# Patient Record
Sex: FEMALE | Race: WHITE | NOT HISPANIC OR LATINO
[De-identification: names, ages, dates, MRNs, and addresses within clinical notes are randomized per-mention and may not be internally consistent; named-entity substitution may affect disease eponyms.]

---

## 2017-03-08 ENCOUNTER — RX RENEWAL (OUTPATIENT)
Age: 67
End: 2017-03-08

## 2017-05-22 ENCOUNTER — APPOINTMENT (OUTPATIENT)
Dept: INTERNAL MEDICINE | Facility: CLINIC | Age: 67
End: 2017-05-22

## 2017-05-22 RX ORDER — DIPHENOXYLATE HYDROCHLORIDE AND ATROPINE SULFATE 2.5; .025 MG/1; MG/1
2.5-0.025 TABLET ORAL 4 TIMES DAILY
Refills: 0 | Status: ACTIVE | COMMUNITY
Start: 2017-05-22

## 2017-05-24 LAB
ALBUMIN SERPL ELPH-MCNC: 4 G/DL
ALP BLD-CCNC: 95 U/L
ALT SERPL-CCNC: 27 U/L
ANION GAP SERPL CALC-SCNC: 17 MMOL/L
AST SERPL-CCNC: 35 U/L
BASOPHILS # BLD AUTO: 0.02 K/UL
BASOPHILS NFR BLD AUTO: 0.3 %
BILIRUB SERPL-MCNC: 0.5 MG/DL
BUN SERPL-MCNC: 19 MG/DL
CALCIUM SERPL-MCNC: 10.2 MG/DL
CHLORIDE SERPL-SCNC: 98 MMOL/L
CHOLEST SERPL-MCNC: 190 MG/DL
CHOLEST/HDLC SERPL: 1.9 RATIO
CO2 SERPL-SCNC: 23 MMOL/L
CREAT SERPL-MCNC: 0.67 MG/DL
EOSINOPHIL # BLD AUTO: 0.06 K/UL
EOSINOPHIL NFR BLD AUTO: 0.8 %
FERRITIN SERPL-MCNC: 22 NG/ML
FOLATE SERPL-MCNC: >20 NG/ML
GLUCOSE SERPL-MCNC: 72 MG/DL
HCT VFR BLD CALC: 39.5 %
HDLC SERPL-MCNC: 102 MG/DL
HGB BLD-MCNC: 11.9 G/DL
IMM GRANULOCYTES NFR BLD AUTO: 0.1 %
IRON SATN MFR SERPL: 18 %
IRON SERPL-MCNC: 76 UG/DL
LDLC SERPL CALC-MCNC: 62 MG/DL
LYMPHOCYTES # BLD AUTO: 1.42 K/UL
LYMPHOCYTES NFR BLD AUTO: 17.8 %
MAGNESIUM SERPL-MCNC: 1.9 MG/DL
MAN DIFF?: NORMAL
MCHC RBC-ENTMCNC: 26.6 PG
MCHC RBC-ENTMCNC: 30.1 GM/DL
MCV RBC AUTO: 88.4 FL
MONOCYTES # BLD AUTO: 0.53 K/UL
MONOCYTES NFR BLD AUTO: 6.6 %
NEUTROPHILS # BLD AUTO: 5.93 K/UL
NEUTROPHILS NFR BLD AUTO: 74.4 %
PLATELET # BLD AUTO: 341 K/UL
POTASSIUM SERPL-SCNC: 5.3 MMOL/L
PROT SERPL-MCNC: 7.8 G/DL
RBC # BLD: 4.47 M/UL
RBC # FLD: 18 %
SODIUM SERPL-SCNC: 138 MMOL/L
TIBC SERPL-MCNC: 418 UG/DL
TRIGL SERPL-MCNC: 129 MG/DL
UIBC SERPL-MCNC: 342 UG/DL
VIT B12 SERPL-MCNC: 650 PG/ML
WBC # FLD AUTO: 7.97 K/UL

## 2017-05-25 LAB — ZINC SERPL-MCNC: 67 UG/DL

## 2017-06-05 ENCOUNTER — APPOINTMENT (OUTPATIENT)
Dept: INTERNAL MEDICINE | Facility: CLINIC | Age: 67
End: 2017-06-05

## 2017-06-05 VITALS — WEIGHT: 94.5 LBS | BODY MASS INDEX: 19.75 KG/M2

## 2017-06-16 LAB
ANION GAP SERPL CALC-SCNC: 18 MMOL/L
BUN SERPL-MCNC: 15 MG/DL
CALCIUM SERPL-MCNC: 10.2 MG/DL
CHLORIDE SERPL-SCNC: 99 MMOL/L
CO2 SERPL-SCNC: 23 MMOL/L
CREAT SERPL-MCNC: 0.71 MG/DL
GLUCOSE SERPL-MCNC: 89 MG/DL
MAGNESIUM SERPL-MCNC: 2 MG/DL
POTASSIUM SERPL-SCNC: 5.1 MMOL/L
SODIUM SERPL-SCNC: 140 MMOL/L

## 2017-07-05 ENCOUNTER — APPOINTMENT (OUTPATIENT)
Dept: INTERNAL MEDICINE | Facility: CLINIC | Age: 67
End: 2017-07-05

## 2017-07-05 DIAGNOSIS — M85.80 OTHER SPECIFIED DISORDERS OF BONE DENSITY AND STRUCTURE, UNSPECIFIED SITE: ICD-10-CM

## 2017-07-06 VITALS — HEART RATE: 78 BPM | SYSTOLIC BLOOD PRESSURE: 120 MMHG | DIASTOLIC BLOOD PRESSURE: 70 MMHG | RESPIRATION RATE: 14 BRPM

## 2017-08-21 ENCOUNTER — LABORATORY RESULT (OUTPATIENT)
Age: 67
End: 2017-08-21

## 2017-08-25 ENCOUNTER — APPOINTMENT (OUTPATIENT)
Dept: INTERNAL MEDICINE | Facility: CLINIC | Age: 67
End: 2017-08-25
Payer: MEDICARE

## 2017-08-25 PROCEDURE — 99214 OFFICE O/P EST MOD 30 MIN: CPT

## 2017-08-25 RX ORDER — POTASSIUM CHLORIDE 1500 MG/1
20 TABLET, FILM COATED, EXTENDED RELEASE ORAL
Qty: 30 | Refills: 2 | Status: DISCONTINUED | COMMUNITY
Start: 2017-05-22 | End: 2017-08-25

## 2017-09-13 ENCOUNTER — APPOINTMENT (OUTPATIENT)
Dept: SURGERY | Facility: CLINIC | Age: 67
End: 2017-09-13
Payer: MEDICARE

## 2017-09-13 VITALS
HEIGHT: 58 IN | HEART RATE: 86 BPM | WEIGHT: 95 LBS | RESPIRATION RATE: 15 BRPM | BODY MASS INDEX: 19.94 KG/M2 | SYSTOLIC BLOOD PRESSURE: 113 MMHG | DIASTOLIC BLOOD PRESSURE: 73 MMHG | OXYGEN SATURATION: 98 % | TEMPERATURE: 98.3 F

## 2017-09-13 DIAGNOSIS — K62.3 RECTAL PROLAPSE: ICD-10-CM

## 2017-09-13 DIAGNOSIS — Z87.19 PERSONAL HISTORY OF OTHER DISEASES OF THE DIGESTIVE SYSTEM: ICD-10-CM

## 2017-09-13 PROCEDURE — 99215 OFFICE O/P EST HI 40 MIN: CPT

## 2017-09-13 RX ORDER — IRON/IRON ASP GLY/FA/MV-MIN 38 125-25-1MG
TABLET ORAL
Refills: 0 | Status: ACTIVE | COMMUNITY

## 2017-09-25 ENCOUNTER — RX RENEWAL (OUTPATIENT)
Age: 67
End: 2017-09-25

## 2017-10-07 ENCOUNTER — LABORATORY RESULT (OUTPATIENT)
Age: 67
End: 2017-10-07

## 2017-10-12 ENCOUNTER — MEDICATION RENEWAL (OUTPATIENT)
Age: 67
End: 2017-10-12

## 2017-10-12 ENCOUNTER — APPOINTMENT (OUTPATIENT)
Dept: INTERNAL MEDICINE | Facility: CLINIC | Age: 67
End: 2017-10-12
Payer: MEDICARE

## 2017-10-12 VITALS — RESPIRATION RATE: 14 BRPM | HEART RATE: 72 BPM | DIASTOLIC BLOOD PRESSURE: 70 MMHG | SYSTOLIC BLOOD PRESSURE: 110 MMHG

## 2017-10-12 DIAGNOSIS — Z87.39 PERSONAL HISTORY OF OTHER DISEASES OF THE MUSCULOSKELETAL SYSTEM AND CONNECTIVE TISSUE: ICD-10-CM

## 2017-10-12 PROCEDURE — 90662 IIV NO PRSV INCREASED AG IM: CPT

## 2017-10-12 PROCEDURE — G0008: CPT

## 2017-10-12 PROCEDURE — 99214 OFFICE O/P EST MOD 30 MIN: CPT | Mod: 25

## 2017-10-13 LAB
APPEARANCE: CLEAR
BILIRUBIN URINE: NEGATIVE
BLOOD URINE: NEGATIVE
COLOR: YELLOW
GLUCOSE QUALITATIVE U: NEGATIVE MG/DL
KETONES URINE: NEGATIVE
LEUKOCYTE ESTERASE URINE: NEGATIVE
NITRITE URINE: NEGATIVE
PH URINE: 6
PROTEIN URINE: NEGATIVE MG/DL
SPECIFIC GRAVITY URINE: 1.01
UROBILINOGEN URINE: NEGATIVE MG/DL

## 2017-10-17 ENCOUNTER — TRANSCRIPTION ENCOUNTER (OUTPATIENT)
Age: 67
End: 2017-10-17

## 2017-10-28 ENCOUNTER — MEDICATION RENEWAL (OUTPATIENT)
Age: 67
End: 2017-10-28

## 2017-12-01 ENCOUNTER — NON-APPOINTMENT (OUTPATIENT)
Age: 67
End: 2017-12-01

## 2017-12-01 ENCOUNTER — APPOINTMENT (OUTPATIENT)
Dept: INTERNAL MEDICINE | Facility: CLINIC | Age: 67
End: 2017-12-01
Payer: MEDICARE

## 2017-12-01 PROCEDURE — 99497 ADVNCD CARE PLAN 30 MIN: CPT | Mod: 33

## 2017-12-01 PROCEDURE — 93000 ELECTROCARDIOGRAM COMPLETE: CPT

## 2017-12-01 PROCEDURE — 99214 OFFICE O/P EST MOD 30 MIN: CPT | Mod: 25

## 2017-12-01 PROCEDURE — G0439: CPT

## 2017-12-01 RX ORDER — LEVOTHYROXINE SODIUM 0.11 MG/1
112 TABLET ORAL
Qty: 90 | Refills: 2 | Status: DISCONTINUED | COMMUNITY
Start: 2017-05-22 | End: 2017-12-01

## 2017-12-05 ENCOUNTER — TRANSCRIPTION ENCOUNTER (OUTPATIENT)
Age: 67
End: 2017-12-05

## 2018-02-20 ENCOUNTER — RX RENEWAL (OUTPATIENT)
Age: 68
End: 2018-02-20

## 2018-02-23 ENCOUNTER — RX RENEWAL (OUTPATIENT)
Age: 68
End: 2018-02-23

## 2018-02-28 ENCOUNTER — RX RENEWAL (OUTPATIENT)
Age: 68
End: 2018-02-28

## 2018-03-26 ENCOUNTER — RX RENEWAL (OUTPATIENT)
Age: 68
End: 2018-03-26

## 2018-04-16 ENCOUNTER — MEDICATION RENEWAL (OUTPATIENT)
Age: 68
End: 2018-04-16

## 2018-04-18 ENCOUNTER — APPOINTMENT (OUTPATIENT)
Dept: ORTHOPEDIC SURGERY | Facility: CLINIC | Age: 68
End: 2018-04-18
Payer: MEDICARE

## 2018-04-18 VITALS
BODY MASS INDEX: 20.43 KG/M2 | SYSTOLIC BLOOD PRESSURE: 111 MMHG | HEART RATE: 100 BPM | HEIGHT: 58.5 IN | WEIGHT: 100 LBS | DIASTOLIC BLOOD PRESSURE: 65 MMHG

## 2018-04-18 PROCEDURE — 72110 X-RAY EXAM L-2 SPINE 4/>VWS: CPT

## 2018-04-18 PROCEDURE — 99204 OFFICE O/P NEW MOD 45 MIN: CPT

## 2018-04-18 PROCEDURE — 72170 X-RAY EXAM OF PELVIS: CPT | Mod: 59

## 2018-04-18 RX ORDER — NYSTATIN 100000 [USP'U]/G
100000 POWDER TOPICAL
Qty: 60 | Refills: 0 | Status: ACTIVE | COMMUNITY
Start: 2017-06-08

## 2018-05-01 ENCOUNTER — APPOINTMENT (OUTPATIENT)
Dept: INTERNAL MEDICINE | Facility: CLINIC | Age: 68
End: 2018-05-01
Payer: MEDICARE

## 2018-05-01 ENCOUNTER — APPOINTMENT (OUTPATIENT)
Dept: ORTHOPEDIC SURGERY | Facility: CLINIC | Age: 68
End: 2018-05-01
Payer: MEDICARE

## 2018-05-01 VITALS
WEIGHT: 100 LBS | HEART RATE: 92 BPM | BODY MASS INDEX: 20.43 KG/M2 | HEIGHT: 58.5 IN | DIASTOLIC BLOOD PRESSURE: 68 MMHG | SYSTOLIC BLOOD PRESSURE: 105 MMHG

## 2018-05-01 PROCEDURE — 36415 COLL VENOUS BLD VENIPUNCTURE: CPT

## 2018-05-01 PROCEDURE — 73630 X-RAY EXAM OF FOOT: CPT | Mod: 50

## 2018-05-01 PROCEDURE — 99214 OFFICE O/P EST MOD 30 MIN: CPT

## 2018-05-02 LAB
25(OH)D3 SERPL-MCNC: 27.4 NG/ML
ALBUMIN SERPL ELPH-MCNC: 4 G/DL
ALP BLD-CCNC: 131 U/L
ALT SERPL-CCNC: 28 U/L
ANION GAP SERPL CALC-SCNC: 14 MMOL/L
AST SERPL-CCNC: 41 U/L
BASOPHILS # BLD AUTO: 0.03 K/UL
BASOPHILS NFR BLD AUTO: 0.5 %
BILIRUB SERPL-MCNC: 0.6 MG/DL
BUN SERPL-MCNC: 15 MG/DL
CALCIUM SERPL-MCNC: 10 MG/DL
CHLORIDE SERPL-SCNC: 96 MMOL/L
CHOLEST SERPL-MCNC: 203 MG/DL
CHOLEST/HDLC SERPL: 1.8 RATIO
CO2 SERPL-SCNC: 26 MMOL/L
CREAT SERPL-MCNC: 0.86 MG/DL
EOSINOPHIL # BLD AUTO: 0.09 K/UL
EOSINOPHIL NFR BLD AUTO: 1.4 %
FERRITIN SERPL-MCNC: 50 NG/ML
FOLATE SERPL-MCNC: >20 NG/ML
GLUCOSE SERPL-MCNC: 79 MG/DL
HCT VFR BLD CALC: 38.3 %
HDLC SERPL-MCNC: 112 MG/DL
HGB BLD-MCNC: 12.3 G/DL
IMM GRANULOCYTES NFR BLD AUTO: 0.2 %
IRON SATN MFR SERPL: 21 %
IRON SERPL-MCNC: 83 UG/DL
LDLC SERPL CALC-MCNC: 66 MG/DL
LYMPHOCYTES # BLD AUTO: 1.32 K/UL
LYMPHOCYTES NFR BLD AUTO: 20.8 %
MAGNESIUM SERPL-MCNC: 1.7 MG/DL
MAN DIFF?: NORMAL
MCHC RBC-ENTMCNC: 28.7 PG
MCHC RBC-ENTMCNC: 32.1 GM/DL
MCV RBC AUTO: 89.3 FL
MONOCYTES # BLD AUTO: 0.43 K/UL
MONOCYTES NFR BLD AUTO: 6.8 %
NEUTROPHILS # BLD AUTO: 4.46 K/UL
NEUTROPHILS NFR BLD AUTO: 70.3 %
PLATELET # BLD AUTO: 240 K/UL
POTASSIUM SERPL-SCNC: 4.5 MMOL/L
PROT SERPL-MCNC: 7.7 G/DL
RBC # BLD: 4.29 M/UL
RBC # FLD: 14.5 %
SODIUM SERPL-SCNC: 136 MMOL/L
T4 SERPL-MCNC: 9.9 UG/DL
TIBC SERPL-MCNC: 401 UG/DL
TRIGL SERPL-MCNC: 125 MG/DL
TSH SERPL-ACNC: 0.88 UIU/ML
UIBC SERPL-MCNC: 318 UG/DL
VIT B12 SERPL-MCNC: 498 PG/ML
WBC # FLD AUTO: 6.34 K/UL

## 2018-05-04 LAB — ZINC SERPL-MCNC: 130 UG/DL

## 2018-05-07 ENCOUNTER — RX RENEWAL (OUTPATIENT)
Age: 68
End: 2018-05-07

## 2018-05-09 ENCOUNTER — APPOINTMENT (OUTPATIENT)
Dept: ORTHOPEDIC SURGERY | Facility: CLINIC | Age: 68
End: 2018-05-09
Payer: MEDICARE

## 2018-05-09 VITALS
BODY MASS INDEX: 20.43 KG/M2 | WEIGHT: 100 LBS | SYSTOLIC BLOOD PRESSURE: 100 MMHG | HEART RATE: 89 BPM | HEIGHT: 58.5 IN | DIASTOLIC BLOOD PRESSURE: 65 MMHG

## 2018-05-09 DIAGNOSIS — M43.10 SPONDYLOLISTHESIS, SITE UNSPECIFIED: ICD-10-CM

## 2018-05-09 PROCEDURE — 99214 OFFICE O/P EST MOD 30 MIN: CPT

## 2018-05-10 ENCOUNTER — APPOINTMENT (OUTPATIENT)
Dept: INTERNAL MEDICINE | Facility: CLINIC | Age: 68
End: 2018-05-10
Payer: MEDICARE

## 2018-05-10 VITALS — HEIGHT: 58.5 IN | WEIGHT: 100 LBS | BODY MASS INDEX: 20.43 KG/M2

## 2018-05-10 VITALS — HEART RATE: 78 BPM | SYSTOLIC BLOOD PRESSURE: 110 MMHG | DIASTOLIC BLOOD PRESSURE: 70 MMHG | RESPIRATION RATE: 14 BRPM

## 2018-05-10 DIAGNOSIS — M54.5 LOW BACK PAIN: ICD-10-CM

## 2018-05-10 PROCEDURE — 99214 OFFICE O/P EST MOD 30 MIN: CPT

## 2018-05-10 RX ORDER — AMOXICILLIN AND CLAVULANATE POTASSIUM 875; 125 MG/1; MG/1
875-125 TABLET, COATED ORAL
Qty: 20 | Refills: 0 | Status: DISCONTINUED | COMMUNITY
Start: 2017-09-24 | End: 2018-05-10

## 2018-05-10 RX ORDER — METHOCARBAMOL 500 MG/1
500 TABLET, FILM COATED ORAL 3 TIMES DAILY
Qty: 30 | Refills: 0 | Status: DISCONTINUED | COMMUNITY
Start: 2018-04-18 | End: 2018-05-10

## 2018-05-12 ENCOUNTER — FORM ENCOUNTER (OUTPATIENT)
Age: 68
End: 2018-05-12

## 2018-05-13 ENCOUNTER — OUTPATIENT (OUTPATIENT)
Dept: OUTPATIENT SERVICES | Facility: HOSPITAL | Age: 68
LOS: 1 days | End: 2018-05-13
Payer: MEDICARE

## 2018-05-13 ENCOUNTER — APPOINTMENT (OUTPATIENT)
Dept: MRI IMAGING | Facility: CLINIC | Age: 68
End: 2018-05-13
Payer: MEDICARE

## 2018-05-13 DIAGNOSIS — Z00.8 ENCOUNTER FOR OTHER GENERAL EXAMINATION: ICD-10-CM

## 2018-05-13 PROCEDURE — 72148 MRI LUMBAR SPINE W/O DYE: CPT | Mod: 26

## 2018-05-13 PROCEDURE — 72148 MRI LUMBAR SPINE W/O DYE: CPT

## 2018-05-14 ENCOUNTER — TRANSCRIPTION ENCOUNTER (OUTPATIENT)
Age: 68
End: 2018-05-14

## 2018-05-23 ENCOUNTER — APPOINTMENT (OUTPATIENT)
Dept: ORTHOPEDIC SURGERY | Facility: CLINIC | Age: 68
End: 2018-05-23
Payer: MEDICARE

## 2018-05-23 VITALS
WEIGHT: 100 LBS | HEIGHT: 58.5 IN | HEART RATE: 67 BPM | SYSTOLIC BLOOD PRESSURE: 102 MMHG | DIASTOLIC BLOOD PRESSURE: 61 MMHG | BODY MASS INDEX: 20.43 KG/M2

## 2018-05-23 PROCEDURE — 99214 OFFICE O/P EST MOD 30 MIN: CPT

## 2018-05-31 ENCOUNTER — MEDICATION RENEWAL (OUTPATIENT)
Age: 68
End: 2018-05-31

## 2018-05-31 RX ORDER — BUPROPION HYDROCHLORIDE 150 MG/1
150 TABLET, EXTENDED RELEASE ORAL
Qty: 30 | Refills: 1 | Status: DISCONTINUED | COMMUNITY
Start: 2018-05-10 | End: 2018-05-31

## 2018-06-05 ENCOUNTER — APPOINTMENT (OUTPATIENT)
Dept: SURGERY | Facility: CLINIC | Age: 68
End: 2018-06-05
Payer: MEDICARE

## 2018-06-05 PROCEDURE — 99213 OFFICE O/P EST LOW 20 MIN: CPT

## 2018-06-10 ENCOUNTER — FORM ENCOUNTER (OUTPATIENT)
Age: 68
End: 2018-06-10

## 2018-06-11 ENCOUNTER — APPOINTMENT (OUTPATIENT)
Dept: MRI IMAGING | Facility: CLINIC | Age: 68
End: 2018-06-11
Payer: MEDICARE

## 2018-06-11 ENCOUNTER — OUTPATIENT (OUTPATIENT)
Dept: OUTPATIENT SERVICES | Facility: HOSPITAL | Age: 68
LOS: 1 days | End: 2018-06-11
Payer: MEDICARE

## 2018-06-11 DIAGNOSIS — Z00.8 ENCOUNTER FOR OTHER GENERAL EXAMINATION: ICD-10-CM

## 2018-06-11 PROCEDURE — 70540 MRI ORBIT/FACE/NECK W/O DYE: CPT | Mod: 26

## 2018-06-11 PROCEDURE — 70540 MRI ORBIT/FACE/NECK W/O DYE: CPT

## 2018-06-15 ENCOUNTER — OTHER (OUTPATIENT)
Age: 68
End: 2018-06-15

## 2018-06-28 ENCOUNTER — APPOINTMENT (OUTPATIENT)
Dept: INTERNAL MEDICINE | Facility: CLINIC | Age: 68
End: 2018-06-28
Payer: MEDICARE

## 2018-06-28 VITALS
WEIGHT: 98 LBS | HEART RATE: 72 BPM | DIASTOLIC BLOOD PRESSURE: 70 MMHG | SYSTOLIC BLOOD PRESSURE: 110 MMHG | BODY MASS INDEX: 20.13 KG/M2 | RESPIRATION RATE: 14 BRPM

## 2018-06-28 DIAGNOSIS — M70.72 OTHER BURSITIS OF HIP, LEFT HIP: ICD-10-CM

## 2018-06-28 PROCEDURE — 99214 OFFICE O/P EST MOD 30 MIN: CPT

## 2018-06-28 NOTE — ASSESSMENT
[FreeTextEntry1] : Trial of celebrex bid \par Orthopedic evaluation\par \par Continue wellbutrin and other meds\par \par F/U 2 months with BW\par F/U if symptoms do not resolve, or if they should change/worsen.\par

## 2018-06-28 NOTE — HISTORY OF PRESENT ILLNESS
[de-identified] : Pt presents for f/u evaluation of hypothyroidism, low vitamin D, s/p ileostomy, Sjogrens' disease, and depression.\par Feels less emotional on wellbutrin.\par No crying as much.\par Has been seeing orthopedics for her back and foot issues.\par C/O left hip pain radiating down leg. No buttock pain. Better when walking.\par \par

## 2018-06-28 NOTE — PHYSICAL EXAM
[No Acute Distress] : no acute distress [Supple] : supple [No Lymphadenopathy] : no lymphadenopathy [No Respiratory Distress] : no respiratory distress  [Clear to Auscultation] : lungs were clear to auscultation bilaterally [No Accessory Muscle Use] : no accessory muscle use [Normal Rate] : normal rate  [Regular Rhythm] : with a regular rhythm [Normal S1, S2] : normal S1 and S2 [No Edema] : there was no peripheral edema [Soft] : abdomen soft [Non Tender] : non-tender [Non-distended] : non-distended [Normal Bowel Sounds] : normal bowel sounds [de-identified] : illeostomy in place

## 2018-07-02 ENCOUNTER — APPOINTMENT (OUTPATIENT)
Dept: ORTHOPEDIC SURGERY | Facility: CLINIC | Age: 68
End: 2018-07-02
Payer: MEDICARE

## 2018-07-02 VITALS
SYSTOLIC BLOOD PRESSURE: 120 MMHG | WEIGHT: 95 LBS | HEIGHT: 59 IN | HEART RATE: 98 BPM | BODY MASS INDEX: 19.15 KG/M2 | DIASTOLIC BLOOD PRESSURE: 67 MMHG

## 2018-07-02 DIAGNOSIS — M25.551 PAIN IN RIGHT HIP: ICD-10-CM

## 2018-07-02 DIAGNOSIS — M25.552 PAIN IN RIGHT HIP: ICD-10-CM

## 2018-07-02 PROCEDURE — 99215 OFFICE O/P EST HI 40 MIN: CPT

## 2018-07-02 PROCEDURE — 73522 X-RAY EXAM HIPS BI 3-4 VIEWS: CPT

## 2018-07-09 RX ORDER — METRONIDAZOLE 7.5 MG/G
0.75 CREAM TOPICAL TWICE DAILY
Qty: 45 | Refills: 0 | Status: DISCONTINUED | COMMUNITY
Start: 2017-09-27 | End: 2018-07-09

## 2018-07-09 RX ORDER — LEVOTHYROXINE SODIUM 112 UG/1
112 TABLET ORAL
Qty: 90 | Refills: 1 | Status: DISCONTINUED | COMMUNITY
Start: 2017-10-12 | End: 2018-07-09

## 2018-07-09 RX ORDER — MICONAZOLE NITRATE
2 POWDER (GRAM) MISCELLANEOUS
Refills: 0 | Status: DISCONTINUED | COMMUNITY
End: 2018-07-09

## 2018-07-09 RX ORDER — CELECOXIB 100 MG/1
100 CAPSULE ORAL
Qty: 30 | Refills: 0 | Status: DISCONTINUED | COMMUNITY
Start: 2018-06-28 | End: 2018-07-09

## 2018-07-09 RX ORDER — LEVOTHYROXINE SODIUM 112 UG/1
112 TABLET ORAL
Refills: 0 | Status: DISCONTINUED | COMMUNITY
End: 2018-07-09

## 2018-07-13 ENCOUNTER — RX RENEWAL (OUTPATIENT)
Age: 68
End: 2018-07-13

## 2018-07-23 ENCOUNTER — APPOINTMENT (OUTPATIENT)
Dept: ORTHOPEDIC SURGERY | Facility: CLINIC | Age: 68
End: 2018-07-23

## 2018-08-01 ENCOUNTER — APPOINTMENT (OUTPATIENT)
Dept: ORTHOPEDIC SURGERY | Facility: CLINIC | Age: 68
End: 2018-08-01
Payer: MEDICARE

## 2018-08-01 VITALS
BODY MASS INDEX: 19.35 KG/M2 | HEIGHT: 59 IN | HEART RATE: 70 BPM | SYSTOLIC BLOOD PRESSURE: 97 MMHG | WEIGHT: 96 LBS | DIASTOLIC BLOOD PRESSURE: 61 MMHG

## 2018-08-01 DIAGNOSIS — G57.02 LESION OF SCIATIC NERVE, LEFT LOWER LIMB: ICD-10-CM

## 2018-08-01 PROCEDURE — 99214 OFFICE O/P EST MOD 30 MIN: CPT

## 2018-08-02 ENCOUNTER — LABORATORY RESULT (OUTPATIENT)
Age: 68
End: 2018-08-02

## 2018-08-02 ENCOUNTER — INPATIENT (INPATIENT)
Facility: HOSPITAL | Age: 68
LOS: 1 days | Discharge: HOME | DRG: 682 | End: 2018-08-04
Attending: STUDENT IN AN ORGANIZED HEALTH CARE EDUCATION/TRAINING PROGRAM | Admitting: INTERNAL MEDICINE
Payer: MEDICARE

## 2018-08-02 VITALS
SYSTOLIC BLOOD PRESSURE: 147 MMHG | DIASTOLIC BLOOD PRESSURE: 60 MMHG | HEART RATE: 88 BPM | TEMPERATURE: 97 F | RESPIRATION RATE: 16 BRPM | OXYGEN SATURATION: 98 %

## 2018-08-02 DIAGNOSIS — E87.5 HYPERKALEMIA: ICD-10-CM

## 2018-08-02 LAB
ALBUMIN SERPL ELPH-MCNC: 4 G/DL — SIGNIFICANT CHANGE UP (ref 3.3–5)
ALP SERPL-CCNC: 123 U/L — HIGH (ref 40–120)
ALT FLD-CCNC: 35 U/L — SIGNIFICANT CHANGE UP (ref 10–45)
ANION GAP SERPL CALC-SCNC: 10 MMOL/L — SIGNIFICANT CHANGE UP (ref 5–17)
ANION GAP SERPL CALC-SCNC: 13 MMOL/L — SIGNIFICANT CHANGE UP (ref 5–17)
APPEARANCE UR: CLEAR — SIGNIFICANT CHANGE UP
AST SERPL-CCNC: 97 U/L — HIGH (ref 10–40)
BASOPHILS # BLD AUTO: 0.1 K/UL — SIGNIFICANT CHANGE UP (ref 0–0.2)
BASOPHILS NFR BLD AUTO: 0.8 % — SIGNIFICANT CHANGE UP (ref 0–2)
BILIRUB SERPL-MCNC: 0.3 MG/DL — SIGNIFICANT CHANGE UP (ref 0.2–1.2)
BILIRUB UR-MCNC: NEGATIVE — SIGNIFICANT CHANGE UP
BUN SERPL-MCNC: 48 MG/DL — HIGH (ref 7–23)
BUN SERPL-MCNC: 50 MG/DL — HIGH (ref 7–23)
CALCIUM SERPL-MCNC: 9.5 MG/DL — SIGNIFICANT CHANGE UP (ref 8.4–10.5)
CALCIUM SERPL-MCNC: 9.6 MG/DL — SIGNIFICANT CHANGE UP (ref 8.4–10.5)
CHLORIDE SERPL-SCNC: 88 MMOL/L — LOW (ref 96–108)
CHLORIDE SERPL-SCNC: 88 MMOL/L — LOW (ref 96–108)
CO2 SERPL-SCNC: 24 MMOL/L — SIGNIFICANT CHANGE UP (ref 22–31)
CO2 SERPL-SCNC: 24 MMOL/L — SIGNIFICANT CHANGE UP (ref 22–31)
COLOR SPEC: YELLOW — SIGNIFICANT CHANGE UP
CREAT ?TM UR-MCNC: 29 MG/DL — SIGNIFICANT CHANGE UP
CREAT SERPL-MCNC: 1.65 MG/DL — HIGH (ref 0.5–1.3)
CREAT SERPL-MCNC: 1.74 MG/DL — HIGH (ref 0.5–1.3)
DIFF PNL FLD: NEGATIVE — SIGNIFICANT CHANGE UP
EOSINOPHIL # BLD AUTO: 0.1 K/UL — SIGNIFICANT CHANGE UP (ref 0–0.5)
EOSINOPHIL NFR BLD AUTO: 1 % — SIGNIFICANT CHANGE UP (ref 0–6)
GAS PNL BLDV: SIGNIFICANT CHANGE UP
GLUCOSE SERPL-MCNC: 91 MG/DL — SIGNIFICANT CHANGE UP (ref 70–99)
GLUCOSE SERPL-MCNC: 95 MG/DL — SIGNIFICANT CHANGE UP (ref 70–99)
GLUCOSE UR QL: NEGATIVE — SIGNIFICANT CHANGE UP
HCT VFR BLD CALC: 38.4 % — SIGNIFICANT CHANGE UP (ref 34.5–45)
HGB BLD-MCNC: 13.4 G/DL — SIGNIFICANT CHANGE UP (ref 11.5–15.5)
KETONES UR-MCNC: NEGATIVE — SIGNIFICANT CHANGE UP
LEUKOCYTE ESTERASE UR-ACNC: NEGATIVE — SIGNIFICANT CHANGE UP
LYMPHOCYTES # BLD AUTO: 1.4 K/UL — SIGNIFICANT CHANGE UP (ref 1–3.3)
LYMPHOCYTES # BLD AUTO: 17.4 % — SIGNIFICANT CHANGE UP (ref 13–44)
MCHC RBC-ENTMCNC: 29.3 PG — SIGNIFICANT CHANGE UP (ref 27–34)
MCHC RBC-ENTMCNC: 34.8 GM/DL — SIGNIFICANT CHANGE UP (ref 32–36)
MCV RBC AUTO: 84.2 FL — SIGNIFICANT CHANGE UP (ref 80–100)
MONOCYTES # BLD AUTO: 0.6 K/UL — SIGNIFICANT CHANGE UP (ref 0–0.9)
MONOCYTES NFR BLD AUTO: 7.4 % — SIGNIFICANT CHANGE UP (ref 2–14)
NEUTROPHILS # BLD AUTO: 6.1 K/UL — SIGNIFICANT CHANGE UP (ref 1.8–7.4)
NEUTROPHILS NFR BLD AUTO: 73.4 % — SIGNIFICANT CHANGE UP (ref 43–77)
NITRITE UR-MCNC: NEGATIVE — SIGNIFICANT CHANGE UP
OSMOLALITY SERPL: 282 MOS/KG — SIGNIFICANT CHANGE UP (ref 275–300)
OSMOLALITY UR: 262 MOS/KG — LOW (ref 300–900)
PH UR: 7 — SIGNIFICANT CHANGE UP (ref 5–8)
PLATELET # BLD AUTO: 263 K/UL — SIGNIFICANT CHANGE UP (ref 150–400)
POTASSIUM SERPL-MCNC: 6.5 MMOL/L — CRITICAL HIGH (ref 3.5–5.3)
POTASSIUM SERPL-MCNC: 8.6 MMOL/L — CRITICAL HIGH (ref 3.5–5.3)
POTASSIUM SERPL-SCNC: 6.5 MMOL/L — CRITICAL HIGH (ref 3.5–5.3)
POTASSIUM SERPL-SCNC: 8.6 MMOL/L — CRITICAL HIGH (ref 3.5–5.3)
POTASSIUM UR-SCNC: 57 MMOL/L — SIGNIFICANT CHANGE UP
PROT SERPL-MCNC: 8.4 G/DL — HIGH (ref 6–8.3)
PROT UR-MCNC: NEGATIVE — SIGNIFICANT CHANGE UP
RBC # BLD: 4.56 M/UL — SIGNIFICANT CHANGE UP (ref 3.8–5.2)
RBC # FLD: 11.9 % — SIGNIFICANT CHANGE UP (ref 10.3–14.5)
SODIUM SERPL-SCNC: 122 MMOL/L — LOW (ref 135–145)
SODIUM SERPL-SCNC: 125 MMOL/L — LOW (ref 135–145)
SODIUM UR-SCNC: <20 MMOL/L — SIGNIFICANT CHANGE UP
SP GR SPEC: 1.01 — SIGNIFICANT CHANGE UP (ref 1.01–1.02)
UROBILINOGEN FLD QL: NEGATIVE — SIGNIFICANT CHANGE UP
WBC # BLD: 8.3 K/UL — SIGNIFICANT CHANGE UP (ref 3.8–10.5)
WBC # FLD AUTO: 8.3 K/UL — SIGNIFICANT CHANGE UP (ref 3.8–10.5)

## 2018-08-02 PROCEDURE — 93010 ELECTROCARDIOGRAM REPORT: CPT

## 2018-08-02 PROCEDURE — 99223 1ST HOSP IP/OBS HIGH 75: CPT

## 2018-08-02 PROCEDURE — 99285 EMERGENCY DEPT VISIT HI MDM: CPT | Mod: GC,25

## 2018-08-02 RX ORDER — MULTIVIT-MIN/FERROUS GLUCONATE 9 MG/15 ML
1 LIQUID (ML) ORAL
Qty: 0 | Refills: 0 | COMMUNITY

## 2018-08-02 RX ORDER — INSULIN HUMAN 100 [IU]/ML
6 INJECTION, SOLUTION SUBCUTANEOUS ONCE
Qty: 0 | Refills: 0 | Status: COMPLETED | OUTPATIENT
Start: 2018-08-02 | End: 2018-08-02

## 2018-08-02 RX ORDER — DEXTROSE 50 % IN WATER 50 %
100 SYRINGE (ML) INTRAVENOUS ONCE
Qty: 0 | Refills: 0 | Status: DISCONTINUED | OUTPATIENT
Start: 2018-08-02 | End: 2018-08-02

## 2018-08-02 RX ORDER — INSULIN HUMAN 100 [IU]/ML
10 INJECTION, SOLUTION SUBCUTANEOUS ONCE
Qty: 0 | Refills: 0 | Status: DISCONTINUED | OUTPATIENT
Start: 2018-08-02 | End: 2018-08-02

## 2018-08-02 RX ORDER — SODIUM CHLORIDE 9 MG/ML
1000 INJECTION INTRAMUSCULAR; INTRAVENOUS; SUBCUTANEOUS ONCE
Qty: 0 | Refills: 0 | Status: COMPLETED | OUTPATIENT
Start: 2018-08-02 | End: 2018-08-02

## 2018-08-02 RX ORDER — DEXTROSE 50 % IN WATER 50 %
50 SYRINGE (ML) INTRAVENOUS ONCE
Qty: 0 | Refills: 0 | Status: COMPLETED | OUTPATIENT
Start: 2018-08-02 | End: 2018-08-02

## 2018-08-02 RX ADMIN — SODIUM CHLORIDE 2000 MILLILITER(S): 9 INJECTION INTRAMUSCULAR; INTRAVENOUS; SUBCUTANEOUS at 21:01

## 2018-08-02 RX ADMIN — INSULIN HUMAN 6 UNIT(S): 100 INJECTION, SOLUTION SUBCUTANEOUS at 22:09

## 2018-08-02 RX ADMIN — SODIUM CHLORIDE 2000 MILLILITER(S): 9 INJECTION INTRAMUSCULAR; INTRAVENOUS; SUBCUTANEOUS at 22:17

## 2018-08-02 RX ADMIN — Medication 50 MILLILITER(S): at 22:10

## 2018-08-02 NOTE — H&P ADULT - NSHPREVIEWOFSYSTEMS_GEN_ALL_CORE
Review of Systems:   CONSTITUTIONAL: No fever, weight loss, or fatigue  EYES: No eye pain, visual disturbances, or discharge  ENMT:  No difficulty hearing, tinnitus, vertigo; No sinus or throat pain  NECK: No pain or stiffness  BREASTS: No pain, masses, or nipple discharge  RESPIRATORY: No cough, wheezing, chills or hemoptysis; No shortness of breath  CARDIOVASCULAR: No chest pain, palpitations, dizziness, or leg swelling  GASTROINTESTINAL: No abdominal or epigastric pain. No nausea, vomiting, or hematemesis; No diarrhea or constipation. No melena or hematochezia.  GENITOURINARY: No dysuria, frequency, hematuria, or incontinence  NEUROLOGICAL: No headaches, memory loss, loss of strength, numbness, or tremors  SKIN: No itching, burning, rashes, or lesions   LYMPH NODES: No enlarged glands  ENDOCRINE: No heat or cold intolerance; No hair loss  MUSCULOSKELETAL: No joint pain or swelling; No muscle, back, or extremity pain  PSYCHIATRIC: No depression, anxiety, mood swings, or difficulty sleeping  HEME/LYMPH: No easy bruising, or bleeding gums  ALLERY AND IMMUNOLOGIC: No hives or eczema  [ ] all other systems negative or as per HPI Review of Systems:   CONSTITUTIONAL: +WEIGHT LOSS, FATIGUE; No fever  EYES: No eye pain, visual disturbances, or discharge  ENMT:  No difficulty hearing, tinnitus, vertigo; No sinus or throat pain  NECK: No pain or stiffness  BREASTS: No pain, masses, or nipple discharge  RESPIRATORY: No cough, wheezing, chills or hemoptysis; No shortness of breath  CARDIOVASCULAR: No chest pain, palpitations, dizziness, or leg swelling  GASTROINTESTINAL: +GERD, MILD NAUSEA; No abdominal or epigastric pain. No vomiting, or hematemesis; No diarrhea or constipation. No melena or hematochezia.  GENITOURINARY: +URINARY FREQUENCY AND URGENCY; No dysuria, hematuria, or incontinence  NEUROLOGICAL: No headaches, memory loss, loss of strength, numbness, or tremors  SKIN: No itching, burning, rashes, or lesions   LYMPH NODES: No enlarged glands  ENDOCRINE: No heat or cold intolerance; No hair loss  MUSCULOSKELETAL: No joint pain or swelling; No muscle, back, or extremity pain  PSYCHIATRIC: +ANXIETY; No depression, mood swings, or difficulty sleeping  HEME/LYMPH: No easy bruising, or bleeding gums  ALLERY AND IMMUNOLOGIC: No hives or eczema  [X] all other systems negative or as per HPI

## 2018-08-02 NOTE — H&P ADULT - PROBLEM SELECTOR PLAN 5
--will continue home anti-motility agents as she experiences rapid volume loss from ileostomy when she is off them. Will have to balance this output with need to lower hyperkalemia. Can consider holding one to two doses of loperamide and lomotil if K remains >6.0. --will continue home anti-motility agents as she experiences rapid volume loss from ileostomy when she is off them. Will have to balance this output with need to lower hyperkalemia. Can consider holding one to two doses of loperamide and lomotil if K remains >6.0.  --Patient's GI physician Dr. White asked to be notified of admission.

## 2018-08-02 NOTE — H&P ADULT - NSHPPHYSICALEXAM_GEN_ALL_CORE
Vital Signs Last 24 Hrs  T(C): 36.8 (08-02-18 @ 22:29)  T(F): 98.2 (08-02-18 @ 22:29), Max: 98.2 (08-02-18 @ 22:29)  HR: 86 (08-02-18 @ 22:29) (81 - 88)  BP: 125/85 (08-02-18 @ 22:29)  BP(mean): --  RR: 17 (08-02-18 @ 22:29) (16 - 17)  SpO2: 100% (08-02-18 @ 22:29) (98% - 100%)  Wt(kg): --    CAPILLARY BLOOD GLUCOSE  POCT Blood Glucose.: 129 mg/dL (02 Aug 2018 22:54)    PHYSICAL EXAM:  GENERAL: NAD, well-developed  HEAD:  Atraumatic, Normocephalic  EYES: EOMI, PERRLA, conjunctiva and sclera clear  NECK: Supple, No JVD  CHEST/LUNG: Clear to auscultation bilaterally; No wheeze  HEART: Regular rate and rhythm; No murmurs, rubs, or gallops  ABDOMEN: Soft, Nontender, Nondistended; Bowel sounds present  EXTREMITIES:  2+ Peripheral Pulses, No clubbing, cyanosis, or edema  PSYCH: AAOx3  NEUROLOGY: non-focal  SKIN: No rashes or lesions Vital Signs Last 24 Hrs  T(C): 36.8 (08-02-18 @ 22:29)  T(F): 98.2 (08-02-18 @ 22:29), Max: 98.2 (08-02-18 @ 22:29)  HR: 86 (08-02-18 @ 22:29) (81 - 88)  BP: 125/85 (08-02-18 @ 22:29)  BP(mean): --  RR: 17 (08-02-18 @ 22:29) (16 - 17)  SpO2: 100% (08-02-18 @ 22:29) (98% - 100%)  Wt(kg): --    CAPILLARY BLOOD GLUCOSE  POCT Blood Glucose.: 129 mg/dL (02 Aug 2018 22:54)    PHYSICAL EXAM:  GENERAL: NAD, well-developed, very thin  HEAD:  Atraumatic, Normocephalic, dry mucus membranes  EYES: EOMI, PERRLA, conjunctiva and sclera clear  NECK: Supple, No JVD  CHEST/LUNG: Clear to auscultation bilaterally; No wheeze  HEART: Regular rate and rhythm; No murmurs, rubs, or gallops  ABDOMEN: ileostomy in place; Soft, Nontender, Nondistended; Bowel sounds present  EXTREMITIES:  2+ Peripheral Pulses, No clubbing, cyanosis, or edema  PSYCH: AAOx3, visibly anxious and tremulous at times  NEUROLOGY: non-focal  SKIN: No rashes or lesions

## 2018-08-02 NOTE — H&P ADULT - NSHPLABSRESULTS_GEN_ALL_CORE
EKG, labs, and imaging personally reviewed and interpreted.     EKG: NSR, , QTc 399, QRS 70, no ischemic changes, T waves do not appear peaked    LABS:                13.4   8.3   )-----------( 263      ( 02 Aug 2018 20:03 )             38.4       125<L>  |  88<L>  |  48<H>  ----------------------------<  91  6.5<HH>   |  24  |  1.74<H>    Ca    9.6      02 Aug 2018 21:08  Phos  4.0     -  Mg     1.9         TPro  8.4<H>  /  Alb  4.0  /  TBili  0.3  /  DBili  x   /  AST  97<H>  /  ALT  35  /  AlkPhos  123<H>      Urinalysis Basic - ( 02 Aug 2018 20:15 )    Color: Yellow / Appearance: Clear / S.012 / pH: x  Gluc: x / Ketone: Negative  / Bili: Negative / Urobili: Negative   Blood: x / Protein: Negative / Nitrite: Negative   Leuk Esterase: Negative / RBC: x / WBC x   Sq Epi: x / Non Sq Epi: x / Bacteria: x    RADIOLOGY & ADDITIONAL TESTS:  Imaging Personally Reviewed: none available    Consultant(s) Notes Reviewed:  Yes  Care Discussed with Consultants/Other Providers: Yes EKG, labs, and imaging personally reviewed and interpreted.     EKG: NSR, , QTc 399, QRS 70, no ischemic changes, T waves do not appear peaked    LABS:                13.4   8.3   )-----------( 263      ( 02 Aug 2018 20:03 )             38.4     125<L>  |  88<L>  |  48<H>  ----------------------------<  91  6.5<HH>   |  24  |  1.74<H>    Ca    9.6      02 Aug 2018 21:08  Phos  4.0     -  Mg     1.9         TPro  8.4<H>  /  Alb  4.0  /  TBili  0.3  /  DBili  x   /  AST  97<H>  /  ALT  35  /  AlkPhos  123<H>      Urinalysis Basic - ( 02 Aug 2018 20:15 )    Color: Yellow / Appearance: Clear / S.012 / pH: x  Gluc: x / Ketone: Negative  / Bili: Negative / Urobili: Negative   Blood: x / Protein: Negative / Nitrite: Negative   Leuk Esterase: Negative / RBC: x / WBC x   Sq Epi: x / Non Sq Epi: x / Bacteria: x    RADIOLOGY & ADDITIONAL TESTS:  Imaging Personally Reviewed: none available    Consultant(s) Notes Reviewed:  Yes  Care Discussed with Consultants/Other Providers: Yes

## 2018-08-02 NOTE — H&P ADULT - HISTORY OF PRESENT ILLNESS
67F PMH Sjogrens, SBO s/p ileostomy (2017), anxiety, GERD, sciatica/back pain who was sent to ED by PMD due to abnormal labs (hyperkalemia and VALERIE). She endorses feeling fatigued over the past few months but has been particularly worse the past 3 days. Also noticed urinary urgency during this time, but with very limited urine output the past few days. She has had numerous medication changes since June including starting Wellbutrin a few months ago, but developed poor appetite and weight loss (98 to 92 lbs). Wellbutrin was d/c 2 weeks ago, has not regained weight or recovered appetite. Relying mostly on Ensure. Had previously been on 40mEq of KCl supplement daily, but has been taking 20mEq KCl more recently. Finally, had been relying on Advil for sciatica/piriformis pain, started on celebrex in June as well which she has been taking regularly. Her output from the ileostomy remains unchanged from her baseline (about 1200 cc/24 hours). She denies hematuria, fever, chills, SOB, chest pain, LE swelling.     In ED, noted to have VS WNL. Labs significant for Cr 1.7 (from baseline 0.8 from earlier this year), Na 125 (was 128 this morning outpatient), K 6.5. She received approx 2L IVF and insulin/dextrose.

## 2018-08-02 NOTE — ED PROVIDER NOTE - NS ED ROS FT
REVIEW OF SYSTEMS:    CONSTITUTIONAL: Has generalized weakness, NO fevers or chills  EYES/ENT: No visual changes;  No vertigo or throat pain   NECK: No pain or stiffness  RESPIRATORY: No cough, wheezing, hemoptysis; No shortness of breath  CARDIOVASCULAR: No chest pain or palpitations  GASTROINTESTINAL: No abdominal or epigastric pain. No nausea, vomiting, or hematemesis; No diarrhea or constipation. No melena or hematochezia. Low appetite  GENITOURINARY: Complains of dysuria and low o/p  NEUROLOGICAL: No numbness or weakness  SKIN: No itching, rashes  MSK: No trauma, has history benign tremor

## 2018-08-02 NOTE — ED PROVIDER NOTE - NS_ ATTENDINGSCRIBEDETAILS _ED_A_ED_FT
The scribe's documentation has been prepared under my direction and personally reviewed by me in its entirety. I confirm that the note above accurately reflects all work, treatment, procedures, and medical decision making performed by me.  GARLAND Dowd MD

## 2018-08-02 NOTE — H&P ADULT - PMH
Anxiety    Sciatica Anxiety    Colonic dysmotility    GERD (gastroesophageal reflux disease)    Ileostomy in place  2/2 SBO 2017  Sciatica    Sjogren's syndrome

## 2018-08-02 NOTE — ED ADULT NURSE NOTE - NSIMPLEMENTINTERV_GEN_ALL_ED
Implemented All Universal Safety Interventions:  [place] to call system. Call bell, personal items and telephone within reach. Instruct patient to call for assistance. Room bathroom lighting operational. Non-slip footwear when patient is off stretcher. Physically safe environment: no spills, clutter or unnecessary equipment. Stretcher in lowest position, wheels locked, appropriate side rails in place.

## 2018-08-02 NOTE — ED PROVIDER NOTE - MEDICAL DECISION MAKING DETAILS
68 y/o F pt with PMHx of sjogren' s crest syndrome, anxiety, sciatica presents to the ED for high potassium and creatinine levels on outside labs today. Notes decreased urine output, increased urinary urgency, mild bladder tenderness, otherwise PE is normal. Pt is hemodynamically stable. Plan: labs to evaluate for renal injury, hyperkalemia, and reassess 68 y/o F pt with PMHx of sjogren' s crest syndrome, anxiety, sciatica presents to the ED for high potassium and creatinine levels on outside labs today. Notes decreased urine output, increased urinary urgency, mild bladder tenderness, otherwise PE is normal. Pt is hemodynamically stable. Plan: labs to evaluate for renal injury, hyperkalemia, and reassess. 68 y/o F pt with PMHx of sjogren' s crest syndrome, anxiety, sciatica presents to the ED for high potassium and creatinine levels on outside labs today. Notes decreased urine output, increased urinary urgency, mild bladder tenderness, otherwise PE is normal. Pt is hemodynamically stable. UA negative for infection. Plan: labs to evaluate for renal injury, hyperkalemia, and reassess. 68 y/o F pt with PMHx of sjogren' s crest syndrome, anxiety, sciatica presents to the ED for high potassium and creatinine levels on outside labs today. Notes decreased urine output, increased urinary urgency, mild bladder tenderness, otherwise PE is normal. Pt is hemodynamically stable. UA negative for infection. Plan: labs to evaluate for renal injury, hyperkalemia to 6.5 here in ED and normal EKG, pt given insulin and dextrose. Pt otherwise stable and will be admitted to [facility].

## 2018-08-02 NOTE — ED ADULT NURSE REASSESSMENT NOTE - NS ED NURSE REASSESS COMMENT FT1
pharmacy called in regards to pts home medications being brought up to date. pt ambulating to bathroom. no acute distress noted.

## 2018-08-02 NOTE — ED ADULT NURSE NOTE - CHPI ED NUR SYMPTOMS NEG
no decreased eating/drinking/no fever/no dizziness/no weakness/no nausea/no tingling/no pain/no vomiting/no chills

## 2018-08-02 NOTE — H&P ADULT - PROBLEM SELECTOR PLAN 2
--due to KCl supplementation and VALERIE. Stop KCl supplementation.   --K 6.5 on admission, treated with insulin and dextrose. Will check repeat K.   --admitted to telemetry, has shorted QTc but T waves not appearing hyperacute at this time.   --If K remains elevated on repeat blood work, will need further treatment.

## 2018-08-02 NOTE — ED PROVIDER NOTE - ABDOMINAL EXAM
mid abd ostomy with no output in bag, mild suprapubic tenderness, no abd distension, no rebound, no guarding,

## 2018-08-02 NOTE — ED PROVIDER NOTE - PHYSICAL EXAMINATION
PHYSICAL EXAM:  GENERAL: NAD, looks frail  HEAD:  Atraumatic, Normocephalic  EYES: EOMI, PERRLA, conjunctiva and sclera clear  NECK: Supple, No JVD  CHEST/LUNG: Clear to auscultation bilaterally; No wheeze  HEART: Regular rate and rhythm; No murmurs, rubs, or gallops  ABDOMEN: Soft, Nontender, Nondistended; Bowel sounds present, illeostomy bag present and intact. Mild suprapubic tenderness  EXTREMITIES:  2+ Peripheral Pulses, No clubbing, cyanosis, or edema  PSYCH: AAOx3  NEUROLOGY: non-focal  SKIN: No rashes or lesions

## 2018-08-02 NOTE — H&P ADULT - ASSESSMENT
67F PMH FRANCESCO Ferrell s/p ileostomy (2017), anxiety, GERD, sciatica/back pain who was sent to ED for hyperkalemia and VALERIE. Also found to be hyponatremic. Suspect abnormalities related to hypovolemia and NSAID use, admitted for electrolyte management and evaluation of VALERIE.

## 2018-08-02 NOTE — H&P ADULT - PROBLEM SELECTOR PLAN 3
--mixed picture based on lab studies, but suspect predominantly related to hypovolemic hyponatremia. Received IVF in the ED, still being infused slowly. Despite Na values here of 122 to 125, was actually 128 this morning. Check BMP Q6 hours to avoid rapid overcorrection. Goal is no more than ~130 by tomorrow evening ideally.

## 2018-08-02 NOTE — ED PROVIDER NOTE - ATTENDING CONTRIBUTION TO CARE
68 y/o F pt with PMHx of sjogren' s crest syndrome, anxiety, sciatica presents to the ED for high potassium and creatinine levels on outside labs today. Notes decreased urine output, increased urinary urgency.  Of note patient is s/p partial colectomy and ileostomy (per report by patient) and has been on supplementation, including potassium supplementation, due to this.     On exam, patient is well appearing in NAD, afebrile, VSS.  PERRL, anicteric.  Oropharynx clear, MMM. Lungs CTABL.  Cardiac Nrl s1s2 RRR no MGR.  Abdomen is soft nt/nd.  No CVA tenderness.  No peripheral edema.  No rash noted.   Mild bladder tenderness.  Neuro: no facial asymmetry, moving all extremities equally, ambulates normally.    Pt is hemodynamically stable. UA negative for infection. Plan: labs to evaluate for renal injury, hyperkalemia to 6.5 here in ED with normal EKG that is not suggestive of resultant cardiac dysrhythmia, pt given insulin and dextrose. Pt otherwise stable and will be admitted to [facility].  Hyperkalemia may be secondary to potassium supplementation and or decreased renal clearance; decreased renal clearance likely due to dehydration due to ileostomy, however, DDx also includes intrinsic renal dysfunction, will need further workup and evaluation as inpatient.

## 2018-08-02 NOTE — ED ADULT NURSE NOTE - OBJECTIVE STATEMENT
67 yr old female arrive to the ED form home c/o abd normal lab values. per pt lab work this morning showed a potassium of 6.9 as well as a creatinine of 1.66. pt had ileostomy placed in 2017 due to small bowel obstruction. upon assessment pt is a&ox3, neuro grossly intact, lungs clear sulma, abd soft, non tender to palpation. ileostomy located in RLQ draining with no output in bag. pt reports chronic siatic pain but denies pain at this time.  pt also reports an increase urinary frequency and urgency  as well as dribbling but states when she makes it to the bathroom  a small amount of urine comes out. pt ambulatory with a steady gait. saline lock obtained. call bell within reach.  at bedside

## 2018-08-02 NOTE — ED CLERICAL - NS ED CLERK UNITS
5 day old ex 37 week baby girl, mom with Anti -D antibodies,  vinny positive, being admitted for phothotherapy.    [place] discharged home from nursery on  with bili of 10.6.  Mom states that she is looking more yellow.    BIli in ED 15.6 at 135 HOL (threshold 15).  Breastfeeding.    s/p phototherapy during WBN APER 5 day old ex 37 week baby girl, mom with Anti -D antibodies,  vinny positive, being admitted for phothotherapy.    [place] discharged home from nursery on  with bili of 10.6.  Mom states that she is looking more yellow.    BIli in ED 15.6 at 135 HOL (threshold 15).  Breastfeeding + supplementing.  Taking 2 oz - 100 ml q2-3hrs with some spitting up.  2 voids and 2 stools today.  Seen by Dr. Fontana.     s/p phototherapy during WBN  Active and well appearing 5 day old ex 37 week baby girl, mom with Anti -D antibodies,  vinny positive, being admitted for phothotherapy.    [place] discharged home from nursery on  with bili of 10.6.  Mom states that she is looking more yellow.    BIli in ED 15.6 at 135 HOL (threshold 15).  Breastfeeding + supplementing.  Taking 2 oz - 100 ml q2-3hrs with some spitting up.  2 voids and 2 stools today.  Seen by Dr. Fontana.     s/p phototherapy during WBN  Active and well appearing    wt 2400 (-2% from birth wt)

## 2018-08-02 NOTE — ED PROVIDER NOTE - OBJECTIVE STATEMENT
68 y/o F pt with PMHx of anxiety, sciatica, PSHx of ileostomy (1 year ago s/p obstruction) c/o high potassium at 6.9 on lab. Took potassium supplements this morning. States that pt has been taking different medications after her ileostomy. Notes that pt's been not been feeling well since taking Wellbutrin such as decreased eating/drinking. Noted weight loss, so pt stopped taking it 2 weeks ago. Pt hasn't gained any weight since. Notes that at night, pt will feel the need to urinate and then states that there is very little urinary output. Denies dysuria, pain, palpitations, or any other complaints. NKDA. Current medications: potassium supplements  Internal Medicine: Lee Coley 68 y/o F pt with PMHx of anxiety, sciatica, PSHx of ileostomy (1 year ago s/p obstruction) c/o high potassium at 6.9 on lab. Took potassium supplements this morning. States that pt has been taking different medications after her ileostomy. Notes that pt's been not been feeling well since taking Wellbutrin such as decreased eating/drinking. Noted weight loss, so pt stopped taking it 2 weeks ago. Pt hasn't gained any weight since. Notes that at night, pt will feel the need to urinate and then states that there is very little urinary output. Denies dysuria, pain, palpitations, or any other complaints. NKDA. Current medications: potassium supplements. Also notes increase in Crt from .86 to 1.66 from May lab to this am labs.  Internal Medicine: Lee Coley

## 2018-08-02 NOTE — ED PROVIDER NOTE - ST/T WAVE
No st elevations or depressions.  Grossly normal t wave morphology; t waves do not appear hyperacute.

## 2018-08-02 NOTE — H&P ADULT - NSHPOUTPATIENTPROVIDERS_GEN_ALL_CORE
Dr. Lee Coley (PMD) Dr. Lee Coley (PMD)  Dr. Kirill Monroe (GI) Dr. Lee Coley (PMD)  Dr. Kirill White (GI)

## 2018-08-02 NOTE — H&P ADULT - PROBLEM SELECTOR PLAN 6
--has had very poor PO intake and lost weight over the past few months.  --will c/w protein supplementation and obtain nutrition consult.

## 2018-08-02 NOTE — H&P ADULT - PROBLEM SELECTOR PLAN 1
--FENA <1%, appears related to pre-renal etiology. Patient appears hypovolemic as well and endorses poor PO intake. Differential also includes VALERIE related to NSAID use. Inflammatory causes given her history of autoimmune illnesses (Sjogrens, hypothyroidism) also a possibility.   --Hold NSAIDs. Avoid other nephrotoxins. Renally dose meds (holding pepcid at this time).   --continues to receive 2nd IVF bolus but infusing slowly.   --check urine protein/cr and check urine microscopy.   --will check bladder scan but unlikely to be urinary retention. Will get renal US in the AM.  --will consider nephrology consult after initial work up if not improving.

## 2018-08-03 ENCOUNTER — TRANSCRIPTION ENCOUNTER (OUTPATIENT)
Age: 68
End: 2018-08-03

## 2018-08-03 DIAGNOSIS — M35.00 SJOGREN SYNDROME, UNSPECIFIED: ICD-10-CM

## 2018-08-03 DIAGNOSIS — Z90.49 ACQUIRED ABSENCE OF OTHER SPECIFIED PARTS OF DIGESTIVE TRACT: Chronic | ICD-10-CM

## 2018-08-03 DIAGNOSIS — E43 UNSPECIFIED SEVERE PROTEIN-CALORIE MALNUTRITION: ICD-10-CM

## 2018-08-03 DIAGNOSIS — E87.5 HYPERKALEMIA: ICD-10-CM

## 2018-08-03 DIAGNOSIS — Z29.9 ENCOUNTER FOR PROPHYLACTIC MEASURES, UNSPECIFIED: ICD-10-CM

## 2018-08-03 DIAGNOSIS — M54.32 SCIATICA, LEFT SIDE: ICD-10-CM

## 2018-08-03 DIAGNOSIS — Z93.2 ILEOSTOMY STATUS: ICD-10-CM

## 2018-08-03 DIAGNOSIS — Z90.710 ACQUIRED ABSENCE OF BOTH CERVIX AND UTERUS: Chronic | ICD-10-CM

## 2018-08-03 DIAGNOSIS — N17.9 ACUTE KIDNEY FAILURE, UNSPECIFIED: ICD-10-CM

## 2018-08-03 DIAGNOSIS — E87.1 HYPO-OSMOLALITY AND HYPONATREMIA: ICD-10-CM

## 2018-08-03 LAB
ALBUMIN SERPL ELPH-MCNC: 3 G/DL — LOW (ref 3.3–5)
ALP SERPL-CCNC: 80 U/L — SIGNIFICANT CHANGE UP (ref 40–120)
ALT FLD-CCNC: 15 U/L — SIGNIFICANT CHANGE UP (ref 10–45)
ANION GAP SERPL CALC-SCNC: 10 MMOL/L — SIGNIFICANT CHANGE UP (ref 5–17)
ANION GAP SERPL CALC-SCNC: 10 MMOL/L — SIGNIFICANT CHANGE UP (ref 5–17)
ANION GAP SERPL CALC-SCNC: 5 MMOL/L — SIGNIFICANT CHANGE UP (ref 5–17)
APPEARANCE UR: CLEAR — SIGNIFICANT CHANGE UP
AST SERPL-CCNC: 50 U/L — HIGH (ref 10–40)
BACTERIA # UR AUTO: NEGATIVE — SIGNIFICANT CHANGE UP
BILIRUB SERPL-MCNC: 0.3 MG/DL — SIGNIFICANT CHANGE UP (ref 0.2–1.2)
BILIRUB UR-MCNC: NEGATIVE — SIGNIFICANT CHANGE UP
BUN SERPL-MCNC: 30 MG/DL — HIGH (ref 7–23)
BUN SERPL-MCNC: 34 MG/DL — HIGH (ref 7–23)
BUN SERPL-MCNC: 36 MG/DL — HIGH (ref 7–23)
CALCIUM SERPL-MCNC: 6.7 MG/DL — LOW (ref 8.4–10.5)
CALCIUM SERPL-MCNC: 8.3 MG/DL — LOW (ref 8.4–10.5)
CALCIUM SERPL-MCNC: 8.5 MG/DL — SIGNIFICANT CHANGE UP (ref 8.4–10.5)
CHLORIDE SERPL-SCNC: 101 MMOL/L — SIGNIFICANT CHANGE UP (ref 96–108)
CHLORIDE SERPL-SCNC: 98 MMOL/L — SIGNIFICANT CHANGE UP (ref 96–108)
CHLORIDE SERPL-SCNC: 99 MMOL/L — SIGNIFICANT CHANGE UP (ref 96–108)
CO2 SERPL-SCNC: 17 MMOL/L — LOW (ref 22–31)
CO2 SERPL-SCNC: 21 MMOL/L — LOW (ref 22–31)
CO2 SERPL-SCNC: 23 MMOL/L — SIGNIFICANT CHANGE UP (ref 22–31)
COLOR SPEC: YELLOW — SIGNIFICANT CHANGE UP
CREAT ?TM UR-MCNC: 16 MG/DL — SIGNIFICANT CHANGE UP
CREAT SERPL-MCNC: 1.06 MG/DL — SIGNIFICANT CHANGE UP (ref 0.5–1.3)
CREAT SERPL-MCNC: 1.08 MG/DL — SIGNIFICANT CHANGE UP (ref 0.5–1.3)
CREAT SERPL-MCNC: 1.33 MG/DL — HIGH (ref 0.5–1.3)
CULTURE RESULTS: SIGNIFICANT CHANGE UP
DIFF PNL FLD: NEGATIVE — SIGNIFICANT CHANGE UP
EPI CELLS # UR: 0 /HPF — SIGNIFICANT CHANGE UP (ref 0–5)
GLUCOSE SERPL-MCNC: 100 MG/DL — HIGH (ref 70–99)
GLUCOSE SERPL-MCNC: 89 MG/DL — SIGNIFICANT CHANGE UP (ref 70–99)
GLUCOSE SERPL-MCNC: 91 MG/DL — SIGNIFICANT CHANGE UP (ref 70–99)
GLUCOSE UR QL: NEGATIVE MG/DL — SIGNIFICANT CHANGE UP
HCT VFR BLD CALC: 33 % — LOW (ref 34.5–45)
HCT VFR BLD CALC: 34.9 % — SIGNIFICANT CHANGE UP (ref 34.5–45)
HGB BLD-MCNC: 11.2 G/DL — LOW (ref 11.5–15.5)
HGB BLD-MCNC: 11.5 G/DL — SIGNIFICANT CHANGE UP (ref 11.5–15.5)
KETONES UR-MCNC: NEGATIVE — SIGNIFICANT CHANGE UP
LEUKOCYTE ESTERASE UR-ACNC: NEGATIVE — SIGNIFICANT CHANGE UP
MAGNESIUM SERPL-MCNC: 1.5 MG/DL — LOW (ref 1.6–2.6)
MAGNESIUM SERPL-MCNC: 1.6 MG/DL — SIGNIFICANT CHANGE UP (ref 1.6–2.6)
MAGNESIUM SERPL-MCNC: 1.6 MG/DL — SIGNIFICANT CHANGE UP (ref 1.6–2.6)
MCHC RBC-ENTMCNC: 28.5 PG — SIGNIFICANT CHANGE UP (ref 27–34)
MCHC RBC-ENTMCNC: 28.9 PG — SIGNIFICANT CHANGE UP (ref 27–34)
MCHC RBC-ENTMCNC: 33 GM/DL — SIGNIFICANT CHANGE UP (ref 32–36)
MCHC RBC-ENTMCNC: 33.9 GM/DL — SIGNIFICANT CHANGE UP (ref 32–36)
MCV RBC AUTO: 85.3 FL — SIGNIFICANT CHANGE UP (ref 80–100)
MCV RBC AUTO: 86.5 FL — SIGNIFICANT CHANGE UP (ref 80–100)
NITRITE UR-MCNC: NEGATIVE — SIGNIFICANT CHANGE UP
PH UR: 6.5 — SIGNIFICANT CHANGE UP (ref 5–8)
PHOSPHATE SERPL-MCNC: 2.8 MG/DL — SIGNIFICANT CHANGE UP (ref 2.5–4.5)
PHOSPHATE SERPL-MCNC: 3.3 MG/DL — SIGNIFICANT CHANGE UP (ref 2.5–4.5)
PLATELET # BLD AUTO: 200 K/UL — SIGNIFICANT CHANGE UP (ref 150–400)
PLATELET # BLD AUTO: 259 K/UL — SIGNIFICANT CHANGE UP (ref 150–400)
POTASSIUM SERPL-MCNC: 4 MMOL/L — SIGNIFICANT CHANGE UP (ref 3.5–5.3)
POTASSIUM SERPL-MCNC: 5 MMOL/L — SIGNIFICANT CHANGE UP (ref 3.5–5.3)
POTASSIUM SERPL-MCNC: 7.7 MMOL/L — CRITICAL HIGH (ref 3.5–5.3)
POTASSIUM SERPL-SCNC: 4 MMOL/L — SIGNIFICANT CHANGE UP (ref 3.5–5.3)
POTASSIUM SERPL-SCNC: 5 MMOL/L — SIGNIFICANT CHANGE UP (ref 3.5–5.3)
POTASSIUM SERPL-SCNC: 7.7 MMOL/L — CRITICAL HIGH (ref 3.5–5.3)
PROT ?TM UR-MCNC: <4 MG/DL — SIGNIFICANT CHANGE UP (ref 0–12)
PROT SERPL-MCNC: 5.8 G/DL — LOW (ref 6–8.3)
PROT UR-MCNC: NEGATIVE MG/DL — SIGNIFICANT CHANGE UP
PROT/CREAT UR-RTO: SIGNIFICANT CHANGE UP (ref 0–0.2)
RBC # BLD: 3.87 M/UL — SIGNIFICANT CHANGE UP (ref 3.8–5.2)
RBC # BLD: 4.04 M/UL — SIGNIFICANT CHANGE UP (ref 3.8–5.2)
RBC # FLD: 11.6 % — SIGNIFICANT CHANGE UP (ref 10.3–14.5)
RBC # FLD: 13.6 % — SIGNIFICANT CHANGE UP (ref 10.3–14.5)
RBC CASTS # UR COMP ASSIST: 1 /HPF — SIGNIFICANT CHANGE UP (ref 0–4)
SODIUM SERPL-SCNC: 121 MMOL/L — LOW (ref 135–145)
SODIUM SERPL-SCNC: 131 MMOL/L — LOW (ref 135–145)
SODIUM SERPL-SCNC: 132 MMOL/L — LOW (ref 135–145)
SP GR SPEC: 1.01 — LOW (ref 1.01–1.02)
SPECIMEN SOURCE: SIGNIFICANT CHANGE UP
TSH SERPL-MCNC: 0.78 UIU/ML — SIGNIFICANT CHANGE UP (ref 0.27–4.2)
UROBILINOGEN FLD QL: NEGATIVE MG/DL — SIGNIFICANT CHANGE UP
WBC # BLD: 6.8 K/UL — SIGNIFICANT CHANGE UP (ref 3.8–10.5)
WBC # BLD: 7.63 K/UL — SIGNIFICANT CHANGE UP (ref 3.8–10.5)
WBC # FLD AUTO: 6.8 K/UL — SIGNIFICANT CHANGE UP (ref 3.8–10.5)
WBC # FLD AUTO: 7.63 K/UL — SIGNIFICANT CHANGE UP (ref 3.8–10.5)
WBC UR QL: 0 /HPF — SIGNIFICANT CHANGE UP (ref 0–5)

## 2018-08-03 PROCEDURE — 76770 US EXAM ABDO BACK WALL COMP: CPT | Mod: 26

## 2018-08-03 PROCEDURE — 99233 SBSQ HOSP IP/OBS HIGH 50: CPT

## 2018-08-03 RX ORDER — HEPARIN SODIUM 5000 [USP'U]/ML
5000 INJECTION INTRAVENOUS; SUBCUTANEOUS EVERY 12 HOURS
Qty: 0 | Refills: 0 | Status: DISCONTINUED | OUTPATIENT
Start: 2018-08-03 | End: 2018-08-04

## 2018-08-03 RX ORDER — MAGNESIUM OXIDE 400 MG ORAL TABLET 241.3 MG
400 TABLET ORAL
Qty: 0 | Refills: 0 | Status: DISCONTINUED | OUTPATIENT
Start: 2018-08-03 | End: 2018-08-04

## 2018-08-03 RX ORDER — LANOLIN ALCOHOL/MO/W.PET/CERES
5 CREAM (GRAM) TOPICAL AT BEDTIME
Qty: 0 | Refills: 0 | Status: DISCONTINUED | OUTPATIENT
Start: 2018-08-03 | End: 2018-08-04

## 2018-08-03 RX ORDER — PANTOPRAZOLE SODIUM 20 MG/1
40 TABLET, DELAYED RELEASE ORAL
Qty: 0 | Refills: 0 | Status: DISCONTINUED | OUTPATIENT
Start: 2018-08-03 | End: 2018-08-04

## 2018-08-03 RX ORDER — LOPERAMIDE HCL 2 MG
2 TABLET ORAL
Qty: 0 | Refills: 0 | Status: DISCONTINUED | OUTPATIENT
Start: 2018-08-03 | End: 2018-08-04

## 2018-08-03 RX ORDER — ACETAMINOPHEN 500 MG
750 TABLET ORAL ONCE
Qty: 0 | Refills: 0 | Status: COMPLETED | OUTPATIENT
Start: 2018-08-03 | End: 2018-08-03

## 2018-08-03 RX ORDER — DIPHENOXYLATE HCL/ATROPINE 2.5-.025MG
1 TABLET ORAL
Qty: 0 | Refills: 0 | Status: DISCONTINUED | OUTPATIENT
Start: 2018-08-03 | End: 2018-08-04

## 2018-08-03 RX ORDER — HYDROXYCHLOROQUINE SULFATE 200 MG
200 TABLET ORAL DAILY
Qty: 0 | Refills: 0 | Status: DISCONTINUED | OUTPATIENT
Start: 2018-08-03 | End: 2018-08-04

## 2018-08-03 RX ORDER — LIDOCAINE 4 G/100G
1 CREAM TOPICAL ONCE
Qty: 0 | Refills: 0 | Status: COMPLETED | OUTPATIENT
Start: 2018-08-03 | End: 2018-08-03

## 2018-08-03 RX ORDER — ACETAMINOPHEN 500 MG
1000 TABLET ORAL ONCE
Qty: 0 | Refills: 0 | Status: DISCONTINUED | OUTPATIENT
Start: 2018-08-03 | End: 2018-08-03

## 2018-08-03 RX ORDER — SODIUM CHLORIDE 9 MG/ML
1000 INJECTION, SOLUTION INTRAVENOUS
Qty: 0 | Refills: 0 | Status: DISCONTINUED | OUTPATIENT
Start: 2018-08-03 | End: 2018-08-03

## 2018-08-03 RX ORDER — CHOLECALCIFEROL (VITAMIN D3) 125 MCG
3000 CAPSULE ORAL DAILY
Qty: 0 | Refills: 0 | Status: DISCONTINUED | OUTPATIENT
Start: 2018-08-03 | End: 2018-08-04

## 2018-08-03 RX ADMIN — Medication 2 MILLIGRAM(S): at 19:04

## 2018-08-03 RX ADMIN — Medication 2 MILLIGRAM(S): at 05:08

## 2018-08-03 RX ADMIN — Medication 2 MILLIGRAM(S): at 23:05

## 2018-08-03 RX ADMIN — Medication 750 MILLIGRAM(S): at 15:18

## 2018-08-03 RX ADMIN — Medication 1 TABLET(S): at 05:08

## 2018-08-03 RX ADMIN — MAGNESIUM OXIDE 400 MG ORAL TABLET 400 MILLIGRAM(S): 241.3 TABLET ORAL at 14:48

## 2018-08-03 RX ADMIN — Medication 1 TABLET(S): at 19:04

## 2018-08-03 RX ADMIN — MAGNESIUM OXIDE 400 MG ORAL TABLET 400 MILLIGRAM(S): 241.3 TABLET ORAL at 20:39

## 2018-08-03 RX ADMIN — SODIUM CHLORIDE 75 MILLILITER(S): 9 INJECTION, SOLUTION INTRAVENOUS at 06:18

## 2018-08-03 RX ADMIN — Medication 200 MILLIGRAM(S): at 11:50

## 2018-08-03 RX ADMIN — HEPARIN SODIUM 5000 UNIT(S): 5000 INJECTION INTRAVENOUS; SUBCUTANEOUS at 19:04

## 2018-08-03 RX ADMIN — Medication 1 TABLET(S): at 12:29

## 2018-08-03 RX ADMIN — Medication 3000 UNIT(S): at 12:29

## 2018-08-03 RX ADMIN — PANTOPRAZOLE SODIUM 40 MILLIGRAM(S): 20 TABLET, DELAYED RELEASE ORAL at 05:08

## 2018-08-03 RX ADMIN — LIDOCAINE 1 PATCH: 4 CREAM TOPICAL at 23:05

## 2018-08-03 RX ADMIN — LIDOCAINE 1 PATCH: 4 CREAM TOPICAL at 11:50

## 2018-08-03 RX ADMIN — Medication 2 MILLIGRAM(S): at 11:50

## 2018-08-03 RX ADMIN — Medication 1 TABLET(S): at 23:05

## 2018-08-03 RX ADMIN — Medication 300 MILLIGRAM(S): at 14:48

## 2018-08-03 RX ADMIN — Medication 1 DROP(S): at 05:08

## 2018-08-03 RX ADMIN — Medication 5 MILLIGRAM(S): at 23:05

## 2018-08-03 NOTE — DIETITIAN INITIAL EVALUATION ADULT. - OTHER INFO
Patient seen for nutrition consult on 6TOW.  reports pt with weight of 88 pounds x 1.5 years ago, reports pt had gained weight and was up to 98 pounds in May, 2018 however now reports pt with weight loss over the last few months due to decreased PO intake. Current dosing weight noted as 92.4 pounds. In-house pt reports appetite is improving, reports feeling hungry which is something she hasn't felt in a while. Pt consuming lunch during visit. Pt ordered for Ensure Enlive, amendable to also trying health shake 1x per day.

## 2018-08-03 NOTE — DISCHARGE NOTE ADULT - CARE PLAN
Principal Discharge DX:	VALERIE (acute kidney injury)  Goal:	Resolved  Assessment and plan of treatment:	Avoid taking (NSAIDs) - (ex: Ibuprofen, Advil, Celebrex, Naprosyn)  Avoid taking any nephrotoxic agents (can harm kidneys) - Intravenous contrast for diagnostic testing, combination cold medications.  Have all medications adjusted for your renal function by your Health Care Provider.  Blood pressure control is important.  Take all medication as prescribed.  Secondary Diagnosis:	Hyperkalemia  Assessment and plan of treatment:	Likely in setting of KCl supplementation and VALERIE.   - no peaked T waves on EKG  - no events on tele monitor  Secondary Diagnosis:	Hyponatremia  Assessment and plan of treatment:	Likely hypovolemic hyponatremia in setting of poor po intake and VALERIE.   Continue hydration and meals  Secondary Diagnosis:	Severe protein-calorie malnutrition  Assessment and plan of treatment:	Regular diet-no concentrated potassium, Ensure Enlive 3x/day and health shakes 1x per day to supplement meals. C/w protein intake.  Secondary Diagnosis:	Ileostomy in place  Assessment and plan of treatment:	Continue home anti-motility agents  F/u with Dr. Kirill marcus Thursday 8/9 at 11am  Secondary Diagnosis:	Sjogren's syndrome  Assessment and plan of treatment:	c/w home Plaquenil and artificial tears.

## 2018-08-03 NOTE — PROGRESS NOTE ADULT - PROBLEM SELECTOR PLAN 5
Continue home anti-motility agents as she experiences rapid volume loss from ileostomy when she is off them. Will have to balance this output with need to lower hyperkalemia.   - Patient's GI physician Dr. White asked to be notified of admission. - c/w home Plaquenil and artificial tears.

## 2018-08-03 NOTE — DIETITIAN INITIAL EVALUATION ADULT. - ENERGY NEEDS
Ht: 58 inches (per pt report), Wt: 92.4lbs, BMI: 19.4kg/m2, IBW: 90lbs +/- 10%, %IBW: 103%  No Edema, Skin: free of pressure injuries   Pertinent Information: This is 67 year old female with PMH Sjogrens, SBO s/p ileostomy (2017), anxiety, GERD, sciatica/back pain who presents with hyperkalemia and VALERIE, hyponatremia likely 2/2 hypovolemia and NSAID use, admitted for electrolyte management and evaluation of VALERIE.

## 2018-08-03 NOTE — DIETITIAN INITIAL EVALUATION ADULT. - NS AS NUTRI INTERV MEALS SNACK
Continue current diet as tolerated. encourage PO intake of protein rich, nutrient dense foods./General/healthful diet

## 2018-08-03 NOTE — DISCHARGE NOTE ADULT - CARE PROVIDERS DIRECT ADDRESSES
,DirectAddress_Unknown,[email or web address]

## 2018-08-03 NOTE — DISCHARGE NOTE ADULT - CARE PROVIDER_API CALL
Kirill Esparza), Gastroenterology  [address]  [address]  [place] [address]  Phone: [phone]  Fax: [phone]    Lee Coley), Internal Medicine  [address]  [address]  [place] [address]  Phone: [phone]  Fax: [phone]

## 2018-08-03 NOTE — PROVIDER CONTACT NOTE (CRITICAL VALUE NOTIFICATION) - ASSESSMENT
Pt A+Ox4, VSS, denies CP; no c/o pain, discomfort, or distress. IVL in place & WDL.  Will continue to monitor and maintain patient safety.

## 2018-08-03 NOTE — CONSULT NOTE ADULT - SUBJECTIVE AND OBJECTIVE BOX
Made aware of patient's admission.  No acute GI issues at present time, appetite markedly better since her electrolytes were corrected.  She will follow up with me in the office Thursday at 11AM.  To stay on home regimen of anti-motility agents.

## 2018-08-03 NOTE — PROGRESS NOTE ADULT - PROBLEM SELECTOR PLAN 4
- c/w home Plaquenil and artificial tears. Pt with recent dx of sciatica, started on advil/celebrex for anti-inflammatory effect. Likely the culprit of the VALERIE/hyperkalemia  - hold all NSAIDs  - tylenol and lidoderm patch for pain control  - pt/ will discuss options of possible local injection as outpatient

## 2018-08-03 NOTE — PROGRESS NOTE ADULT - PROBLEM SELECTOR PLAN 6
Pt with very poor PO intake and lost weight over the past few months.  - will c/w protein supplementation and obtain nutrition consult. Continue home anti-motility agents as she experiences rapid volume loss from ileostomy when she is off them. Will have to balance this output with need to lower hyperkalemia.   - Patient's GI physician Dr. White asked to be notified of admission.

## 2018-08-03 NOTE — DIETITIAN INITIAL EVALUATION ADULT. - ORAL INTAKE PTA
Pt reports decreased PO intake over the last 2-3 months. Pt reports feeling very full after a few bites. Believes decreased in appetite is a side affect of medication, now off medication x 2 weeks. Patient consumes 3 meals per day + Ensure Plus 3x per day (provides 350 Kcals and 13gm protein per serving). Typical diet recall: breakfast: 1 slice Citizen of [place] toast, Lunch; 2/3 of sandwich (white bread with turkey), Dinner: chicken. Pt reports she has been avoiding fiber in the diet due to fear of a blockage (pt noted with ileostomy x 1 year ago). pt also notes more loose stools with milk or yogurt, denies lactose intolerance. Confirms NKFA. Takes multivitamin, vitamin D3, zinc, iron, calcium citrate. Pt denies chewing/swallowing difficulty, nausea, vomiting. Per chart review, usual ileostomy output x 24 hrs=1200ml./fair

## 2018-08-03 NOTE — DIETITIAN INITIAL EVALUATION ADULT. - NS AS NUTRI INTERV COLLABORAT
Collaboration with other providers/Malnutrition sticker placed in EMR, discussed with team Malnutrition sticker placed in EMR, discussed with NP/Collaboration with other providers

## 2018-08-03 NOTE — DIETITIAN INITIAL EVALUATION ADULT. - PROBLEM SELECTOR PLAN 5
--will continue home anti-motility agents as she experiences rapid volume loss from ileostomy when she is off them. Will have to balance this output with need to lower hyperkalemia. Can consider holding one to two doses of loperamide and lomotil if K remains >6.0.  --Patient's GI physician Dr. White asked to be notified of admission.

## 2018-08-03 NOTE — DIETITIAN INITIAL EVALUATION ADULT. - NS AS NUTRI INTERV ED CONTENT
Purpose of the nutrition education/Provide diet education regarding management of ileostomy. also discussed high protein, high calorie snack options to help promote weight gain. Provided list of potassium content in food per pt request. Pt made aware RD remains available as needed.

## 2018-08-03 NOTE — DISCHARGE NOTE ADULT - PLAN OF CARE
Avoid taking (NSAIDs) - (ex: Ibuprofen, Advil, Celebrex, Naprosyn)  Avoid taking any nephrotoxic agents (can harm kidneys) - Intravenous contrast for diagnostic testing, combination cold medications.  Have all medications adjusted for your renal function by your Health Care Provider.  Blood pressure control is important.  Take all medication as prescribed. Likely in setting of KCl supplementation and VALERIE.   - no peaked T waves on EKG  - no events on tele monitor Likely hypovolemic hyponatremia in setting of poor po intake and VALERIE.   Continue hydration and meals Regular diet-no concentrated potassium, Ensure Enlive 3x/day and health shakes 1x per day to supplement meals. C/w protein intake. Continue home anti-motility agents  F/u with Dr. Kirill Esparza GI this Thursday 8/9 at 11am c/w home Plaquenil and artificial tears. Resolved

## 2018-08-03 NOTE — PROGRESS NOTE ADULT - PROBLEM SELECTOR PLAN 7
DVT ppx: HSQ  Dispo: home, likely over the weekend Pt with very poor PO intake and lost weight over the past few months.  - will c/w protein supplementation and obtain nutrition consult.

## 2018-08-03 NOTE — DISCHARGE NOTE ADULT - HOSPITAL COURSE
67F PMH SjJAIRO hornerO s/p ileostomy (2017), anxiety, GERD, sciatica/back pain who was sent to ED by PMD due to abnormal labs (hyperkalemia and VALERIE)... 67F [facility] Sjogrens, SBO s/p ileostomy (2017), anxiety, GERD, sciatica/back pain who was sent to ED for hyperkalemia and VALERIE, hyponatremia likely 2/2 hypovolemia and NSAID use, admitted for electrolyte management and evaluation of VALERIE.      Problem/Plan - 1:  ·  Problem: VALERIE (acute kidney injury).  Plan: - FENA <1%, appears related to pre-renal etiology. Patient appears hypovolemic as   well and endorses poor PO intake. Likely secondary to decreased PO and NSAIDS.  - Resolved.   - Renal US pending.      Problem/Plan - 2:  ·  Problem: Hyperkalemia.  Plan: Likely in setting of KCl supplementation and VALERIE.   - no peaked T waves on EKG  - no events on tele monitor  - K+ now at 4.8 this morning.   - Stop KCl supplementation.      Problem/Plan - 3:  ·  Problem: Hyponatremia.  Plan: Likely hypovolemic hyponatremia in setting of poor po intake and VALERIE.   - Na 122 on admission, 133 this morning  - stop IVF   - continue to monitor mental status  - encourage po intake.      Problem/Plan - 4:  ·  Problem: Sciatic nerve pain, left.  Plan: Pt with recent dx of sciatica, started on advil/celebrex for anti-inflammatory effect. Likely the culprit of the VALERIE/hyperkalemia  - hold all NSAIDs  - tylenol and lidoderm patch for pain control  - pt/ will discuss options of possible local injection as outpatient.      Problem/Plan - 5:  ·  Problem: Sjogren's syndrome.  Plan: - c/w home Plaquenil and artificial tears.      Problem/Plan - 6:  Problem: Ileostomy in place. Plan: Continue home anti-motility agents as she experiences rapid volume loss from ileostomy when she is off them. Will have to balance this output with need to lower hyperkalemia.   - Patient to follow up with GI as an outpatient this Thursday.     Problem/Plan - 7:  ·  Problem: Severe protein-calorie malnutrition.  Plan: Pt with very poor PO intake and lost weight over the past few months.  - will c/w protein supplementation and obtain nutrition consult.      Problem/Plan - 8:  ·  Problem: Need for prophylactic measure.  Plan: DVT ppx: HSQ  Patient to be discharged home today pending results of renal US.   Patient will follow up with Dr. White this Thursday.

## 2018-08-03 NOTE — DISCHARGE NOTE ADULT - MEDICATION SUMMARY - MEDICATIONS TO TAKE
I will START or STAY ON the medications listed below when I get home from the hospital:    loperamide 2 mg oral capsule  -- 2 cap(s) by mouth 4 times a day(before meals and at bedtime)    *Please see internal linda*  -- Indication: For diarrhea    Lomotil 2.5 mg-0.025 mg oral tablet  -- 1 tab(s) by mouth 4 times a day before meals and at bedtime    *Please see internal linda*  -- Indication: For diarrhea    Plaquenil 200 mg oral tablet  -- 1 tab(s) by mouth once a day with breakfast  -- Indication: For Sjogren's syndrome    Mag-Ox 400 oral tablet  -- 1 tab(s) by mouth 4 times a day with meals & bed  -- Indication: For Need for prophylactic measure    melatonin 5 mg oral tablet  -- 1 tab(s) by mouth once a day ( before bedtime)  -- Indication: For Insomnia    Systane ophthalmic solution  -- 1 drop(s) to each affected eye 2 times a day  -- Indication: For Need for prophylactic measure    NexIUM 40 mg oral delayed release capsule  -- 1 cap(s) by mouth once a day (before breakfast)   -- Indication: For GERD (gastroesophageal reflux disease)    Synthroid 100 mcg (0.1 mg) oral tablet  -- 1 tab(s) by mouth once a day  -- Indication: For Hypothyroid    Vitamin D3 1000 intl units oral tablet  -- 1 tab(s) by mouth once a day  (takes along with 2000iu=total dose 3000iu)  -- Indication: For Need for prophylactic measure    Vitamin D3 2000 intl units oral tablet  -- 1 tab(s) by mouth once a day  (takes along with 1000iu=total dose 3000iu)  -- Indication: For Need for prophylactic measure

## 2018-08-03 NOTE — DISCHARGE NOTE ADULT - PATIENT PORTAL LINK FT
You can access the [facility] Portal, offered by [facility], by registering with the following website: [email or web address]

## 2018-08-03 NOTE — DISCHARGE NOTE ADULT - MEDICATION SUMMARY - MEDICATIONS TO STOP TAKING
I will STOP taking the medications listed below when I get home from the hospital:    miconazole 2% topical cream  -- Apply on skin to affected area every 3 days    potassium chloride 10 mEq oral capsule, extended release  -- 1 cap(s) by mouth 2 times a day    Pepcid AC Maximum Strength 20 mg oral tablet  -- 1 tab(s) by mouth once a day(before dinner)    Tums 500 mg oral tablet, chewable  -- 1 or 2  tab(s) by mouth once a day at bedtime    celecoxib 100 mg oral capsule  -- 1 cap(s) by mouth 2 times a day    Advil 200 mg oral tablet  -- 1 tab(s) by mouth every 6 hours, As Needed

## 2018-08-03 NOTE — CHART NOTE - NSCHARTNOTEFT_GEN_A_CORE
Upon Nutritional Assessment by the Registered Dietitian your patient was determined to meet criteria / has evidence of the following diagnosis/diagnoses:          [ ]  Mild Protein Calorie Malnutrition        [x ]  Moderate Protein Calorie Malnutrition        [ ] Severe Protein Calorie Malnutrition        [ ] Unspecified Protein Calorie Malnutrition        [ ] Underweight / BMI <19        [ ] Morbid Obesity / BMI > 40      Findings as based on:  [ ] Comprehensive nutrition assessment   [ ] Nutrition Focused Physical Exam  [x ] Other: ·meeting <75% of estimated needs >1 month, 6% weight loss x 2-3 months    RD to remain available and follow-up as medically appropriate.     Nutrition Plan/Recommendations:          PROVIDER Section:     By signing this assessment you are acknowledging and agree with the diagnosis/diagnoses assigned by the Registered Dietitian    Comments:

## 2018-08-04 VITALS — SYSTOLIC BLOOD PRESSURE: 98 MMHG | DIASTOLIC BLOOD PRESSURE: 60 MMHG

## 2018-08-04 LAB
ANION GAP SERPL CALC-SCNC: 9 MMOL/L — SIGNIFICANT CHANGE UP (ref 5–17)
BUN SERPL-MCNC: 22 MG/DL — SIGNIFICANT CHANGE UP (ref 7–23)
CALCIUM SERPL-MCNC: 8.9 MG/DL — SIGNIFICANT CHANGE UP (ref 8.4–10.5)
CHLORIDE SERPL-SCNC: 100 MMOL/L — SIGNIFICANT CHANGE UP (ref 96–108)
CO2 SERPL-SCNC: 24 MMOL/L — SIGNIFICANT CHANGE UP (ref 22–31)
CREAT SERPL-MCNC: 1.02 MG/DL — SIGNIFICANT CHANGE UP (ref 0.5–1.3)
GLUCOSE SERPL-MCNC: 79 MG/DL — SIGNIFICANT CHANGE UP (ref 70–99)
HCT VFR BLD CALC: 32.1 % — LOW (ref 34.5–45)
HGB BLD-MCNC: 10.4 G/DL — LOW (ref 11.5–15.5)
MAGNESIUM SERPL-MCNC: 1.6 MG/DL — SIGNIFICANT CHANGE UP (ref 1.6–2.6)
MCHC RBC-ENTMCNC: 28.1 PG — SIGNIFICANT CHANGE UP (ref 27–34)
MCHC RBC-ENTMCNC: 32.4 GM/DL — SIGNIFICANT CHANGE UP (ref 32–36)
MCV RBC AUTO: 86.8 FL — SIGNIFICANT CHANGE UP (ref 80–100)
PHOSPHATE SERPL-MCNC: 2.4 MG/DL — LOW (ref 2.5–4.5)
PLATELET # BLD AUTO: 241 K/UL — SIGNIFICANT CHANGE UP (ref 150–400)
POTASSIUM SERPL-MCNC: 4.8 MMOL/L — SIGNIFICANT CHANGE UP (ref 3.5–5.3)
POTASSIUM SERPL-SCNC: 4.8 MMOL/L — SIGNIFICANT CHANGE UP (ref 3.5–5.3)
RBC # BLD: 3.7 M/UL — LOW (ref 3.8–5.2)
RBC # FLD: 13.8 % — SIGNIFICANT CHANGE UP (ref 10.3–14.5)
SODIUM SERPL-SCNC: 133 MMOL/L — LOW (ref 135–145)
WBC # BLD: 6.67 K/UL — SIGNIFICANT CHANGE UP (ref 3.8–10.5)
WBC # FLD AUTO: 6.67 K/UL — SIGNIFICANT CHANGE UP (ref 3.8–10.5)

## 2018-08-04 PROCEDURE — 83605 ASSAY OF LACTIC ACID: CPT

## 2018-08-04 PROCEDURE — 76770 US EXAM ABDO BACK WALL COMP: CPT

## 2018-08-04 PROCEDURE — 80048 BASIC METABOLIC PNL TOTAL CA: CPT

## 2018-08-04 PROCEDURE — 81001 URINALYSIS AUTO W/SCOPE: CPT

## 2018-08-04 PROCEDURE — 85027 COMPLETE CBC AUTOMATED: CPT

## 2018-08-04 PROCEDURE — 84132 ASSAY OF SERUM POTASSIUM: CPT

## 2018-08-04 PROCEDURE — 96375 TX/PRO/DX INJ NEW DRUG ADDON: CPT

## 2018-08-04 PROCEDURE — 84300 ASSAY OF URINE SODIUM: CPT

## 2018-08-04 PROCEDURE — 83935 ASSAY OF URINE OSMOLALITY: CPT

## 2018-08-04 PROCEDURE — 82803 BLOOD GASES ANY COMBINATION: CPT

## 2018-08-04 PROCEDURE — 84443 ASSAY THYROID STIM HORMONE: CPT

## 2018-08-04 PROCEDURE — 85014 HEMATOCRIT: CPT

## 2018-08-04 PROCEDURE — 82570 ASSAY OF URINE CREATININE: CPT

## 2018-08-04 PROCEDURE — 99285 EMERGENCY DEPT VISIT HI MDM: CPT | Mod: 25

## 2018-08-04 PROCEDURE — 86036 ANCA SCREEN EACH ANTIBODY: CPT

## 2018-08-04 PROCEDURE — 83735 ASSAY OF MAGNESIUM: CPT

## 2018-08-04 PROCEDURE — 83930 ASSAY OF BLOOD OSMOLALITY: CPT

## 2018-08-04 PROCEDURE — 80053 COMPREHEN METABOLIC PANEL: CPT

## 2018-08-04 PROCEDURE — 82947 ASSAY GLUCOSE BLOOD QUANT: CPT

## 2018-08-04 PROCEDURE — 99233 SBSQ HOSP IP/OBS HIGH 50: CPT

## 2018-08-04 PROCEDURE — 84295 ASSAY OF SERUM SODIUM: CPT

## 2018-08-04 PROCEDURE — 82435 ASSAY OF BLOOD CHLORIDE: CPT

## 2018-08-04 PROCEDURE — 84133 ASSAY OF URINE POTASSIUM: CPT

## 2018-08-04 PROCEDURE — 81003 URINALYSIS AUTO W/O SCOPE: CPT

## 2018-08-04 PROCEDURE — 93005 ELECTROCARDIOGRAM TRACING: CPT

## 2018-08-04 PROCEDURE — 82330 ASSAY OF CALCIUM: CPT

## 2018-08-04 PROCEDURE — 84156 ASSAY OF PROTEIN URINE: CPT

## 2018-08-04 PROCEDURE — 96374 THER/PROPH/DIAG INJ IV PUSH: CPT

## 2018-08-04 PROCEDURE — 82962 GLUCOSE BLOOD TEST: CPT

## 2018-08-04 PROCEDURE — 84100 ASSAY OF PHOSPHORUS: CPT

## 2018-08-04 PROCEDURE — 87086 URINE CULTURE/COLONY COUNT: CPT

## 2018-08-04 RX ORDER — CALCIUM CARBONATE 500(1250)
1 TABLET ORAL
Qty: 0 | Refills: 0 | COMMUNITY

## 2018-08-04 RX ORDER — POTASSIUM CHLORIDE 20 MEQ
1 PACKET (EA) ORAL
Qty: 0 | Refills: 0 | COMMUNITY

## 2018-08-04 RX ORDER — CELECOXIB 200 MG/1
1 CAPSULE ORAL
Qty: 0 | Refills: 0 | COMMUNITY

## 2018-08-04 RX ORDER — FAMOTIDINE 10 MG/ML
1 INJECTION INTRAVENOUS
Qty: 0 | Refills: 0 | COMMUNITY

## 2018-08-04 RX ORDER — MICONAZOLE NITRATE 2 %
1 CREAM (GRAM) TOPICAL
Qty: 0 | Refills: 0 | COMMUNITY

## 2018-08-04 RX ORDER — IBUPROFEN 200 MG
1 TABLET ORAL
Qty: 0 | Refills: 0 | COMMUNITY

## 2018-08-04 RX ORDER — LEVOTHYROXINE SODIUM 125 MCG
100 TABLET ORAL DAILY
Qty: 0 | Refills: 0 | Status: DISCONTINUED | OUTPATIENT
Start: 2018-08-04 | End: 2018-08-04

## 2018-08-04 RX ADMIN — Medication 1 TABLET(S): at 11:42

## 2018-08-04 RX ADMIN — Medication 1 TABLET(S): at 05:24

## 2018-08-04 RX ADMIN — Medication 2 MILLIGRAM(S): at 11:41

## 2018-08-04 RX ADMIN — Medication 2 MILLIGRAM(S): at 05:24

## 2018-08-04 RX ADMIN — MAGNESIUM OXIDE 400 MG ORAL TABLET 400 MILLIGRAM(S): 241.3 TABLET ORAL at 08:49

## 2018-08-04 RX ADMIN — Medication 3000 UNIT(S): at 11:42

## 2018-08-04 RX ADMIN — Medication 200 MILLIGRAM(S): at 11:41

## 2018-08-04 RX ADMIN — HEPARIN SODIUM 5000 UNIT(S): 5000 INJECTION INTRAVENOUS; SUBCUTANEOUS at 05:24

## 2018-08-04 RX ADMIN — PANTOPRAZOLE SODIUM 40 MILLIGRAM(S): 20 TABLET, DELAYED RELEASE ORAL at 05:24

## 2018-08-04 NOTE — PROGRESS NOTE ADULT - PROBLEM SELECTOR PLAN 8
DVT ppx: HSQ  Patient to be discharged home today pending results of renal US.   Patient will follow up with Dr. White this Thursday
DVT ppx: HSQ  Dispo: home, likely over the weekend

## 2018-08-04 NOTE — PROGRESS NOTE ADULT - PROBLEM SELECTOR PLAN 2
Likely in setting of KCl supplementation and VALERIE.   - no peaked T waves on EKG  - no events on tele monitor  - K+ now at 4.8 this morning.   - Stop KCl supplementation.
Likely in setting of KCl supplementation and VALERIE.   - no peaked T waves on EKG  - no events on tele monitor  - K+ now at 4 this morning. Continue to monitor BMP q12hr  - Stop KCl supplementation.

## 2018-08-04 NOTE — PROGRESS NOTE ADULT - PROBLEM SELECTOR PLAN 4
Pt with recent dx of sciatica, started on advil/celebrex for anti-inflammatory effect. Likely the culprit of the VALERIE/hyperkalemia  - hold all NSAIDs  - tylenol and lidoderm patch for pain control  - pt/ will discuss options of possible local injection as outpatient

## 2018-08-04 NOTE — PROGRESS NOTE ADULT - PROBLEM SELECTOR PLAN 3
Likely hypovolemic hyponatremia in setting of poor po intake and VALERIE.   - Na 122 on admission, 133 this morning  - stop IVF   - continue to monitor mental status  - encourage po intake
Likely hypovolemic hyponatremia in setting of poor po intake and VALERIE.   - Na 122 on admission, 133 this morning  - stop IVF   - continue to monitor mental status  - encourage po intake

## 2018-08-04 NOTE — PROGRESS NOTE ADULT - ASSESSMENT
67F PMH Sjogr, JAIROO s/p ileostomy (2017), anxiety, GERD, sciatica/back pain who was sent to ED for hyperkalemia and VALERIE, hyponatremia likely 2/2 hypovolemia and NSAID use, admitted for electrolyte management and evaluation of VALERIE.
67F PMH Sjogr, JAIROO s/p ileostomy (2017), anxiety, GERD, sciatica/back pain who was sent to ED for hyperkalemia and VALERIE, hyponatremia likely 2/2 hypovolemia and NSAID use, admitted for electrolyte management and evaluation of VALERIE.

## 2018-08-04 NOTE — PROGRESS NOTE ADULT - PROBLEM SELECTOR PLAN 6
Continue home anti-motility agents as she experiences rapid volume loss from ileostomy when she is off them. Will have to balance this output with need to lower hyperkalemia.   - Patient to follow up with GI as an outpatient this Thursday.

## 2018-08-04 NOTE — PROGRESS NOTE ADULT - SUBJECTIVE AND OBJECTIVE BOX
Patient is a 67y old  Female who presents with a chief complaint of abnormal labs (hyperkalemia) (03 Aug 2018 17:12)      SUBJECTIVE / OVERNIGHT EVENTS:  Clinically improved.  No acute events overnight.  Feels well.   ROS are negative.    MEDICATIONS  (STANDING):  cholecalciferol 3000 Unit(s) Oral daily  diphenoxylate/atropine 1 Tablet(s) Oral four times a day  heparin  Injectable 5000 Unit(s) SubCutaneous every 12 hours  hydroxychloroquine 200 milliGRAM(s) Oral daily  levothyroxine 100 MICROGram(s) Oral daily  loperamide 2 milliGRAM(s) Oral four times a day  magnesium oxide 400 milliGRAM(s) Oral two times a day with meals  melatonin 5 milliGRAM(s) Oral at bedtime  pantoprazole    Tablet 40 milliGRAM(s) Oral before breakfast    MEDICATIONS  (PRN):  artificial  tears Solution 1 Drop(s) Both EYES four times a day PRN Dry Eyes      CAPILLARY BLOOD GLUCOSE        I&O's Summary    03 Aug 2018 07:  -  04 Aug 2018 07:00  --------------------------------------------------------  IN: 915 mL / OUT: 550 mL / NET: 365 mL    04 Aug 2018 07:01  -  04 Aug 2018 12:35  --------------------------------------------------------  IN: 480 mL / OUT: 0 mL / NET: 480 mL        PHYSICAL EXAM:  Vital Signs Last 24 Hrs  T(C): 36.4 (04 Aug 2018 11:57), Max: 36.5 (03 Aug 2018 20:15)  T(F): 97.6 (04 Aug 2018 11:57), Max: 97.7 (03 Aug 2018 20:15)  HR: 78 (04 Aug 2018 11:57) (74 - 99)  BP: 98/60 (04 Aug 2018 12:04) (95/59 - 101/61)  BP(mean): --  RR: 18 (04 Aug 2018 11:57) (18 - 18)  SpO2: 96% (04 Aug 2018 11:57) (96% - 97%)  GENERAL: NAD, well-developed  HEAD:  Atraumatic, Normocephalic  EYES: EOMI, PERRLA, conjunctiva and sclera clear  NECK: Supple, No JVD  CHEST/LUNG: Clear to auscultation bilaterally; No wheeze  HEART: Regular rate and rhythm; No murmurs, rubs, or gallops  ABDOMEN: Soft, Nontender, Nondistended; Bowel sounds present, ostomy  EXTREMITIES:  2+ Peripheral Pulses, No clubbing, cyanosis, or edema  PSYCH: AAOx3  NEUROLOGY: non-focal  SKIN: No rashes or lesions    LABS:                        10.4   6.67  )-----------( 241      ( 04 Aug 2018 08:25 )             32.1     08-04    133<L>  |  100  |  22  ----------------------------<  79  4.8   |  24  |  1.02    Ca    8.9      04 Aug 2018 06:27  Phos  2.4     08-04  Mg     1.6     08-04    TPro  5.8<L>  /  Alb  3.0<L>  /  TBili  0.3  /  DBili  x   /  AST  50<H>  /  ALT  15  /  AlkPhos  80  08-03          Urinalysis Basic - ( 03 Aug 2018 04:41 )    Color: Yellow / Appearance: Clear / S.006 / pH: x  Gluc: x / Ketone: Negative  / Bili: Negative / Urobili: Negative mg/dL   Blood: x / Protein: Negative mg/dL / Nitrite: Negative   Leuk Esterase: Negative / RBC: 1 /HPF / WBC 0 /HPF   Sq Epi: x / Non Sq Epi: 0 /HPF / Bacteria: Negative        RADIOLOGY & ADDITIONAL TESTS:    Imaging Personally Reviewed:    Consultant(s) Notes Reviewed:      Care Discussed with Consultants/Other Providers:
Patient is a 67y old  Female who presents with a chief complaint of abnormal labs (hyperkalemia) (02 Aug 2018 23:34)      SUBJECTIVE / OVERNIGHT EVENTS:  no acute events overnight, vss, afebrile  pt still with left-sided hip/sciatica pain but has been ambulating the hallway  currently denies chest pain, palpitation, dyspnea, abdominal pain    ROS:  14 point ROS negative in detail except stated as above    MEDICATIONS  (STANDING):  acetaminophen  IVPB. 750 milliGRAM(s) IV Intermittent once  diphenoxylate/atropine 1 Tablet(s) Oral four times a day  heparin  Injectable 5000 Unit(s) SubCutaneous every 12 hours  hydroxychloroquine 200 milliGRAM(s) Oral daily  loperamide 2 milliGRAM(s) Oral four times a day  melatonin 5 milliGRAM(s) Oral at bedtime  pantoprazole    Tablet 40 milliGRAM(s) Oral before breakfast    MEDICATIONS  (PRN):  artificial  tears Solution 1 Drop(s) Both EYES four times a day PRN Dry Eyes      CAPILLARY BLOOD GLUCOSE      POCT Blood Glucose.: 129 mg/dL (02 Aug 2018 22:54)    I&O's Summary    02 Aug 2018 07:01  -  03 Aug 2018 07:00  --------------------------------------------------------  IN: 0 mL / OUT: 300 mL / NET: -300 mL        PHYSICAL EXAM:  Vital Signs Last 24 Hrs  T(C): 36.4 (03 Aug 2018 04:22), Max: 37 (03 Aug 2018 00:40)  T(F): 97.5 (03 Aug 2018 04:22), Max: 98.6 (03 Aug 2018 00:40)  HR: 86 (03 Aug 2018 04:22) (58 - 88)  BP: 96/59 (03 Aug 2018 04:22) (96/59 - 147/60)  BP(mean): --  RR: 18 (03 Aug 2018 04:22) (16 - 18)  SpO2: 98% (03 Aug 2018 04:22) (98% - 100%)    GENERAL: NAD, well-developed  HEAD:  Atraumatic, Normocephalic  EYES: EOMI, PERRLA, conjunctiva and sclera clear  NECK: Supple, No JVD  CHEST/LUNG: Clear to auscultation bilaterally; No wheeze  HEART: Regular rate and rhythm; No murmurs, rubs, or gallops  ABDOMEN: Soft, Nontender, Nondistended; Bowel sounds present  EXTREMITIES:  2+ Peripheral Pulses, No clubbing, cyanosis, or edema  NEUROLOGY: AAOx3; non-focal  SKIN: No rashes or lesions    LABS:  personally reviewed                        11.5   6.8   )-----------( 200      ( 03 Aug 2018 02:56 )             34.9     08-03    131<L>  |  98  |  30<H>  ----------------------------<  100<H>  4.0   |  23  |  1.06    Ca    8.5      03 Aug 2018 10:52  Phos  2.8     08-03  Mg     1.6     08-03    TPro  5.8<L>  /  Alb  3.0<L>  /  TBili  0.3  /  DBili  x   /  AST  50<H>  /  ALT  15  /  AlkPhos  80  08-03          Urinalysis Basic - ( 03 Aug 2018 04:41 )    Color: Yellow / Appearance: Clear / S.006 / pH: x  Gluc: x / Ketone: Negative  / Bili: Negative / Urobili: Negative mg/dL   Blood: x / Protein: Negative mg/dL / Nitrite: Negative   Leuk Esterase: Negative / RBC: 1 /HPF / WBC 0 /HPF   Sq Epi: x / Non Sq Epi: 0 /HPF / Bacteria: Negative        RADIOLOGY & ADDITIONAL TESTS:    Imaging Personally Reviewed:  - tele: NSR 80-90s    Consultant(s) Notes Reviewed:      Care Discussed with Consultants/Other Providers:

## 2018-08-04 NOTE — PROGRESS NOTE ADULT - PROBLEM SELECTOR PLAN 1
- FENA <1%, appears related to pre-renal etiology. Patient appears hypovolemic as   well and endorses poor PO intake. Likely secondary to decreased PO and NSAIDS.  - Resolved.   - Renal US pending.
FENA <1%, appears related to pre-renal etiology. Patient appears hypovolemic as well and endorses poor PO intake. Differential also includes VALERIE related to NSAID use. Inflammatory causes given her history of autoimmune illnesses (Sjogrens, hypothyroidism) also a possibility.   - Cr baseline 0.8 as per . now trending down since admission s/p IVF  - continue to hold NSAIDs. Avoid other nephrotoxins. Renally dose meds (holding pepcid at this time).   - fu renal US  - ru urine lyte studies

## 2018-08-04 NOTE — CHART NOTE - NSCHARTNOTEFT_GEN_A_CORE
Medicine [phone]; S/W Radiology Resident([id number]) re. renal U/S results. Renal U/s performed 8/3/18 @ 1615  -U/S results unable to be uploaded in [facility] until Mon. 8/6 due to IT issue  -Renal U/S read by Dr. Garcia(Rad.); Normal renal ultrasound  -d/w Dr. ADAM Elias; OK to d/c home now

## 2018-08-04 NOTE — PROGRESS NOTE ADULT - PROBLEM SELECTOR PLAN 7
Pt with very poor PO intake and lost weight over the past few months.  - will c/w protein supplementation and obtain nutrition consult.

## 2018-08-07 ENCOUNTER — INBOUND DOCUMENT (OUTPATIENT)
Age: 68
End: 2018-08-07

## 2018-08-07 DIAGNOSIS — Z98.890 OTHER SPECIFIED POSTPROCEDURAL STATES: Chronic | ICD-10-CM

## 2018-08-08 ENCOUNTER — APPOINTMENT (OUTPATIENT)
Dept: INTERNAL MEDICINE | Facility: CLINIC | Age: 68
End: 2018-08-08
Payer: MEDICARE

## 2018-08-08 VITALS — RESPIRATION RATE: 14 BRPM | HEART RATE: 72 BPM | SYSTOLIC BLOOD PRESSURE: 106 MMHG | DIASTOLIC BLOOD PRESSURE: 60 MMHG

## 2018-08-08 VITALS — HEIGHT: 59 IN | BODY MASS INDEX: 19.96 KG/M2 | WEIGHT: 99 LBS

## 2018-08-08 DIAGNOSIS — E87.5 HYPERKALEMIA: ICD-10-CM

## 2018-08-08 PROCEDURE — 99496 TRANSJ CARE MGMT HIGH F2F 7D: CPT | Mod: 25

## 2018-08-08 PROCEDURE — 36415 COLL VENOUS BLD VENIPUNCTURE: CPT

## 2018-08-08 RX ORDER — CELECOXIB 100 MG/1
100 CAPSULE ORAL TWICE DAILY
Qty: 90 | Refills: 2 | Status: DISCONTINUED | COMMUNITY
Start: 2018-07-02 | End: 2018-08-08

## 2018-08-08 RX ORDER — BUPROPION HYDROCHLORIDE 100 MG/1
100 TABLET, FILM COATED ORAL
Qty: 180 | Refills: 0 | Status: DISCONTINUED | COMMUNITY
Start: 2018-05-31 | End: 2018-08-08

## 2018-08-08 NOTE — HISTORY OF PRESENT ILLNESS
[de-identified] : Pt presents for evaluation s/p hospitalization for VALERIE / hyperkalemia.\par Pt was hydrated / potassium supplementation held. \par Feels much better - more energy. Better appetite,\par has gained some weight.

## 2018-08-08 NOTE — ASSESSMENT
[FreeTextEntry1] : Blood work was drawn and sent to the lab today. The patient has been instructed to call the office next week to discuss today's lab work.\par \par To see GI tomorrow\par \par Monitor electrolytes carefully.\par Will recheck pending above, but no more than two weeks.\par \par

## 2018-08-08 NOTE — PHYSICAL EXAM
[No Acute Distress] : no acute distress [Normal Sclera/Conjunctiva] : normal sclera/conjunctiva [PERRL] : pupils equal round and reactive to light [Normal Oropharynx] : the oropharynx was normal [No JVD] : no jugular venous distention [Supple] : supple [No Lymphadenopathy] : no lymphadenopathy [No Respiratory Distress] : no respiratory distress  [Clear to Auscultation] : lungs were clear to auscultation bilaterally [No Accessory Muscle Use] : no accessory muscle use [Normal Rate] : normal rate  [Regular Rhythm] : with a regular rhythm [Normal S1, S2] : normal S1 and S2 [No Edema] : there was no peripheral edema [Soft] : abdomen soft [Non Tender] : non-tender [Non-distended] : non-distended [Normal Bowel Sounds] : normal bowel sounds [de-identified] : ileostomy in place

## 2018-08-09 PROBLEM — M35.00 SJOGREN SYNDROME, UNSPECIFIED: Chronic | Status: ACTIVE | Noted: 2018-08-03

## 2018-08-09 PROBLEM — F41.9 ANXIETY DISORDER, UNSPECIFIED: Chronic | Status: ACTIVE | Noted: 2018-08-07

## 2018-08-09 PROBLEM — K21.9 GASTRO-ESOPHAGEAL REFLUX DISEASE WITHOUT ESOPHAGITIS: Chronic | Status: ACTIVE | Noted: 2018-08-03

## 2018-08-09 PROBLEM — K59.9 FUNCTIONAL INTESTINAL DISORDER, UNSPECIFIED: Chronic | Status: ACTIVE | Noted: 2018-08-03

## 2018-08-09 LAB
ALBUMIN SERPL ELPH-MCNC: 3.7 G/DL
ALP BLD-CCNC: 92 U/L
ALT SERPL-CCNC: 30 U/L
ANION GAP SERPL CALC-SCNC: 16 MMOL/L
AST SERPL-CCNC: 56 U/L
BILIRUB SERPL-MCNC: 0.2 MG/DL
BUN SERPL-MCNC: 18 MG/DL
CALCIUM SERPL-MCNC: 9.2 MG/DL
CHLORIDE SERPL-SCNC: 97 MMOL/L
CO2 SERPL-SCNC: 19 MMOL/L
CREAT SERPL-MCNC: 1.09 MG/DL
GLUCOSE SERPL-MCNC: 69 MG/DL
MAGNESIUM SERPL-MCNC: 1.5 MG/DL
POTASSIUM SERPL-SCNC: 5.8 MMOL/L
PROT SERPL-MCNC: 7.1 G/DL
SODIUM SERPL-SCNC: 132 MMOL/L

## 2018-08-11 LAB
ANION GAP SERPL CALC-SCNC: 14 MMOL/L
BUN SERPL-MCNC: 15 MG/DL
CALCIUM SERPL-MCNC: 9.5 MG/DL
CHLORIDE SERPL-SCNC: 92 MMOL/L
CO2 SERPL-SCNC: 24 MMOL/L
CREAT SERPL-MCNC: 0.9 MG/DL
GLUCOSE SERPL-MCNC: 79 MG/DL
MAGNESIUM SERPL-MCNC: 1.5 MG/DL
POTASSIUM SERPL-SCNC: 4.5 MMOL/L
SODIUM SERPL-SCNC: 130 MMOL/L

## 2018-08-14 ENCOUNTER — OUTPATIENT (OUTPATIENT)
Dept: OUTPATIENT SERVICES | Facility: HOSPITAL | Age: 68
LOS: 1 days | End: 2018-08-14
Payer: MEDICARE

## 2018-08-14 DIAGNOSIS — Z90.710 ACQUIRED ABSENCE OF BOTH CERVIX AND UTERUS: Chronic | ICD-10-CM

## 2018-08-14 DIAGNOSIS — Z90.49 ACQUIRED ABSENCE OF OTHER SPECIFIED PARTS OF DIGESTIVE TRACT: Chronic | ICD-10-CM

## 2018-08-14 DIAGNOSIS — M54.16 RADICULOPATHY, LUMBAR REGION: ICD-10-CM

## 2018-08-14 DIAGNOSIS — Z98.890 OTHER SPECIFIED POSTPROCEDURAL STATES: Chronic | ICD-10-CM

## 2018-08-14 PROCEDURE — 77003 FLUOROGUIDE FOR SPINE INJECT: CPT

## 2018-08-14 PROCEDURE — 62323 NJX INTERLAMINAR LMBR/SAC: CPT

## 2018-08-28 ENCOUNTER — OUTPATIENT (OUTPATIENT)
Dept: OUTPATIENT SERVICES | Facility: HOSPITAL | Age: 68
LOS: 1 days | End: 2018-08-28
Payer: MEDICARE

## 2018-08-28 DIAGNOSIS — M54.16 RADICULOPATHY, LUMBAR REGION: ICD-10-CM

## 2018-08-28 DIAGNOSIS — Z90.49 ACQUIRED ABSENCE OF OTHER SPECIFIED PARTS OF DIGESTIVE TRACT: Chronic | ICD-10-CM

## 2018-08-28 DIAGNOSIS — Z90.710 ACQUIRED ABSENCE OF BOTH CERVIX AND UTERUS: Chronic | ICD-10-CM

## 2018-08-28 DIAGNOSIS — Z98.890 OTHER SPECIFIED POSTPROCEDURAL STATES: Chronic | ICD-10-CM

## 2018-08-28 PROCEDURE — 62323 NJX INTERLAMINAR LMBR/SAC: CPT

## 2018-08-28 PROCEDURE — 77003 FLUOROGUIDE FOR SPINE INJECT: CPT

## 2018-09-17 ENCOUNTER — APPOINTMENT (OUTPATIENT)
Dept: ORTHOPEDIC SURGERY | Facility: CLINIC | Age: 68
End: 2018-09-17
Payer: MEDICARE

## 2018-09-17 VITALS
HEART RATE: 85 BPM | HEIGHT: 59 IN | BODY MASS INDEX: 19.15 KG/M2 | WEIGHT: 95 LBS | SYSTOLIC BLOOD PRESSURE: 110 MMHG | DIASTOLIC BLOOD PRESSURE: 67 MMHG

## 2018-09-17 DIAGNOSIS — M41.50 OTHER SECONDARY SCOLIOSIS, SITE UNSPECIFIED: ICD-10-CM

## 2018-09-17 PROCEDURE — 99214 OFFICE O/P EST MOD 30 MIN: CPT

## 2018-09-18 PROBLEM — M41.50 DEGENERATIVE SCOLIOSIS IN ADULT PATIENT: Status: ACTIVE | Noted: 2018-04-18

## 2018-09-19 ENCOUNTER — APPOINTMENT (OUTPATIENT)
Dept: ORTHOPEDIC SURGERY | Facility: CLINIC | Age: 68
End: 2018-09-19

## 2018-09-24 ENCOUNTER — RX RENEWAL (OUTPATIENT)
Age: 68
End: 2018-09-24

## 2018-09-25 ENCOUNTER — APPOINTMENT (OUTPATIENT)
Dept: INTERNAL MEDICINE | Facility: CLINIC | Age: 68
End: 2018-09-25
Payer: MEDICARE

## 2018-09-25 PROCEDURE — 36415 COLL VENOUS BLD VENIPUNCTURE: CPT

## 2018-09-26 LAB
ANION GAP SERPL CALC-SCNC: 18 MMOL/L
BUN SERPL-MCNC: 18 MG/DL
CALCIUM SERPL-MCNC: 9.8 MG/DL
CHLORIDE SERPL-SCNC: 96 MMOL/L
CO2 SERPL-SCNC: 24 MMOL/L
CREAT SERPL-MCNC: 0.77 MG/DL
FOLATE SERPL-MCNC: >20 NG/ML
GLUCOSE SERPL-MCNC: 77 MG/DL
MAGNESIUM SERPL-MCNC: 1.8 MG/DL
POTASSIUM SERPL-SCNC: 5.5 MMOL/L
SODIUM SERPL-SCNC: 138 MMOL/L
VIT B12 SERPL-MCNC: 438 PG/ML

## 2018-09-28 ENCOUNTER — RX RENEWAL (OUTPATIENT)
Age: 68
End: 2018-09-28

## 2018-10-05 ENCOUNTER — APPOINTMENT (OUTPATIENT)
Dept: INTERNAL MEDICINE | Facility: CLINIC | Age: 68
End: 2018-10-05
Payer: MEDICARE

## 2018-10-05 PROCEDURE — 36415 COLL VENOUS BLD VENIPUNCTURE: CPT

## 2018-10-10 LAB
ANION GAP SERPL CALC-SCNC: 15 MMOL/L
BUN SERPL-MCNC: 19 MG/DL
CALCIUM SERPL-MCNC: 9.8 MG/DL
CHLORIDE SERPL-SCNC: 94 MMOL/L
CO2 SERPL-SCNC: 23 MMOL/L
CREAT SERPL-MCNC: 0.9 MG/DL
GLUCOSE SERPL-MCNC: 71 MG/DL
MAGNESIUM SERPL-MCNC: 1.8 MG/DL
POTASSIUM SERPL-SCNC: 5.3 MMOL/L
SODIUM SERPL-SCNC: 132 MMOL/L

## 2018-10-11 ENCOUNTER — RX RENEWAL (OUTPATIENT)
Age: 68
End: 2018-10-11

## 2018-10-17 ENCOUNTER — MEDICATION RENEWAL (OUTPATIENT)
Age: 68
End: 2018-10-17

## 2018-10-24 ENCOUNTER — APPOINTMENT (OUTPATIENT)
Dept: INTERNAL MEDICINE | Facility: CLINIC | Age: 68
End: 2018-10-24
Payer: MEDICARE

## 2018-10-24 PROCEDURE — 36415 COLL VENOUS BLD VENIPUNCTURE: CPT

## 2018-10-24 PROCEDURE — G0008: CPT

## 2018-10-24 PROCEDURE — 90662 IIV NO PRSV INCREASED AG IM: CPT

## 2018-10-25 LAB
ANION GAP SERPL CALC-SCNC: 10 MMOL/L
BASOPHILS # BLD AUTO: 0.05 K/UL
BASOPHILS NFR BLD AUTO: 0.6 %
BUN SERPL-MCNC: 23 MG/DL
CALCIUM SERPL-MCNC: 9.8 MG/DL
CHLORIDE SERPL-SCNC: 98 MMOL/L
CO2 SERPL-SCNC: 28 MMOL/L
CREAT SERPL-MCNC: 1 MG/DL
EOSINOPHIL # BLD AUTO: 0.11 K/UL
EOSINOPHIL NFR BLD AUTO: 1.3 %
GLUCOSE SERPL-MCNC: 62 MG/DL
HCT VFR BLD CALC: 35.5 %
HGB BLD-MCNC: 11.6 G/DL
IMM GRANULOCYTES NFR BLD AUTO: 0.3 %
LYMPHOCYTES # BLD AUTO: 1.21 K/UL
LYMPHOCYTES NFR BLD AUTO: 13.8 %
MAGNESIUM SERPL-MCNC: 1.5 MG/DL
MAN DIFF?: NORMAL
MCHC RBC-ENTMCNC: 28.1 PG
MCHC RBC-ENTMCNC: 32.7 GM/DL
MCV RBC AUTO: 86 FL
MONOCYTES # BLD AUTO: 0.77 K/UL
MONOCYTES NFR BLD AUTO: 8.8 %
NEUTROPHILS # BLD AUTO: 6.62 K/UL
NEUTROPHILS NFR BLD AUTO: 75.2 %
PLATELET # BLD AUTO: 322 K/UL
POTASSIUM SERPL-SCNC: 4.7 MMOL/L
RBC # BLD: 4.13 M/UL
RBC # FLD: 14.1 %
SODIUM SERPL-SCNC: 136 MMOL/L
WBC # FLD AUTO: 8.79 K/UL

## 2018-10-27 ENCOUNTER — RX RENEWAL (OUTPATIENT)
Age: 68
End: 2018-10-27

## 2018-11-02 ENCOUNTER — MEDICATION RENEWAL (OUTPATIENT)
Age: 68
End: 2018-11-02

## 2018-11-19 ENCOUNTER — APPOINTMENT (OUTPATIENT)
Dept: INTERNAL MEDICINE | Facility: CLINIC | Age: 68
End: 2018-11-19
Payer: MEDICARE

## 2018-11-19 PROCEDURE — 36415 COLL VENOUS BLD VENIPUNCTURE: CPT

## 2018-11-28 LAB
25(OH)D3 SERPL-MCNC: 25.7 NG/ML
ALBUMIN SERPL ELPH-MCNC: 4 G/DL
ALP BLD-CCNC: 136 U/L
ALT SERPL-CCNC: 22 U/L
ANION GAP SERPL CALC-SCNC: 14 MMOL/L
APPEARANCE: CLEAR
AST SERPL-CCNC: 37 U/L
BASOPHILS # BLD AUTO: 0.04 K/UL
BASOPHILS NFR BLD AUTO: 0.6 %
BILIRUB SERPL-MCNC: 0.5 MG/DL
BILIRUBIN URINE: NEGATIVE
BLOOD URINE: NEGATIVE
BUN SERPL-MCNC: 20 MG/DL
CALCIUM SERPL-MCNC: 9.8 MG/DL
CHLORIDE SERPL-SCNC: 95 MMOL/L
CHOLEST SERPL-MCNC: 198 MG/DL
CHOLEST/HDLC SERPL: 1.8 RATIO
CO2 SERPL-SCNC: 23 MMOL/L
COLOR: YELLOW
CREAT SERPL-MCNC: 1.02 MG/DL
EOSINOPHIL # BLD AUTO: 0.21 K/UL
EOSINOPHIL NFR BLD AUTO: 3 %
FERRITIN SERPL-MCNC: 70 NG/ML
FOLATE SERPL-MCNC: >20 NG/ML
GLUCOSE QUALITATIVE U: NEGATIVE MG/DL
GLUCOSE SERPL-MCNC: 52 MG/DL
HBA1C MFR BLD HPLC: 5.5 %
HCT VFR BLD CALC: 34.9 %
HDLC SERPL-MCNC: 112 MG/DL
HGB BLD-MCNC: 10.9 G/DL
IMM GRANULOCYTES NFR BLD AUTO: 0.3 %
IRON SATN MFR SERPL: 27 %
IRON SERPL-MCNC: 120 UG/DL
KETONES URINE: NEGATIVE
LDLC SERPL CALC-MCNC: 63 MG/DL
LEUKOCYTE ESTERASE URINE: NEGATIVE
LYMPHOCYTES # BLD AUTO: 1.24 K/UL
LYMPHOCYTES NFR BLD AUTO: 17.6 %
MAGNESIUM SERPL-MCNC: 1.5 MG/DL
MAN DIFF?: NORMAL
MCHC RBC-ENTMCNC: 27.6 PG
MCHC RBC-ENTMCNC: 31.2 GM/DL
MCV RBC AUTO: 88.4 FL
MONOCYTES # BLD AUTO: 0.51 K/UL
MONOCYTES NFR BLD AUTO: 7.2 %
NEUTROPHILS # BLD AUTO: 5.04 K/UL
NEUTROPHILS NFR BLD AUTO: 71.3 %
NITRITE URINE: NEGATIVE
PH URINE: 6
PLATELET # BLD AUTO: 229 K/UL
POTASSIUM SERPL-SCNC: 4.5 MMOL/L
PROT SERPL-MCNC: 7.4 G/DL
PROTEIN URINE: NEGATIVE MG/DL
RBC # BLD: 3.95 M/UL
RBC # FLD: 14.6 %
SODIUM SERPL-SCNC: 132 MMOL/L
SPECIFIC GRAVITY URINE: 1.01
T4 FREE SERPL-MCNC: 2 NG/DL
T4 SERPL-MCNC: 9.7 UG/DL
TIBC SERPL-MCNC: 452 UG/DL
TRIGL SERPL-MCNC: 116 MG/DL
TSH SERPL-ACNC: 0.28 UIU/ML
UIBC SERPL-MCNC: 332 UG/DL
URATE SERPL-MCNC: 5.7 MG/DL
UROBILINOGEN URINE: NEGATIVE MG/DL
VIT B12 SERPL-MCNC: 481 PG/ML
WBC # FLD AUTO: 7.06 K/UL
ZINC SERPL-MCNC: 69 UG/DL

## 2018-12-03 ENCOUNTER — APPOINTMENT (OUTPATIENT)
Dept: INTERNAL MEDICINE | Facility: CLINIC | Age: 68
End: 2018-12-03
Payer: MEDICARE

## 2018-12-03 ENCOUNTER — NON-APPOINTMENT (OUTPATIENT)
Age: 68
End: 2018-12-03

## 2018-12-03 VITALS — SYSTOLIC BLOOD PRESSURE: 110 MMHG | HEART RATE: 78 BPM | DIASTOLIC BLOOD PRESSURE: 64 MMHG | RESPIRATION RATE: 14 BRPM

## 2018-12-03 VITALS — HEIGHT: 59 IN | BODY MASS INDEX: 19.56 KG/M2 | WEIGHT: 97 LBS

## 2018-12-03 PROCEDURE — 99214 OFFICE O/P EST MOD 30 MIN: CPT | Mod: 25

## 2018-12-03 PROCEDURE — 93000 ELECTROCARDIOGRAM COMPLETE: CPT

## 2018-12-03 PROCEDURE — 99497 ADVNCD CARE PLAN 30 MIN: CPT | Mod: 33

## 2018-12-03 PROCEDURE — G0439: CPT

## 2018-12-03 PROCEDURE — 36415 COLL VENOUS BLD VENIPUNCTURE: CPT

## 2018-12-03 RX ORDER — DULOXETINE HYDROCHLORIDE 20 MG/1
20 CAPSULE, DELAYED RELEASE PELLETS ORAL
Qty: 30 | Refills: 1 | Status: DISCONTINUED | COMMUNITY
Start: 2018-11-02 | End: 2018-12-03

## 2018-12-03 NOTE — HISTORY OF PRESENT ILLNESS
[PMH Reviewed and Updated] : past medical history reviewed and updated [PSH Reviewed and Updated] : past surgical history reviewed and updated [Family History Reviewed and Updated] : family history reviewed and updated [Medication and Allergies Reconciled] : medication and allergies reconciled [0] : 0 [Over the Past 2 Weeks, Have You Felt Down, Depressed, or Hopeless?] : 1.) Over the past 2 weeks, have you felt down, depressed, or hopeless? No [Over the Past 2 Weeks, Have You Felt Little Interest or Pleasure Doing Things?] : 2.) Over the past 2 weeks, have you felt little interest or pleasure doing things? No [FreeTextEntry1] : Pt presents for f/u evaluation of hypothyroidism, s/p osteomy, CREST syndrome, hyperlipidemia.\par She is doing well  - output of about 1 liter daily\par Also here for her annual physical.\par \par She is without acute complaints today.\par \par Saw GI Dr Hill - tai.\par \par Saw endocrinology - repeat bone density in August\par \par Will be going to [place] in the next few days for the winter.

## 2018-12-05 LAB
ANION GAP SERPL CALC-SCNC: 12 MMOL/L
BASOPHILS # BLD AUTO: 0.04 K/UL
BASOPHILS NFR BLD AUTO: 0.5 %
BUN SERPL-MCNC: 20 MG/DL
CALCIUM SERPL-MCNC: 10 MG/DL
CHLORIDE SERPL-SCNC: 99 MMOL/L
CO2 SERPL-SCNC: 23 MMOL/L
CREAT SERPL-MCNC: 1.07 MG/DL
EOSINOPHIL # BLD AUTO: 0.13 K/UL
EOSINOPHIL NFR BLD AUTO: 1.5 %
GLUCOSE SERPL-MCNC: 86 MG/DL
HCT VFR BLD CALC: 34 %
HGB BLD-MCNC: 10.8 G/DL
IMM GRANULOCYTES NFR BLD AUTO: 0.2 %
LYMPHOCYTES # BLD AUTO: 1.71 K/UL
LYMPHOCYTES NFR BLD AUTO: 19.8 %
MAGNESIUM SERPL-MCNC: 1.6 MG/DL
MAN DIFF?: NORMAL
MCHC RBC-ENTMCNC: 27.4 PG
MCHC RBC-ENTMCNC: 31.8 GM/DL
MCV RBC AUTO: 86.3 FL
MONOCYTES # BLD AUTO: 0.69 K/UL
MONOCYTES NFR BLD AUTO: 8 %
NEUTROPHILS # BLD AUTO: 6.04 K/UL
NEUTROPHILS NFR BLD AUTO: 70 %
PLATELET # BLD AUTO: 259 K/UL
POTASSIUM SERPL-SCNC: 5.1 MMOL/L
RBC # BLD: 3.94 M/UL
RBC # FLD: 14.3 %
SODIUM SERPL-SCNC: 134 MMOL/L
WBC # FLD AUTO: 8.63 K/UL

## 2019-03-10 ENCOUNTER — TRANSCRIPTION ENCOUNTER (OUTPATIENT)
Age: 69
End: 2019-03-10

## 2019-03-22 ENCOUNTER — TRANSCRIPTION ENCOUNTER (OUTPATIENT)
Age: 69
End: 2019-03-22

## 2019-03-26 ENCOUNTER — RX RENEWAL (OUTPATIENT)
Age: 69
End: 2019-03-26

## 2019-04-02 ENCOUNTER — MEDICATION RENEWAL (OUTPATIENT)
Age: 69
End: 2019-04-02

## 2019-04-15 ENCOUNTER — RX RENEWAL (OUTPATIENT)
Age: 69
End: 2019-04-15

## 2019-05-02 ENCOUNTER — RESULT REVIEW (OUTPATIENT)
Age: 69
End: 2019-05-02

## 2019-05-02 ENCOUNTER — APPOINTMENT (OUTPATIENT)
Dept: INTERNAL MEDICINE | Facility: CLINIC | Age: 69
End: 2019-05-02
Payer: MEDICARE

## 2019-05-02 VITALS — SYSTOLIC BLOOD PRESSURE: 112 MMHG | HEART RATE: 72 BPM | RESPIRATION RATE: 14 BRPM | DIASTOLIC BLOOD PRESSURE: 66 MMHG

## 2019-05-02 VITALS — HEIGHT: 58 IN | WEIGHT: 96 LBS | BODY MASS INDEX: 20.15 KG/M2

## 2019-05-02 PROCEDURE — 90471 IMMUNIZATION ADMIN: CPT | Mod: GY

## 2019-05-02 PROCEDURE — G0444 DEPRESSION SCREEN ANNUAL: CPT | Mod: 59

## 2019-05-02 PROCEDURE — 99214 OFFICE O/P EST MOD 30 MIN: CPT | Mod: 25

## 2019-05-02 PROCEDURE — 90750 HZV VACC RECOMBINANT IM: CPT | Mod: GY

## 2019-05-02 NOTE — HISTORY OF PRESENT ILLNESS
[de-identified] : Pt presents for evaluation of hyperlipidemia, OA, Sjogrens, hypothyroidism, s/p ileostomy.\par Pt had uneventful winter in [place], except for worsening of sciatica.\par No relief with Cymbalta.\par

## 2019-05-02 NOTE — ASSESSMENT
[FreeTextEntry1] : Labs reviewed\par \par DC Cymbalta\par Begin Sertraline 25 mg daily\par \par Continue all other meds.\par Neuro f/u\par \par Shingrix #1 given\par \par F/U 2-3 months

## 2019-05-02 NOTE — PHYSICAL EXAM
[No Acute Distress] : no acute distress [Normal Sclera/Conjunctiva] : normal sclera/conjunctiva [PERRL] : pupils equal round and reactive to light [No JVD] : no jugular venous distention [Normal Oropharynx] : the oropharynx was normal [Supple] : supple [No Lymphadenopathy] : no lymphadenopathy [No Respiratory Distress] : no respiratory distress  [Clear to Auscultation] : lungs were clear to auscultation bilaterally [No Accessory Muscle Use] : no accessory muscle use [Normal Rate] : normal rate  [Regular Rhythm] : with a regular rhythm [Soft] : abdomen soft [No Edema] : there was no peripheral edema [Normal S1, S2] : normal S1 and S2 [Normal Bowel Sounds] : normal bowel sounds [Non-distended] : non-distended [Non Tender] : non-tender [de-identified] : ileostomy in place

## 2019-06-20 ENCOUNTER — APPOINTMENT (OUTPATIENT)
Dept: SURGERY | Facility: CLINIC | Age: 69
End: 2019-06-20
Payer: MEDICARE

## 2019-06-20 VITALS
HEIGHT: 58.5 IN | OXYGEN SATURATION: 99 % | RESPIRATION RATE: 73 BRPM | HEART RATE: 15 BPM | WEIGHT: 93 LBS | SYSTOLIC BLOOD PRESSURE: 99 MMHG | BODY MASS INDEX: 19 KG/M2 | TEMPERATURE: 98.1 F | DIASTOLIC BLOOD PRESSURE: 66 MMHG

## 2019-06-20 DIAGNOSIS — R59.9 ENLARGED LYMPH NODES, UNSPECIFIED: ICD-10-CM

## 2019-06-20 DIAGNOSIS — R15.9 FULL INCONTINENCE OF FECES: ICD-10-CM

## 2019-06-20 DIAGNOSIS — Z87.19 PERSONAL HISTORY OF OTHER DISEASES OF THE DIGESTIVE SYSTEM: ICD-10-CM

## 2019-06-20 PROCEDURE — 99214 OFFICE O/P EST MOD 30 MIN: CPT

## 2019-06-20 RX ORDER — ELECTROLYTES/DEXTROSE
SOLUTION, ORAL ORAL
Refills: 0 | Status: DISCONTINUED | COMMUNITY
End: 2019-06-20

## 2019-06-20 RX ORDER — POTASSIUM CHLORIDE 750 MG/1
10 CAPSULE, EXTENDED RELEASE ORAL TWICE DAILY
Qty: 180 | Refills: 1 | Status: DISCONTINUED | COMMUNITY
Start: 2017-08-25 | End: 2019-06-20

## 2019-06-20 RX ORDER — SERTRALINE 25 MG/1
25 TABLET, FILM COATED ORAL
Qty: 30 | Refills: 2 | Status: DISCONTINUED | COMMUNITY
Start: 2019-05-02 | End: 2019-06-20

## 2019-06-20 RX ORDER — MAGNESIUM OXIDE 400 MG
400 (241.3 MG) TABLET ORAL
Qty: 360 | Refills: 1 | Status: DISCONTINUED | COMMUNITY
Start: 2017-08-25 | End: 2019-06-20

## 2019-06-20 RX ORDER — METHYLPREDNISOLONE 4 MG/1
4 TABLET ORAL
Qty: 1 | Refills: 0 | Status: DISCONTINUED | COMMUNITY
Start: 2018-08-01 | End: 2019-06-20

## 2019-06-20 NOTE — PHYSICAL EXAM
[Normal Heart Sounds] : normal heart sounds [Normal Breath Sounds] : Normal breath sounds [Normal Rate and Rhythm] : normal rate and rhythm [No Rash or Lesion] : No rash or lesion [Alert] : alert [Oriented to Time] : oriented to time [Oriented to Place] : oriented to place [Oriented to Person] : oriented to person [Calm] : calm [Abdomen Masses] : No abdominal masses [Abdomen Tenderness] : ~T No ~M abdominal tenderness [JVD] : no jugular venous distention  [de-identified] : No palpable hernias, ostomy appliance in place [de-identified] : Well nourished female, in no apparent distress [de-identified] : WNL [de-identified] : Full ROM

## 2019-06-20 NOTE — ASSESSMENT
[FreeTextEntry1] : Chart and previous notes reviewed. No change in my assessment or recommendation. I would not reverse the patient's loop ileostomy because she would continue to have the problems that she had previously and there is a significant risk of losing more small bowel.

## 2019-06-20 NOTE — HISTORY OF PRESENT ILLNESS
[FreeTextEntry1] : Siena is a 67 y/o female here for follow up visit. Patient with a PMH of CREST syndrome, colonic inertia, recurrent rectal prolapse and anal incontinence. Patient is s/p low anterior resection, rigid proctosigmoidoscopy, JUANPABLO and reduction of internal hernia on 1/30/11 at [facility] indicated for rectal prolapse. In 2016 patient underwent ventral mesh rectopexy with re-suspension of the bladder on 9/13/16 at [facility]. In 2017 patient was diagnosed with a small bowel obstruction, s/p laparotomy, extensive JUANPABLO, ileocolic resection with primary anastomosis and diverting loop ileostomy at [facility]. Last seen on 9/13/17, patient was inquiring about closure of her loop ileostomy but recommended against the procedure. \par \par MRI from 5/13/19 shows colectomy with a right-sided ileostomy. Moderate distended loop of small bowel within the right side of the lower abdomen. Flexible sigmoidoscopy from 2/15/17 demonstrated the entire examined colon is normal. \par \par Today, patient is here to discuss possible ileostomy closure again. Has no real issues with ostomy. Has some issues with ostomy appliance but she sees a stomal therapist.

## 2019-06-25 ENCOUNTER — RX RENEWAL (OUTPATIENT)
Age: 69
End: 2019-06-25

## 2019-07-05 ENCOUNTER — APPOINTMENT (OUTPATIENT)
Dept: INTERNAL MEDICINE | Facility: CLINIC | Age: 69
End: 2019-07-05
Payer: MEDICARE

## 2019-07-05 VITALS
WEIGHT: 93 LBS | HEART RATE: 72 BPM | DIASTOLIC BLOOD PRESSURE: 70 MMHG | RESPIRATION RATE: 14 BRPM | SYSTOLIC BLOOD PRESSURE: 106 MMHG | BODY MASS INDEX: 19.11 KG/M2

## 2019-07-05 PROCEDURE — 99214 OFFICE O/P EST MOD 30 MIN: CPT

## 2019-07-05 NOTE — HISTORY OF PRESENT ILLNESS
[de-identified] : developed cellulitis 6/26 - put on doxycline for seven days.\par Put on prednisone for URI and proAir.\par Otherwise feeling ok.\par Still with chest congestion.\par \par Did not try sertraline.\par Still obviously depressed\par \par Did blood work last week at lab.\par Still on meds for hypothyroidism, GERD, CREST

## 2019-07-05 NOTE — ASSESSMENT
[FreeTextEntry1] : Labs reviewed\par \par Advair 250/50 bid for one week\par F/U 10 days\par \par Begin Sertraline 25 mg daily\par \par Continue all other meds.\par \par F/U 2-3 months

## 2019-07-05 NOTE — PHYSICAL EXAM
[Normal] : no carotid or abdominal bruits heard, no varicosities, pedal pulses are present, no peripheral edema, no extremity clubbing or cyanosis and no palpable aorta

## 2019-07-11 ENCOUNTER — RX RENEWAL (OUTPATIENT)
Age: 69
End: 2019-07-11

## 2019-07-12 ENCOUNTER — EMERGENCY (EMERGENCY)
Facility: HOSPITAL | Age: 69
LOS: 1 days | Discharge: HOME | End: 2019-07-12
Attending: EMERGENCY MEDICINE
Payer: MEDICARE

## 2019-07-12 VITALS
SYSTOLIC BLOOD PRESSURE: 101 MMHG | OXYGEN SATURATION: 99 % | DIASTOLIC BLOOD PRESSURE: 59 MMHG | HEART RATE: 78 BPM | RESPIRATION RATE: 17 BRPM | TEMPERATURE: 98 F

## 2019-07-12 VITALS
TEMPERATURE: 98 F | HEART RATE: 60 BPM | WEIGHT: 93.04 LBS | HEIGHT: 58 IN | SYSTOLIC BLOOD PRESSURE: 113 MMHG | DIASTOLIC BLOOD PRESSURE: 61 MMHG | OXYGEN SATURATION: 98 % | RESPIRATION RATE: 17 BRPM

## 2019-07-12 DIAGNOSIS — Z98.890 OTHER SPECIFIED POSTPROCEDURAL STATES: Chronic | ICD-10-CM

## 2019-07-12 DIAGNOSIS — Z90.49 ACQUIRED ABSENCE OF OTHER SPECIFIED PARTS OF DIGESTIVE TRACT: Chronic | ICD-10-CM

## 2019-07-12 DIAGNOSIS — Z90.710 ACQUIRED ABSENCE OF BOTH CERVIX AND UTERUS: Chronic | ICD-10-CM

## 2019-07-12 LAB
ALBUMIN SERPL ELPH-MCNC: 4 G/DL — SIGNIFICANT CHANGE UP (ref 3.3–5)
ALP SERPL-CCNC: 90 U/L — SIGNIFICANT CHANGE UP (ref 40–120)
ALT FLD-CCNC: 20 U/L — SIGNIFICANT CHANGE UP (ref 10–45)
ANION GAP SERPL CALC-SCNC: 10 MMOL/L — SIGNIFICANT CHANGE UP (ref 5–17)
ANION GAP SERPL CALC-SCNC: 11 MMOL/L — SIGNIFICANT CHANGE UP (ref 5–17)
AST SERPL-CCNC: 33 U/L — SIGNIFICANT CHANGE UP (ref 10–40)
BASE EXCESS BLDV CALC-SCNC: 3.5 MMOL/L — HIGH (ref -2–2)
BASOPHILS # BLD AUTO: 0 K/UL — SIGNIFICANT CHANGE UP (ref 0–0.2)
BASOPHILS NFR BLD AUTO: 0.3 % — SIGNIFICANT CHANGE UP (ref 0–2)
BILIRUB SERPL-MCNC: 0.6 MG/DL — SIGNIFICANT CHANGE UP (ref 0.2–1.2)
BUN SERPL-MCNC: 14 MG/DL — SIGNIFICANT CHANGE UP (ref 7–23)
BUN SERPL-MCNC: 14 MG/DL — SIGNIFICANT CHANGE UP (ref 7–23)
CA-I SERPL-SCNC: 1.21 MMOL/L — SIGNIFICANT CHANGE UP (ref 1.12–1.3)
CALCIUM SERPL-MCNC: 8 MG/DL — LOW (ref 8.4–10.5)
CALCIUM SERPL-MCNC: 9.4 MG/DL — SIGNIFICANT CHANGE UP (ref 8.4–10.5)
CHLORIDE BLDV-SCNC: 92 MMOL/L — LOW (ref 96–108)
CHLORIDE SERPL-SCNC: 89 MMOL/L — LOW (ref 96–108)
CHLORIDE SERPL-SCNC: 94 MMOL/L — LOW (ref 96–108)
CO2 BLDV-SCNC: 30 MMOL/L — SIGNIFICANT CHANGE UP (ref 22–30)
CO2 SERPL-SCNC: 26 MMOL/L — SIGNIFICANT CHANGE UP (ref 22–31)
CO2 SERPL-SCNC: 27 MMOL/L — SIGNIFICANT CHANGE UP (ref 22–31)
CREAT SERPL-MCNC: 0.95 MG/DL — SIGNIFICANT CHANGE UP (ref 0.5–1.3)
CREAT SERPL-MCNC: 1.03 MG/DL — SIGNIFICANT CHANGE UP (ref 0.5–1.3)
EOSINOPHIL # BLD AUTO: 0 K/UL — SIGNIFICANT CHANGE UP (ref 0–0.5)
EOSINOPHIL NFR BLD AUTO: 0.3 % — SIGNIFICANT CHANGE UP (ref 0–6)
GAS PNL BLDV: 126 MMOL/L — LOW (ref 135–145)
GAS PNL BLDV: SIGNIFICANT CHANGE UP
GLUCOSE BLDV-MCNC: 90 MG/DL — SIGNIFICANT CHANGE UP (ref 70–99)
GLUCOSE SERPL-MCNC: 92 MG/DL — SIGNIFICANT CHANGE UP (ref 70–99)
GLUCOSE SERPL-MCNC: 92 MG/DL — SIGNIFICANT CHANGE UP (ref 70–99)
HCO3 BLDV-SCNC: 29 MMOL/L — SIGNIFICANT CHANGE UP (ref 21–29)
HCT VFR BLD CALC: 35.8 % — SIGNIFICANT CHANGE UP (ref 34.5–45)
HCT VFR BLDA CALC: 39 % — SIGNIFICANT CHANGE UP (ref 39–50)
HGB BLD CALC-MCNC: 12.6 G/DL — SIGNIFICANT CHANGE UP (ref 11.5–15.5)
HGB BLD-MCNC: 11.8 G/DL — SIGNIFICANT CHANGE UP (ref 11.5–15.5)
LACTATE BLDV-MCNC: 0.8 MMOL/L — SIGNIFICANT CHANGE UP (ref 0.7–2)
LYMPHOCYTES # BLD AUTO: 0.8 K/UL — LOW (ref 1–3.3)
LYMPHOCYTES # BLD AUTO: 6.9 % — LOW (ref 13–44)
MAGNESIUM SERPL-MCNC: 1.3 MG/DL — LOW (ref 1.6–2.6)
MCHC RBC-ENTMCNC: 28.8 PG — SIGNIFICANT CHANGE UP (ref 27–34)
MCHC RBC-ENTMCNC: 32.9 GM/DL — SIGNIFICANT CHANGE UP (ref 32–36)
MCV RBC AUTO: 87.7 FL — SIGNIFICANT CHANGE UP (ref 80–100)
MONOCYTES # BLD AUTO: 0.5 K/UL — SIGNIFICANT CHANGE UP (ref 0–0.9)
MONOCYTES NFR BLD AUTO: 3.7 % — SIGNIFICANT CHANGE UP (ref 2–14)
NEUTROPHILS # BLD AUTO: 10.9 K/UL — HIGH (ref 1.8–7.4)
NEUTROPHILS NFR BLD AUTO: 88.7 % — HIGH (ref 43–77)
OTHER CELLS CSF MANUAL: 7 ML/DL — LOW (ref 18–22)
PCO2 BLDV: 50 MMHG — SIGNIFICANT CHANGE UP (ref 35–50)
PH BLDV: 7.38 — SIGNIFICANT CHANGE UP (ref 7.35–7.45)
PHOSPHATE SERPL-MCNC: 3.2 MG/DL — SIGNIFICANT CHANGE UP (ref 2.5–4.5)
PLATELET # BLD AUTO: 208 K/UL — SIGNIFICANT CHANGE UP (ref 150–400)
PO2 BLDV: 25 MMHG — SIGNIFICANT CHANGE UP (ref 25–45)
POTASSIUM BLDV-SCNC: 4 MMOL/L — SIGNIFICANT CHANGE UP (ref 3.5–5.3)
POTASSIUM SERPL-MCNC: 4 MMOL/L — SIGNIFICANT CHANGE UP (ref 3.5–5.3)
POTASSIUM SERPL-MCNC: 4 MMOL/L — SIGNIFICANT CHANGE UP (ref 3.5–5.3)
POTASSIUM SERPL-SCNC: 4 MMOL/L — SIGNIFICANT CHANGE UP (ref 3.5–5.3)
POTASSIUM SERPL-SCNC: 4 MMOL/L — SIGNIFICANT CHANGE UP (ref 3.5–5.3)
PROT SERPL-MCNC: 6.8 G/DL — SIGNIFICANT CHANGE UP (ref 6–8.3)
RBC # BLD: 4.08 M/UL — SIGNIFICANT CHANGE UP (ref 3.8–5.2)
RBC # FLD: 12.1 % — SIGNIFICANT CHANGE UP (ref 10.3–14.5)
SAO2 % BLDV: 38 % — LOW (ref 67–88)
SODIUM SERPL-SCNC: 127 MMOL/L — LOW (ref 135–145)
SODIUM SERPL-SCNC: 130 MMOL/L — LOW (ref 135–145)
WBC # BLD: 12.3 K/UL — HIGH (ref 3.8–10.5)
WBC # FLD AUTO: 12.3 K/UL — HIGH (ref 3.8–10.5)

## 2019-07-12 PROCEDURE — 99284 EMERGENCY DEPT VISIT MOD MDM: CPT | Mod: GC

## 2019-07-12 RX ORDER — MAGNESIUM SULFATE 500 MG/ML
2 VIAL (ML) INJECTION ONCE
Refills: 0 | Status: COMPLETED | OUTPATIENT
Start: 2019-07-12 | End: 2019-07-12

## 2019-07-12 RX ORDER — SODIUM CHLORIDE 9 MG/ML
1000 INJECTION INTRAMUSCULAR; INTRAVENOUS; SUBCUTANEOUS ONCE
Refills: 0 | Status: COMPLETED | OUTPATIENT
Start: 2019-07-12 | End: 2019-07-12

## 2019-07-12 RX ORDER — ACETAMINOPHEN 500 MG
650 TABLET ORAL ONCE
Refills: 0 | Status: COMPLETED | OUTPATIENT
Start: 2019-07-12 | End: 2019-07-12

## 2019-07-12 RX ORDER — METOCLOPRAMIDE HCL 10 MG
10 TABLET ORAL ONCE
Refills: 0 | Status: COMPLETED | OUTPATIENT
Start: 2019-07-12 | End: 2019-07-12

## 2019-07-12 RX ADMIN — Medication 50 GRAM(S): at 13:21

## 2019-07-12 RX ADMIN — Medication 10 MILLIGRAM(S): at 13:21

## 2019-07-12 RX ADMIN — Medication 650 MILLIGRAM(S): at 11:15

## 2019-07-12 RX ADMIN — SODIUM CHLORIDE 1000 MILLILITER(S): 9 INJECTION INTRAMUSCULAR; INTRAVENOUS; SUBCUTANEOUS at 11:16

## 2019-07-12 NOTE — ED CLERICAL - NS ED CLERK NOTE PRE-ARRIVAL INFORMATION; ADDITIONAL PRE-ARRIVAL INFORMATION
CC/Reason For referral: dehydration, headache  Preferred Consultant(if applicable):  Who admits for you (if needed):  Do you have documents you would like to fax over?  Would you still like to speak to an ED attending? please call after patient is seen

## 2019-07-12 NOTE — ED PROVIDER NOTE - ATTENDING CONTRIBUTION TO CARE
Patient is a 67 yo F with history of Sjogren's syndrome, CREST, GERD, hx of anxiety depression. hypothyroidism, ostomy secondary to SBO with resection/ ileostomy in 2017 sent in by Dr. Esparza for evaluation of decreased ostomy input. Patient reports decreased PO intake. Denies abdominal pain, nausea, vomiting, fevers, or chills. She states she has a headache. No chest pain or shortness of breath. Headache is pressure like. No focal weakness.     VS noted  Gen. no acute distress, Non toxic   HEENT: EOMI, mmm, pharynx w/o erythema or exudate  Lungs: CTAB/L no C/ W /R   CVS: RRR   Abd; Soft non tender, non distended, no CVA tenderness b/l  Ext: no edema  Skin: no rash  Neuro AAOx3 non focal clear speech  a/p: decreased ostomy output - will check labs, hydrate and reassess. Will reach out to Dr. Esparza. No indication for CT at this time. Will treat headache symptomatically  - no suspicion for SAH, meningitis.   - Charo VEGA

## 2019-07-12 NOTE — ED PROVIDER NOTE - OBJECTIVE STATEMENT
68F, hx sjogrens, CREST, GERD, anxiety/depression, hypothyroid, SBO s/p resection & ileostomy 2017, presents after being instructed to come to ED by her GI doctor due to abnormal labs. GI Dr Esparza [phone]. Patient states that she has had slightly decreased ostomy output since yesterday, as well as a bad headache. NO associated fevers or chills, nausea or vomiting, chest pain, shortness of breath, cough, abdominal pain, urinary sx.    Went to GI doctor for eval last night and had bloodwork performed. Faxed results include CBC, CMP, Mag/Phos - CBC with WBC 14.9, Mg 1.4, H/H 11.3/34.2, Phos 3.3BUN/Cr 18/0.91, Na 129, Cl 87. Patient states she was called this AM by Dr Esparza and told to come to ED for eval and further care.     Of note, patient states she had left arm cellulitis ~3 weeks ago and had 1 week course doxy. She then developed bronchitis and had a course of steroids. 68F, hx sjogrens, CREST, GERD, anxiety/depression, hypothyroid, SBO s/p resection & ileostomy 2017, presents after being instructed to come to ED by her GI doctor due to abnormal labs. GI Dr Esparza [phone]. Patient states that she has had slightly decreased ostomy output since yesterday, as well as a bad headache. NO associated fevers or chills, nausea or vomiting, chest pain, shortness of breath, cough, abdominal pain, urinary sx.    Went to GI doctor for eval last night and had bloodwork performed:  Faxed results include CBC, CMP, Mag/Phos: CBC with WBC 14.9, H/H 11.3/34.2, Phos 3.3, Mg 1.4, BUN/Cr 18/0.91, Na 129, K 4.4, Cl 87. Patient states she was called this AM by Dr Esparza and told to come to ED for eval and further care.     Of note, patient states she had left arm cellulitis ~3 weeks ago and had 1 week course doxy. She then developed bronchitis and had a course of steroids. 68F, hx sjogrens, CREST, GERD, anxiety/depression, hypothyroid, SBO s/p resection & ileostomy 2017, presents after being instructed to come to ED by her GI doctor due to abnormal labs. GI Dr Esparza [phone]. Patient states that she has had slightly decreased ostomy output since yesterday, as well as a bad headache. NO associated fevers or chills, nausea or vomiting, chest pain, shortness of breath, cough, abdominal pain, urinary sx. Patient reports decreased appetite chronic with poor PO intake.     Went to GI doctor for eval last night and had bloodwork performed:  Faxed results include CBC, CMP, Mag/Phos: CBC with WBC 14.9, H/H 11.3/34.2, Phos 3.3, Mg 1.4, BUN/Cr 18/0.91, Na 129, K 4.4, Cl 87. Patient states she was called this AM by Dr Esparza and told to come to ED for eval and further care.     Of note, patient states she had left arm cellulitis ~3 weeks ago and had 1 week course doxy. She then developed bronchitis and had a course of steroids.

## 2019-07-12 NOTE — ED PROVIDER NOTE - PROGRESS NOTE DETAILS
27-Jun-2019 Spoke with GI Dr Esparza regarding patient clinical ED course. If patient discharged home, with be followed up with Dr White.  Patient still receiving IV fluids, will recheck BMP after. Will administer reglan due to ongoing headache, as well as Mag due to low mag on labs  Resting in bed, no acute distress   Moises Turner MD, PGY3 Emergency Medicine Na improved to 130 from 127. Headache improved. Will d/c home with return precautions and instructed to f/u with primary medical doctor and Dr Esparza.   Moises Turner MD, PGY3 Emergency Medicine

## 2019-07-12 NOTE — ED PROVIDER NOTE - CLINICAL SUMMARY MEDICAL DECISION MAKING FREE TEXT BOX
68F, hx Sjogren's, CREST, GERD, anxiety/depression, hypothyroid, SBO s/p resection & ileostomy 2017, presents for evaluation due to reported abnormal labs and slight decreased ostomy output. Exam as above. NO nausea, vomiting, abdominal pain. Will check labs, administer fluids and pain control (for headache), and re-assess. Currently stable, no acute distress. Will continue to follow up and re-assess. Case discussed with Attending.   Moises Turner MD, PGY3 Emergency Medicine

## 2019-07-12 NOTE — ED PROVIDER NOTE - PHYSICAL EXAMINATION
General: Well appearing, alert, oriented, no acute distress.   HEENT: PERRLA EOMI. No trauma/bruising noted to head or face.   CV: Regular rate and rhythm, S1/S2, no murmurs/rubs/gallops noted on exam.   Lungs: Clear to ascultation bilaterally, no wheezes/crackles/rales noted on exam.   Abdomen: Soft, non tender, non distended, no guarding or rebound. Right lower abdomen with ostomy in place.   MSK: Full ROM of upper and lower extremities bilaterally. Full ROM of neck.   Neuro: Awake, A+O x4, moving all extremities spontaneously. CN 2-12 grossly intact.  Strength and sensation grossly intact to all extremities. Ambulatory w/o assist, normal gait.   Extremities: No swelling or edema noted to extremities.   Skin: No rash noted on exam.

## 2019-07-12 NOTE — ED ADULT NURSE NOTE - OBJECTIVE STATEMENT
Pt 69 y/o female presents to ED from home after receiving abnormal blood test results from gastro yesterday. Pt went to gastro complaining of decreased ileostomy output. Ileostomy from 2017 SBO. Pt also complaining of severe HA. Pt took Tylenol extra strength 1000mg at home with relief last night for HA. Pt reports around normal ileostomy output from 10pm-10am. PMH includes hypothyroidism and sjogrens. Has upper extremity tremors at baseline. Pt denies N/V, appetite, pain, SOB, CP. Pt does not seem to be in any apparent respiratory distress.  at bedside.

## 2019-07-12 NOTE — ED PROVIDER NOTE - NSFOLLOWUPINSTRUCTIONS_ED_ALL_ED_FT
Return to hospital for any new or concerning symptoms, including but not limited to: fevers, chills, nausea, vomiting, headache, dizziness, lightheadedness, chest pain, shortness of breath, difficulty breathing, abdominal pain, weakness, or any other new or concerning symptoms.    Please follow up with your primary medical doctor and Dr Esparza next week for further care    Please continue all home medications    Please ensure adequate hydration. You can drink pedialyte or other oral hydration solutions as per dosing on bottles.

## 2019-07-12 NOTE — ED PROVIDER NOTE - PMH
Anxiety    Colonic dysmotility    GERD (gastroesophageal reflux disease)    Ileostomy in place  2/2 SBO 2017  Sciatica    Sjogren's syndrome

## 2019-07-14 ENCOUNTER — INPATIENT (INPATIENT)
Facility: HOSPITAL | Age: 69
LOS: 3 days | Discharge: HOME | DRG: 247 | End: 2019-07-18
Attending: HOSPITALIST | Admitting: HOSPITALIST
Payer: MEDICARE

## 2019-07-14 VITALS
WEIGHT: 93.04 LBS | HEIGHT: 58.5 IN | OXYGEN SATURATION: 99 % | RESPIRATION RATE: 18 BRPM | HEART RATE: 83 BPM | DIASTOLIC BLOOD PRESSURE: 61 MMHG | TEMPERATURE: 98 F | SYSTOLIC BLOOD PRESSURE: 96 MMHG

## 2019-07-14 DIAGNOSIS — Z90.710 ACQUIRED ABSENCE OF BOTH CERVIX AND UTERUS: Chronic | ICD-10-CM

## 2019-07-14 DIAGNOSIS — M54.2 CERVICALGIA: ICD-10-CM

## 2019-07-14 DIAGNOSIS — M35.00 SJOGREN SYNDROME, UNSPECIFIED: ICD-10-CM

## 2019-07-14 DIAGNOSIS — K21.9 GASTRO-ESOPHAGEAL REFLUX DISEASE WITHOUT ESOPHAGITIS: ICD-10-CM

## 2019-07-14 DIAGNOSIS — Z90.49 ACQUIRED ABSENCE OF OTHER SPECIFIED PARTS OF DIGESTIVE TRACT: Chronic | ICD-10-CM

## 2019-07-14 DIAGNOSIS — Z93.2 ILEOSTOMY STATUS: ICD-10-CM

## 2019-07-14 DIAGNOSIS — E03.9 HYPOTHYROIDISM, UNSPECIFIED: ICD-10-CM

## 2019-07-14 DIAGNOSIS — Z98.890 OTHER SPECIFIED POSTPROCEDURAL STATES: Chronic | ICD-10-CM

## 2019-07-14 DIAGNOSIS — M54.30 SCIATICA, UNSPECIFIED SIDE: ICD-10-CM

## 2019-07-14 DIAGNOSIS — Z29.9 ENCOUNTER FOR PROPHYLACTIC MEASURES, UNSPECIFIED: ICD-10-CM

## 2019-07-14 LAB
ALBUMIN SERPL ELPH-MCNC: 3.8 G/DL — SIGNIFICANT CHANGE UP (ref 3.3–5)
ALP SERPL-CCNC: 81 U/L — SIGNIFICANT CHANGE UP (ref 40–120)
ALT FLD-CCNC: 17 U/L — SIGNIFICANT CHANGE UP (ref 10–45)
ANION GAP SERPL CALC-SCNC: 10 MMOL/L — SIGNIFICANT CHANGE UP (ref 5–17)
APTT BLD: 33.5 SEC — SIGNIFICANT CHANGE UP (ref 27.5–36.3)
AST SERPL-CCNC: 28 U/L — SIGNIFICANT CHANGE UP (ref 10–40)
BASOPHILS # BLD AUTO: 0 K/UL — SIGNIFICANT CHANGE UP (ref 0–0.2)
BASOPHILS NFR BLD AUTO: 0.2 % — SIGNIFICANT CHANGE UP (ref 0–2)
BILIRUB SERPL-MCNC: 0.4 MG/DL — SIGNIFICANT CHANGE UP (ref 0.2–1.2)
BUN SERPL-MCNC: 18 MG/DL — SIGNIFICANT CHANGE UP (ref 7–23)
CALCIUM SERPL-MCNC: 8.8 MG/DL — SIGNIFICANT CHANGE UP (ref 8.4–10.5)
CHLORIDE SERPL-SCNC: 95 MMOL/L — LOW (ref 96–108)
CO2 SERPL-SCNC: 29 MMOL/L — SIGNIFICANT CHANGE UP (ref 22–31)
CREAT SERPL-MCNC: 1.09 MG/DL — SIGNIFICANT CHANGE UP (ref 0.5–1.3)
D DIMER BLD IA.RAPID-MCNC: 181 NG/ML DDU — SIGNIFICANT CHANGE UP
EOSINOPHIL # BLD AUTO: 0.1 K/UL — SIGNIFICANT CHANGE UP (ref 0–0.5)
EOSINOPHIL NFR BLD AUTO: 1 % — SIGNIFICANT CHANGE UP (ref 0–6)
GAS PNL BLDV: SIGNIFICANT CHANGE UP
GLUCOSE SERPL-MCNC: 102 MG/DL — HIGH (ref 70–99)
HCT VFR BLD CALC: 31.8 % — LOW (ref 34.5–45)
HGB BLD-MCNC: 11.1 G/DL — LOW (ref 11.5–15.5)
INR BLD: 0.97 RATIO — SIGNIFICANT CHANGE UP (ref 0.88–1.16)
LYMPHOCYTES # BLD AUTO: 1.8 K/UL — SIGNIFICANT CHANGE UP (ref 1–3.3)
LYMPHOCYTES # BLD AUTO: 14.6 % — SIGNIFICANT CHANGE UP (ref 13–44)
MAGNESIUM SERPL-MCNC: 1.8 MG/DL — SIGNIFICANT CHANGE UP (ref 1.6–2.6)
MCHC RBC-ENTMCNC: 31.3 PG — SIGNIFICANT CHANGE UP (ref 27–34)
MCHC RBC-ENTMCNC: 34.9 GM/DL — SIGNIFICANT CHANGE UP (ref 32–36)
MCV RBC AUTO: 89.6 FL — SIGNIFICANT CHANGE UP (ref 80–100)
MONOCYTES # BLD AUTO: 0.7 K/UL — SIGNIFICANT CHANGE UP (ref 0–0.9)
MONOCYTES NFR BLD AUTO: 5.6 % — SIGNIFICANT CHANGE UP (ref 2–14)
NEUTROPHILS # BLD AUTO: 9.4 K/UL — HIGH (ref 1.8–7.4)
NEUTROPHILS NFR BLD AUTO: 78.7 % — HIGH (ref 43–77)
PLATELET # BLD AUTO: 184 K/UL — SIGNIFICANT CHANGE UP (ref 150–400)
POTASSIUM SERPL-MCNC: 4.3 MMOL/L — SIGNIFICANT CHANGE UP (ref 3.5–5.3)
POTASSIUM SERPL-SCNC: 4.3 MMOL/L — SIGNIFICANT CHANGE UP (ref 3.5–5.3)
PROT SERPL-MCNC: 6.1 G/DL — SIGNIFICANT CHANGE UP (ref 6–8.3)
PROTHROM AB SERPL-ACNC: 11.1 SEC — SIGNIFICANT CHANGE UP (ref 10–12.9)
RBC # BLD: 3.55 M/UL — LOW (ref 3.8–5.2)
RBC # FLD: 12.4 % — SIGNIFICANT CHANGE UP (ref 10.3–14.5)
SODIUM SERPL-SCNC: 134 MMOL/L — LOW (ref 135–145)
TROPONIN T, HIGH SENSITIVITY RESULT: 23 NG/L — SIGNIFICANT CHANGE UP (ref 0–51)
TROPONIN T, HIGH SENSITIVITY RESULT: 26 NG/L — SIGNIFICANT CHANGE UP (ref 0–51)
WBC # BLD: 12 K/UL — HIGH (ref 3.8–10.5)
WBC # FLD AUTO: 12 K/UL — HIGH (ref 3.8–10.5)

## 2019-07-14 PROCEDURE — 99285 EMERGENCY DEPT VISIT HI MDM: CPT | Mod: GC

## 2019-07-14 PROCEDURE — 93010 ELECTROCARDIOGRAM REPORT: CPT

## 2019-07-14 PROCEDURE — 71045 X-RAY EXAM CHEST 1 VIEW: CPT | Mod: 26

## 2019-07-14 PROCEDURE — 99223 1ST HOSP IP/OBS HIGH 75: CPT | Mod: GC

## 2019-07-14 RX ORDER — NYSTATIN CREAM 100000 [USP'U]/G
1 CREAM TOPICAL
Refills: 0 | Status: DISCONTINUED | OUTPATIENT
Start: 2019-07-14 | End: 2019-07-18

## 2019-07-14 RX ORDER — CHOLECALCIFEROL (VITAMIN D3) 125 MCG
1 CAPSULE ORAL
Qty: 0 | Refills: 0 | DISCHARGE

## 2019-07-14 RX ORDER — PANTOPRAZOLE SODIUM 20 MG/1
40 TABLET, DELAYED RELEASE ORAL
Refills: 0 | Status: DISCONTINUED | OUTPATIENT
Start: 2019-07-14 | End: 2019-07-18

## 2019-07-14 RX ORDER — SODIUM CHLORIDE 9 MG/ML
1000 INJECTION INTRAMUSCULAR; INTRAVENOUS; SUBCUTANEOUS ONCE
Refills: 0 | Status: COMPLETED | OUTPATIENT
Start: 2019-07-14 | End: 2019-07-14

## 2019-07-14 RX ORDER — CHOLECALCIFEROL (VITAMIN D3) 125 MCG
1000 CAPSULE ORAL DAILY
Refills: 0 | Status: DISCONTINUED | OUTPATIENT
Start: 2019-07-14 | End: 2019-07-18

## 2019-07-14 RX ORDER — ZINC SULFATE TAB 220 MG (50 MG ZINC EQUIVALENT) 220 (50 ZN) MG
220 TAB ORAL DAILY
Refills: 0 | Status: DISCONTINUED | OUTPATIENT
Start: 2019-07-14 | End: 2019-07-18

## 2019-07-14 RX ORDER — LOPERAMIDE HCL 2 MG
4 TABLET ORAL
Refills: 0 | Status: DISCONTINUED | OUTPATIENT
Start: 2019-07-14 | End: 2019-07-18

## 2019-07-14 RX ORDER — TRAMADOL HYDROCHLORIDE 50 MG/1
1 TABLET ORAL
Qty: 0 | Refills: 0 | DISCHARGE

## 2019-07-14 RX ORDER — ASPIRIN/CALCIUM CARB/MAGNESIUM 324 MG
81 TABLET ORAL DAILY
Refills: 0 | Status: DISCONTINUED | OUTPATIENT
Start: 2019-07-14 | End: 2019-07-18

## 2019-07-14 RX ORDER — ACETAMINOPHEN 500 MG
975 TABLET ORAL ONCE
Refills: 0 | Status: COMPLETED | OUTPATIENT
Start: 2019-07-14 | End: 2019-07-14

## 2019-07-14 RX ORDER — FERROUS SULFATE 325(65) MG
325 TABLET ORAL DAILY
Refills: 0 | Status: DISCONTINUED | OUTPATIENT
Start: 2019-07-14 | End: 2019-07-18

## 2019-07-14 RX ORDER — LOPERAMIDE HCL 2 MG
2 TABLET ORAL
Refills: 0 | Status: DISCONTINUED | OUTPATIENT
Start: 2019-07-14 | End: 2019-07-14

## 2019-07-14 RX ORDER — LEVOTHYROXINE SODIUM 125 MCG
100 TABLET ORAL DAILY
Refills: 0 | Status: DISCONTINUED | OUTPATIENT
Start: 2019-07-14 | End: 2019-07-18

## 2019-07-14 RX ORDER — MAGNESIUM OXIDE 400 MG ORAL TABLET 241.3 MG
400 TABLET ORAL
Refills: 0 | Status: DISCONTINUED | OUTPATIENT
Start: 2019-07-14 | End: 2019-07-18

## 2019-07-14 RX ORDER — TRAMADOL HYDROCHLORIDE 50 MG/1
50 TABLET ORAL THREE TIMES A DAY
Refills: 0 | Status: DISCONTINUED | OUTPATIENT
Start: 2019-07-14 | End: 2019-07-18

## 2019-07-14 RX ORDER — LANOLIN ALCOHOL/MO/W.PET/CERES
1 CREAM (GRAM) TOPICAL
Qty: 0 | Refills: 0 | DISCHARGE

## 2019-07-14 RX ORDER — HYDROXYCHLOROQUINE SULFATE 200 MG
200 TABLET ORAL DAILY
Refills: 0 | Status: DISCONTINUED | OUTPATIENT
Start: 2019-07-14 | End: 2019-07-18

## 2019-07-14 RX ORDER — ENOXAPARIN SODIUM 100 MG/ML
40 INJECTION SUBCUTANEOUS DAILY
Refills: 0 | Status: DISCONTINUED | OUTPATIENT
Start: 2019-07-14 | End: 2019-07-18

## 2019-07-14 RX ORDER — DIPHENOXYLATE HCL/ATROPINE 2.5-.025MG
1 TABLET ORAL
Refills: 0 | Status: DISCONTINUED | OUTPATIENT
Start: 2019-07-14 | End: 2019-07-16

## 2019-07-14 RX ADMIN — Medication 2 MILLIGRAM(S): at 21:19

## 2019-07-14 RX ADMIN — MAGNESIUM OXIDE 400 MG ORAL TABLET 400 MILLIGRAM(S): 241.3 TABLET ORAL at 21:19

## 2019-07-14 RX ADMIN — Medication 81 MILLIGRAM(S): at 21:47

## 2019-07-14 RX ADMIN — SODIUM CHLORIDE 1000 MILLILITER(S): 9 INJECTION INTRAMUSCULAR; INTRAVENOUS; SUBCUTANEOUS at 16:20

## 2019-07-14 RX ADMIN — Medication 1 TABLET(S): at 21:19

## 2019-07-14 NOTE — H&P ADULT - NSHPLABSRESULTS_GEN_ALL_CORE
11.1   12.0  )-----------( 184      ( 14 Jul 2019 16:22 )             31.8       07-14    134<L>  |  95<L>  |  18  ----------------------------<  102<H>  4.3   |  29  |  1.09    Ca    8.8      14 Jul 2019 16:22  Mg     1.8     07-14    TPro  6.1  /  Alb  3.8  /  TBili  0.4  /  DBili  x   /  AST  28  /  ALT  17  /  AlkPhos  81  07-14          PT/INR - ( 14 Jul 2019 16:22 )   PT: 11.1 sec;   INR: 0.97 ratio         PTT - ( 14 Jul 2019 16:22 )  PTT:33.5 sec    Lactate Trend    CAPILLARY BLOOD GLUCOSE 11.1   12.0  )-----------( 184      ( 14 Jul 2019 16:22 )             31.8       07-14    134<L>  |  95<L>  |  18  ----------------------------<  102<H>  4.3   |  29  |  1.09    Ca    8.8      14 Jul 2019 16:22  Mg     1.8     07-14    TPro  6.1  /  Alb  3.8  /  TBili  0.4  /  DBili  x   /  AST  28  /  ALT  17  /  AlkPhos  81  07-14          PT/INR - ( 14 Jul 2019 16:22 )   PT: 11.1 sec;   INR: 0.97 ratio         PTT - ( 14 Jul 2019 16:22 )  PTT:33.5 sec    Lactate Trend    CAPILLARY BLOOD GLUCOSE    < from: Xray Chest 1 View- PORTABLE-Urgent (07.14.19 @ 16:43) >    IMPRESSION:   No acute pulmonary disease.    < end of copied text >    EKG: reviewed, with new T wave inversion in III, aVF, V3 and V4. 11.1   12.0  )-----------( 184      ( 14 Jul 2019 16:22 )             31.8       07-14    134<L>  |  95<L>  |  18  ----------------------------<  102<H>  4.3   |  29  |  1.09    Ca    8.8      14 Jul 2019 16:22  Mg     1.8     07-14    TPro  6.1  /  Alb  3.8  /  TBili  0.4  /  DBili  x   /  AST  28  /  ALT  17  /  AlkPhos  81  07-14          PT/INR - ( 14 Jul 2019 16:22 )   PT: 11.1 sec;   INR: 0.97 ratio         PTT - ( 14 Jul 2019 16:22 )  PTT:33.5 sec    Lactate Trend    CAPILLARY BLOOD GLUCOSE    < from: Xray Chest 1 View- PORTABLE-Urgent (07.14.19 @ 16:43) >    IMPRESSION:   No acute pulmonary disease.    < end of copied text >    EKG: reviewed, with new T wave inversion in III, aVF, V3 and V4. No other ST changes Personally reviewed labs and noted in detail below              11.1   12.0  )-----------( 184      ( 14 Jul 2019 16:22 )             31.8     07-14    134<L>  |  95<L>  |  18  ----------------------------<  102<H>  4.3   |  29  |  1.09    Ca    8.8      14 Jul 2019 16:22  Mg     1.8     07-14    TPro  6.1  /  Alb  3.8  /  TBili  0.4  /  DBili  x   /  AST  28  /  ALT  17  /  AlkPhos  81  07-14          PT/INR - ( 14 Jul 2019 16:22 )   PT: 11.1 sec;   INR: 0.97 ratio         PTT - ( 14 Jul 2019 16:22 )  PTT:33.5 sec    Lactate Trend    CAPILLARY BLOOD GLUCOSE    < from: Xray Chest 1 View- PORTABLE-Urgent (07.14.19 @ 16:43) >    IMPRESSION:   No acute pulmonary disease.    < end of copied text >    EKG: personally  reviewed, with new T wave inversion in III, aVF, V3 -V5. No other ST changes

## 2019-07-14 NOTE — PATIENT PROFILE ADULT - NSTRANSFERBELONGINGSDISPO_GEN_A_NUR
Was the patient seen in the last year in this department? Yes     Does patient have an active prescription for medications requested? No     Received Request Via: Patient        I requested 90 day ( 3 month) pt would like to have this meds renewed for 3 months at a time please.   with patient

## 2019-07-14 NOTE — H&P ADULT - PROBLEM SELECTOR PLAN 3
- on home PRN tramadol  - Istop ID: [id number] - last picked tramadol 4/2019, dispensed 90 pills, 30 day supply  - c/w tramadol PRN - c/w home Plaquenil 200mg QD

## 2019-07-14 NOTE — H&P ADULT - NSICDXFAMILYHX_GEN_ALL_CORE_FT
FAMILY HISTORY:  Mother  Still living? Unknown  Family history of uterine cancer, Age at diagnosis: Age Unknown FAMILY HISTORY:  Family history of uterine cancer  FH: CHF (congestive heart failure), Mother, passed age 82  FH: myocardial infarction, Father, age 70

## 2019-07-14 NOTE — H&P ADULT - NSICDXPASTMEDICALHX_GEN_ALL_CORE_FT
PAST MEDICAL HISTORY:  Anxiety     Colonic dysmotility     GERD (gastroesophageal reflux disease)     Ileostomy in place 2/2 SBO 2017    Sciatica     Sjogren's syndrome

## 2019-07-14 NOTE — H&P ADULT - PROBLEM SELECTOR PLAN 1
Patient with atypical exertional neck pain, as well as new EKG changes with T wave inversion, need to r/o ACS  - Patient with atypical exertional neck pain, as well as new EKG changes with T wave inversion, concerning for atypical unstable angina in women, need to r/o ACS  - Trop negative  - obtain TTE and possible stress test while inpatient  - cards consult in AM - per patient, is scheduled to see Dr. Womack  for new care establishment  - start daily ASA Patient with atypical exertional neck pain, as well as new EKG changes with T wave inversion, concerning for atypical unstable angina in women, need to r/o ACS  - admit to tele  - Trop negative  - obtain TTE and possible stress test while inpatient  - cards consult in AM - per patient, is scheduled to see Dr. Womack  for [facility] care establishment  - start daily ASA  - check AM lipid and A1C

## 2019-07-14 NOTE — ED ADULT NURSE NOTE - NSIMPLEMENTINTERV_GEN_ALL_ED
Implemented All Universal Safety Interventions:  [place] to call system. Call bell, personal items and telephone within reach. Instruct patient to call for assistance. Room bathroom lighting operational. Non-slip footwear when patient is off stretcher. Physically safe environment: no spills, clutter or unnecessary equipment. Stretcher in lowest position, wheels locked, appropriate side rails in place.

## 2019-07-14 NOTE — H&P ADULT - PROBLEM SELECTOR PLAN 2
- c/w home Plaquenil 200mg QD differentials broad include ACS (r/o) vs. neck abscess vs. pulled muscle vs. lymphadenopathy   - management as above

## 2019-07-14 NOTE — H&P ADULT - PROBLEM SELECTOR PLAN 6
- DVT: Lovenox QD  - diet: DASH/TLC - c/w daily change  - c/w home loperamide 2mg 2 capsules q6hr  - c/w lomotil 1 tabs q6hr  - ileostomy with good output - unlikely to cause her neck pain with associated exertion  - c/w home PPI

## 2019-07-14 NOTE — ED ADULT NURSE NOTE - OBJECTIVE STATEMENT
69 y/o F, reported to ED from home. A&ox2 (person & place), c/o left neck pain. Pt reports that she had 2 episodes of sudden onset left sided neck pain. Pt reports that her first episode was yesterday and then it happened again today. Pt reports that the episode only lasts a few minutes and then it resolves. Pt reports that the pain in her neck feels like a "pressure." Pt denies any pain or discomfort in her neck at this time. Pt denies feeling SOB or difficulty swallowing when she feels this pressure. Pt denies LOC, H/A, SOB, C/P, N/V/D, abd pain. Pt denies palpitations, numbness or tingling. Pt denies any lightheadedness or dizziness. Pt reports that she was in the ED on Friday 7/12/19 because of dehydration and low Mg levels. Pt was treated in the ED and sent home feeling better.  at pt's bedside, will continue to monitor pt.

## 2019-07-14 NOTE — H&P ADULT - NSHPSOCIALHISTORY_GEN_ALL_CORE
Lives at home with , ambulatory without assist. Used to work as . Denies smoking, alcohol use or recreational drug use.

## 2019-07-14 NOTE — H&P ADULT - ASSESSMENT
68F with PMHx of Sjogrens, SBO s/p ileostomy (2017), GERD, sciatica/68F with PMHx of Sjogrens, SBO s/p ileostomy (2017), anxiety, GERD, sciatica/back presents with 2 day h/o anterior L neck pain, back presents with 2 day h/o anterior L neck pain, admitted for ACS workup. 68F with PMHx of Sjogrens, SBO s/p ileostomy (2017), GERD, sciatica/68F with PMHx of Sjogrens, hypothyroidism, SBO s/p ileostomy (2017), GERD, sciatica/back presents with 2 day h/o anterior L neck pain, back presents with 2 day h/o anterior L neck pain, admitted for ACS workup.

## 2019-07-14 NOTE — ED ADULT NURSE REASSESSMENT NOTE - NS ED NURSE REASSESS COMMENT FT1
Pt forgot that she had gotten an EKG out in triage, minutes before she came back.  said that she is normally not that forgetful.

## 2019-07-14 NOTE — H&P ADULT - ATTENDING COMMENTS
67 yo woman with PMHx of Sjogrens, SBO s/p ileostomy (2017), hypothyroidism, GERD, sciatica/back pain presents with anterior leck neck pain x 2 days. Confirmed HPI and ROS with patient.  Vitals reviewed and physically examined patient at bedside. Agree with resident's findings.  Labs, imaging and EKG personally reviewed  Neck pain: elicited on exertion per HPI. Possible cardiac equivalent in setting of T-wave changes on EKG. R/O ACS Monitor on tele. Cardiology consult. with regards to cardiac CT vs stress test. Family was planning to set up appointment with Dr. Womack.   Remainder of plan as detailed above.  Patient previously unknown to me and I was assigned to precept this case with housestaff resident, Dr. Rowan. My involvement in this case consisted only of the initial history, physical and management plan. Primary day team to assume care in AM.

## 2019-07-14 NOTE — ED PROVIDER NOTE - CLINICAL SUMMARY MEDICAL DECISION MAKING FREE TEXT BOX
68F with hx of appendectomy, ileostomy (for SBO) presenting with exertional anterior neck pain x 2 days associated with dyspnea. Plan - basics, trop, ekg, cxr, tba.

## 2019-07-14 NOTE — ED PROVIDER NOTE - ATTENDING CONTRIBUTION TO CARE
Private Physician Johan Coley, PCP, [facility]  68y female PMH ileostomy after small bowel obstruction two y ago. SP APPY, hyestrectomy. PT was seen two days ago for ha/dehydration/hyponatremia. Given Ivf improved and DC. Pt now comes to ed complains of chest pain pressure while walking while walking  yesterday with shortness of breath, Symptoms returns when takes short walk. Improved with resting. No cough,hemoptysis, travel. Cancer. Had uri last week tx with doxycycline. PE WDWN NCAT neck supple chest clear anterior & posterior abd soft +bs no mass guarding, neruo no focal  defects cv no rgm  Fuad Joel MD, Facep

## 2019-07-14 NOTE — H&P ADULT - HISTORY OF PRESENT ILLNESS
68F with hx of Sjogren's, CREST, GERD, appendectomy, ileostomy (for SBO 2017) presenting with exertional anterior neck pain x 2 days associated with dyspnea. Intermittent. No radiation of pain. No associated nausea. No fever, chills, cough. No abdominal pain, diarrhea. No dysuria, frequency. No recent travel, calf pain/swelling/redness. 68F with PMHx of Sjogrens, SBO s/p ileostomy (2017), GERD, sciatica/back pain presents with anterior leck neck pain x 2 days. Per patient, she first noticed the pain Saturday while talking a stroll with her , rated the pain 8/10, not sharp and non-radiating. Had to stop stroll for the pain to go away. Again, had the same symptoms tonight, promoted the  to bring her to the ED for evaluations.   She denies fever, chill, CP, SOB, n/v/c/d, abd or urinary symptoms. No sick contact, recent travel, or leg pain/swelling.   Reports h/o acid reflux where she feels the discomfort in her superior anterior chest, but the neck pain was different. At baseline able to walk multiple blocks with CP, however, this time around, she felt the neck pain only after walking for about 100 feet.    ED VSS. Lab benign. EKG with new T wave inversions in leads III, aVF, and V3, V4. Admitted to medicine for further workup. 68F with PMHx of Sjogrens, SBO s/p ileostomy (2017), hypothyroidism, GERD, sciatica/back pain presents with anterior leck neck pain x 2 days. Per patient, she first noticed the pain Saturday while talking a stroll with her , rated the pain 8/10, not sharp and non-radiating. Had to stop stroll for the pain to go away. Again, had the same symptoms tonight, promoted the  to bring her to the ED for evaluations.   Was recently treated for bronchitis and completed a course of doxy and prednisone 2 weeks ago.  She denies fever, chill, diaphoresis, CP, SOB, n/v/c/d, abd or urinary symptoms. No sick contact, recent travel, or leg pain/swelling.   Reports h/o acid reflux where she feels the discomfort in her superior anterior chest, but the neck pain was different. At baseline able to walk multiple blocks with CP, however, this time around, she felt the neck pain only after walking for about 100 feet.    ED VSS. Lab benign. EKG with new T wave inversions in leads III, aVF, and V3, V4. Admitted to medicine for further workup. 68F with PMHx of Sjogrens, SBO s/p ileostomy (2017), hypothyroidism, GERD, sciatica/back pain presents with anterior leck neck pain x 2 days. Per patient, she first noticed the pain Saturday while talking a stroll with her , rated the pain 8/10, not sharp and non-radiating. Had to stop strolling for the pain to go away. Again, had the same symptoms tonight, promoted the  to bring her to the ED for evaluations.   Was recently treated for bronchitis and completed a course of doxy and prednisone 2 weeks ago.  She denies fever, chill, diaphoresis, CP, SOB, n/v/c/d, abd or urinary symptoms. No sick contact, recent travel, or leg pain/swelling.   Reports h/o acid reflux where she feels the discomfort in her superior anterior chest, but the neck pain was different. At baseline able to walk multiple blocks without CP, however, this time around, she felt the neck pain only after walking for about 100 feet.    ED VSS. Lab benign. EKG with new T wave inversions in leads III, aVF, V3, V4. Admitted to medicine for further workup. 68F with PMHx of Sjogrens, SBO s/p ileostomy (2017), hypothyroidism, GERD, sciatica/back pain presents with anterior leck neck pain x 2 days. Per patient, she first noticed the pain Saturday while talking a stroll with her , rated the pain 8/10, not sharp and non-radiating. Had to stop strolling for the pain to go away. Again, had the same symptoms tonight, promoted the  to bring her to the ED for evaluations.   Was recently treated for bronchitis and LUE cellulitis and completed a course of doxy and prednisone 2 weeks ago.  She denies fever, chill, diaphoresis, CP, SOB, n/v/c/d, abd or urinary symptoms. No sick contact, recent travel, or leg pain/swelling.   Reports h/o acid reflux where she feels the discomfort in her superior anterior chest, but the neck pain was different. At baseline able to walk multiple blocks without CP, however, this time around, she felt the neck pain only after walking for about 100 feet.    ED VSS. Lab benign. EKG with new T wave inversions in leads III, aVF, V3, V4. Admitted to medicine for further workup.

## 2019-07-14 NOTE — ED ADULT NURSE REASSESSMENT NOTE - NS ED NURSE REASSESS COMMENT FT1
Pt AAOx4, NAD, resp nonlabored, resting comfortably in bed with family at bedside. Safety maintained.

## 2019-07-14 NOTE — PATIENT PROFILE ADULT - VISION (WITH CORRECTIVE LENSES IF THE PATIENT USUALLY WEARS THEM):
Normal vision: sees adequately in most situations; can see medication labels, newsprint/With glasses

## 2019-07-14 NOTE — H&P ADULT - PROBLEM SELECTOR PLAN 7
- DVT: Lovenox QD  - diet: DASH/TLC - c/w daily change  - c/w home loperamide 2mg 2 capsules q6hr  - c/w lomotil 1 tabs q6hr  - ileostomy with good output

## 2019-07-14 NOTE — ED ADULT NURSE REASSESSMENT NOTE - NS ED NURSE REASSESS COMMENT FT1
Pt is awaiting results from her repeat troponin.  at bedside, VSS. Pt is receiving IV fluids. Will continue to monitor pt.

## 2019-07-14 NOTE — H&P ADULT - NSHPREVIEWOFSYSTEMS_GEN_ALL_CORE
REVIEW OF SYSTEMS:    CONSTITUTIONAL: No weakness, fevers or chills  EYES: No vertigo or throat pain  ENT: No visual changes, eye pain  MOUTH: moist kevin mucosal, no mouth ulcers  NECK: No pain or stiffness  RESPIRATORY: No cough, wheezing, hemoptysis; No shortness of breath  CARDIOVASCULAR: No chest pain or palpitations  GASTROINTESTINAL: No abdominal or epigastric pain. No nausea, vomiting, or hematemesis; No diarrhea or constipation. No melena or hematochezia.  GENITOURINARY: No dysuria, frequency or hematuria  NEUROLOGICAL: No numbness or weakness  SKIN: No itching, rashes REVIEW OF SYSTEMS:    CONSTITUTIONAL: No weakness, fevers or chills  EYES: No vertigo or throat pain  ENT: No visual changes, eye pain  MOUTH: moist kevin mucosal, no mouth ulcers  NECK: + anterior neck pain. No stiffness  RESPIRATORY: No cough, wheezing, hemoptysis; No shortness of breath  CARDIOVASCULAR: No chest pain or palpitations  GASTROINTESTINAL: No abdominal or epigastric pain. No nausea, vomiting, or hematemesis; No diarrhea or constipation. No melena or hematochezia.  GENITOURINARY: No dysuria, frequency or hematuria  NEUROLOGICAL: No numbness or weakness  SKIN: No itching, rashes

## 2019-07-14 NOTE — H&P ADULT - PROBLEM SELECTOR PLAN 4
- unlikely to cause her neck pain with associated exertion  - c/w home PPI - AM TSH  - c/w home synthroid - on home PRN tramadol  - Istop ID: [id number] - last picked tramadol 4/2019, dispensed 90 pills, 30 day supply  - c/w tramadol PRN

## 2019-07-14 NOTE — ED PROVIDER NOTE - NS ED ROS FT
GENERAL: no fever, chills  HEENT: no cough, congestion, odynophagia, dysphagia  CARDIAC: + anterior neck pain  PULM: + dyspnea  GI: no abdominal pain, nausea, vomiting, diarrhea, constipation, melena, hematochezia  : no urinary dysuria, frequency, incontinence, hematuria  NEURO: no headache, motor weakness, sensory changes  MSK: no joint or muscle pain  SKIN: no rashes, hives  HEME: no active bleeding, bruising

## 2019-07-14 NOTE — H&P ADULT - NSHPPHYSICALEXAM_GEN_ALL_CORE
PHYSICAL EXAM:  GENERAL: NAD, well-developed  HEAD:  Atraumatic, Normocephalic  EYES: EOMI, PERRLA, conjunctiva and sclera clear  NECK: Supple, No JVD  CHEST/LUNG: Clear to auscultation bilaterally; No wheeze  HEART: Regular rate and rhythm; No murmurs, rubs, or gallops  ABDOMEN: Soft, Nontender, Nondistended; Bowel sounds present  EXTREMITIES:  2+ Peripheral Pulses, No clubbing, cyanosis, or edema  PSYCH: AAOx3  NEUROLOGY: non-focal  SKIN: No rashes or lesions PHYSICAL EXAM:  GENERAL: NAD, well-developed  HEAD:  Atraumatic, Normocephalic  EYES: EOMI, PERRLA, conjunctiva and sclera clear  NECK: Supple, No JVD, no mass felt, no lymphadenopathy   CHEST/LUNG: Clear to auscultation bilaterally; No wheeze. No tenderness to palpation of chest wall  HEART: Regular rate and rhythm; No murmurs, rubs, or gallops  ABDOMEN: Soft, Nontender, Nondistended; Bowel sounds present. Ileostomy bag on left abd, recently changed  EXTREMITIES:  2+ Peripheral Pulses, No clubbing, cyanosis, or edema  PSYCH: AAOx4  NEUROLOGY: non-focal  SKIN: No rashes or lesions    Vital Signs Last 24 Hrs  T(C): 36.9 (14 Jul 2019 19:30), Max: 36.9 (14 Jul 2019 15:15)  T(F): 98.5 (14 Jul 2019 19:30), Max: 98.5 (14 Jul 2019 15:15)  HR: 85 (14 Jul 2019 19:30) (83 - 86)  BP: 101/60 (14 Jul 2019 19:30) (96/61 - 112/68)  BP(mean): --  RR: 16 (14 Jul 2019 19:30) (16 - 18)  SpO2: 98% (14 Jul 2019 19:30) (98% - 99%) PHYSICAL EXAM:  GENERAL: NAD, well-developed  HEAD:  Atraumatic, Normocephalic  EYES: EOMI, PERRLA, conjunctiva and sclera clear  NECK: Supple, No JVD, no mass felt, no lymphadenopathy, full ROM  CHEST/LUNG: Clear to auscultation bilaterally; No wheeze. No tenderness to palpation of chest wall  HEART: Regular rate and rhythm; No murmurs, rubs, or gallops  ABDOMEN: Soft, Nontender, Nondistended; Bowel sounds present. Ileostomy bag on left abd, recently changed  EXTREMITIES:  2+ Peripheral Pulses, No clubbing, cyanosis, or edema  PSYCH: AAOx4  NEUROLOGY: non-focal  SKIN: No rashes or lesions    Vital Signs Last 24 Hrs  T(C): 36.9 (14 Jul 2019 19:30), Max: 36.9 (14 Jul 2019 15:15)  T(F): 98.5 (14 Jul 2019 19:30), Max: 98.5 (14 Jul 2019 15:15)  HR: 85 (14 Jul 2019 19:30) (83 - 86)  BP: 101/60 (14 Jul 2019 19:30) (96/61 - 112/68)  BP(mean): --  RR: 16 (14 Jul 2019 19:30) (16 - 18)  SpO2: 98% (14 Jul 2019 19:30) (98% - 99%) Vital Signs Last 24 Hrs  T(C): 36.9 (14 Jul 2019 19:30), Max: 36.9 (14 Jul 2019 15:15)  T(F): 98.5 (14 Jul 2019 19:30), Max: 98.5 (14 Jul 2019 15:15)  HR: 85 (14 Jul 2019 19:30) (83 - 86)  BP: 101/60 (14 Jul 2019 19:30) (96/61 - 112/68)  BP(mean): --  RR: 16 (14 Jul 2019 19:30) (16 - 18)  SpO2: 98% (14 Jul 2019 19:30) (98% - 99%)    PHYSICAL EXAM:  GENERAL: NAD, well-developed  HEAD:  Atraumatic, Normocephalic  EYES: EOMI, PERRLA, conjunctiva and sclera clear  NECK: Supple, No JVD, no mass felt, no lymphadenopathy, full ROM without reproduction of pain.  CHEST/LUNG: Clear to auscultation bilaterally; No wheeze. No tenderness to palpation of chest wall  HEART: Regular rate and rhythm +S1 S2; No murmurs, rubs, or gallops. No appreciable carotid bruits  ABDOMEN: Soft, Nontender, Nondistended; Bowel sounds present. Ileostomy bag on right abd, recently changed  EXTREMITIES:  2+ Peripheral Pulses, No clubbing, cyanosis, or edema  PSYCH: AAOx4  NEUROLOGY: non-focal  SKIN: No rashes or lesions

## 2019-07-14 NOTE — ED PROVIDER NOTE - OBJECTIVE STATEMENT
68F with hx of 68F with hx of appendectomy, ileostomy (for SBO) presenting with exertional anterior neck pain x 2 days associated with dyspnea. Intermittent. No radiation of pain. No associated nausea. No fever, chills, cough. No abdominal pain, diarrhea. No dysuria, frequency. No recent travel, calf pain/swelling/redness. 68F with hx of Sjogren's, CREST, GERD, appendectomy, ileostomy (for SBO) presenting with exertional anterior neck pain x 2 days associated with dyspnea. Intermittent. No radiation of pain. No associated nausea. No fever, chills, cough. No abdominal pain, diarrhea. No dysuria, frequency. No recent travel, calf pain/swelling/redness.

## 2019-07-15 LAB
ALBUMIN SERPL ELPH-MCNC: 3.3 G/DL — SIGNIFICANT CHANGE UP (ref 3.3–5)
ALP SERPL-CCNC: 73 U/L — SIGNIFICANT CHANGE UP (ref 40–120)
ALT FLD-CCNC: 15 U/L — SIGNIFICANT CHANGE UP (ref 10–45)
ANION GAP SERPL CALC-SCNC: 11 MMOL/L — SIGNIFICANT CHANGE UP (ref 5–17)
AST SERPL-CCNC: 25 U/L — SIGNIFICANT CHANGE UP (ref 10–40)
BASOPHILS # BLD AUTO: 0.1 K/UL — SIGNIFICANT CHANGE UP (ref 0–0.2)
BASOPHILS NFR BLD AUTO: 0.8 % — SIGNIFICANT CHANGE UP (ref 0–2)
BILIRUB SERPL-MCNC: 0.4 MG/DL — SIGNIFICANT CHANGE UP (ref 0.2–1.2)
BUN SERPL-MCNC: 12 MG/DL — SIGNIFICANT CHANGE UP (ref 7–23)
CALCIUM SERPL-MCNC: 9.1 MG/DL — SIGNIFICANT CHANGE UP (ref 8.4–10.5)
CHLORIDE SERPL-SCNC: 104 MMOL/L — SIGNIFICANT CHANGE UP (ref 96–108)
CHOLEST SERPL-MCNC: 164 MG/DL — SIGNIFICANT CHANGE UP (ref 10–199)
CO2 SERPL-SCNC: 24 MMOL/L — SIGNIFICANT CHANGE UP (ref 22–31)
CREAT SERPL-MCNC: 0.88 MG/DL — SIGNIFICANT CHANGE UP (ref 0.5–1.3)
EOSINOPHIL # BLD AUTO: 0.2 K/UL — SIGNIFICANT CHANGE UP (ref 0–0.5)
EOSINOPHIL NFR BLD AUTO: 2.3 % — SIGNIFICANT CHANGE UP (ref 0–6)
GLUCOSE SERPL-MCNC: 77 MG/DL — SIGNIFICANT CHANGE UP (ref 70–99)
HBA1C BLD-MCNC: 5.5 % — SIGNIFICANT CHANGE UP (ref 4–5.6)
HCT VFR BLD CALC: 31.2 % — LOW (ref 34.5–45)
HCV AB S/CO SERPL IA: 0.12 S/CO — SIGNIFICANT CHANGE UP (ref 0–0.99)
HCV AB SERPL-IMP: SIGNIFICANT CHANGE UP
HDLC SERPL-MCNC: 90 MG/DL — SIGNIFICANT CHANGE UP
HGB BLD-MCNC: 10.4 G/DL — LOW (ref 11.5–15.5)
LIPID PNL WITH DIRECT LDL SERPL: 53 MG/DL — SIGNIFICANT CHANGE UP
LYMPHOCYTES # BLD AUTO: 1.6 K/UL — SIGNIFICANT CHANGE UP (ref 1–3.3)
LYMPHOCYTES # BLD AUTO: 21.6 % — SIGNIFICANT CHANGE UP (ref 13–44)
MAGNESIUM SERPL-MCNC: 2 MG/DL — SIGNIFICANT CHANGE UP (ref 1.6–2.6)
MCHC RBC-ENTMCNC: 30 PG — SIGNIFICANT CHANGE UP (ref 27–34)
MCHC RBC-ENTMCNC: 33.4 GM/DL — SIGNIFICANT CHANGE UP (ref 32–36)
MCV RBC AUTO: 90 FL — SIGNIFICANT CHANGE UP (ref 80–100)
MONOCYTES # BLD AUTO: 0.4 K/UL — SIGNIFICANT CHANGE UP (ref 0–0.9)
MONOCYTES NFR BLD AUTO: 5 % — SIGNIFICANT CHANGE UP (ref 2–14)
NEUTROPHILS # BLD AUTO: 5.1 K/UL — SIGNIFICANT CHANGE UP (ref 1.8–7.4)
NEUTROPHILS NFR BLD AUTO: 70.3 % — SIGNIFICANT CHANGE UP (ref 43–77)
PHOSPHATE SERPL-MCNC: 3 MG/DL — SIGNIFICANT CHANGE UP (ref 2.5–4.5)
PLATELET # BLD AUTO: 171 K/UL — SIGNIFICANT CHANGE UP (ref 150–400)
POTASSIUM SERPL-MCNC: 4.4 MMOL/L — SIGNIFICANT CHANGE UP (ref 3.5–5.3)
POTASSIUM SERPL-SCNC: 4.4 MMOL/L — SIGNIFICANT CHANGE UP (ref 3.5–5.3)
PROT SERPL-MCNC: 5.8 G/DL — LOW (ref 6–8.3)
RBC # BLD: 3.47 M/UL — LOW (ref 3.8–5.2)
RBC # FLD: 12.4 % — SIGNIFICANT CHANGE UP (ref 10.3–14.5)
SODIUM SERPL-SCNC: 139 MMOL/L — SIGNIFICANT CHANGE UP (ref 135–145)
TOTAL CHOLESTEROL/HDL RATIO MEASUREMENT: 1.8 RATIO — LOW (ref 3.3–7.1)
TRIGL SERPL-MCNC: 106 MG/DL — SIGNIFICANT CHANGE UP (ref 10–149)
TSH SERPL-MCNC: 0.59 UIU/ML — SIGNIFICANT CHANGE UP (ref 0.27–4.2)
WBC # BLD: 7.2 K/UL — SIGNIFICANT CHANGE UP (ref 3.8–10.5)
WBC # FLD AUTO: 7.2 K/UL — SIGNIFICANT CHANGE UP (ref 3.8–10.5)

## 2019-07-15 PROCEDURE — 99232 SBSQ HOSP IP/OBS MODERATE 35: CPT

## 2019-07-15 PROCEDURE — 99223 1ST HOSP IP/OBS HIGH 75: CPT

## 2019-07-15 RX ADMIN — ENOXAPARIN SODIUM 40 MILLIGRAM(S): 100 INJECTION SUBCUTANEOUS at 12:56

## 2019-07-15 RX ADMIN — Medication 4 MILLIGRAM(S): at 21:56

## 2019-07-15 RX ADMIN — Medication 1 TABLET(S): at 17:14

## 2019-07-15 RX ADMIN — MAGNESIUM OXIDE 400 MG ORAL TABLET 400 MILLIGRAM(S): 241.3 TABLET ORAL at 17:10

## 2019-07-15 RX ADMIN — MAGNESIUM OXIDE 400 MG ORAL TABLET 400 MILLIGRAM(S): 241.3 TABLET ORAL at 12:51

## 2019-07-15 RX ADMIN — Medication 100 MICROGRAM(S): at 05:00

## 2019-07-15 RX ADMIN — Medication 4 MILLIGRAM(S): at 12:55

## 2019-07-15 RX ADMIN — Medication 1 TABLET(S): at 21:59

## 2019-07-15 RX ADMIN — Medication 4 MILLIGRAM(S): at 17:11

## 2019-07-15 RX ADMIN — Medication 325 MILLIGRAM(S): at 12:56

## 2019-07-15 RX ADMIN — Medication 4 MILLIGRAM(S): at 05:33

## 2019-07-15 RX ADMIN — Medication 1000 UNIT(S): at 12:51

## 2019-07-15 RX ADMIN — NYSTATIN CREAM 1 APPLICATION(S): 100000 CREAM TOPICAL at 17:11

## 2019-07-15 RX ADMIN — Medication 1 TABLET(S): at 13:02

## 2019-07-15 RX ADMIN — Medication 81 MILLIGRAM(S): at 12:50

## 2019-07-15 RX ADMIN — ZINC SULFATE TAB 220 MG (50 MG ZINC EQUIVALENT) 220 MILLIGRAM(S): 220 (50 ZN) TAB at 12:57

## 2019-07-15 RX ADMIN — Medication 1 TABLET(S): at 05:32

## 2019-07-15 RX ADMIN — Medication 200 MILLIGRAM(S): at 12:57

## 2019-07-15 RX ADMIN — PANTOPRAZOLE SODIUM 40 MILLIGRAM(S): 20 TABLET, DELAYED RELEASE ORAL at 05:33

## 2019-07-15 NOTE — PROGRESS NOTE ADULT - PROBLEM SELECTOR PLAN 1
Patient with atypical exertional neck pain, as well as new EKG changes with T wave inversion, concerning for atypical unstable angina in women, need to r/o ACS  - admit to tele  - Trop negative  - obtain TTE and possible stress test while inpatient  - cards consult in AM - per patient, is scheduled to see Dr. Womack  for [facility] care establishment  - start daily ASA  - check AM lipid and A1C Patient with atypical exertional neck pain, as well as new EKG changes with T wave inversion, concerning for atypical unstable angina in women, need to r/o ACS  - admit to tele  - Trop negative  - obtain TTE and possible stress test while inpatient  - cards consult   -daily ASA  -A1c 5.5  ldl 63

## 2019-07-15 NOTE — PROGRESS NOTE ADULT - PROBLEM SELECTOR PLAN 2
differentials broad include ACS (r/o) vs. neck abscess vs. pulled muscle vs. lymphadenopathy   - management as above symptoms concerning for ischemic disease  - management as above

## 2019-07-15 NOTE — PROGRESS NOTE ADULT - PROBLEM SELECTOR PLAN 4
- on home PRN tramadol  - Istop ID: [id number] - last picked tramadol 4/2019, dispensed 90 pills, 30 day supply  - c/w tramadol PRN

## 2019-07-15 NOTE — PROGRESS NOTE ADULT - ASSESSMENT
68F with PMHx of Sjogrens, SBO s/p ileostomy (2017), GERD, sciatica/68F with PMHx of Sjogrens, hypothyroidism, SBO s/p ileostomy (2017), GERD, sciatica/back presents with 2 day h/o anterior L neck pain, back presents with 2 day h/o anterior L neck pain, admitted for ACS workup.

## 2019-07-15 NOTE — PROGRESS NOTE ADULT - PROBLEM SELECTOR PLAN 7
- c/w daily change  - c/w home loperamide 2mg 2 capsules q6hr  - c/w lomotil 1 tabs q6hr  - ileostomy with good output

## 2019-07-15 NOTE — PROGRESS NOTE ADULT - SUBJECTIVE AND OBJECTIVE BOX
Patient is a 68y old  Female who presents with a chief complaint of anterior neck pain (15 Jul 2019 14:42)        SUBJECTIVE / OVERNIGHT EVENTS: no acute complaints. neck pain resolved      MEDICATIONS  (STANDING):  aspirin enteric coated 81 milliGRAM(s) Oral daily  cholecalciferol 1000 Unit(s) Oral daily  diphenoxylate/atropine 1 Tablet(s) Oral four times a day  enoxaparin Injectable 40 milliGRAM(s) SubCutaneous daily  ferrous    sulfate 325 milliGRAM(s) Oral daily  hydroxychloroquine 200 milliGRAM(s) Oral daily  levothyroxine 100 MICROGram(s) Oral daily  loperamide 4 milliGRAM(s) Oral four times a day  magnesium oxide 400 milliGRAM(s) Oral three times a day with meals  nystatin Cream 1 Application(s) Topical two times a day  pantoprazole    Tablet 40 milliGRAM(s) Oral before breakfast  zinc sulfate 220 milliGRAM(s) Oral daily    MEDICATIONS  (PRN):  traMADol 50 milliGRAM(s) Oral three times a day PRN Moderate Pain (4 - 6)      Vital Signs Last 24 Hrs  T(C): 36.4 (15 Jul 2019 12:20), Max: 36.9 (14 Jul 2019 20:58)  T(F): 97.5 (15 Jul 2019 12:20), Max: 98.4 (14 Jul 2019 20:58)  HR: 80 (15 Jul 2019 12:20) (72 - 84)  BP: 93/53 (15 Jul 2019 12:20) (89/52 - 104/62)  BP(mean): --  RR: 18 (15 Jul 2019 12:20) (18 - 18)  SpO2: 99% (15 Jul 2019 12:20) (97% - 99%)  CAPILLARY BLOOD GLUCOSE        I&O's Summary    14 Jul 2019 07:01  -  15 Jul 2019 07:00  --------------------------------------------------------  IN: 0 mL / OUT: 450 mL / NET: -450 mL    15 Jul 2019 07:01  -  15 Jul 2019 20:41  --------------------------------------------------------  IN: 480 mL / OUT: 400 mL / NET: 80 mL        PHYSICAL EXAM:  GENERAL: NAD  HEAD:  Atraumatic, Normocephalic  EYES: conjunctiva and sclera clear  NECK: No JVD  CHEST/LUNG: CTA b/l  HEART: S1 S2 RRR  ABDOMEN: +BS Soft, NT/ND  EXTREMITIES:  2+ DP Pulses, No c/c/e  NEUROLOGY: AAOx3, no focal deficits   SKIN: No rashes or lesions    LABS:                        10.4   7.2   )-----------( 171      ( 15 Jul 2019 06:45 )             31.2     07-15    139  |  104  |  12  ----------------------------<  77  4.4   |  24  |  0.88    Ca    9.1      15 Jul 2019 06:46  Phos  3.0     07-15  Mg     2.0     07-15    TPro  5.8<L>  /  Alb  3.3  /  TBili  0.4  /  DBili  x   /  AST  25  /  ALT  15  /  AlkPhos  73  07-15    PT/INR - ( 14 Jul 2019 16:22 )   PT: 11.1 sec;   INR: 0.97 ratio         PTT - ( 14 Jul 2019 16:22 )  PTT:33.5 sec          RADIOLOGY & ADDITIONAL TESTS:    Imaging Personally Reviewed:   Consultant(s) Notes Reviewed:    Care Discussed with Consultants/Other Providers:

## 2019-07-15 NOTE — CHART NOTE - NSCHARTNOTEFT_GEN_A_CORE
as per nurse, pt requesting ensure plus instead of health shake. saw pt and explained that we have ensure enlive. pt requesting 3 x ensure enlive daily. she agrees to discontinue health shake that she was receiving at breakfast. as per nurse, pt requesting ensure plus instead of health shake. saw pt and explained that we have ensure enlive. pt requesting 3 x ensure enlive daily. she agrees to discontinue health shake that she was receiving at breakfast. NP made aware.

## 2019-07-15 NOTE — CONSULT NOTE ADULT - SUBJECTIVE AND OBJECTIVE BOX
Full Consult To Follow  - Will see in PM **********              CARDIOLOGY CONSULT NOTE              ************  ============================================================  CHIEF COMPLAINT/REASON FOR CONSULT:  Patient is a 68y old  Female who presents with a chief complaint of anterior neck pain (15 Jul 2019 14:42)      HISTORY OF PRESENT ILLNESS:  68yFemale with a history of GERD, Sciatica,Ileostomy, Sjogren's Syndrome Treated With Steroids - presenting with Chest pressure.  Briefly, limited in mobility primarily due to sciatica.  Recently discharged.  Now developed exertional chest heaviness 4/10 radiating to L neck, improved with rest. +Fatigue.  No Assoc SOB/Palps/Diaphoresis.   EKG with anterior TWIs new from prior.           aspirin enteric coated 81 milliGRAM(s) Oral daily  cholecalciferol 1000 Unit(s) Oral daily  diphenoxylate/atropine 1 Tablet(s) Oral four times a day  enoxaparin Injectable 40 milliGRAM(s) SubCutaneous daily  ferrous    sulfate 325 milliGRAM(s) Oral daily  hydroxychloroquine 200 milliGRAM(s) Oral daily  levothyroxine 100 MICROGram(s) Oral daily  loperamide 4 milliGRAM(s) Oral four times a day  magnesium oxide 400 milliGRAM(s) Oral three times a day with meals  nystatin Cream 1 Application(s) Topical two times a day  pantoprazole    Tablet 40 milliGRAM(s) Oral before breakfast  traMADol 50 milliGRAM(s) Oral three times a day PRN  zinc sulfate 220 milliGRAM(s) Oral daily    ============================================================  Interval Events   -   -     ============================================================      Allergies    latex (Rash)  No Known Drug Allergies    Intolerances    	    MEDICATIONS:  aspirin enteric coated 81 milliGRAM(s) Oral daily  enoxaparin Injectable 40 milliGRAM(s) SubCutaneous daily    hydroxychloroquine 200 milliGRAM(s) Oral daily      traMADol 50 milliGRAM(s) Oral three times a day PRN    diphenoxylate/atropine 1 Tablet(s) Oral four times a day  loperamide 4 milliGRAM(s) Oral four times a day  pantoprazole    Tablet 40 milliGRAM(s) Oral before breakfast    levothyroxine 100 MICROGram(s) Oral daily    cholecalciferol 1000 Unit(s) Oral daily  ferrous    sulfate 325 milliGRAM(s) Oral daily  magnesium oxide 400 milliGRAM(s) Oral three times a day with meals  nystatin Cream 1 Application(s) Topical two times a day  zinc sulfate 220 milliGRAM(s) Oral daily      PAST MEDICAL & SURGICAL HISTORY:  Sciatica  Anxiety  GERD (gastroesophageal reflux disease)  Colonic dysmotility  Ileostomy in place: 2/2 SBO 2017  Sjogren's syndrome  H/O ileostomy  History of appendectomy  History of hysterectomy      FAMILY HISTORY:  FH: CHF (congestive heart failure): Mother, passed age 82  FH: myocardial infarction: Father, age 70  Family history of uterine cancer    Mother - HTN      SOCIAL HISTORY:    [ x] Non-smoker  [x] No Illicit Drug Use  [ x] No Excess EtOH Use      REVIEW OF SYSTEMS: (Unless + Before Symptom, it is negative)  Constitutional: [] Fever, [x]Fatigue, []Weight Changes  Eyes:  []Recent Vision Changes, []Eye Pain  ENT: []Congestion, []Sore Throat  Endocrine: []Excess Sweating, []Temperature Intolerance  Cardiovascular:  [x]Chest Pain, []Palpitations, []Shortness of Breath, []Pre-syncope, []Syncope,[] LE Swelling  Respiratory:[] Cough, []Congestion,[] Wheezing  Gastrointestinal: [] Abdominal Pain,[] Nausea, []Vomiting  Genitourinary: []Dysuria,[] hematuria  Musculoskeletal: []Joint Pain, []Hip/Knee Injury  Neurologic: []Headaches,[] Imbalance, []Weakness  Skin: []Rashes, []hematoma, []purprura    ================================    PHYSICAL EXAM:  T(C): 36.4 (07-15-19 @ 12:20), Max: 36.9 (07-14-19 @ 19:30)  HR: 80 (07-15-19 @ 12:20) (72 - 85)  BP: 93/53 (07-15-19 @ 12:20) (89/52 - 104/62)  RR: 18 (07-15-19 @ 12:20) (16 - 18)  SpO2: 99% (07-15-19 @ 12:20) (97% - 99%)  Wt(kg): --  I&O's Summary    14 Jul 2019 07:01  -  15 Jul 2019 07:00  --------------------------------------------------------  IN: 0 mL / OUT: 450 mL / NET: -450 mL    15 Jul 2019 07:01  -  15 Jul 2019 19:03  --------------------------------------------------------  IN: 480 mL / OUT: 400 mL / NET: 80 mL      Appearance: Normal; NAD	  Psychiatry: AOx3; Normal Mood/Affect  HEENT:   Normal oral mucosa, EOMI	  Lymphatic: No lymphadenopathy  Cardiovascular: Normal S1 S2, No JVD, No murmurs, No edema  Respiratory: Lungs clear to auscultation, no use of accessory muscles	  Gastrointestinal:  Soft, Non-tender	  Skin: No rashes, No ecchymoses, No cyanosis	  Neurologic: Non-focal, No Focal Deficits  Extremities: Normal range of motion, No clubbing, cyanosis  Vascular: Peripheral pulses palpable 2+ bilaterally, no prominent varicosities    ============================    LABS:	   Labs Reviewed07-15-19 @ 19:03	    CBC Full  -  ( 15 Jul 2019 06:45 )  WBC Count : 7.2 K/uL  Hemoglobin : 10.4 g/dL  Hematocrit : 31.2 %  Platelet Count - Automated : 171 K/uL  Mean Cell Volume : 90.0 fl  Mean Cell Hemoglobin : 30.0 pg  Mean Cell Hemoglobin Concentration : 33.4 gm/dL  Auto Neutrophil # : 5.1 K/uL  Auto Lymphocyte # : 1.6 K/uL  Auto Monocyte # : 0.4 K/uL  Auto Eosinophil # : 0.2 K/uL  Auto Basophil # : 0.1 K/uL  Auto Neutrophil % : 70.3 %  Auto Lymphocyte % : 21.6 %  Auto Monocyte % : 5.0 %  Auto Eosinophil % : 2.3 %  Auto Basophil % : 0.8 %    07-15    139  |  104  |  12  ----------------------------<  77  4.4   |  24  |  0.88  07-14    134<L>  |  95<L>  |  18  ----------------------------<  102<H>  4.3   |  29  |  1.09    Ca    9.1      15 Jul 2019 06:46  Ca    8.8      14 Jul 2019 16:22  Phos  3.0     07-15  Mg     2.0     07-15  Mg     1.8     07-14    TPro  5.8<L>  /  Alb  3.3  /  TBili  0.4  /  DBili  x   /  AST  25  /  ALT  15  /  AlkPhos  73  07-15  TPro  6.1  /  Alb  3.8  /  TBili  0.4  /  DBili  x   /  AST  28  /  ALT  17  /  AlkPhos  81  07-14    EKG   NSR +Anterior TWIs    =========================================================================  ASSESSMENT:  68yFemale with a history of GERD, Sciatica,Ileostomy, Sjogren's Syndrome Treated With Steroids - presenting with Chest pressure.  aspirin enteric coated 81 milliGRAM(s) Oral daily      Recommendations  - TTE  - MPI  - Continue ASA  - Discussed at length with patient/ at bedside  - Will follow        Please call with questions.     Silvestre Rosen MD, [facility]  [phone]                                                                                                        Total time spent in face-to-face encounter was 80 minutes. > 50 minutes spent in counseling and coordination of care addressing all medical issues listed above. All labs, imaging, consultant reports, and any relevant outside medical records personally reviewed in order to evaluate and manage the patient medically as well as provide coordination of care amongst providers.

## 2019-07-16 DIAGNOSIS — H10.32 UNSPECIFIED ACUTE CONJUNCTIVITIS, LEFT EYE: ICD-10-CM

## 2019-07-16 LAB
ANION GAP SERPL CALC-SCNC: 10 MMOL/L — SIGNIFICANT CHANGE UP (ref 5–17)
BUN SERPL-MCNC: 14 MG/DL — SIGNIFICANT CHANGE UP (ref 7–23)
CALCIUM SERPL-MCNC: 8.8 MG/DL — SIGNIFICANT CHANGE UP (ref 8.4–10.5)
CHLORIDE SERPL-SCNC: 100 MMOL/L — SIGNIFICANT CHANGE UP (ref 96–108)
CO2 SERPL-SCNC: 26 MMOL/L — SIGNIFICANT CHANGE UP (ref 22–31)
CREAT SERPL-MCNC: 0.91 MG/DL — SIGNIFICANT CHANGE UP (ref 0.5–1.3)
GLUCOSE SERPL-MCNC: 85 MG/DL — SIGNIFICANT CHANGE UP (ref 70–99)
HCT VFR BLD CALC: 33.6 % — LOW (ref 34.5–45)
HGB BLD-MCNC: 11.2 G/DL — LOW (ref 11.5–15.5)
MAGNESIUM SERPL-MCNC: 1.7 MG/DL — SIGNIFICANT CHANGE UP (ref 1.6–2.6)
MCHC RBC-ENTMCNC: 29.9 PG — SIGNIFICANT CHANGE UP (ref 27–34)
MCHC RBC-ENTMCNC: 33.3 GM/DL — SIGNIFICANT CHANGE UP (ref 32–36)
MCV RBC AUTO: 89.9 FL — SIGNIFICANT CHANGE UP (ref 80–100)
PLATELET # BLD AUTO: 180 K/UL — SIGNIFICANT CHANGE UP (ref 150–400)
POTASSIUM SERPL-MCNC: 4.1 MMOL/L — SIGNIFICANT CHANGE UP (ref 3.5–5.3)
POTASSIUM SERPL-SCNC: 4.1 MMOL/L — SIGNIFICANT CHANGE UP (ref 3.5–5.3)
RBC # BLD: 3.74 M/UL — LOW (ref 3.8–5.2)
RBC # FLD: 12.4 % — SIGNIFICANT CHANGE UP (ref 10.3–14.5)
SODIUM SERPL-SCNC: 136 MMOL/L — SIGNIFICANT CHANGE UP (ref 135–145)
WBC # BLD: 6.6 K/UL — SIGNIFICANT CHANGE UP (ref 3.8–10.5)
WBC # FLD AUTO: 6.6 K/UL — SIGNIFICANT CHANGE UP (ref 3.8–10.5)

## 2019-07-16 PROCEDURE — 93018 CV STRESS TEST I&R ONLY: CPT

## 2019-07-16 PROCEDURE — 99233 SBSQ HOSP IP/OBS HIGH 50: CPT

## 2019-07-16 PROCEDURE — 93016 CV STRESS TEST SUPVJ ONLY: CPT

## 2019-07-16 PROCEDURE — 78452 HT MUSCLE IMAGE SPECT MULT: CPT | Mod: 26

## 2019-07-16 PROCEDURE — 93306 TTE W/DOPPLER COMPLETE: CPT | Mod: 26

## 2019-07-16 RX ORDER — POLYMYXIN B SULF/TRIMETHOPRIM 10000-1/ML
2 DROPS OPHTHALMIC (EYE) EVERY 4 HOURS
Refills: 0 | Status: DISCONTINUED | OUTPATIENT
Start: 2019-07-16 | End: 2019-07-17

## 2019-07-16 RX ORDER — DIPHENOXYLATE HCL/ATROPINE 2.5-.025MG
2 TABLET ORAL
Refills: 0 | Status: DISCONTINUED | OUTPATIENT
Start: 2019-07-16 | End: 2019-07-18

## 2019-07-16 RX ORDER — DIPHENOXYLATE HCL/ATROPINE 2.5-.025MG
1 TABLET ORAL
Refills: 0 | Status: DISCONTINUED | OUTPATIENT
Start: 2019-07-16 | End: 2019-07-18

## 2019-07-16 RX ADMIN — MAGNESIUM OXIDE 400 MG ORAL TABLET 400 MILLIGRAM(S): 241.3 TABLET ORAL at 13:40

## 2019-07-16 RX ADMIN — Medication 100 MICROGRAM(S): at 05:47

## 2019-07-16 RX ADMIN — MAGNESIUM OXIDE 400 MG ORAL TABLET 400 MILLIGRAM(S): 241.3 TABLET ORAL at 09:03

## 2019-07-16 RX ADMIN — Medication 2 DROP(S): at 22:39

## 2019-07-16 RX ADMIN — Medication 4 MILLIGRAM(S): at 05:47

## 2019-07-16 RX ADMIN — Medication 2 TABLET(S): at 17:36

## 2019-07-16 RX ADMIN — ZINC SULFATE TAB 220 MG (50 MG ZINC EQUIVALENT) 220 MILLIGRAM(S): 220 (50 ZN) TAB at 09:03

## 2019-07-16 RX ADMIN — Medication 4 MILLIGRAM(S): at 22:39

## 2019-07-16 RX ADMIN — MAGNESIUM OXIDE 400 MG ORAL TABLET 400 MILLIGRAM(S): 241.3 TABLET ORAL at 17:36

## 2019-07-16 RX ADMIN — Medication 325 MILLIGRAM(S): at 09:03

## 2019-07-16 RX ADMIN — Medication 2 DROP(S): at 17:37

## 2019-07-16 RX ADMIN — Medication 1000 UNIT(S): at 09:03

## 2019-07-16 RX ADMIN — ENOXAPARIN SODIUM 40 MILLIGRAM(S): 100 INJECTION SUBCUTANEOUS at 13:41

## 2019-07-16 RX ADMIN — Medication 2 TABLET(S): at 22:39

## 2019-07-16 RX ADMIN — Medication 1 TABLET(S): at 06:34

## 2019-07-16 RX ADMIN — Medication 200 MILLIGRAM(S): at 13:41

## 2019-07-16 RX ADMIN — Medication 4 MILLIGRAM(S): at 17:36

## 2019-07-16 RX ADMIN — Medication 81 MILLIGRAM(S): at 09:03

## 2019-07-16 RX ADMIN — Medication 4 MILLIGRAM(S): at 13:41

## 2019-07-16 RX ADMIN — Medication 1 TABLET(S): at 13:44

## 2019-07-16 RX ADMIN — PANTOPRAZOLE SODIUM 40 MILLIGRAM(S): 20 TABLET, DELAYED RELEASE ORAL at 06:34

## 2019-07-16 NOTE — CONSULT NOTE ADULT - SUBJECTIVE AND OBJECTIVE BOX
SUBJECTIVE:  68yFemale admitted with substernal exertional chest pain, planned for stress test and possible angiogram  she has a permanent ileostomy and is dependent on imodium/lomotil to keep the output to goal 6702-9427 cc/24 hrs  she usually takes Imodium 2 capsules with meals and bedtime and Lomotil 1-2 capsules with meals/bedtime depending on output that day  she denies nausea, vomiting or abdominal pain  no blood or mucus in the ostomy  ______________________________________________________________________  PMH/PSH:  PAST MEDICAL & SURGICAL HISTORY:  Sciatica  Anxiety  GERD (gastroesophageal reflux disease)  Colonic dysmotility  Ileostomy in place: 2/2 SBO 2017  Sjogren's syndrome  H/O ileostomy  History of appendectomy  History of hysterectomy    ______________________________________________________________________  MEDS:  MEDICATIONS  (STANDING):  aspirin enteric coated 81 milliGRAM(s) Oral daily  cholecalciferol 1000 Unit(s) Oral daily  diphenoxylate/atropine 1 Tablet(s) Oral four times a day  enoxaparin Injectable 40 milliGRAM(s) SubCutaneous daily  ferrous    sulfate 325 milliGRAM(s) Oral daily  hydroxychloroquine 200 milliGRAM(s) Oral daily  levothyroxine 100 MICROGram(s) Oral daily  loperamide 4 milliGRAM(s) Oral four times a day  magnesium oxide 400 milliGRAM(s) Oral three times a day with meals  nystatin Cream 1 Application(s) Topical two times a day  pantoprazole    Tablet 40 milliGRAM(s) Oral before breakfast  zinc sulfate 220 milliGRAM(s) Oral daily    MEDICATIONS  (PRN):  traMADol 50 milliGRAM(s) Oral three times a day PRN Moderate Pain (4 - 6)    ______________________________________________________________________  ALL:   Allergies    latex (Rash)  No Known Drug Allergies    Intolerances      ______________________________________________________________________  SH:  ______________________________________________________________________  FH:  FAMILY HISTORY:  FH: CHF (congestive heart failure): Mother, passed age 82  FH: myocardial infarction: Father, age 70  Family history of uterine cancer    ______________________________________________________________________  ROS:    CONSTITUTIONAL:  No weight loss, fever, chills, weakness or fatigue.    HEENT:  Eyes:  No visual loss, blurred vision, double vision or yellow sclerae. Ears, Nose, Throat:  No hearing loss, sneezing, congestion, runny nose or sore throat.    SKIN:  No rash or itching.    CARDIOVASCULAR:  No chest pain, chest pressure or chest discomfort. No palpitations or edema.    RESPIRATORY:  No shortness of breath, cough or sputum.    GASTROINTESTINAL:  SEE HPI    GENITOURINARY:  No dysuria, hematuria, urinary frequency    NEUROLOGICAL:  No headache, dizziness, syncope, paralysis, ataxia, numbness or tingling in the extremities. No change in bowel or bladder control.    MUSCULOSKELETAL:  No muscle, back pain, joint pain or stiffness.    HEMATOLOGIC:  No anemia, bleeding or bruising.    LYMPHATICS:  No enlarged nodes. No history of splenectomy.    PSYCHIATRIC:  No history of depression or anxiety.    ENDOCRINOLOGIC:  No reports of sweating, cold or heat intolerance. No polyuria or polydipsia.    ALLERGIES:  No history of asthma, hives, eczema or rhinitis.  ______________________________________________________________________  PHYSICAL EXAM:  T(C): 36.6 (07-15-19 @ 21:01), Max: 36.6 (07-15-19 @ 21:01)  HR: 82 (07-15-19 @ 21:01)  BP: 102/62 (07-15-19 @ 21:01)  RR: 18 (07-15-19 @ 21:01)  SpO2: 96% (07-15-19 @ 21:01)  Wt(kg): --    07-15  -  07-16  --------------------------------------------------------  IN:    Oral Fluid: 720 mL  Total IN: 720 mL    OUT:    Voided: 400 mL  Total OUT: 400 mL    Total NET: 320 mL        PHYSICAL EXAM:      Constitutional: nad, thin    Eyes: perrla    ENMT: mmm    Neck: no jvd/lad    Respiratory: cta    Cardiovascular: rr s1/s2 nl    Gastrointestinal: soft ntnd +bs rlq ileostomy with liquid green/yellow stool    Extremities: no c/c    Psychiatric: a&o x 3, anxious      ______________________________________________________________________  LABS:                        11.2   6.6   )-----------( 180      ( 16 Jul 2019 07:09 )             33.6     07-16    136  |  100  |  14  ----------------------------<  85  4.1   |  26  |  0.91    Ca    8.8      16 Jul 2019 07:09  Phos  3.0     07-15  Mg     1.7     07-16    TPro  5.8<L>  /  Alb  3.3  /  TBili  0.4  /  DBili  x   /  AST  25  /  ALT  15  /  AlkPhos  73  07-15    LIVER FUNCTIONS - ( 15 Jul 2019 06:46 )  Alb: 3.3 g/dL / Pro: 5.8 g/dL / ALK PHOS: 73 U/L / ALT: 15 U/L / AST: 25 U/L / GGT: x           ______________________________________________________________________  IMAGING:  n/a  ______________________________________________________________________  ASSESSMENT:  68y Female a/w possible ACS, asked to see for high ostomy output    PLAN:  1.  patient should resume Imodium 4 mg with meals and at bedtime  2.  she should be on lomotil - start with 2 pills with breakfast, 1 with lunch and dinner and 2 at bedtime  3.  if her ostomy output is < 300 cc/12 hrs would reduce/hold next lomotil dose  4.  if she is fasting for various tests would start iv fluids  5.  monitor lytes as she is prone to low K+/low Mg++ in the past  6.  should be allowed to take her own ensure plus 3 cans/day  7.  d/w , patient and NATALYA Esparza M.D.  [facility]  (O) [phone]

## 2019-07-16 NOTE — PROGRESS NOTE ADULT - PROBLEM SELECTOR PLAN 4
- on home PRN tramadol  - Istop ID: [id number] - last picked tramadol 4/2019, dispensed 90 pills, 30 day supply  - c/w tramadol PRN - c/w home Plaquenil 200mg QD

## 2019-07-16 NOTE — PROGRESS NOTE ADULT - PROBLEM SELECTOR PLAN 2
symptoms concerning for ischemic disease  - management as above ?unstable angina  - management as above Size Of Lesion In Cm (Optional): 0 Detail Level: Zone

## 2019-07-16 NOTE — PROVIDER CONTACT NOTE (OTHER) - ACTION/TREATMENT ORDERED:
seen and evaluated by Torres Chapman Ho,NP @ bedside no new orders at this time will be reevaluated by MD in am

## 2019-07-16 NOTE — PROGRESS NOTE ADULT - PROBLEM SELECTOR PLAN 6
- unlikely to cause her neck pain with associated exertion  - c/w home PPI - TSH wnl  - c/w home synthroid

## 2019-07-16 NOTE — PROGRESS NOTE ADULT - SUBJECTIVE AND OBJECTIVE BOX
· Requested by Name:	Chiara Solis	  · Date/Time:	16-Jul-2019 	  · Reason for Referral/Consultation:	Chest Pain	  · Reason for Admission	anterior neck pain	      · Subjective and Objective: 	  **********              CARDIOLOGY CONSULT NOTE              ************  ============================================================  CHIEF COMPLAINT/REASON FOR CONSULT:  Patient is a 68y old  Female who presents with a chief complaint of anterior neck pain (15 Jul 2019 14:42)      HISTORY OF PRESENT ILLNESS:  68yFemale with a history of GERD, Sciatica,Ileostomy, Sjogren's Syndrome Treated With Steroids - presenting with Chest pressure.  Briefly, limited in mobility primarily due to sciatica.  Recently discharged.  Now developed exertional chest heaviness 4/10 radiating to L neck, improved with rest. +Fatigue.  No Assoc SOB/Palps/Diaphoresis.   EKG with anterior TWIs new from prior.     ==================  Interval Events  - MPI with moderate-severe reversible defects in inferoseptal wall  - TTE with mod MR; otherwise normal  - No reported worsening CP/Palps/SOB  ===================          aspirin enteric coated 81 milliGRAM(s) Oral daily  cholecalciferol 1000 Unit(s) Oral daily  diphenoxylate/atropine 1 Tablet(s) Oral four times a day  enoxaparin Injectable 40 milliGRAM(s) SubCutaneous daily  ferrous    sulfate 325 milliGRAM(s) Oral daily  hydroxychloroquine 200 milliGRAM(s) Oral daily  levothyroxine 100 MICROGram(s) Oral daily  loperamide 4 milliGRAM(s) Oral four times a day  magnesium oxide 400 milliGRAM(s) Oral three times a day with meals  nystatin Cream 1 Application(s) Topical two times a day  pantoprazole    Tablet 40 milliGRAM(s) Oral before breakfast  traMADol 50 milliGRAM(s) Oral three times a day PRN  zinc sulfate 220 milliGRAM(s) Oral daily        Allergies    latex (Rash)  No Known Drug Allergies    Intolerances    	    MEDICATIONS:  aspirin enteric coated 81 milliGRAM(s) Oral daily  enoxaparin Injectable 40 milliGRAM(s) SubCutaneous daily    hydroxychloroquine 200 milliGRAM(s) Oral daily      traMADol 50 milliGRAM(s) Oral three times a day PRN    diphenoxylate/atropine 1 Tablet(s) Oral four times a day  loperamide 4 milliGRAM(s) Oral four times a day  pantoprazole    Tablet 40 milliGRAM(s) Oral before breakfast    levothyroxine 100 MICROGram(s) Oral daily    cholecalciferol 1000 Unit(s) Oral daily  ferrous    sulfate 325 milliGRAM(s) Oral daily  magnesium oxide 400 milliGRAM(s) Oral three times a day with meals  nystatin Cream 1 Application(s) Topical two times a day  zinc sulfate 220 milliGRAM(s) Oral daily      PAST MEDICAL & SURGICAL HISTORY:  Sciatica  Anxiety  GERD (gastroesophageal reflux disease)  Colonic dysmotility  Ileostomy in place: 2/2 SBO 2017  Sjogren's syndrome  H/O ileostomy  History of appendectomy  History of hysterectomy      FAMILY HISTORY:  FH: CHF (congestive heart failure): Mother, passed age 82  FH: myocardial infarction: Father, age 70  Family history of uterine cancer    Mother - HTN      SOCIAL HISTORY:    [ x] Non-smoker  [x] No Illicit Drug Use  [ x] No Excess EtOH Use      REVIEW OF SYSTEMS: (Unless + Before Symptom, it is negative)  Constitutional: [] Fever, [x]Fatigue, []Weight Changes  Eyes:  []Recent Vision Changes, []Eye Pain  ENT: []Congestion, []Sore Throat  Endocrine: []Excess Sweating, []Temperature Intolerance  Cardiovascular:  [x]Chest Pain, []Palpitations, []Shortness of Breath, []Pre-syncope, []Syncope,[] LE Swelling  Respiratory:[] Cough, []Congestion,[] Wheezing  Gastrointestinal: [] Abdominal Pain,[] Nausea, []Vomiting  Genitourinary: []Dysuria,[] hematuria  Musculoskeletal: []Joint Pain, []Hip/Knee Injury  Neurologic: []Headaches,[] Imbalance, []Weakness  Skin: []Rashes, []hematoma, []purprura    ================================    PHYSICAL EXAM:  T(C): 36.4 (07-15-19 @ 12:20), Max: 36.9 (07-14-19 @ 19:30)  HR: 80 (07-15-19 @ 12:20) (72 - 85)  BP: 93/53 (07-15-19 @ 12:20) (89/52 - 104/62)  RR: 18 (07-15-19 @ 12:20) (16 - 18)  SpO2: 99% (07-15-19 @ 12:20) (97% - 99%)  Wt(kg): --  I&O's Summary    14 Jul 2019 07:01  -  15 Jul 2019 07:00  --------------------------------------------------------  IN: 0 mL / OUT: 450 mL / NET: -450 mL    15 Jul 2019 07:01  -  15 Jul 2019 19:03  --------------------------------------------------------  IN: 480 mL / OUT: 400 mL / NET: 80 mL      Appearance: Normal; NAD	  Psychiatry: AOx3; Normal Mood/Affect  HEENT:   Normal oral mucosa, EOMI	  Lymphatic: No lymphadenopathy  Cardiovascular: Normal S1 S2, No JVD, No murmurs, No edema  Respiratory: Lungs clear to auscultation, no use of accessory muscles	  Gastrointestinal:  Soft, Non-tender	  Skin: No rashes, No ecchymoses, No cyanosis	  Neurologic: Non-focal, No Focal Deficits  Extremities: Normal range of motion, No clubbing, cyanosis  Vascular: Peripheral pulses palpable 2+ bilaterally, no prominent varicosities    ============================    LABS:	   Labs [id number]    CBC Full  -  ( 15 Jul 2019 06:45 )  WBC Count : 7.2 K/uL  Hemoglobin : 10.4 g/dL  Hematocrit : 31.2 %  Platelet Count - Automated : 171 K/uL  Mean Cell Volume : 90.0 fl  Mean Cell Hemoglobin : 30.0 pg  Mean Cell Hemoglobin Concentration : 33.4 gm/dL  Auto Neutrophil # : 5.1 K/uL  Auto Lymphocyte # : 1.6 K/uL  Auto Monocyte # : 0.4 K/uL  Auto Eosinophil # : 0.2 K/uL  Auto Basophil # : 0.1 K/uL  Auto Neutrophil % : 70.3 %  Auto Lymphocyte % : 21.6 %  Auto Monocyte % : 5.0 %  Auto Eosinophil % : 2.3 %  Auto Basophil % : 0.8 %    07-15    139  |  104  |  12  ----------------------------<  77  4.4   |  24  |  0.88  07-14    134<L>  |  95<L>  |  18  ----------------------------<  102<H>  4.3   |  29  |  1.09    Ca    9.1      15 Jul 2019 06:46  Ca    8.8      14 Jul 2019 16:22  Phos  3.0     07-15  Mg     2.0     07-15  Mg     1.8     07-14    TPro  5.8<L>  /  Alb  3.3  /  TBili  0.4  /  DBili  x   /  AST  25  /  ALT  15  /  AlkPhos  73  07-15  TPro  6.1  /  Alb  3.8  /  TBili  0.4  /  DBili  x   /  AST  28  /  ALT  17  /  AlkPhos  81  07-14    EKG   NSR +Anterior TWIs    =================  < from: Nuclear Stress Test-Pharmacologic (07.16.19 @ 15:41) >  ECG ABNORMALITIES DURING/AFTER STRESS:   ST Changes:ST Depression: 0.5 mm horizontal in leads II,  III, aVF, and V2-V6 started at 2:00 min following  regadenoson infusion at 120 bpm and persisted at least  6:00 min in recovery.  ------------------------------------------------------------------------  NEW ARRHYTHMIAS DEVELOPED DURING/AFTER STRESS:  None  ------------------------------------------------------------------------  STRESS TEST IMPRESSIONS:  Chest Pain: No chest pain with administration of  Regadenoson.  Symptom: Fatigue.  HR Response: Appropriate.  BP Response: Appropriate.  Heart Rhythm: Sinus Rhythm - 74 BPM.  Baseline ECG: T wave abnormality in leads II, III, aVF,  and V3-V6, inverted at baseline.  ECG Changes: ST Depression: 0.5 mm horizontal in leads II,  III, aVF, and V2-V6 started at 2:00 min following  regadenoson infusion at 120 bpm and persisted at least  6:00 min in recovery.  Arrhythmia: None.  ------------------------------------------------------------------------  PROCEDURE:  Approximately 20 minutes later, tomographic images were  obtained in a 180 degree arc from right anterior oblique  to left anterior oblique with 64 stops. The tomographic  slices were reconstructed in 3 orthogonal planes (short  axis, horizontal long axis and vertical long axis).  Interpretation was performed both by visual and  quantitative analysis.  Rest and stress images were acquired using CZT-based  system with pinhole collimation (ERLink 530 c, Cellwitch), and reconstructed using MLEM algorithm.  Images were re-acquired with the patient in a prone  position.  ------------------------------------------------------------------------  NUCLEAR FINDINGS:  The left ventricle was small in size. There are large,  moderate defects in the inferior, basal to mid  inferolateral, and basal to mid inferoseptal walls that  are mostly reversible suggestive of ischemia with partial  scarring.  There are mild defects in the distal anterior wall and  apex that are partially reversible suggestive of mild  ischemia with partial scarring.  ------------------------------------------------------------------------  GATED ANALYSIS:  Post-stress gated wall motion analysis was performed (LVEF  > 70%;LVEDV = 37 ml.) revealing reduced systolic  thickening of the inferior, basal to mid inferoseptal,  basal to mid inferolateral, and apical walls.  Overall  left ventricular ejection fraction is normal.  ------------------------------------------------------------------------  IMPRESSIONS:Abnormal Study  * Chest Pain: No chest pain with administration of  Regadenoson.  * Symptom: Fatigue.  * HR Response: Appropriate.  * BP Response: Appropriate.  * Heart Rhythm: Sinus Rhythm - 74 BPM.  * Baseline ECG: T wave abnormality in leads II, III, aVF,  and V3-V6, inverted at baseline.  * ECG Changes: ST Depression: 0.5 mm horizontal in leads  II, III, aVF, and V2-V6 started at 2:00 min following  regadenoson infusion at 120 bpm and persisted at least  6:00 min in recovery.  * Arrhythmia: None.  * The left ventricle was small in size. There are large,  moderate defects in the inferior, basal to mid  inferolateral, and basal to mid inferoseptal walls that  are mostly reversible suggestive of ischemia with partial  scarring.  * There are mild defects in the distal anterior wall and  apex that are partially reversible suggestive of mild  ischemia with partial scarring.  * Post-stress gated wall motion analysis was performed  (LVEF > 70%;LVEDV = 37 ml.) revealing reduced systolic  thickening of the inferior, basal to mid inferoseptal,  basal to mid inferolateral, and apical walls.  Overall  left ventricular ejection fraction is normal.  *** No previous Nuclear/Stress exam.  ------------------------------------------------------------------------  Confirmed on  7/16/2019 - 17:02:12 by Matteo Melendez M.D.  ------------------------------------------------------------------------    < end of copied text >      =========================================================================  ASSESSMENT:  68yFemale with a history of GERD, Sciatica,Ileostomy, Sjogren's Syndrome Treated With Steroids - presenting with Chest pressure      Recommendations  - Plan for LHC with Dr. Lau in Early AM  - Continue ASA  - Discussed at length with patient/ in nuclear lab  - Will follow        Please call with questions.     Silvestre Rosen MD, [facility]  [phone]

## 2019-07-16 NOTE — PROGRESS NOTE ADULT - PROBLEM SELECTOR PLAN 1
Patient with atypical exertional neck pain, as well as new EKG changes with T wave inversion, concerning for atypical unstable angina in women, need to r/o ACS  - admit to tele  - Trop negative  - obtain TTE and possible stress test while inpatient  - cards consult   -daily ASA  -A1c 5.5  ldl 63 neck pain concerning for unstable angina- EKG with TWI anteroseptally and inferiorly. negative troponin.   monitor on telemetry  -TTE reviewed   f/u stress   cards f/u  -daily ASA  -A1c 5.5  ldl 53

## 2019-07-16 NOTE — PROGRESS NOTE ADULT - SUBJECTIVE AND OBJECTIVE BOX
Patient is a 68y old  Female who presents with a chief complaint of anterior neck pain (16 Jul 2019 15:52)        SUBJECTIVE / OVERNIGHT EVENTS:      MEDICATIONS  (STANDING):  aspirin enteric coated 81 milliGRAM(s) Oral daily  cholecalciferol 1000 Unit(s) Oral daily  diphenoxylate/atropine 2 Tablet(s) Oral two times a day  diphenoxylate/atropine 1 Tablet(s) Oral two times a day  enoxaparin Injectable 40 milliGRAM(s) SubCutaneous daily  ferrous    sulfate 325 milliGRAM(s) Oral daily  hydroxychloroquine 200 milliGRAM(s) Oral daily  levothyroxine 100 MICROGram(s) Oral daily  loperamide 4 milliGRAM(s) Oral four times a day  magnesium oxide 400 milliGRAM(s) Oral three times a day with meals  nystatin Cream 1 Application(s) Topical two times a day  pantoprazole    Tablet 40 milliGRAM(s) Oral before breakfast  trimethoprim/polymyxin Solution 2 Drop(s) Both EYES every 4 hours  zinc sulfate 220 milliGRAM(s) Oral daily    MEDICATIONS  (PRN):  traMADol 50 milliGRAM(s) Oral three times a day PRN Moderate Pain (4 - 6)      Vital Signs Last 24 Hrs  T(C): 36.8 (16 Jul 2019 11:53), Max: 36.8 (16 Jul 2019 11:53)  T(F): 98.2 (16 Jul 2019 11:53), Max: 98.2 (16 Jul 2019 11:53)  HR: 100 (16 Jul 2019 11:53) (82 - 100)  BP: 114/72 (16 Jul 2019 11:53) (102/62 - 114/72)  BP(mean): --  RR: 18 (16 Jul 2019 11:53) (18 - 18)  SpO2: 96% (16 Jul 2019 11:53) (96% - 96%)  CAPILLARY BLOOD GLUCOSE        I&O's Summary    15 Jul 2019 07:01  -  16 Jul 2019 07:00  --------------------------------------------------------  IN: 720 mL / OUT: 400 mL / NET: 320 mL    16 Jul 2019 07:01  -  16 Jul 2019 19:05  --------------------------------------------------------  IN: 220 mL / OUT: 175 mL / NET: 45 mL        PHYSICAL EXAM:  GENERAL: NAD  HEAD:  Atraumatic, Normocephalic  EYES: conjunctiva and sclera clear  NECK: No JVD  CHEST/LUNG: CTA b/l  HEART: S1 S2 RRR  ABDOMEN: +BS Soft, NT/ND, +ostomy  EXTREMITIES:  2+ DP Pulses, No c/c/e  NEUROLOGY: AAOx3, no focal deficits   SKIN: No rashes or lesions    LABS:                        11.2   6.6   )-----------( 180      ( 16 Jul 2019 07:09 )             33.6     07-16    136  |  100  |  14  ----------------------------<  85  4.1   |  26  |  0.91    Ca    8.8      16 Jul 2019 07:09  Phos  3.0     07-15  Mg     1.7     07-16    TPro  5.8<L>  /  Alb  3.3  /  TBili  0.4  /  DBili  x   /  AST  25  /  ALT  15  /  AlkPhos  73  07-15              RADIOLOGY & ADDITIONAL TESTS:    Imaging Personally Reviewed: TTE reviewed- no segmental wall motion abn  Consultant(s) Notes Reviewed:    Care Discussed with Consultants/Other Providers:

## 2019-07-16 NOTE — PROGRESS NOTE ADULT - PROBLEM SELECTOR PLAN 7
- c/w daily change  - c/w home loperamide 2mg 2 capsules q6hr  - c/w lomotil 1 tabs q6hr  - ileostomy with good output - unlikely to cause her neck pain with associated exertion  - c/w home PPI

## 2019-07-16 NOTE — PROGRESS NOTE ADULT - PROBLEM SELECTOR PLAN 5
- TSH wnl  - c/w home synthroid - on home PRN tramadol  - Istop ID: [id number] - last picked tramadol 4/2019, dispensed 90 pills, 30 day supply  - c/w tramadol PRN

## 2019-07-17 ENCOUNTER — TRANSCRIPTION ENCOUNTER (OUTPATIENT)
Age: 69
End: 2019-07-17

## 2019-07-17 LAB
ANION GAP SERPL CALC-SCNC: 13 MMOL/L — SIGNIFICANT CHANGE UP (ref 5–17)
BUN SERPL-MCNC: 13 MG/DL — SIGNIFICANT CHANGE UP (ref 7–23)
CALCIUM SERPL-MCNC: 9.1 MG/DL — SIGNIFICANT CHANGE UP (ref 8.4–10.5)
CHLORIDE SERPL-SCNC: 101 MMOL/L — SIGNIFICANT CHANGE UP (ref 96–108)
CO2 SERPL-SCNC: 27 MMOL/L — SIGNIFICANT CHANGE UP (ref 22–31)
CREAT SERPL-MCNC: 0.92 MG/DL — SIGNIFICANT CHANGE UP (ref 0.5–1.3)
GLUCOSE BLDC GLUCOMTR-MCNC: 126 MG/DL — HIGH (ref 70–99)
GLUCOSE SERPL-MCNC: 68 MG/DL — LOW (ref 70–99)
MAGNESIUM SERPL-MCNC: 1.7 MG/DL — SIGNIFICANT CHANGE UP (ref 1.6–2.6)
POTASSIUM SERPL-MCNC: 4.4 MMOL/L — SIGNIFICANT CHANGE UP (ref 3.5–5.3)
POTASSIUM SERPL-SCNC: 4.4 MMOL/L — SIGNIFICANT CHANGE UP (ref 3.5–5.3)
SODIUM SERPL-SCNC: 141 MMOL/L — SIGNIFICANT CHANGE UP (ref 135–145)

## 2019-07-17 PROCEDURE — 93010 ELECTROCARDIOGRAM REPORT: CPT | Mod: 76

## 2019-07-17 PROCEDURE — 99152 MOD SED SAME PHYS/QHP 5/>YRS: CPT | Mod: GC

## 2019-07-17 PROCEDURE — 99233 SBSQ HOSP IP/OBS HIGH 50: CPT

## 2019-07-17 PROCEDURE — 93458 L HRT ARTERY/VENTRICLE ANGIO: CPT | Mod: 26,59,GC

## 2019-07-17 PROCEDURE — 92928 PRQ TCAT PLMT NTRAC ST 1 LES: CPT | Mod: RC,GC

## 2019-07-17 PROCEDURE — 99232 SBSQ HOSP IP/OBS MODERATE 35: CPT

## 2019-07-17 RX ORDER — HYDROXYCHLOROQUINE SULFATE 200 MG
1 TABLET ORAL
Qty: 0 | Refills: 0 | DISCHARGE
Start: 2019-07-17

## 2019-07-17 RX ORDER — OFLOXACIN 0.3 %
1 DROPS OPHTHALMIC (EYE)
Refills: 0 | Status: DISCONTINUED | OUTPATIENT
Start: 2019-07-17 | End: 2019-07-18

## 2019-07-17 RX ORDER — CLOPIDOGREL BISULFATE 75 MG/1
75 TABLET, FILM COATED ORAL DAILY
Refills: 0 | Status: DISCONTINUED | OUTPATIENT
Start: 2019-07-18 | End: 2019-07-18

## 2019-07-17 RX ORDER — ASPIRIN/CALCIUM CARB/MAGNESIUM 324 MG
1 TABLET ORAL
Qty: 30 | Refills: 0
Start: 2019-07-17 | End: 2019-08-15

## 2019-07-17 RX ORDER — CHOLECALCIFEROL (VITAMIN D3) 125 MCG
1 CAPSULE ORAL
Qty: 0 | Refills: 0 | DISCHARGE

## 2019-07-17 RX ORDER — CLOPIDOGREL BISULFATE 75 MG/1
1 TABLET, FILM COATED ORAL
Qty: 30 | Refills: 0
Start: 2019-07-17 | End: 2019-08-15

## 2019-07-17 RX ORDER — LEVOTHYROXINE SODIUM 125 MCG
1 TABLET ORAL
Qty: 0 | Refills: 0 | DISCHARGE

## 2019-07-17 RX ORDER — DIPHENOXYLATE HCL/ATROPINE 2.5-.025MG
1 TABLET ORAL
Qty: 0 | Refills: 0 | DISCHARGE

## 2019-07-17 RX ORDER — NYSTATIN CREAM 100000 [USP'U]/G
1 CREAM TOPICAL
Qty: 0 | Refills: 0 | DISCHARGE

## 2019-07-17 RX ORDER — ESOMEPRAZOLE MAGNESIUM 40 MG/1
1 CAPSULE, DELAYED RELEASE ORAL
Qty: 0 | Refills: 0 | DISCHARGE

## 2019-07-17 RX ORDER — ATORVASTATIN CALCIUM 80 MG/1
20 TABLET, FILM COATED ORAL AT BEDTIME
Refills: 0 | Status: DISCONTINUED | OUTPATIENT
Start: 2019-07-17 | End: 2019-07-18

## 2019-07-17 RX ORDER — ZINC SULFATE TAB 220 MG (50 MG ZINC EQUIVALENT) 220 (50 ZN) MG
0 TAB ORAL
Qty: 0 | Refills: 0 | DISCHARGE

## 2019-07-17 RX ORDER — SODIUM CHLORIDE 9 MG/ML
1000 INJECTION INTRAMUSCULAR; INTRAVENOUS; SUBCUTANEOUS
Refills: 0 | Status: DISCONTINUED | OUTPATIENT
Start: 2019-07-17 | End: 2019-07-18

## 2019-07-17 RX ORDER — LOPERAMIDE HCL 2 MG
2 TABLET ORAL
Qty: 0 | Refills: 0 | DISCHARGE

## 2019-07-17 RX ORDER — MAGNESIUM OXIDE 400 MG ORAL TABLET 241.3 MG
1 TABLET ORAL
Qty: 0 | Refills: 0 | DISCHARGE

## 2019-07-17 RX ORDER — OFLOXACIN 0.3 %
1 DROPS OPHTHALMIC (EYE)
Qty: 1 | Refills: 0
Start: 2019-07-17 | End: 2019-07-23

## 2019-07-17 RX ORDER — BENZOYL PEROXIDE MICRONIZED 5.8 %
1 TOWELETTE (EA) TOPICAL
Qty: 0 | Refills: 0 | DISCHARGE

## 2019-07-17 RX ORDER — ROSUVASTATIN CALCIUM 5 MG/1
1 TABLET ORAL
Qty: 30 | Refills: 0
Start: 2019-07-17 | End: 2019-08-15

## 2019-07-17 RX ORDER — HYDROXYCHLOROQUINE SULFATE 200 MG
1 TABLET ORAL
Qty: 0 | Refills: 0 | DISCHARGE

## 2019-07-17 RX ORDER — TRAMADOL HYDROCHLORIDE 50 MG/1
1 TABLET ORAL
Qty: 0 | Refills: 0 | DISCHARGE

## 2019-07-17 RX ADMIN — Medication 4 MILLIGRAM(S): at 16:11

## 2019-07-17 RX ADMIN — Medication 4 MILLIGRAM(S): at 22:18

## 2019-07-17 RX ADMIN — Medication 1 DROP(S): at 22:17

## 2019-07-17 RX ADMIN — Medication 4 MILLIGRAM(S): at 05:42

## 2019-07-17 RX ADMIN — Medication 200 MILLIGRAM(S): at 17:32

## 2019-07-17 RX ADMIN — ATORVASTATIN CALCIUM 20 MILLIGRAM(S): 80 TABLET, FILM COATED ORAL at 22:17

## 2019-07-17 RX ADMIN — SODIUM CHLORIDE 50 MILLILITER(S): 9 INJECTION INTRAMUSCULAR; INTRAVENOUS; SUBCUTANEOUS at 17:32

## 2019-07-17 RX ADMIN — ZINC SULFATE TAB 220 MG (50 MG ZINC EQUIVALENT) 220 MILLIGRAM(S): 220 (50 ZN) TAB at 16:12

## 2019-07-17 RX ADMIN — MAGNESIUM OXIDE 400 MG ORAL TABLET 400 MILLIGRAM(S): 241.3 TABLET ORAL at 17:33

## 2019-07-17 RX ADMIN — Medication 2 DROP(S): at 16:11

## 2019-07-17 RX ADMIN — Medication 325 MILLIGRAM(S): at 16:12

## 2019-07-17 RX ADMIN — Medication 2 TABLET(S): at 22:35

## 2019-07-17 RX ADMIN — Medication 1 TABLET(S): at 16:18

## 2019-07-17 RX ADMIN — Medication 2 DROP(S): at 05:42

## 2019-07-17 RX ADMIN — Medication 81 MILLIGRAM(S): at 07:57

## 2019-07-17 RX ADMIN — Medication 100 MICROGRAM(S): at 05:42

## 2019-07-17 RX ADMIN — Medication 1000 UNIT(S): at 16:10

## 2019-07-17 RX ADMIN — PANTOPRAZOLE SODIUM 40 MILLIGRAM(S): 20 TABLET, DELAYED RELEASE ORAL at 05:42

## 2019-07-17 NOTE — DISCHARGE NOTE PROVIDER - CARE PROVIDER_API CALL
Lee Coley)  Internal Medicine  [address] [facility] [address], [address]  [place] [address]  Phone: [phone]  Fax: [phone]  Follow Up Time: 1 week    Kirill Esparza)  Gastroenterology  [address] [facility], [address]  [place] [address]  Phone: [phone]  Fax: [phone]  Follow Up Time: 1 week Lee Coley)  Internal Medicine  [address] [facility] [facility], [address]  [place] [address]  Phone: [phone]  Fax: [phone]  Follow Up Time: 1 week    Kirill Esparza)  Gastroenterology  [address] [facility], [address]  [place] [address]  Phone: [phone]  Fax: [phone]  Follow Up Time: 1 week    Silvestre Rosen)  Cardiovascular Disease; Internal Medicine  [address] [facility], [address] [place] [address]  Phone: [phone]  Fax: [phone]  Follow Up Time:

## 2019-07-17 NOTE — PROGRESS NOTE ADULT - PROBLEM SELECTOR PLAN 5
- on home PRN tramadol  - Istop ID: [id number] - last picked tramadol 4/2019, dispensed 90 pills, 30 day supply  - c/w tramadol PRN

## 2019-07-17 NOTE — DISCHARGE NOTE PROVIDER - CARE PROVIDERS DIRECT ADDRESSES
,[email or web address],DirectAddress_Unknown ,[email or web address],DirectAddress_Unknown,[email or web address]

## 2019-07-17 NOTE — PROGRESS NOTE ADULT - PROBLEM SELECTOR PROBLEM 1
R/O ACS (acute coronary syndrome)

## 2019-07-17 NOTE — PROGRESS NOTE ADULT - SUBJECTIVE AND OBJECTIVE BOX
SUBJECTIVE:  no acute events  output is watery but volume is acceptable  _____________________________________________________  OBJECTIVE:    T(C): 36.3 (07-17-19 @ 05:05), Max: 36.8 (07-16-19 @ 11:53)  HR: 73 (07-17-19 @ 05:05)  BP: 106/50 (07-17-19 @ 05:05)  RR: 18 (07-17-19 @ 05:05)  SpO2: 100% (07-17-19 @ 05:05)  Wt(kg): --    07-16 @ 07:01  -  07-17 @ 07:00  --------------------------------------------------------  IN:    Oral Fluid: 460 mL  Total IN: 460 mL    OUT:    Ileostomy: 525 mL  Total OUT: 525 mL    Total NET: -65 mL        PHYSICAL EXAM:      Constitutional: nad    Gastrointestinal: soft ntnd +bs      _____________________________________________________  LABS:                        11.2   6.6   )-----------( 180      ( 16 Jul 2019 07:09 )             33.6     07-16    136  |  100  |  14  ----------------------------<  85  4.1   |  26  |  0.91    Ca    8.8      16 Jul 2019 07:09  Mg     1.7     07-16        _____________________________________________________  ACTIVE MEDS:  MEDICATIONS  (STANDING):  aspirin enteric coated 81 milliGRAM(s) Oral daily  cholecalciferol 1000 Unit(s) Oral daily  diphenoxylate/atropine 2 Tablet(s) Oral two times a day  diphenoxylate/atropine 1 Tablet(s) Oral two times a day  enoxaparin Injectable 40 milliGRAM(s) SubCutaneous daily  ferrous    sulfate 325 milliGRAM(s) Oral daily  hydroxychloroquine 200 milliGRAM(s) Oral daily  levothyroxine 100 MICROGram(s) Oral daily  loperamide 4 milliGRAM(s) Oral four times a day  magnesium oxide 400 milliGRAM(s) Oral three times a day with meals  nystatin Cream 1 Application(s) Topical two times a day  pantoprazole    Tablet 40 milliGRAM(s) Oral before breakfast  trimethoprim/polymyxin Solution 2 Drop(s) Both EYES every 4 hours  zinc sulfate 220 milliGRAM(s) Oral daily    MEDICATIONS  (PRN):  traMADol 50 milliGRAM(s) Oral three times a day PRN Moderate Pain (4 - 6)    _____________________________________________________  ASSESSMENT:  68yFemale with ileostomy dependent on lomotil/imodium for output control    PLAN:  1.  resume her usual imodium (4 mg four times a day - AC and HS) and lomotil (2 tabs with breakfast, 1 tab with lunch/dinner and 2 tabs at bedtime) after her angiogram  2.  monitor ostomy output and electrolytes    Kirill Esparza M.D.  [facility]  (O) [phone]

## 2019-07-17 NOTE — PROGRESS NOTE ADULT - PROBLEM SELECTOR PLAN 1
neck pain concerning for unstable angina- EKG with TWI anteroseptally and inferiorly. negative troponin.   monitor on telemetry  -TTE reviewed   positive stress s/p cath today with GAUDENCIO placed to RCA  cards f/u  -c/w asa, plavix, statin  -A1c 5.5  ldl 53

## 2019-07-17 NOTE — PROGRESS NOTE ADULT - SUBJECTIVE AND OBJECTIVE BOX
Patient is a 68y old  Female who presents with a chief complaint of anterior neck pain (17 Jul 2019 18:10)        SUBJECTIVE / OVERNIGHT EVENTS: no acute complaints, still having watery output from ostomy      MEDICATIONS  (STANDING):  aluminum hydroxide/magnesium hydroxide/simethicone Suspension 30 milliLiter(s) Oral once  aspirin enteric coated 81 milliGRAM(s) Oral daily  atorvastatin 20 milliGRAM(s) Oral at bedtime  cholecalciferol 1000 Unit(s) Oral daily  diphenoxylate/atropine 2 Tablet(s) Oral two times a day  diphenoxylate/atropine 1 Tablet(s) Oral two times a day  enoxaparin Injectable 40 milliGRAM(s) SubCutaneous daily  ferrous    sulfate 325 milliGRAM(s) Oral daily  hydroxychloroquine 200 milliGRAM(s) Oral daily  levothyroxine 100 MICROGram(s) Oral daily  loperamide 4 milliGRAM(s) Oral four times a day  magnesium oxide 400 milliGRAM(s) Oral three times a day with meals  nystatin Cream 1 Application(s) Topical two times a day  ofloxacin 0.3% Solution 1 Drop(s) Both EYES four times a day  pantoprazole    Tablet 40 milliGRAM(s) Oral before breakfast  sodium chloride 0.9%. 1000 milliLiter(s) (50 mL/Hr) IV Continuous <Continuous>  zinc sulfate 220 milliGRAM(s) Oral daily    MEDICATIONS  (PRN):  traMADol 50 milliGRAM(s) Oral three times a day PRN Moderate Pain (4 - 6)      Vital Signs Last 24 Hrs  T(C): 36.7 (17 Jul 2019 15:10), Max: 36.8 (17 Jul 2019 14:53)  T(F): 98.1 (17 Jul 2019 15:10), Max: 98.2 (17 Jul 2019 14:53)  HR: 98 (17 Jul 2019 17:09) (73 - 98)  BP: 91/55 (17 Jul 2019 17:09) (91/55 - 106/50)  BP(mean): --  RR: 16 (17 Jul 2019 17:09) (16 - 18)  SpO2: 98% (17 Jul 2019 17:09) (95% - 100%)  CAPILLARY BLOOD GLUCOSE      POCT Blood Glucose.: 126 mg/dL (17 Jul 2019 18:26)    I&O's Summary    16 Jul 2019 07:01 - 17 Jul 2019 07:00  --------------------------------------------------------  IN: 460 mL / OUT: 525 mL / NET: -65 mL    17 Jul 2019 07:01  -  17 Jul 2019 19:32  --------------------------------------------------------  IN: 400 mL / OUT: 450 mL / NET: -50 mL        PHYSICAL EXAM:  GENERAL: NAD  HEAD:  Atraumatic, Normocephalic  EYES: conjunctiva and sclera clear  NECK: No JVD  CHEST/LUNG: CTA b/l  HEART: S1 S2 RRR  ABDOMEN: +BS Soft, NT/ND +ostomy  EXTREMITIES:  2+ DP Pulses, No c/c/e  NEUROLOGY: AAOx3, no focal deficits   SKIN: right groin with small amount of blood on gauze - no hematoma    LABS:                        11.2   6.6   )-----------( 180      ( 16 Jul 2019 07:09 )             33.6     07-17    141  |  101  |  13  ----------------------------<  68<L>  4.4   |  27  |  0.92    Ca    9.1      17 Jul 2019 15:53  Mg     1.7     07-17                RADIOLOGY & ADDITIONAL TESTS:    Imaging Personally Reviewed:  Consultant(s) Notes Reviewed:    Care Discussed with Consultants/Other Providers:

## 2019-07-17 NOTE — DISCHARGE NOTE PROVIDER - NSDCCPCAREPLAN_GEN_ALL_CORE_FT
PRINCIPAL DISCHARGE DIAGNOSIS  Diagnosis: Stented coronary artery  Assessment and Plan of Treatment: s/p cardiac cath--oRCA 95% GAUDENCIO x 1  Continue ASA & Plavix, started on statin medication as per Cardiology  Follow-up with Cardiology in 1-2 weeks.      SECONDARY DISCHARGE DIAGNOSES  Diagnosis: Ileostomy in place  Assessment and Plan of Treatment: Permanent ileostomy and is dependent on imodium/lomotil to keep the output to goal 2773-0008 cc/24 hrs  Continue with Imodium 2 capsules with meals and bedtime and Lomotil 1-2 capsules with meals/bedtime depending on output that day    Diagnosis: Sjogren's syndrome  Assessment and Plan of Treatment: Continue with your current regimen and follow-up with your PCP in 1 week    Diagnosis: Acute conjunctivitis of left eye, unspecified acute conjunctivitis type  Assessment and Plan of Treatment: Complete course of eye drops x 7 days. If no improvement, follow-up with Ophthalmogy outpatient. PRINCIPAL DISCHARGE DIAGNOSIS  Diagnosis: Stented coronary artery  Assessment and Plan of Treatment: s/p cardiac cath--oRCA 95% GAUDENCIO x 1  Continue ASA & Plavix, started on statin medication as per Cardiology  Follow-up with Cardiology in 1-2 weeks.      SECONDARY DISCHARGE DIAGNOSES  Diagnosis: Ileostomy in place  Assessment and Plan of Treatment: Permanent ileostomy and is dependent on imodium/lomotil to keep the output to goal 7849-6944 cc/24 hrs  Continue with Imodium 2 capsules with meals and bedtime and Lomotil 1-2 capsules with meals/bedtime depending on output that day    Diagnosis: Sjogren's syndrome  Assessment and Plan of Treatment: Continue with your current regimen and follow-up with your PCP in 1 week    Diagnosis: Acute conjunctivitis of left eye, unspecified acute conjunctivitis type  Assessment and Plan of Treatment: Complete course of eye drops x 7 days. If no improvement, follow-up with Ophthalmogy outpatient. PRINCIPAL DISCHARGE DIAGNOSIS  Diagnosis: Stented coronary artery  Assessment and Plan of Treatment: s/p cardiac cath--oRCA 95% GAUDENCIO x 1  Continue ASA & Plavix, started on statin medication (Crestor 10mg at bedtime) as per Cardiology  Follow-up with Cardiology in 1-2 weeks.      SECONDARY DISCHARGE DIAGNOSES  Diagnosis: Ileostomy in place  Assessment and Plan of Treatment: Permanent ileostomy and is dependent on imodium/lomotil to keep the output to goal 9440-3955 cc/24 hrs  Continue with Imodium 2 capsules with meals and bedtime and Lomotil 1-2 capsules with meals/bedtime depending on output that day    Diagnosis: Sjogren's syndrome  Assessment and Plan of Treatment: Continue with your current regimen and follow-up with your PCP in 1 week    Diagnosis: Acute conjunctivitis of left eye, unspecified acute conjunctivitis type  Assessment and Plan of Treatment: Complete course of eye drops x 7 days. If no improvement, follow-up with Ophthalmogy outpatient.

## 2019-07-17 NOTE — DISCHARGE NOTE PROVIDER - PROVIDER TOKENS
PROVIDER:[TOKEN:[[id number]],FOLLOWUP:[1 week]],PROVIDER:[TOKEN:[[id number]],FOLLOWUP:[1 week]] PROVIDER:[TOKEN:[[id number]],FOLLOWUP:[1 week]],PROVIDER:[TOKEN:[[id number]],FOLLOWUP:[1 week]],PROVIDER:[TOKEN:[[id number]]]

## 2019-07-17 NOTE — DISCHARGE NOTE PROVIDER - HOSPITAL COURSE
68F with PMHx of Sjogrens, hypothyroidism, SBO s/p ileostomy (2017), GERD, sciatica/back presents with 2 day h/o anterior L neck pain, back presents with 2 day h/o anterior L neck pain, admitted for ACS workup. EKG on arrival with TWI anteroseptally and inferiorly, negative troponins, TTE performed, and underwent a C 7/17 oRCA 95% GAUDENCIO x 1, on ASA & Plavix, started on Crestor 5mg as per Cardiology. Pt also with EDP 9, provided with IV hydration post-cath, now deemed clinically stable for discharge home with outpatient follow-up with Cards & PCP in 1-2 weeks. 68F with PMHx of Sjogrens, hypothyroidism, SBO s/p ileostomy (2017), GERD, sciatica/back presents with 2 day h/o anterior L neck pain, back presents with 2 day h/o anterior L neck pain, admitted for ACS workup. EKG on arrival with TWI anteroseptally and inferiorly, negative troponins, TTE performed, and underwent a C 7/17 oRCA 95% GAUDENCIO x 1, on ASA & Plavix, started on Crestor 5mg as per Cardiology. Now recommended by cardiology to increase crestor to 10mg qhs on discharge. Pt also with EDP 9, provided with IV hydration post-cath, now deemed clinically stable for discharge home with outpatient follow-up with PCP in 1-2 weeks. follow up with Cardiology - Dr. Rosen.

## 2019-07-17 NOTE — PROGRESS NOTE ADULT - SUBJECTIVE AND OBJECTIVE BOX
· Requested by Name:	Chiara Solis	  · Date/Time:	17-Jul-2019 	  · Reason for Referral/Consultation:	Chest Pain	  · Reason for Admission	anterior neck pain	      · Subjective and Objective: 	  **********              CARDIOLOGY CONSULT FOLLOW UP NOTE              ************  ============================================================  CHIEF COMPLAINT/REASON FOR CONSULT:  Patient is a 68y old  Female who presents with a chief complaint of anterior neck pain (15 Jul 2019 14:42)      HISTORY OF PRESENT ILLNESS:  68yFemale with a history of GERD, Sciatica,Ileostomy, Sjogren's Syndrome Treated With Steroids - presenting with Chest pressure.  Briefly, limited in mobility primarily due to sciatica.  Recently discharged.  Now developed exertional chest heaviness 4/10 radiating to L neck, improved with rest. +Fatigue.  No Assoc SOB/Palps/Diaphoresis.   EKG with anterior TWIs new from prior.     ==================  Interval Events  - s/p LHC with {CI to 95% pRCA  - TTE with mod MR; otherwise normal  - No reported worsening CP/Palps/SOB  ===================          aspirin enteric coated 81 milliGRAM(s) Oral daily  cholecalciferol 1000 Unit(s) Oral daily  diphenoxylate/atropine 1 Tablet(s) Oral four times a day  enoxaparin Injectable 40 milliGRAM(s) SubCutaneous daily  ferrous    sulfate 325 milliGRAM(s) Oral daily  hydroxychloroquine 200 milliGRAM(s) Oral daily  levothyroxine 100 MICROGram(s) Oral daily  loperamide 4 milliGRAM(s) Oral four times a day  magnesium oxide 400 milliGRAM(s) Oral three times a day with meals  nystatin Cream 1 Application(s) Topical two times a day  pantoprazole    Tablet 40 milliGRAM(s) Oral before breakfast  traMADol 50 milliGRAM(s) Oral three times a day PRN  zinc sulfate 220 milliGRAM(s) Oral daily        Allergies    latex (Rash)  No Known Drug Allergies    Intolerances    	    MEDICATIONS:  aspirin enteric coated 81 milliGRAM(s) Oral daily  enoxaparin Injectable 40 milliGRAM(s) SubCutaneous daily    hydroxychloroquine 200 milliGRAM(s) Oral daily      traMADol 50 milliGRAM(s) Oral three times a day PRN    diphenoxylate/atropine 1 Tablet(s) Oral four times a day  loperamide 4 milliGRAM(s) Oral four times a day  pantoprazole    Tablet 40 milliGRAM(s) Oral before breakfast    levothyroxine 100 MICROGram(s) Oral daily    cholecalciferol 1000 Unit(s) Oral daily  ferrous    sulfate 325 milliGRAM(s) Oral daily  magnesium oxide 400 milliGRAM(s) Oral three times a day with meals  nystatin Cream 1 Application(s) Topical two times a day  zinc sulfate 220 milliGRAM(s) Oral daily      PAST MEDICAL & SURGICAL HISTORY:  Sciatica  Anxiety  GERD (gastroesophageal reflux disease)  Colonic dysmotility  Ileostomy in place: 2/2 SBO 2017  Sjogren's syndrome  H/O ileostomy  History of appendectomy  History of hysterectomy      FAMILY HISTORY:  FH: CHF (congestive heart failure): Mother, passed age 82  FH: myocardial infarction: Father, age 70  Family history of uterine cancer    Mother - HTN      SOCIAL HISTORY:    [ x] Non-smoker  [x] No Illicit Drug Use  [ x] No Excess EtOH Use      REVIEW OF SYSTEMS: (Unless + Before Symptom, it is negative)  Constitutional: [] Fever, [x]Fatigue, []Weight Changes  Eyes:  []Recent Vision Changes, []Eye Pain  ENT: []Congestion, []Sore Throat  Endocrine: []Excess Sweating, []Temperature Intolerance  Cardiovascular:  []Chest Pain, []Palpitations, []Shortness of Breath, []Pre-syncope, []Syncope,[] LE Swelling  Respiratory:[] Cough, []Congestion,[] Wheezing  Gastrointestinal: [] Abdominal Pain,[] Nausea, []Vomiting  Genitourinary: []Dysuria,[] hematuria  Musculoskeletal: []Joint Pain, []Hip/Knee Injury  Neurologic: []Headaches,[] Imbalance, []Weakness  Skin: []Rashes, []hematoma, []purprura    ================================    PHYSICAL EXAM:  T(C): 36.4 (07-15-19 @ 12:20), Max: 36.9 (07-14-19 @ 19:30)  HR: 80 (07-15-19 @ 12:20) (72 - 85)  BP: 93/53 (07-15-19 @ 12:20) (89/52 - 104/62)  RR: 18 (07-15-19 @ 12:20) (16 - 18)  SpO2: 99% (07-15-19 @ 12:20) (97% - 99%)  Wt(kg): --  I&O's Summary    14 Jul 2019 07:01  -  15 Jul 2019 07:00  --------------------------------------------------------  IN: 0 mL / OUT: 450 mL / NET: -450 mL    15 Jul 2019 07:01  -  15 Jul 2019 19:03  --------------------------------------------------------  IN: 480 mL / OUT: 400 mL / NET: 80 mL      Appearance: Normal; NAD	  Psychiatry: AOx3; Normal Mood/Affect  HEENT:   Normal oral mucosa, EOMI	  Lymphatic: No lymphadenopathy  Cardiovascular: Normal S1 S2, No JVD, No murmurs, No edema  Respiratory: Lungs clear to auscultation, no use of accessory muscles	  Gastrointestinal:  Soft, Non-tender	  Skin: No rashes, No ecchymoses, No cyanosis	  Neurologic: Non-focal, No Focal Deficits  Extremities: Normal range of motion, No clubbing, cyanosis  Vascular: Peripheral pulses palpable 2+ bilaterally, no prominent varicosities    ============================    LABS:	   Labs [id number]    CBC Full  -  ( 15 Jul 2019 06:45 )  WBC Count : 7.2 K/uL  Hemoglobin : 10.4 g/dL  Hematocrit : 31.2 %  Platelet Count - Automated : 171 K/uL  Mean Cell Volume : 90.0 fl  Mean Cell Hemoglobin : 30.0 pg  Mean Cell Hemoglobin Concentration : 33.4 gm/dL  Auto Neutrophil # : 5.1 K/uL  Auto Lymphocyte # : 1.6 K/uL  Auto Monocyte # : 0.4 K/uL  Auto Eosinophil # : 0.2 K/uL  Auto Basophil # : 0.1 K/uL  Auto Neutrophil % : 70.3 %  Auto Lymphocyte % : 21.6 %  Auto Monocyte % : 5.0 %  Auto Eosinophil % : 2.3 %  Auto Basophil % : 0.8 %    07-15    139  |  104  |  12  ----------------------------<  77  4.4   |  24  |  0.88  07-14    134<L>  |  95<L>  |  18  ----------------------------<  102<H>  4.3   |  29  |  1.09    Ca    9.1      15 Jul 2019 06:46  Ca    8.8      14 Jul 2019 16:22  Phos  3.0     07-15  Mg     2.0     07-15  Mg     1.8     07-14    TPro  5.8<L>  /  Alb  3.3  /  TBili  0.4  /  DBili  x   /  AST  25  /  ALT  15  /  AlkPhos  73  07-15  TPro  6.1  /  Alb  3.8  /  TBili  0.4  /  DBili  x   /  AST  28  /  ALT  17  /  AlkPhos  81  07-14    EKG   NSR +Anterior TWIs    =================  < from: Nuclear Stress Test-Pharmacologic (07.16.19 @ 15:41) >  ECG ABNORMALITIES DURING/AFTER STRESS:   ST Changes:ST Depression: 0.5 mm horizontal in leads II,  III, aVF, and V2-V6 started at 2:00 min following  regadenoson infusion at 120 bpm and persisted at least  6:00 min in recovery.  ------------------------------------------------------------------------  NEW ARRHYTHMIAS DEVELOPED DURING/AFTER STRESS:  None  ------------------------------------------------------------------------  STRESS TEST IMPRESSIONS:  Chest Pain: No chest pain with administration of  Regadenoson.  Symptom: Fatigue.  HR Response: Appropriate.  BP Response: Appropriate.  Heart Rhythm: Sinus Rhythm - 74 BPM.  Baseline ECG: T wave abnormality in leads II, III, aVF,  and V3-V6, inverted at baseline.  ECG Changes: ST Depression: 0.5 mm horizontal in leads II,  III, aVF, and V2-V6 started at 2:00 min following  regadenoson infusion at 120 bpm and persisted at least  6:00 min in recovery.  Arrhythmia: None.  ------------------------------------------------------------------------  PROCEDURE:  Approximately 20 minutes later, tomographic images were  obtained in a 180 degree arc from right anterior oblique  to left anterior oblique with 64 stops. The tomographic  slices were reconstructed in 3 orthogonal planes (short  axis, horizontal long axis and vertical long axis).  Interpretation was performed both by visual and  quantitative analysis.  Rest and stress images were acquired using CZT-based  system with pinhole collimation (Stratoscale 530 c, [employer]), and reconstructed using MLEM algorithm.  Images were re-acquired with the patient in a prone  position.  ------------------------------------------------------------------------  NUCLEAR FINDINGS:  The left ventricle was small in size. There are large,  moderate defects in the inferior, basal to mid  inferolateral, and basal to mid inferoseptal walls that  are mostly reversible suggestive of ischemia with partial  scarring.  There are mild defects in the distal anterior wall and  apex that are partially reversible suggestive of mild  ischemia with partial scarring.  ------------------------------------------------------------------------  GATED ANALYSIS:  Post-stress gated wall motion analysis was performed (LVEF  > 70%;LVEDV = 37 ml.) revealing reduced systolic  thickening of the inferior, basal to mid inferoseptal,  basal to mid inferolateral, and apical walls.  Overall  left ventricular ejection fraction is normal.  ------------------------------------------------------------------------  IMPRESSIONS:Abnormal Study  * Chest Pain: No chest pain with administration of  Regadenoson.  * Symptom: Fatigue.  * HR Response: Appropriate.  * BP Response: Appropriate.  * Heart Rhythm: Sinus Rhythm - 74 BPM.  * Baseline ECG: T wave abnormality in leads II, III, aVF,  and V3-V6, inverted at baseline.  * ECG Changes: ST Depression: 0.5 mm horizontal in leads  II, III, aVF, and V2-V6 started at 2:00 min following  regadenoson infusion at 120 bpm and persisted at least  6:00 min in recovery.  * Arrhythmia: None.  * The left ventricle was small in size. There are large,  moderate defects in the inferior, basal to mid  inferolateral, and basal to mid inferoseptal walls that  are mostly reversible suggestive of ischemia with partial  scarring.  * There are mild defects in the distal anterior wall and  apex that are partially reversible suggestive of mild  ischemia with partial scarring.  * Post-stress gated wall motion analysis was performed  (LVEF > 70%;LVEDV = 37 ml.) revealing reduced systolic  thickening of the inferior, basal to mid inferoseptal,  basal to mid inferolateral, and apical walls.  Overall  left ventricular ejection fraction is normal.  *** No previous Nuclear/Stress exam.  ------------------------------------------------------------------------  Confirmed on  7/16/2019 - 17:02:12 by Matteo Melendez M.D.  ------------------------------------------------------------------------    < end of copied text >      =========================================================================  ASSESSMENT:  68yFemale with a history of GERD, Sciatica,Ileostomy, Sjogren's Syndrome Treated With Steroids - presenting with Chest pressure      Recommendations  - Continue ASA/Plavix  - Start Crestor 5mg  - Discussed at length with patient and   - Will follow        Please call with questions.     Silvestre Rosen MD, [facility]  [phone]

## 2019-07-18 ENCOUNTER — TRANSCRIPTION ENCOUNTER (OUTPATIENT)
Age: 69
End: 2019-07-18

## 2019-07-18 ENCOUNTER — INBOUND DOCUMENT (OUTPATIENT)
Age: 69
End: 2019-07-18

## 2019-07-18 ENCOUNTER — MEDICATION RENEWAL (OUTPATIENT)
Age: 69
End: 2019-07-18

## 2019-07-18 VITALS
SYSTOLIC BLOOD PRESSURE: 108 MMHG | OXYGEN SATURATION: 100 % | TEMPERATURE: 98 F | DIASTOLIC BLOOD PRESSURE: 69 MMHG | RESPIRATION RATE: 16 BRPM | HEART RATE: 86 BPM

## 2019-07-18 LAB
ANION GAP SERPL CALC-SCNC: 11 MMOL/L — SIGNIFICANT CHANGE UP (ref 5–17)
BUN SERPL-MCNC: 17 MG/DL — SIGNIFICANT CHANGE UP (ref 7–23)
CALCIUM SERPL-MCNC: 8.8 MG/DL — SIGNIFICANT CHANGE UP (ref 8.4–10.5)
CHLORIDE SERPL-SCNC: 104 MMOL/L — SIGNIFICANT CHANGE UP (ref 96–108)
CO2 SERPL-SCNC: 24 MMOL/L — SIGNIFICANT CHANGE UP (ref 22–31)
CREAT SERPL-MCNC: 1.04 MG/DL — SIGNIFICANT CHANGE UP (ref 0.5–1.3)
GLUCOSE SERPL-MCNC: 82 MG/DL — SIGNIFICANT CHANGE UP (ref 70–99)
MAGNESIUM SERPL-MCNC: 1.6 MG/DL — SIGNIFICANT CHANGE UP (ref 1.6–2.6)
POTASSIUM SERPL-MCNC: 4.2 MMOL/L — SIGNIFICANT CHANGE UP (ref 3.5–5.3)
POTASSIUM SERPL-SCNC: 4.2 MMOL/L — SIGNIFICANT CHANGE UP (ref 3.5–5.3)
SODIUM SERPL-SCNC: 139 MMOL/L — SIGNIFICANT CHANGE UP (ref 135–145)

## 2019-07-18 PROCEDURE — 93458 L HRT ARTERY/VENTRICLE ANGIO: CPT | Mod: 59

## 2019-07-18 PROCEDURE — 85730 THROMBOPLASTIN TIME PARTIAL: CPT

## 2019-07-18 PROCEDURE — 99153 MOD SED SAME PHYS/QHP EA: CPT

## 2019-07-18 PROCEDURE — 80053 COMPREHEN METABOLIC PANEL: CPT

## 2019-07-18 PROCEDURE — 86803 HEPATITIS C AB TEST: CPT

## 2019-07-18 PROCEDURE — 93005 ELECTROCARDIOGRAM TRACING: CPT

## 2019-07-18 PROCEDURE — 93306 TTE W/DOPPLER COMPLETE: CPT

## 2019-07-18 PROCEDURE — C1887: CPT

## 2019-07-18 PROCEDURE — 84443 ASSAY THYROID STIM HORMONE: CPT

## 2019-07-18 PROCEDURE — 93017 CV STRESS TEST TRACING ONLY: CPT

## 2019-07-18 PROCEDURE — C1874: CPT

## 2019-07-18 PROCEDURE — 83735 ASSAY OF MAGNESIUM: CPT

## 2019-07-18 PROCEDURE — C9600: CPT | Mod: RC

## 2019-07-18 PROCEDURE — 83605 ASSAY OF LACTIC ACID: CPT

## 2019-07-18 PROCEDURE — C1769: CPT

## 2019-07-18 PROCEDURE — C1725: CPT

## 2019-07-18 PROCEDURE — 71045 X-RAY EXAM CHEST 1 VIEW: CPT

## 2019-07-18 PROCEDURE — 84100 ASSAY OF PHOSPHORUS: CPT

## 2019-07-18 PROCEDURE — 96375 TX/PRO/DX INJ NEW DRUG ADDON: CPT

## 2019-07-18 PROCEDURE — 82962 GLUCOSE BLOOD TEST: CPT

## 2019-07-18 PROCEDURE — 99239 HOSP IP/OBS DSCHRG MGMT >30: CPT

## 2019-07-18 PROCEDURE — 80061 LIPID PANEL: CPT

## 2019-07-18 PROCEDURE — 85379 FIBRIN DEGRADATION QUANT: CPT

## 2019-07-18 PROCEDURE — 80048 BASIC METABOLIC PNL TOTAL CA: CPT

## 2019-07-18 PROCEDURE — 85014 HEMATOCRIT: CPT

## 2019-07-18 PROCEDURE — 85027 COMPLETE CBC AUTOMATED: CPT

## 2019-07-18 PROCEDURE — 82435 ASSAY OF BLOOD CHLORIDE: CPT

## 2019-07-18 PROCEDURE — 99232 SBSQ HOSP IP/OBS MODERATE 35: CPT

## 2019-07-18 PROCEDURE — 83036 HEMOGLOBIN GLYCOSYLATED A1C: CPT

## 2019-07-18 PROCEDURE — 84295 ASSAY OF SERUM SODIUM: CPT

## 2019-07-18 PROCEDURE — 96374 THER/PROPH/DIAG INJ IV PUSH: CPT

## 2019-07-18 PROCEDURE — 99238 HOSP IP/OBS DSCHRG MGMT 30/<: CPT

## 2019-07-18 PROCEDURE — 78452 HT MUSCLE IMAGE SPECT MULT: CPT

## 2019-07-18 PROCEDURE — 84484 ASSAY OF TROPONIN QUANT: CPT

## 2019-07-18 PROCEDURE — 82330 ASSAY OF CALCIUM: CPT

## 2019-07-18 PROCEDURE — 85610 PROTHROMBIN TIME: CPT

## 2019-07-18 PROCEDURE — C1894: CPT

## 2019-07-18 PROCEDURE — 82803 BLOOD GASES ANY COMBINATION: CPT

## 2019-07-18 PROCEDURE — 99285 EMERGENCY DEPT VISIT HI MDM: CPT

## 2019-07-18 PROCEDURE — 84132 ASSAY OF SERUM POTASSIUM: CPT

## 2019-07-18 PROCEDURE — 99152 MOD SED SAME PHYS/QHP 5/>YRS: CPT

## 2019-07-18 PROCEDURE — 82947 ASSAY GLUCOSE BLOOD QUANT: CPT

## 2019-07-18 RX ORDER — ROSUVASTATIN CALCIUM 5 MG/1
1 TABLET ORAL
Qty: 30 | Refills: 0
Start: 2019-07-18 | End: 2019-08-16

## 2019-07-18 RX ADMIN — Medication 81 MILLIGRAM(S): at 09:26

## 2019-07-18 RX ADMIN — ZINC SULFATE TAB 220 MG (50 MG ZINC EQUIVALENT) 220 MILLIGRAM(S): 220 (50 ZN) TAB at 09:25

## 2019-07-18 RX ADMIN — PANTOPRAZOLE SODIUM 40 MILLIGRAM(S): 20 TABLET, DELAYED RELEASE ORAL at 06:51

## 2019-07-18 RX ADMIN — Medication 325 MILLIGRAM(S): at 09:26

## 2019-07-18 RX ADMIN — Medication 1 TABLET(S): at 12:35

## 2019-07-18 RX ADMIN — Medication 1000 UNIT(S): at 09:26

## 2019-07-18 RX ADMIN — Medication 4 MILLIGRAM(S): at 06:51

## 2019-07-18 RX ADMIN — Medication 1 DROP(S): at 13:29

## 2019-07-18 RX ADMIN — Medication 2 TABLET(S): at 07:41

## 2019-07-18 RX ADMIN — Medication 1 DROP(S): at 06:52

## 2019-07-18 RX ADMIN — Medication 200 MILLIGRAM(S): at 09:25

## 2019-07-18 RX ADMIN — Medication 100 MICROGRAM(S): at 06:52

## 2019-07-18 RX ADMIN — Medication 4 MILLIGRAM(S): at 12:35

## 2019-07-18 RX ADMIN — MAGNESIUM OXIDE 400 MG ORAL TABLET 400 MILLIGRAM(S): 241.3 TABLET ORAL at 12:35

## 2019-07-18 RX ADMIN — MAGNESIUM OXIDE 400 MG ORAL TABLET 400 MILLIGRAM(S): 241.3 TABLET ORAL at 09:25

## 2019-07-18 RX ADMIN — CLOPIDOGREL BISULFATE 75 MILLIGRAM(S): 75 TABLET, FILM COATED ORAL at 09:26

## 2019-07-18 NOTE — PROGRESS NOTE ADULT - PROVIDER SPECIALTY LIST ADULT
Cardiology
Gastroenterology
Gastroenterology
Internal Medicine
Internal Medicine
Intervent Cardiology
Internal Medicine

## 2019-07-18 NOTE — CHART NOTE - NSCHARTNOTEFT_GEN_A_CORE
patient seen and examined. still having watery output from ostomy - may be slightly better.     PHYSICAL EXAM:   HEART: s1, s2 RRR  LUNGS: CTA b/l   Abd: NT/ND      68F h/o Sjogrens, hypothyroidism, SBO s/p ileostomy (2017), GERD, sciatica/back presents with 2 day h/o anterior L neck pain, back presents with 2 day h/o anterior L neck pain, admitted for ACS workup. EKG on arrival with TWI anteroseptally and inferiorly, negative troponins, TTE performed, and underwent a [facility] 7/17 oRCA 95% GAUDENCIO x 1, on ASA & Plavix, started on Crestor 5mg as per Cardiology. Now recommended by cardiology to increase crestor to 10mg qhs on discharge. Pt also found with EDP 9, provided with IV hydration post-cath, now deemed clinically stable for discharge home with outpatient follow-up with PCP in 1-2 weeks. follow up with Cardiology - Dr. Rosen - appointment made in 1 month.     discharge time - 38 minutes  Dr. M. Luke  Medicine Hospitalist  [phone]

## 2019-07-18 NOTE — DISCHARGE NOTE NURSING/CASE MANAGEMENT/SOCIAL WORK - NSDCDPATPORTLINK_GEN_ALL_CORE
You can access the [facility] Patient Portal, offered by [facility], by registering with the following website: [email or web address]

## 2019-07-18 NOTE — PROGRESS NOTE ADULT - REASON FOR ADMISSION
anterior neck pain

## 2019-07-18 NOTE — PROGRESS NOTE ADULT - SUBJECTIVE AND OBJECTIVE BOX
· Requested by Name:	Chiara Solis	  · Date/Time:	18-Jul-2019 	  · Reason for Referral/Consultation:	Chest Pain	  · Reason for Admission	anterior neck pain	      · Subjective and Objective: 	  **********              CARDIOLOGY CONSULT FOLLOW UP NOTE              ************  ============================================================  CHIEF COMPLAINT/REASON FOR CONSULT:  Patient is a 68y old  Female who presents with a chief complaint of anterior neck pain (15 Jul 2019 14:42)      HISTORY OF PRESENT ILLNESS:  68yFemale with a history of GERD, Sciatica,Ileostomy, Sjogren's Syndrome Treated With Steroids - presenting with Chest pressure.  Briefly, limited in mobility primarily due to sciatica.  Recently discharged.  Now developed exertional chest heaviness 4/10 radiating to L neck, improved with rest. +Fatigue.  No Assoc SOB/Palps/Diaphoresis.   EKG with anterior TWIs new from prior.     ==================  Interval Events  - s/p LHC with {CI to 95% pRCA  - TTE with mod MR; otherwise normal  - No reported worsening CP/Palps/SOB  ===================          aspirin enteric coated 81 milliGRAM(s) Oral daily  cholecalciferol 1000 Unit(s) Oral daily  diphenoxylate/atropine 1 Tablet(s) Oral four times a day  enoxaparin Injectable 40 milliGRAM(s) SubCutaneous daily  ferrous    sulfate 325 milliGRAM(s) Oral daily  hydroxychloroquine 200 milliGRAM(s) Oral daily  levothyroxine 100 MICROGram(s) Oral daily  loperamide 4 milliGRAM(s) Oral four times a day  magnesium oxide 400 milliGRAM(s) Oral three times a day with meals  nystatin Cream 1 Application(s) Topical two times a day  pantoprazole    Tablet 40 milliGRAM(s) Oral before breakfast  traMADol 50 milliGRAM(s) Oral three times a day PRN  zinc sulfate 220 milliGRAM(s) Oral daily        Allergies    latex (Rash)  No Known Drug Allergies    Intolerances    	    MEDICATIONS:  aspirin enteric coated 81 milliGRAM(s) Oral daily  enoxaparin Injectable 40 milliGRAM(s) SubCutaneous daily    hydroxychloroquine 200 milliGRAM(s) Oral daily      traMADol 50 milliGRAM(s) Oral three times a day PRN    diphenoxylate/atropine 1 Tablet(s) Oral four times a day  loperamide 4 milliGRAM(s) Oral four times a day  pantoprazole    Tablet 40 milliGRAM(s) Oral before breakfast    levothyroxine 100 MICROGram(s) Oral daily    cholecalciferol 1000 Unit(s) Oral daily  ferrous    sulfate 325 milliGRAM(s) Oral daily  magnesium oxide 400 milliGRAM(s) Oral three times a day with meals  nystatin Cream 1 Application(s) Topical two times a day  zinc sulfate 220 milliGRAM(s) Oral daily      PAST MEDICAL & SURGICAL HISTORY:  Sciatica  Anxiety  GERD (gastroesophageal reflux disease)  Colonic dysmotility  Ileostomy in place: 2/2 SBO 2017  Sjogren's syndrome  H/O ileostomy  History of appendectomy  History of hysterectomy      FAMILY HISTORY:  FH: CHF (congestive heart failure): Mother, passed age 82  FH: myocardial infarction: Father, age 70  Family history of uterine cancer    Mother - HTN      SOCIAL HISTORY:    [ x] Non-smoker  [x] No Illicit Drug Use  [ x] No Excess EtOH Use      REVIEW OF SYSTEMS: (Unless + Before Symptom, it is negative)  Constitutional: [] Fever, [x]Fatigue, []Weight Changes  Eyes:  []Recent Vision Changes, []Eye Pain  ENT: []Congestion, []Sore Throat  Endocrine: []Excess Sweating, []Temperature Intolerance  Cardiovascular:  []Chest Pain, []Palpitations, []Shortness of Breath, []Pre-syncope, []Syncope,[] LE Swelling  Respiratory:[] Cough, []Congestion,[] Wheezing  Gastrointestinal: [] Abdominal Pain,[] Nausea, []Vomiting  Genitourinary: []Dysuria,[] hematuria  Musculoskeletal: []Joint Pain, []Hip/Knee Injury  Neurologic: []Headaches,[] Imbalance, []Weakness  Skin: []Rashes, []hematoma, []purprura    ================================    PHYSICAL EXAM:  T(C): 36.4 (07-15-19 @ 12:20), Max: 36.9 (07-14-19 @ 19:30)  HR: 80 (07-15-19 @ 12:20) (72 - 85)  BP: 93/53 (07-15-19 @ 12:20) (89/52 - 104/62)  RR: 18 (07-15-19 @ 12:20) (16 - 18)  SpO2: 99% (07-15-19 @ 12:20) (97% - 99%)  Wt(kg): --  I&O's Summary    14 Jul 2019 07:01  -  15 Jul 2019 07:00  --------------------------------------------------------  IN: 0 mL / OUT: 450 mL / NET: -450 mL    15 Jul 2019 07:01  -  15 Jul 2019 19:03  --------------------------------------------------------  IN: 480 mL / OUT: 400 mL / NET: 80 mL      Appearance: Normal; NAD	  Psychiatry: AOx3; Normal Mood/Affect  HEENT:   Normal oral mucosa, EOMI	  Lymphatic: No lymphadenopathy  Cardiovascular: Normal S1 S2, No JVD, No murmurs, No edema  Respiratory: Lungs clear to auscultation, no use of accessory muscles	  Gastrointestinal:  Soft, Non-tender	  Skin: No rashes, No ecchymoses, No cyanosis	  Neurologic: Non-focal, No Focal Deficits  Extremities: Normal range of motion, No clubbing, cyanosis  Vascular: Peripheral pulses palpable 2+ bilaterally, no prominent varicosities    ============================    LABS:	   Labs [id number]    CBC Full  -  ( 15 Jul 2019 06:45 )  WBC Count : 7.2 K/uL  Hemoglobin : 10.4 g/dL  Hematocrit : 31.2 %  Platelet Count - Automated : 171 K/uL  Mean Cell Volume : 90.0 fl  Mean Cell Hemoglobin : 30.0 pg  Mean Cell Hemoglobin Concentration : 33.4 gm/dL  Auto Neutrophil # : 5.1 K/uL  Auto Lymphocyte # : 1.6 K/uL  Auto Monocyte # : 0.4 K/uL  Auto Eosinophil # : 0.2 K/uL  Auto Basophil # : 0.1 K/uL  Auto Neutrophil % : 70.3 %  Auto Lymphocyte % : 21.6 %  Auto Monocyte % : 5.0 %  Auto Eosinophil % : 2.3 %  Auto Basophil % : 0.8 %    07-15    139  |  104  |  12  ----------------------------<  77  4.4   |  24  |  0.88  07-14    134<L>  |  95<L>  |  18  ----------------------------<  102<H>  4.3   |  29  |  1.09    Ca    9.1      15 Jul 2019 06:46  Ca    8.8      14 Jul 2019 16:22  Phos  3.0     07-15  Mg     2.0     07-15  Mg     1.8     07-14    TPro  5.8<L>  /  Alb  3.3  /  TBili  0.4  /  DBili  x   /  AST  25  /  ALT  15  /  AlkPhos  73  07-15  TPro  6.1  /  Alb  3.8  /  TBili  0.4  /  DBili  x   /  AST  28  /  ALT  17  /  AlkPhos  81  07-14    EKG   NSR +Anterior TWIs    =================  < from: Nuclear Stress Test-Pharmacologic (07.16.19 @ 15:41) >  ECG ABNORMALITIES DURING/AFTER STRESS:   ST Changes:ST Depression: 0.5 mm horizontal in leads II,  III, aVF, and V2-V6 started at 2:00 min following  regadenoson infusion at 120 bpm and persisted at least  6:00 min in recovery.  ------------------------------------------------------------------------  NEW ARRHYTHMIAS DEVELOPED DURING/AFTER STRESS:  None  ------------------------------------------------------------------------  STRESS TEST IMPRESSIONS:  Chest Pain: No chest pain with administration of  Regadenoson.  Symptom: Fatigue.  HR Response: Appropriate.  BP Response: Appropriate.  Heart Rhythm: Sinus Rhythm - 74 BPM.  Baseline ECG: T wave abnormality in leads II, III, aVF,  and V3-V6, inverted at baseline.  ECG Changes: ST Depression: 0.5 mm horizontal in leads II,  III, aVF, and V2-V6 started at 2:00 min following  regadenoson infusion at 120 bpm and persisted at least  6:00 min in recovery.  Arrhythmia: None.  ------------------------------------------------------------------------  PROCEDURE:  Approximately 20 minutes later, tomographic images were  obtained in a 180 degree arc from right anterior oblique  to left anterior oblique with 64 stops. The tomographic  slices were reconstructed in 3 orthogonal planes (short  axis, horizontal long axis and vertical long axis).  Interpretation was performed both by visual and  quantitative analysis.  Rest and stress images were acquired using CZT-based  system with pinhole collimation (Liebo 530 c, LiveRSVP), and reconstructed using MLEM algorithm.  Images were re-acquired with the patient in a prone  position.  ------------------------------------------------------------------------  NUCLEAR FINDINGS:  The left ventricle was small in size. There are large,  moderate defects in the inferior, basal to mid  inferolateral, and basal to mid inferoseptal walls that  are mostly reversible suggestive of ischemia with partial  scarring.  There are mild defects in the distal anterior wall and  apex that are partially reversible suggestive of mild  ischemia with partial scarring.  ------------------------------------------------------------------------  GATED ANALYSIS:  Post-stress gated wall motion analysis was performed (LVEF  > 70%;LVEDV = 37 ml.) revealing reduced systolic  thickening of the inferior, basal to mid inferoseptal,  basal to mid inferolateral, and apical walls.  Overall  left ventricular ejection fraction is normal.  ------------------------------------------------------------------------  IMPRESSIONS:Abnormal Study  * Chest Pain: No chest pain with administration of  Regadenoson.  * Symptom: Fatigue.  * HR Response: Appropriate.  * BP Response: Appropriate.  * Heart Rhythm: Sinus Rhythm - 74 BPM.  * Baseline ECG: T wave abnormality in leads II, III, aVF,  and V3-V6, inverted at baseline.  * ECG Changes: ST Depression: 0.5 mm horizontal in leads  II, III, aVF, and V2-V6 started at 2:00 min following  regadenoson infusion at 120 bpm and persisted at least  6:00 min in recovery.  * Arrhythmia: None.  * The left ventricle was small in size. There are large,  moderate defects in the inferior, basal to mid  inferolateral, and basal to mid inferoseptal walls that  are mostly reversible suggestive of ischemia with partial  scarring.  * There are mild defects in the distal anterior wall and  apex that are partially reversible suggestive of mild  ischemia with partial scarring.  * Post-stress gated wall motion analysis was performed  (LVEF > 70%;LVEDV = 37 ml.) revealing reduced systolic  thickening of the inferior, basal to mid inferoseptal,  basal to mid inferolateral, and apical walls.  Overall  left ventricular ejection fraction is normal.  *** No previous Nuclear/Stress exam.  ------------------------------------------------------------------------  Confirmed on  7/16/2019 - 17:02:12 by Matteo Melendez M.D.  ------------------------------------------------------------------------    < end of copied text >      =========================================================================  ASSESSMENT:  68yFemale with a history of GERD, Sciatica,Ileostomy, Sjogren's Syndrome Treated With Steroids - presenting with Chest pressure      Recommendations  - Continue ASA/Plavix  - Start Crestor 5mg  - Discussed at length with patient and   - OK to D/C         Please call with questions.     Silvestre Rosen MD, [facility]  [phone]

## 2019-07-18 NOTE — DISCHARGE NOTE NURSING/CASE MANAGEMENT/SOCIAL WORK - NSDCPNDISPN_GEN_ALL_CORE
Education provided on the pain management plan of care/Safe use, storage and disposal of opioids when prescribed

## 2019-07-18 NOTE — PROGRESS NOTE ADULT - ATTENDING COMMENTS
S/p PCI and stable without complaints.  Right groin stable.  Ready for discharge today
Dr. JUANITA BhaktaUniversity [facility] Centerist  [phone]
[facility]. [facility]  [phone]
Dr. J[facility]taTr[facility] Hospitalist  [phone]

## 2019-07-18 NOTE — PROGRESS NOTE ADULT - SUBJECTIVE AND OBJECTIVE BOX
SUBJECTIVE:  events noted  s/p GAUDENCIO to RCA yesterday  ostomy output liquid but only having first solid meal this AM  _____________________________________________________  OBJECTIVE:    T(C): 36.6 (07-18-19 @ 04:25), Max: 36.9 (07-17-19 @ 21:38)  HR: 81 (07-18-19 @ 04:25)  BP: 105/65 (07-18-19 @ 04:25)  RR: 18 (07-18-19 @ 04:25)  SpO2: 96% (07-18-19 @ 04:25)  Wt(kg): --    07-17 @ 07:01  -  07-18 @ 07:00  --------------------------------------------------------  IN:    Oral Fluid: 420 mL    sodium chloride 0.9%.: 100 mL  Total IN: 520 mL    OUT:    Ileostomy: 1000 mL  Total OUT: 1000 mL    Total NET: -480 mL        PHYSICAL EXAM:      Constitutional: nad    Respiratory: cta    Cardiovascular: rr s1/s2 nl    Gastrointestinal: soft ntnd +bs rlq ileostomy with liquid yellow output      _____________________________________________________  LABS:    07-18    139  |  104  |  17  ----------------------------<  82  4.2   |  24  |  1.04    Ca    8.8      18 Jul 2019 07:01  Mg     1.6     07-18        _____________________________________________________  ACTIVE MEDS:  MEDICATIONS  (STANDING):  aluminum hydroxide/magnesium hydroxide/simethicone Suspension 30 milliLiter(s) Oral once  aspirin enteric coated 81 milliGRAM(s) Oral daily  atorvastatin 20 milliGRAM(s) Oral at bedtime  cholecalciferol 1000 Unit(s) Oral daily  clopidogrel Tablet 75 milliGRAM(s) Oral daily  diphenoxylate/atropine 2 Tablet(s) Oral two times a day  diphenoxylate/atropine 1 Tablet(s) Oral two times a day  enoxaparin Injectable 40 milliGRAM(s) SubCutaneous daily  ferrous    sulfate 325 milliGRAM(s) Oral daily  hydroxychloroquine 200 milliGRAM(s) Oral daily  levothyroxine 100 MICROGram(s) Oral daily  loperamide 4 milliGRAM(s) Oral four times a day  magnesium oxide 400 milliGRAM(s) Oral three times a day with meals  nystatin Cream 1 Application(s) Topical two times a day  ofloxacin 0.3% Solution 1 Drop(s) Both EYES four times a day  pantoprazole    Tablet 40 milliGRAM(s) Oral before breakfast  sodium chloride 0.9%. 1000 milliLiter(s) (50 mL/Hr) IV Continuous <Continuous>  zinc sulfate 220 milliGRAM(s) Oral daily    MEDICATIONS  (PRN):  traMADol 50 milliGRAM(s) Oral three times a day PRN Moderate Pain (4 - 6)    _____________________________________________________  ASSESSMENT:  68yFemale s/p GAUDENCIO to RCA, with ileostomy, at risk for dehydration    PLAN:  1.  Resume home regimen on Imodium 4 mg four times a day and Lomotil 4-6 pills/24 hrs  2.  Diet as tolerated  3.  See me in the office next week to assess for dehydration/repeat labs    Kirill Esparza M.D.  [facility]  (O) [phone]

## 2019-07-18 NOTE — PROGRESS NOTE ADULT - ASSESSMENT
69 yo F with PMH of Sjogren's, SBO s/p ileostomy (2017), GERD, and sciatica who presents for exertional chest pain with positive stress test, s/p GAUDENCIO to pRCA  - Continue aspirin 81mg daily and Plavix 75mg daily  - Increase atorvastatin to 40mg nightly  - Rest of management per general cardiology and primary team    Mallika Howard MD  Cardiology Fellow  [phone]  All Cardiology service information can be found 24/7 on [email or web address], password: [employer] 69 yo F with PMH of Sjogren's, SBO s/p ileostomy (2017), GERD, and sciatica who presents for exertional chest pain with positive stress test, s/p GAUDENCIO to pRCA  - Continue aspirin 81mg daily and Plavix 75mg daily  - Recommend increasing statin to high intensity dose  - No contraindication for discharge from interventional cardiology standpoint  - Rest of management per general cardiology and primary team    Mallika Howard MD  Cardiology Fellow  [phone]  All Cardiology service information can be found 24/7 on [email or web address], password: [email or web address] 67 yo F with PMH of Sjogren's, SBO s/p ileostomy (2017), GERD, and sciatica who presents for exertional chest pain with positive stress test, s/p GAUDENCIO x 1 to pRCA on 7/17/19. Right femoral access site without hematoma, vitals signs and labs stable.  - Continue aspirin 81mg daily and Plavix 75mg daily  - Recommend increasing statin to high intensity dose  - No contraindication for discharge from interventional cardiology standpoint  - Rest of management per general cardiology and primary team    Mallika Howard MD  Cardiology Fellow  [phone]  All Cardiology service information can be found 24/7 on [email or web address], password: [employer]

## 2019-07-18 NOTE — PROGRESS NOTE ADULT - SUBJECTIVE AND OBJECTIVE BOX
Patient seen and examined at bedside. No complaints.    Overnight Events:     Review Of Systems: No chest pain, shortness of breath, or palpitations            Current Meds:  aluminum hydroxide/magnesium hydroxide/simethicone Suspension 30 milliLiter(s) Oral once  aspirin enteric coated 81 milliGRAM(s) Oral daily  atorvastatin 20 milliGRAM(s) Oral at bedtime  cholecalciferol 1000 Unit(s) Oral daily  clopidogrel Tablet 75 milliGRAM(s) Oral daily  diphenoxylate/atropine 2 Tablet(s) Oral two times a day  diphenoxylate/atropine 1 Tablet(s) Oral two times a day  enoxaparin Injectable 40 milliGRAM(s) SubCutaneous daily  ferrous    sulfate 325 milliGRAM(s) Oral daily  hydroxychloroquine 200 milliGRAM(s) Oral daily  levothyroxine 100 MICROGram(s) Oral daily  loperamide 4 milliGRAM(s) Oral four times a day  magnesium oxide 400 milliGRAM(s) Oral three times a day with meals  nystatin Cream 1 Application(s) Topical two times a day  ofloxacin 0.3% Solution 1 Drop(s) Both EYES four times a day  pantoprazole    Tablet 40 milliGRAM(s) Oral before breakfast  sodium chloride 0.9%. 1000 milliLiter(s) IV Continuous <Continuous>  traMADol 50 milliGRAM(s) Oral three times a day PRN  zinc sulfate 220 milliGRAM(s) Oral daily      Vitals:  T(F): 97.8 (07-18), Max: 98.4 (07-17)  HR: 81 (07-18) (68 - 98)  BP: 105/65 (07-18) (91/55 - 105/65)  RR: 18 (07-18)  SpO2: 96% (07-18)  I&O's Summary    17 Jul 2019 07:01  -  18 Jul 2019 07:00  --------------------------------------------------------  IN: 520 mL / OUT: 1000 mL / NET: -480 mL        Physical Exam:  Appearance: No acute distress; well appearing  Eyes: EOMI, pink conjunctiva  HEENT: Normal oral mucosa  Cardiovascular: RRR, S1, S2, no murmurs, rubs, or gallops; no edema; R femoral access site without hematoma, intact distal pulses  Respiratory: Clear to auscultation bilaterally  Gastrointestinal: soft, non-tender  Musculoskeletal: No clubbing; no joint deformity   Neurologic: Non-focal  Psychiatry: AAOx3, mood & affect appropriate  Skin: No rashes      07-18    139  |  104  |  17  ----------------------------<  82  4.2   |  24  |  1.04    Ca    8.8      18 Jul 2019 07:01  Mg     1.6     07-18      CARDIAC MARKERS ( 14 Jul 2019 17:35 )  23 ng/L / x     / x     / x     / x     / x      CARDIAC MARKERS ( 14 Jul 2019 16:22 )  26 ng/L / x     / x     / x     / x     / x        Echo:  < from: Transthoracic Echocardiogram (07.16.19 @ 10:59) >  Conclusions:  1. Moderate mitral regurgitation.  2. Normal left ventricular internal dimensions and wall  thicknesses.  3. Normal left ventricular systolicfunction. No segmental  wall motion abnormalities.  4. Normal diastolic function.  5. Borderline right ventricular enlargement with normal  right ventricular function.    < end of copied text >    Cath:  < from: Cardiac Cath Lab - Adult (07.17.19 @ 09:26) >  CORONARY VESSELS: The coronary circulation is right dominant.  LM:   --  LM: Normal.  LAD:   -- Proximal LAD: Angiography showed minor luminal irregularities  with no flow limiting lesions.  --  Distal LAD: The vessel was very small sized.  CX:   --  OM1: There was a discrete 50 % stenosis at the ostium of the  vessel segment.  RCA:   --  Proximal RCA: There was a tubular 95 % stenosis.    PROCEDURE:  --  Left heart catheterization.  --  Left coronary angiography.  --  Right coronary angiography.  --  Sonosite - Diagnostic.  --  Sheath Exchange for Intervention.  --  Intervention on proximal RCA: balloon angioplasty, stent, balloon  angioplasty. Patient seen and examined at bedside. No complaints, no chest pain.    Overnight Events:     Review Of Systems: No chest pain, shortness of breath, or palpitations            Current Meds:  aluminum hydroxide/magnesium hydroxide/simethicone Suspension 30 milliLiter(s) Oral once  aspirin enteric coated 81 milliGRAM(s) Oral daily  atorvastatin 20 milliGRAM(s) Oral at bedtime  cholecalciferol 1000 Unit(s) Oral daily  clopidogrel Tablet 75 milliGRAM(s) Oral daily  diphenoxylate/atropine 2 Tablet(s) Oral two times a day  diphenoxylate/atropine 1 Tablet(s) Oral two times a day  enoxaparin Injectable 40 milliGRAM(s) SubCutaneous daily  ferrous    sulfate 325 milliGRAM(s) Oral daily  hydroxychloroquine 200 milliGRAM(s) Oral daily  levothyroxine 100 MICROGram(s) Oral daily  loperamide 4 milliGRAM(s) Oral four times a day  magnesium oxide 400 milliGRAM(s) Oral three times a day with meals  nystatin Cream 1 Application(s) Topical two times a day  ofloxacin 0.3% Solution 1 Drop(s) Both EYES four times a day  pantoprazole    Tablet 40 milliGRAM(s) Oral before breakfast  sodium chloride 0.9%. 1000 milliLiter(s) IV Continuous <Continuous>  traMADol 50 milliGRAM(s) Oral three times a day PRN  zinc sulfate 220 milliGRAM(s) Oral daily      Vitals:  T(F): 97.8 (07-18), Max: 98.4 (07-17)  HR: 81 (07-18) (68 - 98)  BP: 105/65 (07-18) (91/55 - 105/65)  RR: 18 (07-18)  SpO2: 96% (07-18)  I&O's Summary    17 Jul 2019 07:01  -  18 Jul 2019 07:00  --------------------------------------------------------  IN: 520 mL / OUT: 1000 mL / NET: -480 mL        Physical Exam:  Appearance: No acute distress; well appearing  Eyes: EOMI, pink conjunctiva  HEENT: Normal oral mucosa  Cardiovascular: RRR, S1, S2, no murmurs, rubs, or gallops; no edema; R femoral access site without hematoma, intact distal pulses  Respiratory: Clear to auscultation bilaterally  Gastrointestinal: soft, non-tender  Musculoskeletal: No clubbing; no joint deformity   Neurologic: Non-focal  Psychiatry: AAOx3, mood & affect appropriate  Skin: No rashes      07-18    139  |  104  |  17  ----------------------------<  82  4.2   |  24  |  1.04    Ca    8.8      18 Jul 2019 07:01  Mg     1.6     07-18      CARDIAC MARKERS ( 14 Jul 2019 17:35 )  23 ng/L / x     / x     / x     / x     / x      CARDIAC MARKERS ( 14 Jul 2019 16:22 )  26 ng/L / x     / x     / x     / x     / x        Echo:  < from: Transthoracic Echocardiogram (07.16.19 @ 10:59) >  Conclusions:  1. Moderate mitral regurgitation.  2. Normal left ventricular internal dimensions and wall  thicknesses.  3. Normal left ventricular systolicfunction. No segmental  wall motion abnormalities.  4. Normal diastolic function.  5. Borderline right ventricular enlargement with normal  right ventricular function.    < end of copied text >    Cath:  < from: Cardiac Cath Lab - Adult (07.17.19 @ 09:26) >  CORONARY VESSELS: The coronary circulation is right dominant.  LM:   --  LM: Normal.  LAD:   -- Proximal LAD: Angiography showed minor luminal irregularities  with no flow limiting lesions.  --  Distal LAD: The vessel was very small sized.  CX:   --  OM1: There was a discrete 50 % stenosis at the ostium of the  vessel segment.  RCA:   --  Proximal RCA: There was a tubular 95 % stenosis.    PROCEDURE:  --  Left heart catheterization.  --  Left coronary angiography.  --  Right coronary angiography.  --  Sonosite - Diagnostic.  --  Sheath Exchange for Intervention.  --  Intervention on proximal RCA: balloon angioplasty, stent, balloon  angioplasty.

## 2019-07-24 ENCOUNTER — APPOINTMENT (OUTPATIENT)
Dept: INTERNAL MEDICINE | Facility: CLINIC | Age: 69
End: 2019-07-24
Payer: MEDICARE

## 2019-07-24 VITALS
HEIGHT: 58 IN | HEART RATE: 72 BPM | DIASTOLIC BLOOD PRESSURE: 70 MMHG | SYSTOLIC BLOOD PRESSURE: 110 MMHG | BODY MASS INDEX: 20.15 KG/M2 | WEIGHT: 96 LBS | RESPIRATION RATE: 14 BRPM

## 2019-07-24 PROCEDURE — 99496 TRANSJ CARE MGMT HIGH F2F 7D: CPT

## 2019-07-25 NOTE — ASSESSMENT
[FreeTextEntry1] : Hospital records reviewed.\par Continue ASA/Plavix/Crestor\par \par Repeat BW 6 weeks\par \par F/U with Cardiology\par Consider Cardiac rehab\par \par May go to PT for chronic sciatica pain\par \par Continue all other meds\par \par

## 2019-07-25 NOTE — HISTORY OF PRESENT ILLNESS
[Post-hospitalization from ___ Hospital] : Post-hospitalization from [unfilled] Hospital [Admitted on: ___] : The patient was admitted on [unfilled] [Discharged on ___] : discharged on [unfilled] [Discharge Summary] : discharge summary [Pertinent Labs] : pertinent labs [Radiology Findings] : radiology findings [Discharge Med List] : discharge medication list [Patient Contacted By: ____] : and contacted by [unfilled] [FreeTextEntry2] : 68F with PMHx of Sjogrens, hypothyroidism, SBO s/p ileostomy (2017), GERD,\par sciatica/back presents with 2 day h/o anterior L neck pain, back presents with\par 2 day h/o anterior L neck pain, admitted for ACS workup. EKG on arrival with\par TWI anteroseptally and inferiorly, negative troponins, TTE performed, and\par underwent a C 7/17 oRCA 95% GAUDENCIO x 1, on ASA & Plavix, started on Crestor 5mg\par as per Cardiology. Now recommended by cardiology to increase crestor to 10mg\par qhs on discharge. Pt also with EDP 9, provided with IV hydration post-cath, now\par deemed clinically stable for discharge home with outpatient follow-up with PCP\par in 1-2 weeks. follow up with Cardiology - Dr. Rosen

## 2019-07-25 NOTE — PHYSICAL EXAM
[Normal] : soft, non-tender, non-distended, no masses palpated, no HSM and normal bowel sounds [de-identified] : ileostomy in place, no hematoma s/p cath [06324 - High Complexity requires an extensive number of possible diagnoses and/or the management options, extensive complexity of the medical data (tests, etc.) to be reviewed, and a high risk of significant complications, morbidity, and/or mortality as w] : High Complexity

## 2019-07-29 ENCOUNTER — NON-APPOINTMENT (OUTPATIENT)
Age: 69
End: 2019-07-29

## 2019-07-29 ENCOUNTER — APPOINTMENT (OUTPATIENT)
Dept: CARDIOLOGY | Facility: CLINIC | Age: 69
End: 2019-07-29
Payer: MEDICARE

## 2019-07-29 ENCOUNTER — MEDICATION RENEWAL (OUTPATIENT)
Age: 69
End: 2019-07-29

## 2019-07-29 VITALS
DIASTOLIC BLOOD PRESSURE: 58 MMHG | OXYGEN SATURATION: 95 % | HEART RATE: 80 BPM | WEIGHT: 96 LBS | SYSTOLIC BLOOD PRESSURE: 101 MMHG | BODY MASS INDEX: 20.06 KG/M2

## 2019-07-29 PROCEDURE — 93000 ELECTROCARDIOGRAM COMPLETE: CPT

## 2019-07-29 PROCEDURE — 99215 OFFICE O/P EST HI 40 MIN: CPT

## 2019-08-04 ENCOUNTER — EMERGENCY (EMERGENCY)
Facility: HOSPITAL | Age: 69
LOS: 1 days | Discharge: HOME | End: 2019-08-04
Attending: EMERGENCY MEDICINE
Payer: MEDICARE

## 2019-08-04 VITALS
WEIGHT: 91.93 LBS | DIASTOLIC BLOOD PRESSURE: 69 MMHG | RESPIRATION RATE: 18 BRPM | HEART RATE: 89 BPM | SYSTOLIC BLOOD PRESSURE: 115 MMHG | OXYGEN SATURATION: 96 % | TEMPERATURE: 98 F | HEIGHT: 58 IN

## 2019-08-04 VITALS
OXYGEN SATURATION: 97 % | SYSTOLIC BLOOD PRESSURE: 124 MMHG | HEART RATE: 94 BPM | RESPIRATION RATE: 18 BRPM | WEIGHT: 92.59 LBS | TEMPERATURE: 98 F | DIASTOLIC BLOOD PRESSURE: 67 MMHG | HEIGHT: 58.5 IN

## 2019-08-04 VITALS
OXYGEN SATURATION: 98 % | DIASTOLIC BLOOD PRESSURE: 68 MMHG | SYSTOLIC BLOOD PRESSURE: 119 MMHG | HEART RATE: 84 BPM | RESPIRATION RATE: 16 BRPM

## 2019-08-04 VITALS
RESPIRATION RATE: 18 BRPM | DIASTOLIC BLOOD PRESSURE: 71 MMHG | OXYGEN SATURATION: 97 % | HEART RATE: 84 BPM | SYSTOLIC BLOOD PRESSURE: 111 MMHG

## 2019-08-04 DIAGNOSIS — Z98.890 OTHER SPECIFIED POSTPROCEDURAL STATES: Chronic | ICD-10-CM

## 2019-08-04 DIAGNOSIS — Z90.710 ACQUIRED ABSENCE OF BOTH CERVIX AND UTERUS: Chronic | ICD-10-CM

## 2019-08-04 DIAGNOSIS — Z90.49 ACQUIRED ABSENCE OF OTHER SPECIFIED PARTS OF DIGESTIVE TRACT: Chronic | ICD-10-CM

## 2019-08-04 LAB
ALBUMIN SERPL ELPH-MCNC: 4 G/DL — SIGNIFICANT CHANGE UP (ref 3.3–5)
ALP SERPL-CCNC: 63 U/L — SIGNIFICANT CHANGE UP (ref 40–120)
ALT FLD-CCNC: 12 U/L — SIGNIFICANT CHANGE UP (ref 10–45)
ANION GAP SERPL CALC-SCNC: 17 MMOL/L — SIGNIFICANT CHANGE UP (ref 5–17)
AST SERPL-CCNC: 39 U/L — SIGNIFICANT CHANGE UP (ref 10–40)
BASOPHILS # BLD AUTO: 0 K/UL — SIGNIFICANT CHANGE UP (ref 0–0.2)
BASOPHILS NFR BLD AUTO: 0.4 % — SIGNIFICANT CHANGE UP (ref 0–2)
BILIRUB SERPL-MCNC: 0.6 MG/DL — SIGNIFICANT CHANGE UP (ref 0.2–1.2)
BUN SERPL-MCNC: 19 MG/DL — SIGNIFICANT CHANGE UP (ref 7–23)
CALCIUM SERPL-MCNC: 9.1 MG/DL — SIGNIFICANT CHANGE UP (ref 8.4–10.5)
CHLORIDE SERPL-SCNC: 89 MMOL/L — LOW (ref 96–108)
CO2 SERPL-SCNC: 28 MMOL/L — SIGNIFICANT CHANGE UP (ref 22–31)
CREAT SERPL-MCNC: 0.95 MG/DL — SIGNIFICANT CHANGE UP (ref 0.5–1.3)
EOSINOPHIL # BLD AUTO: 0.1 K/UL — SIGNIFICANT CHANGE UP (ref 0–0.5)
EOSINOPHIL NFR BLD AUTO: 0.6 % — SIGNIFICANT CHANGE UP (ref 0–6)
GLUCOSE SERPL-MCNC: 103 MG/DL — HIGH (ref 70–99)
HCT VFR BLD CALC: 32.6 % — LOW (ref 34.5–45)
HGB BLD-MCNC: 11.1 G/DL — LOW (ref 11.5–15.5)
LYMPHOCYTES # BLD AUTO: 1.4 K/UL — SIGNIFICANT CHANGE UP (ref 1–3.3)
LYMPHOCYTES # BLD AUTO: 11.6 % — LOW (ref 13–44)
MCHC RBC-ENTMCNC: 29.8 PG — SIGNIFICANT CHANGE UP (ref 27–34)
MCHC RBC-ENTMCNC: 34 GM/DL — SIGNIFICANT CHANGE UP (ref 32–36)
MCV RBC AUTO: 87.6 FL — SIGNIFICANT CHANGE UP (ref 80–100)
MONOCYTES # BLD AUTO: 0.8 K/UL — SIGNIFICANT CHANGE UP (ref 0–0.9)
MONOCYTES NFR BLD AUTO: 6.7 % — SIGNIFICANT CHANGE UP (ref 2–14)
NEUTROPHILS # BLD AUTO: 9.3 K/UL — HIGH (ref 1.8–7.4)
NEUTROPHILS NFR BLD AUTO: 80.7 % — HIGH (ref 43–77)
PLATELET # BLD AUTO: 221 K/UL — SIGNIFICANT CHANGE UP (ref 150–400)
POTASSIUM SERPL-MCNC: 3.8 MMOL/L — SIGNIFICANT CHANGE UP (ref 3.5–5.3)
POTASSIUM SERPL-SCNC: 3.8 MMOL/L — SIGNIFICANT CHANGE UP (ref 3.5–5.3)
PROT SERPL-MCNC: 6.8 G/DL — SIGNIFICANT CHANGE UP (ref 6–8.3)
RBC # BLD: 3.72 M/UL — LOW (ref 3.8–5.2)
RBC # FLD: 11.8 % — SIGNIFICANT CHANGE UP (ref 10.3–14.5)
SODIUM SERPL-SCNC: 134 MMOL/L — LOW (ref 135–145)
WBC # BLD: 11.6 K/UL — HIGH (ref 3.8–10.5)
WBC # FLD AUTO: 11.6 K/UL — HIGH (ref 3.8–10.5)

## 2019-08-04 PROCEDURE — 73562 X-RAY EXAM OF KNEE 3: CPT | Mod: 26,LT

## 2019-08-04 PROCEDURE — 70450 CT HEAD/BRAIN W/O DYE: CPT | Mod: 26

## 2019-08-04 PROCEDURE — 73562 X-RAY EXAM OF KNEE 3: CPT

## 2019-08-04 PROCEDURE — 90715 TDAP VACCINE 7 YRS/> IM: CPT

## 2019-08-04 PROCEDURE — 99284 EMERGENCY DEPT VISIT MOD MDM: CPT | Mod: 25

## 2019-08-04 PROCEDURE — 93005 ELECTROCARDIOGRAM TRACING: CPT | Mod: XU

## 2019-08-04 PROCEDURE — 12001 RPR S/N/AX/GEN/TRNK 2.5CM/<: CPT

## 2019-08-04 PROCEDURE — 93010 ELECTROCARDIOGRAM REPORT: CPT | Mod: 59

## 2019-08-04 PROCEDURE — 99283 EMERGENCY DEPT VISIT LOW MDM: CPT

## 2019-08-04 PROCEDURE — 85027 COMPLETE CBC AUTOMATED: CPT

## 2019-08-04 PROCEDURE — 72125 CT NECK SPINE W/O DYE: CPT

## 2019-08-04 PROCEDURE — 70450 CT HEAD/BRAIN W/O DYE: CPT

## 2019-08-04 PROCEDURE — 80053 COMPREHEN METABOLIC PANEL: CPT

## 2019-08-04 PROCEDURE — 72125 CT NECK SPINE W/O DYE: CPT | Mod: 26

## 2019-08-04 PROCEDURE — 90471 IMMUNIZATION ADMIN: CPT

## 2019-08-04 RX ORDER — TETANUS TOXOID, REDUCED DIPHTHERIA TOXOID AND ACELLULAR PERTUSSIS VACCINE, ADSORBED 5; 2.5; 8; 8; 2.5 [IU]/.5ML; [IU]/.5ML; UG/.5ML; UG/.5ML; UG/.5ML
0.5 SUSPENSION INTRAMUSCULAR ONCE
Refills: 0 | Status: COMPLETED | OUTPATIENT
Start: 2019-08-04 | End: 2019-08-04

## 2019-08-04 RX ADMIN — TETANUS TOXOID, REDUCED DIPHTHERIA TOXOID AND ACELLULAR PERTUSSIS VACCINE, ADSORBED 0.5 MILLILITER(S): 5; 2.5; 8; 8; 2.5 SUSPENSION INTRAMUSCULAR at 14:09

## 2019-08-04 NOTE — ED PROVIDER NOTE - PHYSICAL EXAMINATION
A&Ox3, NAD. NCAT. PERRL, EOMI. Neck supple, no LAD. Lungs CTAB. +S1S2, RRR, No m/r/g. Abd soft, NT/ND, +BS, no rebound or guarding. Extremities: cap refill <2, pulses in distal extremities 4+, no edema. Skin: Left frontal scalp with 1cm laceration closed with staple. left knee with skin tear. all wounds clean and dry, non bloody no discharge or FB, without rash. CN II-XII intact. Strength 5/5 UE/LE. Sensations intact throughout. Gait steady.

## 2019-08-04 NOTE — ED PROVIDER NOTE - CLINICAL SUMMARY MEDICAL DECISION MAKING FREE TEXT BOX
67 y/o female with mechanical trip and fall onto conrete without loc. patient with frontal laceration and left knee pain. neuro intaact. will obtain CTH and neck, xray of knee, tdap and staple wound and reevalute. ZR

## 2019-08-04 NOTE — ED PROVIDER NOTE - SKIN WOUND TYPE
laceration to the left frontal area 1 cm at hair line. laceration to the left frontal area 1 cm just behind hair line. also with skin tear to the left patella

## 2019-08-04 NOTE — ED PROVIDER NOTE - NS ED ROS FT
Constitutional: No fever or chills  Eyes: No visual changes, eye pain or redness  HEENT: see hpi  CV: No chest pain or lower extremity edema  Resp: No SOB no cough  GI: No abd pain. No nausea or vomiting. No diarrhea. No constipation.   : No dysuria, hematuria.   MSK: No musculoskeletal pain  Skin: see hpi  Neuro: No headache. No numbness or tingling. No weakness.

## 2019-08-04 NOTE — ED PROVIDER NOTE - NSFOLLOWUPINSTRUCTIONS_ED_ALL_ED_FT
- stay hydrated. take home medications as prescribed  - take tylenol 975mg every 6 hours as needed for pain.   - follow up with your pcp in 1-2 days.  -Keep knee wrapped with gauze, change daily. TAKE ACE WRAP OFF BEFORE GOING TO SLEEP. use ace wrap during the day while ambulating. Return or follow up with pmd for staple removal in 5-7 days.   - return if symptoms worsen, fever, redness/discharge/drainage from the wound sites, weakness, numbness/tingling, blurred vision, difficulty ambulating and all other concerns.

## 2019-08-04 NOTE — ED PROVIDER NOTE - ATTENDING CONTRIBUTION TO CARE
Attending MD Watson:   I personally have seen and examined this patient.  Physician assistant note reviewed and agree on plan of care and except where noted.  See below for details.     Seen in [facility]20[facility], accompanied by     68F with PMH/PSH including CAD s/p recent stent on Plavix, SBO s/p ileostomy, HLD, hypothyroidism presents to the ED for a recheck of L periorbital ecchymosis.  Patient was discharged from the ED at around the time of this MD's shift start.  Patient had fall earlier which required lac repair to the frontal scalp.  CT head and C spine without acute pathology, nonactionable.  L knee XR nonactionable.  Returns now because of increased L periorbital ecchymosis.  Denies change in vision, double vision, sudden loss of vision. Reports has mild headache, not significantly worsened.  Reports nausea, single emetic episode since fall.  Denies numbness, weakness or tingling in extremities. Reports has been able to ambulate.  Seen ambulating in ED.  Denies loss of urinary or bowel continence. Denies chest pain, shortness of breath, palpitations. Denies urinary complaints. Denies abdominal pain, blood in stools.  On exam, NAD, CN 2-12 grossly intact, head with L frontal scalp laceration repaired with staple, underlying hematoma, +ecchymosis extending in a dependent pattern from aforementioned hematoma, now with ecchymosis under the L eyebrow and involving the L upper lid, PERRL, no hyphema, EOMI, FROM at neck, no tenderness to midline palpation, no stepoffs along length of spine, lungs CTAB with good inspiratory effort, +S1S2, no m/r/g, abdomen soft with +BS, NT, ND, no CVAT, moving all extremities with 5/5 strength bilateral upper and lower extremities, good and equal  strength bilaterally, sensory grossly itnact, ambulating with steady gait, L knee skin tea with mild oozing; A/P: 68F with dependent ecchymosis at area of L eye, reassured, extensive discussion about concussion, pattern of dependent ecchymosis and return to ED precautions.  Patient and  verbalized understanding.  Explained will place ACE wrap on L knee now but MUST remove prior to sleeping.  Again, both patient and  verbalized understanding.  Wounds redressed.  Stable for discharge. Follow up instructions given, importance of follow up emphasized, return to ED parameters reviewed and patient verbalized understanding.  All questions answered, all concerns addressed.

## 2019-08-04 NOTE — ED ADULT NURSE NOTE - NSIMPLEMENTINTERV_GEN_ALL_ED
Implemented All Fall Risk Interventions:  [place] to call system. Call bell, personal items and telephone within reach. Instruct patient to call for assistance. Room bathroom lighting operational. Non-slip footwear when patient is off stretcher. Physically safe environment: no spills, clutter or unnecessary equipment. Stretcher in lowest position, wheels locked, appropriate side rails in place. Provide visual cue, wrist band, yellow gown, etc. Monitor gait and stability. Monitor for mental status changes and reorient to person, place, and time. Review medications for side effects contributing to fall risk. Reinforce activity limits and safety measures with patient and family.

## 2019-08-04 NOTE — ED PROVIDER NOTE - NSFOLLOWUPINSTRUCTIONS_ED_ALL_ED_FT
Follow up with your Primary Care Physician within the next 2-3 days  Bring a copy of your test results with you to your appointment  Continue your current medication regimen  Return to the Emergency Room if you experience new or worsening symptoms  keep wound clean and dry for 24 hours, after which you can shower and allow water to run over the scalp but you should not scrub the area  Return here or to your PMD for staple removal in 10 days.

## 2019-08-04 NOTE — ED ADULT NURSE NOTE - OBJECTIVE STATEMENT
67 y/o female came to ER for head injury s/p tripped and fell down 2 concrete stairs, with head laceration. Witnessed by  who is at pt's bedside.  Pt c/o left knee pain.  Pt with history of CAD with recent stent placement now on Plavix,  history of SBO s/p ileostomy.  Extremities mobile.  Left knee tender to touch, (+) ROM.  Left frontal laceration 1 cm at hair line.  No acute respiratory distress noted. 67 y/o female came to ER for head injury s/p tripped and fell down 2 concrete stairs, with head laceration. Witnessed by  who is at pt's bedside.  Pt c/o left knee pain.  Pt with history of CAD with recent stent placement now on Plavix,  history of SBO s/p ileostomy.  Extremities mobile.  Left knee skin tear,  tender to touch, (+) ROM.  Left frontal laceration 1 cm at hair line.  No acute respiratory distress noted. 69 y/o female came to ER for head injury s/p tripped and fell down 2 concrete stairs, with head laceration. Witnessed by  who is at pt's bedside.  Pt c/o left knee pain.  Pt with history of CAD with recent stent placement now on Plavix,  history of SBO s/p ileostomy.  Extremities mobile.  Left knee skin tear,  tender to touch, (+) ROM.  Left frontal area laceration 1 cm behind hair line.  No acute respiratory distress noted.

## 2019-08-04 NOTE — ED PROVIDER NOTE - MUSCULOSKELETAL, MLM
Spine appears normal, range of motion is not limited, left knee with  ttp along joint line but with full ROM and able to lift against resistance in full extension

## 2019-08-04 NOTE — ED ADULT NURSE NOTE - OBJECTIVE STATEMENT
67 y/o female a+ox3, pmhx SUZAN on plavix and ASA, SBO with ileostomy, coming from home for worsening ecchymosis s/p fall this morning. Pt seen at [facility] ED earlier today after mechanical fall this morning; lac repair to left frontal scalp with 1 stample placed, CT of head negative for pathology, left knee abrasion with no fracture or break on xray. Pt returned to ED concerned that the bruising around left eye was worsening; pt was nauseous with 1 episode of vomiting. Pt denies chest pain or discomfort, headache, lightheadedness, dizziness, difficulty breathing, abdominal pain, fever or chills. Pt seen by THUY Rodrigues who re-dressed the lac and wrapped the left knee. Pt left in position of comfort, will reassess

## 2019-08-04 NOTE — ED ADULT TRIAGE NOTE - CHIEF COMPLAINT QUOTE
fall 1pm, recent discharge from ED, worsening swelling and bruising to left eye, vomit x 1 today. +plavix/ASA

## 2019-08-04 NOTE — ED PROVIDER NOTE - OBJECTIVE STATEMENT
69 y/o female hx of CAD with recent stent placement now on plavix, SBO s/p ileostomy, HLD, hypothyroid who presents to the ED for mechanical trip and fall down two concrete stairs with associated headstrike and laceration. do not believe there was loc. complaining of left knee pain. has otherwise been in usual state of health with no recent illness. unsure of last tetanus shot.

## 2019-08-04 NOTE — ED PROVIDER NOTE - OBJECTIVE STATEMENT
67 y/o female hx of CAD with recent stent placement now on plavix, SBO s/p ileostomy, HLD, hypothyroid who presents to the ED for recheck of ecchymosis around left eye s/p fall this morning. Pt evaluated in Ed today s/p fall on concrete step, had lac repair to left frontal scalp with one staple, CT H/c-spine neg. for acute pathology and Left knee abrasion with normal Xray. Pt concerned that she has developed worsening ecchymosis and swelling to Left eye after the event. pt with on episode of vomiting at home, baseline nausea , feeling groggy and tired. Otherwise no changes in vision, no numbness, tingling, parasthesias, headaches, nose bleeds, difficulty ambulating, dizziness or lightheadedness.

## 2019-08-05 ENCOUNTER — APPOINTMENT (OUTPATIENT)
Dept: INTERNAL MEDICINE | Facility: CLINIC | Age: 69
End: 2019-08-05
Payer: MEDICARE

## 2019-08-05 VITALS — DIASTOLIC BLOOD PRESSURE: 60 MMHG | SYSTOLIC BLOOD PRESSURE: 106 MMHG | RESPIRATION RATE: 14 BRPM | HEART RATE: 78 BPM

## 2019-08-05 PROCEDURE — 99214 OFFICE O/P EST MOD 30 MIN: CPT

## 2019-08-05 NOTE — ASSESSMENT
[FreeTextEntry1] : Dressing on knee changed.\par TO see Derm tomorrow - will defer bandage change for one additional day, as pt still oozing at site.\par Will need staple removed in 7-10 days\par \par Likely with minor concussion.\par F/U if symptoms do not resolve, or if they should change/worsen.\par Return if HA/vomiting/lethargy/ etc occurs.

## 2019-08-05 NOTE — HISTORY OF PRESENT ILLNESS
[FreeTextEntry8] : Pt presents for evaluation s/p fall yesterday - fell on cement stairs - hit her head / shoulder/ knee on left.\par Went to [facility] ED -CT showed scalp hematoma.\par Had one episode of vomiting yesterday. None today.\par REceived one staple at ED

## 2019-08-05 NOTE — PHYSICAL EXAM
[Normal] : normal rate, regular rhythm, normal S1 and S2 and no murmur heard [de-identified] : ecchymosis below both eyes with swelling, bandage on scalp hematoma. [de-identified] : abrasion left knee / ecchymosis left shoulder

## 2019-08-09 ENCOUNTER — INPATIENT (INPATIENT)
Facility: HOSPITAL | Age: 69
LOS: 4 days | Discharge: HOME | DRG: 389 | End: 2019-08-14
Admitting: HOSPITALIST
Payer: MEDICARE

## 2019-08-09 VITALS
TEMPERATURE: 100 F | HEIGHT: 58 IN | DIASTOLIC BLOOD PRESSURE: 52 MMHG | OXYGEN SATURATION: 95 % | SYSTOLIC BLOOD PRESSURE: 89 MMHG | HEART RATE: 85 BPM | WEIGHT: 91.93 LBS | RESPIRATION RATE: 20 BRPM

## 2019-08-09 DIAGNOSIS — R10.9 UNSPECIFIED ABDOMINAL PAIN: ICD-10-CM

## 2019-08-09 DIAGNOSIS — E87.6 HYPOKALEMIA: ICD-10-CM

## 2019-08-09 DIAGNOSIS — K52.9 NONINFECTIVE GASTROENTERITIS AND COLITIS, UNSPECIFIED: ICD-10-CM

## 2019-08-09 DIAGNOSIS — M35.00 SJOGREN SYNDROME, UNSPECIFIED: ICD-10-CM

## 2019-08-09 DIAGNOSIS — Z91.81 HISTORY OF FALLING: ICD-10-CM

## 2019-08-09 DIAGNOSIS — K21.9 GASTRO-ESOPHAGEAL REFLUX DISEASE WITHOUT ESOPHAGITIS: ICD-10-CM

## 2019-08-09 DIAGNOSIS — Z90.710 ACQUIRED ABSENCE OF BOTH CERVIX AND UTERUS: Chronic | ICD-10-CM

## 2019-08-09 DIAGNOSIS — Z90.49 ACQUIRED ABSENCE OF OTHER SPECIFIED PARTS OF DIGESTIVE TRACT: Chronic | ICD-10-CM

## 2019-08-09 DIAGNOSIS — E87.1 HYPO-OSMOLALITY AND HYPONATREMIA: ICD-10-CM

## 2019-08-09 DIAGNOSIS — K56.609 UNSPECIFIED INTESTINAL OBSTRUCTION, UNSPECIFIED AS TO PARTIAL VERSUS COMPLETE OBSTRUCTION: ICD-10-CM

## 2019-08-09 DIAGNOSIS — Z29.9 ENCOUNTER FOR PROPHYLACTIC MEASURES, UNSPECIFIED: ICD-10-CM

## 2019-08-09 DIAGNOSIS — Z98.890 OTHER SPECIFIED POSTPROCEDURAL STATES: Chronic | ICD-10-CM

## 2019-08-09 DIAGNOSIS — I25.10 ATHEROSCLEROTIC HEART DISEASE OF NATIVE CORONARY ARTERY WITHOUT ANGINA PECTORIS: ICD-10-CM

## 2019-08-09 DIAGNOSIS — D64.9 ANEMIA, UNSPECIFIED: ICD-10-CM

## 2019-08-09 DIAGNOSIS — E03.9 HYPOTHYROIDISM, UNSPECIFIED: ICD-10-CM

## 2019-08-09 LAB
ALBUMIN SERPL ELPH-MCNC: 4.2 G/DL — SIGNIFICANT CHANGE UP (ref 3.3–5)
ALP SERPL-CCNC: 65 U/L — SIGNIFICANT CHANGE UP (ref 40–120)
ALT FLD-CCNC: 11 U/L — SIGNIFICANT CHANGE UP (ref 10–45)
ANION GAP SERPL CALC-SCNC: 16 MMOL/L — SIGNIFICANT CHANGE UP (ref 5–17)
APTT BLD: 34.6 SEC — SIGNIFICANT CHANGE UP (ref 27.5–36.3)
AST SERPL-CCNC: 32 U/L — SIGNIFICANT CHANGE UP (ref 10–40)
BASE EXCESS BLDV CALC-SCNC: 10.9 MMOL/L — HIGH (ref -2–2)
BASOPHILS # BLD AUTO: 0 K/UL — SIGNIFICANT CHANGE UP (ref 0–0.2)
BASOPHILS NFR BLD AUTO: 0.3 % — SIGNIFICANT CHANGE UP (ref 0–2)
BILIRUB SERPL-MCNC: 1.1 MG/DL — SIGNIFICANT CHANGE UP (ref 0.2–1.2)
BLD GP AB SCN SERPL QL: NEGATIVE — SIGNIFICANT CHANGE UP
BUN SERPL-MCNC: 26 MG/DL — HIGH (ref 7–23)
CA-I SERPL-SCNC: 1.1 MMOL/L — LOW (ref 1.12–1.3)
CALCIUM SERPL-MCNC: 9.7 MG/DL — SIGNIFICANT CHANGE UP (ref 8.4–10.5)
CHLORIDE BLDV-SCNC: 88 MMOL/L — LOW (ref 96–108)
CHLORIDE SERPL-SCNC: 83 MMOL/L — LOW (ref 96–108)
CO2 BLDV-SCNC: 39 MMOL/L — HIGH (ref 22–30)
CO2 SERPL-SCNC: 32 MMOL/L — HIGH (ref 22–31)
CREAT SERPL-MCNC: 1.04 MG/DL — SIGNIFICANT CHANGE UP (ref 0.5–1.3)
EOSINOPHIL # BLD AUTO: 0 K/UL — SIGNIFICANT CHANGE UP (ref 0–0.5)
EOSINOPHIL NFR BLD AUTO: 0.1 % — SIGNIFICANT CHANGE UP (ref 0–6)
GAS PNL BLDV: 127 MMOL/L — LOW (ref 135–145)
GAS PNL BLDV: SIGNIFICANT CHANGE UP
GLUCOSE BLDV-MCNC: 96 MG/DL — SIGNIFICANT CHANGE UP (ref 70–99)
GLUCOSE SERPL-MCNC: 96 MG/DL — SIGNIFICANT CHANGE UP (ref 70–99)
HCO3 BLDV-SCNC: 37 MMOL/L — HIGH (ref 21–29)
HCT VFR BLD CALC: 34.6 % — SIGNIFICANT CHANGE UP (ref 34.5–45)
HCT VFR BLDA CALC: 34 % — LOW (ref 39–50)
HGB BLD CALC-MCNC: 11.1 G/DL — LOW (ref 11.5–15.5)
HGB BLD-MCNC: 11.2 G/DL — LOW (ref 11.5–15.5)
INR BLD: 1.43 RATIO — HIGH (ref 0.88–1.16)
LACTATE BLDV-MCNC: 1.6 MMOL/L — SIGNIFICANT CHANGE UP (ref 0.7–2)
LYMPHOCYTES # BLD AUTO: 0.7 K/UL — LOW (ref 1–3.3)
LYMPHOCYTES # BLD AUTO: 5.1 % — LOW (ref 13–44)
MAGNESIUM SERPL-MCNC: 1.7 MG/DL — SIGNIFICANT CHANGE UP (ref 1.6–2.6)
MCHC RBC-ENTMCNC: 28.5 PG — SIGNIFICANT CHANGE UP (ref 27–34)
MCHC RBC-ENTMCNC: 32.3 GM/DL — SIGNIFICANT CHANGE UP (ref 32–36)
MCV RBC AUTO: 88.3 FL — SIGNIFICANT CHANGE UP (ref 80–100)
MONOCYTES # BLD AUTO: 0.8 K/UL — SIGNIFICANT CHANGE UP (ref 0–0.9)
MONOCYTES NFR BLD AUTO: 5.9 % — SIGNIFICANT CHANGE UP (ref 2–14)
NEUTROPHILS # BLD AUTO: 12.4 K/UL — HIGH (ref 1.8–7.4)
NEUTROPHILS NFR BLD AUTO: 88.6 % — HIGH (ref 43–77)
OTHER CELLS CSF MANUAL: 5 ML/DL — LOW (ref 18–22)
PCO2 BLDV: 62 MMHG — HIGH (ref 35–50)
PH BLDV: 7.4 — SIGNIFICANT CHANGE UP (ref 7.35–7.45)
PHOSPHATE SERPL-MCNC: 3.4 MG/DL — SIGNIFICANT CHANGE UP (ref 2.5–4.5)
PLATELET # BLD AUTO: 213 K/UL — SIGNIFICANT CHANGE UP (ref 150–400)
PO2 BLDV: 24 MMHG — LOW (ref 25–45)
POTASSIUM BLDV-SCNC: 3.2 MMOL/L — LOW (ref 3.5–5.3)
POTASSIUM SERPL-MCNC: 3.2 MMOL/L — LOW (ref 3.5–5.3)
POTASSIUM SERPL-SCNC: 3.2 MMOL/L — LOW (ref 3.5–5.3)
PROT SERPL-MCNC: 7.3 G/DL — SIGNIFICANT CHANGE UP (ref 6–8.3)
PROTHROM AB SERPL-ACNC: 16.6 SEC — HIGH (ref 10–12.9)
RBC # BLD: 3.92 M/UL — SIGNIFICANT CHANGE UP (ref 3.8–5.2)
RBC # FLD: 11.9 % — SIGNIFICANT CHANGE UP (ref 10.3–14.5)
RH IG SCN BLD-IMP: POSITIVE — SIGNIFICANT CHANGE UP
SAO2 % BLDV: 34 % — LOW (ref 67–88)
SODIUM SERPL-SCNC: 131 MMOL/L — LOW (ref 135–145)
WBC # BLD: 14 K/UL — HIGH (ref 3.8–10.5)
WBC # FLD AUTO: 14 K/UL — HIGH (ref 3.8–10.5)

## 2019-08-09 PROCEDURE — 74270 X-RAY XM COLON 1CNTRST STD: CPT | Mod: 26

## 2019-08-09 PROCEDURE — 74177 CT ABD & PELVIS W/CONTRAST: CPT | Mod: 26

## 2019-08-09 PROCEDURE — 99285 EMERGENCY DEPT VISIT HI MDM: CPT | Mod: GC

## 2019-08-09 PROCEDURE — 99233 SBSQ HOSP IP/OBS HIGH 50: CPT | Mod: GC

## 2019-08-09 PROCEDURE — 99223 1ST HOSP IP/OBS HIGH 75: CPT

## 2019-08-09 PROCEDURE — 71045 X-RAY EXAM CHEST 1 VIEW: CPT | Mod: 26

## 2019-08-09 RX ORDER — POLYETHYLENE GLYCOL 3350 17 G/17G
17 POWDER, FOR SOLUTION ORAL ONCE
Refills: 0 | Status: DISCONTINUED | OUTPATIENT
Start: 2019-08-09 | End: 2019-08-10

## 2019-08-09 RX ORDER — SODIUM CHLORIDE 9 MG/ML
2000 INJECTION, SOLUTION INTRAVENOUS ONCE
Refills: 0 | Status: COMPLETED | OUTPATIENT
Start: 2019-08-09 | End: 2019-08-09

## 2019-08-09 RX ORDER — ZINC SULFATE TAB 220 MG (50 MG ZINC EQUIVALENT) 220 (50 ZN) MG
220 TAB ORAL DAILY
Refills: 0 | Status: DISCONTINUED | OUTPATIENT
Start: 2019-08-09 | End: 2019-08-09

## 2019-08-09 RX ORDER — NYSTATIN CREAM 100000 [USP'U]/G
1 CREAM TOPICAL EVERY 12 HOURS
Refills: 0 | Status: DISCONTINUED | OUTPATIENT
Start: 2019-08-09 | End: 2019-08-14

## 2019-08-09 RX ORDER — POTASSIUM CHLORIDE 20 MEQ
40 PACKET (EA) ORAL ONCE
Refills: 0 | Status: COMPLETED | OUTPATIENT
Start: 2019-08-09 | End: 2019-08-09

## 2019-08-09 RX ORDER — PIPERACILLIN AND TAZOBACTAM 4; .5 G/20ML; G/20ML
3.38 INJECTION, POWDER, LYOPHILIZED, FOR SOLUTION INTRAVENOUS ONCE
Refills: 0 | Status: COMPLETED | OUTPATIENT
Start: 2019-08-09 | End: 2019-08-09

## 2019-08-09 RX ORDER — FERROUS SULFATE 325(65) MG
325 TABLET ORAL DAILY
Refills: 0 | Status: DISCONTINUED | OUTPATIENT
Start: 2019-08-09 | End: 2019-08-09

## 2019-08-09 RX ORDER — HYDROXYCHLOROQUINE SULFATE 200 MG
200 TABLET ORAL DAILY
Refills: 0 | Status: DISCONTINUED | OUTPATIENT
Start: 2019-08-09 | End: 2019-08-14

## 2019-08-09 RX ORDER — PIPERACILLIN AND TAZOBACTAM 4; .5 G/20ML; G/20ML
3.38 INJECTION, POWDER, LYOPHILIZED, FOR SOLUTION INTRAVENOUS EVERY 8 HOURS
Refills: 0 | Status: DISCONTINUED | OUTPATIENT
Start: 2019-08-09 | End: 2019-08-14

## 2019-08-09 RX ORDER — LEVOTHYROXINE SODIUM 125 MCG
100 TABLET ORAL DAILY
Refills: 0 | Status: DISCONTINUED | OUTPATIENT
Start: 2019-08-09 | End: 2019-08-09

## 2019-08-09 RX ORDER — SODIUM CHLORIDE 9 MG/ML
1000 INJECTION, SOLUTION INTRAVENOUS
Refills: 0 | Status: DISCONTINUED | OUTPATIENT
Start: 2019-08-09 | End: 2019-08-09

## 2019-08-09 RX ORDER — ASPIRIN/CALCIUM CARB/MAGNESIUM 324 MG
81 TABLET ORAL DAILY
Refills: 0 | Status: DISCONTINUED | OUTPATIENT
Start: 2019-08-09 | End: 2019-08-14

## 2019-08-09 RX ORDER — CHOLECALCIFEROL (VITAMIN D3) 125 MCG
1000 CAPSULE ORAL DAILY
Refills: 0 | Status: DISCONTINUED | OUTPATIENT
Start: 2019-08-09 | End: 2019-08-09

## 2019-08-09 RX ORDER — PANTOPRAZOLE SODIUM 20 MG/1
40 TABLET, DELAYED RELEASE ORAL
Refills: 0 | Status: DISCONTINUED | OUTPATIENT
Start: 2019-08-09 | End: 2019-08-14

## 2019-08-09 RX ORDER — CALCIUM CARBONATE 500(1250)
1 TABLET ORAL DAILY
Refills: 0 | Status: DISCONTINUED | OUTPATIENT
Start: 2019-08-09 | End: 2019-08-09

## 2019-08-09 RX ORDER — MORPHINE SULFATE 50 MG/1
1 CAPSULE, EXTENDED RELEASE ORAL ONCE
Refills: 0 | Status: DISCONTINUED | OUTPATIENT
Start: 2019-08-09 | End: 2019-08-09

## 2019-08-09 RX ORDER — PREGABALIN 225 MG/1
1000 CAPSULE ORAL DAILY
Refills: 0 | Status: DISCONTINUED | OUTPATIENT
Start: 2019-08-09 | End: 2019-08-09

## 2019-08-09 RX ORDER — ATORVASTATIN CALCIUM 80 MG/1
80 TABLET, FILM COATED ORAL AT BEDTIME
Refills: 0 | Status: DISCONTINUED | OUTPATIENT
Start: 2019-08-09 | End: 2019-08-10

## 2019-08-09 RX ORDER — LEVOTHYROXINE SODIUM 125 MCG
50 TABLET ORAL AT BEDTIME
Refills: 0 | Status: DISCONTINUED | OUTPATIENT
Start: 2019-08-09 | End: 2019-08-09

## 2019-08-09 RX ORDER — PIPERACILLIN AND TAZOBACTAM 4; .5 G/20ML; G/20ML
3.38 INJECTION, POWDER, LYOPHILIZED, FOR SOLUTION INTRAVENOUS EVERY 8 HOURS
Refills: 0 | Status: DISCONTINUED | OUTPATIENT
Start: 2019-08-09 | End: 2019-08-09

## 2019-08-09 RX ORDER — LOPERAMIDE HCL 2 MG
4 TABLET ORAL
Refills: 0 | Status: DISCONTINUED | OUTPATIENT
Start: 2019-08-09 | End: 2019-08-09

## 2019-08-09 RX ORDER — CLOPIDOGREL BISULFATE 75 MG/1
75 TABLET, FILM COATED ORAL DAILY
Refills: 0 | Status: DISCONTINUED | OUTPATIENT
Start: 2019-08-09 | End: 2019-08-14

## 2019-08-09 RX ORDER — DIPHENOXYLATE HCL/ATROPINE 2.5-.025MG
1 TABLET ORAL
Refills: 0 | Status: DISCONTINUED | OUTPATIENT
Start: 2019-08-09 | End: 2019-08-09

## 2019-08-09 RX ORDER — LEVOTHYROXINE SODIUM 125 MCG
100 TABLET ORAL DAILY
Refills: 0 | Status: DISCONTINUED | OUTPATIENT
Start: 2019-08-09 | End: 2019-08-14

## 2019-08-09 RX ORDER — PANTOPRAZOLE SODIUM 20 MG/1
40 TABLET, DELAYED RELEASE ORAL DAILY
Refills: 0 | Status: DISCONTINUED | OUTPATIENT
Start: 2019-08-09 | End: 2019-08-09

## 2019-08-09 RX ORDER — MAGNESIUM OXIDE 400 MG ORAL TABLET 241.3 MG
400 TABLET ORAL
Refills: 0 | Status: DISCONTINUED | OUTPATIENT
Start: 2019-08-09 | End: 2019-08-09

## 2019-08-09 RX ORDER — PANTOPRAZOLE SODIUM 20 MG/1
40 TABLET, DELAYED RELEASE ORAL
Refills: 0 | Status: DISCONTINUED | OUTPATIENT
Start: 2019-08-09 | End: 2019-08-09

## 2019-08-09 RX ORDER — FAMOTIDINE 10 MG/ML
20 INJECTION INTRAVENOUS DAILY
Refills: 0 | Status: DISCONTINUED | OUTPATIENT
Start: 2019-08-09 | End: 2019-08-09

## 2019-08-09 RX ORDER — TRAMADOL HYDROCHLORIDE 50 MG/1
1 TABLET ORAL
Qty: 0 | Refills: 0 | DISCHARGE

## 2019-08-09 RX ADMIN — CLOPIDOGREL BISULFATE 75 MILLIGRAM(S): 75 TABLET, FILM COATED ORAL at 18:59

## 2019-08-09 RX ADMIN — ATORVASTATIN CALCIUM 80 MILLIGRAM(S): 80 TABLET, FILM COATED ORAL at 22:15

## 2019-08-09 RX ADMIN — PIPERACILLIN AND TAZOBACTAM 25 GRAM(S): 4; .5 INJECTION, POWDER, LYOPHILIZED, FOR SOLUTION INTRAVENOUS at 19:16

## 2019-08-09 RX ADMIN — Medication 81 MILLIGRAM(S): at 18:59

## 2019-08-09 RX ADMIN — SODIUM CHLORIDE 2000 MILLILITER(S): 9 INJECTION, SOLUTION INTRAVENOUS at 07:04

## 2019-08-09 RX ADMIN — PIPERACILLIN AND TAZOBACTAM 200 GRAM(S): 4; .5 INJECTION, POWDER, LYOPHILIZED, FOR SOLUTION INTRAVENOUS at 08:47

## 2019-08-09 RX ADMIN — Medication 40 MILLIEQUIVALENT(S): at 08:23

## 2019-08-09 NOTE — ED PROVIDER NOTE - CLINICAL SUMMARY MEDICAL DECISION MAKING FREE TEXT BOX
Attending Leanne Almendarez: 69 y/o female presenting with abdominal pain. upon arrival pt ill appearing with diffusely tender abdomen. concerned for acute obstruction vs perforation. less likely traumatic sequelae as fall one week ago. will obtain labs, ct abd/pel, hydrate, culture and likely admit

## 2019-08-09 NOTE — H&P ADULT - NSHPLABSRESULTS_GEN_ALL_CORE
LABS:                        11.2   14.0  )-----------( 213      ( 09 Aug 2019 07:17 )             34.6     09 Aug 2019 07:17    131    |  83     |  26     ----------------------------<  96     3.2     |  32     |  1.04     Ca    9.7        09 Aug 2019 07:17  Mg     1.7       09 Aug 2019 07:17    TPro  7.3    /  Alb  4.2    /  TBili  1.1    /  DBili  x      /  AST  32     /  ALT  11     /  AlkPhos  65     09 Aug 2019 07:17    PT/INR - ( 09 Aug 2019 07:17 )   PT: 16.6 sec;   INR: 1.43 ratio         PTT - ( 09 Aug 2019 07:17 )  PTT:34.6 sec  CAPILLARY BLOOD GLUCOSE        BLOOD CULTURE    RADIOLOGY & ADDITIONAL TESTS:    EXAM:  XR CHEST AP OR PA 1V                            PROCEDURE DATE:  08/09/2019        FINDINGS:  The heart size is normal.    The lungs are clear. No pneumothorax or pleural effusions.    Osteopenia.    IMPRESSION: Clear lungs.      EXAM:  CT ABDOMEN AND PELVIS IC                            PROCEDURE DATE:  08/09/2019      IMPRESSION:     Findings suggestive of large bowel obstruction secondary to large   retained foci within the large bowel. These may represent bezoars.   Superimposed mild colitis.        Imaging Personally Reviewed:  [X] YES

## 2019-08-09 NOTE — H&P ADULT - NSHPOUTPATIENTPROVIDERS_GEN_ALL_CORE
Dr. Lee Coley (PMD)  Dr. Bryan (Colorectal)  Dr. Esparza (GI) GI Dr. Kirill Esparza ([facility])  Cards Dr. Silvestre Rosen  Colorectal sx Dr. Chauncey Bryan  PMD Dr. Lee Coley

## 2019-08-09 NOTE — H&P ADULT - PROBLEM SELECTOR PLAN 8
- P/w Na 131 in ED. Likely due to pain-related etiology.  - Repletion with IV hydration and pain control.  - Will continue to monitor.

## 2019-08-09 NOTE — H&P ADULT - NSHPREVIEWOFSYSTEMS_GEN_ALL_CORE
CONSTITUTIONAL: No fever, weight loss, or fatigue  EYES: No eye pain, visual disturbances, or discharge  ENMT: No difficulty hearing, tinnitus, vertigo; No sinus or throat pain  RESPIRATORY: No cough, wheezing, chills or hemoptysis; No shortness of breath  CARDIOVASCULAR: No chest pain, palpitations, dizziness, or leg swelling  GASTROINTESTINAL: Decreased ileostomy output; +rectal discharge; crampy abdominal pain worsens with movement  GENITOURINARY: No dysuria, frequency, hematuria, or incontinence  NEUROLOGICAL: No headaches, loss of strength, numbness, or tremors  SKIN: Taut finger skins from Raynaud's syndrome  LYMPH NODES: No enlarged glands  ENDOCRINE: No heat or cold intolerance; No polydipsia or polyuria  MUSCULOSKELETAL: No joint pain or swelling  PSYCHIATRIC: Denies depression, anxiety  HEME/LYMPH: Multiple ecchymoses from a recent fall  ALLERGY AND IMMUNOLOGIC: No hives or eczema

## 2019-08-09 NOTE — H&P ADULT - PROBLEM SELECTOR PLAN 3
- Recent history of CAD s/p GAUDENCIO, currently on DAPT (ASA and Plavix)  - Last ASA and Plavix taken yesterday. Will continue with both meds.  - Also takes Crestor 20 mg PO QHS. Will continue with atorvastatin 80 mg PO QHS (therapeutic exchange)  - Currently without chest pain and VS table. Will continue to monitor. - Recent history of CAD s/p GAUDENCIO, currently on DAPT (ASA and Plavix)  - would recommend to c/e DAPT, statin - Recent history of CAD s/p GAUDENCIO, currently on DAPT (ASA and Plavix)  - would recommend to c/w DAPT, statin

## 2019-08-09 NOTE — H&P ADULT - NSICDXPASTMEDICALHX_GEN_ALL_CORE_FT
PAST MEDICAL HISTORY:  Anxiety     CAD (coronary atherosclerotic disease)     Colonic dysmotility     GERD (gastroesophageal reflux disease)     Ileostomy in place 2/2 SBO 2017    Sciatica     Sjogren's syndrome

## 2019-08-09 NOTE — ADVANCED PRACTICE NURSE CONSULT - ASSESSMENT
Patient is awake & alert, in a stretcher bed w/ spouse @ bedside. We discussed present symptoms which brought pt to ED. Stoma is located rlq, pink, 1 1/8" sl oval, budded, peristomal skin & mucocutaneous junction are intact. Stoma is functioning for liquid beige stool + strong odor. Pouching system is intact- usual change is Tuesday-Friday & spouse performs change.

## 2019-08-09 NOTE — CONSULT NOTE ADULT - SUBJECTIVE AND OBJECTIVE BOX
GENERAL SURGERY CONSULT NOTE  --------------------------------------------------------------------------------------------    HPI:   Patient is a 68y old  Female who presents with a chief complaint of abdominal pain. She is a 69 yo female s/p loop ileostomy due to SBO in 2017 in setting of colonic inertia and sjogrens syndrome. She presents with abdominal pain since last night, worsening, constant, cramping sensation, located in the lower quadrants and not associated with nausea or vomiting. She states she last ate dinner and was unable to tolerate PO this AM due to pain, due to concern for something worse she came to the ER. She states she follows with colorectal Dr. Bryan and GI at [facility]. Also admits to normal ileostomy output 1000-1200cc per day, currently noticed decreased output over the last 24 hours. Admits to mucus from rectal vault. Denies fever, chills, CP, SOB, nausea, vomiting, diarrhea, urinary symptoms    ROS: 10-system review is otherwise negative except HPI above.      PAST MEDICAL & SURGICAL HISTORY:  Sciatica  Anxiety  GERD (gastroesophageal reflux disease)  Colonic dysmotility  Ileostomy in place: 2/2 SBO 2017  Sjogren's syndrome  H/O ileostomy  History of appendectomy  History of hysterectomy    FAMILY HISTORY:  FH: CHF (congestive heart failure): Mother, passed age 82  FH: myocardial infarction: Father, age 70  Family history of uterine cancer  [] Family history not pertinent as reviewed with the patient and family    ALLERGIES: latex (Rash)  No Known Drug Allergies    CURRENT MEDICATIONS  MEDICATIONS (STANDING):   MEDICATIONS (PRN):  --------------------------------------------------------------------------------------------    Vitals:   T(C): 37.4 (08-09-19 @ 07:25), Max: 37.8 (08-09-19 @ 06:11)  HR: 90 (08-09-19 @ 07:25) (85 - 91)  BP: 112/70 (08-09-19 @ 07:25) (89/52 - 112/70)  RR: 20 (08-09-19 @ 07:25) (16 - 20)  SpO2: 97% (08-09-19 @ 07:25) (95% - 99%)  CAPILLARY BLOOD GLUCOSE          Height (cm): 147.32 (08-09 @ 06:11)  Weight (kg): 41.7 (08-09 @ 06:11)  BMI (kg/m2): 19.2 (08-09 @ 06:11)  BSA (m2): 1.31 (08-09 @ 06:11)    PHYSICAL EXAM: ***  General: NAD, Lying in bed comfortably  Neuro: A+Ox3  HEENT: NC/AT, EOMI  Neck: Soft, supple  Cardio: RRR, nml S1/S2  Resp: Good effort, CTA b/l  Thorax: No chest wall tenderness  Breast: No lesions/masses, no drainage  GI/Abd: Soft, nondistended, tender lower quadrants and medial to stoma without palpation of parastomal hernia, no rebound/guarding, no masses palpated  Vascular: All 4 extremities warm.  Skin: Intact, no breakdown  Lymphatic/Nodes: No palpable lymphadenopathy  Musculoskeletal: All 4 extremities moving spontaneously, no limitations  --------------------------------------------------------------------------------------------    LABS  CBC (08-09 @ 07:17)                              11.2<L>                         14.0<H>  )----------------(  213        88.6<H>% Neutrophils, 5.1<L>% Lymphocytes, ANC: 12.4<H>                              34.6      BMP (08-09 @ 07:17)             131<L>  |  83<L>   |  26<H> 		Ca++ --      Ca 9.7                ---------------------------------( 96    		Mg 1.7                3.2<L>  |  32<H>   |  1.04  			Ph --        LFTs (08-09 @ 07:17)      TPro 7.3 / Alb 4.2 / TBili 1.1 / DBili -- / AST 32 / ALT 11 / AlkPhos 65    Coags (08-09 @ 07:17)  aPTT 34.6 / INR 1.43<H> / PT 16.6<H>        VBG (08-09 @ 07:04)     7.40 / 62<H> / 24<L> / 37<H> / 10.9<H> / 34<L>%     Lactate: 1.6    --------------------------------------------------------------------------------------------    MICROBIOLOGY      --------------------------------------------------------------------------------------------  --------------------------------------------------------------------------------------------    ASSESSMENT: Patient is a 68y old f with lower abdominal tenderness presenting with leukocytosis to 14 and CT revealing LBO due to possible bezoar.    PLAN:  ***  - Recommend NPO and IVF  - Recommend IVF resuscitation  -   -   - Patient seen/examined with attending.  - Plan to be discussed with Attending,  GENERAL SURGERY CONSULT NOTE  --------------------------------------------------------------------------------------------    HPI:   Patient is a 68y old  Female who presents with a chief complaint of abdominal pain. She is a 67 yo female s/p loop ileostomy due to SBO in 2017 in setting of colonic inertia and sjogrens syndrome. She presents with abdominal pain since last night, worsening, constant, cramping sensation, located in the lower quadrants and not associated with nausea or vomiting. She states she last ate dinner and was unable to tolerate PO this AM due to pain, due to concern for something worse she came to the ER. She states she follows with colorectal Dr. Bryan and GI at [facility]. Also admits to normal ileostomy output 1000-1200cc per day, currently noticed decreased output over the last 24 hours. Admits to mucus from rectal vault. Denies fever, chills, CP, SOB, nausea, vomiting, diarrhea, urinary symptoms    ROS: 10-system review is otherwise negative except HPI above.      PAST MEDICAL & SURGICAL HISTORY:  Sciatica  Anxiety  GERD (gastroesophageal reflux disease)  Colonic dysmotility  Ileostomy in place: 2/2 SBO 2017  Sjogren's syndrome  H/O ileostomy  History of appendectomy  History of hysterectomy    FAMILY HISTORY:  FH: CHF (congestive heart failure): Mother, passed age 82  FH: myocardial infarction: Father, age 70  Family history of uterine cancer  [] Family history not pertinent as reviewed with the patient and family    ALLERGIES: latex (Rash)  No Known Drug Allergies    CURRENT MEDICATIONS  MEDICATIONS (STANDING):   MEDICATIONS (PRN):  --------------------------------------------------------------------------------------------    Vitals:   T(C): 37.4 (08-09-19 @ 07:25), Max: 37.8 (08-09-19 @ 06:11)  HR: 90 (08-09-19 @ 07:25) (85 - 91)  BP: 112/70 (08-09-19 @ 07:25) (89/52 - 112/70)  RR: 20 (08-09-19 @ 07:25) (16 - 20)  SpO2: 97% (08-09-19 @ 07:25) (95% - 99%)  CAPILLARY BLOOD GLUCOSE          Height (cm): 147.32 (08-09 @ 06:11)  Weight (kg): 41.7 (08-09 @ 06:11)  BMI (kg/m2): 19.2 (08-09 @ 06:11)  BSA (m2): 1.31 (08-09 @ 06:11)    PHYSICAL EXAM: ***  General: NAD, Lying in bed comfortably  Neuro: A+Ox3  HEENT: NC/AT, EOMI  Neck: Soft, supple  Cardio: RRR, nml S1/S2  Resp: Good effort, CTA b/l  Thorax: No chest wall tenderness  Breast: No lesions/masses, no drainage  GI/Abd: Soft, nondistended, tender lower quadrants and medial to stoma without palpation of parastomal hernia, no rebound/guarding, no masses palpated  Vascular: All 4 extremities warm.  Skin: Intact, no breakdown  Lymphatic/Nodes: No palpable lymphadenopathy  Musculoskeletal: All 4 extremities moving spontaneously, no limitations  --------------------------------------------------------------------------------------------    LABS  CBC (08-09 @ 07:17)                              11.2<L>                         14.0<H>  )----------------(  213        88.6<H>% Neutrophils, 5.1<L>% Lymphocytes, ANC: 12.4<H>                              34.6      BMP (08-09 @ 07:17)             131<L>  |  83<L>   |  26<H> 		Ca++ --      Ca 9.7                ---------------------------------( 96    		Mg 1.7                3.2<L>  |  32<H>   |  1.04  			Ph --        LFTs (08-09 @ 07:17)      TPro 7.3 / Alb 4.2 / TBili 1.1 / DBili -- / AST 32 / ALT 11 / AlkPhos 65    Coags (08-09 @ 07:17)  aPTT 34.6 / INR 1.43<H> / PT 16.6<H>        VBG (08-09 @ 07:04)     7.40 / 62<H> / 24<L> / 37<H> / 10.9<H> / 34<L>%     Lactate: 1.6    --------------------------------------------------------------------------------------------    MICROBIOLOGY      --------------------------------------------------------------------------------------------  --------------------------------------------------------------------------------------------    ASSESSMENT: Patient is a 68y old f with lower abdominal tenderness presenting with leukocytosis to 14 and CT revealing LBO due to possible bezoar.    PLAN:  ***  Surgery will admit patient due to concern for obstruction  Will administer Miralax into ostomy from above and water soluble enemas from below  NPO for now  Will continue ASA/Plavix for recent stent  Will hold off on colonoscopy if possible  D/W Dr. Randi Gurrola [phone]

## 2019-08-09 NOTE — H&P ADULT - HISTORY OF PRESENT ILLNESS
Jose Alberto Frey MD, PhD | PGY-2, Day Admit  Department of Internal Medicine  Pager [phone] ([facility]) / [phone] ([facility])  Spectra [phone]    68-yo F w/ PMH of SBO (2017) s/p ileostomy, CAD s/p GAUDENCIO x1 (July 2019), Sjogren's syndrome, hypothyroidism, colonic dysmotility, and GERD, presenting with abdominal pain. Onset was last night. Severe lower abdominal crampy pain that worsens with movement. Unsure of prandial association as patient has not been eating. Last meal 5PM yesterday. Patient notices decreased ileostomy output since Wednesday. Also notices rectal discharge, which she gets only once in a while. Endorses gassy sensation in the abdomen and heartburn from GERD. Denies fever, chills, nausea, vomiting chest pain, SOB, or headache. Of note, patient presented last week s/p mechanical fall (8/4) where no fracture or intracranial bleeding were visualized. Another noted for a recent admission for CAD during 7/14 - 7/18, and she received GAUDENCIO to RCA.    In ED, T 100, HR 85, BP 89/52, RR 20, Sat 95% RA. WBC 14 (88% neutrophil), hgb 11.2, Na 131, and K 3.2. Zosyn, 2L LR, and K tab were given. CTAP was performed which revealed LBO 2/2 large retained foci, likely bezoars. Superimposed mild colitis was also visualized. GI and Surgery were consulted.

## 2019-08-09 NOTE — H&P ADULT - PROBLEM SELECTOR PLAN 10
- DVT PPx: SCDs in the setting of hematoma and possible surgical intervention  - Full Code  - Diet: NPO in anticipation of surgical intervention - P/w hgb 11.2  - Per chart review, hgb ~ 11s at baseline. No signs of acute blood loss.  - Will continue to monitor.    #Problem 11: Need for prophylactic measure  - DVT PPx: SCDs in the setting of hematoma and possible surgical intervention  - Full Code  - Diet: NPO in anticipation of surgical intervention

## 2019-08-09 NOTE — ED PROVIDER NOTE - PROGRESS NOTE DETAILS
Attending Leanne Almendarez: pt at CT scan, reportedly has baseline systolic of 90. received ivf Irizarry: surgery evaluated patient and states patient should get colonoscopy for removal of bezoars

## 2019-08-09 NOTE — H&P ADULT - NSICDXFAMILYHX_GEN_ALL_CORE_FT
FAMILY HISTORY:  Family history of uterine cancer  FH: CHF (congestive heart failure), Mother, passed age 82  FH: myocardial infarction, Father, age 70

## 2019-08-09 NOTE — ADVANCED PRACTICE NURSE CONSULT - REASON FOR CONSULT
Requested by Staff NATALYA Ramos & pt spouse  to evaluate Loop Ileostomy stoma & perform routine ostomy care. Pt is well know to [facility].

## 2019-08-09 NOTE — H&P ADULT - ASSESSMENT
68-yo F w/ PMH of SBO (2017) s/p ileostomy, CAD s/p GAUDENCIO x1 (July 2019), Sjogren's syndrome, hypothyroidism, and GERD, presenting with 1-day history of abdominal pain, found to have LBO likely 2/2 bezoars and mild colitis, admitted for surgical vs. GI intervention.

## 2019-08-09 NOTE — ED PROVIDER NOTE - PHYSICAL EXAMINATION
Attending Leanne Almendarez: Gen: ill appearing, heent: diffuse ecchymoses to face eomi, perrla, mmm, op pink, uvula midline, neck; nttp, no nuchal rigidity, chest: nttp, no crepitus, cv: rrr, no murmurs, lungs: ctab, abd: sdiffusely tender to palpation, decreased output in ostomy, no peritoneal signs, +BS, no guarding, ext: wwp, neg homans, skin: no rash, neuro: awake and alert, following commands, speech clear, sensation and strength intact, no focal deficits

## 2019-08-09 NOTE — H&P ADULT - PROBLEM SELECTOR PLAN 1
- Patient presents with 1-day history of large bowel obstruction evidenced by CTAP, 2/2 large retained foci w/in the large bowel, which may represent bezoars.  - GI and Surgery consulted.  - Per surgery, no surgical intervention.  - GI to assess the patient. Tentatively deciding between enema vs. colonoscopy. - Patient presents with 1-day history of large bowel obstruction evidenced by CTAP, 2/2 large retained foci w/in the large bowel, which may represent bezoars.  - GI and Surgery consulted.  - Per surgery, no surgical intervention.  - Spoke with Dr. Esparza. To assess the patient. Will order tap enema at this time per verbal recs. - Patient presents with 1-day history of large bowel obstruction evidenced by CTAP, 2/2 large retained foci w/in the large bowel, which may represent bezoars.  - Spoke to surgery team ([phone]), they will take over the case from this point. Note per surgery team appreciated.

## 2019-08-09 NOTE — H&P ADULT - PROBLEM SELECTOR PLAN 5
- Patient with diffuse hematoma in the face and bilateral knees  - No knee or cranial fractures. No intracranial hemorrhage noted.  - Symptomatic treatment. Will refrain from NSAIDs in the setting of bleeding  - Continue to monitor

## 2019-08-09 NOTE — H&P ADULT - PROBLEM SELECTOR PLAN 2
- Patient with abdominal pain. CTAP visualizing foci in large bowel that may represent bezoars with superimposed mild colitis.  - S/p Zosyn x1 in ED. Will continue with Zosyn at this time. - Patient with abdominal pain. CTAP visualizing foci in large bowel that may represent bezoars with superimposed mild colitis.  - S/p Zosyn x1 in ED. Would not recommend anbx at this point, Spoke to GI- recommend tap water enema

## 2019-08-09 NOTE — H&P ADULT - PROBLEM SELECTOR PLAN 4
- Presents with history of GERD. Complains of heartburn on presentation.  - Takes famotidine and omeprazole at home. Will continue with famotidine 20 mg PO QD and pantoprazole 40 mg PO QD (therapeutic exchange). - Presents with history of GERD. Complains of heartburn on presentation.  - Takes famotidine and omeprazole at home.  - Will continue with pantoprazole 40 mg IV. Holding famotidine at this time.

## 2019-08-09 NOTE — H&P ADULT - PROBLEM SELECTOR PLAN 7
- Presents with history of hypothyroidism, currently taking Synthroid 100 mcg PO QD  - Also with low BM output in ileostomy.  - Low suspicion, however will check TSH to rule out thyroid-related etiology. - Presents with history of hypothyroidism, currently taking Synthroid 100 mcg PO QD. Will c/w Synthroid 50 mcg IV.  - Also with low BM output in ileostomy.  - Low suspicion, however will check TSH to rule out thyroid-related etiology.

## 2019-08-09 NOTE — ED ADULT NURSE NOTE - ED STAT RN HANDOFF DETAILS
Bedside report given to on coming nurse Rachel RN. Understands pmh, medications given and plan of care for patient. Patient in stable condition, vital signs updated, has no complaints at this time and has been updated on care plan. Explained to patient that it is change of shift and new nurse is taking over, pt verbalized understanding.

## 2019-08-09 NOTE — H&P ADULT - NSHPPHYSICALEXAM_GEN_ALL_CORE
Vital Signs Last 24 Hrs  T(C): 37.8 (09 Aug 2019 11:23), Max: 37.8 (09 Aug 2019 06:11)  T(F): 100.1 (09 Aug 2019 11:23), Max: 100.1 (09 Aug 2019 11:23)  HR: 95 (09 Aug 2019 11:23) (85 - 95)  BP: 96/60 (09 Aug 2019 11:23) (89/52 - 112/70)  BP(mean): 83 (09 Aug 2019 06:30) (83 - 83)  RR: 18 (09 Aug 2019 11:23) (16 - 20)  SpO2: 96% (09 Aug 2019 11:23) (95% - 99%)    PHYSICAL EXAM:  GENERAL: NAD, well-groomed, well-developed  HEAD: Diffuse facial ecchymoses  EYES: EOMI, PERRLA, conjunctiva and sclera clear  ENMT: No tonsillar erythema, exudates, or enlargement  NECK: Supple, No JVD  NERVOUS SYSTEM: AOX3, motor and sensation grossly intact in b/l UE and b/l LE  PSYCHIATRIC: Appropriate affect and mood  CHEST/LUNG: Clear to auscultation bilaterally; No rales, rhonchi, wheezing, or rubs  HEART: Regular rate and rhythm; No murmurs, rubs, or gallops. No LE edema  ABDOMEN: Ileostomy pouch collecting fecal material without ecchymoses or tenderness; lower abdomen tenderness under the umbilicus; +BS  EXTREMITIES:  2+ Peripheral Pulses, No clubbing, cyanosis; tender ecchymoses over bilateral knees  SKIN: No rashes or lesions

## 2019-08-09 NOTE — CONSULT NOTE ADULT - ASSESSMENT
**********              CARDIOLOGY CONSULT NOTE              ************  ============================================================  CHIEF COMPLAINT/REASON FOR CONSULT:  Patient is a 68y old  Female who presents with a chief complaint of Abdominal pain (09 Aug 2019 12:27)      HISTORY OF PRESENT ILLNESS:  68yFemale with a history of GERD, Sjogren's Treated With Prednisone, Ileostomy, Normal LVEF, Moderate MR, Borderline RVE, CAD S/p pRCA S/p RCA  (OM1 was 50%), - presenting with  large bowel obstruction secondary to large retained foci within the large bowel.  Briefly, patient had a stent placed a few weeks ago in the proximal RCA.   Was doing well until last week fell - never lost consciousness (mechanical fall) - had extensive ecchymoses on face and trunk and scalp laceration requiring staples at local er  No active CP/Palps/SOB.  Has remained on ASA & Plavix.                MEDS  aspirin enteric coated 81 milliGRAM(s) Oral daily  atorvastatin 80 milliGRAM(s) Oral at bedtime  clopidogrel Tablet 75 milliGRAM(s) Oral daily  hydroxychloroquine 200 milliGRAM(s) Oral daily  levothyroxine 100 MICROGram(s) Oral daily  nystatin Cream 1 Application(s) Topical every 12 hours PRN  pantoprazole    Tablet 40 milliGRAM(s) Oral before breakfast  piperacillin/tazobactam IVPB.. 3.375 Gram(s) IV Intermittent every 8 hours  polyethylene glycol 3350 17 Gram(s) Oral once    ============================================================  Interval Events   -   -     ============================================================      Allergies    latex (Rash)  No Known Drug Allergies    Intolerances    	    MEDICATIONS:  aspirin enteric coated 81 milliGRAM(s) Oral daily  clopidogrel Tablet 75 milliGRAM(s) Oral daily    hydroxychloroquine 200 milliGRAM(s) Oral daily  piperacillin/tazobactam IVPB.. 3.375 Gram(s) IV Intermittent every 8 hours        pantoprazole    Tablet 40 milliGRAM(s) Oral before breakfast  polyethylene glycol 3350 17 Gram(s) Oral once    atorvastatin 80 milliGRAM(s) Oral at bedtime  levothyroxine 100 MICROGram(s) Oral daily    nystatin Cream 1 Application(s) Topical every 12 hours PRN      PAST MEDICAL & SURGICAL HISTORY:  CAD (coronary atherosclerotic disease)  Sciatica  Anxiety  GERD (gastroesophageal reflux disease)  Colonic dysmotility  Ileostomy in place: 2/2 SBO 2017  Sjogren's syndrome  H/O ileostomy  History of appendectomy  History of hysterectomy      FAMILY HISTORY:  FH: CHF (congestive heart failure): Mother, passed age 82  FH: myocardial infarction: Father, age 70  Family history of uterine cancer    Mother - HTN      SOCIAL HISTORY:    [ x] Non-smoker  [x] No Illicit Drug Use  [ x] No Excess EtOH Use      REVIEW OF SYSTEMS: (Unless + Before Symptom, it is negative)  Constitutional: [] Fever, [x]Fatigue, []Weight Changes  Eyes:  []Recent Vision Changes, []Eye Pain  ENT: []Congestion, []Sore Throat  Endocrine: []Excess Sweating, []Temperature Intolerance  Cardiovascular:  []Chest Pain, []Palpitations, []Shortness of Breath, []Pre-syncope, []Syncope,[] LE Swelling  Respiratory:[] Cough, []Congestion,[] Wheezing  Gastrointestinal: [x] Abdominal Pain,[] Nausea, []Vomiting  Genitourinary: []Dysuria,[] hematuria  Musculoskeletal: []Joint Pain, []Hip/Knee Injury  Neurologic: []Headaches,[] Imbalance, []Weakness  Skin: []Rashes, []hematoma, []purprura    ================================    PHYSICAL EXAM:  T(C): 37.2 (08-09-19 @ 21:14), Max: 38 (08-09-19 @ 15:17)  HR: 98 (08-09-19 @ 21:14) (85 - 104)  BP: 96/58 (08-09-19 @ 22:13) (89/52 - 112/70)  RR: 17 (08-09-19 @ 21:14) (16 - 20)  SpO2: 92% (08-09-19 @ 21:14) (92% - 99%)  Wt(kg): --  I&O's Summary    09 Aug 2019 07:01  -  09 Aug 2019 23:02  --------------------------------------------------------  IN: 340 mL / OUT: 550 mL / NET: -210 mL      Appearance: Normal; NAD +Fatigued  Psychiatry: AOx3; Normal Mood/Affect  HEENT:   Normal oral mucosa, EOMI +Scalp hematoma  Lymphatic: No lymphadenopathy  Cardiovascular: Normal S1 S2, No JVD, No murmurs, No edema  Respiratory: Lungs clear to auscultation, no use of accessory muscles	  Gastrointestinal:  Soft, Non-tender	  Skin: No rashes, No ecchymoses, No cyanosis	  Neurologic: Non-focal, No Focal Deficits  Extremities: Normal range of motion, No clubbing, cyanosis  Vascular: Peripheral pulses palpable 2+ bilaterally, no prominent varicosities    ============================    LABS:	   Labs [id number] @ 23:02	    CBC Full  -  ( 09 Aug 2019 07:17 )  WBC Count : 14.0 K/uL  Hemoglobin : 11.2 g/dL  Hematocrit : 34.6 %  Platelet Count - Automated : 213 K/uL  Mean Cell Volume : 88.3 fl  Mean Cell Hemoglobin : 28.5 pg  Mean Cell Hemoglobin Concentration : 32.3 gm/dL  Auto Neutrophil # : 12.4 K/uL  Auto Lymphocyte # : 0.7 K/uL  Auto Monocyte # : 0.8 K/uL  Auto Eosinophil # : 0.0 K/uL  Auto Basophil # : 0.0 K/uL  Auto Neutrophil % : 88.6 %  Auto Lymphocyte % : 5.1 %  Auto Monocyte % : 5.9 %  Auto Eosinophil % : 0.1 %  Auto Basophil % : 0.3 %    08-09    131<L>  |  83<L>  |  26<H>  ----------------------------<  96  3.2<L>   |  32<H>  |  1.04    Ca    9.7      09 Aug 2019 07:17  Phos  3.4     08-09  Mg     1.7     08-09    TPro  7.3  /  Alb  4.2  /  TBili  1.1  /  DBili  x   /  AST  32  /  ALT  11  /  AlkPhos  65  08-09  ==================  TTE  < from: Transthoracic Echocardiogram (07.16.19 @ 10:59) >  EF (Teicholtz): 54 %  ------------------------------------------------------------------------  Observations:  Mitral Valve: Mitral annular calcification, otherwise  normal mitral valve. Moderate mitral regurgitation.  Aortic Valve/Aorta: Calcified trileaflet aortic valve with  normal opening.  Aortic Root: 2.8 cm.  Left Atrium: Moderate left atrial enlargement.  Left Ventricle: Normal left ventricular systolic function.  No segmental wall motion abnormalities. Normal left  ventricular internal dimensions and wall thicknesses.  Normal diastolic function.  Right Heart: Normal right atrium. Borderline right  ventricular enlargement with normal right ventricular  function. Normal tricuspid valve. Minimal tricuspid  regurgitation. Normal pulmonic valve.  Pericardium/Pleura: Normal pericardium with no pericardial  effusion.  Hemodynamic: Estimated right atrial pressure is 8 mm Hg.  ------------------------------------------------------------------------  Conclusions:  1. Moderate mitral regurgitation.  2. Normal left ventricular internal dimensions and wall  thicknesses.    < end of copied text >  ==========================    EKG  NSR, Non-specific TW changes Similar to Prior EKG    =========================================================================  ASSESSMENT:  68yFemale with a history of GERD, Sjogren's Treated With Prednisone, Ileostomy, Normal LVEF, Moderate MR, Borderline RVE, CAD S/p pRCA S/p RCA  (OM1 was 50%), - presenting with  large bowel obstruction secondary to large retained foci within the large bowel.  =========  RECOMMENDATIONS  - No active cardiac symptoms  - *Requires no further cardiac testing prior to any intervention*  - *MUST Remain On ASA/Plavix*  - Continue Rest of cardiac medications  - Please call me at [phone] with any questions.       Please call with questions.     Silvestre Rosen MD, [facility] FNLA  [phone]                                                                                                        Total time spent in face-to-face encounter was 80 minutes. > 50 minutes spent in counseling and coordination of care addressing all medical issues listed above. All labs, imaging, consultant reports, and any relevant outside medical records personally reviewed in order to evaluate and manage the patient medically as well as provide coordination of care amongst providers.

## 2019-08-09 NOTE — ED PROVIDER NOTE - PMH
Anxiety    CAD (coronary atherosclerotic disease)    Colonic dysmotility    GERD (gastroesophageal reflux disease)    Ileostomy in place  2/2 SBO 2017  Sciatica    Sjogren's syndrome

## 2019-08-09 NOTE — ED PROVIDER NOTE - OBJECTIVE STATEMENT
Attending Leanne Almendarez: 69 y/o female h/o CAD  and prior ileostomy presentign with abdominal pain. pt with recent fall last week and is on plavix. over last few days noticed increased weakness and fatigue. now with worsening abdominal pain. pain located in lower abdomen. pt noticed decreased output in her ostomy over last 2 days. per family member noticed increased weakness. h/o similar with sig dehydration. no fevers or chills. did not take anything for pain

## 2019-08-09 NOTE — ED PROVIDER NOTE - CARE PLAN
Principal Discharge DX:	Abdominal pain  Secondary Diagnosis:	Colitis  Secondary Diagnosis:	Large bowel obstruction

## 2019-08-09 NOTE — H&P ADULT - PROBLEM SELECTOR PLAN 6
- History of Sjogren's syndrome.  - Patient takes Plaquenil 200 mg PO QD. Will continue with home dose - History of Sjogren's syndrome.  - Patient takes Plaquenil 200 mg PO QD. Holding at this time as patient cannot tolerate PO - History of Sjogren's syndrome.  - Patient takes Plaquenil 200 mg PO QD. Will continue at home dose.

## 2019-08-09 NOTE — ED ADULT NURSE NOTE - OBJECTIVE STATEMENT
67 YO female with PMH PCI (2 weeks ago) on ASA and Plavix, SBO with ostomy in place, sciatica, anxiety, GERD, Sjogren's syndrome, sp appendectomy, and sp hysterectomy, via walk in presenting with complaints of abdominal pain. As per patient, since last night, pt developed sudden onset pain, described as constant, worsened with movement, and palpation, associated with mucous rectal discharge (no blood visualized), decreased PO intake, and decreased ostomy output.   Pt states she recently was seen and discharged from [facility] ED for mechanical trip and fall, ecchymosis visualized around face.   Pt denies chest pain, shortness of breath, visual disturbances, numbness/tingling, fever, chills, diaphoresis, headache, nausea, vomiting, constipation, diarrhea, or urinary symptoms.   Pt Axox4, gross neuro intact, PERRL 3 mm. Lungs clear throughout bilateral. S1S2 heard. Abdomen soft, tender, non-distended. Skin warm, dry, and intact. Pt placed in position of comfort. Pt educated on call bell system and provided call bell. Bed in lowest position, wheels locked, appropriate side rails raised. Pt denies needs at this time.

## 2019-08-09 NOTE — ED ADULT NURSE NOTE - NSIMPLEMENTINTERV_GEN_ALL_ED
Implemented All Fall with Harm Risk Interventions:  [place] to call system. Call bell, personal items and telephone within reach. Instruct patient to call for assistance. Room bathroom lighting operational. Non-slip footwear when patient is off stretcher. Physically safe environment: no spills, clutter or unnecessary equipment. Stretcher in lowest position, wheels locked, appropriate side rails in place. Provide visual cue, wrist band, yellow gown, etc. Monitor gait and stability. Monitor for mental status changes and reorient to person, place, and time. Review medications for side effects contributing to fall risk. Reinforce activity limits and safety measures with patient and family. Provide visual clues: red socks.

## 2019-08-09 NOTE — CONSULT NOTE ADULT - SUBJECTIVE AND OBJECTIVE BOX
SUBJECTIVE:  68yFemale followed by me for ileostomy management  yesterday ate out, did not feel well  overnight developed abdominal pain and decreased ostomy output  brought to er by  for continued and worsening pain, cramping, mostly in left harvey-abdomen  no nausea or vomiting  admits to diminished appetite, but no fever or chills  passed a fair amount of mucus per rectum, which is unusual for her    last week fell - never lost consciousness (mechanical fall) - had extensive ecchymoses on face and trunk and scalp  had a scalp laceration requiring staples at local er    A few weeks ago admitted with dyspnea, found to have coronary artery disease requiring placement of drug eluting stent  now on aspirin/plavix    long standing colonic inertia (positive Sitz radhames study 2015)  2011 - partial colon resection (?sigmoid) during a hysterectomy for a "floppy colon", mesh placed for "stability"  2016 - rectopexy by Dr. 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 with TPN and drains  Her ostomy output was high but eventually responded to Imodium and Lomotil (4 of each per 24 hrs) with average 1000 - 1100 cc/24 hrs  If ostomy output would increase typically developed renal failure/hyperkalemia  ______________________________________________________________________  PMH/PSH:  PAST MEDICAL & SURGICAL HISTORY:  CAD (coronary atherosclerotic disease)  Sciatica  Anxiety  GERD (gastroesophageal reflux disease)  Colonic dysmotility  Ileostomy in place: 2/2 SBO 2017  Sjogren's syndrome  H/O ileostomy  History of appendectomy  History of hysterectomy    ______________________________________________________________________  MEDS:  MEDICATIONS  (STANDING):  aspirin enteric coated 81 milliGRAM(s) Oral daily  atorvastatin 80 milliGRAM(s) Oral at bedtime  clopidogrel Tablet 75 milliGRAM(s) Oral daily  hydroxychloroquine 200 milliGRAM(s) Oral daily  levothyroxine 100 MICROGram(s) Oral daily  pantoprazole    Tablet 40 milliGRAM(s) Oral before breakfast  piperacillin/tazobactam IVPB.. 3.375 Gram(s) IV Intermittent every 8 hours  polyethylene glycol 3350 17 Gram(s) Oral once    MEDICATIONS  (PRN):  nystatin Cream 1 Application(s) Topical every 12 hours PRN Rash    ______________________________________________________________________  ALL:   Allergies    latex (Rash)  No Known Drug Allergies    Intolerances      ______________________________________________________________________  SH: no active toxic habits, lives with   ______________________________________________________________________  FH:  FAMILY HISTORY:  FH: CHF (congestive heart failure): Mother, passed age 82  FH: myocardial infarction: Father, age 70  Family history of uterine cancer    ______________________________________________________________________  ROS:    CONSTITUTIONAL:  No weight loss, fever, chills, weakness or fatigue.    HEENT:  Eyes:  No visual loss, blurred vision, double vision or yellow sclerae. Ears, Nose, Throat:  No hearing loss, sneezing, congestion, runny nose or sore throat.    SKIN:  No rash or itching.    CARDIOVASCULAR:  No chest pain, chest pressure or chest discomfort. No palpitations or edema.    RESPIRATORY:  No shortness of breath, cough or sputum.    GASTROINTESTINAL:  SEE HPI    GENITOURINARY:  No dysuria, hematuria, urinary frequency    NEUROLOGICAL:  No headache, dizziness, syncope, paralysis, ataxia, numbness or tingling in the extremities. No change in bowel or bladder control.    MUSCULOSKELETAL:  No muscle, back pain, joint pain or stiffness.    HEMATOLOGIC:  No anemia, bleeding or bruising.    LYMPHATICS:  No enlarged nodes. No history of splenectomy.    PSYCHIATRIC:  No history of depression or anxiety.    ENDOCRINOLOGIC:  No reports of sweating, cold or heat intolerance. No polyuria or polydipsia.    ALLERGIES:  No history of asthma, hives, eczema or rhinitis.  ______________________________________________________________________  PHYSICAL EXAM:  T(C): 37.2 (08-09-19 @ 21:14), Max: 38 (08-09-19 @ 15:17)  HR: 98 (08-09-19 @ 21:14)  BP: 96/58 (08-09-19 @ 22:13)  RR: 17 (08-09-19 @ 21:14)  SpO2: 92% (08-09-19 @ 21:14)  Wt(kg): --    08-09 - 08-09  --------------------------------------------------------  IN:    Oral Fluid: 340 mL  Total IN: 340 mL    OUT:    Ileostomy: 550 mL  Total OUT: 550 mL    Total NET: -210 mL        PHYSICAL EXAM:      Constitutional: nad    Eyes: anicteric    ENMT: dry mm    Neck: no jvd    Back: no cvat    Respiratory: cta    Cardiovascular: rrr s1/s2 nl    Gastrointestinal: soft nd +llq ttp +rlq ostomy (healthy mucosa, +output) no hsm/r/g    Extremities: no c/c    Neurological: grossly non focal    Skin: extensive ecchymoses involving > 80% of face and some parts of upper extremeteis    Psychiatric: depressed, a&o x 3    ______________________________________________________________________  LABS:                        11.2   14.0  )-----------( 213      ( 09 Aug 2019 07:17 )             34.6     08-09    131<L>  |  83<L>  |  26<H>  ----------------------------<  96  3.2<L>   |  32<H>  |  1.04    Ca    9.7      09 Aug 2019 07:17  Phos  3.4     08-09  Mg     1.7     08-09    TPro  7.3  /  Alb  4.2  /  TBili  1.1  /  DBili  x   /  AST  32  /  ALT  11  /  AlkPhos  65  08-09    LIVER FUNCTIONS - ( 09 Aug 2019 07:17 )  Alb: 4.2 g/dL / Pro: 7.3 g/dL / ALK PHOS: 65 U/L / ALT: 11 U/L / AST: 32 U/L / GGT: x           ______________________________________________________________________  IMAGING:  CT Abdomen and Pelvis w/ IV Cont:   EXAM:  CT ABDOMEN AND PELVIS IC                        PROCEDURE DATE:  08/09/2019      INTERPRETATION:  CLINICAL INFORMATION: Abdominal pain.    COMPARISON: None.    PROCEDURE:   CT of the Abdomen and Pelvis was performed with intravenous contrast.   Intravenous contrast: 90 ml Omnipaque 350. 10 ml discarded.  Oral contrast: None.  Sagittal and coronal reformats were performed.    FINDINGS:    LOWER CHEST: Within normal limits.    LIVER: Within normal limits.  BILE DUCTS: Normal caliber.  GALLBLADDER: Within normal limits.  SPLEEN: Within normal limits.  PANCREAS: Within normal limits.  ADRENALS: Within normal limits.  KIDNEYS/URETERS: Mild hydronephrosis bilaterally can be secondary to distended urinary bladder.    BLADDER: Markedly distended.  REPRODUCTIVE ORGANS: Hysterectomy.    BOWEL: Postsurgical changes at the rectosigmoid. Postsurgical changes in the right lower quadrant. Right lower quadrant ostomy. The large bowel is diffusely distended and demonstrates mural thickening and adjacent fatty stranding. Large retained foci are identified throughout the large bowel, largest in the right hemiabdomen.  PERITONEUM: Small free fluid in the abdomen and pelvis.  VESSELS: Moderate vascular calcifications.  RETROPERITONEUM/LYMPH NODES: No lymphadenopathy.    ABDOMINAL WALL: Within normal limits.  BONES: Within normal limits.    IMPRESSION:     Findings suggestive of large bowel obstruction secondary to large retained foci within the large bowel. These may represent bezoars. Superimposed mild colitis.    MO IQBAL M.D. ATTENDING RADIOLOGIST  This document has been electronically signed. Aug  9 2019  9:14AM    ______________________________________________________________________  ASSESSMENT:  68y Female with abdominal pain, CT findings initially suggestive of large bowel obstruction due to fecal bezoars.  CT reviewed again with radiology, obstruction appears to be due to a long large piece of ?stool in the right colon.  given presence of stool in the mid and distal colon, as well as inability to clear patient with enemas given amount/length of colon, it is highly unlikely that a colonoscopy is technically feasible to reach the area of interest in the right colon.    PLAN:  1.  Hypaque enema for therapeutic purposes as planned by surgery  2.  If able consider miralax enema via distal limb of loop ileostomy   3.  diet per surgery  4.  hold lomotil/imodium for now but will need to resume eventually given known high output ileostomy    Kirill Esparza M.D.  [facility]  (O) [phone]

## 2019-08-09 NOTE — CONSULT NOTE ADULT - ATTENDING COMMENTS
I have seen and evaluated patient and discussed with team.  I reviewed CT, data and chart.  Patient with old stool in colon after diverting loop ileostomy proximal to ileocolic anastomosis.  Has large stool burden in colon.  Unlikely LBO.  Will get water soluble enema to r/o LBO and to help with evacuation.  Miralax via distal limb ileostomy.  ABX for likely stercoral colitis

## 2019-08-10 LAB
ALBUMIN SERPL ELPH-MCNC: 3 G/DL — LOW (ref 3.3–5)
ALP SERPL-CCNC: 53 U/L — SIGNIFICANT CHANGE UP (ref 40–120)
ALT FLD-CCNC: 9 U/L — LOW (ref 10–45)
ANION GAP SERPL CALC-SCNC: 13 MMOL/L — SIGNIFICANT CHANGE UP (ref 5–17)
AST SERPL-CCNC: 21 U/L — SIGNIFICANT CHANGE UP (ref 10–40)
BASOPHILS # BLD AUTO: 0 K/UL — SIGNIFICANT CHANGE UP (ref 0–0.2)
BASOPHILS NFR BLD AUTO: 0.3 % — SIGNIFICANT CHANGE UP (ref 0–2)
BILIRUB SERPL-MCNC: 1.3 MG/DL — HIGH (ref 0.2–1.2)
BUN SERPL-MCNC: 22 MG/DL — SIGNIFICANT CHANGE UP (ref 7–23)
CALCIUM SERPL-MCNC: 8.6 MG/DL — SIGNIFICANT CHANGE UP (ref 8.4–10.5)
CHLORIDE SERPL-SCNC: 92 MMOL/L — LOW (ref 96–108)
CO2 SERPL-SCNC: 31 MMOL/L — SIGNIFICANT CHANGE UP (ref 22–31)
CREAT SERPL-MCNC: 1.1 MG/DL — SIGNIFICANT CHANGE UP (ref 0.5–1.3)
EOSINOPHIL # BLD AUTO: 0 K/UL — SIGNIFICANT CHANGE UP (ref 0–0.5)
EOSINOPHIL NFR BLD AUTO: 0.5 % — SIGNIFICANT CHANGE UP (ref 0–6)
GLUCOSE SERPL-MCNC: 98 MG/DL — SIGNIFICANT CHANGE UP (ref 70–99)
HCT VFR BLD CALC: 29 % — LOW (ref 34.5–45)
HGB BLD-MCNC: 9.1 G/DL — LOW (ref 11.5–15.5)
LYMPHOCYTES # BLD AUTO: 1 K/UL — SIGNIFICANT CHANGE UP (ref 1–3.3)
LYMPHOCYTES # BLD AUTO: 14.7 % — SIGNIFICANT CHANGE UP (ref 13–44)
MAGNESIUM SERPL-MCNC: 1.7 MG/DL — SIGNIFICANT CHANGE UP (ref 1.6–2.6)
MCHC RBC-ENTMCNC: 27.9 PG — SIGNIFICANT CHANGE UP (ref 27–34)
MCHC RBC-ENTMCNC: 31.5 GM/DL — LOW (ref 32–36)
MCV RBC AUTO: 88.5 FL — SIGNIFICANT CHANGE UP (ref 80–100)
MONOCYTES # BLD AUTO: 0.5 K/UL — SIGNIFICANT CHANGE UP (ref 0–0.9)
MONOCYTES NFR BLD AUTO: 7.4 % — SIGNIFICANT CHANGE UP (ref 2–14)
NEUTROPHILS # BLD AUTO: 5.5 K/UL — SIGNIFICANT CHANGE UP (ref 1.8–7.4)
NEUTROPHILS NFR BLD AUTO: 77.1 % — HIGH (ref 43–77)
PHOSPHATE SERPL-MCNC: 2.3 MG/DL — LOW (ref 2.5–4.5)
PLATELET # BLD AUTO: 188 K/UL — SIGNIFICANT CHANGE UP (ref 150–400)
POTASSIUM SERPL-MCNC: 3 MMOL/L — LOW (ref 3.5–5.3)
POTASSIUM SERPL-SCNC: 3 MMOL/L — LOW (ref 3.5–5.3)
PROT SERPL-MCNC: 5.6 G/DL — LOW (ref 6–8.3)
RBC # BLD: 3.27 M/UL — LOW (ref 3.8–5.2)
RBC # FLD: 11.9 % — SIGNIFICANT CHANGE UP (ref 10.3–14.5)
SODIUM SERPL-SCNC: 136 MMOL/L — SIGNIFICANT CHANGE UP (ref 135–145)
TSH SERPL-MCNC: 0.52 UIU/ML — SIGNIFICANT CHANGE UP (ref 0.27–4.2)
WBC # BLD: 7.2 K/UL — SIGNIFICANT CHANGE UP (ref 3.8–10.5)
WBC # FLD AUTO: 7.2 K/UL — SIGNIFICANT CHANGE UP (ref 3.8–10.5)

## 2019-08-10 PROCEDURE — 74018 RADEX ABDOMEN 1 VIEW: CPT | Mod: 26

## 2019-08-10 RX ORDER — SODIUM CHLORIDE 9 MG/ML
250 INJECTION, SOLUTION INTRAVENOUS ONCE
Refills: 0 | Status: COMPLETED | OUTPATIENT
Start: 2019-08-10 | End: 2019-08-10

## 2019-08-10 RX ORDER — PREGABALIN 225 MG/1
1000 CAPSULE ORAL DAILY
Refills: 0 | Status: DISCONTINUED | OUTPATIENT
Start: 2019-08-10 | End: 2019-08-14

## 2019-08-10 RX ORDER — FAMOTIDINE 10 MG/ML
20 INJECTION INTRAVENOUS DAILY
Refills: 0 | Status: DISCONTINUED | OUTPATIENT
Start: 2019-08-10 | End: 2019-08-14

## 2019-08-10 RX ORDER — SODIUM CHLORIDE 9 MG/ML
1000 INJECTION, SOLUTION INTRAVENOUS
Refills: 0 | Status: DISCONTINUED | OUTPATIENT
Start: 2019-08-10 | End: 2019-08-11

## 2019-08-10 RX ORDER — POTASSIUM PHOSPHATE, MONOBASIC POTASSIUM PHOSPHATE, DIBASIC 236; 224 MG/ML; MG/ML
30 INJECTION, SOLUTION INTRAVENOUS ONCE
Refills: 0 | Status: COMPLETED | OUTPATIENT
Start: 2019-08-10 | End: 2019-08-10

## 2019-08-10 RX ORDER — SODIUM CHLORIDE 9 MG/ML
500 INJECTION, SOLUTION INTRAVENOUS
Refills: 0 | Status: DISCONTINUED | OUTPATIENT
Start: 2019-08-10 | End: 2019-08-10

## 2019-08-10 RX ORDER — CHOLECALCIFEROL (VITAMIN D3) 125 MCG
1000 CAPSULE ORAL DAILY
Refills: 0 | Status: DISCONTINUED | OUTPATIENT
Start: 2019-08-10 | End: 2019-08-14

## 2019-08-10 RX ORDER — ATORVASTATIN CALCIUM 80 MG/1
40 TABLET, FILM COATED ORAL AT BEDTIME
Refills: 0 | Status: DISCONTINUED | OUTPATIENT
Start: 2019-08-10 | End: 2019-08-14

## 2019-08-10 RX ORDER — LANOLIN ALCOHOL/MO/W.PET/CERES
3 CREAM (GRAM) TOPICAL AT BEDTIME
Refills: 0 | Status: DISCONTINUED | OUTPATIENT
Start: 2019-08-10 | End: 2019-08-14

## 2019-08-10 RX ORDER — MAGNESIUM SULFATE 500 MG/ML
2 VIAL (ML) INJECTION ONCE
Refills: 0 | Status: COMPLETED | OUTPATIENT
Start: 2019-08-10 | End: 2019-08-10

## 2019-08-10 RX ORDER — FERROUS SULFATE 325(65) MG
325 TABLET ORAL DAILY
Refills: 0 | Status: DISCONTINUED | OUTPATIENT
Start: 2019-08-10 | End: 2019-08-10

## 2019-08-10 RX ADMIN — PREGABALIN 1000 MICROGRAM(S): 225 CAPSULE ORAL at 16:05

## 2019-08-10 RX ADMIN — Medication 100 MICROGRAM(S): at 06:49

## 2019-08-10 RX ADMIN — FAMOTIDINE 20 MILLIGRAM(S): 10 INJECTION INTRAVENOUS at 17:56

## 2019-08-10 RX ADMIN — Medication 3 MILLIGRAM(S): at 21:13

## 2019-08-10 RX ADMIN — PIPERACILLIN AND TAZOBACTAM 25 GRAM(S): 4; .5 INJECTION, POWDER, LYOPHILIZED, FOR SOLUTION INTRAVENOUS at 11:08

## 2019-08-10 RX ADMIN — CLOPIDOGREL BISULFATE 75 MILLIGRAM(S): 75 TABLET, FILM COATED ORAL at 11:13

## 2019-08-10 RX ADMIN — POTASSIUM PHOSPHATE, MONOBASIC POTASSIUM PHOSPHATE, DIBASIC 83.33 MILLIMOLE(S): 236; 224 INJECTION, SOLUTION INTRAVENOUS at 16:01

## 2019-08-10 RX ADMIN — Medication 81 MILLIGRAM(S): at 11:13

## 2019-08-10 RX ADMIN — SODIUM CHLORIDE 250 MILLILITER(S): 9 INJECTION, SOLUTION INTRAVENOUS at 10:48

## 2019-08-10 RX ADMIN — Medication 200 MILLIGRAM(S): at 11:32

## 2019-08-10 RX ADMIN — Medication 1000 UNIT(S): at 16:05

## 2019-08-10 RX ADMIN — Medication 50 GRAM(S): at 13:21

## 2019-08-10 RX ADMIN — ATORVASTATIN CALCIUM 40 MILLIGRAM(S): 80 TABLET, FILM COATED ORAL at 21:13

## 2019-08-10 RX ADMIN — PIPERACILLIN AND TAZOBACTAM 25 GRAM(S): 4; .5 INJECTION, POWDER, LYOPHILIZED, FOR SOLUTION INTRAVENOUS at 03:17

## 2019-08-10 RX ADMIN — SODIUM CHLORIDE 100 MILLILITER(S): 9 INJECTION, SOLUTION INTRAVENOUS at 10:48

## 2019-08-10 RX ADMIN — PIPERACILLIN AND TAZOBACTAM 25 GRAM(S): 4; .5 INJECTION, POWDER, LYOPHILIZED, FOR SOLUTION INTRAVENOUS at 19:34

## 2019-08-10 RX ADMIN — PANTOPRAZOLE SODIUM 40 MILLIGRAM(S): 20 TABLET, DELAYED RELEASE ORAL at 06:49

## 2019-08-10 RX ADMIN — SODIUM CHLORIDE 100 MILLILITER(S): 9 INJECTION, SOLUTION INTRAVENOUS at 17:57

## 2019-08-10 NOTE — PROGRESS NOTE ADULT - SUBJECTIVE AND OBJECTIVE BOX
Interval Events: Underwent CT scan    S: Patient reports continued abdominal pain that is minimally relieved with IV tylenol; otherwise denies denies fevers, chills, nausea, emesis, chest pain, SOB.    O: Vital Signs  T(C): 36.7 (08-10 @ 05:40), Max: 38 (08-09 @ 15:17)  HR: 91 (08-10 @ 05:40) (87 - 104)  BP: 90/51 (08-10 @ 05:40) (90/50 - 112/70)  RR: 18 (08-10 @ 05:40) (17 - 20)  SpO2: 95% (08-10 @ 05:40) (92% - 97%)  08-09-19 @ 07:01  -  08-10-19 @ 06:53  --------------------------------------------------------  IN: 540 mL / OUT: 775 mL / NET: -235 mL      General: alert and oriented, NAD  Resp: airway patent, respirations unlabored  CVS: regular rate and rhythm  Abdomen: soft, tender in lower abdomen, mildly distended. Ostomy with good output   Extremities: no edema  Skin: warm, dry, appropriate color                          11.2   14.0  )-----------( 213      ( 09 Aug 2019 07:17 )             34.6   08-09    131<L>  |  83<L>  |  26<H>  ----------------------------<  96  3.2<L>   |  32<H>  |  1.04    Ca    9.7      09 Aug 2019 07:17  Phos  3.4     08-09  Mg     1.7     08-09    TPro  7.3  /  Alb  4.2  /  TBili  1.1  /  DBili  x   /  AST  32  /  ALT  11  /  AlkPhos  65  08-09

## 2019-08-10 NOTE — PROGRESS NOTE ADULT - ASSESSMENT
69yo F presenting with abdominal pain and colonic inertia in the setting of a diverting loop ileostomy and no competent ileocecal valve    - Red rubber cath and miralax to relieve obstruction  -Regular diet  -Pain control    Green team  [id number] 67yo F presenting with abdominal pain and colonic inertia in the setting of a diverting loop ileostomy and no competent ileocecal valve    -Regular diet  -Pain control    Green team  [id number]

## 2019-08-10 NOTE — PROGRESS NOTE ADULT - SUBJECTIVE AND OBJECTIVE BOX
SUBJECTIVE:  reports that she is able to tolerate current diet  reports continued abdominal pain, some relief with iv tylenol  _____________________________________________________  OBJECTIVE:    T(C): 37.2 (08-10-19 @ 17:22), Max: 37.4 (08-10-19 @ 10:36)  HR: 94 (08-10-19 @ 17:22)  BP: 90/50 (08-10-19 @ 17:22)  RR: 17 (08-10-19 @ 17:22)  SpO2: 95% (08-10-19 @ 17:22)  Wt(kg): --    08-09 @ 07:01  -  08-10 @ 07:00  --------------------------------------------------------  IN:    IV PiggyBack: 200 mL    Oral Fluid: 340 mL  Total IN: 540 mL    OUT:    Ileostomy: 950 mL  Total OUT: 950 mL    Total NET: -410 mL      08-10 @ 07:01  -  08-10 @ 19:32  --------------------------------------------------------  IN:    IV PiggyBack: 400 mL    lactated ringers.: 800 mL    lactated ringers.: 250 mL    Oral Fluid: 780 mL  Total IN: 2230 mL    OUT:    Ileostomy: 1000 mL    Voided: 200 mL  Total OUT: 1200 mL    Total NET: 1030 mL        PHYSICAL EXAM:      Constitutional: nad    Gastrointestinal: soft mild diffuse ttp +bs +rlq ostomy output    Skin: ecchymoses face    Psychiatric: a&o x 3 ,       _____________________________________________________  LABS:                        9.1    7.2   )-----------( 188      ( 10 Aug 2019 07:15 )             29.0   Hemoglobin: 9.1 (08-10-19 @ 07:15)  Hemoglobin: 11.2 (08-09-19 @ 07:17)    08-10    136  |  92<L>  |  22  ----------------------------<  98  3.0<L>   |  31  |  1.10    Ca    8.6      10 Aug 2019 07:03  Phos  2.3     08-10  Mg     1.7     08-10    TPro  5.6<L>  /  Alb  3.0<L>  /  TBili  1.3<H>  /  DBili  x   /  AST  21  /  ALT  9<L>  /  AlkPhos  53  08-10    LIVER FUNCTIONS - ( 10 Aug 2019 07:03 )  Alb: 3.0 g/dL / Pro: 5.6 g/dL / ALK PHOS: 53 U/L / ALT: 9 U/L / AST: 21 U/L / GGT: x           _____________________________________________________  ACTIVE MEDS:  MEDICATIONS  (STANDING):  aspirin enteric coated 81 milliGRAM(s) Oral daily  atorvastatin 40 milliGRAM(s) Oral at bedtime  cholecalciferol 1000 Unit(s) Oral daily  clopidogrel Tablet 75 milliGRAM(s) Oral daily  cyanocobalamin 1000 MICROGram(s) Oral daily  famotidine    Tablet 20 milliGRAM(s) Oral daily  hydroxychloroquine 200 milliGRAM(s) Oral daily  lactated ringers. 1000 milliLiter(s) (100 mL/Hr) IV Continuous <Continuous>  levothyroxine 100 MICROGram(s) Oral daily  pantoprazole    Tablet 40 milliGRAM(s) Oral before breakfast  piperacillin/tazobactam IVPB.. 3.375 Gram(s) IV Intermittent every 8 hours    MEDICATIONS  (PRN):  nystatin Cream 1 Application(s) Topical every 12 hours PRN Rash    _____________________________________________________  ASSESSMENT:  68yFemale with loop ileostomy, colonic inertia, presumed stercoral colitis    PLAN:  continue diet  as d/w surgery will arrange for colonoscopic evaluation later in the week to see if stool balls seen on water soluble enema can be removed with cobb net  stay on asa/plavix  cards clearance for proposed procedures/MAC    [facility]  (O) [phone]

## 2019-08-11 LAB
ANION GAP SERPL CALC-SCNC: 14 MMOL/L — SIGNIFICANT CHANGE UP (ref 5–17)
BUN SERPL-MCNC: 14 MG/DL — SIGNIFICANT CHANGE UP (ref 7–23)
CALCIUM SERPL-MCNC: 8.8 MG/DL — SIGNIFICANT CHANGE UP (ref 8.4–10.5)
CHLORIDE SERPL-SCNC: 96 MMOL/L — SIGNIFICANT CHANGE UP (ref 96–108)
CO2 SERPL-SCNC: 26 MMOL/L — SIGNIFICANT CHANGE UP (ref 22–31)
CREAT SERPL-MCNC: 1.01 MG/DL — SIGNIFICANT CHANGE UP (ref 0.5–1.3)
GLUCOSE SERPL-MCNC: 103 MG/DL — HIGH (ref 70–99)
HCT VFR BLD CALC: 31.1 % — LOW (ref 34.5–45)
HGB BLD-MCNC: 9.9 G/DL — LOW (ref 11.5–15.5)
MAGNESIUM SERPL-MCNC: 2.4 MG/DL — SIGNIFICANT CHANGE UP (ref 1.6–2.6)
MCHC RBC-ENTMCNC: 28.7 PG — SIGNIFICANT CHANGE UP (ref 27–34)
MCHC RBC-ENTMCNC: 31.9 GM/DL — LOW (ref 32–36)
MCV RBC AUTO: 90 FL — SIGNIFICANT CHANGE UP (ref 80–100)
PHOSPHATE SERPL-MCNC: 2.6 MG/DL — SIGNIFICANT CHANGE UP (ref 2.5–4.5)
PLATELET # BLD AUTO: 220 K/UL — SIGNIFICANT CHANGE UP (ref 150–400)
POTASSIUM SERPL-MCNC: 3.5 MMOL/L — SIGNIFICANT CHANGE UP (ref 3.5–5.3)
POTASSIUM SERPL-SCNC: 3.5 MMOL/L — SIGNIFICANT CHANGE UP (ref 3.5–5.3)
RBC # BLD: 3.46 M/UL — LOW (ref 3.8–5.2)
RBC # FLD: 12.1 % — SIGNIFICANT CHANGE UP (ref 10.3–14.5)
SODIUM SERPL-SCNC: 136 MMOL/L — SIGNIFICANT CHANGE UP (ref 135–145)
WBC # BLD: 7.4 K/UL — SIGNIFICANT CHANGE UP (ref 3.8–10.5)
WBC # FLD AUTO: 7.4 K/UL — SIGNIFICANT CHANGE UP (ref 3.8–10.5)

## 2019-08-11 RX ORDER — DEXTROSE MONOHYDRATE, SODIUM CHLORIDE, AND POTASSIUM CHLORIDE 50; .745; 4.5 G/1000ML; G/1000ML; G/1000ML
1000 INJECTION, SOLUTION INTRAVENOUS
Refills: 0 | Status: DISCONTINUED | OUTPATIENT
Start: 2019-08-11 | End: 2019-08-14

## 2019-08-11 RX ORDER — POTASSIUM PHOSPHATE, MONOBASIC POTASSIUM PHOSPHATE, DIBASIC 236; 224 MG/ML; MG/ML
15 INJECTION, SOLUTION INTRAVENOUS ONCE
Refills: 0 | Status: COMPLETED | OUTPATIENT
Start: 2019-08-11 | End: 2019-08-11

## 2019-08-11 RX ORDER — DEXTROSE MONOHYDRATE, SODIUM CHLORIDE, AND POTASSIUM CHLORIDE 50; .745; 4.5 G/1000ML; G/1000ML; G/1000ML
1000 INJECTION, SOLUTION INTRAVENOUS
Refills: 0 | Status: DISCONTINUED | OUTPATIENT
Start: 2019-08-11 | End: 2019-08-11

## 2019-08-11 RX ORDER — POTASSIUM CHLORIDE 20 MEQ
40 PACKET (EA) ORAL ONCE
Refills: 0 | Status: COMPLETED | OUTPATIENT
Start: 2019-08-11 | End: 2019-08-11

## 2019-08-11 RX ADMIN — PANTOPRAZOLE SODIUM 40 MILLIGRAM(S): 20 TABLET, DELAYED RELEASE ORAL at 05:26

## 2019-08-11 RX ADMIN — Medication 200 MILLIGRAM(S): at 11:11

## 2019-08-11 RX ADMIN — PIPERACILLIN AND TAZOBACTAM 25 GRAM(S): 4; .5 INJECTION, POWDER, LYOPHILIZED, FOR SOLUTION INTRAVENOUS at 11:11

## 2019-08-11 RX ADMIN — FAMOTIDINE 20 MILLIGRAM(S): 10 INJECTION INTRAVENOUS at 17:35

## 2019-08-11 RX ADMIN — CLOPIDOGREL BISULFATE 75 MILLIGRAM(S): 75 TABLET, FILM COATED ORAL at 11:11

## 2019-08-11 RX ADMIN — POTASSIUM PHOSPHATE, MONOBASIC POTASSIUM PHOSPHATE, DIBASIC 62.5 MILLIMOLE(S): 236; 224 INJECTION, SOLUTION INTRAVENOUS at 11:27

## 2019-08-11 RX ADMIN — PIPERACILLIN AND TAZOBACTAM 25 GRAM(S): 4; .5 INJECTION, POWDER, LYOPHILIZED, FOR SOLUTION INTRAVENOUS at 21:52

## 2019-08-11 RX ADMIN — PIPERACILLIN AND TAZOBACTAM 25 GRAM(S): 4; .5 INJECTION, POWDER, LYOPHILIZED, FOR SOLUTION INTRAVENOUS at 02:44

## 2019-08-11 RX ADMIN — Medication 1000 UNIT(S): at 11:11

## 2019-08-11 RX ADMIN — Medication 3 MILLIGRAM(S): at 21:52

## 2019-08-11 RX ADMIN — Medication 81 MILLIGRAM(S): at 11:11

## 2019-08-11 RX ADMIN — Medication 100 MICROGRAM(S): at 05:26

## 2019-08-11 RX ADMIN — Medication 40 MILLIEQUIVALENT(S): at 11:11

## 2019-08-11 RX ADMIN — ATORVASTATIN CALCIUM 40 MILLIGRAM(S): 80 TABLET, FILM COATED ORAL at 21:52

## 2019-08-11 RX ADMIN — PREGABALIN 1000 MICROGRAM(S): 225 CAPSULE ORAL at 11:10

## 2019-08-11 NOTE — PROGRESS NOTE ADULT - SUBJECTIVE AND OBJECTIVE BOX
Interval Events: no acute interval events    S: Patient reports improvement in abdominal pain. Tolerating a diet without any N/V. remains on IV fluids because of a low baseline BP    O: Vital Signs  T(C): 37 (08-11 @ 02:07), Max: 37.4 (08-10 @ 10:36)  HR: 93 (08-11 @ 02:07) (91 - 97)  BP: 88/54 (08-11 @ 02:07) (81/48 - 93/56)  RR: 17 (08-11 @ 02:07) (17 - 18)  SpO2: 93% (08-11 @ 02:07) (92% - 96%)  08-09-19 @ 07:01  -  08-10-19 @ 07:00  --------------------------------------------------------  IN: 540 mL / OUT: 950 mL / NET: -410 mL    08-10-19 @ 07:01  -  08-11-19 @ 04:41  --------------------------------------------------------  IN: 3690 mL / OUT: 1700 mL / NET: 1990 mL      General: alert and oriented, NAD  Resp: airway patent, respirations unlabored  CVS: regular rate and rhythm  Abdomen: soft, tender in lower abdomen, mildly distended. Ostomy with good output  Extremities: no edema  Skin: warm, dry, appropriate color                          9.1    7.2   )-----------( 188      ( 10 Aug 2019 07:15 )             29.0   08-10    136  |  92<L>  |  22  ----------------------------<  98  3.0<L>   |  31  |  1.10    Ca    8.6      10 Aug 2019 07:03  Phos  2.3     08-10  Mg     1.7     08-10    TPro  5.6<L>  /  Alb  3.0<L>  /  TBili  1.3<H>  /  DBili  x   /  AST  21  /  ALT  9<L>  /  AlkPhos  53  08-10 Interval Events: no acute interval events    S: Patient reports improvement in abdominal pain. Tolerating a diet without any N/V. remains on IV fluids because of a low baseline BP.  Had multiple BMs via anus    O: Vital Signs  T(C): 37 (08-11 @ 02:07), Max: 37.4 (08-10 @ 10:36)  HR: 93 (08-11 @ 02:07) (91 - 97)  BP: 88/54 (08-11 @ 02:07) (81/48 - 93/56)  RR: 17 (08-11 @ 02:07) (17 - 18)  SpO2: 93% (08-11 @ 02:07) (92% - 96%)  08-09-19 @ 07:01  -  08-10-19 @ 07:00  --------------------------------------------------------  IN: 540 mL / OUT: 950 mL / NET: -410 mL    08-10-19 @ 07:01  -  08-11-19 @ 04:41  --------------------------------------------------------  IN: 3690 mL / OUT: 1700 mL / NET: 1990 mL      General: alert and oriented, NAD  Resp: airway patent, respirations unlabored  CVS: regular rate and rhythm  Abdomen: soft, tender in lower abdomen, mildly distended. Ostomy with good output  Extremities: no edema  Skin: warm, dry, appropriate color                          9.1    7.2   )-----------( 188      ( 10 Aug 2019 07:15 )             29.0   08-10    136  |  92<L>  |  22  ----------------------------<  98  3.0<L>   |  31  |  1.10    Ca    8.6      10 Aug 2019 07:03  Phos  2.3     08-10  Mg     1.7     08-10    TPro  5.6<L>  /  Alb  3.0<L>  /  TBili  1.3<H>  /  DBili  x   /  AST  21  /  ALT  9<L>  /  AlkPhos  53  08-10

## 2019-08-11 NOTE — PROGRESS NOTE ADULT - SUBJECTIVE AND OBJECTIVE BOX
SUBJECTIVE:  feels better today  spoke to  - pt had a large soft stool per rectum (he shows me a picture)  ostomy output stable but higher then usual  _____________________________________________________  OBJECTIVE:    T(C): 36.6 (08-11-19 @ 13:34), Max: 37 (08-11-19 @ 02:07)  HR: 94 (08-11-19 @ 13:34)  BP: 86/54 (08-11-19 @ 13:34)  RR: 18 (08-11-19 @ 13:34)  SpO2: 93% (08-11-19 @ 13:34)  Wt(kg): --    08-10 @ 07:01  -  08-11 @ 07:00  --------------------------------------------------------  IN:    IV PiggyBack: 975 mL    lactated ringers.: 250 mL    lactated ringers.: 1900 mL    Oral Fluid: 1060 mL  Total IN: 4185 mL    OUT:    Ileostomy: 1500 mL    Voided: 200 mL  Total OUT: 1700 mL    Total NET: 2485 mL      08-11 @ 07:01  -  08-11 @ 21:13  --------------------------------------------------------  IN:    Oral Fluid: 960 mL  Total IN: 960 mL    OUT:    Ileostomy: 800 mL    Voided: 500 mL  Total OUT: 1300 mL    Total NET: -340 mL        PHYSICAL EXAM:  improved abdominal exam with less tenderness (confirmed with surgical team)  _____________________________________________________  LABS:                        9.9    7.4   )-----------( 220      ( 11 Aug 2019 09:13 )             31.1     Hemoglobin: 9.9 (08-11-19 @ 09:13)  Hemoglobin: 9.1 (08-10-19 @ 07:15)  Hemoglobin: 11.2 (08-09-19 @ 07:17)      08-11    136  |  96  |  14  ----------------------------<  103<H>  3.5   |  26  |  1.01    Ca    8.8      11 Aug 2019 09:13  Phos  2.6     08-11  Mg     2.4     08-11    TPro  5.6<L>  /  Alb  3.0<L>  /  TBili  1.3<H>  /  DBili  x   /  AST  21  /  ALT  9<L>  /  AlkPhos  53  08-10    _____________________________________________________  ACTIVE MEDS:  MEDICATIONS  (STANDING):  aspirin enteric coated 81 milliGRAM(s) Oral daily  atorvastatin 40 milliGRAM(s) Oral at bedtime  cholecalciferol 1000 Unit(s) Oral daily  clopidogrel Tablet 75 milliGRAM(s) Oral daily  cyanocobalamin 1000 MICROGram(s) Oral daily  dextrose 5% + sodium chloride 0.45% with potassium chloride 20 mEq/L 1000 milliLiter(s) (100 mL/Hr) IV Continuous <Continuous>  famotidine    Tablet 20 milliGRAM(s) Oral daily  hydroxychloroquine 200 milliGRAM(s) Oral daily  levothyroxine 100 MICROGram(s) Oral daily  melatonin 3 milliGRAM(s) Oral at bedtime  pantoprazole    Tablet 40 milliGRAM(s) Oral before breakfast  piperacillin/tazobactam IVPB.. 3.375 Gram(s) IV Intermittent every 8 hours    MEDICATIONS  (PRN):  nystatin Cream 1 Application(s) Topical every 12 hours PRN Rash    _____________________________________________________  ASSESSMENT:  68yFemale with retained stool in the colon causing stercoral colitis/low grade large bowel obstruction - now both improving/resolved but at risk for recurrence, especially with known colonic inertia, need for anti-diarrheals (Imodium/Lomotil) to control ostomy output    PLAN:  will discuss with cardiology re: safety of performing colonoscopy with monitored anesthesia care (MAC) to see if any of the stool balls can be broken up/dislodged and assess the severity of colitis  in the future will need to discuss with colorectal surgery if patient would benefit from intermittent antegrade miralax instillations via distal limb of the loop ileostomy (perhaps once a month or so) to keep the colon clear  change to clear liquid diet as of morning in anticipation of colonoscopy either monday afternoon or tuesday, based on suite availability and cardiology clearance    Kirill Esparza M.D.  [facility]  (O) [phone]

## 2019-08-11 NOTE — PROGRESS NOTE ADULT - ASSESSMENT
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  Has diverting loop ileostomy proximal to ileocolic resection.  Now with stercoral colitis on ABX.  Large fecal load in diverted colon    -Regular diet  -GI recommendation: colonoscopic evaluation later in the week to see if stool balls seen on water soluble enema can be removed with cobb net  - Remain on asa/plavix    Green team  [id number]

## 2019-08-11 NOTE — PROVIDER CONTACT NOTE (OTHER) - ACTION/TREATMENT ORDERED:
Dick Hutchison MD notified. Provider recommended to continue to monitor. Pt. BP has been hypotensive. Bolus gave earlier in the day. No bolus at this time.
will order Xray.

## 2019-08-12 LAB
ANION GAP SERPL CALC-SCNC: 9 MMOL/L — SIGNIFICANT CHANGE UP (ref 5–17)
APPEARANCE UR: CLEAR — SIGNIFICANT CHANGE UP
APTT BLD: 37.8 SEC — HIGH (ref 27.5–36.3)
BACTERIA # UR AUTO: NEGATIVE — SIGNIFICANT CHANGE UP
BILIRUB UR-MCNC: NEGATIVE — SIGNIFICANT CHANGE UP
BUN SERPL-MCNC: 9 MG/DL — SIGNIFICANT CHANGE UP (ref 7–23)
CALCIUM SERPL-MCNC: 8.5 MG/DL — SIGNIFICANT CHANGE UP (ref 8.4–10.5)
CHLORIDE SERPL-SCNC: 101 MMOL/L — SIGNIFICANT CHANGE UP (ref 96–108)
CO2 SERPL-SCNC: 25 MMOL/L — SIGNIFICANT CHANGE UP (ref 22–31)
COLOR SPEC: SIGNIFICANT CHANGE UP
CREAT SERPL-MCNC: 0.96 MG/DL — SIGNIFICANT CHANGE UP (ref 0.5–1.3)
CULTURE RESULTS: NO GROWTH — SIGNIFICANT CHANGE UP
DIFF PNL FLD: NEGATIVE — SIGNIFICANT CHANGE UP
EPI CELLS # UR: 1 /HPF — SIGNIFICANT CHANGE UP
GLUCOSE SERPL-MCNC: 95 MG/DL — SIGNIFICANT CHANGE UP (ref 70–99)
GLUCOSE UR QL: NEGATIVE — SIGNIFICANT CHANGE UP
HCT VFR BLD CALC: 26.2 % — LOW (ref 34.5–45)
HGB BLD-MCNC: 8.3 G/DL — LOW (ref 11.5–15.5)
HYALINE CASTS # UR AUTO: 0 /LPF — SIGNIFICANT CHANGE UP (ref 0–2)
INR BLD: 2.3 RATIO — HIGH (ref 0.88–1.16)
KETONES UR-MCNC: NEGATIVE — SIGNIFICANT CHANGE UP
LEUKOCYTE ESTERASE UR-ACNC: NEGATIVE — SIGNIFICANT CHANGE UP
MAGNESIUM SERPL-MCNC: 2.1 MG/DL — SIGNIFICANT CHANGE UP (ref 1.6–2.6)
MCHC RBC-ENTMCNC: 28 PG — SIGNIFICANT CHANGE UP (ref 27–34)
MCHC RBC-ENTMCNC: 31.6 GM/DL — LOW (ref 32–36)
MCV RBC AUTO: 88.6 FL — SIGNIFICANT CHANGE UP (ref 80–100)
NITRITE UR-MCNC: NEGATIVE — SIGNIFICANT CHANGE UP
PH UR: 7.5 — SIGNIFICANT CHANGE UP (ref 5–8)
PHOSPHATE SERPL-MCNC: 2 MG/DL — LOW (ref 2.5–4.5)
PLATELET # BLD AUTO: 219 K/UL — SIGNIFICANT CHANGE UP (ref 150–400)
POTASSIUM SERPL-MCNC: 3.5 MMOL/L — SIGNIFICANT CHANGE UP (ref 3.5–5.3)
POTASSIUM SERPL-SCNC: 3.5 MMOL/L — SIGNIFICANT CHANGE UP (ref 3.5–5.3)
PROT UR-MCNC: ABNORMAL
PROTHROM AB SERPL-ACNC: 26.9 SEC — HIGH (ref 10–12.9)
RBC # BLD: 2.96 M/UL — LOW (ref 3.8–5.2)
RBC # FLD: 12.1 % — SIGNIFICANT CHANGE UP (ref 10.3–14.5)
RBC CASTS # UR COMP ASSIST: 2 /HPF — SIGNIFICANT CHANGE UP (ref 0–4)
SODIUM SERPL-SCNC: 135 MMOL/L — SIGNIFICANT CHANGE UP (ref 135–145)
SP GR SPEC: 1.01 — SIGNIFICANT CHANGE UP (ref 1.01–1.02)
SPECIMEN SOURCE: SIGNIFICANT CHANGE UP
UROBILINOGEN FLD QL: NEGATIVE — SIGNIFICANT CHANGE UP
WBC # BLD: 6.9 K/UL — SIGNIFICANT CHANGE UP (ref 3.8–10.5)
WBC # FLD AUTO: 6.9 K/UL — SIGNIFICANT CHANGE UP (ref 3.8–10.5)
WBC UR QL: 1 /HPF — SIGNIFICANT CHANGE UP (ref 0–5)

## 2019-08-12 RX ADMIN — FAMOTIDINE 20 MILLIGRAM(S): 10 INJECTION INTRAVENOUS at 18:48

## 2019-08-12 RX ADMIN — PIPERACILLIN AND TAZOBACTAM 25 GRAM(S): 4; .5 INJECTION, POWDER, LYOPHILIZED, FOR SOLUTION INTRAVENOUS at 05:14

## 2019-08-12 RX ADMIN — PREGABALIN 1000 MICROGRAM(S): 225 CAPSULE ORAL at 18:48

## 2019-08-12 RX ADMIN — Medication 62.5 MILLIMOLE(S): at 10:06

## 2019-08-12 RX ADMIN — ATORVASTATIN CALCIUM 40 MILLIGRAM(S): 80 TABLET, FILM COATED ORAL at 22:16

## 2019-08-12 RX ADMIN — Medication 3 MILLIGRAM(S): at 22:16

## 2019-08-12 RX ADMIN — Medication 62.5 MILLIMOLE(S): at 15:18

## 2019-08-12 RX ADMIN — Medication 81 MILLIGRAM(S): at 11:46

## 2019-08-12 RX ADMIN — Medication 100 MICROGRAM(S): at 05:15

## 2019-08-12 RX ADMIN — PIPERACILLIN AND TAZOBACTAM 25 GRAM(S): 4; .5 INJECTION, POWDER, LYOPHILIZED, FOR SOLUTION INTRAVENOUS at 22:16

## 2019-08-12 RX ADMIN — Medication 1000 UNIT(S): at 18:48

## 2019-08-12 RX ADMIN — CLOPIDOGREL BISULFATE 75 MILLIGRAM(S): 75 TABLET, FILM COATED ORAL at 11:46

## 2019-08-12 RX ADMIN — PIPERACILLIN AND TAZOBACTAM 25 GRAM(S): 4; .5 INJECTION, POWDER, LYOPHILIZED, FOR SOLUTION INTRAVENOUS at 14:17

## 2019-08-12 RX ADMIN — Medication 200 MILLIGRAM(S): at 11:47

## 2019-08-12 RX ADMIN — PANTOPRAZOLE SODIUM 40 MILLIGRAM(S): 20 TABLET, DELAYED RELEASE ORAL at 05:15

## 2019-08-12 NOTE — CHART NOTE - NSCHARTNOTEFT_GEN_A_CORE
GENERAL SURGERY PROGRESS NOTE:    Interval:  No acute events since op.    Subjective:  Patient seen and examined sp colonoscopy. Denies pain. No other complaints. Denies fever. Denies HA/CP/SOB. Denies N/V/D. No appetite. +Voiding. -Passing flatus. -BM. Fluid leaking from rectum. +OOB w assistance.    Vital Signs Last 24 Hrs  T(C): 36.9 (12 Aug 2019 21:35), Max: 37.1 (12 Aug 2019 18:01)  T(F): 98.5 (12 Aug 2019 21:35), Max: 98.8 (12 Aug 2019 18:01)  HR: 90 (12 Aug 2019 21:35) (83 - 94)  BP: 101/60 (12 Aug 2019 21:35) (91/55 - 104/65)  BP(mean): --  RR: 18 (12 Aug 2019 21:35) (18 - 18)  SpO2: 95% (12 Aug 2019 21:35) (93% - 97%)    Exam:  Gen: NAD, resting in bed, sleeping alert and responding appropriately, dressing on forehead bruising under eyes  Resp: Airway patent, non-labored respirations  Abd: Soft, ND, NTTP x 4 quadrants, no rebound or guarding. Ostomy x1 w fecal material  Ext: No edema, WWP, bruise LUE shoulder  Neuro: AAOx3, no focal deficits    I&O's Detail    11 Aug 2019 07:01  -  12 Aug 2019 07:00  --------------------------------------------------------  IN:    dextrose 5% + sodium chloride 0.45% with potassium chloride 20 mEq/L: 600 mL    IV PiggyBack: 200 mL    Oral Fluid: 1680 mL  Total IN: 2480 mL    OUT:    Ileostomy: 1060 mL    Voided: 1050 mL  Total OUT: 2110 mL    Total NET: 370 mL      12 Aug 2019 07:01  -  12 Aug 2019 23:47  --------------------------------------------------------  IN:    dextrose 5% + sodium chloride 0.45% with potassium chloride 20 mEq/L: 225 mL    IV PiggyBack: 600 mL    Oral Fluid: 720 mL  Total IN: 1545 mL    OUT:    Ileostomy: 600 mL    Voided: 1150 mL  Total OUT: 1750 mL    Total NET: -205 mL          Daily     Daily     MEDICATIONS  (STANDING):  aspirin enteric coated 81 milliGRAM(s) Oral daily  atorvastatin 40 milliGRAM(s) Oral at bedtime  cholecalciferol 1000 Unit(s) Oral daily  clopidogrel Tablet 75 milliGRAM(s) Oral daily  cyanocobalamin 1000 MICROGram(s) Oral daily  dextrose 5% + sodium chloride 0.45% with potassium chloride 20 mEq/L 1000 milliLiter(s) (75 mL/Hr) IV Continuous <Continuous>  famotidine    Tablet 20 milliGRAM(s) Oral daily  hydroxychloroquine 200 milliGRAM(s) Oral daily  levothyroxine 100 MICROGram(s) Oral daily  melatonin 3 milliGRAM(s) Oral at bedtime  pantoprazole    Tablet 40 milliGRAM(s) Oral before breakfast  piperacillin/tazobactam IVPB.. 3.375 Gram(s) IV Intermittent every 8 hours    MEDICATIONS  (PRN):  nystatin Cream 1 Application(s) Topical every 12 hours PRN Rash      LABS:                        8.3    6.9   )-----------( 219      ( 12 Aug 2019 07:07 )             26.2     08-12    135  |  101  |  9   ----------------------------<  95  3.5   |  25  |  0.96    Ca    8.5      12 Aug 2019 07:07  Phos  2.0     12  Mg     2.1     08-12      PT/INR - ( 12 Aug 2019 07:07 )   PT: 26.9 sec;   INR: 2.30 ratio         PTT - ( 12 Aug 2019 07:07 )  PTT:37.8 sec  Urinalysis Basic - ( 12 Aug 2019 00:43 )    Color: Light Yellow / Appearance: Clear / S.015 / pH: x  Gluc: x / Ketone: Negative  / Bili: Negative / Urobili: Negative   Blood: x / Protein: 30 mg/dL / Nitrite: Negative   Leuk Esterase: Negative / RBC: 2 /hpf / WBC 1 /HPF   Sq Epi: x / Non Sq Epi: 1 /hpf / Bacteria: Negative        Plan:   ASA plavix cannot come off  Monitor VS  Am labs  Care per primary team    KELLEE Greenberg, PGY-1  Green Team Surgery

## 2019-08-12 NOTE — PROGRESS NOTE ADULT - SUBJECTIVE AND OBJECTIVE BOX
Pre-Endoscopy Evaluation      Referring Physician:  dr. wilfredo dash                                  Procedure: colonoscopy    Indication for Procedure: colitis    Pertinent History:  68y female with retained stool in the colon causing stercoral colitis/low grade large bowel obstruction    Sedation by Anesthesia [X]    PAST MEDICAL & SURGICAL HISTORY:  CAD (coronary atherosclerotic disease)  Sciatica  Anxiety  GERD (gastroesophageal reflux disease)  Colonic dysmotility  Ileostomy in place: 2/2 SBO 2017  Sjogren's syndrome  H/O ileostomy  History of appendectomy  History of hysterectomy      PMH of Gastroparesis [ ]  Gastric Surgery [ ]  Gastric Outlet Obstruction [ ]    Allergies:    latex (Rash)  No Known Drug Allergies    Intolerances:    Latex allergy: [ ] yes [X] no    Medications:MEDICATIONS  (STANDING):  aspirin enteric coated 81 milliGRAM(s) Oral daily  atorvastatin 40 milliGRAM(s) Oral at bedtime  cholecalciferol 1000 Unit(s) Oral daily  clopidogrel Tablet 75 milliGRAM(s) Oral daily  cyanocobalamin 1000 MICROGram(s) Oral daily  dextrose 5% + sodium chloride 0.45% with potassium chloride 20 mEq/L 1000 milliLiter(s) (75 mL/Hr) IV Continuous <Continuous>  famotidine    Tablet 20 milliGRAM(s) Oral daily  hydroxychloroquine 200 milliGRAM(s) Oral daily  levothyroxine 100 MICROGram(s) Oral daily  melatonin 3 milliGRAM(s) Oral at bedtime  pantoprazole    Tablet 40 milliGRAM(s) Oral before breakfast  piperacillin/tazobactam IVPB.. 3.375 Gram(s) IV Intermittent every 8 hours  sodium phosphate IVPB 15 milliMole(s) IV Intermittent every 4 hours    MEDICATIONS  (PRN):  nystatin Cream 1 Application(s) Topical every 12 hours PRN Rash      Smoking: [ ] yes  [X] no    AICD/PPM: [ ] yes   [X] no    Pertinent lab data:                        8.3    6.9   )-----------( 219      ( 12 Aug 2019 07:07 )             26.2     08-12    135  |  101  |  9   ----------------------------<  95  3.5   |  25  |  0.96    Ca    8.5      12 Aug 2019 07:07  Phos  2.0     08-12  Mg     2.1     08-12        PT/INR - ( 12 Aug 2019 07:07 )   PT: 26.9 sec;   INR: 2.30 ratio      PTT - ( 12 Aug 2019 07:07 )  PTT:37.8 sec        Physical Examination:     Daily   Vital Signs Last 24 Hrs  T(C): 36.9 (12 Aug 2019 10:11), Max: 37.4 (11 Aug 2019 21:13)  T(F): 98.5 (12 Aug 2019 10:11), Max: 99.4 (11 Aug 2019 21:13)  HR: 94 (12 Aug 2019 10:11) (86 - 97)  BP: 104/65 (12 Aug 2019 10:11) (86/54 - 104/65)  BP(mean): --  RR: 18 (12 Aug 2019 10:11) (18 - 18)  SpO2: 96% (12 Aug 2019 10:11) (93% - 96%)      < from: Transthoracic Echocardiogram (07.16.19 @ 10:59) >    Fractional short: 27 %  EF (Teicholtz): 54 %  ------------------------------------------------------------------------  Observations:  Mitral Valve: Mitral annular calcification, otherwise  normal mitral valve. Moderate mitral regurgitation.  Aortic Valve/Aorta: Calcified trileaflet aortic valve with  normal opening.  Aortic Root: 2.8 cm.  Left Atrium: Moderate left atrial enlargement.  Left Ventricle: Normal left ventricular systolic function.  No segmental wall motion abnormalities. Normal left  ventricular internal dimensions and wall thicknesses.  Normal diastolic function.  Right Heart: Normal right atrium. Borderline right  ventricular enlargement with normal right ventricular  function. Normal tricuspid valve. Minimal tricuspid  regurgitation. Normal pulmonic valve.  Pericardium/Pleura: Normal pericardium with no pericardial  effusion.  Hemodynamic: Estimated right atrial pressure is 8 mm Hg.  ------------------------------------------------------------------------  Conclusions:  1. Moderate mitral regurgitation.  2. Normal left ventricular internal dimensions and wall  thicknesses.  3. Normal left ventricular systolicfunction. No segmental  wall motion abnormalities.  4. Normal diastolic function.  5. Borderline right ventricular enlargement with normal  right ventricular function.  -----------------------------------      Constitutional: NAD    Neck:  No JVD    Respiratory: CTAB/L    Cardiovascular: S1 and S2    Gastrointestinal: BS+, soft, NT/ND    Extremities: No peripheral edema    Neurological: A/O x 3    : No Nix    Skin: No rashes    Comments:      The patient is a suitable candidate for the planned procedure unless box checked [ ]  No, explain:

## 2019-08-12 NOTE — PROGRESS NOTE ADULT - ASSESSMENT
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  Has diverting loop ileostomy proximal to ileocolic resection.  Now with stercoral colitis on ABX.  Large fecal load in diverted colon    -Clear liquid diet this am  -GI recommendation:  colonoscopy either monday afternoon or tuesday, based on suite availability and cardiology clearance    Green team  [id number]

## 2019-08-12 NOTE — PROGRESS NOTE ADULT - SUBJECTIVE AND OBJECTIVE BOX
Interval Events: No acute interval events    S: Patient voiding appropriately, has continued pain controlled. Patient doing well, denies fevers, chills, nausea, emesis, chest pain, SOB.    O: Vital Signs  T(C): 36.8 (08-12 @ 01:05), Max: 37.4 (08-11 @ 21:13)  HR: 93 (08-12 @ 01:05) (89 - 97)  BP: 94/57 (08-12 @ 01:05) (86/54 - 102/61)  RR: 18 (08-12 @ 01:05) (17 - 18)  SpO2: 94% (08-12 @ 01:05) (92% - 96%)  08-10-19 @ 07:01  -  08-11-19 @ 07:00  --------------------------------------------------------  IN: 4185 mL / OUT: 1700 mL / NET: 2485 mL    08-11-19 @ 07:01  -  08-12-19 @ 05:08  --------------------------------------------------------  IN: 1320 mL / OUT: 1810 mL / NET: -490 mL      General: alert and oriented, NAD  Resp: airway patent, respirations unlabored  CVS: regular rate and rhythm  Abdomen: soft, minimally tender in the LLQ, nondistended. Ostomy with good output  Extremities: no edema  Skin: warm, dry, appropriate color                          9.9    7.4   )-----------( 220      ( 11 Aug 2019 09:13 )             31.1   08-11    136  |  96  |  14  ----------------------------<  103<H>  3.5   |  26  |  1.01    Ca    8.8      11 Aug 2019 09:13  Phos  2.6     08-11  Mg     2.4     08-11    TPro  5.6<L>  /  Alb  3.0<L>  /  TBili  1.3<H>  /  DBili  x   /  AST  21  /  ALT  9<L>  /  AlkPhos  53  08-10

## 2019-08-13 ENCOUNTER — TRANSCRIPTION ENCOUNTER (OUTPATIENT)
Age: 69
End: 2019-08-13

## 2019-08-13 DIAGNOSIS — F41.9 ANXIETY DISORDER, UNSPECIFIED: ICD-10-CM

## 2019-08-13 LAB
ANION GAP SERPL CALC-SCNC: 11 MMOL/L — SIGNIFICANT CHANGE UP (ref 5–17)
BUN SERPL-MCNC: 8 MG/DL — SIGNIFICANT CHANGE UP (ref 7–23)
CALCIUM SERPL-MCNC: 8.7 MG/DL — SIGNIFICANT CHANGE UP (ref 8.4–10.5)
CHLORIDE SERPL-SCNC: 101 MMOL/L — SIGNIFICANT CHANGE UP (ref 96–108)
CO2 SERPL-SCNC: 24 MMOL/L — SIGNIFICANT CHANGE UP (ref 22–31)
CREAT SERPL-MCNC: 1.04 MG/DL — SIGNIFICANT CHANGE UP (ref 0.5–1.3)
GLUCOSE SERPL-MCNC: 91 MG/DL — SIGNIFICANT CHANGE UP (ref 70–99)
HCT VFR BLD CALC: 32.3 % — LOW (ref 34.5–45)
HGB BLD-MCNC: 10.2 G/DL — LOW (ref 11.5–15.5)
MAGNESIUM SERPL-MCNC: 1.9 MG/DL — SIGNIFICANT CHANGE UP (ref 1.6–2.6)
MCHC RBC-ENTMCNC: 28.5 PG — SIGNIFICANT CHANGE UP (ref 27–34)
MCHC RBC-ENTMCNC: 31.7 GM/DL — LOW (ref 32–36)
MCV RBC AUTO: 90 FL — SIGNIFICANT CHANGE UP (ref 80–100)
PHOSPHATE SERPL-MCNC: 2.6 MG/DL — SIGNIFICANT CHANGE UP (ref 2.5–4.5)
PLATELET # BLD AUTO: 265 K/UL — SIGNIFICANT CHANGE UP (ref 150–400)
POTASSIUM SERPL-MCNC: 3.9 MMOL/L — SIGNIFICANT CHANGE UP (ref 3.5–5.3)
POTASSIUM SERPL-SCNC: 3.9 MMOL/L — SIGNIFICANT CHANGE UP (ref 3.5–5.3)
RBC # BLD: 3.59 M/UL — LOW (ref 3.8–5.2)
RBC # FLD: 12.5 % — SIGNIFICANT CHANGE UP (ref 10.3–14.5)
SODIUM SERPL-SCNC: 136 MMOL/L — SIGNIFICANT CHANGE UP (ref 135–145)
WBC # BLD: 6.6 K/UL — SIGNIFICANT CHANGE UP (ref 3.8–10.5)
WBC # FLD AUTO: 6.6 K/UL — SIGNIFICANT CHANGE UP (ref 3.8–10.5)

## 2019-08-13 PROCEDURE — 99233 SBSQ HOSP IP/OBS HIGH 50: CPT

## 2019-08-13 PROCEDURE — 99222 1ST HOSP IP/OBS MODERATE 55: CPT

## 2019-08-13 RX ORDER — POLYETHYLENE GLYCOL 3350 17 G/17G
500 POWDER, FOR SOLUTION ORAL ONCE
Refills: 0 | Status: DISCONTINUED | OUTPATIENT
Start: 2019-08-13 | End: 2019-08-13

## 2019-08-13 RX ORDER — ESCITALOPRAM OXALATE 10 MG/1
5 TABLET, FILM COATED ORAL DAILY
Refills: 0 | Status: DISCONTINUED | OUTPATIENT
Start: 2019-08-13 | End: 2019-08-14

## 2019-08-13 RX ORDER — ALPRAZOLAM 0.25 MG
0.25 TABLET ORAL EVERY 6 HOURS
Refills: 0 | Status: DISCONTINUED | OUTPATIENT
Start: 2019-08-13 | End: 2019-08-14

## 2019-08-13 RX ORDER — DIPHENOXYLATE HCL/ATROPINE 2.5-.025MG
1 TABLET ORAL
Refills: 0 | Status: DISCONTINUED | OUTPATIENT
Start: 2019-08-13 | End: 2019-08-14

## 2019-08-13 RX ORDER — MAGNESIUM SULFATE 500 MG/ML
2 VIAL (ML) INJECTION ONCE
Refills: 0 | Status: COMPLETED | OUTPATIENT
Start: 2019-08-13 | End: 2019-08-13

## 2019-08-13 RX ORDER — ACETAMINOPHEN 500 MG
650 TABLET ORAL ONCE
Refills: 0 | Status: COMPLETED | OUTPATIENT
Start: 2019-08-13 | End: 2019-08-13

## 2019-08-13 RX ORDER — POTASSIUM PHOSPHATE, MONOBASIC POTASSIUM PHOSPHATE, DIBASIC 236; 224 MG/ML; MG/ML
15 INJECTION, SOLUTION INTRAVENOUS ONCE
Refills: 0 | Status: COMPLETED | OUTPATIENT
Start: 2019-08-13 | End: 2019-08-13

## 2019-08-13 RX ORDER — POLYETHYLENE GLYCOL 3350 17 G/17G
17 POWDER, FOR SOLUTION ORAL ONCE
Refills: 0 | Status: COMPLETED | OUTPATIENT
Start: 2019-08-13 | End: 2019-08-13

## 2019-08-13 RX ADMIN — Medication 1000 UNIT(S): at 11:27

## 2019-08-13 RX ADMIN — POTASSIUM PHOSPHATE, MONOBASIC POTASSIUM PHOSPHATE, DIBASIC 62.5 MILLIMOLE(S): 236; 224 INJECTION, SOLUTION INTRAVENOUS at 15:51

## 2019-08-13 RX ADMIN — FAMOTIDINE 20 MILLIGRAM(S): 10 INJECTION INTRAVENOUS at 18:38

## 2019-08-13 RX ADMIN — Medication 50 GRAM(S): at 12:46

## 2019-08-13 RX ADMIN — Medication 1 TABLET(S): at 21:37

## 2019-08-13 RX ADMIN — PIPERACILLIN AND TAZOBACTAM 25 GRAM(S): 4; .5 INJECTION, POWDER, LYOPHILIZED, FOR SOLUTION INTRAVENOUS at 05:48

## 2019-08-13 RX ADMIN — Medication 100 MICROGRAM(S): at 05:48

## 2019-08-13 RX ADMIN — Medication 200 MILLIGRAM(S): at 11:27

## 2019-08-13 RX ADMIN — PIPERACILLIN AND TAZOBACTAM 25 GRAM(S): 4; .5 INJECTION, POWDER, LYOPHILIZED, FOR SOLUTION INTRAVENOUS at 21:37

## 2019-08-13 RX ADMIN — PIPERACILLIN AND TAZOBACTAM 25 GRAM(S): 4; .5 INJECTION, POWDER, LYOPHILIZED, FOR SOLUTION INTRAVENOUS at 12:47

## 2019-08-13 RX ADMIN — ATORVASTATIN CALCIUM 40 MILLIGRAM(S): 80 TABLET, FILM COATED ORAL at 21:37

## 2019-08-13 RX ADMIN — PANTOPRAZOLE SODIUM 40 MILLIGRAM(S): 20 TABLET, DELAYED RELEASE ORAL at 05:48

## 2019-08-13 RX ADMIN — PREGABALIN 1000 MICROGRAM(S): 225 CAPSULE ORAL at 11:27

## 2019-08-13 RX ADMIN — Medication 3 MILLIGRAM(S): at 21:37

## 2019-08-13 RX ADMIN — POLYETHYLENE GLYCOL 3350 17 GRAM(S): 17 POWDER, FOR SOLUTION ORAL at 14:21

## 2019-08-13 RX ADMIN — CLOPIDOGREL BISULFATE 75 MILLIGRAM(S): 75 TABLET, FILM COATED ORAL at 11:27

## 2019-08-13 RX ADMIN — Medication 81 MILLIGRAM(S): at 11:27

## 2019-08-13 NOTE — PROGRESS NOTE ADULT - SUBJECTIVE AND OBJECTIVE BOX
Green Team Surgery Progress Note     SUBJECTIVE / 24H EVENTS  Patient seen and examined on morning rounds. Colonoscopy aborted due to inability to cross the anastomosis. Otherwise no complaints and having minimal pain.      OBJECTIVE:    VITAL SIGNS:  T(C): 37 (19 @ 01:47), Max: 37.1 (19 @ 18:01)  HR: 91 (19 @ 01:47) (83 - 94)  BP: 107/69 (19 @ 01:47) (91/55 - 107/69)  RR: 18 (19 @ 01:47) (18 - 18)  SpO2: 96% (19 @ 01:47) (93% - 97%)      PHYSICAL EXAM:  Resp: airway patent, respirations unlabored  CVS: regular rate and rhythm  Abdomen: soft, tender in lower abdomen, mildly distended. Ostomy with good output  Extremities: no edema  Skin: warm, dry, appropriate color      19 @ 07:01  -  19 @ 07:00  --------------------------------------------------------  IN:    dextrose 5% + sodium chloride 0.45% with potassium chloride 20 mEq/L: 600 mL    IV PiggyBack: 200 mL    Oral Fluid: 1680 mL  Total IN: 2480 mL    OUT:    Ileostomy: 1060 mL    Voided: 1050 mL  Total OUT: 2110 mL    Total NET: 370 mL      19 @ 07:01  -  19 @ 05:08  --------------------------------------------------------  IN:    dextrose 5% + sodium chloride 0.45% with potassium chloride 20 mEq/L: 225 mL    IV PiggyBack: 700 mL    Oral Fluid: 1200 mL  Total IN: 2125 mL    OUT:    Ileostomy: 1050 mL    Voided: 1150 mL  Total OUT: 2200 mL    Total NET: -75 mL          LAB VALUES:      135  |  101  |  9   ----------------------------<  95  3.5   |  25  |  0.96    Ca    8.5      12 Aug 2019 07:07  Phos  2.0     08-12  Mg     2.1     08-12                                 8.3    6.9   )-----------( 219      ( 12 Aug 2019 07:07 )             26.2       PT/INR - ( 12 Aug 2019 07:07 )   PT: 26.9 sec;   INR: 2.30 ratio         PTT - ( 12 Aug 2019 07:07 )  PTT:37.8 sec        Urinalysis Basic - ( 12 Aug 2019 00:43 )    Color: Light Yellow / Appearance: Clear / S.015 / pH: x  Gluc: x / Ketone: Negative  / Bili: Negative / Urobili: Negative   Blood: x / Protein: 30 mg/dL / Nitrite: Negative   Leuk Esterase: Negative / RBC: 2 /hpf / WBC 1 /HPF   Sq Epi: x / Non Sq Epi: 1 /hpf / Bacteria: Negative        MICROBIOLOGY:    No new microbiology data for review.     RADIOLOGY:    No new radiographic images for review.    MEDICATIONS  (STANDING):  aspirin enteric coated 81 milliGRAM(s) Oral daily  atorvastatin 40 milliGRAM(s) Oral at bedtime  cholecalciferol 1000 Unit(s) Oral daily  clopidogrel Tablet 75 milliGRAM(s) Oral daily  cyanocobalamin 1000 MICROGram(s) Oral daily  dextrose 5% + sodium chloride 0.45% with potassium chloride 20 mEq/L 1000 milliLiter(s) (75 mL/Hr) IV Continuous <Continuous>  famotidine    Tablet 20 milliGRAM(s) Oral daily  hydroxychloroquine 200 milliGRAM(s) Oral daily  levothyroxine 100 MICROGram(s) Oral daily  melatonin 3 milliGRAM(s) Oral at bedtime  pantoprazole    Tablet 40 milliGRAM(s) Oral before breakfast  piperacillin/tazobactam IVPB.. 3.375 Gram(s) IV Intermittent every 8 hours    MEDICATIONS  (PRN):  nystatin Cream 1 Application(s) Topical every 12 hours PRN Rash

## 2019-08-13 NOTE — DISCHARGE NOTE PROVIDER - CARE PROVIDER_API CALL
Chauncey Bryan)  ColonRectal Surgery; Surgery  [address], [address]  [place] [address]  Phone: [phone]  Fax: [phone]  Follow Up Time: 1 week    Kirill Esparza)  Gastroenterology  [address], [address]  [place] [address]  Phone: [phone]  Fax: [phone]  Follow Up Time: Chauncey Bryan)  ColonRectal Surgery; Surgery  [address], [address]  [place] [address]  Phone: [phone]  Fax: [phone]  Follow Up Time: 1 week    Kirill Esparza)  Gastroenterology  [address], [address]  [place] [address]  Phone: [phone]  Fax: [phone]  Follow Up Time:     Rosibel Cardoza)  Surgery  [address], [address]  [place] [address]  Phone: [phone]  Fax: [phone]  Follow Up Time: 1-3 days

## 2019-08-13 NOTE — BEHAVIORAL HEALTH ASSESSMENT NOTE - SUMMARY
Patient is a 68 year old woman,  and domiciled with her , retired , with PPH of anxiety, not currently in outpatient treatment (prior management by PCP), no prior psychiatric hospitalizations, no prior SIB or suicide attempts, no history of violence or legal issues, no history of substance abuse, PMH of SBO (2017, 2019), ileostomy (2017), GERD, hypothyroidism, familial tremor, Sjogren syndrome, colonic dysmotility, CAD s/p GAUDENCIO x1 (July 2019), consulted for anxiety, depression. pt reports anxiety symptoms, worrying about her health, mild depressed mood.

## 2019-08-13 NOTE — DISCHARGE NOTE PROVIDER - NSDCFUADDINST_GEN_ALL_CORE_FT
OSTOMY CARE: Start monthly miralax flushes as instructed by ostomy nurses.      FOLLOW-UP:  1. Please call to make a follow-up appointment within one week of discharge.   2. Please follow up with your primary care physician in one week regarding your hospitalization. OSTOMY CARE: Start every 2 weeks Miralax flushes as instructed by ostomy nurses.      FOLLOW-UP:  1. Please call to make a follow-up appointment within one week of discharge.   2. Please follow up with your primary care physician in one week regarding your hospitalization.

## 2019-08-13 NOTE — BEHAVIORAL HEALTH ASSESSMENT NOTE - HPI (INCLUDE ILLNESS QUALITY, SEVERITY, DURATION, TIMING, CONTEXT, MODIFYING FACTORS, ASSOCIATED SIGNS AND SYMPTOMS)
Patient is a 68 year old woman,  and domiciled with her , retired , with PPH of anxiety, not currently in outpatient treatment (prior management by PCP), no prior psychiatric hospitalizations, no prior SIB or suicide attempts, no history of violence or legal issues, no history of substance abuse, PMH of SBO (2017, 2019), ileostomy (2017), GERD, hypothyroidism, familial tremor, Sjogren syndrome, colonic dysmotility, CAD s/p GAUDENCIO x1 (July 2019), consulted for anxiety, depression.   Pt reports ongoing depression, anxiety due to multiple med issues. Pt reports intermittent depressed mood, anehdonia, poor sleep and appetite. She is hopeful about continuing to engage in pleasurable activities including socializing with friends and staying active but is eager to address her daily bouts of tearfulness in which she ruminates on fears of the unknown. She states that when she is not distracted, she spends time at home overthinking and ruminating on anxious thoughts. She also describes vague disappointment in children and grandchildren, as she compares them to accomplishments of friends’ children but still loves children and feels supported by them. She worries that with the several recent medical events (mechanical fall/facial trauma, CAD stenting, SBO), she will experience similar event in the near future that will prevent her from recovering or returning to baseline activity. Pt denies panic attacks symptoms, psychosis, james. (July 2019).  Patient reports that she has been “terrified about the unknown” and worries about “becoming hopeless and dependent on someone else.” Specifically, she describes fears of ileostomy/GI sx complications including rupture or infection. She describes significantly diminished appetite recently, and attributes this to both mood and GI discomfort (with frequent postprandial vomiting). She describes event yesterday after sx (while on propofol), in which she stated repeatedly that she wanted to kill herself because she was dying. When reflecting on this event, she states that she cannot imagine living with any more surgeries and “does not have the guts” to complete suicide. She denies any prior ideation or attempts. She reports that her  has been supportive throughout process but is distressed when seeing him tearful/cry about her condition.   Patient reports that she has been on Lexapro in the past, prescribed by her PCP who saw change in mood; she discontinued several years ago, and expressed interest in potentially restarting medications to manage anxiety/depressed mood.

## 2019-08-13 NOTE — DISCHARGE NOTE PROVIDER - PROVIDER TOKENS
PROVIDER:[TOKEN:[[id number]],FOLLOWUP:[1 week]],PROVIDER:[TOKEN:[[id number]]] PROVIDER:[TOKEN:[[id number]],FOLLOWUP:[1 week]],PROVIDER:[TOKEN:[[id number]]],PROVIDER:[TOKEN:[[id number]],FOLLOWUP:[1-3 days]]

## 2019-08-13 NOTE — BEHAVIORAL HEALTH ASSESSMENT NOTE - NSBHCHARTREVIEWLAB_PSY_A_CORE FT
10.2   6.6   )-----------( 265      ( 13 Aug 2019 07:32 )             32.3     08-13    136  |  101  |  8   ----------------------------<  91  3.9   |  24  |  1.04    Ca    8.7      13 Aug 2019 07:32  Phos  2.6     08-13  Mg     1.9     08-13

## 2019-08-13 NOTE — DISCHARGE NOTE PROVIDER - NSDCFUSCHEDAPPT_GEN_ALL_CORE_FT
ANGIE MCCULLOUGH ; 08/20/2019 ; [facility] [place] [address]  ANGIE MCCULLOUGH ; 08/26/2019 ; [facility] [address] [facility]  ANGIE MCCULLOUGH ; 08/28/2019 ; [facility] [address]  ANGIE MCCULLOUGH ; 09/17/2019 ; [facility] [address] [facility]  ANGIE MCCULLOUGH ; 09/17/2019 ; [facility] [address] [facility] ANGIE MCCULLOUGH ; 08/20/2019 ; [facility] [place] [address] [facility]  ANGIE MCCULLOUGH ; 08/26/2019 ; [facility] [address] [facility]  ANGIE MCCULLOUGH ; 08/28/2019 ; [facility] [address]  ANGIE MCCULLOUGH ; 09/17/2019 ; [facility] [address] [facility]  ANGIE MCCULLOUGH ; 09/17/2019 ; [facility] [address] [facility] ANGIE MCCULLOUGH ; 08/20/2019 ; [facility] [place] [address] [facility]  ANGIE MCCULLOUGH ; 08/26/2019 ; [facility] [address] [facility]  ANGIE MCCULLOUGH ; 08/28/2019 ; [facility] [address]  ANGIE MCCULLOUGH ; 09/17/2019 ; [facility] [address] [facility]  ANGIE MCCULLOUGH ; 09/17/2019 ; [facility] [address] [facility] ANGIE MCCULLOUGH ; 08/20/2019 ; [facility] [place] [address] [facility]  ANGIE MCCULLOUGH ; 08/26/2019 ; [facility] [address] [facility]  ANGIE MCCULLOUGH ; 08/28/2019 ; [facility] [address]  ANGIE MCCULLOUGH ; 09/17/2019 ; [facility] [address] [facility]  ANGIE MCCULLOUGH ; 09/17/2019 ; [facility] [address] [facility]

## 2019-08-13 NOTE — BEHAVIORAL HEALTH ASSESSMENT NOTE - NSBHCHARTREVIEWIMAGING_PSY_A_CORE FT
EXAM:  CT ABDOMEN AND PELVIS IC                            PROCEDURE DATE:  08/09/2019            INTERPRETATION:  CLINICAL INFORMATION: Abdominal pain.    COMPARISON: None.    PROCEDURE:   CT of the Abdomen and Pelvis was performed with intravenous contrast.   Intravenous contrast: 90 ml Omnipaque 350. 10 ml discarded.  Oral contrast: None.  Sagittal and coronal reformats were performed.    FINDINGS:    LOWER CHEST: Within normal limits.    LIVER: Within normal limits.  BILE DUCTS: Normal caliber.  GALLBLADDER: Within normal limits.  SPLEEN: Within normal limits.  PANCREAS: Within normal limits.  ADRENALS: Within normal limits.  KIDNEYS/URETERS: Mild hydronephrosis bilaterally can be secondary to   distended urinary bladder.    BLADDER: Markedly distended.  REPRODUCTIVE ORGANS: Hysterectomy.    BOWEL: Postsurgical changes at the rectosigmoid. Postsurgical changes in   the right lower quadrant. Right lower quadrant ostomy. The large bowel is   diffusely distended and demonstrates mural thickening and adjacent fatty   stranding. Large retained foci are identified throughout the large bowel,   largest in the right hemiabdomen.

## 2019-08-13 NOTE — BEHAVIORAL HEALTH ASSESSMENT NOTE - RISK ASSESSMENT
Risk factors: recent passive SI, past psychiatric dx and treatment for anxiety (with no current treatment), chronic pain and acute medical complications.  Protective factors: stable marriage and home, strong support network (family, friends), no SIIP/HIIP, no prior attempts, no access to firearms, compliance with medical treatment, strong therapeutic alliances, motivation to begin treatment and improve mood  pt overall low risk for suicide attempt

## 2019-08-13 NOTE — DISCHARGE NOTE PROVIDER - HOSPITAL COURSE
68-yo F w/ PMH of SBO (2017) s/p ileostomy, CAD s/p GAUDENCIO x1 (July 2019), Sjogren's syndrome, hypothyroidism, colonic dysmotility, and GERD, presenting with abdominal pain. Onset was last night. Severe lower abdominal crampy pain that worsens with movement. Unsure of prandial association as patient has not been eating. Last meal 5PM yesterday. Patient notices decreased ileostomy output since Wednesday. Also notices rectal discharge, which she gets only once in a while. Endorses gassy sensation in the abdomen and heartburn from GERD. Denies fever, chills, nausea, vomiting chest pain, SOB, or headache. Of note, patient presented last week s/p mechanical fall (8/4) where no fracture or intracranial bleeding were visualized. Another noted for a recent admission for CAD during 7/14 - 7/18, and she received GAUDENCIO to RCA.        In ED, T 100, HR 85, BP 89/52, RR 20, Sat 95% RA. WBC 14 (88% neutrophil), hgb 11.2, Na 131, and K 3.2. Zosyn, 2L LR, and K tab were given. CTAP was performed which revealed LBO 2/2 large retained foci, likely bezoars. Superimposed mild colitis was also visualized. GI and Surgery and Cards were consulted.        Transfer of care to Surgery due to concern for obstruction    Will administer Miralax into ostomy from above and water soluble enemas from below, NPO for now. Will continue ASA/Plavix for recent stent, hold off on colonoscopy if possible        Diet was advanced and tolerated, patient having GI fxn. Planning with-GI recommendation: colonoscopic evaluation later in the week to see if stool balls seen on water soluble enema can be removed with cobb net, pending cardiology clearance- cleared.        On 8/12, pt underwent Colonoscopy, but was aborted due to inability to cross the anastomosis. Otherwise no complaints and having minimal pain. On 8/13, Staple removed from forehead. 68-yo F w/ PMH of SBO (2017) s/p ileostomy, CAD s/p GAUDENCIO x1 (July 2019), Sjogren's syndrome, hypothyroidism, colonic dysmotility, and GERD, presenting with abdominal pain. Onset was last night. Severe lower abdominal crampy pain that worsens with movement. Unsure of prandial association as patient has not been eating. Last meal 5PM yesterday. Patient notices decreased ileostomy output since Wednesday. Also notices rectal discharge, which she gets only once in a while. Endorses gassy sensation in the abdomen and heartburn from GERD. Denies fever, chills, nausea, vomiting chest pain, SOB, or headache. Of note, patient presented last week s/p mechanical fall (8/4) where no fracture or intracranial bleeding were visualized. Another noted for a recent admission for CAD during 7/14 - 7/18, and she received GAUDENCIO to RCA.        In ED, T 100, HR 85, BP 89/52, RR 20, Sat 95% RA. WBC 14 (88% neutrophil), hgb 11.2, Na 131, and K 3.2. Zosyn, 2L LR, and K tab were given. CTAP was performed which revealed LBO 2/2 large retained foci, likely bezoars. Superimposed mild colitis was also visualized. GI and Surgery and Cards were consulted.        Transfer of care to Surgery due to concern for obstruction    Will administer Miralax into ostomy from above and water soluble enemas from below, NPO for now. Will continue ASA/Plavix for recent stent, hold off on colonoscopy if possible        Diet was advanced and tolerated, patient having GI fxn. Planning with-GI recommendation: colonoscopic evaluation later in the week to see if stool balls seen on water soluble enema can be removed with cobb net, pending cardiology clearance- cleared.        On 8/12, pt underwent Colonoscopy, but was aborted due to inability to cross the anastomosis. Otherwise no complaints and having minimal pain. On 8/13, Staple removed from forehead. Plan for monthly miralax flushes.  At the time of discharge, the patient was hemodynamically stable, was tolerating PO diet, was voiding urine and passing stool, was ambulating, and was comfortable with adequate pain control. The patient was instructed to follow up with Dr. Bryan within 1-2 weeks after discharge from the hospital. The patient felt comfortable with discharge. The patient was discharged to home. The patient had no other issues. 68-yo F w/ PMH of SBO (2017) s/p ileostomy, CAD s/p GAUDENCIO x1 (July 2019), Sjogren's syndrome, hypothyroidism, colonic dysmotility, and GERD, presenting with abdominal pain. Onset was last night. Severe lower abdominal crampy pain that worsens with movement. Unsure of prandial association as patient has not been eating. Last meal 5PM yesterday. Patient notices decreased ileostomy output since Wednesday. Also notices rectal discharge, which she gets only once in a while. Endorses gassy sensation in the abdomen and heartburn from GERD. Denies fever, chills, nausea, vomiting chest pain, SOB, or headache. Of note, patient presented last week s/p mechanical fall (8/4) where no fracture or intracranial bleeding were visualized. Another noted for a recent admission for CAD during 7/14 - 7/18, and she received GAUDENCIO to RCA.        In ED, T 100, HR 85, BP 89/52, RR 20, Sat 95% RA. WBC 14 (88% neutrophil), hgb 11.2, Na 131, and K 3.2. Zosyn, 2L LR, and K tab were given. CTAP was performed which revealed LBO 2/2 large retained foci, likely bezoars. Superimposed mild colitis was also visualized. GI and Surgery and Cards were consulted.        Transfer of care to Surgery due to concern for obstruction    Will administer Miralax into ostomy from above and water soluble enemas from below, NPO for now. Will continue ASA/Plavix for recent stent, hold off on colonoscopy if possible        Diet was advanced and tolerated, patient having GI fxn. Planning with-GI recommendation: colonoscopic evaluation later in the week to see if stool balls seen on water soluble enema can be removed with cobb net, pending cardiology clearance- cleared.        On 8/12, pt underwent Colonoscopy, but was aborted due to inability to cross the anastomosis. Otherwise no complaints and having minimal pain. On 8/13, attempt made to remove the staple placed on 8/4 after fall while at home. 8/14- Plastics surgery attending on call consulted Dr. Cardoza and recommended for follow up outpatient. Ostomy nurses re-educated patient and  on ostomy lavage. Plan for every 2 week miralax flushes.      At the time of discharge, the patient was hemodynamically stable, was tolerating PO diet, was voiding urine and passing stool, was ambulating, and was comfortable with adequate pain control. The patient was instructed to follow up with Dr. Bryan within 1-2 weeks after discharge from the hospital. The patient felt comfortable with discharge. The patient was discharged to home. The patient had no other issues.

## 2019-08-13 NOTE — DISCHARGE NOTE PROVIDER - NSDCCPCAREPLAN_GEN_ALL_CORE_FT
PRINCIPAL DISCHARGE DIAGNOSIS  Diagnosis: Large bowel obstruction  Assessment and Plan of Treatment:       SECONDARY DISCHARGE DIAGNOSES  Diagnosis: Colitis  Assessment and Plan of Treatment:     Diagnosis: Large bowel obstruction  Assessment and Plan of Treatment:

## 2019-08-13 NOTE — DISCHARGE NOTE PROVIDER - CARE PROVIDERS DIRECT ADDRESSES
,[email or web address]_[email or web address],DirectAddress_Unknown,DirectAddress_Unknown

## 2019-08-13 NOTE — PROGRESS NOTE ADULT - ASSESSMENT
69yo F presenting with abdominal pain and with history of colonic inertia, rectal prolapse and anal incontinece related to CREST.  Has diverting loop ileostomy proximal to ileocolic resection.  Now with stercoral colitis on ABX.  Large fecal load in diverted colon.    - LRD  - Colonoscopy aborted due to inability to pass anastomosis  - Cont Aspirin Plavix due to recent cardiac stent  - Remove forehead staple today  - Cont Zosyn   - Cont Home meds     Green team  [id number]

## 2019-08-13 NOTE — BEHAVIORAL HEALTH ASSESSMENT NOTE - NSBHCHARTREVIEWINVESTIGATE_PSY_A_CORE FT
Ventricular Rate 85 BPM    Atrial Rate 85 BPM    P-R Interval 152 ms    QRS Duration 74 ms    Q-T Interval 400 ms    QTC Calculation(Bezet) 476 ms    P Axis 76 degrees    R Axis 23 degrees    T Axis 17 degrees    Diagnosis Line NORMAL SINUS RHYTHM  LOW VOLTAGE QRS  NONSPECIFIC T WAVE ABNORMALITY  ABNORMAL ECG    Confirmed by ATTENDING, ED ([id number]),  MIGUEL CORTEZ ([id number]) on 8/9/2019 7:07:29 AM

## 2019-08-13 NOTE — BEHAVIORAL HEALTH ASSESSMENT NOTE - NSBHCHARTREVIEWVS_PSY_A_CORE FT
Vital Signs Last 24 Hrs  T(C): 36.7 (13 Aug 2019 14:21), Max: 37.1 (12 Aug 2019 18:01)  T(F): 98 (13 Aug 2019 14:21), Max: 98.8 (12 Aug 2019 18:01)  HR: 85 (13 Aug 2019 14:21) (82 - 91)  BP: 109/68 (13 Aug 2019 14:21) (99/65 - 109/68)  BP(mean): --  RR: 18 (13 Aug 2019 14:21) (18 - 18)  SpO2: 97% (13 Aug 2019 14:21) (93% - 97%)

## 2019-08-13 NOTE — BEHAVIORAL HEALTH ASSESSMENT NOTE - NSBHSUICPROTECTFACT_PSY_A_CORE
Identifies reasons for living/Future oriented/Supportive social network or family/Fear of death or dying due to pain/suffering

## 2019-08-13 NOTE — PROGRESS NOTE ADULT - SUBJECTIVE AND OBJECTIVE BOX
SUBJECTIVE:  continues to have output per rectum,  learning how to irrigate distal limb of ileostomy  abd pain almost fully resolved  tolerating diet  _____________________________________________________  OBJECTIVE:    T(C): 36.7 (08-13-19 @ 14:21), Max: 37.1 (08-12-19 @ 18:01)  HR: 85 (08-13-19 @ 14:21)  BP: 109/68 (08-13-19 @ 14:21)  RR: 18 (08-13-19 @ 14:21)  SpO2: 97% (08-13-19 @ 14:21)  Wt(kg): --    08-12 @ 07:01  -  08-13 @ 07:00  --------------------------------------------------------  IN:    dextrose 5% + sodium chloride 0.45% with potassium chloride 20 mEq/L: 225 mL    IV PiggyBack: 800 mL    Oral Fluid: 1440 mL  Total IN: 2465 mL    OUT:    Ileostomy: 1400 mL    Voided: 1150 mL  Total OUT: 2550 mL    Total NET: -85 mL      08-13 @ 07:01  -  08-13 @ 16:54  --------------------------------------------------------  IN:    Oral Fluid: 450 mL  Total IN: 450 mL    OUT:    Ileostomy: 300 mL  Total OUT: 300 mL    Total NET: 150 mL        PHYSICAL EXAM:      Constitutional: nad    Gastrointestinal: soft ntnd +bs +rlq ileostomy      _____________________________________________________  LABS:                        10.2   6.6   )-----------( 265      ( 13 Aug 2019 07:32 )             32.3     08-13    136  |  101  |  8   ----------------------------<  91  3.9   |  24  |  1.04    Ca    8.7      13 Aug 2019 07:32  Phos  2.6     08-13  Mg     1.9     08-13        _____________________________________________________  ACTIVE MEDS:  MEDICATIONS  (STANDING):  acetaminophen   Tablet .. 650 milliGRAM(s) Oral once  aspirin enteric coated 81 milliGRAM(s) Oral daily  atorvastatin 40 milliGRAM(s) Oral at bedtime  cholecalciferol 1000 Unit(s) Oral daily  clopidogrel Tablet 75 milliGRAM(s) Oral daily  cyanocobalamin 1000 MICROGram(s) Oral daily  dextrose 5% + sodium chloride 0.45% with potassium chloride 20 mEq/L 1000 milliLiter(s) (75 mL/Hr) IV Continuous <Continuous>  escitalopram 5 milliGRAM(s) Oral daily  famotidine    Tablet 20 milliGRAM(s) Oral daily  hydroxychloroquine 200 milliGRAM(s) Oral daily  levothyroxine 100 MICROGram(s) Oral daily  melatonin 3 milliGRAM(s) Oral at bedtime  pantoprazole    Tablet 40 milliGRAM(s) Oral before breakfast  piperacillin/tazobactam IVPB.. 3.375 Gram(s) IV Intermittent every 8 hours    MEDICATIONS  (PRN):  ALPRAZolam 0.25 milliGRAM(s) Oral every 6 hours PRN anxiety  nystatin Cream 1 Application(s) Topical every 12 hours PRN Rash    _____________________________________________________  ASSESSMENT:  68yFemale with loop ileostomy and large colon stool burden responding to conservative management    PLAN:  care per surgical team  patient has appt with me next week  if able i would resume lomotil 1 tablet four times a day and eventually slowly re-introduce imodium as she has chronic high output ostomy and previously dehydrated to the point of renal failure    Kirill Esparza M.D.  [facility]  O) [phone]

## 2019-08-13 NOTE — PROGRESS NOTE ADULT - SUBJECTIVE AND OBJECTIVE BOX
Consult:  · Requested by Name:	Chauncey Bryan	  · Date/Time:	13-Aug-2019 	  · Reason for Referral/Consultation:	Obstruction/CAD S/p Recent PCI	  · Reason for Admission	Abdominal pain	    Assessment and Recommendation:   · Assessment		  **********              CARDIOLOGY CONSULT NOTE              ************  ============================================================  CHIEF COMPLAINT/REASON FOR CONSULT:  Patient is a 68y old  Female who presents with a chief complaint of Abdominal pain (09 Aug 2019 12:27)      HISTORY OF PRESENT ILLNESS:  68yFemale with a history of GERD, Sjogren's Treated With Prednisone, Ileostomy, Normal LVEF, Moderate MR, Borderline RVE, CAD S/p pRCA S/p RCA  (OM1 was 50%), - presenting with  large bowel obstruction secondary to large retained foci within the large bowel.  Briefly, patient had a stent placed a few weeks ago in the proximal RCA.   Was doing well until last week fell - never lost consciousness (mechanical fall) - had extensive ecchymoses on face and trunk and scalp laceration requiring staples at local er  No active CP/Palps/SOB.  Has remained on ASA & Plavix.                MEDS  aspirin enteric coated 81 milliGRAM(s) Oral daily  atorvastatin 80 milliGRAM(s) Oral at bedtime  clopidogrel Tablet 75 milliGRAM(s) Oral daily  hydroxychloroquine 200 milliGRAM(s) Oral daily  levothyroxine 100 MICROGram(s) Oral daily  nystatin Cream 1 Application(s) Topical every 12 hours PRN  pantoprazole    Tablet 40 milliGRAM(s) Oral before breakfast  piperacillin/tazobactam IVPB.. 3.375 Gram(s) IV Intermittent every 8 hours  polyethylene glycol 3350 17 Gram(s) Oral once    ============================================================  Interval Events   - S/p Colonoscopy  - Conservative miralax  - No reported worsening CP/Palps/SOB      ============================================================      Allergies    latex (Rash)  No Known Drug Allergies    Intolerances    	    MEDICATIONS:  aspirin enteric coated 81 milliGRAM(s) Oral daily  clopidogrel Tablet 75 milliGRAM(s) Oral daily    hydroxychloroquine 200 milliGRAM(s) Oral daily  piperacillin/tazobactam IVPB.. 3.375 Gram(s) IV Intermittent every 8 hours        pantoprazole    Tablet 40 milliGRAM(s) Oral before breakfast  polyethylene glycol 3350 17 Gram(s) Oral once    atorvastatin 80 milliGRAM(s) Oral at bedtime  levothyroxine 100 MICROGram(s) Oral daily    nystatin Cream 1 Application(s) Topical every 12 hours PRN      PAST MEDICAL & SURGICAL HISTORY:  CAD (coronary atherosclerotic disease)  Sciatica  Anxiety  GERD (gastroesophageal reflux disease)  Colonic dysmotility  Ileostomy in place: 2/2 SBO 2017  Sjogren's syndrome  H/O ileostomy  History of appendectomy  History of hysterectomy      FAMILY HISTORY:  FH: CHF (congestive heart failure): Mother, passed age 82  FH: myocardial infarction: Father, age 70  Family history of uterine cancer    Mother - HTN      SOCIAL HISTORY:    [ x] Non-smoker  [x] No Illicit Drug Use  [ x] No Excess EtOH Use      REVIEW OF SYSTEMS: (Unless + Before Symptom, it is negative)  Constitutional: [] Fever, [x]Fatigue, []Weight Changes  Eyes:  []Recent Vision Changes, []Eye Pain  ENT: []Congestion, []Sore Throat  Endocrine: []Excess Sweating, []Temperature Intolerance  Cardiovascular:  []Chest Pain, []Palpitations, []Shortness of Breath, []Pre-syncope, []Syncope,[] LE Swelling  Respiratory:[] Cough, []Congestion,[] Wheezing  Gastrointestinal: [x] Abdominal Pain,[] Nausea, []Vomiting  Genitourinary: []Dysuria,[] hematuria  Musculoskeletal: []Joint Pain, []Hip/Knee Injury  Neurologic: []Headaches,[] Imbalance, []Weakness  Skin: []Rashes, []hematoma, []purprura    ================================    PHYSICAL EXAM:  T(C): 37.2 (08-09-19 @ 21:14), Max: 38 (08-09-19 @ 15:17)  HR: 98 (08-09-19 @ 21:14) (85 - 104)  BP: 96/58 (08-09-19 @ 22:13) (89/52 - 112/70)  RR: 17 (08-09-19 @ 21:14) (16 - 20)  SpO2: 92% (08-09-19 @ 21:14) (92% - 99%)  Wt(kg): --  I&O's Summary    09 Aug 2019 07:01  -  09 Aug 2019 23:02  --------------------------------------------------------  IN: 340 mL / OUT: 550 mL / NET: -210 mL      Appearance: Normal; NAD +Fatigued  Psychiatry: AOx3; Normal Mood/Affect  HEENT:   Normal oral mucosa, EOMI +Scalp hematoma  Lymphatic: No lymphadenopathy  Cardiovascular: Normal S1 S2, No JVD, No murmurs, No edema  Respiratory: Lungs clear to auscultation, no use of accessory muscles	  Gastrointestinal:  Soft, Non-tender	  Skin: No rashes, No ecchymoses, No cyanosis	  Neurologic: Non-focal, No Focal Deficits  Extremities: Normal range of motion, No clubbing, cyanosis  Vascular: Peripheral pulses palpable 2+ bilaterally, no prominent varicosities    ============================    LABS:	   Labs [id number]    CBC Full  -  ( 09 Aug 2019 07:17 )  WBC Count : 14.0 K/uL  Hemoglobin : 11.2 g/dL  Hematocrit : 34.6 %  Platelet Count - Automated : 213 K/uL  Mean Cell Volume : 88.3 fl  Mean Cell Hemoglobin : 28.5 pg  Mean Cell Hemoglobin Concentration : 32.3 gm/dL  Auto Neutrophil # : 12.4 K/uL  Auto Lymphocyte # : 0.7 K/uL  Auto Monocyte # : 0.8 K/uL  Auto Eosinophil # : 0.0 K/uL  Auto Basophil # : 0.0 K/uL  Auto Neutrophil % : 88.6 %  Auto Lymphocyte % : 5.1 %  Auto Monocyte % : 5.9 %  Auto Eosinophil % : 0.1 %  Auto Basophil % : 0.3 %    08-09    131<L>  |  83<L>  |  26<H>  ----------------------------<  96  3.2<L>   |  32<H>  |  1.04    Ca    9.7      09 Aug 2019 07:17  Phos  3.4     08-09  Mg     1.7     08-09    TPro  7.3  /  Alb  4.2  /  TBili  1.1  /  DBili  x   /  AST  32  /  ALT  11  /  AlkPhos  65  08-09  ==================  TTE  < from: Transthoracic Echocardiogram (07.16.19 @ 10:59) >  EF (Teicholtz): 54 %  ------------------------------------------------------------------------  Observations:  Mitral Valve: Mitral annular calcification, otherwise  normal mitral valve. Moderate mitral regurgitation.  Aortic Valve/Aorta: Calcified trileaflet aortic valve with  normal opening.  Aortic Root: 2.8 cm.  Left Atrium: Moderate left atrial enlargement.  Left Ventricle: Normal left ventricular systolic function.  No segmental wall motion abnormalities. Normal left  ventricular internal dimensions and wall thicknesses.  Normal diastolic function.  Right Heart: Normal right atrium. Borderline right  ventricular enlargement with normal right ventricular  function. Normal tricuspid valve. Minimal tricuspid  regurgitation. Normal pulmonic valve.  Pericardium/Pleura: Normal pericardium with no pericardial  effusion.  Hemodynamic: Estimated right atrial pressure is 8 mm Hg.  ------------------------------------------------------------------------  Conclusions:  1. Moderate mitral regurgitation.  2. Normal left ventricular internal dimensions and wall  thicknesses.    < end of copied text >  ==========================    EKG  NSR, Non-specific TW changes Similar to Prior EKG    =========================================================================  ASSESSMENT:  68yFemale with a history of GERD, Sjogren's Treated With Prednisone, Ileostomy, Normal LVEF, Moderate MR, Borderline RVE, CAD S/p pRCA S/p RCA  (OM1 was 50%), - presenting with  large bowel obstruction secondary to large retained foci within the large bowel.  =========  RECOMMENDATIONS  - No active cardiac symptoms  - Remain On ASA/Plavix*  - Continue Rest of cardiac medications  - Please call me at [phone] with any questions.       Please call with questions.     Silvestre Rosen MD, FACC FNLA  [phone]

## 2019-08-14 ENCOUNTER — TRANSCRIPTION ENCOUNTER (OUTPATIENT)
Age: 69
End: 2019-08-14

## 2019-08-14 VITALS
OXYGEN SATURATION: 95 % | TEMPERATURE: 99 F | RESPIRATION RATE: 16 BRPM | DIASTOLIC BLOOD PRESSURE: 67 MMHG | HEART RATE: 77 BPM | SYSTOLIC BLOOD PRESSURE: 106 MMHG

## 2019-08-14 LAB
ANION GAP SERPL CALC-SCNC: 11 MMOL/L — SIGNIFICANT CHANGE UP (ref 5–17)
BUN SERPL-MCNC: 8 MG/DL — SIGNIFICANT CHANGE UP (ref 7–23)
CALCIUM SERPL-MCNC: 8.6 MG/DL — SIGNIFICANT CHANGE UP (ref 8.4–10.5)
CHLORIDE SERPL-SCNC: 103 MMOL/L — SIGNIFICANT CHANGE UP (ref 96–108)
CO2 SERPL-SCNC: 25 MMOL/L — SIGNIFICANT CHANGE UP (ref 22–31)
CREAT SERPL-MCNC: 1.03 MG/DL — SIGNIFICANT CHANGE UP (ref 0.5–1.3)
CULTURE RESULTS: SIGNIFICANT CHANGE UP
CULTURE RESULTS: SIGNIFICANT CHANGE UP
GLUCOSE SERPL-MCNC: 92 MG/DL — SIGNIFICANT CHANGE UP (ref 70–99)
HCT VFR BLD CALC: 30.4 % — LOW (ref 34.5–45)
HGB BLD-MCNC: 9.6 G/DL — LOW (ref 11.5–15.5)
MAGNESIUM SERPL-MCNC: 2.1 MG/DL — SIGNIFICANT CHANGE UP (ref 1.6–2.6)
MCHC RBC-ENTMCNC: 27.8 PG — SIGNIFICANT CHANGE UP (ref 27–34)
MCHC RBC-ENTMCNC: 31.6 GM/DL — LOW (ref 32–36)
MCV RBC AUTO: 87.9 FL — SIGNIFICANT CHANGE UP (ref 80–100)
PHOSPHATE SERPL-MCNC: 2.6 MG/DL — SIGNIFICANT CHANGE UP (ref 2.5–4.5)
PLATELET # BLD AUTO: 297 K/UL — SIGNIFICANT CHANGE UP (ref 150–400)
POTASSIUM SERPL-MCNC: 3.7 MMOL/L — SIGNIFICANT CHANGE UP (ref 3.5–5.3)
POTASSIUM SERPL-SCNC: 3.7 MMOL/L — SIGNIFICANT CHANGE UP (ref 3.5–5.3)
RBC # BLD: 3.45 M/UL — LOW (ref 3.8–5.2)
RBC # FLD: 12.5 % — SIGNIFICANT CHANGE UP (ref 10.3–14.5)
SODIUM SERPL-SCNC: 139 MMOL/L — SIGNIFICANT CHANGE UP (ref 135–145)
SPECIMEN SOURCE: SIGNIFICANT CHANGE UP
SPECIMEN SOURCE: SIGNIFICANT CHANGE UP
WBC # BLD: 7.8 K/UL — SIGNIFICANT CHANGE UP (ref 3.8–10.5)
WBC # FLD AUTO: 7.8 K/UL — SIGNIFICANT CHANGE UP (ref 3.8–10.5)

## 2019-08-14 PROCEDURE — 81001 URINALYSIS AUTO W/SCOPE: CPT

## 2019-08-14 PROCEDURE — 99285 EMERGENCY DEPT VISIT HI MDM: CPT | Mod: 25

## 2019-08-14 PROCEDURE — 83605 ASSAY OF LACTIC ACID: CPT

## 2019-08-14 PROCEDURE — 93005 ELECTROCARDIOGRAM TRACING: CPT

## 2019-08-14 PROCEDURE — 74018 RADEX ABDOMEN 1 VIEW: CPT

## 2019-08-14 PROCEDURE — 74177 CT ABD & PELVIS W/CONTRAST: CPT

## 2019-08-14 PROCEDURE — 85027 COMPLETE CBC AUTOMATED: CPT

## 2019-08-14 PROCEDURE — 84443 ASSAY THYROID STIM HORMONE: CPT

## 2019-08-14 PROCEDURE — 84295 ASSAY OF SERUM SODIUM: CPT

## 2019-08-14 PROCEDURE — 87040 BLOOD CULTURE FOR BACTERIA: CPT

## 2019-08-14 PROCEDURE — 82330 ASSAY OF CALCIUM: CPT

## 2019-08-14 PROCEDURE — 99232 SBSQ HOSP IP/OBS MODERATE 35: CPT

## 2019-08-14 PROCEDURE — 85730 THROMBOPLASTIN TIME PARTIAL: CPT

## 2019-08-14 PROCEDURE — 86901 BLOOD TYPING SEROLOGIC RH(D): CPT

## 2019-08-14 PROCEDURE — 74270 X-RAY XM COLON 1CNTRST STD: CPT

## 2019-08-14 PROCEDURE — 80048 BASIC METABOLIC PNL TOTAL CA: CPT

## 2019-08-14 PROCEDURE — 71045 X-RAY EXAM CHEST 1 VIEW: CPT

## 2019-08-14 PROCEDURE — 82947 ASSAY GLUCOSE BLOOD QUANT: CPT

## 2019-08-14 PROCEDURE — 82803 BLOOD GASES ANY COMBINATION: CPT

## 2019-08-14 PROCEDURE — 82435 ASSAY OF BLOOD CHLORIDE: CPT

## 2019-08-14 PROCEDURE — 86900 BLOOD TYPING SEROLOGIC ABO: CPT

## 2019-08-14 PROCEDURE — 76705 ECHO EXAM OF ABDOMEN: CPT

## 2019-08-14 PROCEDURE — 85014 HEMATOCRIT: CPT

## 2019-08-14 PROCEDURE — 87086 URINE CULTURE/COLONY COUNT: CPT

## 2019-08-14 PROCEDURE — 96374 THER/PROPH/DIAG INJ IV PUSH: CPT | Mod: XU

## 2019-08-14 PROCEDURE — 84132 ASSAY OF SERUM POTASSIUM: CPT

## 2019-08-14 PROCEDURE — 85610 PROTHROMBIN TIME: CPT

## 2019-08-14 PROCEDURE — 80053 COMPREHEN METABOLIC PANEL: CPT

## 2019-08-14 PROCEDURE — 84100 ASSAY OF PHOSPHORUS: CPT

## 2019-08-14 PROCEDURE — 83735 ASSAY OF MAGNESIUM: CPT

## 2019-08-14 PROCEDURE — 86850 RBC ANTIBODY SCREEN: CPT

## 2019-08-14 RX ORDER — LOPERAMIDE HCL 2 MG
2 TABLET ORAL
Qty: 0 | Refills: 0 | DISCHARGE

## 2019-08-14 RX ORDER — POLYETHYLENE GLYCOL 3350 17 G/17G
34 POWDER, FOR SOLUTION ORAL ONCE
Refills: 0 | Status: DISCONTINUED | OUTPATIENT
Start: 2019-08-14 | End: 2019-08-14

## 2019-08-14 RX ADMIN — PIPERACILLIN AND TAZOBACTAM 25 GRAM(S): 4; .5 INJECTION, POWDER, LYOPHILIZED, FOR SOLUTION INTRAVENOUS at 05:55

## 2019-08-14 RX ADMIN — PANTOPRAZOLE SODIUM 40 MILLIGRAM(S): 20 TABLET, DELAYED RELEASE ORAL at 05:55

## 2019-08-14 RX ADMIN — Medication 100 MICROGRAM(S): at 05:55

## 2019-08-14 RX ADMIN — Medication 200 MILLIGRAM(S): at 11:59

## 2019-08-14 RX ADMIN — PREGABALIN 1000 MICROGRAM(S): 225 CAPSULE ORAL at 11:59

## 2019-08-14 RX ADMIN — Medication 81 MILLIGRAM(S): at 11:59

## 2019-08-14 RX ADMIN — Medication 1 TABLET(S): at 05:55

## 2019-08-14 RX ADMIN — Medication 1 TABLET(S): at 18:16

## 2019-08-14 RX ADMIN — CLOPIDOGREL BISULFATE 75 MILLIGRAM(S): 75 TABLET, FILM COATED ORAL at 11:58

## 2019-08-14 RX ADMIN — Medication 1 TABLET(S): at 11:58

## 2019-08-14 RX ADMIN — Medication 1000 UNIT(S): at 11:59

## 2019-08-14 RX ADMIN — FAMOTIDINE 20 MILLIGRAM(S): 10 INJECTION INTRAVENOUS at 11:58

## 2019-08-14 NOTE — PROGRESS NOTE ADULT - ATTENDING COMMENTS
I have seen and evaluated patient and agree with resident assessment and plan.  Stercoral colitis improving clinically.  Has had multiple BMs.  awaiting colonoscopy to further evaluate.  Continue abx
I have seen and evaluated patient and agree with resident assessment and plan. Ostomy team to teach patient to irrigate distal limb of the loop ileostomy. This will be done at home every 2-4 weeks. Once arrangements made, discharge to home to resume antidiarrheals as needed because of high ostomy output. This will be managed by GI. Patient to complete one more week of oral antibiotics for stercoral colitis. Overall symptoms improved. No further abdominal pain or tenderness. Patient has had multiple BMs once antidiarrheals discontinued. Unfortunately she must restart these medications because of the high ostomy output.
I have seen and evaluated patient and discussed with team and GI. Since patient  from stercoral colitis will not irrigate: Through distal limb of loop ileostomy today. Next week patient should have colonoscopy performed with attempt at removal of stool with Kwong net.  If this can be accomplished several procedures may be needed to complete the task. I discussed this with the patient and her . The stool did appear to be mobile on the water-soluble contrast enema
I have seen and evaluated patient and agree with resident assessment and plan. Patient will be discharged home today with bowel regimen as per GI. Distal limb of the ileostomy flushing will be performed as an outpatient by enterostomal therapy team.
I have seen and evaluated patient and agree with resident edited assessment and plan

## 2019-08-14 NOTE — PROGRESS NOTE ADULT - ASSESSMENT
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  Has diverting loop ileostomy proximal to ileocolic resection.  Now with stercoral colitis on ABX.  Large fecal load in diverted colon.    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  Has diverting loop ileostomy proximal to ileocolic resection.  Now with stercoral colitis on ABX.  Large fecal load in diverted colon.    - LRD  - Ostomy flushed with miralax   - Cont Aspirin Plavix due to recent cardiac stent  - Staple couldn't be removed because of surrounding clot, patient wants plastics to evaluate wound  - Cont Zosyn   - Cont Home meds  - Psych recommened xanax 0.25 mg prn for anxiety    Green team  [id number]

## 2019-08-14 NOTE — PROGRESS NOTE ADULT - SUBJECTIVE AND OBJECTIVE BOX
Consult:  · Requested by Name:	Chauncey Bryan	  · Date/Time:	14-Aug-2019 	  · Reason for Referral/Consultation:	Obstruction/CAD S/p Recent PCI	  · Reason for Admission	Abdominal pain	    Assessment and Recommendation:   · Assessment		  **********              CARDIOLOGY CONSULT PROGRESS NOTE              ************  ============================================================  CHIEF COMPLAINT/REASON FOR CONSULT:  Patient is a 68y old  Female who presents with a chief complaint of Abdominal pain (09 Aug 2019 12:27)      HISTORY OF PRESENT ILLNESS:  68yFemale with a history of GERD, Sjogren's Treated With Prednisone, Ileostomy, Normal LVEF, Moderate MR, Borderline RVE, CAD S/p pRCA S/p RCA  (OM1 was 50%), - presenting with  large bowel obstruction secondary to large retained foci within the large bowel.  Briefly, patient had a stent placed a few weeks ago in the proximal RCA.   Was doing well until last week fell - never lost consciousness (mechanical fall) - had extensive ecchymoses on face and trunk and scalp laceration requiring staples at local er  No active CP/Palps/SOB.  Has remained on ASA & Plavix.                MEDS  aspirin enteric coated 81 milliGRAM(s) Oral daily  atorvastatin 80 milliGRAM(s) Oral at bedtime  clopidogrel Tablet 75 milliGRAM(s) Oral daily  hydroxychloroquine 200 milliGRAM(s) Oral daily  levothyroxine 100 MICROGram(s) Oral daily  nystatin Cream 1 Application(s) Topical every 12 hours PRN  pantoprazole    Tablet 40 milliGRAM(s) Oral before breakfast  piperacillin/tazobactam IVPB.. 3.375 Gram(s) IV Intermittent every 8 hours  polyethylene glycol 3350 17 Gram(s) Oral once    ============================================================  Interval Events   - S/p Colonoscopy  - Conservative miralax  - No reported worsening CP/Palps/SOB      ============================================================      Allergies    latex (Rash)  No Known Drug Allergies    Intolerances    	    MEDICATIONS:  aspirin enteric coated 81 milliGRAM(s) Oral daily  clopidogrel Tablet 75 milliGRAM(s) Oral daily    hydroxychloroquine 200 milliGRAM(s) Oral daily  piperacillin/tazobactam IVPB.. 3.375 Gram(s) IV Intermittent every 8 hours        pantoprazole    Tablet 40 milliGRAM(s) Oral before breakfast  polyethylene glycol 3350 17 Gram(s) Oral once    atorvastatin 80 milliGRAM(s) Oral at bedtime  levothyroxine 100 MICROGram(s) Oral daily    nystatin Cream 1 Application(s) Topical every 12 hours PRN      PAST MEDICAL & SURGICAL HISTORY:  CAD (coronary atherosclerotic disease)  Sciatica  Anxiety  GERD (gastroesophageal reflux disease)  Colonic dysmotility  Ileostomy in place: 2/2 SBO 2017  Sjogren's syndrome  H/O ileostomy  History of appendectomy  History of hysterectomy      FAMILY HISTORY:  FH: CHF (congestive heart failure): Mother, passed age 82  FH: myocardial infarction: Father, age 70  Family history of uterine cancer    Mother - HTN      SOCIAL HISTORY:    [ x] Non-smoker  [x] No Illicit Drug Use  [ x] No Excess EtOH Use      REVIEW OF SYSTEMS: (Unless + Before Symptom, it is negative)  Constitutional: [] Fever, [x]Fatigue, []Weight Changes  Eyes:  []Recent Vision Changes, []Eye Pain  ENT: []Congestion, []Sore Throat  Endocrine: []Excess Sweating, []Temperature Intolerance  Cardiovascular:  []Chest Pain, []Palpitations, []Shortness of Breath, []Pre-syncope, []Syncope,[] LE Swelling  Respiratory:[] Cough, []Congestion,[] Wheezing  Gastrointestinal: [x] Abdominal Pain,[] Nausea, []Vomiting  Genitourinary: []Dysuria,[] hematuria  Musculoskeletal: []Joint Pain, []Hip/Knee Injury  Neurologic: []Headaches,[] Imbalance, []Weakness  Skin: []Rashes, []hematoma, []purprura    ================================    PHYSICAL EXAM:  T(C): 37.2 (08-09-19 @ 21:14), Max: 38 (08-09-19 @ 15:17)  HR: 98 (08-09-19 @ 21:14) (85 - 104)  BP: 96/58 (08-09-19 @ 22:13) (89/52 - 112/70)  RR: 17 (08-09-19 @ 21:14) (16 - 20)  SpO2: 92% (08-09-19 @ 21:14) (92% - 99%)  Wt(kg): --  I&O's Summary    09 Aug 2019 07:01  -  09 Aug 2019 23:02  --------------------------------------------------------  IN: 340 mL / OUT: 550 mL / NET: -210 mL      Appearance: Normal; NAD +Fatigued  Psychiatry: AOx3; Normal Mood/Affect  HEENT:   Normal oral mucosa, EOMI +Scalp hematoma  Lymphatic: No lymphadenopathy  Cardiovascular: Normal S1 S2, No JVD, No murmurs, No edema  Respiratory: Lungs clear to auscultation, no use of accessory muscles	  Gastrointestinal:  Soft, Non-tender	  Skin: No rashes, No ecchymoses, No cyanosis	  Neurologic: Non-focal, No Focal Deficits  Extremities: Normal range of motion, No clubbing, cyanosis  Vascular: Peripheral pulses palpable 2+ bilaterally, no prominent varicosities    ============================    LABS:	   Labs [id number]    CBC Full  -  ( 09 Aug 2019 07:17 )  WBC Count : 14.0 K/uL  Hemoglobin : 11.2 g/dL  Hematocrit : 34.6 %  Platelet Count - Automated : 213 K/uL  Mean Cell Volume : 88.3 fl  Mean Cell Hemoglobin : 28.5 pg  Mean Cell Hemoglobin Concentration : 32.3 gm/dL  Auto Neutrophil # : 12.4 K/uL  Auto Lymphocyte # : 0.7 K/uL  Auto Monocyte # : 0.8 K/uL  Auto Eosinophil # : 0.0 K/uL  Auto Basophil # : 0.0 K/uL  Auto Neutrophil % : 88.6 %  Auto Lymphocyte % : 5.1 %  Auto Monocyte % : 5.9 %  Auto Eosinophil % : 0.1 %  Auto Basophil % : 0.3 %    08-09    131<L>  |  83<L>  |  26<H>  ----------------------------<  96  3.2<L>   |  32<H>  |  1.04    Ca    9.7      09 Aug 2019 07:17  Phos  3.4     08-09  Mg     1.7     08-09    TPro  7.3  /  Alb  4.2  /  TBili  1.1  /  DBili  x   /  AST  32  /  ALT  11  /  AlkPhos  65  08-09  ==================  TTE  < from: Transthoracic Echocardiogram (07.16.19 @ 10:59) >  EF (Teicholtz): 54 %  ------------------------------------------------------------------------  Observations:  Mitral Valve: Mitral annular calcification, otherwise  normal mitral valve. Moderate mitral regurgitation.  Aortic Valve/Aorta: Calcified trileaflet aortic valve with  normal opening.  Aortic Root: 2.8 cm.  Left Atrium: Moderate left atrial enlargement.  Left Ventricle: Normal left ventricular systolic function.  No segmental wall motion abnormalities. Normal left  ventricular internal dimensions and wall thicknesses.  Normal diastolic function.  Right Heart: Normal right atrium. Borderline right  ventricular enlargement with normal right ventricular  function. Normal tricuspid valve. Minimal tricuspid  regurgitation. Normal pulmonic valve.  Pericardium/Pleura: Normal pericardium with no pericardial  effusion.  Hemodynamic: Estimated right atrial pressure is 8 mm Hg.  ------------------------------------------------------------------------  Conclusions:  1. Moderate mitral regurgitation.  2. Normal left ventricular internal dimensions and wall  thicknesses.    < end of copied text >  ==========================    EKG  NSR, Non-specific TW changes Similar to Prior EKG    =========================================================================  ASSESSMENT:  68yFemale with a history of GERD, Sjogren's Treated With Prednisone, Ileostomy, Normal LVEF, Moderate MR, Borderline RVE, CAD S/p pRCA S/p RCA  (OM1 was 50%), - presenting with  large bowel obstruction secondary to large retained foci within the large bowel.  =========  RECOMMENDATIONS  - No active cardiac symptoms  - Remain On ASA/Plavix*  - Continue Rest of cardiac medications  - Please call me at [phone] with any questions.       Please call with questions.     Silvestre Rosen MD, FACC FNLA  [phone]

## 2019-08-14 NOTE — PROGRESS NOTE ADULT - PROVIDER SPECIALTY LIST ADULT
Cardiology
Gastroenterology
Surgery
Cardiology
Gastroenterology

## 2019-08-14 NOTE — DISCHARGE NOTE NURSING/CASE MANAGEMENT/SOCIAL WORK - NSDCDPATPORTLINK_GEN_ALL_CORE
You can access the [facility] Patient Portal, offered by [facility], by registering with the following website: [email or web address]

## 2019-08-14 NOTE — PROGRESS NOTE ADULT - SUBJECTIVE AND OBJECTIVE BOX
Green Team Surgery Progress Note     SUBJECTIVE / 24H EVENTS  Patient seen and examined on morning rounds. Ileostomy flushed. Staple attempted to be removed from forehead but was not found. Instead wound with fresh clot.     OBJECTIVE:    VITAL SIGNS:  T(C): 36.4 (08-14-19 @ 01:10), Max: 37.1 (08-13-19 @ 05:52)  HR: 79 (08-14-19 @ 01:10) (79 - 88)  BP: 101/63 (08-14-19 @ 01:10) (100/58 - 118/70)  RR: 18 (08-14-19 @ 01:10) (18 - 18)  SpO2: 96% (08-14-19 @ 01:10) (95% - 97%)      PHYSICAL EXAM:  Resp: airway patent, respirations unlabored  CVS: regular rate and rhythm  Abdomen: soft, tender in lower abdomen, mildly distended. Ostomy with good output  Extremities: WWP  Wound: Forehead lac 2x1 cm defect with fresh clot      08-12-19 @ 07:01  -  08-13-19 @ 07:00  --------------------------------------------------------  IN:    dextrose 5% + sodium chloride 0.45% with potassium chloride 20 mEq/L: 225 mL    IV PiggyBack: 800 mL    Oral Fluid: 1440 mL  Total IN: 2465 mL    OUT:    Ileostomy: 1400 mL    Voided: 1150 mL  Total OUT: 2550 mL    Total NET: -85 mL      08-13-19 @ 07:01  -  08-14-19 @ 04:16  --------------------------------------------------------  IN:    Oral Fluid: 1090 mL  Total IN: 1090 mL    OUT:    Ileostomy: 1050 mL  Total OUT: 1050 mL    Total NET: 40 mL          LAB VALUES:  08-13    136  |  101  |  8   ----------------------------<  91  3.9   |  24  |  1.04    Ca    8.7      13 Aug 2019 07:32  Phos  2.6     08-13  Mg     1.9     08-13                                 10.2   6.6   )-----------( 265      ( 13 Aug 2019 07:32 )             32.3       PT/INR - ( 12 Aug 2019 07:07 )   PT: 26.9 sec;   INR: 2.30 ratio         PTT - ( 12 Aug 2019 07:07 )  PTT:37.8 sec            MICROBIOLOGY:    No new microbiology data for review.     RADIOLOGY:    No new radiographic images for review.    MEDICATIONS  (STANDING):  aspirin enteric coated 81 milliGRAM(s) Oral daily  atorvastatin 40 milliGRAM(s) Oral at bedtime  cholecalciferol 1000 Unit(s) Oral daily  clopidogrel Tablet 75 milliGRAM(s) Oral daily  cyanocobalamin 1000 MICROGram(s) Oral daily  dextrose 5% + sodium chloride 0.45% with potassium chloride 20 mEq/L 1000 milliLiter(s) (75 mL/Hr) IV Continuous <Continuous>  diphenoxylate/atropine 1 Tablet(s) Oral four times a day  escitalopram 5 milliGRAM(s) Oral daily  famotidine    Tablet 20 milliGRAM(s) Oral daily  hydroxychloroquine 200 milliGRAM(s) Oral daily  levothyroxine 100 MICROGram(s) Oral daily  melatonin 3 milliGRAM(s) Oral at bedtime  pantoprazole    Tablet 40 milliGRAM(s) Oral before breakfast  piperacillin/tazobactam IVPB.. 3.375 Gram(s) IV Intermittent every 8 hours    MEDICATIONS  (PRN):  ALPRAZolam 0.25 milliGRAM(s) Oral every 6 hours PRN anxiety  nystatin Cream 1 Application(s) Topical every 12 hours PRN Rash Green Team Surgery Progress Note     SUBJECTIVE / 24H EVENTS  Patient seen and examined on morning rounds. Ileostomy flushed.   OBJECTIVE:    VITAL SIGNS:  T(C): 36.4 (08-14-19 @ 01:10), Max: 37.1 (08-13-19 @ 05:52)  HR: 79 (08-14-19 @ 01:10) (79 - 88)  BP: 101/63 (08-14-19 @ 01:10) (100/58 - 118/70)  RR: 18 (08-14-19 @ 01:10) (18 - 18)  SpO2: 96% (08-14-19 @ 01:10) (95% - 97%)      PHYSICAL EXAM:  Resp: airway patent, respirations unlabored  CVS: regular rate and rhythm  Abdomen: soft, tender in lower abdomen, mildly distended. Ostomy with good output  Extremities: WWP  Wound: Forehead lac 2x1 cm defect with fresh clot      08-12-19 @ 07:01  -  08-13-19 @ 07:00  --------------------------------------------------------  IN:    dextrose 5% + sodium chloride 0.45% with potassium chloride 20 mEq/L: 225 mL    IV PiggyBack: 800 mL    Oral Fluid: 1440 mL  Total IN: 2465 mL    OUT:    Ileostomy: 1400 mL    Voided: 1150 mL  Total OUT: 2550 mL    Total NET: -85 mL      08-13-19 @ 07:01  -  08-14-19 @ 04:16  --------------------------------------------------------  IN:    Oral Fluid: 1090 mL  Total IN: 1090 mL    OUT:    Ileostomy: 1050 mL  Total OUT: 1050 mL    Total NET: 40 mL          LAB VALUES:  08-13    136  |  101  |  8   ----------------------------<  91  3.9   |  24  |  1.04    Ca    8.7      13 Aug 2019 07:32  Phos  2.6     08-13  Mg     1.9     08-13                                 10.2   6.6   )-----------( 265      ( 13 Aug 2019 07:32 )             32.3       PT/INR - ( 12 Aug 2019 07:07 )   PT: 26.9 sec;   INR: 2.30 ratio         PTT - ( 12 Aug 2019 07:07 )  PTT:37.8 sec            MICROBIOLOGY:    No new microbiology data for review.     RADIOLOGY:    No new radiographic images for review.    MEDICATIONS  (STANDING):  aspirin enteric coated 81 milliGRAM(s) Oral daily  atorvastatin 40 milliGRAM(s) Oral at bedtime  cholecalciferol 1000 Unit(s) Oral daily  clopidogrel Tablet 75 milliGRAM(s) Oral daily  cyanocobalamin 1000 MICROGram(s) Oral daily  dextrose 5% + sodium chloride 0.45% with potassium chloride 20 mEq/L 1000 milliLiter(s) (75 mL/Hr) IV Continuous <Continuous>  diphenoxylate/atropine 1 Tablet(s) Oral four times a day  escitalopram 5 milliGRAM(s) Oral daily  famotidine    Tablet 20 milliGRAM(s) Oral daily  hydroxychloroquine 200 milliGRAM(s) Oral daily  levothyroxine 100 MICROGram(s) Oral daily  melatonin 3 milliGRAM(s) Oral at bedtime  pantoprazole    Tablet 40 milliGRAM(s) Oral before breakfast  piperacillin/tazobactam IVPB.. 3.375 Gram(s) IV Intermittent every 8 hours    MEDICATIONS  (PRN):  ALPRAZolam 0.25 milliGRAM(s) Oral every 6 hours PRN anxiety  nystatin Cream 1 Application(s) Topical every 12 hours PRN Rash

## 2019-08-20 ENCOUNTER — APPOINTMENT (OUTPATIENT)
Dept: SURGERY | Facility: CLINIC | Age: 69
End: 2019-08-20
Payer: MEDICARE

## 2019-08-20 PROCEDURE — 99213 OFFICE O/P EST LOW 20 MIN: CPT

## 2019-08-20 NOTE — PHYSICAL EXAM
[de-identified] : no palpable thyroid nodules [Laryngoscopy Performed] : laryngoscopy was performed, see procedure section for findings [Midline] : located in midline position [FreeTextEntry2] : facial swelling and ecchymosis [Normal] : orientation to person, place, and time: normal [de-identified] : dry mucous membranes.  no parotid or submandibular masses

## 2019-08-20 NOTE — HISTORY OF PRESENT ILLNESS
[de-identified] : prior evaluation of parotid swelling. history of sjogren's syndrome. recently hospitalized for intestinal obstruction, facial trauma s/p fall. no further salivary swelling since last visit

## 2019-08-26 ENCOUNTER — NON-APPOINTMENT (OUTPATIENT)
Age: 69
End: 2019-08-26

## 2019-08-26 ENCOUNTER — APPOINTMENT (OUTPATIENT)
Dept: CARDIOLOGY | Facility: CLINIC | Age: 69
End: 2019-08-26
Payer: MEDICARE

## 2019-08-26 VITALS
BODY MASS INDEX: 20.48 KG/M2 | SYSTOLIC BLOOD PRESSURE: 107 MMHG | DIASTOLIC BLOOD PRESSURE: 66 MMHG | HEART RATE: 72 BPM | OXYGEN SATURATION: 97 % | WEIGHT: 98 LBS

## 2019-08-26 PROBLEM — I25.10 ATHEROSCLEROTIC HEART DISEASE OF NATIVE CORONARY ARTERY WITHOUT ANGINA PECTORIS: Chronic | Status: ACTIVE | Noted: 2019-08-09

## 2019-08-26 PROCEDURE — 99215 OFFICE O/P EST HI 40 MIN: CPT

## 2019-08-26 PROCEDURE — 93000 ELECTROCARDIOGRAM COMPLETE: CPT

## 2019-08-26 NOTE — PHYSICAL EXAM
[General Appearance - Well Developed] : well developed [Normal Appearance] : normal appearance [Well Groomed] : well groomed [No Deformities] : no deformities [General Appearance - Well Nourished] : well nourished [General Appearance - In No Acute Distress] : no acute distress [Normal Conjunctiva] : the conjunctiva exhibited no abnormalities [Normal Oral Mucosa] : normal oral mucosa [Eyelids - No Xanthelasma] : the eyelids demonstrated no xanthelasmas [No Oral Pallor] : no oral pallor [No Oral Cyanosis] : no oral cyanosis [Normal Jugular Venous A Waves Present] : normal jugular venous A waves present [Normal Jugular Venous V Waves Present] : normal jugular venous V waves present [No Jugular Venous Tyler A Waves] : no jugular venous tyler A waves [Respiration, Rhythm And Depth] : normal respiratory rhythm and effort [Exaggerated Use Of Accessory Muscles For Inspiration] : no accessory muscle use [Auscultation Breath Sounds / Voice Sounds] : lungs were clear to auscultation bilaterally [Heart Rate And Rhythm] : heart rate and rhythm were normal [Heart Sounds] : normal S1 and S2 [Murmurs] : no murmurs present [Abdomen Soft] : soft [Abdomen Tenderness] : non-tender [Abdomen Mass (___ Cm)] : no abdominal mass palpated [Abnormal Walk] : normal gait [Gait - Sufficient For Exercise Testing] : the gait was sufficient for exercise testing [Nail Clubbing] : no clubbing of the fingernails [Cyanosis, Localized] : no localized cyanosis [Petechial Hemorrhages (___cm)] : no petechial hemorrhages [] : no ischemic changes [FreeTextEntry1] : + Rash LLE

## 2019-08-26 NOTE — DISCUSSION/SUMMARY
[FreeTextEntry1] : 68yFemale with a history of GERD, Sciatica,Ileostomy, Sjogren's Syndrome Treated With Steroids, Ostomy, HL, Initially Met By Me In Hospital After Presenting With Chest Pressure s/p PCI pRCA  07/2019; OM1 50% - presenting for cardiovascular evaluation \par ================\par ================\par TTE -  - Normal LVEF; Moderate MR; Borderline RVE\par LHC -  - S/p PCI pRCA; OM1 50% \par ================\par ================\par HL, Initially Met By Me In Hospital After Presenting With Chest Pressure s/p PCI pRCA  07/2019; OM1 50% \par - ASA\par - Plavix\par - Crestor Increased to 20mg \par - Ensure proper absorption with Gastroenterologist Dr. Kirill White\par \par Venous Insufficiency\par - Stable; Monitor \par - Consider CR\par \par \par \par \par \par \par \par \par \par \par \par \par \par \par \par \par \par \par \par \par \par \par \par \par \par Venous Insufficiency(Stable; Chronic). Hyperlipidemia (Stable; Chronic). Coronary Artery Disease (Stable; Chronic). Medication plan for these conditions noted above. \par Total Time Spent in face-to-face encounter was 45 minutes. >50% time spent in counseling and coordination of care and on addressing above medical conditions in assessment.\par All labs, imaging, consulting reports, and any relevant outside records including laboratory work personally reviewed in order to evaluate, manage, and coordinate care amongst providers.  Patient-Risk (High).\par \par \par \par \par \par \par \par \par \par \par

## 2019-08-26 NOTE — REASON FOR VISIT
[Follow-Up - Clinic] : a clinic follow-up of [FreeTextEntry2] : Hypertension. Hyperlipidemia. Atherosclerotic Cardiovascular Disease.

## 2019-08-26 NOTE — HISTORY OF PRESENT ILLNESS
[FreeTextEntry1] : 68yFemale with a history of GERD, Sciatica,Ileostomy, Sjogren's Syndrome Treated With Steroids, Ostomy, Initially Met By Me In Hospital After Presenting With Chest Pressure s/p PCI pRCA s/p PCI pRCA 07/2019; OM1 50% - presenting for cardiovascular evaluation \par ================\par ================\par 07/2019\par Briefly, limited in mobility primarily due to sciatica.\par TTE -  - Normal LVEF; Moderate MR; Borderline RVE\par LHC -  - S/p PCI pRCA; OM1 50% \par Walking 0.5 miles. \par +Rash LLE - venous insufficency.\par Concern that some pills may not be absorbed and are being found in ostomy bag. We discussed its importance. \par She is walking 1/2 mile and is considering CR. \par [place] will be going to [place] for the winter and were given Dr. Mendoza and Dr. Gagnon's name. \par Hyperlipidemia/Coronary Artery Disease - controlled on current treatment,\par Labs reviewed in EMR.\par -------------------\par 08/2019\par Distal to ileostomy. \par Bezoars coming home on flushes. \par

## 2019-08-28 ENCOUNTER — APPOINTMENT (OUTPATIENT)
Dept: INTERNAL MEDICINE | Facility: CLINIC | Age: 69
End: 2019-08-28
Payer: MEDICARE

## 2019-08-28 VITALS — SYSTOLIC BLOOD PRESSURE: 110 MMHG | RESPIRATION RATE: 14 BRPM | DIASTOLIC BLOOD PRESSURE: 66 MMHG | HEART RATE: 72 BPM

## 2019-08-28 VITALS — BODY MASS INDEX: 19.31 KG/M2 | HEIGHT: 58 IN | WEIGHT: 92 LBS

## 2019-08-28 PROCEDURE — 99214 OFFICE O/P EST MOD 30 MIN: CPT

## 2019-08-28 NOTE — PHYSICAL EXAM
[Normal] : normal rate, regular rhythm, normal S1 and S2 and no murmur heard [de-identified] : ecchymosis below both eyes much improved [de-identified] : abrasion left knee / ecchymosis left shoulder

## 2019-08-28 NOTE — HISTORY OF PRESENT ILLNESS
[de-identified] : Pt presents for f/u evaluation - was hospitalized a few weeks ago with intestinal obstruction - responded to catheter introduced Miralax into the distal limb of loop ileostomy.\par Pt also still recovering from trauma of fall earlier this month - still with bruising on face/ scalp.\par \par Saw Cardiology - lipids have decreased on Crestor.

## 2019-08-28 NOTE — ASSESSMENT
[FreeTextEntry1] : Labs reviewed.\par To repeat in two weeks.\par Continue all meds\par F/U OV 4 weeks.\par \par F/U with GI

## 2019-09-04 ENCOUNTER — MEDICATION RENEWAL (OUTPATIENT)
Age: 69
End: 2019-09-04

## 2019-09-10 ENCOUNTER — MEDICATION RENEWAL (OUTPATIENT)
Age: 69
End: 2019-09-10

## 2019-09-17 ENCOUNTER — APPOINTMENT (OUTPATIENT)
Dept: CARDIOLOGY | Facility: CLINIC | Age: 69
End: 2019-09-17

## 2019-09-29 ENCOUNTER — EMERGENCY (EMERGENCY)
Facility: HOSPITAL | Age: 69
LOS: 1 days | Discharge: HOME | End: 2019-09-29
Attending: EMERGENCY MEDICINE
Payer: MEDICARE

## 2019-09-29 VITALS
TEMPERATURE: 99 F | HEART RATE: 95 BPM | SYSTOLIC BLOOD PRESSURE: 112 MMHG | HEIGHT: 58 IN | DIASTOLIC BLOOD PRESSURE: 70 MMHG | WEIGHT: 91.93 LBS | RESPIRATION RATE: 18 BRPM | OXYGEN SATURATION: 100 %

## 2019-09-29 DIAGNOSIS — Z98.890 OTHER SPECIFIED POSTPROCEDURAL STATES: Chronic | ICD-10-CM

## 2019-09-29 DIAGNOSIS — Z90.710 ACQUIRED ABSENCE OF BOTH CERVIX AND UTERUS: Chronic | ICD-10-CM

## 2019-09-29 DIAGNOSIS — Z90.49 ACQUIRED ABSENCE OF OTHER SPECIFIED PARTS OF DIGESTIVE TRACT: Chronic | ICD-10-CM

## 2019-09-29 PROCEDURE — 99284 EMERGENCY DEPT VISIT MOD MDM: CPT | Mod: GC

## 2019-09-29 NOTE — ED ADULT NURSE NOTE - NSIMPLEMENTINTERV_GEN_ALL_ED
Implemented All Universal Safety Interventions:  [place] to call system. Call bell, personal items and telephone within reach. Instruct patient to call for assistance. Room bathroom lighting operational. Non-slip footwear when patient is off stretcher. Physically safe environment: no spills, clutter or unnecessary equipment. Stretcher in lowest position, wheels locked, appropriate side rails in place.

## 2019-09-29 NOTE — ED PROVIDER NOTE - FAMILY HISTORY
Father  Still living? Unknown  FH: myocardial infarction, Age at diagnosis: Age Unknown     Mother  Still living? Unknown  Family history of uterine cancer, Age at diagnosis: Age Unknown  FH: CHF (congestive heart failure), Age at diagnosis: Age Unknown

## 2019-09-29 NOTE — ED PROVIDER NOTE - PHYSICAL EXAMINATION
Gen: AAOx3, non-toxic  Head: NCAT  HEENT: EOMI, oral mucosa moist, normal conjunctiva  Lung: CTAB, no respiratory distress, no wheezes/rhonchi/rales B/L, speaking in full sentences  CV: RRR, no murmurs, rubs or gallops  Abd: diffuse abd tenderness, ileostomy in place - no output in bag  MSK: no visible deformities  Neuro: No focal sensory or motor deficits  Skin: Warm, well perfused, no rash  Psych: normal affect.   ~Duke Flood M.D. Resident

## 2019-09-29 NOTE — ED PROVIDER NOTE - ATTENDING CONTRIBUTION TO CARE
68yr F pmh GERD, Sjogren's Treated With Prednisone, Ileostomy, Normal LVEF, Moderate MR, Borderline RVE, CAD S/p pRCA S/p RCA  p/w decreased ileostomy output. 68yr F pmh GERD, Sjogren's Treated With Prednisone, Ileostomy, Normal LVEF, Moderate MR, Borderline RVE, CAD S/p pRCA S/p RCA  p/w decreased ileostomy output and abd pain. since arrival, she reports return of normal output but continues to have abd pain. pt reports abrupt onset of sx, no fever chills, no vomiting, felt nauesous, no cp, sob, no weakness no swelling of low ext.  exam notable for low abd ttp, clear ostomy output, mild prolapse of ileostomy proximal loop.  concern for proximal obstruction, infection, or colonic involvement.   will do labs, CT and consult colorectal surgery.

## 2019-09-29 NOTE — ED PROVIDER NOTE - CLINICAL SUMMARY MEDICAL DECISION MAKING FREE TEXT BOX
67 yo F PMHx Sjogren's, Raynaud's, SBO with resultant ileostomy, large bowel obstructions, p/w abd pain and dec ileostomy output, diffuse abd tenderness on exam, will CTAP to evaluate for bowel obstruction, other acute abd pathology, basic labs, pain control, reevaluate

## 2019-09-29 NOTE — ED PROVIDER NOTE - PATIENT PORTAL LINK FT
You can access the FollowMyHealth Patient Portal offered by [facility] by registering at the following website: [email or web address]. By joining [employer]’s FollowMyHealth portal, you will also be able to view your health information using other applications (apps) compatible with our system.

## 2019-09-29 NOTE — ED ADULT TRIAGE NOTE - NS ED TRIAGE AVPU SCALE
Alert-The patient is alert, awake and responds to voice. The patient is oriented to time, place, and person. The triage nurse is able to obtain subjective information. Unknown

## 2019-09-29 NOTE — ED PROVIDER NOTE - PROGRESS NOTE DETAILS
Duke Flood M.D. Resident: Surgery paged ATTG: : patient endorsed to me by Dr. Damico awaiting Ostomy Nurse to eval patient. I reviewed the results of the imaging and discussed with patient and her . pain improved but not completely resolved. Paged GI Dr. Floyd. awaiting response. ATTG: : I later spoke with Dr Hutchison covering for Dr. Floyd who defers to Surg and ED for management. no rec for abx / inpt care. no interventions. Currently patient is feeling better. tolerated PO trial. has appt tomorrow with Dr. Bryan. has appt on wed with GI Dr. Floyd and Dr. Coley. patient wants to go home. dc to follow with her specialists.  with her at the bedside.

## 2019-09-29 NOTE — ED ADULT NURSE NOTE - OBJECTIVE STATEMENT
67 y/o Female presenting to the ED ambulatory, A&Ox3, complaining of decreased ileostomy output and constant LLQ abdominal pain that she describes as feeling like a gas pain. Abdomen soft, nondistended, tender in the right lower quadrant. Pt hx of bowel obstruction with the ileostomy and gets her ileostomy irrigated every four weeks, pt reports it was irrigated yesterday around 11am and the pain began around 4pm. From 5pm-11pm tonight pt had no output in the ileostomy, pt reports drainage in the ileostomy now, drainage noted. Pt denies chest pain, shortness of breath, fever, chills, dizziness, blood in the stool, vomiting, hematemesis. Ileostomy stoma color WNL, no signs of infection. Safety and comfort measures provided and maintained, bed remains locked and in lowest position. Side rails up for safety.

## 2019-09-29 NOTE — ED PROVIDER NOTE - CARE PLAN
Principal Discharge DX:	Colostomy care  Secondary Diagnosis:	Abdominal pain  Secondary Diagnosis:	Colitis

## 2019-09-29 NOTE — ED PROVIDER NOTE - OBJECTIVE STATEMENT
69 yo F PMHx Sjogren's, Raynaud's, SBO with resultant ileostomy, large bowel obstructions, p/w abd pain and dec ileostomy output. Pt states abd pain began yesterday and today she only put out about 500 cc from her ileostomy bag. She has had dec PO today 2/2 pain. No fevers/chills, N/V. She has a home ostomy nurse that comes to flush her ostomy and encourage colon emptying.

## 2019-09-29 NOTE — ED PROVIDER NOTE - NS ED ROS FT
GENERAL: No fever or chills, EYES: no change in vision, HEENT: no trouble swallowing or speaking, CARDIAC: no chest pain, PULMONARY: no cough or SOB, GI: +abdominal pain, no nausea, no vomiting, no diarrhea or constipation, : No changes in urination, SKIN: no rashes, NEURO: no headache,  MSK: No joint pain ~Duke Flood M.D. Resident

## 2019-09-29 NOTE — ED PROVIDER NOTE - CHILD ABUSE FACILITY
[facility] Medical Necessity Statement: Based on my medical judgement, Mohs surgery is the most appropriate treatment for this cancer compared to other treatments.

## 2019-09-29 NOTE — ED PROVIDER NOTE - NSFOLLOWUPINSTRUCTIONS_ED_ALL_ED_FT
Abdominal Pain    Many things can cause abdominal pain. Many times, abdominal pain is not caused by a disease and will improve without treatment. Your health care provider will do a physical exam to determine if there is a dangerous cause of your pain; blood tests and imaging may help determine the cause of your pain. However, in many cases, no cause may be found and you may need further testing as an outpatient. Monitor your abdominal pain for any changes.     SEEK IMMEDIATE MEDICAL CARE IF YOU HAVE ANY OF THE FOLLOWING SYMPTOMS: worsening abdominal pain, uncontrollable vomiting, profuse diarrhea, inability to have bowel movements or pass gas, black or bloody stools, fever accompanying chest pain or back pain, or fainting. These symptoms may represent a serious problem that is an emergency. Do not wait to see if the symptoms will go away. Get medical help right away. Call 911 and do not drive yourself to the hospital.   Colostomy Irrigation      Colitis    WHAT YOU NEED TO KNOW:    Colitis is swelling and irritation of your colon. Colitis may be caused by ulcers or a problem with your immune system. Bacteria, a virus, or a parasite may also cause colitis. The cause may not be known. You may have diarrhea, abdominal pain, fever, or blood or mucus in your bowel movement.    DISCHARGE INSTRUCTIONS:    Return to the emergency department if:     You have sudden trouble breathing.       Your bowel movements are black or have blood in them.      You have blood in your vomit.      You have severe abdominal pain or your abdomen is swollen and feels hard.      You have any of the following signs of dehydration:   Dizziness or weakness      Dry mouth, cracked lips, or severe thirst      Fast heartbeat or breathing      Urinating very little or not at all    Contact your healthcare provider if:     Your symptoms get worse or do not go away.      You have a fever, chills, cough, or feel weak and achy.      You suddenly lose weight without trying.       You have questions or concerns about your condition or care.     Medicines:     Medicines may be given to decrease inflammation in your colon and treat diarrhea.      Take your medicine as directed. Contact your healthcare provider if you think your medicine is not helping or if you have side effects. Tell him of her if you are allergic to any medicine. Keep a list of the medicines, vitamins, and herbs you take. Include the amounts, and when and why you take them. Bring the list or the pill bottles to follow-up visits. Carry your medicine list with you in case of an emergency.    Manage your symptoms:     Drink liquids as directed to help prevent dehydration. Good liquids to drink include water, juice, and broth. Ask how much liquid to drink each day. You may need to drink an oral rehydration solution (ORS). An ORS contains a balance of water, salt, and sugar to replace body fluids lost during diarrhea.       Eat a variety of healthy foods. Healthy foods include fruits, vegetables, whole-grain breads, beans, low-fat dairy products, lean meats, and fish. You may need to eat several small meals throughout the day instead of large meals. Avoid spicy foods, caffeine, chocolate, and foods high in fat.      Talk to your healthcare provider before you take NSAIDs. NSAIDs can cause worsen your symptoms if ulcers are causing your colitis.       Start to exercise when you feel better. Regular exercise helps your bowels work normally. Ask about the best exercise plan for you.    Follow up with your healthcare provider as directed: You may need to return for a colonoscopy or other tests. Write down how often you have a bowel movements and what they look like. Bring this to your follow-up visits. Write down your questions so you remember to ask them during your visits.   Colostomy care:    Colostomy irrigation is a procedure you use to empty your bowel by putting liquid into your stoma. Your healthcare provider will tell you if you can irrigate your colostomy. Irrigation allows you to time your bowel movements. Your bowel movements need to be regular and free of problems before you can use a stoma cap and irrigation. Schedule regular times to irrigate your colostomy.    Irrigate your colostomy: Ask an ostomy nurse or someone specially trained in ostomy care how to irrigate your colostomy. Below are some general steps for irrigation:    Gather your supplies. You will need a plastic irrigating container with a long tube and a cone to put water into your colostomy. You will also need an irrigation sleeve that will direct the output into the toilet. You will need an adjustable belt to attach the irrigation sleeve and a tail closure for the end of the sleeve.      Choose the same time every day to irrigate. This will help decrease problems with your colostomy.      Know how much liquid to use. Fill the irrigating container with about 16 to 50 ounces (500 to 1500 mL) of lukewarm water. The water should not be cold or hot. Ask how much water you will need to irrigate. Hang the irrigation container so that it is level with your shoulder. Sit up straight on the toilet or on a chair next to the toilet.       Attach the irrigation sleeve to your stoma. Take the adjustable belt and attach it to the irrigation sleeve. Place the belt around your waist and place the sleeve over your stoma. Place the end of the irrigation sleeve into the toilet bowl.       Release air bubbles from the tubing. Release the clamp and allow a small amount of water to flow into the sleeve. Clamp the tubing again.      Moisten the end of the cone. Use water or a water-soluble lubricant.       Place the tip of the cone 3 inches into your stoma. Make sure the fit is snug, and do not force the cone. Release the clamp on the tubing again and slowly allow the water to flow into the stoma. This should take about 5 to 10 minutes. Keep the cone in place for another 10 seconds.       Remove the cone. Allow the output to drain into the irrigation sleeve for about 10 to 15 minutes. Dry the end of the irrigation sleeve. Clip the bottom of the sleeve to the top with a clasp or close the end of the sleeve with the tail closure. It may take 30 to 45 minutes to drain. You may move around during this time. Empty the output from the sleeve into the toilet. Clean the area around the stoma with mild soap and water and pat dry.    Contact your healthcare provider if:    You have a foul odor coming from your colostomy bag or stoma that lasts longer than a week.      Your skin around the stoma becomes red and irritated.      You have nausea, vomiting, pain, cramping, or bloating.      Your bowel movements change.      The size of your stoma changes.      You have questions or concerns about your condition or care.    Follow up with your healthcare provider as directed: Write down your questions so you remember to ask them during your visits.      Please follow with Dr. Bryan as scheduled for tomorrow at 10:30 am  Please follow with Dr. Floyd Wednesday at 11:30 and Dr. Coley at 9 am.

## 2019-09-30 ENCOUNTER — APPOINTMENT (OUTPATIENT)
Dept: INTERNAL MEDICINE | Facility: CLINIC | Age: 69
End: 2019-09-30

## 2019-09-30 VITALS
HEART RATE: 87 BPM | DIASTOLIC BLOOD PRESSURE: 59 MMHG | TEMPERATURE: 98 F | RESPIRATION RATE: 18 BRPM | SYSTOLIC BLOOD PRESSURE: 95 MMHG | OXYGEN SATURATION: 100 %

## 2019-09-30 PROCEDURE — 74177 CT ABD & PELVIS W/CONTRAST: CPT | Mod: 26

## 2019-09-30 PROCEDURE — 96361 HYDRATE IV INFUSION ADD-ON: CPT

## 2019-09-30 PROCEDURE — 74177 CT ABD & PELVIS W/CONTRAST: CPT

## 2019-09-30 PROCEDURE — 96360 HYDRATION IV INFUSION INIT: CPT | Mod: XU

## 2019-09-30 PROCEDURE — 99284 EMERGENCY DEPT VISIT MOD MDM: CPT | Mod: 25

## 2019-09-30 PROCEDURE — 80053 COMPREHEN METABOLIC PANEL: CPT

## 2019-09-30 PROCEDURE — 85027 COMPLETE CBC AUTOMATED: CPT

## 2019-09-30 RX ORDER — SODIUM CHLORIDE 9 MG/ML
1000 INJECTION INTRAMUSCULAR; INTRAVENOUS; SUBCUTANEOUS ONCE
Refills: 0 | Status: COMPLETED | OUTPATIENT
Start: 2019-09-30 | End: 2019-09-30

## 2019-09-30 RX ADMIN — SODIUM CHLORIDE 1000 MILLILITER(S): 9 INJECTION INTRAMUSCULAR; INTRAVENOUS; SUBCUTANEOUS at 00:56

## 2019-09-30 RX ADMIN — SODIUM CHLORIDE 1000 MILLILITER(S): 9 INJECTION INTRAMUSCULAR; INTRAVENOUS; SUBCUTANEOUS at 03:14

## 2019-09-30 NOTE — ADVANCED PRACTICE NURSE CONSULT - ASSESSMENT
Notified by pt's spouse of pt's readmittance to hospital. Chart reviewed & events noted to date. Ostomy RNs in to see upon family's request. Pt w/complaints of  abd pain (around belly button), felt like a "gas pain". Also noted stomal output decreased. Currently ileostomy functioning +pasty morgan yellow stool & pt endorses +stool via rectum. Pt also reports "no pain" at this time. Spoke at length w/pt & spouse as well as ER attending. Spoke w/surgical team earlier this am as stoma lavage was not necessary as stoma functioning, nor irrigation of distal lumen. Also spoke w/team re: follow up to discussed CT scan findings.

## 2019-09-30 NOTE — CONSULT NOTE ADULT - ASSESSMENT
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  She has diverting loop ileostomy proximal to ileocolic resection, CT scan showed persistent distention of the large bowel by heterogeneous, hyperdense materials in the transverse colon (possibly bezoars), and associated worsening colitis.    - Flush Ostomy with miralax, ostomy team to teach patient to irrigate distal limb of the loop ileostomy.

## 2019-09-30 NOTE — ADVANCED PRACTICE NURSE CONSULT - RECOMMEDATIONS
Pouching system intact; pt's LI pink & viable. Pt wears [place] 2 1/4"convex skin barrier, adapt ring & drainable pouch. Currently high output in place due to increase output. Support &encouragement provided. CWOCNs remain available for reconsultation.

## 2019-09-30 NOTE — CONSULT NOTE ADULT - SUBJECTIVE AND OBJECTIVE BOX
[facility] Colorectal Surgery Consultation       HPI:  67yo F with a medical history significant for SBO (2017) s/p ileostomy, CAD s/p GAUDENCIO x1 (July 2019), Sjogren's syndrome, hypothyroidism, colonic dysmotility, and GERD with abdominal pain and with history of colonic inertia, rectal prolapse and anal incontinece related to CREST.  She has diverting loop ileostomy proximal to ileocolic resection.  She was recently admitted in August 2019 with stercoral colitis,  large fecal load in diverted colon. improved on ABX. At that time, GI attempted colonoscopy but could not pass the anastamosis. She was given enema through the ileostomy and she improved. She presents with abdominal pain and decreased ileostomy output. She states abd pain began yesterday and today she only put out about 500 cc from her ileostomy bag. She has had dec PO today 2/2 pain. No fevers/chills, N/V. She has a home ostomy nurse that comes to flush her ostomy and encourage colon emptying.       PAST MEDICAL & SURGICAL HISTORY:  CAD (coronary atherosclerotic disease)  Sciatica  Anxiety  GERD (gastroesophageal reflux disease)  Colonic dysmotility  Ileostomy in place: 2/2 SBO 2017  Sjogren's syndrome  H/O ileostomy  History of appendectomy  History of hysterectomy      FAMILY HISTORY:  FH: CHF (congestive heart failure) (Mother): Mother, passed age 82  FH: myocardial infarction (Father): Father, age 70  Family history of uterine cancer (Mother)      SOCIAL HISTORY:  No tobacco, EtOH, or illicit drug uses. Lives with     MEDICATIONS:  Citracal Petites 200 mg-250 intl units oral tablet: 1 tab(s) orally 2 times a day (09 Aug 2019 12:20)  famotidine 20 mg oral tablet: 1 tab(s) orally once a day (09 Aug 2019 12:14)  hydroxychloroquine 200 mg oral tablet: 1 tab(s) orally once a day (17 Jul 2019 18:30)  Iron 100 Plus: 1 tab(s) orally once a day (17 Jul 2019 18:30)  Lomotil 2.5 mg-0.025 mg oral tablet: 1 tab(s) orally 4 times a day before meals and at bedtime    *Please see internal linda* (17 Jul 2019 18:30)  Mag-Ox 400 oral tablet: 1 tab(s) orally 4 times a day with meals &amp; bed (17 Jul 2019 18:30)  NexIUM 40 mg oral delayed release capsule: 1 cap(s) orally once a day (before breakfast)  (17 Jul 2019 18:30)  nystatin 100,000 units/g topical cream: Apply topically to affected area 3 times a day (17 Jul 2019 18:30)  Synthroid 100 mcg (0.1 mg) oral tablet: 1 tab(s) orally once a day (17 Jul 2019 18:30)  Vitamin B12 1000 mcg oral tablet: 1 tab(s) orally once a day (09 Aug 2019 12:20)  Vitamin D3 5000 intl units oral tablet: 1 tab(s) orally once a day (17 Jul 2019 18:30)  Zinc:  (17 Jul 2019 18:30)    Allergies    latex (Rash)  No Known Drug Allergies    Intolerances        Vital Signs Last 24 Hrs  T(C): 37.4 (30 Sep 2019 01:07), Max: 37.4 (30 Sep 2019 01:07)  T(F): 99.4 (30 Sep 2019 01:07), Max: 99.4 (30 Sep 2019 01:07)  HR: 90 (30 Sep 2019 01:07) (90 - 95)  BP: 104/70 (30 Sep 2019 01:07) (104/70 - 112/70)  BP(mean): --  RR: 17 (30 Sep 2019 01:07) (17 - 18)  SpO2: 100% (30 Sep 2019 01:07) (100% - 100%)  Daily Height in cm: 147.32 (29 Sep 2019 22:30)    Daily     PHYSICAL EXAM:   General: NAD, Lying in bed comfortably  Neuro: A+Ox3  Cardio: RRR  Resp: Good effort  GI/Abd: Soft, nondistended, no tenderness, stoma without palpation of parastomal hernia, no rebound/guarding, no masses palpated  Vascular: All 4 extremities warm.  Skin: Intact, no breakdown                       11.4   12.2  )-----------( 187      ( 30 Sep 2019 01:09 )             34.2     09-30    130<L>  |  88<L>  |  20  ----------------------------<  70  4.4   |  26  |  1.00    Ca    9.5      30 Sep 2019 01:09    TPro  7.1  /  Alb  3.7  /  TBili  1.2  /  DBili  x   /  AST  45<H>  /  ALT  15  /  AlkPhos  69  09-30          Radiographic Findings:   CT abd/pelvis:  LOWER CHEST: Subsegmental atelectasis. Coronary artery calcification.    LIVER: Within normal limits.  BILE DUCTS: Normal caliber.  GALLBLADDER: No significant gallbladder wall thickening.  SPLEEN: Within normal limits.  PANCREAS: Within normal limits.    ADRENALS: Within normal limits.  KIDNEYS/URETERS: Again noted, mild bilateral hydroureteronephrosis to the   level of the urinary bladder. Subcentimeter hypodense focus in the right   kidney, too small to characterize.  BLADDER: Distended.  REPRODUCTIVE ORGANS: Hysterectomy. No obvious adnexal mass.    BOWEL: Postsurgical changes. Ileocolic anastomosis and right lower   quadrant ileostomy. Again noted, distended large bowel by heterogeneous,   mottled hyperdense materials in the transverse colon (possibly bezoars).   Increased extent of colon wall thickening (notably transverse colon) with   surrounding stranding and free fluid since prior study. Fluid-filled   descending/sigmoid colon.  PERITONEUM: No free air. Small free fluid. No drainable fluid collection.  VESSELS: Atherosclerosis.  RETROPERITONEUM/LYMPH NODES: No lymphadenopathy.    ABDOMINAL WALL: Right lower quadrant ileostomy.  BONES: Degenerative changes of the spine. Stable grade 1 anterolisthesis   of L5 on S1.    IMPRESSION:    Persistent distention of the large bowel by heterogeneous, mottled   hyperdense materials in the transverse colon (possibly bezoars).   Increased extent of transverse colon wall thickening with surrounding   stranding and free fluid since 8/9/2019, suggesting worsening colitis.      Persistent bilateral hydroureteronephrosis, which may be due to distended   bladder.    Additional findings as described.

## 2019-09-30 NOTE — ED ADULT NURSE REASSESSMENT NOTE - NS ED NURSE REASSESS COMMENT FT1
Bladder scan done per MD Navarrete order. Post void scan showing 20cc, MD aware.
Pt asleep on stretcher awaiting CT results at this time. Safety and comfort measures maintained, bed remains locked and lowest position. Side rails up for safety.
Pt resting comfortably, in no acute distress. Awaiting ileostomy to be flushed.
Pt steadily walked to the bathroom steady gait noted.
Surgery at bedside.
yes

## 2019-10-01 ENCOUNTER — APPOINTMENT (OUTPATIENT)
Dept: SURGERY | Facility: CLINIC | Age: 69
End: 2019-10-01
Payer: MEDICARE

## 2019-10-01 VITALS
HEART RATE: 73 BPM | DIASTOLIC BLOOD PRESSURE: 53 MMHG | TEMPERATURE: 97.9 F | OXYGEN SATURATION: 99 % | RESPIRATION RATE: 15 BRPM | SYSTOLIC BLOOD PRESSURE: 113 MMHG

## 2019-10-01 DIAGNOSIS — Z93.2 ILEOSTOMY STATUS: ICD-10-CM

## 2019-10-01 PROCEDURE — 99214 OFFICE O/P EST MOD 30 MIN: CPT

## 2019-10-01 RX ORDER — ASPIRIN 81 MG/1
81 TABLET ORAL
Qty: 90 | Refills: 1 | Status: DISCONTINUED | COMMUNITY
Start: 2019-08-05 | End: 2019-10-01

## 2019-10-02 ENCOUNTER — OTHER (OUTPATIENT)
Age: 69
End: 2019-10-02

## 2019-10-02 ENCOUNTER — APPOINTMENT (OUTPATIENT)
Dept: INTERNAL MEDICINE | Facility: CLINIC | Age: 69
End: 2019-10-02
Payer: MEDICARE

## 2019-10-02 VITALS — SYSTOLIC BLOOD PRESSURE: 118 MMHG | DIASTOLIC BLOOD PRESSURE: 60 MMHG | RESPIRATION RATE: 14 BRPM | HEART RATE: 72 BPM

## 2019-10-02 DIAGNOSIS — Z23 ENCOUNTER FOR IMMUNIZATION: ICD-10-CM

## 2019-10-02 PROCEDURE — 90471 IMMUNIZATION ADMIN: CPT | Mod: GY

## 2019-10-02 PROCEDURE — 90750 HZV VACC RECOMBINANT IM: CPT | Mod: GY

## 2019-10-02 PROCEDURE — 99214 OFFICE O/P EST MOD 30 MIN: CPT | Mod: 25

## 2019-10-05 ENCOUNTER — APPOINTMENT (OUTPATIENT)
Dept: INTERNAL MEDICINE | Facility: CLINIC | Age: 69
End: 2019-10-05
Payer: MEDICARE

## 2019-10-05 DIAGNOSIS — L03.119 CELLULITIS OF UNSPECIFIED PART OF LIMB: ICD-10-CM

## 2019-10-05 PROCEDURE — 99213 OFFICE O/P EST LOW 20 MIN: CPT

## 2019-10-05 RX ORDER — CEFADROXIL 500 MG/1
500 CAPSULE ORAL TWICE DAILY
Qty: 10 | Refills: 0 | Status: COMPLETED | COMMUNITY
Start: 2019-10-05 | End: 2019-10-10

## 2019-10-05 NOTE — ASSESSMENT
[FreeTextEntry1] : hospital bloods reviewed\par For blood work next week.\par \par Consider increasing frequency of ileostomy irrigation with miralax.\par \par GI f/u later today.\par \par

## 2019-10-05 NOTE — PHYSICAL EXAM
[Normal] : normal rate, regular rhythm, normal S1 and S2 and no murmur heard [de-identified] : erythema top of left foot, no warmth, no discharge

## 2019-10-05 NOTE — ASSESSMENT
[FreeTextEntry1] : insect bite vs cellulitis.\par \par Observe for 24 hours -if redness expands or still present tomorrow to begin duricef bid for 5 days.\par F/U if symptoms do not resolve, or if they should change/worsen.\par

## 2019-10-05 NOTE — HISTORY OF PRESENT ILLNESS
[FreeTextEntry8] : Pt presents for evaluation of left foot pain for one day.\par Top of foot is red.\par \par No fever/chills.

## 2019-10-05 NOTE — HISTORY OF PRESENT ILLNESS
[de-identified] : Pt presents for evaluation s/p ED visit on 9/30 with left sided abdominal pain. CT showed persistant distention of the large bowel by heterogenous, mottled hyperdense materials in the transverse colon (possible bezoars) and transverse colon wall thickening suggesting worsening colitis. She received a Miralax irrigation of distal limb of ileostomy.\par She is feeling improved.\par to see GI later today.\par Also with b/l hydroureteronephrosis, likely due to distended bladder.\par

## 2019-10-05 NOTE — PHYSICAL EXAM
[Normal] : no carotid or abdominal bruits heard, no varicosities, pedal pulses are present, no peripheral edema, no extremity clubbing or cyanosis and no palpable aorta [de-identified] : erythema top of left foot, no warmth, no discharge

## 2019-10-14 ENCOUNTER — RX RENEWAL (OUTPATIENT)
Age: 69
End: 2019-10-14

## 2019-10-18 ENCOUNTER — APPOINTMENT (OUTPATIENT)
Dept: UROLOGY | Facility: CLINIC | Age: 69
End: 2019-10-18
Payer: MEDICARE

## 2019-10-18 ENCOUNTER — FORM ENCOUNTER (OUTPATIENT)
Age: 69
End: 2019-10-18

## 2019-10-18 VITALS
DIASTOLIC BLOOD PRESSURE: 68 MMHG | WEIGHT: 90 LBS | RESPIRATION RATE: 16 BRPM | HEART RATE: 89 BPM | BODY MASS INDEX: 19.41 KG/M2 | SYSTOLIC BLOOD PRESSURE: 109 MMHG | HEIGHT: 57 IN

## 2019-10-18 DIAGNOSIS — Z80.42 FAMILY HISTORY OF MALIGNANT NEOPLASM OF PROSTATE: ICD-10-CM

## 2019-10-18 DIAGNOSIS — Z95.5 PRESENCE OF CORONARY ANGIOPLASTY IMPLANT AND GRAFT: ICD-10-CM

## 2019-10-18 DIAGNOSIS — M35.00 SICCA SYNDROME, UNSPECIFIED: ICD-10-CM

## 2019-10-18 DIAGNOSIS — Z87.19 PERSONAL HISTORY OF OTHER DISEASES OF THE DIGESTIVE SYSTEM: ICD-10-CM

## 2019-10-18 PROCEDURE — 99204 OFFICE O/P NEW MOD 45 MIN: CPT

## 2019-10-18 NOTE — REVIEW OF SYSTEMS
[Feeling Tired] : feeling tired [Recent Weight Loss (___ Lbs)] : recent [unfilled] ~Ulb weight loss [Dry Eyes] : dryness of the eyes [Diarrhea] : diarrhea [Heartburn] : heartburn [Wake up at night to urinate  How many times?  ___] : wakes up to urinate [unfilled] times during the night [Joint Pain] : joint pain [Itching] : itching [Anxiety] : anxiety [Negative] : Heme/Lymph

## 2019-10-19 ENCOUNTER — APPOINTMENT (OUTPATIENT)
Dept: ULTRASOUND IMAGING | Facility: CLINIC | Age: 69
End: 2019-10-19
Payer: MEDICARE

## 2019-10-19 ENCOUNTER — OUTPATIENT (OUTPATIENT)
Dept: OUTPATIENT SERVICES | Facility: HOSPITAL | Age: 69
LOS: 1 days | End: 2019-10-19
Payer: MEDICARE

## 2019-10-19 DIAGNOSIS — Z98.890 OTHER SPECIFIED POSTPROCEDURAL STATES: Chronic | ICD-10-CM

## 2019-10-19 DIAGNOSIS — Z90.710 ACQUIRED ABSENCE OF BOTH CERVIX AND UTERUS: Chronic | ICD-10-CM

## 2019-10-19 DIAGNOSIS — N13.30 UNSPECIFIED HYDRONEPHROSIS: ICD-10-CM

## 2019-10-19 DIAGNOSIS — Z90.49 ACQUIRED ABSENCE OF OTHER SPECIFIED PARTS OF DIGESTIVE TRACT: Chronic | ICD-10-CM

## 2019-10-19 PROCEDURE — 76770 US EXAM ABDO BACK WALL COMP: CPT | Mod: 26

## 2019-10-19 PROCEDURE — 76770 US EXAM ABDO BACK WALL COMP: CPT

## 2019-10-21 LAB
APPEARANCE: CLEAR
BACTERIA UR CULT: ABNORMAL
BACTERIA: NEGATIVE
BILIRUBIN URINE: NEGATIVE
BLOOD URINE: NEGATIVE
COLOR: YELLOW
GLUCOSE QUALITATIVE U: NEGATIVE
HYALINE CASTS: 0 /LPF
KETONES URINE: NEGATIVE
LEUKOCYTE ESTERASE URINE: NEGATIVE
MICROSCOPIC-UA: NORMAL
NITRITE URINE: NEGATIVE
PH URINE: 6.5
PROTEIN URINE: NEGATIVE
RED BLOOD CELLS URINE: 1 /HPF
SPECIFIC GRAVITY URINE: 1.01
SQUAMOUS EPITHELIAL CELLS: 2 /HPF
UROBILINOGEN URINE: NORMAL
WHITE BLOOD CELLS URINE: 1 /HPF

## 2019-10-21 NOTE — ASSESSMENT
[FreeTextEntry1] : Unclear if truly with incomplete emptying and hydro that is constant vs at the time from bowel issues. With hx of prolapse, may have obstructive component vs dysfunctional voiding from the past. \par --Renal/bladder US to assess hydro and emptying. if hydro persists, will need CIC teaching

## 2019-10-21 NOTE — HISTORY OF PRESENT ILLNESS
[FreeTextEntry1] : 67yo female with cc of mild B hydro and distended bladder. SHe has hx of pelvic organ prolapse with repair including sacral colpopexy, supracervical hysterectomy, BSO, Traore urethropexy with a LAR (for ? redundant colon) 8y ago. Lasted for 5y and then had open repair with mesh. After this, developed SBO and in 2017 has ex-lap, JUANPABLO with ileocolic resection with primary anastamosis and diverting loop ileostomy. Did well for 2y until Aug when she developed decreased ilieostomy output. Had CT scan that showed ? bezoars and colitis. Incidental note of B mild hydro and distended bladder. Had distal stomal flush. Improved and was discharged. Still is having issues with pain. Went back to ER. Had CT scan on 9/30 that showed again B mild hydro with distended bladder. Again also with bezoars and mild colitis. When she was admitted, was told she needed a catheter. She had bladder scan and was told it was only 20cc and so no bridges was placed. \par \par At baseline, goes to the bathroom every 2h. No straining. Gets up every 2h at night but not clearly because of urine output. No issues with UTIs.

## 2019-10-21 NOTE — PHYSICAL EXAM
[Abdomen Soft] : soft [Abdomen Tenderness] : non-tender [Costovertebral Angle Tenderness] : no ~M costovertebral angle tenderness [General Appearance - Well Developed] : well developed [General Appearance - In No Acute Distress] : no acute distress [Edema] : no peripheral edema [Exaggerated Use Of Accessory Muscles For Inspiration] : no accessory muscle use [Skin Color & Pigmentation] : normal skin color and pigmentation [Normal Station and Gait] : the gait and station were normal for the patient's age [Oriented To Time, Place, And Person] : oriented to person, place, and time [No Focal Deficits] : no focal deficits [Supraclavicular Lymph Nodes Enlarged Bilaterally] : supraclavicular [Cervical Lymph Nodes Enlarged Anterior Bilaterally] : anterior cervical [FreeTextEntry1] : ileostomy in place, thin

## 2019-10-23 DIAGNOSIS — F41.9 ANXIETY DISORDER, UNSPECIFIED: ICD-10-CM

## 2019-10-30 ENCOUNTER — APPOINTMENT (OUTPATIENT)
Dept: COLORECTAL SURGERY | Facility: CLINIC | Age: 69
End: 2019-10-30
Payer: MEDICARE

## 2019-10-30 VITALS
HEIGHT: 58 IN | BODY MASS INDEX: 18.89 KG/M2 | DIASTOLIC BLOOD PRESSURE: 71 MMHG | WEIGHT: 90 LBS | HEART RATE: 89 BPM | SYSTOLIC BLOOD PRESSURE: 108 MMHG | OXYGEN SATURATION: 98 % | RESPIRATION RATE: 16 BRPM

## 2019-10-30 PROCEDURE — 99204 OFFICE O/P NEW MOD 45 MIN: CPT

## 2019-10-30 NOTE — ASSESSMENT
[FreeTextEntry1] : Patient now back to baseline. She will use antidiarrheals as needed for high ostomy output and continue distal loop ileostomy irrigations. Colectomy high risk for this patient who has had difficult laparotomies in the past based on previous operative notes. Followup with GI. Followup with me pashwin.

## 2019-10-30 NOTE — PROCEDURE
[FreeTextEntry1] : Unfortunate 68-year-old white female with a long-standing history of colonic inertia and crest syndrome.\par \par Many years ago she underwent surgery for rectal prolapse. She developed a recurrence and was reoperated on with mesh suspension and a bladder suspension being performed. She's had continued issues with bowel obstructions and eventually was in [place] and  required an emergency operation for recurrent small bowel obstruction. An ileocolic resection with diverting ileostomy was performed at the [facility] by Dr. Ady Kothari. She was advised to keep the ileostomy in place due to her colonic inertia. Of late she has been experiencing large bowel obstructions that are thought to be related to inspissated mucus. Various methods have been used to help with this problem including Hypaque enema, antegrade irrigations via her recessive  limb of the ileostomy and recently some warm water enemas from below. Lastly, she recently had a cardiac stent placed and is now on antiplatelet medications.\par \par I certainly believe that the patient's ileostomy should stay in place. Hopefully the situation with her large bowel obstructions can be avoided by ongoing intermittent antegrade flushes via the distal limb of her ileostomy.If she required surgery this cannot be done safely for at least 6-12 months  when her antiplatelet meds can be held related to her cardiac stent placement. If she has recurrent bouts of obstruction requiring emergency room visits or hospitalizations and if all other conservative measures have failed, the last resort would be a proctocolectomy. Since this would be done for colonic inertia her anus would not need to be removed and simply dissection could be done from above stapling just at the level of the sphincter.\par \par Regardless, I hope we can avoid  a proctocolectomy and I advised the patient and her  to continue attempts at her antegrade irrigations

## 2019-10-30 NOTE — PHYSICAL EXAM
[Abdomen Masses] : No abdominal masses [Abdomen Tenderness] : ~T No ~M abdominal tenderness [de-identified] : Non distended

## 2019-10-30 NOTE — HISTORY OF PRESENT ILLNESS
[FreeTextEntry1] : Siena is a 67 y/o female here for follow up visit. Patient with a PMH of CREST syndrome, colonic inertia, recurrent rectal prolapse and anal incontinence. Patient is s/p low anterior resection, rigid proctosigmoidoscopy, JUANPABLO and reduction of internal hernia on 1/30/11 at [facility] [place] [facility] indicated for rectal prolapse. In 2016 patient underwent ventral mesh rectopexy with re-suspension of the bladder on 9/13/16 at [facility] [place] [facility]. In 2017 patient was diagnosed with a small bowel obstruction, s/p laparotomy, extensive JUANPABLO, ileocolic resection with primary anastomosis and diverting loop ileostomy at [facility]. Last seen on 6/20/19, it was not recommended to reverse the patient's loop ileostomy. \par \par Patient presented to [facility] ED on 9/29/19 with left sided abdominal pain. CT from 9/30/19 demonstrated persistent distention of the large bowel by heterogenous, mottled hyperdense materials in the transverse colon (possible bezoars). Increased extent of transverse colon wall thickening with surrounding stranding and free fluid since 8/9/2019, suggesting worsening colitis. Persistent bilateral hydroureteronephrosis, which may be due to distended bladder. It was recommended patient continue intermittent MiraLAX irrigation of distal limb of ileostomy

## 2019-10-31 NOTE — PHYSICAL EXAM
[Normal Breath Sounds] : Normal breath sounds [Normal Heart Sounds] : normal heart sounds [Normal Rate and Rhythm] : normal rate and rhythm [No Rash or Lesion] : No rash or lesion [Alert] : alert [Oriented to Person] : oriented to person [Oriented to Place] : oriented to place [Oriented to Time] : oriented to time [Calm] : calm [de-identified] : round, NT/ND, Ostomy in place [de-identified] : normal female [de-identified] : NC/AT [de-identified] : ARY/+ROM [de-identified] : Intact

## 2019-10-31 NOTE — HISTORY OF PRESENT ILLNESS
[FreeTextEntry1] : Patient is a 69 yo WF here to discuss "next steps" regarding her ileostomy.  She was in [place] and required an emergent surgery for an SBO including creation of an ileostomy.  Most recently she required a cardiac stent and is taking Plavix.  Her ostomy output stopped and she was found to have a LBO.  She was given a Hypaque enema that did resolve it.  Presently she is doing a distal stoma flush every 2-4 weeks.  She developed abdominal pain again but did not have a blockage that time so they cut back on the amount of flush but using it every 2 weeks.  \par \par She does have an extensive history of abdominal surgery in the past.

## 2019-10-31 NOTE — REASON FOR VISIT
[Consultation] : a consultation visit [Spouse] : spouse [FreeTextEntry1] : Patient intake forms reviewed

## 2019-10-31 NOTE — REVIEW OF SYSTEMS
[As Noted in HPI] : as noted in HPI [Heartburn] : heartburn [Arthralgias] : arthralgias [Negative] : Heme/Lymph [FreeTextEntry3] : wears glasses [FreeTextEntry5] : Cath Stent [FreeTextEntry9] : Back pain

## 2019-10-31 NOTE — ASSESSMENT
[FreeTextEntry1] : 68-year-old white female with a very long-standing history of colonic inertia. The patient eventually developed rectal prolapse and underwent a surgical procedure to repair. She developed a recurrence and bladder prolapse and had mesh suspensory procedures for these problems. Patient had ongoing issues with her GI tract including multiple small bowel obstructions. She eventually required a laparotomy and a bowel resection at the [facility] in [place] and a diverting ileostomy was constructed. She was advised to keep the ileostomy in place due to her known colonic inertia. Patient also had a cardiac stent placed recently and is on antiplatelet meds.\par \par Regretfully, she has developed a few large bowel obstructions believed to be related to inspissated mucus. These issues have resolved with Hypaque enema or antegrade irrigations via the recessive limb of her loop ileostomy.\par \par The last thing that we would like to do is a proctocolectomy on this patient if it can be avoided. I would strongly advocate ongoing antegrade irrigations via her ileostomy. She may also attempt some small warm water enemas from below. If she is unfortunate enough to develop recurrent significant large bowel obstructions requiring emergency room evaluation and hospitalizations, the only alternative would be a pan proctocolectomy. Since this would be done for colonic inertia her anus could be left in place with the rectum being stapled at the top of the anus. This would at least avoid the morbidity of the anal incision. Hopefully this will not come to pass.\par \par For now, I would advocate ongoing antegrade irrigations via the recessive limb of her ileostomy and occasional small warm water enemas from below.

## 2019-11-11 ENCOUNTER — APPOINTMENT (OUTPATIENT)
Dept: CARDIOLOGY | Facility: CLINIC | Age: 69
End: 2019-11-11
Payer: MEDICARE

## 2019-11-11 ENCOUNTER — APPOINTMENT (OUTPATIENT)
Dept: INTERNAL MEDICINE | Facility: CLINIC | Age: 69
End: 2019-11-11
Payer: MEDICARE

## 2019-11-11 ENCOUNTER — NON-APPOINTMENT (OUTPATIENT)
Age: 69
End: 2019-11-11

## 2019-11-11 VITALS — HEART RATE: 84 BPM | SYSTOLIC BLOOD PRESSURE: 114 MMHG | RESPIRATION RATE: 14 BRPM | DIASTOLIC BLOOD PRESSURE: 70 MMHG

## 2019-11-11 VITALS
SYSTOLIC BLOOD PRESSURE: 117 MMHG | HEIGHT: 58 IN | WEIGHT: 92 LBS | HEART RATE: 95 BPM | BODY MASS INDEX: 19.31 KG/M2 | DIASTOLIC BLOOD PRESSURE: 70 MMHG

## 2019-11-11 DIAGNOSIS — R09.1 PLEURISY: ICD-10-CM

## 2019-11-11 PROCEDURE — 93000 ELECTROCARDIOGRAM COMPLETE: CPT

## 2019-11-11 PROCEDURE — 99215 OFFICE O/P EST HI 40 MIN: CPT

## 2019-11-11 PROCEDURE — 99213 OFFICE O/P EST LOW 20 MIN: CPT

## 2019-11-11 NOTE — REVIEW OF SYSTEMS
[Chest Pain] : chest pain [Leg Claudication] : no leg claudication [Palpitations] : no palpitations [Orthopnea] : no orthopnea [Lower Ext Edema] : no lower extremity edema [Paroxysmal Nocturnal Dyspnea] : no paroxysmal nocturnal dyspnea [Negative] : Gastrointestinal

## 2019-11-11 NOTE — HISTORY OF PRESENT ILLNESS
[FreeTextEntry8] : Pt presents for evaluation - has been having chest discomfort when taking deep breaths for the past 4-5 days.\par no fever/chills.\par No cough.\par No SOB/MCCORMICK/palpitations. NO wheeze. Does not feel sick.\par No known trauma\par Pain not exacerbated with activity or position.\par No N/V\par Saw cardiology this morning - EKG without change.

## 2019-11-11 NOTE — ASSESSMENT
[FreeTextEntry1] : Chest pain - musculoskeletal vs pulmonary vs GI (?secondary to scleroderma)\par Consider CXR if no improvement in next 48 hours.\par GI evaluation\par \par F/U if symptoms do not resolve, or if they should change/worsen.\par \par

## 2019-11-12 LAB — NT-PROBNP SERPL-MCNC: 719 PG/ML

## 2019-11-27 ENCOUNTER — APPOINTMENT (OUTPATIENT)
Dept: INTERNAL MEDICINE | Facility: CLINIC | Age: 69
End: 2019-11-27
Payer: MEDICARE

## 2019-11-27 VITALS — HEIGHT: 58 IN | TEMPERATURE: 98.7 F | BODY MASS INDEX: 19.31 KG/M2 | WEIGHT: 92 LBS

## 2019-11-27 VITALS — SYSTOLIC BLOOD PRESSURE: 110 MMHG | RESPIRATION RATE: 14 BRPM | HEART RATE: 72 BPM | DIASTOLIC BLOOD PRESSURE: 70 MMHG

## 2019-11-27 DIAGNOSIS — R05 COUGH: ICD-10-CM

## 2019-11-27 PROCEDURE — 99213 OFFICE O/P EST LOW 20 MIN: CPT

## 2019-11-27 NOTE — REVIEW OF SYSTEMS
[Chest Pain] : chest pain [Palpitations] : no palpitations [Leg Claudication] : no leg claudication [Lower Ext Edema] : no lower extremity edema [Orthopnea] : no orthopnea [Paroxysmal Nocturnal Dyspnea] : no paroxysmal nocturnal dyspnea [Negative] : Gastrointestinal

## 2019-11-27 NOTE — ASSESSMENT
[FreeTextEntry1] : Add ventolin PRN\par Tessalon pearls\par \par F/u for CC on Monday\par \par TO see Dr Pierre today for parotid swelling.

## 2019-11-27 NOTE — PHYSICAL EXAM
[Normal] : affect was normal and insight and judgment were intact [de-identified] : swollen right parotid gland

## 2019-11-27 NOTE — HISTORY OF PRESENT ILLNESS
[FreeTextEntry8] : Pt presents for evaluation - has been coughing. Saw pulmonary two days ago - CXR/PFTS were performed. \par Place on astelin and delsym\par No SOB/MCCORMICK/palpitations. NO wheeze. Does not feel sick.\par \par

## 2019-12-02 ENCOUNTER — APPOINTMENT (OUTPATIENT)
Dept: INTERNAL MEDICINE | Facility: CLINIC | Age: 69
End: 2019-12-02
Payer: MEDICARE

## 2019-12-02 VITALS — BODY MASS INDEX: 19.73 KG/M2 | WEIGHT: 94 LBS | HEIGHT: 58 IN

## 2019-12-02 VITALS — SYSTOLIC BLOOD PRESSURE: 110 MMHG | RESPIRATION RATE: 14 BRPM | HEART RATE: 72 BPM | DIASTOLIC BLOOD PRESSURE: 68 MMHG

## 2019-12-02 PROCEDURE — 99214 OFFICE O/P EST MOD 30 MIN: CPT | Mod: 25

## 2019-12-02 PROCEDURE — G0439: CPT

## 2019-12-02 PROCEDURE — G0444 DEPRESSION SCREEN ANNUAL: CPT | Mod: 59

## 2019-12-02 PROCEDURE — 99497 ADVNCD CARE PLAN 30 MIN: CPT | Mod: 33

## 2019-12-02 RX ORDER — BENZONATATE 100 MG/1
100 CAPSULE ORAL 3 TIMES DAILY
Qty: 30 | Refills: 0 | Status: DISCONTINUED | COMMUNITY
Start: 2019-11-27 | End: 2019-12-02

## 2019-12-02 RX ORDER — PEN NEEDLE, DIABETIC 32GX 5/32"
NEEDLE, DISPOSABLE MISCELLANEOUS
Qty: 1 | Refills: 0 | Status: ACTIVE | COMMUNITY
Start: 2019-12-02 | End: 1900-01-01

## 2019-12-02 RX ORDER — TRAMADOL HYDROCHLORIDE 50 MG/1
50 TABLET, COATED ORAL
Qty: 15 | Refills: 0 | Status: DISCONTINUED | COMMUNITY
Start: 2018-09-01 | End: 2019-12-02

## 2019-12-02 RX ORDER — LEVOTHYROXINE SODIUM 100 UG/1
100 TABLET ORAL
Qty: 90 | Refills: 0 | Status: DISCONTINUED | COMMUNITY
Start: 2017-12-01 | End: 2019-12-02

## 2019-12-02 NOTE — HISTORY OF PRESENT ILLNESS
[PMH Reviewed and Updated] : past medical history reviewed and updated [PSH Reviewed and Updated] : past surgical history reviewed and updated [Family History Reviewed and Updated] : family history reviewed and updated [Medication and Allergies Reconciled] : medication and allergies reconciled [0] : 0 [Over the Past 2 Weeks, Have You Felt Down, Depressed, or Hopeless?] : 1.) Over the past 2 weeks, have you felt down, depressed, or hopeless? No [Over the Past 2 Weeks, Have You Felt Little Interest or Pleasure Doing Things?] : 2.) Over the past 2 weeks, have you felt little interest or pleasure doing things? No [FreeTextEntry1] : Pt presents for f/u evaluation of hypothyroidism, s/p osteomy, CREST syndrome, hyperlipidemia.\par She is doing well  - output of about 1 liter daily\par Also here for her annual physical.\par \par She is without acute complaints today.\par \par Saw GI Dr Hill - tai.\par \par Saw endocrinology - repeat bone density in August\par On prolia for osteoporosis.  Endocrinologist - Dr Shabazz\par \par Will be going to [place] in the next few days for the winter.

## 2019-12-02 NOTE — ASSESSMENT
[FreeTextEntry1] : Labs done at GI\par Will review when available.\par \par 16 minutes spent with patient discussing ACP/HCP. She has designated a proxy, and the proxy is aware of the patients wishes and will comply.\par \par Pt had mammogram/breast sono today.\par \par Continue all meds\par \par F/U upon return from [place].\par \par \par

## 2019-12-02 NOTE — HEALTH RISK ASSESSMENT
[Good] : ~his/her~  mood as  good [] : No [No] : In the past 12 months have you used drugs other than those required for medical reasons? No [One fall no injury in past year] : Patient reported one fall in the past year without injury [0] : 2) Feeling down, depressed, or hopeless: Not at all (0) [[id number]] : 0 [HIV test declined] : HIV test declined [Hepatitis C test declined] : Hepatitis C test declined

## 2019-12-29 ENCOUNTER — RX RENEWAL (OUTPATIENT)
Age: 69
End: 2019-12-29

## 2020-01-13 ENCOUNTER — APPOINTMENT (OUTPATIENT)
Dept: PULMONOLOGY | Facility: CLINIC | Age: 70
End: 2020-01-13

## 2020-01-28 NOTE — H&P ADULT - NSCORESITESY/N_GEN_A_CORE_RD
No SSKI Counseling:  I discussed with the patient the risks of SSKI including but not limited to thyroid abnormalities, metallic taste, GI upset, fever, headache, acne, arthralgias, paraesthesias, lymphadenopathy, easy bleeding, arrhythmias, and allergic reaction.

## 2020-02-04 ENCOUNTER — NON-APPOINTMENT (OUTPATIENT)
Age: 70
End: 2020-02-04

## 2020-03-25 ENCOUNTER — RX RENEWAL (OUTPATIENT)
Age: 70
End: 2020-03-25

## 2020-04-30 ENCOUNTER — APPOINTMENT (OUTPATIENT)
Dept: ORTHOPEDIC SURGERY | Facility: CLINIC | Age: 70
End: 2020-04-30
Payer: MEDICARE

## 2020-04-30 VITALS — HEIGHT: 58 IN | BODY MASS INDEX: 19.73 KG/M2 | WEIGHT: 94 LBS

## 2020-04-30 DIAGNOSIS — M20.40 OTHER HAMMER TOE(S) (ACQUIRED), UNSPECIFIED FOOT: ICD-10-CM

## 2020-04-30 DIAGNOSIS — M50.30 OTHER CERVICAL DISC DEGENERATION, UNSPECIFIED CERVICAL REGION: ICD-10-CM

## 2020-04-30 PROCEDURE — 99212 OFFICE O/P EST SF 10 MIN: CPT | Mod: 95

## 2020-05-04 ENCOUNTER — APPOINTMENT (OUTPATIENT)
Dept: ORTHOPEDIC SURGERY | Facility: CLINIC | Age: 70
End: 2020-05-04

## 2020-05-05 ENCOUNTER — LABORATORY RESULT (OUTPATIENT)
Age: 70
End: 2020-05-05

## 2020-05-07 ENCOUNTER — APPOINTMENT (OUTPATIENT)
Dept: ORTHOPEDIC SURGERY | Facility: CLINIC | Age: 70
End: 2020-05-07

## 2020-05-11 ENCOUNTER — APPOINTMENT (OUTPATIENT)
Dept: INTERNAL MEDICINE | Facility: CLINIC | Age: 70
End: 2020-05-11
Payer: MEDICARE

## 2020-05-11 VITALS
OXYGEN SATURATION: 98 % | BODY MASS INDEX: 19.86 KG/M2 | RESPIRATION RATE: 14 BRPM | DIASTOLIC BLOOD PRESSURE: 60 MMHG | WEIGHT: 95 LBS | HEART RATE: 72 BPM | SYSTOLIC BLOOD PRESSURE: 100 MMHG

## 2020-05-11 PROCEDURE — 99214 OFFICE O/P EST MOD 30 MIN: CPT | Mod: 95

## 2020-05-11 NOTE — ASSESSMENT
[FreeTextEntry1] : Labs reviewed\par Increase iron to bid dosing.\par Continue all current meds\par F/U with Neurology / Cardiology/ Orthopedics\par \par Consider tapering off neurontin, as pt states it may be making her a bit sleepy.\par \par f/u OV 3 months with BW

## 2020-05-11 NOTE — HISTORY OF PRESENT ILLNESS
[Home] : at home, [unfilled] , at the time of the visit. [Medical Office: ([facility])___] : at the medical office located in  [Self] : self [Patient] : the patient [de-identified] : Pt presents via televideo for f/u of CAD, hypothyroidism, sciatica, CREST syndrome, hyperlipidemia.\par She returned from [place] a few weeks ago - uneventful winter, except for a fall in her driveway which apparently triggered her sciatica.\par She saw pain management who put her on gabapentin tid with good results. No further sciatic pain.\par She saw Neurology Dr Bundy for w/u of tremor - she is scheduled for MRI Brain and EEG.\par Still with pain in toe - will be seeing podiatry and will consult with Dr Canchola regarding potential surgery.\par

## 2020-05-11 NOTE — PHYSICAL EXAM
[No Acute Distress] : no acute distress [Well-Appearing] : well-appearing [Normal Sclera/Conjunctiva] : normal sclera/conjunctiva [No Respiratory Distress] : no respiratory distress  [No Accessory Muscle Use] : no accessory muscle use [Normal] : affect was normal and insight and judgment were intact

## 2020-05-18 ENCOUNTER — APPOINTMENT (OUTPATIENT)
Dept: CARDIOLOGY | Facility: CLINIC | Age: 70
End: 2020-05-18
Payer: MEDICARE

## 2020-05-18 PROCEDURE — 99215 OFFICE O/P EST HI 40 MIN: CPT | Mod: 95

## 2020-05-31 ENCOUNTER — RX RENEWAL (OUTPATIENT)
Age: 70
End: 2020-05-31

## 2020-06-02 DIAGNOSIS — R92.2 INCONCLUSIVE MAMMOGRAM: ICD-10-CM

## 2020-06-08 ENCOUNTER — APPOINTMENT (OUTPATIENT)
Dept: CARDIOLOGY | Facility: CLINIC | Age: 70
End: 2020-06-08

## 2020-06-12 ENCOUNTER — APPOINTMENT (OUTPATIENT)
Dept: UROLOGY | Facility: CLINIC | Age: 70
End: 2020-06-12

## 2020-06-22 NOTE — ED PROVIDER NOTE - DISCHARGE DATE
Drug Administration Record    Prior to injection, verified patient identity using patient's name and date of birth.  Due to injection administration, patient instructed to remain in clinic for 15 minutes  afterwards, and to report any adverse reaction to me immediately.    Drug Name: triamcinolone acetonide(kenalog)  Dose: 2mL of triamcinolone 10mg/mL, 20mg dose  Route administered: ID  NDC #: Kenalog-10 ([id number])  Amount of waste(mL):3mL  Reason for waste: Multi dose vial    LOT #: [id number]  SITE: scalp  : Aura XM  EXPIRATION DATE: 8/2021      PIERCE Dacosta   12-Jul-2019

## 2020-07-06 ENCOUNTER — APPOINTMENT (OUTPATIENT)
Dept: CARDIOLOGY | Facility: CLINIC | Age: 70
End: 2020-07-06

## 2020-08-01 ENCOUNTER — RX RENEWAL (OUTPATIENT)
Age: 70
End: 2020-08-01

## 2020-08-01 ENCOUNTER — EMERGENCY (EMERGENCY)
Facility: HOSPITAL | Age: 70
LOS: 1 days | Discharge: HOME | End: 2020-08-01
Attending: EMERGENCY MEDICINE
Payer: MEDICARE

## 2020-08-01 VITALS
OXYGEN SATURATION: 99 % | DIASTOLIC BLOOD PRESSURE: 56 MMHG | SYSTOLIC BLOOD PRESSURE: 115 MMHG | RESPIRATION RATE: 20 BRPM | HEART RATE: 78 BPM | TEMPERATURE: 98 F | WEIGHT: 95.02 LBS | HEIGHT: 57 IN

## 2020-08-01 VITALS
RESPIRATION RATE: 16 BRPM | SYSTOLIC BLOOD PRESSURE: 119 MMHG | DIASTOLIC BLOOD PRESSURE: 47 MMHG | OXYGEN SATURATION: 99 % | HEART RATE: 72 BPM

## 2020-08-01 DIAGNOSIS — Z90.710 ACQUIRED ABSENCE OF BOTH CERVIX AND UTERUS: Chronic | ICD-10-CM

## 2020-08-01 DIAGNOSIS — Z98.890 OTHER SPECIFIED POSTPROCEDURAL STATES: Chronic | ICD-10-CM

## 2020-08-01 DIAGNOSIS — Z90.49 ACQUIRED ABSENCE OF OTHER SPECIFIED PARTS OF DIGESTIVE TRACT: Chronic | ICD-10-CM

## 2020-08-01 LAB
ALBUMIN SERPL ELPH-MCNC: 4 G/DL — SIGNIFICANT CHANGE UP (ref 3.3–5)
ALP SERPL-CCNC: 80 U/L — SIGNIFICANT CHANGE UP (ref 40–120)
ALT FLD-CCNC: 21 U/L — SIGNIFICANT CHANGE UP (ref 10–45)
ANION GAP SERPL CALC-SCNC: 14 MMOL/L — SIGNIFICANT CHANGE UP (ref 5–17)
APPEARANCE UR: CLEAR — SIGNIFICANT CHANGE UP
AST SERPL-CCNC: 56 U/L — HIGH (ref 10–40)
BILIRUB SERPL-MCNC: 0.8 MG/DL — SIGNIFICANT CHANGE UP (ref 0.2–1.2)
BILIRUB UR-MCNC: NEGATIVE — SIGNIFICANT CHANGE UP
BUN SERPL-MCNC: 25 MG/DL — HIGH (ref 7–23)
CALCIUM SERPL-MCNC: 9.7 MG/DL — SIGNIFICANT CHANGE UP (ref 8.4–10.5)
CHLORIDE SERPL-SCNC: 90 MMOL/L — LOW (ref 96–108)
CO2 SERPL-SCNC: 31 MMOL/L — SIGNIFICANT CHANGE UP (ref 22–31)
COLOR SPEC: COLORLESS — SIGNIFICANT CHANGE UP
CREAT SERPL-MCNC: 1.02 MG/DL — SIGNIFICANT CHANGE UP (ref 0.5–1.3)
DIFF PNL FLD: NEGATIVE — SIGNIFICANT CHANGE UP
GLUCOSE SERPL-MCNC: 86 MG/DL — SIGNIFICANT CHANGE UP (ref 70–99)
GLUCOSE UR QL: NEGATIVE — SIGNIFICANT CHANGE UP
HCT VFR BLD CALC: 37.3 % — SIGNIFICANT CHANGE UP (ref 34.5–45)
HGB BLD-MCNC: 11.9 G/DL — SIGNIFICANT CHANGE UP (ref 11.5–15.5)
KETONES UR-MCNC: NEGATIVE — SIGNIFICANT CHANGE UP
LEUKOCYTE ESTERASE UR-ACNC: NEGATIVE — SIGNIFICANT CHANGE UP
MCHC RBC-ENTMCNC: 28.4 PG — SIGNIFICANT CHANGE UP (ref 27–34)
MCHC RBC-ENTMCNC: 31.9 GM/DL — LOW (ref 32–36)
MCV RBC AUTO: 89 FL — SIGNIFICANT CHANGE UP (ref 80–100)
NITRITE UR-MCNC: NEGATIVE — SIGNIFICANT CHANGE UP
NRBC # BLD: 0 /100 WBCS — SIGNIFICANT CHANGE UP (ref 0–0)
NT-PROBNP SERPL-SCNC: 230 PG/ML — SIGNIFICANT CHANGE UP (ref 0–300)
PH UR: 7.5 — SIGNIFICANT CHANGE UP (ref 5–8)
PLATELET # BLD AUTO: 112 K/UL — LOW (ref 150–400)
POTASSIUM SERPL-MCNC: 4.3 MMOL/L — SIGNIFICANT CHANGE UP (ref 3.5–5.3)
POTASSIUM SERPL-SCNC: 4.3 MMOL/L — SIGNIFICANT CHANGE UP (ref 3.5–5.3)
PROT SERPL-MCNC: 6.9 G/DL — SIGNIFICANT CHANGE UP (ref 6–8.3)
PROT UR-MCNC: NEGATIVE — SIGNIFICANT CHANGE UP
RBC # BLD: 4.19 M/UL — SIGNIFICANT CHANGE UP (ref 3.8–5.2)
RBC # FLD: 14.5 % — SIGNIFICANT CHANGE UP (ref 10.3–14.5)
SODIUM SERPL-SCNC: 135 MMOL/L — SIGNIFICANT CHANGE UP (ref 135–145)
SP GR SPEC: 1.01 — LOW (ref 1.01–1.02)
TROPONIN T, HIGH SENSITIVITY RESULT: 10 NG/L — SIGNIFICANT CHANGE UP (ref 0–51)
TROPONIN T, HIGH SENSITIVITY RESULT: 12 NG/L — SIGNIFICANT CHANGE UP (ref 0–51)
UROBILINOGEN FLD QL: NEGATIVE — SIGNIFICANT CHANGE UP
WBC # BLD: 8.6 K/UL — SIGNIFICANT CHANGE UP (ref 3.8–10.5)
WBC # FLD AUTO: 8.6 K/UL — SIGNIFICANT CHANGE UP (ref 3.8–10.5)

## 2020-08-01 PROCEDURE — 84484 ASSAY OF TROPONIN QUANT: CPT

## 2020-08-01 PROCEDURE — 71045 X-RAY EXAM CHEST 1 VIEW: CPT | Mod: 26

## 2020-08-01 PROCEDURE — 85027 COMPLETE CBC AUTOMATED: CPT

## 2020-08-01 PROCEDURE — 96360 HYDRATION IV INFUSION INIT: CPT

## 2020-08-01 PROCEDURE — 93010 ELECTROCARDIOGRAM REPORT: CPT

## 2020-08-01 PROCEDURE — 93005 ELECTROCARDIOGRAM TRACING: CPT

## 2020-08-01 PROCEDURE — 99285 EMERGENCY DEPT VISIT HI MDM: CPT | Mod: GC

## 2020-08-01 PROCEDURE — 81003 URINALYSIS AUTO W/O SCOPE: CPT

## 2020-08-01 PROCEDURE — 80053 COMPREHEN METABOLIC PANEL: CPT

## 2020-08-01 PROCEDURE — 99284 EMERGENCY DEPT VISIT MOD MDM: CPT | Mod: 25

## 2020-08-01 PROCEDURE — 83880 ASSAY OF NATRIURETIC PEPTIDE: CPT

## 2020-08-01 PROCEDURE — 71045 X-RAY EXAM CHEST 1 VIEW: CPT

## 2020-08-01 RX ORDER — SODIUM CHLORIDE 9 MG/ML
1000 INJECTION INTRAMUSCULAR; INTRAVENOUS; SUBCUTANEOUS ONCE
Refills: 0 | Status: COMPLETED | OUTPATIENT
Start: 2020-08-01 | End: 2020-08-01

## 2020-08-01 RX ADMIN — SODIUM CHLORIDE 1000 MILLILITER(S): 9 INJECTION INTRAMUSCULAR; INTRAVENOUS; SUBCUTANEOUS at 09:25

## 2020-08-01 RX ADMIN — SODIUM CHLORIDE 1000 MILLILITER(S): 9 INJECTION INTRAMUSCULAR; INTRAVENOUS; SUBCUTANEOUS at 10:40

## 2020-08-01 NOTE — ED PROVIDER NOTE - PROGRESS NOTE DETAILS
Labs within normal limits, troponin negative x2, CXR clear, UA negative for UTI. Voicemail left for Cardiology Dr Womack ([phone]) for collateral, with no response. Will try again. Labs within normal limits, troponin negative x2, CXR clear, UA negative for UTI. Voicemail left for Cardiology Dr Rosen ([phone]) for collateral, with no response. Will try again. Another voicemail left for Cardiology Dr Rosen ([phone]) for collateral, with no response Another voicemail left for Cardiology Dr Rosen ([phone]) for collateral, with no response. Patient stable for discharge with close cardiology follow up.

## 2020-08-01 NOTE — ED PROVIDER NOTE - NSFOLLOWUPINSTRUCTIONS_ED_ALL_ED_FT
Follow up with Cardiology Dr Rosen within 1 week.    Return to the ED for worsening weakness, chest pain, shortness of breath, or recurrent syncope.

## 2020-08-01 NOTE — ED PROVIDER NOTE - PHYSICAL EXAMINATION
Gen: No acute distress.   HEENT: Atraumatic, normocephalic, pupils equally round and reactive to light, extraocular muscles intact, no conjunctival injection  CV: Regular rate and rhythym, normal S1/S2, no murmurs, rubs,or gallops  Resp: Lungs clear to ausculatation bilaterally, no rales, rhonchi, or wheezes  GI: Ileostomy site C/D/I, Soft, nonender, nondistended, BSx4  MSK: Extremities atraumatic, no cyanosis or clubbing  Skin: Warm, dry, no rashes or lesions  Neuro: Nno focal deficits, sensation grossly intact  Psych: Alert and oriented x3, appropriate mood and affect

## 2020-08-01 NOTE — ED ADULT TRIAGE NOTE - CHIEF COMPLAINT QUOTE
unwitnessed syncopal episode at home. Pt reports feeling "very weak" prior to syncopal episode. Denies chest pain/sob.

## 2020-08-01 NOTE — ED ADULT NURSE REASSESSMENT NOTE - NS ED NURSE REASSESS COMMENT FT1
1300 Voided in bathroom. Denies dizziness. A&Ox4. Awaiting call back from cardiologist per Dr/resident. No c/o at present. Pt states her BP usually is in the 90's. Dr riojas.

## 2020-08-01 NOTE — ED PROVIDER NOTE - ATTENDING CONTRIBUTION TO CARE
Patient is a 68 yo F with history of Sjogrens, Raynauds, SBO s/p ileostomy (2017), CAD s/p GAUDENCIO (2019), hypothyroid here for evaluation after syncopal episode this morning. Patient states she was in the elevator with her  when she passed out for 15 seconds. She describes a general weakness and heaviness and feeling of dehydration. She does not think she hit her head but is not sure. She reports passing out for 15 seconds. She reports this has happened in the past and she was diagnosed with vasovagal syncope. Denies headache, vision changes, chest pain, shortness of breath, dizziness. No fevers, chills, abdominal pain. No dysuria.     VS noted  Gen. no acute distress, Non toxic   HEENT: EOMI, mmm, atraumatic  Lungs: CTAB/L no C/ W /R   CVS: RRR   Abd; Soft non tender, non distended   Ext: no edema  Skin: no rash  Neuro AAOx3 non focal clear speech  a/p: syncope - plan for labs including troponin, IVF, u/a. CXR, EKG w/o arrythmia or st elevation/ depression. Patient sees Dr. Womack. Will discuss with him after work up.   - Charo VEGA

## 2020-08-01 NOTE — ED PROVIDER NOTE - OBJECTIVE STATEMENT
Patient is a 69F with PMh of Sjogrens, Raynauds, SBO s/p ileostomy (2017), CAD s/p GAUDENCIO (2019), hypothyroid, presenting to the ED with syncope. Patient is a 69F with PMh of Sjogrens, Raynauds, SBO s/p ileostomy (2017), CAD s/p GAUDENCIO (2019), hypothyroid, presenting to the ED with syncope. Patient woke up early this morning and went to take the elevator in her house. She developed a feeling of weakness and heaviness, and the next thing she knew she was on the floor and her  was helping her to get up. She does not know if she lost consciousness, does not know if she hit her head. She states that the feeling of heaviness was similar to when she had her stent placed in 2019. She denies any CP, SOB, nausea, vomiting, palpitations, dizziness, or vision changes. Patient is a 69F with PMh of Sjogrens, Raynauds, SBO s/p ileostomy (2017), CAD s/p GAUDENCIO (2019), hypothyroid, presenting to the ED with syncope. Patient woke up early this morning and went to take the elevator in her house. She developed a feeling of weakness and heaviness, and the next thing she knew she was on the floor and her  was helping her to get up. She does not know if she lost consciousness, does not know if she hit her head. She denies any CP, SOB, nausea, vomiting, palpitations, dizziness, or vision changes.

## 2020-08-01 NOTE — ED PROVIDER NOTE - CLINICAL SUMMARY MEDICAL DECISION MAKING FREE TEXT BOX
Patient is a 69F with PMh of Sjogrens, Raynauds, SBO s/p ileostomy (2017), CAD s/p GAUDENCIO (2019), hypothyroid, presenting to the ED with syncope. Patient is a 69F with PMh of Sjogrens, Raynauds, SBO s/p ileostomy (2017), CAD s/p GAUDENCIO (2019), hypothyroid, presenting to the ED with syncope. Differential diagnosis includes vasovagal event, cardiac, orthostatic hypotension.    -EKG in NSR, nonischemic  -Left message for callback at Cardiologist Dr Womack's office  -Orthostatic VS  -CBC/CMP/BNP/trops x2  -UA  -CXR  -Reassess after labs and imaging

## 2020-08-01 NOTE — ED ADULT NURSE NOTE - OBJECTIVE STATEMENT
0725 69 yr old WF c/o syncopal episode at home at 410 AM. "[place] very weak, then remembers her  helping her up off floor. " States it only lasted about 3 seconds. Denies HA, chest pain, palp, SOB, cough, fever, chills or exposure to COVID19. Brought in from triage in wheelchair. A&Ox4. color pink. skin W&D. lungs clear. abd soft.  has ileostomy. c/o feeling dehydrated. EKG was done in triage. Cardiac monitor intact. Dr gill pt. Fall risk precautions maintained. hx of coronary stents last yr. Plavix was discontinued yesterday. Takes baby aspirin daily.

## 2020-08-03 ENCOUNTER — APPOINTMENT (OUTPATIENT)
Dept: CARDIOLOGY | Facility: CLINIC | Age: 70
End: 2020-08-03
Payer: MEDICARE

## 2020-08-03 DIAGNOSIS — I87.2 VENOUS INSUFFICIENCY (CHRONIC) (PERIPHERAL): ICD-10-CM

## 2020-08-03 PROBLEM — Z95.5 PRESENCE OF CORONARY ANGIOPLASTY IMPLANT AND GRAFT: Chronic | Status: ACTIVE | Noted: 2020-08-01

## 2020-08-03 PROCEDURE — 99215 OFFICE O/P EST HI 40 MIN: CPT | Mod: 95

## 2020-08-04 ENCOUNTER — APPOINTMENT (OUTPATIENT)
Dept: CARDIOLOGY | Facility: CLINIC | Age: 70
End: 2020-08-04

## 2020-08-05 ENCOUNTER — LABORATORY RESULT (OUTPATIENT)
Age: 70
End: 2020-08-05

## 2020-08-07 LAB — CORTIS SERPL-MCNC: 12.9 UG/DL

## 2020-08-10 ENCOUNTER — APPOINTMENT (OUTPATIENT)
Dept: INTERNAL MEDICINE | Facility: CLINIC | Age: 70
End: 2020-08-10
Payer: MEDICARE

## 2020-08-10 VITALS — HEIGHT: 58 IN | WEIGHT: 96 LBS | TEMPERATURE: 96.6 F | BODY MASS INDEX: 20.15 KG/M2

## 2020-08-10 VITALS — HEART RATE: 72 BPM | SYSTOLIC BLOOD PRESSURE: 118 MMHG | DIASTOLIC BLOOD PRESSURE: 60 MMHG | RESPIRATION RATE: 14 BRPM

## 2020-08-10 PROCEDURE — 99214 OFFICE O/P EST MOD 30 MIN: CPT

## 2020-08-10 RX ORDER — CLOPIDOGREL BISULFATE 75 MG/1
75 TABLET, FILM COATED ORAL
Qty: 90 | Refills: 3 | Status: DISCONTINUED | COMMUNITY
Start: 2019-07-24 | End: 2020-08-10

## 2020-08-10 NOTE — PHYSICAL EXAM
[No Acute Distress] : no acute distress [Normal Sclera/Conjunctiva] : normal sclera/conjunctiva [Well-Appearing] : well-appearing [No Accessory Muscle Use] : no accessory muscle use [No Respiratory Distress] : no respiratory distress  [Normal] : affect was normal and insight and judgment were intact

## 2020-08-10 NOTE — ASSESSMENT
[FreeTextEntry1] : Labs reviewed\par \par Continue all current meds\par F/U with Neurology / Cardiology/ Orthopedics\par \par f/u OV 3 months with BW

## 2020-08-10 NOTE — HISTORY OF PRESENT ILLNESS
[de-identified] : Pt presents for f/u evaluation of CAD, hypothyroidism, sciatica, CREST syndrome, hyperlipidemia.\par She had an episode of vasovagal syncope late July - cardiac testing in ED was ok.\par No episodes since.\par Feels "great"\par No CP/SOB/palpitations/MCCORMICK\par \par Now off Neurontin.

## 2020-08-17 ENCOUNTER — APPOINTMENT (OUTPATIENT)
Dept: CARDIOLOGY | Facility: CLINIC | Age: 70
End: 2020-08-17
Payer: MEDICARE

## 2020-08-17 ENCOUNTER — NON-APPOINTMENT (OUTPATIENT)
Age: 70
End: 2020-08-17

## 2020-08-17 VITALS
SYSTOLIC BLOOD PRESSURE: 98 MMHG | BODY MASS INDEX: 20.36 KG/M2 | HEIGHT: 58 IN | DIASTOLIC BLOOD PRESSURE: 62 MMHG | WEIGHT: 97 LBS | HEART RATE: 76 BPM

## 2020-08-17 PROCEDURE — 99215 OFFICE O/P EST HI 40 MIN: CPT

## 2020-08-17 PROCEDURE — 93000 ELECTROCARDIOGRAM COMPLETE: CPT

## 2020-08-28 ENCOUNTER — APPOINTMENT (OUTPATIENT)
Dept: CARDIOLOGY | Facility: CLINIC | Age: 70
End: 2020-08-28
Payer: MEDICARE

## 2020-08-28 PROCEDURE — 99215 OFFICE O/P EST HI 40 MIN: CPT | Mod: 95

## 2020-08-28 NOTE — REVIEW OF SYSTEMS
[Dyspnea on exertion] : dyspnea during exertion [Feeling Fatigued] : feeling fatigued [Negative] : Heme/Lymph [Chest  Pressure] : no chest pressure [Palpitations] : no palpitations

## 2020-08-28 NOTE — DISCUSSION/SUMMARY
[FreeTextEntry1] : 69yFemale with a history of GERD, Sciatica,Ileostomy, Sjogren's Syndrome Treated With Steroids, Ostomy, HL, Initially Met By Me In Hospital After Presenting With Chest Pressure s/p PCI pRCA  07/2019; OM1 50% - presenting for cardiovascular evaluation \par ================\par ================\par TTE -  - Normal LVEF; Moderate MR; Borderline RVE\par LHC -  - S/p PCI pRCA; OM1 50% \par ================\par ================\par \par Spoke re: if CRP up, will increase statin; otherwise cholesterol excellent\par Vasovagal Recurrent\par - Hydration\par - Compression Stockings - Prescribed \par              - If still not working, will escalate \par - Liberalizing diet. \par \par HL, Initially Met By Me In Hospital After Presenting With Chest Pressure s/p PCI pRCA  07/2019; OM1 50% \par - ASA\par - Plavix\par - Crestor  20mg \par - Ensure proper absorption with Gastroenterologist Dr. Kirill White\par \par Venous Insufficiency\par - Stable; Monitor \par - Consider CR\par \par \par Chest pressure\par - Monitor; seems lung related\par - Monitor; may need lasix \par - BNP\par \par \par \par \par \par \par \par \par \par \par \par \par \par \par \par \par \par \par \par \par \par \par \par \par \par \par \par \par \par Venous Insufficiency(Stable; Chronic). Hyperlipidemia (Stable; Chronic). Coronary Artery Disease (Stable; Chronic). Medication plan for these conditions noted above. \par Total Time Spent in face-to-face encounter was 45 minutes. >50% time spent in counseling and coordination of care and on addressing above medical conditions in assessment.\par All labs, imaging, consulting reports, and any relevant outside records including laboratory work personally reviewed in order to evaluate, manage, and coordinate care amongst providers.  Patient-Risk (High).\par \par \par \par \par \par \par \par \par \par \par

## 2020-08-28 NOTE — HISTORY OF PRESENT ILLNESS
[FreeTextEntry1] : 69yFemale with a history of GERD, Sciatica,Ileostomy, Sjogren's Syndrome Treated With Steroids, Ostomy, Initially Met By Me In Hospital After Presenting With Chest Pressure s/p PCI pRCA s/p PCI pRCA 07/2019; OM1 50% - presenting for cardiovascular evaluation \par ================\par ================\par 07/2019\par Briefly, limited in mobility primarily due to sciatica.\par TTE -  - Normal LVEF; Moderate MR; Borderline RVE\par LHC -  - S/p PCI pRCA; OM1 50% \par Walking 0.5 miles. \par +Rash LLE - venous insufficency.\par Concern that some pills may not be absorbed and are being found in ostomy bag. We discussed its importance. \par She is walking 1/2 mile and is considering CR. \par They will be going to [place] for the winter and were given Dr. Mendoza and Dr. Gagnon's name. \par Hyperlipidemia/Coronary Artery Disease - controlled on current treatment,\par Labs reviewed in EMR.\par -------------------\par 08/2019\par Distal to ileostomy. \par Bezoars coming home on flushes. \par -------------------\par \par When breathes in, it hurts; Lasts through the breath and resolves.\par Otherwise no other symptoms. \par -------------------\par \par Saw Paulino Seckler in [place]\par Did Carotid US = negative\par Walking 3 blocks then SOB; stable MR\par Discussed normal cholesterol ut inflammation cause prior PCI and now on Crestor\par Dsicussed Q2 yr nuclear \par -----------------------\par \par In Emergency room and fainted likely vasovagal. \par In ER, all well. \par Enzymes negative. \par BP went to 80/35 mm Hg. \par ---------------------------\par \par Gaining weight. \par Compression stockings not working., \par Still running low. \par Spoke re: if CRP up, will increase statin; otherwise cholesterol excellent\par \par ---------------------------

## 2020-08-28 NOTE — REASON FOR VISIT
[Follow-Up - Clinic] : a clinic follow-up of [Medical Office: ([facility])___] : at the medical office located in  [Home] : at home, [unfilled] , at the time of the visit. [Verbal consent obtained from patient] : the patient, [unfilled] [FreeTextEntry2] : Hypertension. Hyperlipidemia. Atherosclerotic Cardiovascular Disease.

## 2020-08-31 ENCOUNTER — LABORATORY RESULT (OUTPATIENT)
Age: 70
End: 2020-08-31

## 2020-09-01 LAB — CRP SERPL HS-MCNC: 1.88 MG/L

## 2020-09-02 ENCOUNTER — LABORATORY RESULT (OUTPATIENT)
Age: 70
End: 2020-09-02

## 2020-09-04 LAB — ZINC SERPL-MCNC: 66 UG/DL

## 2020-09-28 ENCOUNTER — APPOINTMENT (OUTPATIENT)
Dept: INTERNAL MEDICINE | Facility: CLINIC | Age: 70
End: 2020-09-28
Payer: MEDICARE

## 2020-09-28 VITALS
WEIGHT: 94 LBS | TEMPERATURE: 98.2 F | BODY MASS INDEX: 19.73 KG/M2 | OXYGEN SATURATION: 95 % | HEART RATE: 86 BPM | HEIGHT: 58 IN

## 2020-09-28 VITALS — RESPIRATION RATE: 14 BRPM | DIASTOLIC BLOOD PRESSURE: 60 MMHG | HEART RATE: 78 BPM | SYSTOLIC BLOOD PRESSURE: 102 MMHG

## 2020-09-28 DIAGNOSIS — Z23 ENCOUNTER FOR IMMUNIZATION: ICD-10-CM

## 2020-09-28 PROCEDURE — G0008: CPT

## 2020-09-28 PROCEDURE — 90662 IIV NO PRSV INCREASED AG IM: CPT

## 2020-09-28 PROCEDURE — 99213 OFFICE O/P EST LOW 20 MIN: CPT | Mod: 25

## 2020-09-28 NOTE — HISTORY OF PRESENT ILLNESS
[FreeTextEntry8] : pt presents for evaluation of abnormal platelets and PT/INR\par pt is off plavix.\par No bleeding\par In USOH

## 2020-09-28 NOTE — ASSESSMENT
[FreeTextEntry1] : Blood work was drawn and sent to the lab today. The patient has been instructed to call the office next week to discuss today's lab work.\par \par Flu vaccine given\par \par Consider hematology evaluation pending today's blood work

## 2020-09-29 ENCOUNTER — LABORATORY RESULT (OUTPATIENT)
Age: 70
End: 2020-09-29

## 2020-09-29 ENCOUNTER — TRANSCRIPTION ENCOUNTER (OUTPATIENT)
Age: 70
End: 2020-09-29

## 2020-10-02 LAB
APTT BLD: 44.1 SEC
BASOPHILS # BLD AUTO: 0.05 K/UL
BASOPHILS NFR BLD AUTO: 0.8 %
EOSINOPHIL # BLD AUTO: 0.18 K/UL
EOSINOPHIL NFR BLD AUTO: 3 %
HCT VFR BLD CALC: 41.3 %
HGB BLD-MCNC: 12.8 G/DL
IMM GRANULOCYTES NFR BLD AUTO: 0.2 %
INR PPP: 1.68 RATIO
LUPUS ANTICOAGULANT CASCADE REFLEX: NORMAL
LYMPHOCYTES # BLD AUTO: 1.14 K/UL
LYMPHOCYTES NFR BLD AUTO: 18.8 %
MAN DIFF?: NORMAL
MCHC RBC-ENTMCNC: 28.1 PG
MCHC RBC-ENTMCNC: 31 GM/DL
MCV RBC AUTO: 90.8 FL
MONOCYTES # BLD AUTO: 0.39 K/UL
MONOCYTES NFR BLD AUTO: 6.4 %
NEUTROPHILS # BLD AUTO: 4.3 K/UL
NEUTROPHILS NFR BLD AUTO: 70.8 %
PLATELET # BLD AUTO: NORMAL K/UL
PLATELET # PLAS AUTO: 149 K/UL
PT BLD: 19.5 SEC
RBC # BLD: 4.55 M/UL
RBC # FLD: 14.6 %
WBC # FLD AUTO: 6.07 K/UL

## 2020-10-07 ENCOUNTER — OUTPATIENT (OUTPATIENT)
Dept: OUTPATIENT SERVICES | Facility: HOSPITAL | Age: 70
LOS: 1 days | Discharge: HOME | End: 2020-10-07

## 2020-10-07 DIAGNOSIS — Z90.710 ACQUIRED ABSENCE OF BOTH CERVIX AND UTERUS: Chronic | ICD-10-CM

## 2020-10-07 DIAGNOSIS — Z90.49 ACQUIRED ABSENCE OF OTHER SPECIFIED PARTS OF DIGESTIVE TRACT: Chronic | ICD-10-CM

## 2020-10-07 DIAGNOSIS — D68.9 COAGULATION DEFECT, UNSPECIFIED: ICD-10-CM

## 2020-10-07 DIAGNOSIS — Z98.890 OTHER SPECIFIED POSTPROCEDURAL STATES: Chronic | ICD-10-CM

## 2020-10-09 ENCOUNTER — RESULT REVIEW (OUTPATIENT)
Age: 70
End: 2020-10-09

## 2020-10-09 ENCOUNTER — LABORATORY RESULT (OUTPATIENT)
Age: 70
End: 2020-10-09

## 2020-10-09 ENCOUNTER — APPOINTMENT (OUTPATIENT)
Dept: HEMATOLOGY ONCOLOGY | Facility: CLINIC | Age: 70
End: 2020-10-09
Payer: MEDICARE

## 2020-10-09 VITALS
DIASTOLIC BLOOD PRESSURE: 55 MMHG | SYSTOLIC BLOOD PRESSURE: 112 MMHG | OXYGEN SATURATION: 97 % | HEART RATE: 74 BPM | RESPIRATION RATE: 14 BRPM | WEIGHT: 94.8 LBS | HEIGHT: 58.66 IN | TEMPERATURE: 98.5 F | BODY MASS INDEX: 19.37 KG/M2

## 2020-10-09 DIAGNOSIS — Z80.3 FAMILY HISTORY OF MALIGNANT NEOPLASM OF BREAST: ICD-10-CM

## 2020-10-09 DIAGNOSIS — D68.9 COAGULATION DEFECT, UNSPECIFIED: ICD-10-CM

## 2020-10-09 LAB
BASOPHILS # BLD AUTO: 0.07 K/UL — SIGNIFICANT CHANGE UP (ref 0–0.2)
BASOPHILS NFR BLD AUTO: 0.9 % — SIGNIFICANT CHANGE UP (ref 0–2)
EOSINOPHIL # BLD AUTO: 0.12 K/UL — SIGNIFICANT CHANGE UP (ref 0–0.5)
EOSINOPHIL NFR BLD AUTO: 1.5 % — SIGNIFICANT CHANGE UP (ref 0–6)
HCT VFR BLD CALC: 37.5 % — SIGNIFICANT CHANGE UP (ref 34.5–45)
HGB BLD-MCNC: 12.4 G/DL — SIGNIFICANT CHANGE UP (ref 11.5–15.5)
IMM GRANULOCYTES NFR BLD AUTO: 0.5 % — SIGNIFICANT CHANGE UP (ref 0–1.5)
LYMPHOCYTES # BLD AUTO: 1.5 K/UL — SIGNIFICANT CHANGE UP (ref 1–3.3)
LYMPHOCYTES # BLD AUTO: 18.7 % — SIGNIFICANT CHANGE UP (ref 13–44)
MCHC RBC-ENTMCNC: 28.2 PG — SIGNIFICANT CHANGE UP (ref 27–34)
MCHC RBC-ENTMCNC: 33.1 G/DL — SIGNIFICANT CHANGE UP (ref 32–36)
MCV RBC AUTO: 85.2 FL — SIGNIFICANT CHANGE UP (ref 80–100)
MONOCYTES # BLD AUTO: 0.44 K/UL — SIGNIFICANT CHANGE UP (ref 0–0.9)
MONOCYTES NFR BLD AUTO: 5.5 % — SIGNIFICANT CHANGE UP (ref 2–14)
NEUTROPHILS # BLD AUTO: 5.87 K/UL — SIGNIFICANT CHANGE UP (ref 1.8–7.4)
NEUTROPHILS NFR BLD AUTO: 72.9 % — SIGNIFICANT CHANGE UP (ref 43–77)
NRBC # BLD: 0 /100 WBCS — SIGNIFICANT CHANGE UP (ref 0–0)
PLATELET # BLD AUTO: 175 K/UL — SIGNIFICANT CHANGE UP (ref 150–400)
PLATELET # BLD MANUAL: 194 K/UL — SIGNIFICANT CHANGE UP (ref 150–400)
RBC # BLD: 4.4 M/UL — SIGNIFICANT CHANGE UP (ref 3.8–5.2)
RBC # FLD: 14 % — SIGNIFICANT CHANGE UP (ref 10.3–14.5)
WBC # BLD: 8.04 K/UL — SIGNIFICANT CHANGE UP (ref 3.8–10.5)
WBC # FLD AUTO: 8.04 K/UL — SIGNIFICANT CHANGE UP (ref 3.8–10.5)

## 2020-10-09 PROCEDURE — 99204 OFFICE O/P NEW MOD 45 MIN: CPT

## 2020-10-12 PROBLEM — Z80.3 FAMILY HISTORY OF MALIGNANT NEOPLASM OF FEMALE BREAST: Status: ACTIVE | Noted: 2020-10-12

## 2020-10-12 RX ORDER — CALCIUM CARBONATE/VITAMIN D3 500-10/5ML
30 LIQUID (ML) ORAL
Refills: 0 | Status: ACTIVE | COMMUNITY
Start: 2020-10-12

## 2020-10-12 RX ORDER — CHLORHEXIDINE GLUCONATE 4 %
1000 LIQUID (ML) TOPICAL DAILY
Refills: 0 | Status: ACTIVE | COMMUNITY

## 2020-10-12 RX ORDER — FAMOTIDINE 20 MG/1
20 TABLET, FILM COATED ORAL DAILY
Refills: 0 | Status: ACTIVE | COMMUNITY

## 2020-10-12 RX ORDER — AZELASTINE HYDROCHLORIDE 137 UG/1
0.1 SPRAY, METERED NASAL TWICE DAILY
Qty: 1 | Refills: 2 | Status: DISCONTINUED | COMMUNITY
Start: 2019-11-27 | End: 2020-10-12

## 2020-10-12 RX ORDER — MULTIVIT WITH CALCIUM,IRON,MIN 27MG-0.4MG
TABLET ORAL
Refills: 0 | Status: DISCONTINUED | COMMUNITY
End: 2020-10-12

## 2020-10-12 RX ORDER — ALBUTEROL SULFATE 90 UG/1
108 (90 BASE) AEROSOL, METERED RESPIRATORY (INHALATION)
Qty: 1 | Refills: 0 | Status: DISCONTINUED | COMMUNITY
Start: 2019-11-27 | End: 2020-10-12

## 2020-10-12 RX ORDER — LOPERAMIDE HCL 2 MG
2 CAPSULE ORAL
Refills: 0 | Status: DISCONTINUED | COMMUNITY
End: 2020-10-12

## 2020-10-12 RX ORDER — DENOSUMAB 60 MG/ML
60 INJECTION SUBCUTANEOUS
Refills: 0 | Status: ACTIVE | COMMUNITY
Start: 2020-10-12

## 2020-10-12 NOTE — REVIEW OF SYSTEMS
[Patient Intake Form Reviewed] : Patient intake form was reviewed [Dry Eyes] : dryness of the eyes [Negative] : Heme/Lymph [de-identified] : Sciatica, numb feet [FreeTextEntry1] : Raynaud's

## 2020-10-12 NOTE — HISTORY OF PRESENT ILLNESS
[de-identified] : EDTA-associated platelet clumping\par Prolonged APTT [de-identified] : 69-year-old female with scleroderma and Sjogren's syndrome referred for thrombocytopenia and coagulopathy.  In December 2018 her platelet count had been 259,000.  All subsequent platelet counts since then have been clumped.  In September 2020, platelets were clumped when drawn into a lavender top, but utilizing a blue top tube the platelet count was 149,000.  In August 2020 prothrombin time was 18.1 seconds.  In September 2020 APTT was 44.1 seconds.  After 50: 50 mixing , APTT was 43.1 seconds and after 2-hour incubation 43.4 seconds / 47.2 seconds.  Dilute Elia viper venom time, silica clotting time, anticardiolipin antibodies, and beta-2 glycoprotein 1 antibodies were all  normal.  A cardiac stent had been placed 1 year ago.  She was anticoagulated with aspirin and Plavix.  Plavix was discontinued in August 2020.\par \par She feels well.  She has left sciatica.  She notes no bleeding, bruising since off the Plavix, limb pain, limb swelling, chest pain, shortness of breath, rash, twila arthritis.  She does not eat salad.

## 2020-10-12 NOTE — REASON FOR VISIT
[Initial Consultation] : an initial consultation for [Coagulopathy] : coagulopathy [Spouse] : spouse

## 2020-10-12 NOTE — ASSESSMENT
[FreeTextEntry1] : 69-year-old female with 2 unrelated hematologic laboratory abnormalities.  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  EDTA, the anticoagulant in lavender top tubes, induces clumping of platelets in some individuals.  It is of no clinical significance.  Should it recur, platelet count should be repeated in a blue top tube to obtain an accurate result.  In addition, she has unexplained prolongation of her PT and APTT.  Her current prothrombin time prolongation is not clinically significant.  One is allowed 1.5 seconds above the upper limits of normal and she falls within this.  Her APTT has been prolonged without correction on 50: 50 mixing.  Evaluation reveals she does not have a lupus type anticoagulant, contact phase inhibitor, nor disseminated intravascular coagulation.  It remains possible she has an inhibitor directed against a specific clotting factor.  More likely, since she has no abnormal bleeding nor bruising, is that this is also an in vitro phenomenon.  [facility] utilizes a thromboplastin to induce clotting in the test tube that is very sensitive and results in false prolongation of the APTT in some individuals.  I will request her to repeat the APTT at an outside lab that utilizes a different type of thromboplastin.  If that result is normal, the APTT prolongation would also be solely an in vitro phenomenon of no significance.\par \par Plan:\par At an outside commercial lab:\par APTT, factors 8, 9, 11\par Discontinue zinc supplement - this can rarely cause anemia [Palliative Care Plan] : not applicable at this time

## 2020-10-12 NOTE — CONSULT LETTER
[Dear  ___] : Dear ~FILOMENA, [Consult Letter:] : I had the pleasure of evaluating your patient, [unfilled]. [Please see my note below.] : Please see my note below. [Consult Closing:] : Thank you very much for allowing me to participate in the care of this patient.  If you have any questions, please do not hesitate to contact me. [Sincerely,] : Sincerely, [FreeTextEntry2] : Lee Coley MD [FreeTextEntry3] : Paresh\par Milton Peng M.D., FACP\par Professor of Medicine\par [employer]\par Associate Chief, Division of Hematology\par [facility]\par [facility]\par [address]\[place] [address]\par [phone]\par \par \par \par   [DrJoshua  ___] : Dr. SHELBY [DrJoshua ___] : Dr. SHELBY

## 2020-10-12 NOTE — RESULTS/DATA
[FreeTextEntry1] : WBC 8000 Hgb 12.4 Hct 37.5 Platelets 175,000 Diff normal\par Blue top platelet count 194,000\par \par PT 14.8, 50:50 12.5\par APTT 38.1, 50:50 39, 2 hr incubation 41.1/40.1\par DTT 21.4\par Fibrinogen 452\par D-dimer <150\par Factor %\par HMWK 35.2\par Prekallikrein 34.8\par

## 2020-10-15 LAB
APTT 2H P 1:4 NP PPP: 40.1 SEC
APTT 2H P INC PPP: 41.1 SEC
APTT 50/50 MIX COMMENT: NORMAL
APTT BLD: 38.1 SEC
APTT IMM NP/PRE NP PPP: 39 SEC
APTT INV RATIO PPP: 38.1 SEC
DEPRECATED D DIMER PPP IA-ACNC: <150 NG/ML DDU
FIBRINOGEN PPP COAG.DERIVED-MCNC: 452 MG/DL
INR PPP: 1.27 RATIO
NPP NORMAL POOLED PLASMA: 32.4 SECS
NPP-PT: 11.8 SEC
PROTHROMBIN TIME/NOMAL: 12.5 SEC
PT BLD: 14.8 SEC
PT BLD: 14.8 SEC
TT CONT PPP: 21.4 SEC

## 2020-11-01 ENCOUNTER — RESULT REVIEW (OUTPATIENT)
Age: 70
End: 2020-11-01

## 2020-11-01 ENCOUNTER — OUTPATIENT (OUTPATIENT)
Dept: OUTPATIENT SERVICES | Facility: HOSPITAL | Age: 70
LOS: 1 days | End: 2020-11-01
Payer: MEDICARE

## 2020-11-01 ENCOUNTER — APPOINTMENT (OUTPATIENT)
Dept: MRI IMAGING | Facility: CLINIC | Age: 70
End: 2020-11-01
Payer: MEDICARE

## 2020-11-01 DIAGNOSIS — Z90.710 ACQUIRED ABSENCE OF BOTH CERVIX AND UTERUS: Chronic | ICD-10-CM

## 2020-11-01 DIAGNOSIS — Z90.49 ACQUIRED ABSENCE OF OTHER SPECIFIED PARTS OF DIGESTIVE TRACT: Chronic | ICD-10-CM

## 2020-11-01 DIAGNOSIS — Z98.890 OTHER SPECIFIED POSTPROCEDURAL STATES: Chronic | ICD-10-CM

## 2020-11-01 DIAGNOSIS — Z00.8 ENCOUNTER FOR OTHER GENERAL EXAMINATION: ICD-10-CM

## 2020-11-01 PROCEDURE — 72148 MRI LUMBAR SPINE W/O DYE: CPT

## 2020-11-01 PROCEDURE — 72148 MRI LUMBAR SPINE W/O DYE: CPT | Mod: 26

## 2020-11-04 ENCOUNTER — APPOINTMENT (OUTPATIENT)
Dept: ORTHOPEDIC SURGERY | Facility: CLINIC | Age: 70
End: 2020-11-04
Payer: MEDICARE

## 2020-11-04 VITALS — BODY MASS INDEX: 19.94 KG/M2 | TEMPERATURE: 97.3 F | WEIGHT: 95 LBS | HEIGHT: 58 IN

## 2020-11-04 DIAGNOSIS — M51.37 OTHER INTERVERTEBRAL DISC DEGENERATION, LUMBOSACRAL REGION: ICD-10-CM

## 2020-11-04 PROCEDURE — 99214 OFFICE O/P EST MOD 30 MIN: CPT

## 2020-11-12 ENCOUNTER — NON-APPOINTMENT (OUTPATIENT)
Age: 70
End: 2020-11-12

## 2020-11-13 ENCOUNTER — LABORATORY RESULT (OUTPATIENT)
Age: 70
End: 2020-11-13

## 2020-11-16 DIAGNOSIS — D69.6 THROMBOCYTOPENIA, UNSPECIFIED: ICD-10-CM

## 2020-11-17 ENCOUNTER — OUTPATIENT (OUTPATIENT)
Dept: OUTPATIENT SERVICES | Facility: HOSPITAL | Age: 70
LOS: 1 days | End: 2020-11-17
Payer: MEDICARE

## 2020-11-17 DIAGNOSIS — Z98.890 OTHER SPECIFIED POSTPROCEDURAL STATES: Chronic | ICD-10-CM

## 2020-11-17 DIAGNOSIS — Z90.49 ACQUIRED ABSENCE OF OTHER SPECIFIED PARTS OF DIGESTIVE TRACT: Chronic | ICD-10-CM

## 2020-11-17 DIAGNOSIS — Z90.710 ACQUIRED ABSENCE OF BOTH CERVIX AND UTERUS: Chronic | ICD-10-CM

## 2020-11-17 DIAGNOSIS — Z11.59 ENCOUNTER FOR SCREENING FOR OTHER VIRAL DISEASES: ICD-10-CM

## 2020-11-17 LAB — SARS-COV-2 RNA SPEC QL NAA+PROBE: SIGNIFICANT CHANGE UP

## 2020-11-17 PROCEDURE — U0003: CPT

## 2020-11-19 ENCOUNTER — OUTPATIENT (OUTPATIENT)
Dept: OUTPATIENT SERVICES | Facility: HOSPITAL | Age: 70
LOS: 1 days | End: 2020-11-19
Payer: MEDICARE

## 2020-11-19 ENCOUNTER — NON-APPOINTMENT (OUTPATIENT)
Age: 70
End: 2020-11-19

## 2020-11-19 DIAGNOSIS — M54.16 RADICULOPATHY, LUMBAR REGION: ICD-10-CM

## 2020-11-19 DIAGNOSIS — Z90.49 ACQUIRED ABSENCE OF OTHER SPECIFIED PARTS OF DIGESTIVE TRACT: Chronic | ICD-10-CM

## 2020-11-19 DIAGNOSIS — Z98.890 OTHER SPECIFIED POSTPROCEDURAL STATES: Chronic | ICD-10-CM

## 2020-11-19 DIAGNOSIS — Z90.710 ACQUIRED ABSENCE OF BOTH CERVIX AND UTERUS: Chronic | ICD-10-CM

## 2020-11-19 LAB
APPEARANCE: CLEAR
BILIRUBIN URINE: NEGATIVE
BLOOD URINE: NEGATIVE
COLOR: NORMAL
GLUCOSE QUALITATIVE U: NEGATIVE
KETONES URINE: NEGATIVE
LEUKOCYTE ESTERASE URINE: NEGATIVE
NITRITE URINE: NEGATIVE
PH URINE: 6.5
PLATELET # PLAS AUTO: 32 K/UL
PROTEIN URINE: NEGATIVE
SPECIFIC GRAVITY URINE: 1.01
UROBILINOGEN URINE: NORMAL

## 2020-11-19 PROCEDURE — 62323 NJX INTERLAMINAR LMBR/SAC: CPT

## 2020-11-20 ENCOUNTER — RESULT REVIEW (OUTPATIENT)
Age: 70
End: 2020-11-20

## 2020-11-20 ENCOUNTER — APPOINTMENT (OUTPATIENT)
Dept: HEMATOLOGY ONCOLOGY | Facility: CLINIC | Age: 70
End: 2020-11-20

## 2020-11-20 ENCOUNTER — APPOINTMENT (OUTPATIENT)
Dept: INTERNAL MEDICINE | Facility: CLINIC | Age: 70
End: 2020-11-20

## 2020-11-20 ENCOUNTER — OUTPATIENT (OUTPATIENT)
Dept: OUTPATIENT SERVICES | Facility: HOSPITAL | Age: 70
LOS: 1 days | Discharge: HOME | End: 2020-11-20

## 2020-11-20 DIAGNOSIS — Z98.890 OTHER SPECIFIED POSTPROCEDURAL STATES: Chronic | ICD-10-CM

## 2020-11-20 DIAGNOSIS — Z90.49 ACQUIRED ABSENCE OF OTHER SPECIFIED PARTS OF DIGESTIVE TRACT: Chronic | ICD-10-CM

## 2020-11-20 DIAGNOSIS — D68.9 COAGULATION DEFECT, UNSPECIFIED: ICD-10-CM

## 2020-11-20 DIAGNOSIS — Z90.710 ACQUIRED ABSENCE OF BOTH CERVIX AND UTERUS: Chronic | ICD-10-CM

## 2020-11-30 ENCOUNTER — APPOINTMENT (OUTPATIENT)
Dept: CARDIOLOGY | Facility: CLINIC | Age: 70
End: 2020-11-30
Payer: MEDICARE

## 2020-11-30 ENCOUNTER — NON-APPOINTMENT (OUTPATIENT)
Age: 70
End: 2020-11-30

## 2020-11-30 VITALS
RESPIRATION RATE: 14 BRPM | OXYGEN SATURATION: 99 % | HEIGHT: 58 IN | BODY MASS INDEX: 19.52 KG/M2 | DIASTOLIC BLOOD PRESSURE: 50 MMHG | WEIGHT: 93 LBS | SYSTOLIC BLOOD PRESSURE: 100 MMHG | HEART RATE: 70 BPM

## 2020-11-30 PROCEDURE — 93000 ELECTROCARDIOGRAM COMPLETE: CPT

## 2020-11-30 PROCEDURE — 99214 OFFICE O/P EST MOD 30 MIN: CPT

## 2020-12-03 ENCOUNTER — APPOINTMENT (OUTPATIENT)
Dept: INTERNAL MEDICINE | Facility: CLINIC | Age: 70
End: 2020-12-03
Payer: MEDICARE

## 2020-12-03 VITALS — BODY MASS INDEX: 19.52 KG/M2 | HEIGHT: 58 IN | WEIGHT: 93 LBS | TEMPERATURE: 97.8 F

## 2020-12-03 VITALS — HEART RATE: 72 BPM | DIASTOLIC BLOOD PRESSURE: 48 MMHG | RESPIRATION RATE: 14 BRPM | SYSTOLIC BLOOD PRESSURE: 102 MMHG

## 2020-12-03 PROCEDURE — 99214 OFFICE O/P EST MOD 30 MIN: CPT | Mod: 25

## 2020-12-03 PROCEDURE — 99497 ADVNCD CARE PLAN 30 MIN: CPT | Mod: 33

## 2020-12-03 PROCEDURE — G0439: CPT

## 2020-12-04 NOTE — HEALTH RISK ASSESSMENT
[Good] : ~his/her~  mood as  good [] : No [No] : In the past 12 months have you used drugs other than those required for medical reasons? No [One fall no injury in past year] : Patient reported one fall in the past year without injury [0] : 2) Feeling down, depressed, or hopeless: Not at all (0) [[id number]] : 0 [HIV test declined] : HIV test declined [Hepatitis C test declined] : Hepatitis C test declined

## 2020-12-04 NOTE — ASSESSMENT
[FreeTextEntry1] : Labs reviewed\par Repeat TFTs in 6 weeks\par \par 16 minutes spent with patient discussing ACP/HCP. She has designated a proxy, and the proxy is aware of the patients wishes and will comply.\par \par Pt had mammogram/breast sono yesterday\par \par Continue all meds\par PT for lower back pain.\par \par F/U upon return from [place] or three months\par \par \par

## 2020-12-04 NOTE — HISTORY OF PRESENT ILLNESS
[PMH Reviewed and Updated] : past medical history reviewed and updated [PSH Reviewed and Updated] : past surgical history reviewed and updated [Family History Reviewed and Updated] : family history reviewed and updated [Medication and Allergies Reconciled] : medication and allergies reconciled [0] : 0 [Over the Past 2 Weeks, Have You Felt Down, Depressed, or Hopeless?] : 1.) Over the past 2 weeks, have you felt down, depressed, or hopeless? No [Over the Past 2 Weeks, Have You Felt Little Interest or Pleasure Doing Things?] : 2.) Over the past 2 weeks, have you felt little interest or pleasure doing things? No [FreeTextEntry1] : Pt presents for f/u evaluation of hypothyroidism, s/p osteomy, CREST syndrome, hyperlipidemia, CAD s/p PTCA\par She is stable on meds\par Also here for her annual physical.\par \par She is without acute complaints today.\par \par Sees GI Dr Hill - stable.\par \par Saw endocrinology - repeat bone density in September reviewed\par On prolia for osteoporosis.  Endocrinologist - Dr Shabazz\par TSH has been borderline high on last BW\par \par Has been seeing Dr Womack for cardiology / Dr Peng for abnormal PT/INR and clumped platelets.\par \par Contemplating winter in [place].\par

## 2020-12-16 ENCOUNTER — TRANSCRIPTION ENCOUNTER (OUTPATIENT)
Age: 70
End: 2020-12-16

## 2021-01-12 NOTE — BEHAVIORAL HEALTH ASSESSMENT NOTE - NSBHCONSULTFOLLOW_PSY_A_CORE
Jessica Mack is a 50 y.o. female who presents today for  Chief Complaint   Patient presents with    Follow-up       HPI:  She saw pain management today for evaluation of chronic mid and lower back pain, thoracic and lumbar DDD, chronic cervical DDD. She was prescribed hydrocodone. Advised to continue other medications. Discussion of injections in future. She is taking diclofenac as needed, typically once or twice a day. Takes Flexeril 1-2 times a day as well, tolerates well. She was seen by Dr. Lillian Avila for exacerbation of her chronic back pain. He has ordered MRIs which she will have seen. Her back pain is fairly stable but still intense at times. Back pain is localized across lower back and radiates to both legs, L>R. She has chronic eczema versus psoriasis. She states she was diagnosed with psoriasis years ago. I gave her steroid cream recently. This has been mildly effective but still has several lesions scattered over her legs and body. She has not seen dermatology locally. Review of Systems   Constitutional: Negative for chills and fever. HENT: Negative for congestion, ear pain and sore throat. Respiratory: Negative for cough, chest tightness, shortness of breath and wheezing. Cardiovascular: Negative for chest pain. Gastrointestinal: Negative for abdominal pain, diarrhea and nausea. Musculoskeletal: Positive for back pain. Negative for arthralgias and myalgias. Skin: Negative for rash.         psoriasis       Past Medical History:   Diagnosis Date    Abnormal Pap smear of cervix     GERD (gastroesophageal reflux disease)     Hip pain     Hyperlipidemia     Nephrolithiasis     Primary osteoarthritis of right hip 12/3/2019    Psoriasis        Current Outpatient Medications   Medication Sig Dispense Refill  HYDROcodone-acetaminophen (NORCO) 5-325 MG per tablet Take 1 tablet by mouth every 8 hours as needed for Pain for up to 30 days. Take lowest dose possible to manage pain 90 tablet 0    diclofenac (VOLTAREN) 50 MG EC tablet Take 1 tablet by mouth 3 times daily as needed for Pain 90 tablet 5    cyclobenzaprine (FLEXERIL) 5 MG tablet Take 1 tablet by mouth 2 times daily as needed for Muscle spasms 60 tablet 3    betamethasone dipropionate (DIPROLENE) 0.05 % cream Apply topically 2 times daily as needed for eczema. Avoid use on face, 45 g 1    pantoprazole (PROTONIX) 40 MG tablet Take 1 tablet by mouth every morning (before breakfast) 30 tablet 5     No current facility-administered medications for this visit. No Known Allergies    Past Surgical History:   Procedure Laterality Date    CHOLECYSTECTOMY      HYSTERECTOMY      HYSTERECTOMY, VAGINAL      No BSO    LITHOTRIPSY      TOTAL HIP ARTHROPLASTY Right 2020    HIP TOTAL ARTHROPLASTY ANTERIOR APPROACH performed by Stan Domingo MD at [facility] OR       Social History     Tobacco Use    Smoking status: Former Smoker     Packs/day: 1.00     Years: 30.00     Pack years: 30.00     Types: Cigarettes     Quit date: 2019     Years since quittin.1    Smokeless tobacco: Never Used   Substance Use Topics    Alcohol use: Not Currently    Drug use: Never       Family History   Problem Relation Age of Onset    Breast Cancer Paternal Grandmother     Hypertension Mother     High Cholesterol Mother     Hypertension Father     Other Father         GERD, nephrolithiasis    High Cholesterol Father     Diabetes type 2  Father     Coronary Art Dis Father     High Blood Pressure Sister     Other Sister         GERD       /86   Pulse 80   Temp 97.6 °F (36.4 °C) (Temporal)   Resp 18   Wt 206 lb (93.4 kg)   SpO2 98%   BMI 34.28 kg/m²     Physical Exam  Vitals signs reviewed.    Constitutional: General: She is not in acute distress. Appearance: Normal appearance. She is well-developed. HENT:      Head: Normocephalic. Eyes:      Conjunctiva/sclera: Conjunctivae normal.      Pupils: Pupils are equal, round, and reactive to light. Neck:      Musculoskeletal: Normal range of motion and neck supple. Thyroid: No thyromegaly. Vascular: No carotid bruit or JVD. Trachea: No tracheal deviation. Cardiovascular:      Rate and Rhythm: Normal rate and regular rhythm. Heart sounds: Normal heart sounds. No murmur. Pulmonary:      Effort: Pulmonary effort is normal. No respiratory distress. Breath sounds: Normal breath sounds. No wheezing or rhonchi. Musculoskeletal: Normal range of motion. Lymphadenopathy:      Cervical: No cervical adenopathy. Skin:     General: Skin is warm and dry. Findings: No rash. Comments: Scaly dry mildly erythematous lesions noted to legs   Neurological:      Mental Status: She is alert. Psychiatric:         Mood and Affect: Mood normal.         Behavior: Behavior normal.         Thought Content: Thought content normal.         ASSESSMENT/PLAN:  1. Chronic midline thoracic back pain  Stable, now followed by pain management. Advised to continue current medication. 2. Degenerative disc disease, lumbar  Continue routine follow-up with pain management  Has upcoming MRI set up by neurosurgery  Continue current medication    3. Degenerative disc disease, cervical  Plan as above    4. Psoriasis  Refer to dermatology for evaluation and recommendations. Order given to patient, she will call and schedule. - External Referral To Dermatology         Return in about 3 months (around 4/12/2021) for follow up, 30 minute visit. Avelina was seen today for follow-up.     Diagnoses and all orders for this visit:    Psoriasis  -     External Referral To Dermatology    Chronic midline thoracic back pain    Degenerative disc disease, lumbar yes

## 2021-01-14 ENCOUNTER — TRANSCRIPTION ENCOUNTER (OUTPATIENT)
Age: 71
End: 2021-01-14

## 2021-01-27 ENCOUNTER — TRANSCRIPTION ENCOUNTER (OUTPATIENT)
Age: 71
End: 2021-01-27

## 2021-01-27 ENCOUNTER — LABORATORY RESULT (OUTPATIENT)
Age: 71
End: 2021-01-27

## 2021-01-28 ENCOUNTER — RX CHANGE (OUTPATIENT)
Age: 71
End: 2021-01-28

## 2021-02-02 ENCOUNTER — EMERGENCY (EMERGENCY)
Facility: HOSPITAL | Age: 71
LOS: 1 days | Discharge: HOME | End: 2021-02-02
Attending: EMERGENCY MEDICINE
Payer: MEDICARE

## 2021-02-02 VITALS
TEMPERATURE: 98 F | DIASTOLIC BLOOD PRESSURE: 76 MMHG | SYSTOLIC BLOOD PRESSURE: 138 MMHG | WEIGHT: 100.09 LBS | RESPIRATION RATE: 18 BRPM | OXYGEN SATURATION: 98 % | HEART RATE: 82 BPM | HEIGHT: 57 IN

## 2021-02-02 DIAGNOSIS — M34.1 CR(E)ST SYNDROME: ICD-10-CM

## 2021-02-02 DIAGNOSIS — Z90.49 ACQUIRED ABSENCE OF OTHER SPECIFIED PARTS OF DIGESTIVE TRACT: Chronic | ICD-10-CM

## 2021-02-02 DIAGNOSIS — Z98.890 OTHER SPECIFIED POSTPROCEDURAL STATES: Chronic | ICD-10-CM

## 2021-02-02 DIAGNOSIS — K56.609 UNSPECIFIED INTESTINAL OBSTRUCTION, UNSPECIFIED AS TO PARTIAL VERSUS COMPLETE OBSTRUCTION: ICD-10-CM

## 2021-02-02 DIAGNOSIS — Z90.710 ACQUIRED ABSENCE OF BOTH CERVIX AND UTERUS: Chronic | ICD-10-CM

## 2021-02-02 LAB
ALBUMIN SERPL ELPH-MCNC: 3.8 G/DL — SIGNIFICANT CHANGE UP (ref 3.3–5)
ALP SERPL-CCNC: 100 U/L — SIGNIFICANT CHANGE UP (ref 40–120)
ALT FLD-CCNC: 21 U/L — SIGNIFICANT CHANGE UP (ref 10–45)
ANION GAP SERPL CALC-SCNC: 12 MMOL/L — SIGNIFICANT CHANGE UP (ref 5–17)
APPEARANCE UR: CLEAR — SIGNIFICANT CHANGE UP
APTT BLD: 30.7 SEC — SIGNIFICANT CHANGE UP (ref 27.5–35.5)
AST SERPL-CCNC: 45 U/L — HIGH (ref 10–40)
BASOPHILS # BLD AUTO: 0.04 K/UL — SIGNIFICANT CHANGE UP (ref 0–0.2)
BASOPHILS NFR BLD AUTO: 0.4 % — SIGNIFICANT CHANGE UP (ref 0–2)
BILIRUB SERPL-MCNC: 0.8 MG/DL — SIGNIFICANT CHANGE UP (ref 0.2–1.2)
BILIRUB UR-MCNC: NEGATIVE — SIGNIFICANT CHANGE UP
BLD GP AB SCN SERPL QL: NEGATIVE — SIGNIFICANT CHANGE UP
BUN SERPL-MCNC: 17 MG/DL — SIGNIFICANT CHANGE UP (ref 7–23)
CALCIUM SERPL-MCNC: 9.5 MG/DL — SIGNIFICANT CHANGE UP (ref 8.4–10.5)
CHLORIDE SERPL-SCNC: 100 MMOL/L — SIGNIFICANT CHANGE UP (ref 96–108)
CO2 SERPL-SCNC: 25 MMOL/L — SIGNIFICANT CHANGE UP (ref 22–31)
COLOR SPEC: COLORLESS — SIGNIFICANT CHANGE UP
CREAT SERPL-MCNC: 0.89 MG/DL — SIGNIFICANT CHANGE UP (ref 0.5–1.3)
DIFF PNL FLD: NEGATIVE — SIGNIFICANT CHANGE UP
EOSINOPHIL # BLD AUTO: 0.11 K/UL — SIGNIFICANT CHANGE UP (ref 0–0.5)
EOSINOPHIL NFR BLD AUTO: 1 % — SIGNIFICANT CHANGE UP (ref 0–6)
GAS PNL BLDV: SIGNIFICANT CHANGE UP
GLUCOSE SERPL-MCNC: 87 MG/DL — SIGNIFICANT CHANGE UP (ref 70–99)
GLUCOSE UR QL: NEGATIVE — SIGNIFICANT CHANGE UP
HCT VFR BLD CALC: 38 % — SIGNIFICANT CHANGE UP (ref 34.5–45)
HGB BLD-MCNC: 12.3 G/DL — SIGNIFICANT CHANGE UP (ref 11.5–15.5)
IMM GRANULOCYTES NFR BLD AUTO: 0.2 % — SIGNIFICANT CHANGE UP (ref 0–1.5)
INR BLD: 1.72 RATIO — HIGH (ref 0.88–1.16)
KETONES UR-MCNC: NEGATIVE — SIGNIFICANT CHANGE UP
LEUKOCYTE ESTERASE UR-ACNC: NEGATIVE — SIGNIFICANT CHANGE UP
LIDOCAIN IGE QN: 54 U/L — SIGNIFICANT CHANGE UP (ref 7–60)
LYMPHOCYTES # BLD AUTO: 1.23 K/UL — SIGNIFICANT CHANGE UP (ref 1–3.3)
LYMPHOCYTES # BLD AUTO: 11.7 % — LOW (ref 13–44)
MCHC RBC-ENTMCNC: 28.7 PG — SIGNIFICANT CHANGE UP (ref 27–34)
MCHC RBC-ENTMCNC: 32.4 GM/DL — SIGNIFICANT CHANGE UP (ref 32–36)
MCV RBC AUTO: 88.8 FL — SIGNIFICANT CHANGE UP (ref 80–100)
MONOCYTES # BLD AUTO: 0.57 K/UL — SIGNIFICANT CHANGE UP (ref 0–0.9)
MONOCYTES NFR BLD AUTO: 5.4 % — SIGNIFICANT CHANGE UP (ref 2–14)
NEUTROPHILS # BLD AUTO: 8.51 K/UL — HIGH (ref 1.8–7.4)
NEUTROPHILS NFR BLD AUTO: 81.3 % — HIGH (ref 43–77)
NITRITE UR-MCNC: NEGATIVE — SIGNIFICANT CHANGE UP
NRBC # BLD: 0 /100 WBCS — SIGNIFICANT CHANGE UP (ref 0–0)
PH UR: 7 — SIGNIFICANT CHANGE UP (ref 5–8)
PLATELET # BLD AUTO: 123 K/UL — LOW (ref 150–400)
POTASSIUM SERPL-MCNC: 4.7 MMOL/L — SIGNIFICANT CHANGE UP (ref 3.5–5.3)
POTASSIUM SERPL-SCNC: 4.7 MMOL/L — SIGNIFICANT CHANGE UP (ref 3.5–5.3)
PROT SERPL-MCNC: 6.7 G/DL — SIGNIFICANT CHANGE UP (ref 6–8.3)
PROT UR-MCNC: NEGATIVE — SIGNIFICANT CHANGE UP
PROTHROM AB SERPL-ACNC: 20.1 SEC — HIGH (ref 10.6–13.6)
RBC # BLD: 4.28 M/UL — SIGNIFICANT CHANGE UP (ref 3.8–5.2)
RBC # FLD: 15.4 % — HIGH (ref 10.3–14.5)
RH IG SCN BLD-IMP: POSITIVE — SIGNIFICANT CHANGE UP
SARS-COV-2 RNA SPEC QL NAA+PROBE: SIGNIFICANT CHANGE UP
SODIUM SERPL-SCNC: 137 MMOL/L — SIGNIFICANT CHANGE UP (ref 135–145)
SP GR SPEC: 1.03 — HIGH (ref 1.01–1.02)
UROBILINOGEN FLD QL: NEGATIVE — SIGNIFICANT CHANGE UP
WBC # BLD: 10.48 K/UL — SIGNIFICANT CHANGE UP (ref 3.8–10.5)
WBC # FLD AUTO: 10.48 K/UL — SIGNIFICANT CHANGE UP (ref 3.8–10.5)

## 2021-02-02 PROCEDURE — 74019 RADEX ABDOMEN 2 VIEWS: CPT | Mod: 26

## 2021-02-02 PROCEDURE — 71045 X-RAY EXAM CHEST 1 VIEW: CPT | Mod: 26

## 2021-02-02 PROCEDURE — 74177 CT ABD & PELVIS W/CONTRAST: CPT | Mod: 26,MG

## 2021-02-02 PROCEDURE — G1004: CPT

## 2021-02-02 PROCEDURE — 99218: CPT | Mod: CS,GC

## 2021-02-02 RX ORDER — SODIUM CHLORIDE 9 MG/ML
1000 INJECTION INTRAMUSCULAR; INTRAVENOUS; SUBCUTANEOUS
Refills: 0 | Status: DISCONTINUED | OUTPATIENT
Start: 2021-02-02 | End: 2021-02-05

## 2021-02-02 RX ORDER — SODIUM CHLORIDE 9 MG/ML
1000 INJECTION, SOLUTION INTRAVENOUS ONCE
Refills: 0 | Status: COMPLETED | OUTPATIENT
Start: 2021-02-02 | End: 2021-02-02

## 2021-02-02 RX ORDER — ONDANSETRON 8 MG/1
4 TABLET, FILM COATED ORAL ONCE
Refills: 0 | Status: COMPLETED | OUTPATIENT
Start: 2021-02-02 | End: 2021-02-02

## 2021-02-02 RX ORDER — FAMOTIDINE 10 MG/ML
20 INJECTION INTRAVENOUS ONCE
Refills: 0 | Status: COMPLETED | OUTPATIENT
Start: 2021-02-02 | End: 2021-02-02

## 2021-02-02 RX ADMIN — SODIUM CHLORIDE 1000 MILLILITER(S): 9 INJECTION, SOLUTION INTRAVENOUS at 05:08

## 2021-02-02 RX ADMIN — SODIUM CHLORIDE 125 MILLILITER(S): 9 INJECTION INTRAMUSCULAR; INTRAVENOUS; SUBCUTANEOUS at 20:38

## 2021-02-02 RX ADMIN — SODIUM CHLORIDE 125 MILLILITER(S): 9 INJECTION INTRAMUSCULAR; INTRAVENOUS; SUBCUTANEOUS at 12:16

## 2021-02-02 RX ADMIN — ONDANSETRON 4 MILLIGRAM(S): 8 TABLET, FILM COATED ORAL at 16:21

## 2021-02-02 RX ADMIN — FAMOTIDINE 20 MILLIGRAM(S): 10 INJECTION INTRAVENOUS at 08:35

## 2021-02-02 NOTE — ED CDU PROVIDER INITIAL DAY NOTE - DETAILS
Abdominal pain/ Vomiting  - IV hydration  - pain and nausea control  - advance diet  - freq re-eval  - seen by colorectal surgery  Case d/w Ruslan Urbina

## 2021-02-02 NOTE — CONSULT NOTE ADULT - PROBLEM SELECTOR RECOMMENDATION 9
Clinically improving, now having ostomy output, tolerating clear liquid diet. 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

## 2021-02-02 NOTE — ED PROVIDER NOTE - PHYSICAL EXAMINATION
General: Anxious   HEENT: pupils equal and reactive, normal external ears bilaterally   Cardiac: RRR, no MRG appreciated  Resp: lungs clear to auscultation bilaterally, symmetric chest wall rise  Abd: soft, mild tenderness diffusely, Ileostomy bag in position with no contents   : no CVA tenderness  Neuro: Moving all extremities  Skin:  normal color for race

## 2021-02-02 NOTE — ED ADULT NURSE NOTE - NSIMPLEMENTINTERV_GEN_ALL_ED
Implemented All Fall Risk Interventions:  [place] to call system. Call bell, personal items and telephone within reach. Instruct patient to call for assistance. Room bathroom lighting operational. Non-slip footwear when patient is off stretcher. Physically safe environment: no spills, clutter or unnecessary equipment. Stretcher in lowest position, wheels locked, appropriate side rails in place. Provide visual cue, wrist band, yellow gown, etc. Monitor gait and stability. Monitor for mental status changes and reorient to person, place, and time. Review medications for side effects contributing to fall risk. Reinforce activity limits and safety measures with patient and family.

## 2021-02-02 NOTE — CONSULT NOTE ADULT - ATTENDING COMMENTS
Differential diagnosis and plan of care discussed with patient after the evaluation  75 Minutes spent on total encounter of which more than fifty percent of the encounter was spent counseling and/or coordinating care by the attending physician.  Advanced care planning was discussed with the patient and/or surrogate decision makers. Advanced care planning forms were discussed. The risks benefits and alternatives to pertinent gastrointestinal procedures and interventions were discussed in detail and all questions were answered. Duration: 30 Minutes.      Keegan Brennan M.D.   Gastroenterology and Hepatology  Cell: [phone]

## 2021-02-02 NOTE — ADVANCED PRACTICE NURSE CONSULT - REASON FOR CONSULT
Requested by spouse to see pt to assess stoma & pouching system .Per Spouse, pt did not bring any ostomy supplies with her. Pt is well known to ostomy team from previous hospitalizations.

## 2021-02-02 NOTE — CONSULT NOTE ADULT - SUBJECTIVE AND OBJECTIVE BOX
Chief Complaint:  Patient is a 70y old  Female who presents with a chief complaint of vomiting, decreased ostomy output    HPI:    The patient is a 68F PMH CAD s/p stents on Plavix, colonic inertia, rectal prolapse, bladder prolapse s/p ?suspensory surgical procedure, SBO s/p ex-lap, SBR, diverting loop ileostomy , with subsequent multiple SBO and one LBO, CREST syndrome Patient currently manages colonic inertia with weekly warm water enemas. She now presents to ED with 2-3days decreased ileostomy output and intermittent crampy abdominal pain. She had an episode of vomiting at home.     The patient denies dysphagia, , diarrhea, unintentional weight loss, change in bowel habits or NSAID use.    She follows with Dr. Kirill Esparza of [facility].    Allergies:  latex (Rash)  No Known Drug Allergies      Home Medications:    Hospital Medications:  sodium chloride 0.9%. 1000 milliLiter(s) IV Continuous <Continuous>      PMHX/PSHX:  Stented coronary artery    CAD (coronary atherosclerotic disease)    Sciatica    Anxiety    GERD (gastroesophageal reflux disease)    Colonic dysmotility    Ileostomy in place    Sjogren&#x27;s syndrome    H/O ileostomy    History of appendectomy    History of hysterectomy        Family history:  FH: CHF (congestive heart failure) (Mother)    FH: myocardial infarction (Father)    Family history of uterine cancer (Mother)        Social History:   Denies ethanol use.  Denies illicit drug use.    ROS:     General:  No wt loss, fevers, chills, night sweats, fatigue,   Eyes:  Good vision, no reported pain  ENT:  No sore throat, pain, runny nose, dysphagia  CV:  No pain, palpitations, hypo/hypertension  Resp:  No dyspnea, cough, tachypnea, wheezing  GI:  See HPI  :  No pain, bleeding, incontinence, nocturia  Muscle:  No pain, weakness  Neuro:  No weakness, tingling, memory problems  Psych:  No fatigue, insomnia, mood problems, depression  Endocrine:  No polyuria, polydipsia, cold/heat intolerance  Heme:  No petechiae, ecchymosis, easy bruisability  Integumentary:  No rash, edema      PHYSICAL EXAM:     GENERAL:  Appears stated age, well-groomed, well-nourished, no distress  HEENT:  NC/AT,  conjunctivae anicteric, clear and pink,   NECK: supple, trachea midline  CHEST:  Full & symmetric excursion, no increased effort, breath sounds clear  HEART:  Regular rhythm, no JVD  ABDOMEN:  Soft, non-tender, non-distended, normoactive bowel sounds,  no masses , no hepatosplenomegaly, ostomy w brown stool  EXTREMITIES:  no cyanosis,clubbing or edema  SKIN:  No rash, erythema, or, ecchymoses, no jaundice  NEURO:  Alert, non-focal, no asterixis  PSYCH: Appropriate affect, oriented to place and time  RECTAL: Deferred      Vital Signs:  Vital Signs Last 24 Hrs  T(C): 37.6 (2021 20:07), Max: 37.6 (2021 20:07)  T(F): 99.6 (2021 20:07), Max: 99.6 (2021 20:07)  HR: 91 (2021 20:07) (78 - 98)  BP: 116/71 (2021 20:07) (116/71 - 140/71)  BP(mean): --  RR: 17 (2021 20:07) (17 - 18)  SpO2: 95% (2021 20:07) (95% - 98%)  Daily Height in cm: 144.78 (2021 04:20)    Daily     LABS:                        12.3   10.48 )-----------( 123      ( 2021 05:10 )             38.0     02-    137  |  100  |  17  ----------------------------<  87  4.7   |  25  |  0.89    Ca    9.5      2021 05:10    TPro  6.7  /  Alb  3.8  /  TBili  0.8  /  DBili  x   /  AST  45<H>  /  ALT  21  /  AlkPhos  100  02-02    LIVER FUNCTIONS - ( 2021 05:10 )  Alb: 3.8 g/dL / Pro: 6.7 g/dL / ALK PHOS: 100 U/L / ALT: 21 U/L / AST: 45 U/L / GGT: x           PT/INR - ( 2021 07:31 )   PT: 20.1 sec;   INR: 1.72 ratio         PTT - ( 2021 07:31 )  PTT:30.7 sec  Urinalysis Basic - ( 2021 06:58 )    Color: Colorless / Appearance: Clear / S.028 / pH: x  Gluc: x / Ketone: Negative  / Bili: Negative / Urobili: Negative   Blood: x / Protein: Negative / Nitrite: Negative   Leuk Esterase: Negative / RBC: x / WBC x   Sq Epi: x / Non Sq Epi: x / Bacteria: x      Amylase Serum--      Lipase serum54       Ammonia--                
COLORECTAL SURGERY CONSULT NOTE    Consulting surgical team: Red Team Surgery [id number]  Consulting attending: Dr. Matteo Hernandez    HPI: 68F PMH CAD s/p stents on Plavix, colonic inertia, rectal prolapse, bladder prolapse s/p ?suspensory surgical procedure, SBO s/p ex-lap, SBR, diverting loop ileostomy at [facility] in [place] 2019, with subsequent multiple SBO and one LBO which resolved after administration of hypaque enema. Patient currently manages colonic inertia with weekly warm water enemas. She now presents to ED with 2-3days decreased ileostomy output and intermittent crampy abdominal pain. Tolerating regular diet with no nausea, vomiting. Denies fevers, chills, sick contacts.      PAST MEDICAL HISTORY:  Stented coronary artery    CAD (coronary atherosclerotic disease)    Sciatica    Anxiety    GERD (gastroesophageal reflux disease)    Colonic dysmotility    Ileostomy in place    Sjogren&#x27;s syndrome        PAST SURGICAL HISTORY:  H/O ileostomy    History of appendectomy    History of hysterectomy        MEDICATIONS:  Home Medications:  Citracal Petites 200 mg-250 intl units oral tablet: 1 tab(s) orally 2 times a day (09 Aug 2019 12:20)  famotidine 20 mg oral tablet: 1 tab(s) orally once a day (09 Aug 2019 12:14)  hydroxychloroquine 200 mg oral tablet: 1 tab(s) orally once a day (17 Jul 2019 18:30)  Iron 100 Plus: 1 tab(s) orally once a day (17 Jul 2019 18:30)  Lomotil 2.5 mg-0.025 mg oral tablet: 1 tab(s) orally 4 times a day before meals and at bedtime    *Please see internal linda* (17 Jul 2019 18:30)  Mag-Ox 400 oral tablet: 1 tab(s) orally 4 times a day with meals &amp; bed (17 Jul 2019 18:30)  NexIUM 40 mg oral delayed release capsule: 1 cap(s) orally once a day (before breakfast)  (17 Jul 2019 18:30)  nystatin 100,000 units/g topical cream: Apply topically to affected area 3 times a day (17 Jul 2019 18:30)  Synthroid 100 mcg (0.1 mg) oral tablet: 1 tab(s) orally once a day (17 Jul 2019 18:30)  Vitamin B12 1000 mcg oral tablet: 1 tab(s) orally once a day (09 Aug 2019 12:20)  Vitamin D3 5000 intl units oral tablet: 1 tab(s) orally once a day (17 Jul 2019 18:30)  Zinc:  (17 Jul 2019 18:30)      ALLERGIES:  latex (Rash)  No Known Drug Allergies      VITALS & I/Os:  Vital Signs Last 24 Hrs  T(C): 37.1 (02 Feb 2021 04:37), Max: 37.1 (02 Feb 2021 04:37)  T(F): 98.8 (02 Feb 2021 04:37), Max: 98.8 (02 Feb 2021 04:37)  HR: 86 (02 Feb 2021 07:12) (82 - 98)  BP: 125/72 (02 Feb 2021 07:12) (125/72 - 140/71)  BP(mean): --  RR: 18 (02 Feb 2021 07:12) (18 - 18)  SpO2: 98% (02 Feb 2021 07:12) (97% - 98%)    I&O's Summary      PHYSICAL EXAM:  General: well developed, well nourished, NAD  Neuro: alert and oriented, no focal deficits, moves all extremities spontaneously  HEENT: NCAT, EOMI, anicteric, mucosa moist  Respiratory: airway patent, respirations unlabored  CVS: regular rate and rhythm  Abdomen: soft, nontender, nondistended, stoma pink and viable with thick green effluent in bag  Extremities: no edema, sensation and movement grossly intact  Skin: warm, dry, appropriate color      LABS:                        12.3   10.48 )-----------( 123      ( 02 Feb 2021 05:10 )             38.0     02-02    137  |  100  |  17  ----------------------------<  87  4.7   |  25  |  0.89    Ca    9.5      02 Feb 2021 05:10    TPro  6.7  /  Alb  3.8  /  TBili  0.8  /  DBili  x   /  AST  45<H>  /  ALT  21  /  AlkPhos  100  02-02    Lactate:  02-02 @ 05:10  1.4          IMAGING:    < from: CT Abdomen and Pelvis w/ IV Cont (02.02.21 @ 06:07) >  FINDINGS:  LOWER CHEST: Coronary artery calcifications. Dilated fluid-filled distal esophagus.    LIVER: Within normal limits.  BILE DUCTS: Normal caliber.  GALLBLADDER: Within normal limits.  SPLEEN: Within normal limits.  PANCREAS: Within normal limits.  ADRENALS: Within normal limits.  KIDNEYS/URETERS: Unchanged mild bilateral hydronephrosis. Symmetric enhancement of the bilateral kidneys.    BLADDER: Within normal limits.  REPRODUCTIVE ORGANS: Hysterectomy.    BOWEL: Postsurgical changes with ileocolic anastomosis in the right lower quadrant ileostomy. Distended bowel loops containing air and fluid with fecalization of small bowel loops proximal to the gradual transition point at the right lower quadrant (2:71).  PERITONEUM: Trace mesenteric edema surrounding the distended bowel loops in the pelvis. Trace ascites. No pneumoperitoneum.  VESSELS: Atherosclerotic changes.  RETROPERITONEUM/LYMPH NODES: No lymphadenopathy.  ABDOMINAL WALL: Right lower quadrant ileostomy.  BONES: Degenerative changes. Unchanged grade 1 anterolisthesis of L5 on S1.    IMPRESSION:    Distended bowel loops containing air and fluid with fecalization of small bowel loops proximal to the gradual transition point at the right lower quadrant (2:71), may reflect partial small bowel obstruction. Trace surrounding mesenteric edema in the pelvic small bowel loops.    Persistent mild bilateral hydronephrosis.    < end of copied text >

## 2021-02-02 NOTE — ED CDU PROVIDER DISPOSITION NOTE - CLINICAL COURSE
69yo F pmhx of Sjogrens, Raynauds, SBO s/p ileostomy (2017), CAD s/p GAUDENCIO (2019), hypothyroid here w/ cc of vomiting. States since 8pm ileostomy output has stopped and has had multiple episodes of vomiting and "stomach cramps", has had multiple SBO's in past and these sx feel like previous SBO's. Had ileostomy due to multiple SBO's. no f/c, no cough, felt ok before this.  In ED, VSS. Labs nonactionable. CT a/p + partial SBO. Pt seen by colorectal surgery team, recommending PO chal with CLD and no surgical intervention at this time. Pt attempted fluids and half of a cracker followed by abdominal discomfort and mild nausea. Pt sent to CDU for serial abdominal exams, hydration, and advance diet as tolerated.   In CDU, patient w/ episode of vomiting relieved by zofran. Abd x ray showed nonspecific bowel gas pattern without significantly gasseous dilated loops of bowel. Pt re-evaluated by Surgery........ 71yo F pmhx of Sjogrens, Raynauds, SBO s/p ileostomy (2017), CAD s/p GAUDENCIO (2019), hypothyroid here w/ cc of vomiting. States since 8pm ileostomy output has stopped and has had multiple episodes of vomiting and "stomach cramps", has had multiple SBO's in past and these sx feel like previous SBO's. Had ileostomy due to multiple SBO's. no f/c, no cough, felt ok before this.  In ED, VSS. Labs nonactionable. CT a/p + partial SBO. Pt seen by colorectal surgery team, recommending PO chal with CLD and no surgical intervention at this time. Pt attempted fluids and half of a cracker followed by abdominal discomfort and mild nausea. Pt sent to CDU for serial abdominal exams, hydration, and advance diet as tolerated.   In CDU, patient w/ episode of vomiting relieved by zofran. Abd x ray showed nonspecific bowel gas pattern without significantly gasseous dilated loops of bowel. Pt re-evaluated by Surgery; pain improved. Pt tolerating PO, stable vitals. seen by Surgery, all questions by patient and spouse answered. Pt understands how to take care of the ostomy and the output. Case discussed with Dr. Kim. Stable for discharge.

## 2021-02-02 NOTE — ED CDU PROVIDER INITIAL DAY NOTE - ATTENDING CONTRIBUTION TO CARE
Patient seen and examined. Case discussed with Surgery. patient feels better with output in ostomy. Admit to CDU for monitoring of symptoms and pain.

## 2021-02-02 NOTE — ED PROVIDER NOTE - NS ED ROS FT
CONSTITUTIONAL: No fevers, no chills  Eyes: No vision changes  Cardiovascular: No Chest pain  Respiratory: No SOB  Gastrointestinal: refer to HPI  Genitourinary: no dysuria, no hematuria  SKIN: no rashes.  NEURO: no headache, no weakness or numbness  PSYCHIATRIC: no known mental health issues.  Endocrine: No unexplained weight gain

## 2021-02-02 NOTE — ED ADULT NURSE NOTE - OBJECTIVE STATEMENT
70 year old Female, PMH: SBO- 2017, Sjogren's syndrome, GERD, CAD. PSH: cardiac stent, hysterectomy, appendectomy. Patient coming in today reporting "I have a blockage, I think", vomiting this morning- no blood present. Has an ileostomy with no drainage since 2000 last night. Reports not drinking a lot of water and thinking she is dehydrated. A&Ox4, breathing spontaneous and unlabored, palpable pulses. Denies fevers, chills, chest pain, shortness of breath, recent sick contacts. Ambulates independently unless sciatica present. Reports frequent falls with syncope, "from low blood pressure and weakness". Bed locked and in lowest position for safety, side rails up, call bell at bedside, educated on use.

## 2021-02-02 NOTE — ED PROVIDER NOTE - PROGRESS NOTE DETAILS
Surgery consult appreciated, recommend PO challenge, states patient with some ostomy outpt. Spoke to patient and , states still has mild LUQ pain. Pt does feel hungry and would like to try po challenge. Will re eval. RGUJRAL Nguyen-PGY2: pt received at sign-out, seen and evaluated at bedside.  Pt sitting comfortably in NAD. Pt states she still has no appetite, states she was able to eat 1 cracker, reports positive ostomy output. Discussed R/B/A of CDU observation for serial abdominal exams, pt agreeable to plan. Discussed with CDU THUY Steele, states pt will be evaluated shortly.

## 2021-02-02 NOTE — ADVANCED PRACTICE NURSE CONSULT - ASSESSMENT
On assessment, pt awake, A & O x4 denies any discomfort,( denies nausea, no abd cramping noted) pt was drinking fluids. Pouching system is intact. Pt is wearing a filtered beige pouch. Abd soft , non distended. Pt unaware if passing gas via ileostomy, as no gas noted at this time. Pouch removed & switched to transparent non-filtered pouch. Stoma pink& viable, small amts of pasty stool w/ undigested food noted .

## 2021-02-02 NOTE — ED CDU PROVIDER INITIAL DAY NOTE - OBJECTIVE STATEMENT
69yo F pmhx of Sjogrens, Raynauds, SBO s/p ileostomy (2017), CAD s/p GAUDENCIO (2019), hypothyroid here w/ cc of vomiting. States since 8pm ileostomy output has stopped and has had multiple episodes of vomiting and "stomach cramps", has had multiple SBO's in past and these sx feel like previous SBO's. Had ileostomy due to multiple SBO's. no f/c, no cough, felt ok before this.  In ED, VSS. Labs nonactionable. CT a/p + partial SBO. Pt seen by colorectal surgery team, recommending PO chal with CLD and no surgical intervention at this time. Pt attempted fluids and half of a cracker followed by abdominal discomfort and mild nausea. Pt sent to CDU for serial abdominal exams, hydration, and advance diet as tolerated. Pt with small amount of formed ostomy output at time of CDU eval, and soft/nontender abdomen.

## 2021-02-02 NOTE — ED CDU PROVIDER INITIAL DAY NOTE - PROGRESS NOTE DETAILS
Pt reports mild abdominal cramping described as gas pains. Tolerating small sips of water. No vomiting since early this morning. VSS. Abdomen remains soft and nontender. +small amount of soft, brown stool in ostomy bag. Called and spoke with surgery team, aware pt staying in CDU, will come re-eval. Pt with episode of vomiting, relieved with zofran. No worsening abdominal pain. Surgery resident at bedside, recommending Abd Xray and will d/w attending Dr. Ramirez who is currently in OR. Xray unremarkable. GI Dr. Brennan at pt's bedside, consulted by surgery team. CDU PROGRESS NOTE THUY MATHEW: Received pt at 1900 sign-out. Pt resting in stretcher in NAD. Case/plan reviewed. VSS. Pt tolerating clears, no vomiting episodes or reports of nausea. +ostomy in place with small amount of formed stool. abdomen soft, nontender, non distended. Pt without complaints. Will continue to monitor overnight. CDU PROGRESS NOTE PA PRIYANKA: Pt resting comfortably, NAD, VSS. Clinically improving, with increasing ostomy output, tolerating clear liquid diet. Will continue to monitor overnight.

## 2021-02-02 NOTE — ED PROVIDER NOTE - OBJECTIVE STATEMENT
69yo F pmhx of Sjogrens, Raynauds, SBO s/p ileostomy (2017), CAD s/p GAUDENCIO (2019), hypothyroid here w/ cc of vomiting    States since 8pm ileostomy output has stopped and has had multiple episodes of vomiting and "stomach cramps", has had multiple SBO's in past and these sx feel like previous SBO's. Had ileostomy due to multiple SBO's. no f/c, no cough, felt ok before this.

## 2021-02-02 NOTE — ED PROVIDER NOTE - ATTENDING CONTRIBUTION TO CARE
Pt w ostomy here w abd pain, no ostomy output and now vomiting for several hrs. on exam, pt is well appearing, stable vitals, benign abd exam, but no output on ostomy. w/u significant for SBO on CT. surgery consulted. awaiting surg recs, expect likely admission

## 2021-02-02 NOTE — ED CDU PROVIDER DISPOSITION NOTE - ATTENDING CONTRIBUTION TO CARE
CDU Attending Note -- Pt seen and examined at bedside.  Case discussed c CDU PA.  Pt comfortable, asymptomatic, and has good follow up.  Surgery recommendations appreciated.  Labs/imaging reviewed.  Pt without n/v, +output in ostomy, tolerating clears.  No abd pain or tenderness to palpation.  Nontoxic appearing.  Awaiting final surgery recs, likely will be able to d/c with close surgery/GI f/u.  --BMM

## 2021-02-02 NOTE — ED CDU PROVIDER DISPOSITION NOTE - CARE PROVIDER_API CALL
Matteo Hernandez)  ColonRectal Surgery; Surgery  [address], [address]  [place] [address]  Phone: [phone]  Fax: [phone]  Follow Up Time:

## 2021-02-02 NOTE — ED CDU PROVIDER DISPOSITION NOTE - NSFOLLOWUPINSTRUCTIONS_ED_ALL_ED_FT
As per Surgery........... Follow up with your PMD within 48-72 hrs. Show copies of your reports given to you. Take all of your medications as previously prescribed.   Rest. Keep self hydrated, start with small amount of water every 20-30 minutes then advance to clears (ginger ale, Broth), then bland diet of BRAT diet (bananas, rice, apple, tea). Avoid diary, avoid spicy/fatty foods.  3. Return to the ER for fever, chills, vomiting, unable to eat or drink or increase or decrease in the output from the ostomy or any other concerns.

## 2021-02-02 NOTE — ADVANCED PRACTICE NURSE CONSULT - RECOMMEDATIONS
Encourage po fluids as tolerated. When allowed to eat encouraged to chew foods very well, eat slowly, maintain hydration. Supplies provided.

## 2021-02-02 NOTE — ED CDU PROVIDER DISPOSITION NOTE - PATIENT PORTAL LINK FT
You can access the FollowMyHealth Patient Portal offered by [facility] by registering at the following website: [email or web address]. By joining [employer]’s FollowMyHealth portal, you will also be able to view your health information using other applications (apps) compatible with our system.

## 2021-02-02 NOTE — ED ADULT NURSE NOTE - PMH
Anxiety    CAD (coronary atherosclerotic disease)    Colonic dysmotility    GERD (gastroesophageal reflux disease)    Ileostomy in place  2/2 SBO 2017  Sciatica    Sjogren's syndrome    Stented coronary artery

## 2021-02-02 NOTE — ED PROVIDER NOTE - CLINICAL SUMMARY MEDICAL DECISION MAKING FREE TEXT BOX
aGry PGY-3:  69yo F w/ pmhx multiple SBO's here with vomiting, no ileostomy output likely representing repeat SBO, will obtain CT A/P to assess if this is the case. Will also obtain bloodwork and give IVF, pt states does not need meds for nausea or pain at this time.

## 2021-02-02 NOTE — CONSULT NOTE ADULT - ASSESSMENT
68F PMH CAD s/p stents on Plavix, colonic inertia, rectal prolapse, bladder prolapse s/p ?suspensory surgical procedure, SBO s/p ex-lap, SBR, diverting loop ileostomy at [facility] in [place] 2019, with subsequent multiple SBO and one LBO which resolved after administration of hypaque enema. Now with possible SBO on CT, however nondistended and now with returned stoma output.    - Recommend PO challenge with CLD, if tolerates, no objection to discharge from ED  - No acute surgical intervention recommended at this time    Discussed with Dr. Hrenandez.    Please page [phone] with any further questions or concerns.    Jessica Ríos, PGY2    
68F PMH of diverting loop ostomy p/w vomiting and decreased ostomy output

## 2021-02-03 VITALS
HEART RATE: 78 BPM | DIASTOLIC BLOOD PRESSURE: 56 MMHG | SYSTOLIC BLOOD PRESSURE: 89 MMHG | OXYGEN SATURATION: 97 % | RESPIRATION RATE: 18 BRPM | TEMPERATURE: 98 F

## 2021-02-03 LAB
CULTURE RESULTS: SIGNIFICANT CHANGE UP
SPECIMEN SOURCE: SIGNIFICANT CHANGE UP

## 2021-02-03 PROCEDURE — 71045 X-RAY EXAM CHEST 1 VIEW: CPT

## 2021-02-03 PROCEDURE — 83690 ASSAY OF LIPASE: CPT

## 2021-02-03 PROCEDURE — 82803 BLOOD GASES ANY COMBINATION: CPT

## 2021-02-03 PROCEDURE — 85610 PROTHROMBIN TIME: CPT

## 2021-02-03 PROCEDURE — 84295 ASSAY OF SERUM SODIUM: CPT

## 2021-02-03 PROCEDURE — G0378: CPT

## 2021-02-03 PROCEDURE — 82947 ASSAY GLUCOSE BLOOD QUANT: CPT

## 2021-02-03 PROCEDURE — 86901 BLOOD TYPING SEROLOGIC RH(D): CPT

## 2021-02-03 PROCEDURE — 85018 HEMOGLOBIN: CPT

## 2021-02-03 PROCEDURE — 74019 RADEX ABDOMEN 2 VIEWS: CPT

## 2021-02-03 PROCEDURE — 83605 ASSAY OF LACTIC ACID: CPT

## 2021-02-03 PROCEDURE — 80053 COMPREHEN METABOLIC PANEL: CPT

## 2021-02-03 PROCEDURE — U0005: CPT

## 2021-02-03 PROCEDURE — U0003: CPT

## 2021-02-03 PROCEDURE — 81003 URINALYSIS AUTO W/O SCOPE: CPT

## 2021-02-03 PROCEDURE — 99284 EMERGENCY DEPT VISIT MOD MDM: CPT | Mod: 25

## 2021-02-03 PROCEDURE — 96375 TX/PRO/DX INJ NEW DRUG ADDON: CPT

## 2021-02-03 PROCEDURE — 93005 ELECTROCARDIOGRAM TRACING: CPT

## 2021-02-03 PROCEDURE — 86900 BLOOD TYPING SEROLOGIC ABO: CPT

## 2021-02-03 PROCEDURE — 74177 CT ABD & PELVIS W/CONTRAST: CPT

## 2021-02-03 PROCEDURE — 86850 RBC ANTIBODY SCREEN: CPT

## 2021-02-03 PROCEDURE — 87086 URINE CULTURE/COLONY COUNT: CPT

## 2021-02-03 PROCEDURE — 82435 ASSAY OF BLOOD CHLORIDE: CPT

## 2021-02-03 PROCEDURE — 96374 THER/PROPH/DIAG INJ IV PUSH: CPT | Mod: XU

## 2021-02-03 PROCEDURE — 85025 COMPLETE CBC W/AUTO DIFF WBC: CPT

## 2021-02-03 PROCEDURE — 82330 ASSAY OF CALCIUM: CPT

## 2021-02-03 PROCEDURE — 85730 THROMBOPLASTIN TIME PARTIAL: CPT

## 2021-02-03 PROCEDURE — 85014 HEMATOCRIT: CPT

## 2021-02-03 PROCEDURE — 84132 ASSAY OF SERUM POTASSIUM: CPT

## 2021-02-03 PROCEDURE — 99217: CPT | Mod: CS

## 2021-02-03 RX ORDER — ACETAMINOPHEN 500 MG
650 TABLET ORAL ONCE
Refills: 0 | Status: COMPLETED | OUTPATIENT
Start: 2021-02-03 | End: 2021-02-03

## 2021-02-03 RX ADMIN — SODIUM CHLORIDE 125 MILLILITER(S): 9 INJECTION INTRAMUSCULAR; INTRAVENOUS; SUBCUTANEOUS at 05:57

## 2021-02-03 RX ADMIN — Medication 650 MILLIGRAM(S): at 05:57

## 2021-02-03 NOTE — ED ADULT NURSE REASSESSMENT NOTE - NS ED NURSE REASSESS COMMENT FT1
12.30 Pt is evaluated by CDU MD Lee Frank . pt is feeling better.  Pt was able to eat solid food & tolerated well  Pt is discharged . Tiffanie Villegas  explained the follow up care & gave the discharge summary  . Pt has stable vitals steady gait A&OX 4 at the time of Discharge
MD Messer at bedside, pt to be admitted to hospital as pt has not had any ostomy output and is unable to drink fluids.
Ostomy RN Rickey at bedside to evaluate pt and change ostomy bag. Pt encouraged to eat and drink.
Patient returns from CT reporting "tightness in throat" that has since subsided. Has previously happened with contrast. No longer has tightness sensation. MD Vega aware and at bedside assessing patient. Patient breathing spontaneous and unlabored, speaking in full sentences, skin color normal for ethnicity. Denies difficulty breathing, shortness of breath. Requesting Vaseline for dry lips. Bed locked and in lowest position for safety with call bell at bedside
Pt accepted to CDU, awaiting transport to CDU.
Pt report received from Susanne HOANG. Pt transferred to cdu Bed 6 for observation due to no output from ileostomy and to advance diet.   Pt a/ox3 denies respiratory distress, sob, dyspnea, C/P, palpitations, n/v/d at this time. Pt states symptoms have improved, currently awaiting at this time. VSS, afebrile, IV clean/dry/intact. Pt aware of plan of care, call bell within reach ,safety maintained. Will monitor and assess.
Pt reports pain 6/10, MD Sethi aware. Per provider, additional pain medication to be ordered at this time.
Report received from Shu HOANG. AOx3, speaking in complete sentences. Unlabored, spontaneous respirations, NAD, O2 sat 98% RA. Pt denies pain, SOB, n/v/d, fever/chills at this time. Seen and evaluated by surgery team. Awaiting final recommendations at this time.
Seen and evaluated by surgery team. Medicated per MD order and EMAR. Pt provided water, juice and crackers per MD request for PO challenge.
Pt received from NATALYA Pham. Pt oriented to CDU & plan of care was discussed. Pt denies any abdominal pain, N/V. Pt states she was able to tolerate a clear dinner without any issues. Pt states she is having more output from ileostomy in comparison to earlier today. Safety & comfort measures maintained. Call bell in reach. Will continue to monitor.
07.00 Am Received the Pt from  NATALYA Ramirez . Pt is Observed for SBO Pt has ileostomy stoma looks healthy  Pt states today ileostomy  is draining  better comparing to yesterday Pt has emptied the pouch. Pt denies abdominal pain Pt A&OX 4 obeys commands Anahi N/V/D fever chills cp SOB   Comfort care & safety measures continued  IV site looks clean & dry no signs of infiltration noted pt denies  pain IV site .  Pt is advised to call for help  call bell with in the reach pt verbalized the understanding . pending CDU  MD & surgical eval . GCS 15/15 A&OX 4 PERRLA  size 3 Strong upper & lower extremities steady gait   No facial droop  No Hand Leg drop denies numbness tingling . Pt is started  with clear liquids  pt tolerated jello apple juice water & Italian Ice Continue to monitor

## 2021-02-03 NOTE — ED CDU PROVIDER SUBSEQUENT DAY NOTE - HISTORY
CDU PROGRESS NOTE PA PRIYANKA: No interval change from prior exam. Pt resting comfortably, NAD, VSS. Abdomen soft, non tender, Pt tolerating clear liquid diet. Will continue to monitor overnight.

## 2021-02-03 NOTE — ED CDU PROVIDER SUBSEQUENT DAY NOTE - PROGRESS NOTE DETAILS
CDU PROGRESS NOTE PA PRIYANKA: Pt resting comfortably, NAD, VSS. Abdomen: soft, nontender, nondistended, stoma pink and viable with thick green effluent in bag. No nausea/vomiting. Pt tolerating clears, will continue to monitor. CDU PROGRESS NOTE PA PRIYANKA: Pt resting comfortably, NAD, VSS. Abdomen: soft, nontender, nondistended, stoma pink and viable with stool in bag. No nausea/vomiting. Pt tolerating clears, will continue to monitor. Pt resting comfortably. NAD. No complaints. VSS. tolerating a clear diet. pending surgery re eval.

## 2021-02-03 NOTE — ED ADULT NURSE REASSESSMENT NOTE - CONDITION
"Subjective   Pt reports L buttocks pain radiating down left leg started 2 weeks ago and started to worsen last night. Pt reports the pain has previously been relieved with ibuprofen but has not relieved the pain since last night. Pt describes the pain as \"painful and burning.\" Denies any injury to the area, urinary or bowel incontinence or weakness.        History provided by:  Patient   used: No    Leg Pain   Location:  Buttock  Time since incident:  2 weeks  Injury: no    Buttock location:  L buttock  Pain details:     Quality:  Burning    Radiates to:  Groin and L leg    Severity:  Moderate    Onset quality:  Gradual    Duration:  2 weeks    Timing:  Constant    Progression:  Worsening  Chronicity:  New  Relieved by:  Movement  Exacerbated by: Supine position.  Ineffective treatments:  NSAIDs  Associated symptoms: back pain    Associated symptoms: no fatigue and no numbness        Review of Systems   Constitutional: Negative for fatigue.   HENT: Negative for congestion.    Respiratory: Negative for cough and shortness of breath.    Gastrointestinal: Negative for vomiting.   Endocrine: Negative for polyuria.   Genitourinary: Positive for pelvic pain. Negative for difficulty urinating, dysuria, hematuria and urgency.   Musculoskeletal: Positive for back pain.   Skin: Negative for color change.   Neurological: Negative for syncope.   Psychiatric/Behavioral: Negative for agitation.       History reviewed. No pertinent past medical history.    No Known Allergies    History reviewed. No pertinent surgical history.    No family history on file.    Social History     Socioeconomic History   • Marital status:      Spouse name: Not on file   • Number of children: Not on file   • Years of education: Not on file   • Highest education level: Not on file           Objective   Physical Exam   Constitutional: She is oriented to person, place, and time. She appears well-developed and well-nourished. "
  HENT:   Head: Normocephalic.   Right Ear: Hearing normal.   Left Ear: Hearing normal.   Nose: Nose normal.   Eyes: Conjunctivae, EOM and lids are normal.   Neck: Trachea normal and full passive range of motion without pain.   Cardiovascular: Regular rhythm, S1 normal, S2 normal, normal heart sounds and normal pulses.   Pulmonary/Chest: Effort normal and breath sounds normal.   Abdominal: Normal appearance and bowel sounds are normal.   Musculoskeletal:        Lumbar back: She exhibits tenderness.   Neurological: She is alert and oriented to person, place, and time. She is not disoriented.   Skin: Skin is warm and dry. She is not diaphoretic.   Psychiatric: She has a normal mood and affect. Her speech is normal and behavior is normal. Thought content normal.   Nursing note and vitals reviewed.      Procedures         Labs Reviewed   URINALYSIS W/ CULTURE IF INDICATED - Abnormal; Notable for the following components:       Result Value    Blood, UA Trace (*)     All other components within normal limits   URINALYSIS, MICROSCOPIC ONLY - Abnormal; Notable for the following components:    RBC, UA 0-2 (*)     All other components within normal limits   PREGNANCY, URINE - Normal       XR Sacrum & Coccyx   Final Result   CONCLUSION:   No radiographic evidence of acute fracture.      Electronically signed by:  Martin Parrish MD  1/2/2019 12:44 PM CST   Workstation: [id number]      CT Lumbar Spine Without Contrast   Final Result   CONCLUSION: Small anterior osteophytes. Facet arthrosis at L4-L5   and L5-S1. Subtle concentric disc bulging at multiple levels most   pronounced at L5-S1.      CT lumbar spine without contrast is otherwise unremarkable. No   focal disc protrusion. No central canal or neuroforaminal   stenosis.      Electronically signed by:  Justyn Valenzuela MD  1/2/2019 11:16 AM CST   Workstation: [id number]              ED Course      12:57P  Rechecked pt and pt reports feeling better after Toradol. Informed x-ray, ct, and 
lab results. Advised to f/u with orthopedics for arthritis and mild herniated disc, and will discharge pt on steroids. Pt agrees with plan of care and all questions addressed at this time.             MDM      Final diagnoses:   Lumbar herniated disc            Mine Ramos PA-C  01/02/19 2015    
unchanged
improved

## 2021-02-03 NOTE — ED ADULT NURSE REASSESSMENT NOTE - NSIMPLEMENTINTERV_GEN_ALL_ED
Implemented All Universal Safety Interventions:  [place] to call system. Call bell, personal items and telephone within reach. Instruct patient to call for assistance. Room bathroom lighting operational. Non-slip footwear when patient is off stretcher. Physically safe environment: no spills, clutter or unnecessary equipment. Stretcher in lowest position, wheels locked, appropriate side rails in place.

## 2021-02-03 NOTE — PROGRESS NOTE ADULT - ASSESSMENT
68F PMH CAD s/p stents on Plavix, colonic inertia, rectal prolapse, bladder prolapse s/p ?suspensory surgical procedure, SBO s/p ex-lap, SBR, diverting loop ileostomy at [facility] in [place] 2019, with subsequent multiple SBO and one LBO which resolved after administration of hypaque enema. Now with possible SBO on CT, however nondistended and now with returned stoma output.    - Advance diet as tolerated   - No acute surgical intervention recommended at this time  - no objection to discharge from the ED       [id number]  Red Surgery

## 2021-02-03 NOTE — ED ADULT NURSE REASSESSMENT NOTE - COMFORT CARE
plan of care explained/po fluids offered
ambulated to bathroom/plan of care explained/po fluids offered

## 2021-02-03 NOTE — PROGRESS NOTE ADULT - SUBJECTIVE AND OBJECTIVE BOX
SURGERY DAILY PROGRESS NOTE:       SUBJECTIVE/ROS: Patient seen and evaluated on AM rounds. Patient feels well. Patient states that her ostomy is functioning as usual. Denies nausea, vomiting, chest pain, shortness of breath.       OBJECTIVE:    Vital Signs Last 24 Hrs  T(C): 36.9 (2021 08:23), Max: 37.6 (2021 20:07)  T(F): 98.5 (2021 08:23), Max: 99.6 (2021 20:07)  HR: 76 (2021 08:23) (76 - 91)  BP: 90/57 (2021 08:23) (90/57 - 136/77)  BP(mean): --  RR: 18 (2021 08:23) (17 - 18)  SpO2: 96% (2021 08:23) (95% - 97%)  I&O's Detail    Daily     Daily   MEDICATIONS  (STANDING):  sodium chloride 0.9%. 1000 milliLiter(s) (125 mL/Hr) IV Continuous <Continuous>    MEDICATIONS  (PRN):      LABS:                        12.3   10.48 )-----------( 123      ( 2021 05:10 )             38.0     -    137  |  100  |  17  ----------------------------<  87  4.7   |  25  |  0.89    Ca    9.5      2021 05:10    TPro  6.7  /  Alb  3.8  /  TBili  0.8  /  DBili  x   /  AST  45<H>  /  ALT  21  /  AlkPhos  100  02-02    PT/INR - ( 2021 07:31 )   PT: 20.1 sec;   INR: 1.72 ratio         PTT - ( 2021 07:31 )  PTT:30.7 sec  Urinalysis Basic - ( 2021 06:58 )    Color: Colorless / Appearance: Clear / S.028 / pH: x  Gluc: x / Ketone: Negative  / Bili: Negative / Urobili: Negative   Blood: x / Protein: Negative / Nitrite: Negative   Leuk Esterase: Negative / RBC: x / WBC x   Sq Epi: x / Non Sq Epi: x / Bacteria: x        PHYSICAL EXAM:  General: well developed, well nourished, NAD  Respiratory: airway patent, respirations unlabored  CVS: regular rate and rhythm  Abdomen: soft, nontender, nondistended, stoma pink and viable with stool and gas in ostomy bag   Extremities: no edema, sensation and movement grossly intact  Skin: warm, dry, appropriate color

## 2021-02-05 ENCOUNTER — APPOINTMENT (OUTPATIENT)
Dept: UROLOGY | Facility: CLINIC | Age: 71
End: 2021-02-05
Payer: MEDICARE

## 2021-02-05 VITALS — SYSTOLIC BLOOD PRESSURE: 100 MMHG | DIASTOLIC BLOOD PRESSURE: 55 MMHG | HEART RATE: 69 BPM

## 2021-02-05 DIAGNOSIS — N13.30 UNSPECIFIED HYDRONEPHROSIS: ICD-10-CM

## 2021-02-05 LAB
25(OH)D3 SERPL-MCNC: 27.9 NG/ML
ALBUMIN SERPL ELPH-MCNC: 3.5 G/DL
ALP BLD-CCNC: 106 U/L
ALT SERPL-CCNC: 16 U/L
ANION GAP SERPL CALC-SCNC: 12 MMOL/L
AST SERPL-CCNC: 29 U/L
BILIRUB SERPL-MCNC: 0.6 MG/DL
BUN SERPL-MCNC: 10 MG/DL
CALCIUM SERPL-MCNC: 8.5 MG/DL
CHLORIDE SERPL-SCNC: 105 MMOL/L
CHOLEST SERPL-MCNC: 133 MG/DL
CO2 SERPL-SCNC: 23 MMOL/L
CREAT SERPL-MCNC: 1.09 MG/DL
FOLATE SERPL-MCNC: 19.8 NG/ML
GLUCOSE SERPL-MCNC: 69 MG/DL
HDLC SERPL-MCNC: 78 MG/DL
LDLC SERPL CALC-MCNC: 35 MG/DL
MAGNESIUM SERPL-MCNC: 1.9 MG/DL
NONHDLC SERPL-MCNC: 55 MG/DL
POTASSIUM SERPL-SCNC: 4.7 MMOL/L
PROT SERPL-MCNC: 5.8 G/DL
SODIUM SERPL-SCNC: 140 MMOL/L
T4 FREE SERPL-MCNC: 1.2 NG/DL
T4 SERPL-MCNC: 6.8 UG/DL
TRIGL SERPL-MCNC: 100 MG/DL
TSH SERPL-ACNC: 1.03 UIU/ML
VIT B12 SERPL-MCNC: 1433 PG/ML

## 2021-02-05 PROCEDURE — 99213 OFFICE O/P EST LOW 20 MIN: CPT

## 2021-02-08 PROBLEM — N13.30 BILATERAL HYDRONEPHROSIS: Status: ACTIVE | Noted: 2019-10-18

## 2021-02-08 LAB
APPEARANCE: CLEAR
BACTERIA UR CULT: NORMAL
BACTERIA: NEGATIVE
BILIRUBIN URINE: NEGATIVE
BLOOD URINE: NEGATIVE
COLOR: COLORLESS
GLUCOSE QUALITATIVE U: NEGATIVE
HYALINE CASTS: 0 /LPF
KETONES URINE: NEGATIVE
LEUKOCYTE ESTERASE URINE: NEGATIVE
MICROSCOPIC-UA: NORMAL
NITRITE URINE: NEGATIVE
PH URINE: 7.5
PROTEIN URINE: NEGATIVE
RED BLOOD CELLS URINE: 0 /HPF
SPECIFIC GRAVITY URINE: 1
SQUAMOUS EPITHELIAL CELLS: 0 /HPF
UROBILINOGEN URINE: NORMAL
WHITE BLOOD CELLS URINE: 0 /HPF

## 2021-02-08 NOTE — PHYSICAL EXAM

## 2021-02-08 NOTE — HISTORY OF PRESENT ILLNESS
[FreeTextEntry1] : 70 year old female with cc of hydro. Pt previously seen by me in 10/2019 for mild B hydro and distended bladder. She has complicated  hx including sacral colpopexy, supracervical hysterectomy, BSO and carrington urethroplasty. This failed and she had open repair with mesh. Had SBO in Jan. 2017 had to have emergent surgery and creation of ileostomy. Second SBO in Aug 2019 medically managed. During this second episode she was noted to have hydro. Given findings, plan made for renal US eval. This showed pt emptying well and hydro was resolved. Now comes in after third SBO this past week medically managed. CT from recent hospital admission reads bilateral mild hydronephrosis. Reviewed images myself and pt with transition point low in pelvis. Bladder is noted to be very distended on this CT again. \par \par No baseline urinary complaints. Denies gross hematuria. She has been voiding well on her own. Denies complaints. No straining or feelings of incomplete emptying.

## 2021-02-08 NOTE — ASSESSMENT
[FreeTextEntry1] : Suspect again that pt with mild B hydro 2/2 overdistension of bladder in the setting of SBO. Now all resolved. Emptying well on bladder scan today. \par --Renal US eval. Will call pt with results

## 2021-02-10 ENCOUNTER — APPOINTMENT (OUTPATIENT)
Dept: COLORECTAL SURGERY | Facility: CLINIC | Age: 71
End: 2021-02-10
Payer: MEDICARE

## 2021-02-10 LAB
BASOPHILS # BLD AUTO: 0.02 K/UL
BASOPHILS NFR BLD AUTO: 0.3 %
EOSINOPHIL # BLD AUTO: 0.23 K/UL
EOSINOPHIL NFR BLD AUTO: 3.1 %
HCT VFR BLD CALC: 32.5 %
HGB BLD-MCNC: 10.4 G/DL
IMM GRANULOCYTES NFR BLD AUTO: 0.4 %
LYMPHOCYTES # BLD AUTO: 1.25 K/UL
LYMPHOCYTES NFR BLD AUTO: 16.8 %
MAN DIFF?: NORMAL
MCHC RBC-ENTMCNC: 29 PG
MCHC RBC-ENTMCNC: 32 GM/DL
MCV RBC AUTO: 90.5 FL
MONOCYTES # BLD AUTO: 0.64 K/UL
MONOCYTES NFR BLD AUTO: 8.6 %
NEUTROPHILS # BLD AUTO: 5.28 K/UL
NEUTROPHILS NFR BLD AUTO: 70.8 %
PLATELET # BLD AUTO: NORMAL K/UL
PLATELET # PLAS AUTO: NORMAL
RBC # BLD: 3.59 M/UL
RBC # FLD: 15.9 %
WBC # FLD AUTO: 7.45 K/UL
ZINC SERPL-MCNC: 52 UG/DL

## 2021-02-10 PROCEDURE — 99212 OFFICE O/P EST SF 10 MIN: CPT

## 2021-02-11 ENCOUNTER — APPOINTMENT (OUTPATIENT)
Dept: INTERNAL MEDICINE | Facility: CLINIC | Age: 71
End: 2021-02-11
Payer: MEDICARE

## 2021-02-11 VITALS
DIASTOLIC BLOOD PRESSURE: 60 MMHG | HEIGHT: 58 IN | BODY MASS INDEX: 20.15 KG/M2 | SYSTOLIC BLOOD PRESSURE: 106 MMHG | TEMPERATURE: 97.8 F | HEART RATE: 72 BPM | RESPIRATION RATE: 14 BRPM | WEIGHT: 96 LBS

## 2021-02-11 DIAGNOSIS — K56.609 UNSPECIFIED INTESTINAL OBSTRUCTION, UNSPECIFIED AS TO PARTIAL VERSUS COMPLETE OBSTRUCTION: ICD-10-CM

## 2021-02-11 PROCEDURE — 99214 OFFICE O/P EST MOD 30 MIN: CPT

## 2021-02-11 NOTE — HISTORY OF PRESENT ILLNESS
[de-identified] : Pt presents for f/u evaluation of CAD, hypothyroidism, sciatica, CREST syndrome, hyperlipidemia.\par She had an episode of SBO, hospitalized at [facility]. \par Noted to have hydronephrosis - Urology w/u unrevealing.\par \par Feeling better\par Getting second COVID vaccine in coming days, and then planning to go to [place] for a month.\par May get epidural injection prior to trip to [place]

## 2021-02-11 NOTE — ASSESSMENT
[FreeTextEntry1] : Labs reviewed\par \par F/U with Dr Wall for epidural\par \par Continue current medications\par \par F/U upon return from [place] with repeat BW

## 2021-02-16 ENCOUNTER — OUTPATIENT (OUTPATIENT)
Dept: OUTPATIENT SERVICES | Facility: HOSPITAL | Age: 71
LOS: 1 days | End: 2021-02-16
Payer: MEDICARE

## 2021-02-16 ENCOUNTER — RESULT REVIEW (OUTPATIENT)
Age: 71
End: 2021-02-16

## 2021-02-16 ENCOUNTER — APPOINTMENT (OUTPATIENT)
Dept: ULTRASOUND IMAGING | Facility: CLINIC | Age: 71
End: 2021-02-16

## 2021-02-16 DIAGNOSIS — Z90.49 ACQUIRED ABSENCE OF OTHER SPECIFIED PARTS OF DIGESTIVE TRACT: Chronic | ICD-10-CM

## 2021-02-16 DIAGNOSIS — Z98.890 OTHER SPECIFIED POSTPROCEDURAL STATES: Chronic | ICD-10-CM

## 2021-02-16 DIAGNOSIS — N13.30 UNSPECIFIED HYDRONEPHROSIS: ICD-10-CM

## 2021-02-16 DIAGNOSIS — Z90.710 ACQUIRED ABSENCE OF BOTH CERVIX AND UTERUS: Chronic | ICD-10-CM

## 2021-02-16 PROCEDURE — 76770 US EXAM ABDO BACK WALL COMP: CPT | Mod: 26

## 2021-02-16 PROCEDURE — 76770 US EXAM ABDO BACK WALL COMP: CPT

## 2021-02-19 NOTE — HISTORY OF PRESENT ILLNESS
[FreeTextEntry1] : 70-year-old female who presents for follow-up after a recent bout of a partial small bowel obstruction.\par \par Patient has an extremely long and complicated past surgical history with an underlying problem of chronic colonic inertia. She developed rectal prolapse in the past and has had multiple operations for her colonic prolapse, GYN prolapse and colonic inertia. She was operated on emergently in [place] for a bowel obstruction where multiple adhesions were lysed and a diverting ileostomy was constructed. Due to her ongoing colonic inertia she was advised to leave the ileostomy in place. Generally she has been doing well but in the recent past developed abdominal pain and decreased ostomy output.\par \par Patient was evaluated in the emergency room. The CAT scan showed a partial small bowel obstruction with a gentle transition point proximal to the ileostomy. The small bowel obstruction resolved with conservative therapy.\par \par Physical examination reveals a thin white female. Her abdomen is totally soft and nontender. She has a well adherent ostomy appliance in place. She has had no problems with abdominal pain or bloating since discharge from the emergency room.\par \par I once again advised against any thoughts of surgery. I advised her to be sure to drink a significant amount of fluid on a daily basis and avoid high fiber products. I will see her as needed

## 2021-02-20 ENCOUNTER — OUTPATIENT (OUTPATIENT)
Dept: OUTPATIENT SERVICES | Facility: HOSPITAL | Age: 71
LOS: 1 days | End: 2021-02-20
Payer: MEDICARE

## 2021-02-20 DIAGNOSIS — Z98.890 OTHER SPECIFIED POSTPROCEDURAL STATES: Chronic | ICD-10-CM

## 2021-02-20 DIAGNOSIS — Z90.710 ACQUIRED ABSENCE OF BOTH CERVIX AND UTERUS: Chronic | ICD-10-CM

## 2021-02-20 DIAGNOSIS — Z20.828 CONTACT WITH AND (SUSPECTED) EXPOSURE TO OTHER VIRAL COMMUNICABLE DISEASES: ICD-10-CM

## 2021-02-20 DIAGNOSIS — Z90.49 ACQUIRED ABSENCE OF OTHER SPECIFIED PARTS OF DIGESTIVE TRACT: Chronic | ICD-10-CM

## 2021-02-20 LAB — SARS-COV-2 RNA SPEC QL NAA+PROBE: SIGNIFICANT CHANGE UP

## 2021-02-20 PROCEDURE — U0003: CPT

## 2021-02-20 PROCEDURE — U0005: CPT

## 2021-02-22 ENCOUNTER — OUTPATIENT (OUTPATIENT)
Dept: OUTPATIENT SERVICES | Facility: HOSPITAL | Age: 71
LOS: 1 days | End: 2021-02-22
Payer: MEDICARE

## 2021-02-22 DIAGNOSIS — Z98.890 OTHER SPECIFIED POSTPROCEDURAL STATES: Chronic | ICD-10-CM

## 2021-02-22 DIAGNOSIS — Z90.49 ACQUIRED ABSENCE OF OTHER SPECIFIED PARTS OF DIGESTIVE TRACT: Chronic | ICD-10-CM

## 2021-02-22 DIAGNOSIS — M54.16 RADICULOPATHY, LUMBAR REGION: ICD-10-CM

## 2021-02-22 DIAGNOSIS — Z90.710 ACQUIRED ABSENCE OF BOTH CERVIX AND UTERUS: Chronic | ICD-10-CM

## 2021-02-22 PROCEDURE — 62323 NJX INTERLAMINAR LMBR/SAC: CPT

## 2021-04-28 ENCOUNTER — LABORATORY RESULT (OUTPATIENT)
Age: 71
End: 2021-04-28

## 2021-05-05 ENCOUNTER — APPOINTMENT (OUTPATIENT)
Dept: INTERNAL MEDICINE | Facility: CLINIC | Age: 71
End: 2021-05-05
Payer: MEDICARE

## 2021-05-05 VITALS — RESPIRATION RATE: 14 BRPM | HEART RATE: 72 BPM | DIASTOLIC BLOOD PRESSURE: 60 MMHG | SYSTOLIC BLOOD PRESSURE: 110 MMHG

## 2021-05-05 VITALS — TEMPERATURE: 98.2 F | WEIGHT: 95 LBS | HEIGHT: 58 IN | BODY MASS INDEX: 19.94 KG/M2

## 2021-05-05 PROCEDURE — 99214 OFFICE O/P EST MOD 30 MIN: CPT

## 2021-05-07 NOTE — HISTORY OF PRESENT ILLNESS
[de-identified] : Pt presents for f/u evaluation of CAD, hypothyroidism, sciatica, CREST syndrome, hyperlipidemia.\par No issues while in [place]\par Having continued back pain - saw physiatry this week - got trigger point injections for presumed bursitis and sciatica with minimal help.\par \par has been off Plaquenil for several months due to eye issues

## 2021-06-02 ENCOUNTER — APPOINTMENT (OUTPATIENT)
Dept: ORTHOPEDIC SURGERY | Facility: CLINIC | Age: 71
End: 2021-06-02
Payer: MEDICARE

## 2021-06-02 VITALS
HEIGHT: 58 IN | WEIGHT: 95 LBS | HEART RATE: 85 BPM | TEMPERATURE: 97.7 F | DIASTOLIC BLOOD PRESSURE: 66 MMHG | SYSTOLIC BLOOD PRESSURE: 111 MMHG | BODY MASS INDEX: 19.94 KG/M2

## 2021-06-02 DIAGNOSIS — M48.061 SPINAL STENOSIS, LUMBAR REGION WITHOUT NEUROGENIC CLAUDICATION: ICD-10-CM

## 2021-06-02 DIAGNOSIS — M54.16 RADICULOPATHY, LUMBAR REGION: ICD-10-CM

## 2021-06-02 PROCEDURE — 99214 OFFICE O/P EST MOD 30 MIN: CPT

## 2021-06-07 ENCOUNTER — APPOINTMENT (OUTPATIENT)
Dept: CARDIOLOGY | Facility: CLINIC | Age: 71
End: 2021-06-07
Payer: MEDICARE

## 2021-06-07 ENCOUNTER — NON-APPOINTMENT (OUTPATIENT)
Age: 71
End: 2021-06-07

## 2021-06-07 VITALS
WEIGHT: 95 LBS | SYSTOLIC BLOOD PRESSURE: 110 MMHG | BODY MASS INDEX: 20.49 KG/M2 | RESPIRATION RATE: 14 BRPM | HEIGHT: 57 IN | DIASTOLIC BLOOD PRESSURE: 60 MMHG | OXYGEN SATURATION: 98 % | HEART RATE: 66 BPM

## 2021-06-07 PROCEDURE — 99215 OFFICE O/P EST HI 40 MIN: CPT

## 2021-06-15 ENCOUNTER — OUTPATIENT (OUTPATIENT)
Dept: OUTPATIENT SERVICES | Facility: HOSPITAL | Age: 71
LOS: 1 days | End: 2021-06-15
Payer: MEDICARE

## 2021-06-15 ENCOUNTER — APPOINTMENT (OUTPATIENT)
Dept: CT IMAGING | Facility: CLINIC | Age: 71
End: 2021-06-15
Payer: MEDICARE

## 2021-06-15 DIAGNOSIS — M54.5 LOW BACK PAIN: ICD-10-CM

## 2021-06-15 DIAGNOSIS — Z98.890 OTHER SPECIFIED POSTPROCEDURAL STATES: Chronic | ICD-10-CM

## 2021-06-15 DIAGNOSIS — Z90.49 ACQUIRED ABSENCE OF OTHER SPECIFIED PARTS OF DIGESTIVE TRACT: Chronic | ICD-10-CM

## 2021-06-15 DIAGNOSIS — Z90.710 ACQUIRED ABSENCE OF BOTH CERVIX AND UTERUS: Chronic | ICD-10-CM

## 2021-06-15 PROCEDURE — 76376 3D RENDER W/INTRP POSTPROCES: CPT | Mod: 26

## 2021-06-15 PROCEDURE — 72131 CT LUMBAR SPINE W/O DYE: CPT

## 2021-06-15 PROCEDURE — 76376 3D RENDER W/INTRP POSTPROCES: CPT

## 2021-06-15 PROCEDURE — 72131 CT LUMBAR SPINE W/O DYE: CPT | Mod: 26,MH

## 2021-07-19 ENCOUNTER — RX RENEWAL (OUTPATIENT)
Age: 71
End: 2021-07-19

## 2021-07-28 ENCOUNTER — LABORATORY RESULT (OUTPATIENT)
Age: 71
End: 2021-07-28

## 2021-08-09 ENCOUNTER — APPOINTMENT (OUTPATIENT)
Dept: INTERNAL MEDICINE | Facility: CLINIC | Age: 71
End: 2021-08-09
Payer: MEDICARE

## 2021-08-09 VITALS — RESPIRATION RATE: 14 BRPM | SYSTOLIC BLOOD PRESSURE: 122 MMHG | HEART RATE: 72 BPM | DIASTOLIC BLOOD PRESSURE: 80 MMHG

## 2021-08-09 VITALS — HEIGHT: 57 IN | WEIGHT: 99 LBS | BODY MASS INDEX: 21.36 KG/M2

## 2021-08-09 DIAGNOSIS — M54.30 SCIATICA, UNSPECIFIED SIDE: ICD-10-CM

## 2021-08-09 PROCEDURE — 99214 OFFICE O/P EST MOD 30 MIN: CPT

## 2021-08-09 RX ORDER — HYDROCORTISONE 25 MG/G
2.5 CREAM TOPICAL
Qty: 28 | Refills: 0 | Status: COMPLETED | COMMUNITY
Start: 2021-02-09

## 2021-08-09 RX ORDER — LIDOCAINE 36 MG/1
1.8 PATCH TOPICAL
Qty: 30 | Refills: 0 | Status: COMPLETED | COMMUNITY
Start: 2021-03-15

## 2021-08-09 RX ORDER — KETOCONAZOLE 20.5 MG/ML
2 SHAMPOO, SUSPENSION TOPICAL
Qty: 120 | Refills: 0 | Status: COMPLETED | COMMUNITY
Start: 2021-02-09

## 2021-08-11 NOTE — HISTORY OF PRESENT ILLNESS
[de-identified] : Pt presents for f/u evaluation of CAD, hypothyroidism, sciatica, CREST syndrome, hyperlipidemia.\par \par Having continued back pain - saw physiatry and surgery in the city   - got trigger point injections for presumed bursitis and sciatica with minimal help.\par Thought to be L5/S1 calcified cyst / disk\par May need L5/S1 fusion\par Hip bursitis unrelated -  saw orthopedics at [facility].\par Advised to continue PT and trigger injections. Last injections 6/30 - lasted a few weeks\par \par has been off Plaquenil for several months due to eye issues

## 2021-08-11 NOTE — ASSESSMENT
[FreeTextEntry1] : Labs reviewed\par \par Trial of celebrex for continued bursitis pain\par Rheum f/u suggested\par PT Rx given at pt request\par \par augmentin for parotid swelling\par All questions answered\par \par F/U 3 months

## 2021-08-13 ENCOUNTER — OUTPATIENT (OUTPATIENT)
Dept: OUTPATIENT SERVICES | Facility: HOSPITAL | Age: 71
LOS: 1 days | End: 2021-08-13
Payer: MEDICARE

## 2021-08-13 DIAGNOSIS — Z98.890 OTHER SPECIFIED POSTPROCEDURAL STATES: Chronic | ICD-10-CM

## 2021-08-13 DIAGNOSIS — Z90.710 ACQUIRED ABSENCE OF BOTH CERVIX AND UTERUS: Chronic | ICD-10-CM

## 2021-08-13 DIAGNOSIS — Z20.828 CONTACT WITH AND (SUSPECTED) EXPOSURE TO OTHER VIRAL COMMUNICABLE DISEASES: ICD-10-CM

## 2021-08-13 DIAGNOSIS — Z90.49 ACQUIRED ABSENCE OF OTHER SPECIFIED PARTS OF DIGESTIVE TRACT: Chronic | ICD-10-CM

## 2021-08-13 LAB — SARS-COV-2 RNA SPEC QL NAA+PROBE: SIGNIFICANT CHANGE UP

## 2021-08-13 PROCEDURE — U0003: CPT

## 2021-08-13 PROCEDURE — U0005: CPT

## 2021-08-16 ENCOUNTER — OUTPATIENT (OUTPATIENT)
Dept: OUTPATIENT SERVICES | Facility: HOSPITAL | Age: 71
LOS: 1 days | End: 2021-08-16
Payer: MEDICARE

## 2021-08-16 DIAGNOSIS — Z98.890 OTHER SPECIFIED POSTPROCEDURAL STATES: Chronic | ICD-10-CM

## 2021-08-16 DIAGNOSIS — Z90.49 ACQUIRED ABSENCE OF OTHER SPECIFIED PARTS OF DIGESTIVE TRACT: Chronic | ICD-10-CM

## 2021-08-16 DIAGNOSIS — Z90.710 ACQUIRED ABSENCE OF BOTH CERVIX AND UTERUS: Chronic | ICD-10-CM

## 2021-08-16 DIAGNOSIS — M54.16 RADICULOPATHY, LUMBAR REGION: ICD-10-CM

## 2021-08-16 PROCEDURE — 64484 NJX AA&/STRD TFRM EPI L/S EA: CPT | Mod: LT

## 2021-08-16 PROCEDURE — 64483 NJX AA&/STRD TFRM EPI L/S 1: CPT | Mod: LT

## 2021-09-22 ENCOUNTER — APPOINTMENT (OUTPATIENT)
Dept: RADIOLOGY | Facility: CLINIC | Age: 71
End: 2021-09-22
Payer: MEDICARE

## 2021-09-22 ENCOUNTER — OUTPATIENT (OUTPATIENT)
Dept: OUTPATIENT SERVICES | Facility: HOSPITAL | Age: 71
LOS: 1 days | End: 2021-09-22
Payer: MEDICARE

## 2021-09-22 DIAGNOSIS — Z90.710 ACQUIRED ABSENCE OF BOTH CERVIX AND UTERUS: Chronic | ICD-10-CM

## 2021-09-22 DIAGNOSIS — Z00.8 ENCOUNTER FOR OTHER GENERAL EXAMINATION: ICD-10-CM

## 2021-09-22 DIAGNOSIS — Z98.890 OTHER SPECIFIED POSTPROCEDURAL STATES: Chronic | ICD-10-CM

## 2021-09-22 DIAGNOSIS — Z90.49 ACQUIRED ABSENCE OF OTHER SPECIFIED PARTS OF DIGESTIVE TRACT: Chronic | ICD-10-CM

## 2021-09-22 PROCEDURE — 72110 X-RAY EXAM L-2 SPINE 4/>VWS: CPT | Mod: 26

## 2021-09-22 PROCEDURE — 72110 X-RAY EXAM L-2 SPINE 4/>VWS: CPT

## 2021-09-26 ENCOUNTER — APPOINTMENT (OUTPATIENT)
Dept: MRI IMAGING | Facility: CLINIC | Age: 71
End: 2021-09-26
Payer: MEDICARE

## 2021-09-26 ENCOUNTER — OUTPATIENT (OUTPATIENT)
Dept: OUTPATIENT SERVICES | Facility: HOSPITAL | Age: 71
LOS: 1 days | End: 2021-09-26
Payer: MEDICARE

## 2021-09-26 DIAGNOSIS — Z98.890 OTHER SPECIFIED POSTPROCEDURAL STATES: Chronic | ICD-10-CM

## 2021-09-26 DIAGNOSIS — Z90.710 ACQUIRED ABSENCE OF BOTH CERVIX AND UTERUS: Chronic | ICD-10-CM

## 2021-09-26 DIAGNOSIS — Z00.8 ENCOUNTER FOR OTHER GENERAL EXAMINATION: ICD-10-CM

## 2021-09-26 DIAGNOSIS — Z90.49 ACQUIRED ABSENCE OF OTHER SPECIFIED PARTS OF DIGESTIVE TRACT: Chronic | ICD-10-CM

## 2021-09-26 PROCEDURE — 72148 MRI LUMBAR SPINE W/O DYE: CPT | Mod: 26,MH

## 2021-09-26 PROCEDURE — 72148 MRI LUMBAR SPINE W/O DYE: CPT | Mod: MH

## 2021-10-01 ENCOUNTER — NON-APPOINTMENT (OUTPATIENT)
Age: 71
End: 2021-10-01

## 2021-10-01 ENCOUNTER — APPOINTMENT (OUTPATIENT)
Dept: INTERNAL MEDICINE | Facility: CLINIC | Age: 71
End: 2021-10-01
Payer: MEDICARE

## 2021-10-01 VITALS — WEIGHT: 102 LBS | BODY MASS INDEX: 22.01 KG/M2 | HEIGHT: 57 IN

## 2021-10-01 VITALS — SYSTOLIC BLOOD PRESSURE: 120 MMHG | DIASTOLIC BLOOD PRESSURE: 60 MMHG | HEART RATE: 78 BPM | RESPIRATION RATE: 16 BRPM

## 2021-10-01 DIAGNOSIS — Z01.818 ENCOUNTER FOR OTHER PREPROCEDURAL EXAMINATION: ICD-10-CM

## 2021-10-01 PROCEDURE — 90662 IIV NO PRSV INCREASED AG IM: CPT

## 2021-10-01 PROCEDURE — G0008: CPT

## 2021-10-01 PROCEDURE — 93000 ELECTROCARDIOGRAM COMPLETE: CPT

## 2021-10-01 PROCEDURE — 99214 OFFICE O/P EST MOD 30 MIN: CPT | Mod: 25

## 2021-10-01 RX ORDER — CELECOXIB 100 MG/1
100 CAPSULE ORAL
Qty: 30 | Refills: 0 | Status: DISCONTINUED | COMMUNITY
Start: 2021-08-09 | End: 2021-10-01

## 2021-10-01 RX ORDER — MELOXICAM 7.5 MG/1
7.5 TABLET ORAL
Qty: 20 | Refills: 0 | Status: DISCONTINUED | COMMUNITY
Start: 2021-05-05 | End: 2021-10-01

## 2021-10-01 RX ORDER — AMOXICILLIN AND CLAVULANATE POTASSIUM 875; 125 MG/1; MG/1
875-125 TABLET, COATED ORAL
Qty: 14 | Refills: 0 | Status: DISCONTINUED | COMMUNITY
Start: 2021-08-09 | End: 2021-10-01

## 2021-10-01 NOTE — ASSESSMENT
[Patient Optimized for Surgery] : Patient optimized for surgery [No Further Testing Recommended] : no further testing recommended [Modify anti-platelet treatment prior to procedure] : Modify anti-platelet treatment prior to procedure [Continue medications as is] : Continue current medications [FreeTextEntry4] : Pt is an acceptable candidate for planned surgery.\par Pt to do labs at the lab.\par  [FreeTextEntry6] : Hold ASA for 1 week before surgery

## 2021-10-01 NOTE — CONSULT LETTER
[Dear  ___] : Dear  [unfilled], [Consult Letter:] : I had the pleasure of evaluating your patient, [unfilled]. [Please see my note below.] : Please see my note below. [Consult Closing:] : Thank you very much for allowing me to participate in the care of this patient.  If you have any questions, please do not hesitate to contact me. [Sincerely,] : Sincerely, [FreeTextEntry1] : Pre-Op evaluation [FreeTextEntry3] : Santy Moreno MD

## 2021-10-01 NOTE — PHYSICAL EXAM
[Normal Sclera/Conjunctiva] : normal sclera/conjunctiva [PERRL] : pupils equal round and reactive to light [Normal Oropharynx] : the oropharynx was normal [No Edema] : there was no peripheral edema [Normal] : soft, non-tender, non-distended, no masses palpated, no HSM and normal bowel sounds [Normal Posterior Cervical Nodes] : no posterior cervical lymphadenopathy [Normal Anterior Cervical Nodes] : no anterior cervical lymphadenopathy [No CVA Tenderness] : no CVA  tenderness [No Spinal Tenderness] : no spinal tenderness [Alert and Oriented x3] : oriented to person, place, and time

## 2021-10-04 ENCOUNTER — TRANSCRIPTION ENCOUNTER (OUTPATIENT)
Age: 71
End: 2021-10-04

## 2021-10-08 ENCOUNTER — NON-APPOINTMENT (OUTPATIENT)
Age: 71
End: 2021-10-08

## 2021-10-28 ENCOUNTER — RX RENEWAL (OUTPATIENT)
Age: 71
End: 2021-10-28

## 2021-11-04 ENCOUNTER — EMERGENCY (EMERGENCY)
Facility: HOSPITAL | Age: 71
LOS: 1 days | Discharge: HOME | End: 2021-11-04
Attending: CLINIC/CENTER
Payer: MEDICARE

## 2021-11-04 VITALS
SYSTOLIC BLOOD PRESSURE: 131 MMHG | HEIGHT: 57 IN | HEART RATE: 75 BPM | OXYGEN SATURATION: 97 % | TEMPERATURE: 98 F | RESPIRATION RATE: 18 BRPM | WEIGHT: 100.09 LBS | DIASTOLIC BLOOD PRESSURE: 79 MMHG

## 2021-11-04 VITALS
HEART RATE: 72 BPM | TEMPERATURE: 98 F | DIASTOLIC BLOOD PRESSURE: 72 MMHG | RESPIRATION RATE: 16 BRPM | OXYGEN SATURATION: 96 % | SYSTOLIC BLOOD PRESSURE: 126 MMHG

## 2021-11-04 DIAGNOSIS — Z90.49 ACQUIRED ABSENCE OF OTHER SPECIFIED PARTS OF DIGESTIVE TRACT: Chronic | ICD-10-CM

## 2021-11-04 DIAGNOSIS — Z98.890 OTHER SPECIFIED POSTPROCEDURAL STATES: Chronic | ICD-10-CM

## 2021-11-04 DIAGNOSIS — Z90.710 ACQUIRED ABSENCE OF BOTH CERVIX AND UTERUS: Chronic | ICD-10-CM

## 2021-11-04 PROCEDURE — 73130 X-RAY EXAM OF HAND: CPT

## 2021-11-04 PROCEDURE — 72125 CT NECK SPINE W/O DYE: CPT | Mod: MA

## 2021-11-04 PROCEDURE — 73130 X-RAY EXAM OF HAND: CPT | Mod: 26,LT

## 2021-11-04 PROCEDURE — 70450 CT HEAD/BRAIN W/O DYE: CPT | Mod: MA

## 2021-11-04 PROCEDURE — 99284 EMERGENCY DEPT VISIT MOD MDM: CPT | Mod: 25

## 2021-11-04 PROCEDURE — 72125 CT NECK SPINE W/O DYE: CPT | Mod: 26,MA

## 2021-11-04 PROCEDURE — 90471 IMMUNIZATION ADMIN: CPT

## 2021-11-04 PROCEDURE — 90715 TDAP VACCINE 7 YRS/> IM: CPT

## 2021-11-04 PROCEDURE — 73562 X-RAY EXAM OF KNEE 3: CPT | Mod: 26,RT

## 2021-11-04 PROCEDURE — 70450 CT HEAD/BRAIN W/O DYE: CPT | Mod: 26,MA

## 2021-11-04 PROCEDURE — 73562 X-RAY EXAM OF KNEE 3: CPT

## 2021-11-04 PROCEDURE — 99284 EMERGENCY DEPT VISIT MOD MDM: CPT | Mod: GC

## 2021-11-04 RX ORDER — ACETAMINOPHEN 500 MG
650 TABLET ORAL ONCE
Refills: 0 | Status: COMPLETED | OUTPATIENT
Start: 2021-11-04 | End: 2021-11-04

## 2021-11-04 RX ORDER — TETANUS TOXOID, REDUCED DIPHTHERIA TOXOID AND ACELLULAR PERTUSSIS VACCINE, ADSORBED 5; 2.5; 8; 8; 2.5 [IU]/.5ML; [IU]/.5ML; UG/.5ML; UG/.5ML; UG/.5ML
0.5 SUSPENSION INTRAMUSCULAR ONCE
Refills: 0 | Status: COMPLETED | OUTPATIENT
Start: 2021-11-04 | End: 2021-11-04

## 2021-11-04 RX ADMIN — Medication 650 MILLIGRAM(S): at 22:57

## 2021-11-04 RX ADMIN — TETANUS TOXOID, REDUCED DIPHTHERIA TOXOID AND ACELLULAR PERTUSSIS VACCINE, ADSORBED 0.5 MILLILITER(S): 5; 2.5; 8; 8; 2.5 SUSPENSION INTRAMUSCULAR at 21:39

## 2021-11-04 NOTE — ED PROVIDER NOTE - ATTENDING CONTRIBUTION TO CARE
Agree with above except noted:     mechanical fall on asa w. mild headache and right side head hematoma. will ct head for bleed and extremity xray.

## 2021-11-04 NOTE — ED ADULT NURSE NOTE - NSICDXPASTMEDICALHX_GEN_ALL_CORE_FT
PAST MEDICAL HISTORY:  Anxiety     CAD (coronary atherosclerotic disease)     Colonic dysmotility     GERD (gastroesophageal reflux disease)     Ileostomy in place 2/2 SBO 2017    Sciatica     Sjogren's syndrome     Stented coronary artery

## 2021-11-04 NOTE — ED ADULT TRIAGE NOTE - PAIN RATING/NUMBER SCALE (0-10): REST
Report given to [facility] for continued care. Packet given to guards. Pt states pain unchanged at 9/10. Released to guard custody for transport.    3

## 2021-11-04 NOTE — ED PROVIDER NOTE - PHYSICAL EXAMINATION
GENERAL: Sitting comfortably in bed in no acute distress  NEURO: Alert and Oriented to person, place, date and situation. Pupils symmetric, No ptosis. No facial asymmetry or dysarthria, no tremor noted.  HEENT: No conjunctival injection or scleral icterus. Large hematoma to R forehead with bruising, no laceration.  CARD: Regular rate and rhythm, no murmurs or gallops appreciated.  RESP: Clear to auscultation bilaterally, No wheezes, rales or rhonchi. Good respiratory effort.  ABD: Bowel sounds active. Nondistended, Soft and nontender to palpation in all quadrants, no guarding, no rigidity. No masses appreciated.  EXT: No pedal edema. 2+DP pulses bilaterally. 4th and 5th digits of L hand pale without capillary refill, all other digits pink with 2 sec cap refill. Tenderness to L 4th digit proximal phalange near MCP joint, and 4th metacarpal distal bony tenderness. R knee with 2cm superficial skin tear., tenderness to medial palpation of the knee. GENERAL: Sitting comfortably in bed in no acute distress  NEURO: Alert and Oriented to person, place, date and situation. Pupils symmetric, No ptosis. No facial asymmetry or dysarthria, no tremor noted.  HEENT: No conjunctival injection or scleral icterus. Large hematoma to R forehead with bruising, no laceration.  no cervical midline tenderness.   CARD: Regular rate and rhythm, no murmurs or gallops appreciated.  RESP: Clear to auscultation bilaterally, No wheezes, rales or rhonchi. Good respiratory effort.  ABD: Bowel sounds active. Nondistended, Soft and nontender to palpation in all quadrants, no guarding, no rigidity. No masses appreciated.  EXT: No pedal edema. 2+DP pulses bilaterally. 4th and 5th digits of L hand pale without capillary refill, all other digits pink with 2 sec cap refill. Tenderness to L 4th digit proximal phalange near MCP joint, and 4th metacarpal distal bony tenderness. R knee with 2cm superficial skin tear., tenderness to medial palpation of the knee.

## 2021-11-04 NOTE — ED PROVIDER NOTE - NSFOLLOWUPINSTRUCTIONS_ED_ALL_ED_FT
Your CT scans and X-rays were negative for any fractures or bleed in the brain. Your forehead bruise will likely continue to get more purple and your R eye may have some blood pooling if it heals. Despite having no symptoms now, you likely had a concussion. If it is uncomfortable to read or look at screens, you begin having headaches or double vision, have difficulty sleeping, or trouble focusing go to your primary care doctor so they can monitor your concussion resolution. Limit any activities that cause headaches or are uncomfortable and re-introduce activities slowly. It is okay to give yourself physical and cognitive rest to recover. If you develop any new or concerning symptoms, including weakness, numbness or tingling in any extremity, significant fatigue or sleepiness, or any other new or concerning symptoms please come back to the ED.

## 2021-11-04 NOTE — ED PROVIDER NOTE - NS ED ROS FT
CONSTITUTIONAL - Denies fever, lightheadedness  SKIN - No rash  HEMATOLOGIC - No abnormal bleeding or bruising  EYES - No eye pain, No blurred vision  ENT - No change in hearing, No sore throat, No neck pain, No rhinorrhea, No ear pain  RESPIRATORY - No shortness of breath, No cough  CARDIAC -No chest pain, No palpitations  GI - No abdominal pain, No nausea, No vomiting, No diarrhea, No constipation  - No dysuria, no frequency, no hematuria.   MUSCULOSKELETAL - (+) joint pain in R knee, No swelling, No back pain, (+) neck pain, (+) L hand pain  NEUROLOGIC - No numbness, No focal weakness, (+) headache, No dizziness

## 2021-11-04 NOTE — ED ADULT NURSE NOTE - CHIEF COMPLAINT QUOTE
Tripped over cane in driveway, landed on head, no LOC, resumed aspirin 81mg today. 1 week ago laminectomy at [facility].

## 2021-11-04 NOTE — ED ADULT TRIAGE NOTE - CHIEF COMPLAINT QUOTE
Tripped over cane in driveway, landed on head, no LOC, resumed aspirin 81mg today. 1 week ago laminectomy at [place].

## 2021-11-04 NOTE — ED PROVIDER NOTE - OBJECTIVE STATEMENT
70 year old female with PMHx Limited Connective tissue disorder (Sjogren's, Raynaud's), SBO s/p ileostomy, CAD with 1 stent (2019) on 81mg aspirin, Lumbar stenosis with sciatica s/p laminectomy (10/28 7 days ago), hypothyroidism, familial hand tremors presents with fall at 6pm. Patient was walking quickly with her cane in the parking lot of a restaurant and tripped over the came and fell to the ground, hit her R forehead, R knee, L hand. Has some neck pain with movement, slightly worse than her chronic arthritic pain. Denies lightheadedness, vertigo, nausea, palpitations prior to fall, denies LOC with fall, it was witnessed by . Denies confusion, nausea, vision changes or difficulties, sensitivity to light, sleepiness.

## 2021-11-04 NOTE — ED PROVIDER NOTE - PROGRESS NOTE DETAILS
Is having some head pain at the bruising site, will give Tylenol and ibuprofen. Ct head, cervical spine all negative for intracranial bleed or fracture. No fracture or dislocation in the R knee or L hand.

## 2021-11-04 NOTE — ED ADULT NURSE NOTE - NSICDXFAMILYHX_GEN_ALL_CORE_FT
FAMILY HISTORY:  Father  Still living? Unknown  FH: myocardial infarction, Age at diagnosis: Age Unknown    Mother  Still living? Unknown  Family history of uterine cancer, Age at diagnosis: Age Unknown  FH: CHF (congestive heart failure), Age at diagnosis: Age Unknown

## 2021-11-04 NOTE — ED PROVIDER NOTE - CLINICAL SUMMARY MEDICAL DECISION MAKING FREE TEXT BOX
70 year old female with PMHx Limited Connective tissue disorder (Sjogren's, Raynaud's), SBO s/p ileostomy, CAD with 1 stent (2019) on 81mg aspirin, Lumbar stenosis with sciatica s/p laminectomy (10/28 7 days ago), hypothyroidism, familial hand tremors presents with fall at 6pm, and large hematoma to R forehead, L knee skin tear and tenderness to L hand. Vitals normal. CT head and cervical spine, L hand and R knee radiographs. 70 year old female with PMHx Limited Connective tissue disorder (Sjogren's, Raynaud's), SBO s/p ileostomy, CAD with 1 stent (2019) on 81mg aspirin, Lumbar stenosis with sciatica s/p laminectomy (10/28 7 days ago), hypothyroidism, familial hand tremors presents with fall at 6pm, and large hematoma to R forehead, L knee skin tear and tenderness to L hand. Vitals normal. CT head and cervical spine, L hand and R knee radiographs. will give tetanus unknown last dose.

## 2021-11-04 NOTE — ED ADULT NURSE NOTE - OBJECTIVE STATEMENT
69 y/o female PMHx Anxiety, CAD, Colonic dysmotility, GERD, Ileostomy in place, Raynaud's, Sciatica s/p laminectomy 10/28/2021, Sjogren's syndrome, Stented coronary artery on 81mg ASA arrives to [facility] ED by car from home with  with c/o fall. Pt states tripped over cane in parking lot at 1830, landed on right side, abrasion to right knee, right hand, ecchymosis and swelling to right forehead, and ecchymosis and swelling to left fourth finger. Patient's  walking in from of patient, turned around after fall, patient +hit head, denies LOC. Patient is A&Ox4, neuro assessment intact. Respirations spontaneous and unlabored. Denies SOB, dyspnea, cough, chest pain, palpitations. No abdominal pain, soft NT/ND. Ileostomy in place right side, stoma bright red and beefy. No n/v/d. Denies urinary symptoms. Denies fever/chills. No sick contacts. Ambulates with cane at baseline.

## 2021-11-06 ENCOUNTER — APPOINTMENT (OUTPATIENT)
Dept: INTERNAL MEDICINE | Facility: CLINIC | Age: 71
End: 2021-11-06
Payer: MEDICARE
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
## 2021-11-06 DIAGNOSIS — T14.8XXA OTHER INJURY OF UNSPECIFIED BODY REGION, INITIAL ENCOUNTER: ICD-10-CM

## 2021-11-06 PROCEDURE — 99213 OFFICE O/P EST LOW 20 MIN: CPT

## 2021-11-06 NOTE — HISTORY OF PRESENT ILLNESS
[FreeTextEntry8] : ANGIE MCCULLOUGH is a 70 year old female who presents for acute med evaluation, s/p recent trip/fall, c/o bruising on face.\par 2 days ago tripped over her cane in parking lot, fell onto flat pavement, right side of head/bilateral hands and right knee hit the ground. She was also just 1 week-post op from lumbar microlaminectomy surgery at the time of the fall.  brought her to [facility], underwent CT of head/cervical spine, bilateral hand rays; findings only significant for subcutaneous hematoma, no subdural/intracranial bleed, was told no acute fractures, She saw hand surgeon Dr. Kirkland yesterday due to persistent pain in left hand 4th digit, she was told on his xray that there is a "fracture at base of middle phalanx left ring finger" and was splinted. \par She also spoke with her spine surgeon, no additional recommendations made as she denies any significant back pain.\par Bruising has now moved into right side of face.\par  [Spouse] : spouse

## 2021-11-06 NOTE — PHYSICAL EXAM
[Normal Sclera/Conjunctiva] : normal sclera/conjunctiva [PERRL] : pupils equal round and reactive to light [EOMI] : extraocular movements intact [Normal Outer Ear/Nose] : the outer ears and nose were normal in appearance [Normal Oropharynx] : the oropharynx was normal [Coordination Grossly Intact] : coordination grossly intact [No Focal Deficits] : no focal deficits [Normal Gait] : normal gait [Normal] : affect was normal and insight and judgment were intact [de-identified] : ++periorbital subcutaneous hematoma--nontender to touch [de-identified] : normal TM/no blood [de-identified] : right knee abrasion--dressed in gauze, no surrounding swelling or discharge. [de-identified] : extensive subcutaneous hematoma extending from right frontal scalp to right cheek [de-identified] : a

## 2021-11-06 NOTE — PLAN
[FreeTextEntry1] : #Left ring finger phalanx fracture: Assisted patient/ to resplint fractured finger without issue, has f/u scheduled with hand surgeon\par #Subcutaneous hematoma: explained to patient that natural progression of scalp hematoma is that gravity will pull hematoma down into face, will eventually reabsorb and heal. Discussed Aspirin 81 mg--was off med for 7 days for surgery and had just restarted per her cardiologist, he had told her he would have preferred that she did not stop Aspirin for surgery as is 2 yrs out of cardiac stent. Discussed risks/benefits of Aspirin, though may somewhat prolong time for bruising to heal, will not stop Aspirin due to greater cardiac risk. Advised to apply ice to affected area. \par #Right knee abrasion: continue Mupirocin and daily dressing changes.\par #post op spine surgery: dressing intact, no significant pain; has f/u scheduled with spine surgeon\par f/u Dr. Coley CPE 12/3, sooner if any further concerns.\par

## 2021-11-06 NOTE — DATA REVIEWED
[FreeTextEntry1] : [facility] ER records 11/4/2021 reviewed: CT head, C-spine, bilateral hand xrays, ER physician note.

## 2021-11-06 NOTE — REVIEW OF SYSTEMS
[Headache] : no headache [Dizziness] : no dizziness [Negative] : Psychiatric [FreeTextEntry9] : as per hPI [de-identified] : as per HPI

## 2021-11-10 ENCOUNTER — LABORATORY RESULT (OUTPATIENT)
Age: 71
End: 2021-11-10

## 2021-11-10 ENCOUNTER — APPOINTMENT (OUTPATIENT)
Dept: INTERNAL MEDICINE | Facility: CLINIC | Age: 71
End: 2021-11-10
Payer: MEDICARE

## 2021-11-10 DIAGNOSIS — T14.8XXA OTHER INJURY OF UNSPECIFIED BODY REGION, INITIAL ENCOUNTER: ICD-10-CM

## 2021-11-10 DIAGNOSIS — S62.625A DISPLACED FRACTURE OF MIDDLE PHALANX OF LEFT RING FINGER, INITIAL ENCOUNTER FOR CLOSED FRACTURE: ICD-10-CM

## 2021-11-10 PROCEDURE — 99213 OFFICE O/P EST LOW 20 MIN: CPT

## 2021-11-11 ENCOUNTER — APPOINTMENT (OUTPATIENT)
Dept: ORTHOPEDIC SURGERY | Facility: CLINIC | Age: 71
End: 2021-11-11

## 2021-11-12 PROBLEM — T14.8XXA HEMATOMA: Status: ACTIVE | Noted: 2019-08-05

## 2021-11-12 PROBLEM — S62.625A FRACTURE OF MIDDLE PHALANX OF LEFT RING FINGER: Status: ACTIVE | Noted: 2021-11-06

## 2021-11-12 NOTE — PHYSICAL EXAM
[Normal] : soft, non-tender, non-distended, no masses palpated, no HSM and normal bowel sounds [de-identified] : subcutaneous hematoma right scalp with ecchymosis down right face,

## 2021-11-12 NOTE — ASSESSMENT
[FreeTextEntry1] : Restart Tylenol PRN pain\par Reassurance provided.\par Consider repeat CT Head if symptoms continue to worsen with tylenol or does not resolve

## 2021-11-12 NOTE — HISTORY OF PRESENT ILLNESS
[FreeTextEntry8] : Pt presents for evaluation - pt stopped taking Tylenol for her back yesterday - since that time she has more pain in her head s/p subcutaneous hematoma. Area is . Bruising has continued to spread  down right side of face. no visual changes. No nausea or vomiting.

## 2021-11-15 ENCOUNTER — NON-APPOINTMENT (OUTPATIENT)
Age: 71
End: 2021-11-15

## 2021-11-22 ENCOUNTER — APPOINTMENT (OUTPATIENT)
Dept: CARDIOLOGY | Facility: CLINIC | Age: 71
End: 2021-11-22
Payer: MEDICARE

## 2021-11-22 VITALS
HEART RATE: 74 BPM | RESPIRATION RATE: 14 BRPM | OXYGEN SATURATION: 98 % | BODY MASS INDEX: 21.36 KG/M2 | WEIGHT: 99 LBS | DIASTOLIC BLOOD PRESSURE: 50 MMHG | HEIGHT: 57 IN | SYSTOLIC BLOOD PRESSURE: 90 MMHG

## 2021-11-22 PROCEDURE — 99215 OFFICE O/P EST HI 40 MIN: CPT

## 2021-11-25 ENCOUNTER — NON-APPOINTMENT (OUTPATIENT)
Age: 71
End: 2021-11-25

## 2021-11-30 ENCOUNTER — NON-APPOINTMENT (OUTPATIENT)
Age: 71
End: 2021-11-30

## 2021-11-30 ENCOUNTER — APPOINTMENT (OUTPATIENT)
Dept: NEUROSURGERY | Facility: CLINIC | Age: 71
End: 2021-11-30
Payer: MEDICARE

## 2021-11-30 VITALS
DIASTOLIC BLOOD PRESSURE: 44 MMHG | HEIGHT: 57 IN | OXYGEN SATURATION: 93 % | HEART RATE: 71 BPM | BODY MASS INDEX: 20.93 KG/M2 | WEIGHT: 97 LBS | SYSTOLIC BLOOD PRESSURE: 95 MMHG

## 2021-11-30 PROCEDURE — 99203 OFFICE O/P NEW LOW 30 MIN: CPT

## 2021-11-30 NOTE — CONSULT LETTER
[Dear  ___] : Dear  [unfilled], [Consult Letter:] : I had the pleasure of evaluating your patient, [unfilled]. [Please see my note below.] : Please see my note below. [Consult Closing:] : Thank you very much for allowing me to participate in the care of this patient.  If you have any questions, please do not hesitate to contact me. [Sincerely,] : Sincerely, [FreeTextEntry2] : Toi Bundy MD\par [address] [place] [address]\[place] [address] [FreeTextEntry3] : Solis Monae MD, FACS, FAANS\par Professor and \par Department of Neurosurgery\par [facility] [employer] [facility]\par \par Clinical Offices:\par [address]\[place] [address]\par \par [address]\[place] [address]\par \par Phone [phone]\par Email: [email or web address]\par \par

## 2021-11-30 NOTE — REVIEW OF SYSTEMS
Abdomen soft, non-tender and non-distended without organomegaly or masses. Normal bowel sounds. [Negative] : Heme/Lymph [de-identified] : hand tremors (chronic)  [FreeTextEntry9] : foot pain

## 2021-11-30 NOTE — END OF VISIT
[FreeTextEntry3] : I, Dr. Solis Monae, evaluated this patient with the Nurse Practitioner, Kim Lombardo, and established the plan of care.  I personally discussed this patient  during the key portions of the history and exam with the Nurse Practitioner at the time of the visit.  I agree with the assessment and plan as written, unless noted below.\par  [Time Spent: ___ minutes] : I have spent [unfilled] minutes of time on the encounter.

## 2021-11-30 NOTE — ASSESSMENT
[FreeTextEntry1] : \par Assessment:\par s/p fall 11/4/2021 - 26 days ago\par Right frontal scalp hematoma and a very small right temporal subdural hematoma (seen only on the MRI)\par Asymptomatic \par \par \par PLAN: \par Follow up with Dr. Bundy re familial tremor\par She may restart ASA 81 mg given her history of CAD\par If any neurologic symptoms occur she will report to the ED or return to the office \par \par Solis Monae MD, FACS, FAANS\par Professor and \par Department of Neurosurgery\par [facility] [employer] [facility]\par \par Clinical Offices:\par [address]\[place] [address]\par \par [address]\[place] [address]\par \par Phone [phone]\par Email: [email or web address]\par \par \par \par CC:\par Toi Bundy MD\par [address]\par [place], [place]

## 2021-11-30 NOTE — DATA REVIEWED
[de-identified] : 11/4/201 CT of head non contrast from [facility]  and head MRI from [address] [place] [address] Imaging 11/15/2021 reviewed. Right frontal subgaleal hematoma and small right posterior temporal mini-subdural hematoma seen.

## 2021-11-30 NOTE — HISTORY OF PRESENT ILLNESS
[< 3 months] : less than 3 months [FreeTextEntry1] : fall  [de-identified] : Ms. Siena Avila is a 71 year old right handed lady who had a fall on November 4, 2021 and hit the right side of her forehead. She denies any HAs or neurologic symptoms.  She was taken to the ED at [facility] and reported no LOC. She had a fairly extensive ecchymoses on the right side of the face in addition to a frontal scalp hematoma. On the brain CT in the ED the right scalp hematoma was noted but nothing significant intracranially.  She was discharged and remained asymptomatic. She went for an evaluation by Dr. Bundy and a MRI of the brain was completed.  A right thin small subacute posterior temporal subdural hematoma was noted on the MRI of the brain. She remains off  the ASA that she was taking for CAD.  She continues to have no neurologic symptoms.

## 2021-11-30 NOTE — PHYSICAL EXAM
[General Appearance - Alert] : alert [General Appearance - In No Acute Distress] : in no acute distress [Oriented To Time, Place, And Person] : oriented to person, place, and time [Impaired Insight] : insight and judgment were intact [Affect] : the affect was normal [Person] : oriented to person [Place] : oriented to place [Time] : oriented to time [Short Term Intact] : short term memory intact [Cranial Nerves Oculomotor (III)] : extraocular motion intact [Cranial Nerves Facial (VII)] : face symmetrical [Cranial Nerves Vestibulocochlear (VIII)] : hearing was intact bilaterally [Cranial Nerves Glossopharyngeal (IX)] : tongue and palate midline [Cranial Nerves Accessory (XI - Cranial And Spinal)] : head turning and shoulder shrug symmetric [Cranial Nerves Hypoglossal (XII)] : there was no tongue deviation with protrusion [Motor Tone] : muscle tone was normal in all four extremities [Motor Strength] : muscle strength was normal in all four extremities [Sensation Tactile Decrease] : light touch was intact [Balance] : balance was intact [Tremor] : a tremor present [Full ROM] : full ROM [Normal] : normal [Able to toe walk] : the patient was able to toe walk [Able to heel walk] : the patient was able to heel walk [Sclera] : the sclera and conjunctiva were normal [Outer Ear] : the ears and nose were normal in appearance [Neck Appearance] : the appearance of the neck was normal [Abnormal Walk] : normal gait [Skin Color & Pigmentation] : normal skin color and pigmentation [General Appearance - Well Nourished] : well nourished [General Appearance - Well-Appearing] : healthy appearing [Memory Recent] : recent memory was not impaired [Cranial Nerves Optic (II)] : visual acuity intact bilaterally,  pupils equal round and reactive to light [Cranial Nerves Trigeminal (V)] : facial sensation intact symmetrically [Limited Balance] : balance was intact [Coordination - Dysmetria Impaired Finger-to-Nose Bilateral] : not present [3+] : Brachioradialis left 3+ [___] : absent on the right [___] : absent on the left [FreeTextEntry6] : Hand tremors bilaterally (familial tremor) [de-identified] : No pronator drift  [PERRL With Normal Accommodation] : pupils were equal in size, round, reactive to light, with normal accommodation [] : the neck was supple [Respiration, Rhythm And Depth] : normal respiratory rhythm and effort [Apical Impulse] : the apical impulse was normal [Heart Rate And Rhythm] : heart rate was normal and rhythm regular

## 2021-11-30 NOTE — REASON FOR VISIT
[New Patient Visit] : a new patient visit [Referred By: _________] : Patient was referred by HERON [Spouse] : spouse [FreeTextEntry1] : Scalp and subdural hematomas

## 2021-12-03 ENCOUNTER — NON-APPOINTMENT (OUTPATIENT)
Age: 71
End: 2021-12-03

## 2021-12-03 ENCOUNTER — APPOINTMENT (OUTPATIENT)
Dept: INTERNAL MEDICINE | Facility: CLINIC | Age: 71
End: 2021-12-03
Payer: MEDICARE

## 2021-12-03 VITALS
HEIGHT: 57 IN | SYSTOLIC BLOOD PRESSURE: 100 MMHG | HEART RATE: 72 BPM | DIASTOLIC BLOOD PRESSURE: 60 MMHG | BODY MASS INDEX: 20.93 KG/M2 | WEIGHT: 97 LBS | RESPIRATION RATE: 14 BRPM

## 2021-12-03 PROCEDURE — G0444 DEPRESSION SCREEN ANNUAL: CPT | Mod: 59

## 2021-12-03 PROCEDURE — 93000 ELECTROCARDIOGRAM COMPLETE: CPT | Mod: 59

## 2021-12-03 PROCEDURE — G0439: CPT

## 2021-12-03 PROCEDURE — 99497 ADVNCD CARE PLAN 30 MIN: CPT

## 2021-12-03 PROCEDURE — 99214 OFFICE O/P EST MOD 30 MIN: CPT | Mod: 25

## 2021-12-08 NOTE — HISTORY OF PRESENT ILLNESS
[PMH Reviewed and Updated] : past medical history reviewed and updated [PSH Reviewed and Updated] : past surgical history reviewed and updated [Family History Reviewed and Updated] : family history reviewed and updated [Medication and Allergies Reconciled] : medication and allergies reconciled [0] : 0 [Over the Past 2 Weeks, Have You Felt Down, Depressed, or Hopeless?] : 1.) Over the past 2 weeks, have you felt down, depressed, or hopeless? No [Over the Past 2 Weeks, Have You Felt Little Interest or Pleasure Doing Things?] : 2.) Over the past 2 weeks, have you felt little interest or pleasure doing things? No [FreeTextEntry1] : Pt presents for f/u evaluation of hypothyroidism, s/p osteomy, CREST syndrome, hyperlipidemia, CAD s/p PTCA\par She is stable on meds\par Also here for her annual physical.\par \par She is without acute complaints today.\par \par Sees GI Dr Hill - stable.\par \par Saw endocrinology - repeat bone density in September reviewed\par On prolia for osteoporosis.  Endocrinologist - Dr Shabazz\par \par Has been seeing Dr Womack for cardiology\par \par s/p lumbar surgery with good resolution of pain\par recovering from fall at home with resulting small subdural hematoma, cleared by neurosx.\par \par Contemplating winter in [place].\par

## 2021-12-08 NOTE — HEALTH RISK ASSESSMENT
[Good] : ~his/her~  mood as  good [No] : In the past 12 months have you used drugs other than those required for medical reasons? No [One fall no injury in past year] : Patient reported one fall in the past year without injury [0] : 2) Feeling down, depressed, or hopeless: Not at all (0) [HIV test declined] : HIV test declined [Hepatitis C test declined] : Hepatitis C test declined [With Patient/Caregiver] : , with patient/caregiver [Name: ___] : Health Care Proxy's Name: [unfilled]  [Relationship: ___] : Relationship: [unfilled] [I will adhere to the patient's wishes.] : I will adhere to the patient's wishes. [Time Spent: ___ minutes] : Time Spent: [unfilled] minutes [] : No [PHQ-2 Negative - No further assessment needed] : PHQ-2 Negative - No further assessment needed [DHQ8Skcyp] : 0 [AdvancecareDate] : 12/21

## 2021-12-08 NOTE — ASSESSMENT
[FreeTextEntry1] : Labs reviewed\par \par Cardiology f/u as scheduled\par \par 17 minutes spent with patient discussing ACP/HCP. She has designated a proxy, and the proxy is aware of the patients wishes and will comply.\par \par Pt had mammogram/breast sono \par \par Continue all meds\par PT for lower back pain.\par f/u with orthopedics\par \par F/U upon return from [place] or three months\par \par \par

## 2021-12-09 ENCOUNTER — APPOINTMENT (OUTPATIENT)
Dept: NEUROSURGERY | Facility: CLINIC | Age: 71
End: 2021-12-09
Payer: MEDICARE

## 2021-12-09 DIAGNOSIS — S06.5X9A TRAUMATIC SUBDURAL HEMORRHAGE WITH LOSS OF CONSCIOUSNESS OF UNSPECIFIED DURATION, INITIAL ENCOUNTER: ICD-10-CM

## 2021-12-09 PROCEDURE — 99213 OFFICE O/P EST LOW 20 MIN: CPT

## 2021-12-09 NOTE — DATA REVIEWED
[de-identified] : 11/4/2021 CT of head non contrast from [facility]  and head MRI from [address] [place] [address] Imaging 11/15/2021 reviewed. Right frontal subgaleal hematoma and small right posterior temporal mini-subdural hematoma seen. MRI head 12/8/21 shows complete resolution of the SDH

## 2021-12-09 NOTE — ASSESSMENT
[FreeTextEntry1] : Assessment:\par - s/p fall on 11/4/2021 resulting in a right frontal scalp hematoma and a very small right temporal subdural hematoma (seen only on the MRI)\par - Asymptomatic \par - MRI brain 12/8/21 - resolution of SDH\par - Familial tremor - seen and evaluated by Dr. Bundy who prescribed Propranolol 10 tid\par \par PLAN: \par No further neurosurgical follow-up needed unless she developes new symptoms\par \par Solis Monae MD, FACS, FAANS\par Professor and \par Department of Neurosurgery\par [facility] [employer] [facility]\par \par Clinical Offices:\par [address]\[place] [address]\par \par [address]\[place] [address]\par \par Phone [phone]\par Email: [email or web address]\par \par \par \par \par

## 2021-12-09 NOTE — PHYSICAL EXAM
[General Appearance - Alert] : alert [General Appearance - In No Acute Distress] : in no acute distress [Oriented To Time, Place, And Person] : oriented to person, place, and time [Impaired Insight] : insight and judgment were intact [Affect] : the affect was normal [Mood] : the mood was normal [Person] : oriented to person [Place] : oriented to place [Time] : oriented to time [Cranial Nerves Optic (II)] : visual acuity intact bilaterally,  pupils equal round and reactive to light [Cranial Nerves Oculomotor (III)] : extraocular motion intact [Cranial Nerves Trigeminal (V)] : facial sensation intact symmetrically [Cranial Nerves Facial (VII)] : face symmetrical [Cranial Nerves Vestibulocochlear (VIII)] : hearing was intact bilaterally [Cranial Nerves Glossopharyngeal (IX)] : tongue and palate midline [Cranial Nerves Accessory (XI - Cranial And Spinal)] : head turning and shoulder shrug symmetric [Cranial Nerves Hypoglossal (XII)] : there was no tongue deviation with protrusion [Motor Tone] : muscle tone was normal in all four extremities [Motor Strength] : muscle strength was normal in all four extremities [Involuntary Movements] : no involuntary movements were seen [Sensation Tactile Decrease] : light touch was intact [3+] : Ankle jerk left 3+ [Sclera] : the sclera and conjunctiva were normal [PERRL With Normal Accommodation] : pupils were equal in size, round, reactive to light, with normal accommodation [Extraocular Movements] : extraocular movements were intact [Outer Ear] : the ears and nose were normal in appearance [Hearing Threshold Finger Rub Not Oliver] : hearing was normal [Neck Appearance] : the appearance of the neck was normal [] : no respiratory distress [Exaggerated Use Of Accessory Muscles For Inspiration] : no accessory muscle use [No CVA Tenderness] : no ~M costovertebral angle tenderness [Abnormal Walk] : normal gait [Skin Color & Pigmentation] : normal skin color and pigmentation [General Appearance - Well Nourished] : well nourished [General Appearance - Well-Appearing] : healthy appearing [FreeTextEntry5] : + R hand tremor

## 2021-12-09 NOTE — HISTORY OF PRESENT ILLNESS
[< 3 months] : less than 3 months [FreeTextEntry1] : Ms. Siena Avila is a 71 year old right handed lady who had a fall on November 4, 2021 and hit the right side of her forehead. She denied any HAs or neurologic symptoms.  She was taken to the ED at [facility] and reported no LOC. She had a fairly extensive ecchymosis on the right side of the face in addition to a frontal scalp hematoma. On the brain CT in the ED the right scalp hematoma was noted but nothing significant intracranially.  She was discharged and remained asymptomatic. She went for an evaluation by Dr. Bundy and a MRI of the brain was completed.  A right thin small subacute posterior temporal subdural hematoma was noted on the MRI of the brain. \par \par Last visit on 11/30/21 advised to follow up with Dr. Bundy re familial tremor, cleared to resume ASA 81 mg given history of CAD, she continued to remain with no neurologic symptoms. Patient had a new MRI yesterday which showed resolution of the SDH. Saw Dr. Bundy and was prescribed Propranolol 10 mg tid for familial tremor. \par Remains asymptomatic and neurologically intact except right hand tremor. No new complaints. \par \par \par \par

## 2021-12-09 NOTE — CONSULT LETTER
[Dear  ___] : Dear  [unfilled], [Courtesy Letter:] : I had the pleasure of seeing your patient, [unfilled], in my office today. [Please see my note below.] : Please see my note below. [Consult Closing:] : Thank you very much for allowing me to participate in the care of this patient.  If you have any questions, please do not hesitate to contact me. [Sincerely,] : Sincerely, [FreeTextEntry2] : Toi Bundy MD\par [address] [place] [address]\[place] [address] [FreeTextEntry3] : Solis Monae MD, FACS, FAANS\par Professor and \par Department of Neurosurgery\par [facility] [employer] [facility] at [facility]\par \par Clinical Offices:\par [address]\[place] [address]\par \par [address]\[place] [address]\par \par Phone [phone]\par Email: [email or web address]\par \par

## 2021-12-13 ENCOUNTER — APPOINTMENT (OUTPATIENT)
Dept: CARDIOLOGY | Facility: CLINIC | Age: 71
End: 2021-12-13
Payer: MEDICARE

## 2021-12-13 VITALS
DIASTOLIC BLOOD PRESSURE: 56 MMHG | HEART RATE: 80 BPM | OXYGEN SATURATION: 97 % | SYSTOLIC BLOOD PRESSURE: 112 MMHG | HEIGHT: 57 IN | WEIGHT: 97 LBS | BODY MASS INDEX: 20.93 KG/M2

## 2021-12-13 PROCEDURE — 99214 OFFICE O/P EST MOD 30 MIN: CPT

## 2021-12-15 ENCOUNTER — APPOINTMENT (OUTPATIENT)
Dept: CARDIOLOGY | Facility: CLINIC | Age: 71
End: 2021-12-15
Payer: MEDICARE

## 2021-12-15 PROCEDURE — 93015 CV STRESS TEST SUPVJ I&R: CPT

## 2021-12-15 PROCEDURE — 78452 HT MUSCLE IMAGE SPECT MULT: CPT

## 2021-12-15 PROCEDURE — A9500: CPT

## 2021-12-23 ENCOUNTER — APPOINTMENT (OUTPATIENT)
Dept: ORTHOPEDIC SURGERY | Facility: CLINIC | Age: 71
End: 2021-12-23

## 2022-02-07 NOTE — ED ADULT TRIAGE NOTE - BMI (KG/M2)
Physical Therapy      Patient Name:  Tammi Farris   MRN:  [id number]    Patient not seen today secondary to  (Interventional radiology). Will follow-up .         19.1

## 2022-02-28 NOTE — BEHAVIORAL HEALTH ASSESSMENT NOTE - ATTENTION / CONCENTRATION
Pt eyebrow laceration cleaned with soap and water, pt currently eating box lunch, son at bedside     Diana Gamboa, ALVARADO  02/28/22 6983 Normal

## 2022-04-01 ENCOUNTER — NON-APPOINTMENT (OUTPATIENT)
Age: 72
End: 2022-04-01

## 2022-04-11 ENCOUNTER — RX RENEWAL (OUTPATIENT)
Age: 72
End: 2022-04-11

## 2022-04-24 ENCOUNTER — OUTPATIENT (OUTPATIENT)
Dept: OUTPATIENT SERVICES | Facility: HOSPITAL | Age: 72
LOS: 1 days | End: 2022-04-24
Payer: MEDICARE

## 2022-04-24 ENCOUNTER — RESULT REVIEW (OUTPATIENT)
Age: 72
End: 2022-04-24

## 2022-04-24 ENCOUNTER — APPOINTMENT (OUTPATIENT)
Dept: MRI IMAGING | Facility: CLINIC | Age: 72
End: 2022-04-24
Payer: MEDICARE

## 2022-04-24 DIAGNOSIS — Z90.710 ACQUIRED ABSENCE OF BOTH CERVIX AND UTERUS: Chronic | ICD-10-CM

## 2022-04-24 DIAGNOSIS — Z90.49 ACQUIRED ABSENCE OF OTHER SPECIFIED PARTS OF DIGESTIVE TRACT: Chronic | ICD-10-CM

## 2022-04-24 DIAGNOSIS — Z98.890 OTHER SPECIFIED POSTPROCEDURAL STATES: Chronic | ICD-10-CM

## 2022-04-24 DIAGNOSIS — Z00.8 ENCOUNTER FOR OTHER GENERAL EXAMINATION: ICD-10-CM

## 2022-04-24 DIAGNOSIS — Q76.49 OTHER CONGENITAL MALFORMATIONS OF SPINE, NOT ASSOCIATED WITH SCOLIOSIS: ICD-10-CM

## 2022-04-24 PROCEDURE — 73718 MRI LOWER EXTREMITY W/O DYE: CPT | Mod: MH

## 2022-04-24 PROCEDURE — 73718 MRI LOWER EXTREMITY W/O DYE: CPT | Mod: 26,LT,MH

## 2022-04-27 ENCOUNTER — NON-APPOINTMENT (OUTPATIENT)
Age: 72
End: 2022-04-27

## 2022-04-27 LAB
25(OH)D3 SERPL-MCNC: 28.9 NG/ML
ALBUMIN SERPL ELPH-MCNC: 4 G/DL
ALP BLD-CCNC: 140 U/L
ALT SERPL-CCNC: 15 U/L
ANION GAP SERPL CALC-SCNC: 11 MMOL/L
APPEARANCE: CLEAR
AST SERPL-CCNC: 27 U/L
BASOPHILS # BLD AUTO: 0.1 K/UL
BASOPHILS NFR BLD AUTO: 1.5 %
BILIRUB SERPL-MCNC: 0.6 MG/DL
BILIRUBIN URINE: NEGATIVE
BLOOD URINE: NEGATIVE
BUN SERPL-MCNC: 15 MG/DL
CALCIUM SERPL-MCNC: 9 MG/DL
CHLORIDE SERPL-SCNC: 92 MMOL/L
CHOLEST SERPL-MCNC: 134 MG/DL
CO2 SERPL-SCNC: 24 MMOL/L
COLOR: YELLOW
CREAT SERPL-MCNC: 1 MG/DL
EGFR: 59 ML/MIN/1.73M2
EOSINOPHIL # BLD AUTO: 0.19 K/UL
EOSINOPHIL NFR BLD AUTO: 2.8 %
ESTIMATED AVERAGE GLUCOSE: 128 MG/DL
FERRITIN SERPL-MCNC: 99 NG/ML
FOLATE SERPL-MCNC: 18.4 NG/ML
GLUCOSE QUALITATIVE U: NEGATIVE
GLUCOSE SERPL-MCNC: 80 MG/DL
HBA1C MFR BLD HPLC: 6.1 %
HCT VFR BLD CALC: 35.9 %
HDLC SERPL-MCNC: 79 MG/DL
HGB BLD-MCNC: 11.3 G/DL
IMM GRANULOCYTES NFR BLD AUTO: 0.3 %
IRON SATN MFR SERPL: 14 %
IRON SERPL-MCNC: 64 UG/DL
KETONES URINE: NEGATIVE
LDLC SERPL CALC-MCNC: 38 MG/DL
LEUKOCYTE ESTERASE URINE: NEGATIVE
LYMPHOCYTES # BLD AUTO: 1.68 K/UL
LYMPHOCYTES NFR BLD AUTO: 24.4 %
MAGNESIUM SERPL-MCNC: 1.4 MG/DL
MAN DIFF?: NORMAL
MCHC RBC-ENTMCNC: 27.9 PG
MCHC RBC-ENTMCNC: 31.5 GM/DL
MCV RBC AUTO: 88.6 FL
MONOCYTES # BLD AUTO: 0.41 K/UL
MONOCYTES NFR BLD AUTO: 6 %
NEUTROPHILS # BLD AUTO: 4.49 K/UL
NEUTROPHILS NFR BLD AUTO: 65 %
NITRITE URINE: NEGATIVE
NONHDLC SERPL-MCNC: 55 MG/DL
PH URINE: 7
PLATELET # BLD AUTO: 328 K/UL
POTASSIUM SERPL-SCNC: 4.5 MMOL/L
PROT SERPL-MCNC: 6.7 G/DL
PROTEIN URINE: NORMAL
RBC # BLD: 4.05 M/UL
RBC # FLD: 15.7 %
SODIUM SERPL-SCNC: 127 MMOL/L
SPECIFIC GRAVITY URINE: 1.01
T4 FREE SERPL-MCNC: 1.4 NG/DL
T4 SERPL-MCNC: 8 UG/DL
TIBC SERPL-MCNC: 453 UG/DL
TRIGL SERPL-MCNC: 86 MG/DL
TSH SERPL-ACNC: 3.87 UIU/ML
UIBC SERPL-MCNC: 390 UG/DL
UROBILINOGEN URINE: NORMAL
VIT B12 SERPL-MCNC: 1552 PG/ML
WBC # FLD AUTO: 6.89 K/UL
ZINC SERPL-MCNC: 76 UG/DL

## 2022-04-28 ENCOUNTER — APPOINTMENT (OUTPATIENT)
Dept: INTERNAL MEDICINE | Facility: CLINIC | Age: 72
End: 2022-04-28
Payer: MEDICARE

## 2022-04-28 VITALS — SYSTOLIC BLOOD PRESSURE: 114 MMHG | HEART RATE: 72 BPM | RESPIRATION RATE: 14 BRPM | DIASTOLIC BLOOD PRESSURE: 58 MMHG

## 2022-04-28 VITALS — WEIGHT: 99 LBS | BODY MASS INDEX: 21.36 KG/M2 | HEIGHT: 57 IN

## 2022-04-28 DIAGNOSIS — M34.9 SYSTEMIC SCLEROSIS, UNSPECIFIED: ICD-10-CM

## 2022-04-28 PROCEDURE — 99214 OFFICE O/P EST MOD 30 MIN: CPT

## 2022-04-28 NOTE — HISTORY OF PRESENT ILLNESS
[de-identified] : Pt presents for f/u evaluation of CAD, hypothyroidism, sciatica, CREST syndrome, hyperlipidemia, hyponatremia\par

## 2022-05-04 LAB
ALBUMIN SERPL ELPH-MCNC: 4.2 G/DL
ALP BLD-CCNC: 88 U/L
ALT SERPL-CCNC: 18 U/L
ANION GAP SERPL CALC-SCNC: 12 MMOL/L
AST SERPL-CCNC: 34 U/L
BILIRUB SERPL-MCNC: 0.8 MG/DL
BUN SERPL-MCNC: 34 MG/DL
CALCIUM SERPL-MCNC: 9.7 MG/DL
CHLORIDE SERPL-SCNC: 98 MMOL/L
CO2 SERPL-SCNC: 23 MMOL/L
CREAT SERPL-MCNC: 1.14 MG/DL
EGFR: 51 ML/MIN/1.73M2
GLUCOSE SERPL-MCNC: 83 MG/DL
MAGNESIUM SERPL-MCNC: 1.7 MG/DL
POTASSIUM SERPL-SCNC: 6.7 MMOL/L
PROT SERPL-MCNC: 6.9 G/DL
SODIUM SERPL-SCNC: 133 MMOL/L

## 2022-05-05 ENCOUNTER — NON-APPOINTMENT (OUTPATIENT)
Age: 72
End: 2022-05-05

## 2022-05-06 ENCOUNTER — APPOINTMENT (OUTPATIENT)
Dept: ORTHOPEDIC SURGERY | Facility: CLINIC | Age: 72
End: 2022-05-06
Payer: MEDICARE

## 2022-05-06 VITALS — BODY MASS INDEX: 21.36 KG/M2 | WEIGHT: 99 LBS | HEIGHT: 57 IN

## 2022-05-06 DIAGNOSIS — M20.42 OTHER HAMMER TOE(S) (ACQUIRED), LEFT FOOT: ICD-10-CM

## 2022-05-06 DIAGNOSIS — M20.22 HALLUX RIGIDUS, LEFT FOOT: ICD-10-CM

## 2022-05-06 LAB
ANION GAP SERPL CALC-SCNC: 13 MMOL/L
BUN SERPL-MCNC: 32 MG/DL
CALCIUM SERPL-MCNC: 9.8 MG/DL
CHLORIDE SERPL-SCNC: 95 MMOL/L
CO2 SERPL-SCNC: 24 MMOL/L
CREAT SERPL-MCNC: 1.21 MG/DL
EGFR: 47 ML/MIN/1.73M2
GLUCOSE SERPL-MCNC: 72 MG/DL
MAGNESIUM SERPL-MCNC: 1.6 MG/DL
POTASSIUM SERPL-SCNC: 4.8 MMOL/L
SODIUM SERPL-SCNC: 132 MMOL/L

## 2022-05-06 PROCEDURE — 99203 OFFICE O/P NEW LOW 30 MIN: CPT

## 2022-05-06 PROCEDURE — 73630 X-RAY EXAM OF FOOT: CPT | Mod: LT

## 2022-05-06 PROCEDURE — 99213 OFFICE O/P EST LOW 20 MIN: CPT

## 2022-05-06 NOTE — DISCUSSION/SUMMARY
[Medication Risks Reviewed] : Medication risks reviewed [Surgical risks reviewed] : Surgical risks reviewed [de-identified] : Left foot hallux valgus deformity with bunion and hammertoe of the second toe\par \par \par \par \par Treatment options were reviewed with the patient that she really wants to proceed with as minimal procedure is possible.  Her biggest complaint is the callus formation is developing between her second and first toe.  We discussed all the surgical options but at this time the most minimal procedure that I think will fix her problem at this time would be to do an Casey procedure this of the big toe with soft tissue release and correction of the second hammertoe.  Risks benefits alternatives to this procedure were reviewed with the patient today as well as the preoperative operative and postoperative course.  The patient understands she will be heel weightbearing for minimum of 6 to 8 weeks with progressive return to weightbearing as tolerated in normal shoewear.  The patient also understands that this would not be a definitive procedure but it would be the most minimal procedure to accomplish when she needs to prevent the increased callus formation between the second toe and first toe.  The patient understands and she would like to proceed with surgery.  Patient will require medical clearance prior to surgery.  I

## 2022-05-06 NOTE — HISTORY OF PRESENT ILLNESS
[FreeTextEntry1] : 71-year-old female presents complaining of left foot pain.  She was seen in the past with severe bunion of the left foot and hammertoe of the second toe, and was considering surgery at that time.  She was hesitant to proceed with surgery because she has Sjogren's disease and autoimmune problem is really concerned about complications.  She is currently seeing a rheumatologist and is not on any infusions or medications for her Sjogren's.  She is also been seeing a podiatrist who has been debriding calluses in between her second and first toe to make her more comfortable.  She finds has been having to go to the podiatrist more frequently. She has 8/10 pain worse with weightbearing.  She denies fever chills night sweats has no paresthesias in the lower extremity no other orthopedic complaints.

## 2022-05-06 NOTE — REASON FOR VISIT
[Follow-Up Visit] : a follow-up visit for [Foot Pain] : foot pain [FreeTextEntry2] : Follow up for left foot pain. Toes rub together causing severe pain. Wears toe separators with no relief

## 2022-05-06 NOTE — PHYSICAL EXAM
[Normal] : no peripheral adenopathy appreciated [de-identified] : Bilateral foot exam demonstrates clear dry intact. No surgical scars but erythema or ecchymosis. Severe bunion deformities are noted bilaterally. No ulcerations. Callus formation at the first MTP joint is appreciated. First toe overriding the second toe is appreciated. Stiff hammertoe deformity of the second toes are noted bilaterally. Remaining toes are normal alignment without crossover or overriding. Sensation is grossly intact. Normal ankle alignment. Normal active ankle motor function. Achilles intact and nontender. No venous stasis or ulceration. 2+ tibial pulse. \par \par  [de-identified] : X-ray of the left foot: 3 views of the left foot shows increased metatarsal angle with hallux valgus deformity arthritic changes of the second PIP joint.

## 2022-05-09 ENCOUNTER — APPOINTMENT (OUTPATIENT)
Dept: CARDIOLOGY | Facility: CLINIC | Age: 72
End: 2022-05-09
Payer: MEDICARE

## 2022-05-09 ENCOUNTER — NON-APPOINTMENT (OUTPATIENT)
Age: 72
End: 2022-05-09

## 2022-05-09 VITALS
HEIGHT: 57 IN | SYSTOLIC BLOOD PRESSURE: 112 MMHG | OXYGEN SATURATION: 99 % | DIASTOLIC BLOOD PRESSURE: 50 MMHG | WEIGHT: 91 LBS | HEART RATE: 77 BPM | BODY MASS INDEX: 19.63 KG/M2 | RESPIRATION RATE: 16 BRPM

## 2022-05-09 PROCEDURE — 93000 ELECTROCARDIOGRAM COMPLETE: CPT

## 2022-05-09 PROCEDURE — 99215 OFFICE O/P EST HI 40 MIN: CPT

## 2022-05-13 ENCOUNTER — APPOINTMENT (OUTPATIENT)
Dept: INTERNAL MEDICINE | Facility: CLINIC | Age: 72
End: 2022-05-13

## 2022-05-19 ENCOUNTER — TRANSCRIPTION ENCOUNTER (OUTPATIENT)
Age: 72
End: 2022-05-19

## 2022-05-20 ENCOUNTER — APPOINTMENT (OUTPATIENT)
Dept: COLORECTAL SURGERY | Facility: CLINIC | Age: 72
End: 2022-05-20
Payer: MEDICARE

## 2022-05-20 PROCEDURE — 99213 OFFICE O/P EST LOW 20 MIN: CPT

## 2022-05-25 NOTE — HISTORY OF PRESENT ILLNESS
[FreeTextEntry1] : 71-year-old female well-known to me.\par \par The patient has a very long and complicated history. She is known to have idiopathic slow transit constipation and also had uterine prolapse. She had undergone a hysterectomy and a suspensory procedure in the past. She also underwent various operations for rectal prolapse including resectional therapy and then suspension therapy with mesh. She developed multiple bowel obstructions and eventually had a diverting loop ileostomy constructed at the [facility] in [place]. Subsequent to this she has had episodes of inspissated stool developing in her out-of-circuit colon. She has also had partial small bowel obstructions. Patient spent a significant amount of time in [place]. Since I last saw her she has had a few recurrent attacks of these issues. The most recent was in the past few months revealing a partial small bowel obstruction versus small bowel slow transit and some inspissated stool seen in the out of circuit colon.\par \par Currently her ostomy is back to it's baseline function. Regrettably there is no quick remedy for this problem. 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 We reviewed the importance of staying well hydrated and diminishing high fiber intake at one sitting. I have also advised the use of some warm water enemas from below to minimize this phenomenon of inspissated stool developing in her colon.

## 2022-05-25 NOTE — ED ADULT NURSE NOTE - NSFALLRSKPSTHSTOCCUR_ED_ALL_ED
Consent: The patient's consent was obtained including but not limited to risks of crusting, scabbing, blistering, scarring, darker or lighter pigmentary change, recurrence, incomplete removal and infection. The patient understands that the procedure is cosmetic in nature and is not covered by insurance. Post-Care Instructions: I reviewed with the patient in detail post-care instructions. Patient is to wear sunprotection, and avoid picking at any of the treated lesions. Pt may apply Vaseline to crusted or scabbing areas. Spray Paint Technique: No Spray Paint Text: The liquid nitrogen was applied to the skin utilizing a spray paint frosting technique. Price (Use Numbers Only, No Special Characters Or $): 25 Show Spray Paint Technique Variable?: Yes Detail Level: Zone Billing Information: Bill by Static Price Single Mechanical/Accidental Fall

## 2022-06-01 ENCOUNTER — APPOINTMENT (OUTPATIENT)
Dept: CARDIOLOGY | Facility: CLINIC | Age: 72
End: 2022-06-01
Payer: MEDICARE

## 2022-06-01 PROCEDURE — 93306 TTE W/DOPPLER COMPLETE: CPT

## 2022-06-03 ENCOUNTER — APPOINTMENT (OUTPATIENT)
Dept: ORTHOPEDIC SURGERY | Facility: CLINIC | Age: 72
End: 2022-06-03
Payer: MEDICARE

## 2022-06-03 VITALS — HEIGHT: 57 IN | WEIGHT: 91 LBS | BODY MASS INDEX: 19.63 KG/M2

## 2022-06-03 PROCEDURE — 99214 OFFICE O/P EST MOD 30 MIN: CPT

## 2022-06-03 NOTE — DISCUSSION/SUMMARY
[Medication Risks Reviewed] : Medication risks reviewed [Surgical risks reviewed] : Surgical risks reviewed [de-identified] : Patient was again explained in full detail E. management choice regarding her left foot condition. She was explained that surgical intervention is warranted. To correct the hammertoe was as well as the hallux valgus with bunion. She was explained the surgery in great detail as well as the risks benefits pros and cons of the surgery as well as alternatives. He was also discussed about the postoperative management both in the hospital upon discharge. Included her weightbearing status and the need for a cast/boot. She was explained the surgery in itself step-by-step. All questions were answered. The patient did agree to continue with the plan of undergoing surgery. She will follow with her primary care physician for medical clearance.\par \par As discussed with the patient previously, because of her comorbidities and the need to solve her foot problem with minimal disruption to her life, it was recommended and agreed with the patient to perform an Zelalem osteotomy of the hallux combined with a second hammertoe PIP arthrodesis.  The patient understand that the surgery will address the callus formation between the first and second toes but will not completely correct her deformity.  Patient understand this is an interim type of procedure, and we may need to consider additional procedure in the future.\par \par I saw and evaluated the patient. I discussed and reviewed the case details with the PA and agree with the PA's findings and plan as documented in the PA note.\par \par

## 2022-06-03 NOTE — PHYSICAL EXAM
[de-identified] : On physical examination of the left foot. The skin is clean dry and intact without erythema swelling or heat noted. It is noted that the patient is having obvious hallux valgus deformity at the first MTP joint. With bunions. It is also noted that the patient is a large her hammertoe in the second toe with a slight hammertoe at the third toe. These toes are pink and mobile and is neurovascularly intact with no evidence of any motor or sensory deficit. Patient has good distal pulses with no calf tenderness. [de-identified] : No x-rays were performed today

## 2022-06-03 NOTE — REASON FOR VISIT
[Follow-Up Visit] : a follow-up visit for [Foot Pain] : foot pain [FreeTextEntry2] : Follow up left foot pain. Was recommended to have surgery at last visit, has more questions before scheduling.

## 2022-06-03 NOTE — HISTORY OF PRESENT ILLNESS
[FreeTextEntry1] : Patient is a 71 or of female who returns today to discuss her left foot. She was last seen in the office she was explained she does have ADD hallux valgus deformity with hammertoe. It was discussed that since conservative management was not improving her condition. She would need to undergo surgical correction. This was all discussed with her in great detail and the patient is very satisfied with like to undergo surgery. However she did have symptoms questions therefore returns today to discuss these questions and answers.

## 2022-06-11 ENCOUNTER — INPATIENT (INPATIENT)
Facility: HOSPITAL | Age: 72
LOS: 1 days | Discharge: HOME | DRG: 640 | End: 2022-06-13
Attending: STUDENT IN AN ORGANIZED HEALTH CARE EDUCATION/TRAINING PROGRAM | Admitting: STUDENT IN AN ORGANIZED HEALTH CARE EDUCATION/TRAINING PROGRAM
Payer: MEDICARE

## 2022-06-11 VITALS
WEIGHT: 95.02 LBS | DIASTOLIC BLOOD PRESSURE: 60 MMHG | SYSTOLIC BLOOD PRESSURE: 122 MMHG | HEIGHT: 57 IN | HEART RATE: 76 BPM | RESPIRATION RATE: 18 BRPM | TEMPERATURE: 98 F

## 2022-06-11 DIAGNOSIS — Z90.710 ACQUIRED ABSENCE OF BOTH CERVIX AND UTERUS: Chronic | ICD-10-CM

## 2022-06-11 DIAGNOSIS — Z90.49 ACQUIRED ABSENCE OF OTHER SPECIFIED PARTS OF DIGESTIVE TRACT: Chronic | ICD-10-CM

## 2022-06-11 DIAGNOSIS — Z98.890 OTHER SPECIFIED POSTPROCEDURAL STATES: Chronic | ICD-10-CM

## 2022-06-11 LAB
ALBUMIN SERPL ELPH-MCNC: 3.9 G/DL — SIGNIFICANT CHANGE UP (ref 3.3–5)
ALP SERPL-CCNC: 87 U/L — SIGNIFICANT CHANGE UP (ref 40–120)
ALT FLD-CCNC: 28 U/L — SIGNIFICANT CHANGE UP (ref 10–45)
ANION GAP SERPL CALC-SCNC: 10 MMOL/L — SIGNIFICANT CHANGE UP (ref 5–17)
APTT BLD: 43.2 SEC — HIGH (ref 27.5–35.5)
AST SERPL-CCNC: 41 U/L — HIGH (ref 10–40)
BASOPHILS # BLD AUTO: 0.05 K/UL — SIGNIFICANT CHANGE UP (ref 0–0.2)
BASOPHILS NFR BLD AUTO: 0.8 % — SIGNIFICANT CHANGE UP (ref 0–2)
BILIRUB SERPL-MCNC: 0.6 MG/DL — SIGNIFICANT CHANGE UP (ref 0.2–1.2)
BUN SERPL-MCNC: 16 MG/DL — SIGNIFICANT CHANGE UP (ref 7–23)
CALCIUM SERPL-MCNC: 8.9 MG/DL — SIGNIFICANT CHANGE UP (ref 8.4–10.5)
CHLORIDE SERPL-SCNC: 96 MMOL/L — SIGNIFICANT CHANGE UP (ref 96–108)
CO2 SERPL-SCNC: 24 MMOL/L — SIGNIFICANT CHANGE UP (ref 22–31)
CREAT SERPL-MCNC: 1.04 MG/DL — SIGNIFICANT CHANGE UP (ref 0.5–1.3)
EGFR: 57 ML/MIN/1.73M2 — LOW
EOSINOPHIL # BLD AUTO: 0.1 K/UL — SIGNIFICANT CHANGE UP (ref 0–0.5)
EOSINOPHIL NFR BLD AUTO: 1.6 % — SIGNIFICANT CHANGE UP (ref 0–6)
GLUCOSE SERPL-MCNC: 83 MG/DL — SIGNIFICANT CHANGE UP (ref 70–99)
HCT VFR BLD CALC: 38 % — SIGNIFICANT CHANGE UP (ref 34.5–45)
HGB BLD-MCNC: 12 G/DL — SIGNIFICANT CHANGE UP (ref 11.5–15.5)
IMM GRANULOCYTES NFR BLD AUTO: 0.3 % — SIGNIFICANT CHANGE UP (ref 0–1.5)
INR BLD: 1.71 RATIO — HIGH (ref 0.88–1.16)
LYMPHOCYTES # BLD AUTO: 1.27 K/UL — SIGNIFICANT CHANGE UP (ref 1–3.3)
LYMPHOCYTES # BLD AUTO: 20.4 % — SIGNIFICANT CHANGE UP (ref 13–44)
MCHC RBC-ENTMCNC: 28.3 PG — SIGNIFICANT CHANGE UP (ref 27–34)
MCHC RBC-ENTMCNC: 31.6 GM/DL — LOW (ref 32–36)
MCV RBC AUTO: 89.6 FL — SIGNIFICANT CHANGE UP (ref 80–100)
MONOCYTES # BLD AUTO: 0.62 K/UL — SIGNIFICANT CHANGE UP (ref 0–0.9)
MONOCYTES NFR BLD AUTO: 9.9 % — SIGNIFICANT CHANGE UP (ref 2–14)
NEUTROPHILS # BLD AUTO: 4.18 K/UL — SIGNIFICANT CHANGE UP (ref 1.8–7.4)
NEUTROPHILS NFR BLD AUTO: 67 % — SIGNIFICANT CHANGE UP (ref 43–77)
NRBC # BLD: 0 /100 WBCS — SIGNIFICANT CHANGE UP (ref 0–0)
PLATELET # BLD AUTO: 226 K/UL — SIGNIFICANT CHANGE UP (ref 150–400)
POTASSIUM SERPL-MCNC: 4.2 MMOL/L — SIGNIFICANT CHANGE UP (ref 3.5–5.3)
POTASSIUM SERPL-SCNC: 4.2 MMOL/L — SIGNIFICANT CHANGE UP (ref 3.5–5.3)
PROT SERPL-MCNC: 6.8 G/DL — SIGNIFICANT CHANGE UP (ref 6–8.3)
PROTHROM AB SERPL-ACNC: 19.9 SEC — HIGH (ref 10.5–13.4)
RBC # BLD: 4.24 M/UL — SIGNIFICANT CHANGE UP (ref 3.8–5.2)
RBC # FLD: 15.6 % — HIGH (ref 10.3–14.5)
SODIUM SERPL-SCNC: 130 MMOL/L — LOW (ref 135–145)
TROPONIN T, HIGH SENSITIVITY RESULT: 16 NG/L — SIGNIFICANT CHANGE UP (ref 0–51)
WBC # BLD: 6.24 K/UL — SIGNIFICANT CHANGE UP (ref 3.8–10.5)
WBC # FLD AUTO: 6.24 K/UL — SIGNIFICANT CHANGE UP (ref 3.8–10.5)

## 2022-06-11 PROCEDURE — 70450 CT HEAD/BRAIN W/O DYE: CPT | Mod: 26,MA

## 2022-06-11 PROCEDURE — 71045 X-RAY EXAM CHEST 1 VIEW: CPT | Mod: 26

## 2022-06-11 PROCEDURE — 99285 EMERGENCY DEPT VISIT HI MDM: CPT | Mod: GC

## 2022-06-11 PROCEDURE — 93010 ELECTROCARDIOGRAM REPORT: CPT | Mod: GC

## 2022-06-11 RX ORDER — SODIUM CHLORIDE 9 MG/ML
500 INJECTION INTRAMUSCULAR; INTRAVENOUS; SUBCUTANEOUS ONCE
Refills: 0 | Status: COMPLETED | OUTPATIENT
Start: 2022-06-11 | End: 2022-06-11

## 2022-06-11 RX ORDER — SODIUM CHLORIDE 9 MG/ML
1000 INJECTION INTRAMUSCULAR; INTRAVENOUS; SUBCUTANEOUS ONCE
Refills: 0 | Status: COMPLETED | OUTPATIENT
Start: 2022-06-11 | End: 2022-06-11

## 2022-06-11 RX ADMIN — SODIUM CHLORIDE 1000 MILLILITER(S): 9 INJECTION INTRAMUSCULAR; INTRAVENOUS; SUBCUTANEOUS at 22:33

## 2022-06-11 NOTE — ED PROVIDER NOTE - PRO INTERPRETER NEED 2
English [FreeTextEntry1] : 64-year-old female status post antibiotic treatment for a recent laceration to her left middle finger tip. She is healing well, and I recommend completing the antibiotic course and continuing activity as tolerated.With regards to her paresthesias I recommend repeat EMG she will followup to review the results.

## 2022-06-11 NOTE — ED ADULT TRIAGE NOTE - CHIEF COMPLAINT QUOTE
headache at 1630; took Tylenol; had visual disturbance seeing "dots"; took a nap at 1900; woke up at about 1830 very confused, unable to answer questions; symptoms, including headache, now resolved and oriented x 4

## 2022-06-11 NOTE — ED PROVIDER NOTE - PHYSICAL EXAMINATION
GENERAL: Awake, alert, NAD  HEENT: NC/AT, moist mucous membranes, PERRL, EOMI  LUNGS: CTAB, no wheezes or crackles   CARDIAC: RRR, no m/r/g  ABDOMEN: Soft, normal BS, non tender, non distended, no rebound, no guarding  BACK: No midline spinal tenderness, no CVA tenderness  EXT: No edema, no calf tenderness, 2+ DP pulses bilaterally, no deformities.  NEURO: A&Ox3. 5/5 strength UE and LE bilaterally, sensation intact. CN III-XII grossly intact. Normal cerebellar exam, normal gait. No facial droop or slurred speech.   SKIN: Warm and dry. No rash.  PSYCH: Normal affect.

## 2022-06-11 NOTE — ED PROVIDER NOTE - CLINICAL SUMMARY MEDICAL DECISION MAKING FREE TEXT BOX
antonio 71 f ho ileostomy ho tramatic sdh today inc fatigue went to take a nap when awoke- confusion no weakness no numbness mild ha and bl spots in vision ho occular migraine in past w similar presentation but wo confusion no at baseline -- no droop or drift nml fs, strngth 5/5 ue le no field cut - no ha currently- abd soft - no urinary freq -- symptoms more concerning for metabolic infectious etiologt low suspcion for stroke symptroms - code stroke cancelled -- pt to be eval  further - will consider ct head despite no lateral findings w ho sdh in past

## 2022-06-11 NOTE — ED PROVIDER NOTE - OBJECTIVE STATEMENT
71 year old F with PMH hypothyroidism, traumatic subdural hematoma (11/21 resolved), hypotension, SBO with ileostomy, sjogrens, OP, GERD, tremors presenting with AMS. Patient took nap. Woke up around 6pm, felt confused, had a headache, bilateral floaters in eyes. All now resolved. No falls. Takes daily baby aspirin. No associate word finding difficulty, slurred speech, facial droop, UE or LE weakness or paresthesias. Denies CP, SOB, palpitations, abdominal pain, fevers, N/V/D. Code stroke called and cancelled. History of ocular migraines.

## 2022-06-11 NOTE — ED ADULT NURSE NOTE - OBJECTIVE STATEMENT
70 y/o female coming to the ER with c/o AMS. A&Ox4. Ambulatory. PMH ileostomy 2017 due to SOB, ocular migraines. Patient endorses around 6:30pm tonight she began to have a headache as well as "spots in her vision". Patient endorses taking tylenol which improved the headache and she states the "spots" in her vision have gone away. Patient endorses these symptoms also occur when she has ocular migraines, patient endorses the AMS during the episode was new as per her  she was altered and unable to use her cell phone. Code stroke initiated on arrival and cancelled by MD Steiner. JERRY. Patient ambulatory with a steady gait. Speech is clear. No facial droop. Patient moving all extremities. Patient denies chest pain, fever, chills, n/v/d, urinary symptoms, abdominal pain. Safety measures maintained. Bed in the lowest position. Call bell within reach.  Provider at the bedside. No acute distress noted or further complaints at this time.

## 2022-06-11 NOTE — ED PROVIDER NOTE - CARE PLAN
Principal Discharge DX:	AMS (altered mental status)   1 Principal Discharge DX:	AMS (altered mental status)  Secondary Diagnosis:	Hyponatremia

## 2022-06-12 ENCOUNTER — TRANSCRIPTION ENCOUNTER (OUTPATIENT)
Age: 72
End: 2022-06-12

## 2022-06-12 DIAGNOSIS — G93.41 METABOLIC ENCEPHALOPATHY: ICD-10-CM

## 2022-06-12 DIAGNOSIS — G43.109 MIGRAINE WITH AURA, NOT INTRACTABLE, WITHOUT STATUS MIGRAINOSUS: ICD-10-CM

## 2022-06-12 DIAGNOSIS — E03.9 HYPOTHYROIDISM, UNSPECIFIED: ICD-10-CM

## 2022-06-12 DIAGNOSIS — R41.82 ALTERED MENTAL STATUS, UNSPECIFIED: ICD-10-CM

## 2022-06-12 DIAGNOSIS — M35.00 SJOGREN SYNDROME, UNSPECIFIED: ICD-10-CM

## 2022-06-12 LAB
ANION GAP SERPL CALC-SCNC: 9 MMOL/L — SIGNIFICANT CHANGE UP (ref 5–17)
APPEARANCE UR: CLEAR — SIGNIFICANT CHANGE UP
BACTERIA # UR AUTO: NEGATIVE — SIGNIFICANT CHANGE UP
BILIRUB UR-MCNC: NEGATIVE — SIGNIFICANT CHANGE UP
BUN SERPL-MCNC: 11 MG/DL — SIGNIFICANT CHANGE UP (ref 7–23)
CALCIUM SERPL-MCNC: 8.3 MG/DL — LOW (ref 8.4–10.5)
CHLORIDE SERPL-SCNC: 102 MMOL/L — SIGNIFICANT CHANGE UP (ref 96–108)
CO2 SERPL-SCNC: 24 MMOL/L — SIGNIFICANT CHANGE UP (ref 22–31)
COLOR SPEC: SIGNIFICANT CHANGE UP
CREAT SERPL-MCNC: 0.94 MG/DL — SIGNIFICANT CHANGE UP (ref 0.5–1.3)
CULTURE RESULTS: SIGNIFICANT CHANGE UP
DIFF PNL FLD: NEGATIVE — SIGNIFICANT CHANGE UP
EGFR: 64 ML/MIN/1.73M2 — SIGNIFICANT CHANGE UP
EPI CELLS # UR: 1 /HPF — SIGNIFICANT CHANGE UP
GLUCOSE SERPL-MCNC: 78 MG/DL — SIGNIFICANT CHANGE UP (ref 70–99)
GLUCOSE UR QL: NEGATIVE — SIGNIFICANT CHANGE UP
KETONES UR-MCNC: NEGATIVE — SIGNIFICANT CHANGE UP
LEUKOCYTE ESTERASE UR-ACNC: ABNORMAL
NITRITE UR-MCNC: NEGATIVE — SIGNIFICANT CHANGE UP
OSMOLALITY SERPL: 281 MOSMOL/KG — SIGNIFICANT CHANGE UP (ref 280–301)
OSMOLALITY UR: 148 MOS/KG — LOW (ref 300–900)
PH UR: 6.5 — SIGNIFICANT CHANGE UP (ref 5–8)
POTASSIUM SERPL-MCNC: 4.2 MMOL/L — SIGNIFICANT CHANGE UP (ref 3.5–5.3)
POTASSIUM SERPL-SCNC: 4.2 MMOL/L — SIGNIFICANT CHANGE UP (ref 3.5–5.3)
PROT UR-MCNC: NEGATIVE — SIGNIFICANT CHANGE UP
RAPID RVP RESULT: SIGNIFICANT CHANGE UP
RBC CASTS # UR COMP ASSIST: 0 /HPF — SIGNIFICANT CHANGE UP (ref 0–4)
SARS-COV-2 RNA SPEC QL NAA+PROBE: SIGNIFICANT CHANGE UP
SODIUM SERPL-SCNC: 135 MMOL/L — SIGNIFICANT CHANGE UP (ref 135–145)
SODIUM UR-SCNC: 12 MMOL/L — SIGNIFICANT CHANGE UP
SODIUM UR-SCNC: 6 MMOL/L — SIGNIFICANT CHANGE UP
SP GR SPEC: 1.01 — LOW (ref 1.01–1.02)
SPECIMEN SOURCE: SIGNIFICANT CHANGE UP
TROPONIN T, HIGH SENSITIVITY RESULT: 17 NG/L — SIGNIFICANT CHANGE UP (ref 0–51)
UROBILINOGEN FLD QL: NEGATIVE — SIGNIFICANT CHANGE UP
WBC UR QL: 2 /HPF — SIGNIFICANT CHANGE UP (ref 0–5)

## 2022-06-12 PROCEDURE — 99223 1ST HOSP IP/OBS HIGH 75: CPT

## 2022-06-12 RX ORDER — BENZOYL PEROXIDE MICRONIZED 5.8 %
1 TOWELETTE (EA) TOPICAL
Qty: 0 | Refills: 0 | DISCHARGE

## 2022-06-12 RX ORDER — DIPHENOXYLATE HCL/ATROPINE 2.5-.025MG
1 TABLET ORAL
Qty: 0 | Refills: 0 | DISCHARGE

## 2022-06-12 RX ORDER — MAGNESIUM OXIDE 400 MG ORAL TABLET 241.3 MG
1 TABLET ORAL
Qty: 0 | Refills: 0 | DISCHARGE

## 2022-06-12 RX ORDER — LANOLIN ALCOHOL/MO/W.PET/CERES
3 CREAM (GRAM) TOPICAL AT BEDTIME
Refills: 0 | Status: DISCONTINUED | OUTPATIENT
Start: 2022-06-12 | End: 2022-06-13

## 2022-06-12 RX ORDER — CHOLECALCIFEROL (VITAMIN D3) 125 MCG
1 CAPSULE ORAL
Qty: 0 | Refills: 0 | DISCHARGE

## 2022-06-12 RX ORDER — LEVOTHYROXINE SODIUM 125 MCG
1 TABLET ORAL
Qty: 0 | Refills: 0 | DISCHARGE

## 2022-06-12 RX ORDER — ZINC SULFATE TAB 220 MG (50 MG ZINC EQUIVALENT) 220 (50 ZN) MG
0 TAB ORAL
Qty: 0 | Refills: 0 | DISCHARGE

## 2022-06-12 RX ORDER — ACETAMINOPHEN 500 MG
650 TABLET ORAL EVERY 6 HOURS
Refills: 0 | Status: DISCONTINUED | OUTPATIENT
Start: 2022-06-12 | End: 2022-06-13

## 2022-06-12 RX ORDER — ONDANSETRON 8 MG/1
4 TABLET, FILM COATED ORAL EVERY 8 HOURS
Refills: 0 | Status: DISCONTINUED | OUTPATIENT
Start: 2022-06-12 | End: 2022-06-13

## 2022-06-12 RX ORDER — NYSTATIN CREAM 100000 [USP'U]/G
1 CREAM TOPICAL
Qty: 0 | Refills: 0 | DISCHARGE

## 2022-06-12 RX ORDER — FAMOTIDINE 10 MG/ML
1 INJECTION INTRAVENOUS
Qty: 0 | Refills: 0 | DISCHARGE

## 2022-06-12 RX ORDER — PREGABALIN 225 MG/1
1 CAPSULE ORAL
Qty: 0 | Refills: 0 | DISCHARGE

## 2022-06-12 RX ORDER — LANOLIN ALCOHOL/MO/W.PET/CERES
1 CREAM (GRAM) TOPICAL
Qty: 0 | Refills: 0 | DISCHARGE

## 2022-06-12 RX ORDER — ESOMEPRAZOLE MAGNESIUM 40 MG/1
1 CAPSULE, DELAYED RELEASE ORAL
Qty: 0 | Refills: 0 | DISCHARGE

## 2022-06-12 RX ADMIN — SODIUM CHLORIDE 500 MILLILITER(S): 9 INJECTION INTRAMUSCULAR; INTRAVENOUS; SUBCUTANEOUS at 00:23

## 2022-06-12 NOTE — PATIENT PROFILE ADULT - FALL HARM RISK - UNIVERSAL INTERVENTIONS
Bed in lowest position, wheels locked, appropriate side rails in place/Call bell, personal items and telephone in reach/Instruct patient to call for assistance before getting out of bed or chair/Non-slip footwear when patient is out of bed/[place] to call system/Physically safe environment - no spills, clutter or unnecessary equipment/Purposeful Proactive Rounding/Room/bathroom lighting operational, light cord in reach

## 2022-06-12 NOTE — H&P ADULT - HISTORY OF PRESENT ILLNESS
71f hx sjogrens syndrome, prior hysterectomy complicated by SBO requiring bowel resection and ileostomy, ocular migraines, prior traumtic SDH now resolved presenting after an episode of disorientation, eye 'floaters,' and mild confusion after awaking from an afternoon nap yesterday. States she experienced ocular migraine yesterday so rested her eyes, 25 minutes later woke up but was confused.  got concerned so brought her to hospital. No focal neurologic weakness/slurred speech. States she had noticed increased ostomy output for the last couple days, unclear reason as to why. Notes no recent decrease in appetite, no abd pain/n/v, no urinary symptoms, no fevers/chils. No recent travel or sick contacts.     In ED: na found to be 130, was given total of 1.5 L NS

## 2022-06-12 NOTE — H&P ADULT - ASSESSMENT
71f hx sjogrens syndrome, hypothyroidism prior hysterectomy complicated by SBO requiring bowel resection and ileostomy, ocular migraines, prior traumtic SDH now resolved presenting after an episode of disorientation

## 2022-06-12 NOTE — ED ADULT NURSE REASSESSMENT NOTE - NS ED NURSE REASSESS COMMENT FT1
Pt A+Ox3, VSS, states improvement in symptoms since ED arrival. Blood work and urine sample collected and sent to lab, IVF infusing. Pt resting comfortably in stretcher, family at bedside, and aware of plan of care; pending lab and CT results.
Pt A+Ox3, aware of plan of care. Admitted to medicine for hyponatremia; pending admission bed.
Received pt from Ilana Villalta. pt pending admission. Will continue to monitor.

## 2022-06-12 NOTE — H&P ADULT - NSHPLABSRESULTS_GEN_ALL_CORE
.  LABS:                         12.0   6.24  )-----------( 226      ( 2022 21:35 )             38.0     06-12    135  |  102  |  11  ----------------------------<  78  4.2   |  24  |  0.94    Ca    8.3<L>      2022 13:19    TPro  6.8  /  Alb  3.9  /  TBili  0.6  /  DBili  x   /  AST  41<H>  /  ALT  28  /  AlkPhos  87  06-11    PT/INR - ( 2022 21:35 )   PT: 19.9 sec;   INR: 1.71 ratio         PTT - ( 2022 21:35 )  PTT:43.2 sec  Urinalysis Basic - ( 2022 00:47 )    Color: Light Yellow / Appearance: Clear / S.008 / pH: x  Gluc: x / Ketone: Negative  / Bili: Negative / Urobili: Negative   Blood: x / Protein: Negative / Nitrite: Negative   Leuk Esterase: Small / RBC: 0 /hpf / WBC 2 /HPF   Sq Epi: x / Non Sq Epi: 1 /hpf / Bacteria: Negative            RADIOLOGY, EKG & ADDITIONAL TESTS: Reviewed.   EKG personally reviewed: NSR, low voltage  CXR personaly reviewed: clear lungs

## 2022-06-12 NOTE — H&P ADULT - PROBLEM SELECTOR PLAN 4
Resolved- continue monitoring  resume OP f/u      Plan discussed with and approved by patient and  at bedside.  dispo: anticipate home tomorrow if remains improved

## 2022-06-12 NOTE — H&P ADULT - PROBLEM SELECTOR PLAN 1
Likely 2/2 hyponatremia- suspect hypovolemic given recent increased ileostomy output and low urine Na vs. less likely low solute intake or SIADH. CTH negative. Neuro exam nonfocal. Patient fully back to baseline.   - Na now normal after 1.5 L fluids in ED  - monitor strict i/o- patient advised to track i/o at home, including ileostomy output  - ileostomy output was increased per patient for a few days, now appears to be back to normal, low suspicion for acute GI pathology  - reasonable to check serum osm, urine osm/na to confirm  - pt offered discharge today however will stay overnight and plan for dc tomorrow if Na remains stable

## 2022-06-13 ENCOUNTER — TRANSCRIPTION ENCOUNTER (OUTPATIENT)
Age: 72
End: 2022-06-13

## 2022-06-13 VITALS
OXYGEN SATURATION: 97 % | TEMPERATURE: 98 F | DIASTOLIC BLOOD PRESSURE: 62 MMHG | SYSTOLIC BLOOD PRESSURE: 103 MMHG | RESPIRATION RATE: 18 BRPM | HEART RATE: 74 BPM

## 2022-06-13 LAB
ALBUMIN SERPL ELPH-MCNC: 3.3 G/DL — SIGNIFICANT CHANGE UP (ref 3.3–5)
ALP SERPL-CCNC: 71 U/L — SIGNIFICANT CHANGE UP (ref 40–120)
ALT FLD-CCNC: 22 U/L — SIGNIFICANT CHANGE UP (ref 10–45)
ANION GAP SERPL CALC-SCNC: 9 MMOL/L — SIGNIFICANT CHANGE UP (ref 5–17)
AST SERPL-CCNC: 30 U/L — SIGNIFICANT CHANGE UP (ref 10–40)
BASOPHILS # BLD AUTO: 0.03 K/UL — SIGNIFICANT CHANGE UP (ref 0–0.2)
BASOPHILS NFR BLD AUTO: 0.5 % — SIGNIFICANT CHANGE UP (ref 0–2)
BILIRUB SERPL-MCNC: 0.6 MG/DL — SIGNIFICANT CHANGE UP (ref 0.2–1.2)
BUN SERPL-MCNC: 14 MG/DL — SIGNIFICANT CHANGE UP (ref 7–23)
CALCIUM SERPL-MCNC: 8.8 MG/DL — SIGNIFICANT CHANGE UP (ref 8.4–10.5)
CHLORIDE SERPL-SCNC: 105 MMOL/L — SIGNIFICANT CHANGE UP (ref 96–108)
CO2 SERPL-SCNC: 24 MMOL/L — SIGNIFICANT CHANGE UP (ref 22–31)
CREAT SERPL-MCNC: 1.2 MG/DL — SIGNIFICANT CHANGE UP (ref 0.5–1.3)
EGFR: 48 ML/MIN/1.73M2 — LOW
EOSINOPHIL # BLD AUTO: 0.18 K/UL — SIGNIFICANT CHANGE UP (ref 0–0.5)
EOSINOPHIL NFR BLD AUTO: 3.3 % — SIGNIFICANT CHANGE UP (ref 0–6)
GLUCOSE SERPL-MCNC: 77 MG/DL — SIGNIFICANT CHANGE UP (ref 70–99)
HCT VFR BLD CALC: 34.7 % — SIGNIFICANT CHANGE UP (ref 34.5–45)
HGB BLD-MCNC: 11 G/DL — LOW (ref 11.5–15.5)
IMM GRANULOCYTES NFR BLD AUTO: 0.2 % — SIGNIFICANT CHANGE UP (ref 0–1.5)
LYMPHOCYTES # BLD AUTO: 1.27 K/UL — SIGNIFICANT CHANGE UP (ref 1–3.3)
LYMPHOCYTES # BLD AUTO: 23.2 % — SIGNIFICANT CHANGE UP (ref 13–44)
MCHC RBC-ENTMCNC: 28.3 PG — SIGNIFICANT CHANGE UP (ref 27–34)
MCHC RBC-ENTMCNC: 31.7 GM/DL — LOW (ref 32–36)
MCV RBC AUTO: 89.2 FL — SIGNIFICANT CHANGE UP (ref 80–100)
MONOCYTES # BLD AUTO: 0.49 K/UL — SIGNIFICANT CHANGE UP (ref 0–0.9)
MONOCYTES NFR BLD AUTO: 8.9 % — SIGNIFICANT CHANGE UP (ref 2–14)
NEUTROPHILS # BLD AUTO: 3.5 K/UL — SIGNIFICANT CHANGE UP (ref 1.8–7.4)
NEUTROPHILS NFR BLD AUTO: 63.9 % — SIGNIFICANT CHANGE UP (ref 43–77)
NRBC # BLD: 0 /100 WBCS — SIGNIFICANT CHANGE UP (ref 0–0)
PLATELET # BLD AUTO: 192 K/UL — SIGNIFICANT CHANGE UP (ref 150–400)
POTASSIUM SERPL-MCNC: 4.5 MMOL/L — SIGNIFICANT CHANGE UP (ref 3.5–5.3)
POTASSIUM SERPL-SCNC: 4.5 MMOL/L — SIGNIFICANT CHANGE UP (ref 3.5–5.3)
PROT SERPL-MCNC: 5.7 G/DL — LOW (ref 6–8.3)
RBC # BLD: 3.89 M/UL — SIGNIFICANT CHANGE UP (ref 3.8–5.2)
RBC # FLD: 15.9 % — HIGH (ref 10.3–14.5)
SODIUM SERPL-SCNC: 138 MMOL/L — SIGNIFICANT CHANGE UP (ref 135–145)
TSH SERPL-MCNC: 2.46 UIU/ML — SIGNIFICANT CHANGE UP (ref 0.27–4.2)
WBC # BLD: 5.48 K/UL — SIGNIFICANT CHANGE UP (ref 3.8–10.5)
WBC # FLD AUTO: 5.48 K/UL — SIGNIFICANT CHANGE UP (ref 3.8–10.5)

## 2022-06-13 PROCEDURE — 85025 COMPLETE CBC W/AUTO DIFF WBC: CPT

## 2022-06-13 PROCEDURE — 36415 COLL VENOUS BLD VENIPUNCTURE: CPT

## 2022-06-13 PROCEDURE — 85730 THROMBOPLASTIN TIME PARTIAL: CPT

## 2022-06-13 PROCEDURE — 0225U NFCT DS DNA&RNA 21 SARSCOV2: CPT

## 2022-06-13 PROCEDURE — 83930 ASSAY OF BLOOD OSMOLALITY: CPT

## 2022-06-13 PROCEDURE — 87086 URINE CULTURE/COLONY COUNT: CPT

## 2022-06-13 PROCEDURE — 99285 EMERGENCY DEPT VISIT HI MDM: CPT | Mod: 25

## 2022-06-13 PROCEDURE — 80053 COMPREHEN METABOLIC PANEL: CPT

## 2022-06-13 PROCEDURE — 81001 URINALYSIS AUTO W/SCOPE: CPT

## 2022-06-13 PROCEDURE — 84484 ASSAY OF TROPONIN QUANT: CPT

## 2022-06-13 PROCEDURE — 83935 ASSAY OF URINE OSMOLALITY: CPT

## 2022-06-13 PROCEDURE — 99239 HOSP IP/OBS DSCHRG MGMT >30: CPT

## 2022-06-13 PROCEDURE — 71045 X-RAY EXAM CHEST 1 VIEW: CPT

## 2022-06-13 PROCEDURE — 84300 ASSAY OF URINE SODIUM: CPT

## 2022-06-13 PROCEDURE — 80048 BASIC METABOLIC PNL TOTAL CA: CPT

## 2022-06-13 PROCEDURE — 84443 ASSAY THYROID STIM HORMONE: CPT

## 2022-06-13 PROCEDURE — 85610 PROTHROMBIN TIME: CPT

## 2022-06-13 PROCEDURE — 82962 GLUCOSE BLOOD TEST: CPT

## 2022-06-13 PROCEDURE — 70450 CT HEAD/BRAIN W/O DYE: CPT | Mod: MA

## 2022-06-13 RX ORDER — FAMOTIDINE 10 MG/ML
1 INJECTION INTRAVENOUS
Qty: 0 | Refills: 0 | DISCHARGE

## 2022-06-13 NOTE — PROGRESS NOTE ADULT - ASSESSMENT
71f hx sjogrens syndrome, hypothyroidism prior hysterectomy complicated by SBO requiring bowel resection and ileostomy, ocular migraines, prior traumtic SDH now resolved presenting after an episode of disorientation in the setting of hyponatremia 2/2 to increased ostomy output now resolved.

## 2022-06-13 NOTE — PROGRESS NOTE ADULT - SUBJECTIVE AND OBJECTIVE BOX
SUBJECTIVE / OVERNIGHT EVENTS:  - No acute events overnight  - Patient seen and examined at bedside  - Denies any cp, sob, abdominal pain, nausea or vomiting  - Ostomy output overall has decreased since hospitalization  - Had questions regards "taking something" for her low sodium; had a brief discussion of role for salt tabs but at this time suggested to hold off and follow up with PCP    ADDITIONAL REVIEW OF SYSTEMS:    MEDICATIONS  (STANDING):    MEDICATIONS  (PRN):  acetaminophen     Tablet .. 650 milliGRAM(s) Oral every 6 hours PRN Temp greater or equal to 38C (100.4F), Mild Pain (1 - 3)  aluminum hydroxide/magnesium hydroxide/simethicone Suspension 30 milliLiter(s) Oral every 4 hours PRN Dyspepsia  melatonin 3 milliGRAM(s) Oral at bedtime PRN Insomnia  ondansetron Injectable 4 milliGRAM(s) IV Push every 8 hours PRN Nausea and/or Vomiting      I&O's Summary    2022 07:01  -  2022 07:00  --------------------------------------------------------  IN: 0 mL / OUT: 2000 mL / NET: -2000 mL        PHYSICAL EXAM:  Vital Signs Last 24 Hrs  T(C): 36.7 (2022 12:04), Max: 36.8 (2022 21:42)  T(F): 98 (2022 12:04), Max: 98.3 (2022 21:42)  HR: 74 (2022 12:04) (65 - 74)  BP: 103/62 (2022 12:04) (101/66 - 109/63)  BP(mean): 77 (2022 14:16) (77 - 77)  RR: 18 (2022 12:04) (18 - 18)  SpO2: 97% (2022 12:04) (95% - 97%)    CONSTITUTIONAL: NAD, well-developed, well-groomed  EYES: conjunctiva and sclera clear  ENMT: Moist oral mucosa, no pharyngeal injection or exudates; normal dentition  NECK: Supple  RESPIRATORY: Normal respiratory effort; lungs are clear to auscultation bilaterally  CARDIOVASCULAR: Regular rate and rhythm, normal S1 and S2, no murmur/rub/gallop; No lower extremity edema; Peripheral pulses are 2+ bilaterally  ABDOMEN: Nontender to palpation, normoactive bowel sounds, no rebound/guarding; +ostomy with yellow/brown stool, no blood seen   MUSCULOSKELETAL:   no clubbing or cyanosis of digits; no joint swelling or tenderness to palpation  PSYCH: A+O to person, place, and time; affect appropriate  NEUROLOGY: no focal deficits   SKIN: No rashes; no palpable lesions    LABS:                        11.0   5.48  )-----------( 192      ( 2022 07:14 )             34.7     06-13    138  |  105  |  14  ----------------------------<  77  4.5   |  24  |  1.20    Ca    8.8      2022 07:14    TPro  5.7<L>  /  Alb  3.3  /  TBili  0.6  /  DBili  x   /  AST  30  /  ALT  22  /  AlkPhos  71  06-13    PT/INR - ( 2022 21:35 )   PT: 19.9 sec;   INR: 1.71 ratio         PTT - ( 2022 21:35 )  PTT:43.2 sec      Urinalysis Basic - ( 2022 00:47 )    Color: Light Yellow / Appearance: Clear / S.008 / pH: x  Gluc: x / Ketone: Negative  / Bili: Negative / Urobili: Negative   Blood: x / Protein: Negative / Nitrite: Negative   Leuk Esterase: Small / RBC: 0 /hpf / WBC 2 /HPF   Sq Epi: x / Non Sq Epi: 1 /hpf / Bacteria: Negative        Culture - Urine (collected 2022 00:47)  Source: Clean Catch Clean Catch (Midstream)  Final Report (2022 22:36):    <10,000 CFU/mL Normal Urogenital Rachana        RADIOLOGY & ADDITIONAL TESTS:  Results Reviewed:   Imaging Personally Reviewed:  Electrocardiogram Personally Reviewed:    COORDINATION OF CARE:  Care Discussed with Consultants/Other Providers [Y/N]:  Prior or Outpatient Records Reviewed [Y/N]:

## 2022-06-13 NOTE — DISCHARGE NOTE NURSING/CASE MANAGEMENT/SOCIAL WORK - PATIENT PORTAL LINK FT
Kourtney You can access the FollowMyHealth Patient Portal offered by [facility] by registering at the following website: [email or web address]. By joining [employer] [facility]’s FollowMyHealth portal, you will also be able to view your health information using other applications (apps) compatible with our system.

## 2022-06-13 NOTE — DISCHARGE NOTE NURSING/CASE MANAGEMENT/SOCIAL WORK - NSDCPEFALRISK_GEN_ALL_CORE
For information on Fall & Injury Prevention, visit: [email or web address] OR  [email or web address] OR  https://www.cdc.gov/steadi/patient.html

## 2022-06-13 NOTE — DISCHARGE NOTE PROVIDER - NSDCCPCAREPLAN_GEN_ALL_CORE_FT
PRINCIPAL DISCHARGE DIAGNOSIS  Diagnosis: AMS (altered mental status)  Assessment and Plan of Treatment: Resolved  Follow-up with your Primary Care Doctor      SECONDARY DISCHARGE DIAGNOSES  Diagnosis: Hyponatremia  Assessment and Plan of Treatment: Resolved  6/13/22 sodium level 138  Follow-up with your Primary Care Doctor

## 2022-06-13 NOTE — DISCHARGE NOTE PROVIDER - CARE PROVIDER_API CALL
Lee Coley  INTERNAL MEDICINE  [address], [address]  [place] [address]  Phone: [phone]  Fax: [phone]  Follow Up Time: 1 week

## 2022-06-13 NOTE — CHART NOTE - NSCHARTNOTEFT_GEN_A_CORE
Discharge Note:    V/s, labs, diagnostics reviewed, pt stable to d/c now.  Na today 138. Pt ambulating and requesting to be discharged.  Dr. Alanis medically cleared w/ f/u as directed.   Please refer to d/c note for hospital details.    Guerda Corona, NP-c  [phone]

## 2022-06-13 NOTE — DISCHARGE NOTE NURSING/CASE MANAGEMENT/SOCIAL WORK - NSDCVIVACCINE_GEN_ALL_CORE_FT
Tdap; 04-Aug-2019 14:09; Rakha, Rozina (RN); [employer]; [id number] (Exp. Date: 06-Aug-2021); IntraMuscular; Deltoid Left.; 0.5 milliLiter(s); VIS (VIS Published: 09-May-2013, VIS Presented: 04-Aug-2019);   Tdap; 04-Nov-2021 21:39; Karie Woody (NATALYA); [employer]; [id number] (Exp. Date: 29-Jul-2023); IntraMuscular; Deltoid Left.; 0.5 milliLiter(s); VIS (VIS Published: 09-May-2013, VIS Presented: 04-Nov-2021);

## 2022-06-13 NOTE — PROGRESS NOTE ADULT - PROBLEM SELECTOR PLAN 1
Likely 2/2 hyponatremia- suspect hypovolemic given recent increased ileostomy output and low urine Na vs. less likely low solute intake or SIADH. Normal serum osm with low urine osm with low urine osm CTH negative. Neuro exam nonfocal. Patient fully back to baseline.   - Na now normal after 1.5 L fluids in ED  - monitor strict i/o- patient advised to track i/o at home, including ileostomy output  - ileostomy output was increased per patient for a few days, now appears to be back to normal, low suspicion for acute GI pathology

## 2022-06-13 NOTE — DISCHARGE NOTE PROVIDER - NSDCMRMEDTOKEN_GEN_ALL_CORE_FT
aspirin 81 mg oral delayed release tablet: 1 tab(s) orally once a day  Citracal Petites 200 mg-6.25 mcg (250 intl units) oral tablet: 2 tab(s) orally 2 times a day  CoQ10: once a day  Fish Oil 1000 mg oral capsule: 1 cap(s) orally once a day  gabapentin 100 mg oral capsule: 1 cap(s) orally once a day  iron: 30 mg twice daily  magnesium oxide 400 mg oral tablet: 1 tab(s) orally 5 times a day  Melatonin 5 mg oral tablet: 1 tab(s) orally once a day (at bedtime)  NexIUM 40 mg oral delayed release capsule: 1 cap(s) orally once a day  Pepcid 20 mg oral tablet: 1 tab(s) orally once a day  Prolia 60 mg/mL subcutaneous solution:   rosuvastatin 20 mg oral tablet: 1 tab(s) orally once a day  Synthroid 88 mcg (0.088 mg) oral tablet: 1 tab(s) orally once a day  Vitamin B12: 1000mcg with folic acid  Vitamin D3 125 mcg/mL (5000 intl units/mL) oral liquid: 2 milliliter(s) orally once a day  Zinc 140 mg (as elemental zinc 50 mg) oral tablet: 1 tab(s) orally once a day

## 2022-06-13 NOTE — DISCHARGE NOTE PROVIDER - NSDCFUSCHEDAPPT_GEN_ALL_CORE_FT
Lasha Medrano  [facility] Physician [facility]  GENSURG [address]  Scheduled Appointment: 06/16/2022    Lee Coley  [facility]  INTMED [address]  Scheduled Appointment: 07/07/2022    Ashley Canchola  [facility]  ORTHOSURG [address]  Scheduled Appointment: 07/18/2022

## 2022-06-13 NOTE — DISCHARGE NOTE PROVIDER - HOSPITAL COURSE
71f hx sjogrens syndrome, hypothyroidism prior hysterectomy complicated by SBO requiring bowel resection and ileostomy, ocular migraines, prior traumtic SDH now resolved presenting after an episode of disorientation     Problem/Plan - 1:  ·  Problem: Metabolic encephalopathy.   ·  Plan: Likely 2/2 hyponatremia- suspect hypovolemic given recent increased ileostomy output and low urine Na vs. less likely low solute intake or SIADH. CTH negative. Neuro exam nonfocal. Patient fully back to baseline.   - Na now normal after 1.5 L fluids in ED  - monitor strict i/o- patient advised to track i/o at home, including ileostomy output  - ileostomy output was increased per patient for a few days, now appears to be back to normal, low suspicion for acute GI pathology  - reasonable to check serum osm, urine osm/na to confirm  - Na today WNL      Problem/Plan - 2:  ·  Problem: Hypothyroidism.   ·  Plan: Check TSH  - continue home synthroid.     Problem/Plan - 3:  ·  Problem: Sjogren's disease.   ·  Plan: Stable, not on any disease modifying therapy  - resume f/u with Dr. Pearson.     Problem/Plan - 4:  ·  Problem: Ocular migraine.   ·  Plan: Resolved- continue monitoring  resume OP f/u       71f hx sjogrens syndrome, hypothyroidism prior hysterectomy complicated by SBO requiring bowel resection and ileostomy, ocular migraines, prior traumtic SDH now resolved presenting after an episode of disorientation     Problem/Plan - 1:  ·  Problem: Metabolic encephalopathy.   ·  Plan: Likely 2/2 hyponatremia- suspect hypovolemic given recent increased ileostomy output and low urine Na vs. less likely low solute intake or SIADH. CTH negative. Neuro exam nonfocal. Patient fully back to baseline.   - Na now normal after 1.5 L fluids in ED  - monitor strict i/o- patient advised to track i/o at home, including ileostomy output  - ileostomy output was increased per patient for a few days, now appears to be back to normal, low suspicion for acute GI pathology  - reasonable to check serum osm, urine osm/na to confirm  - Na 138 (6/13/22)      Problem/Plan - 2:  ·  Problem: Hypothyroidism.   ·  Plan: Check TSH  - continue home synthroid.     Problem/Plan - 3:  ·  Problem: Sjogren's disease.   ·  Plan: Stable, not on any disease modifying therapy  - resume f/u with Dr. Pearson.     Problem/Plan - 4:  ·  Problem: Ocular migraine.   ·  Plan: Resolved- continue monitoring  resume OP f/u       71f hx sjogrens syndrome, hypothyroidism prior hysterectomy complicated by SBO requiring bowel resection and ileostomy, ocular migraines, prior traumtic SDH now resolved presenting after an episode of disorientation     Problem/Plan - 1:  ·  Problem: Metabolic encephalopathy.   ·  Plan: Likely 2/2 hyponatremia- suspect hypovolemic given recent increased ileostomy output and low urine Na vs. less likely low solute intake or SIADH. CTH negative. Neuro exam nonfocal. Patient fully back to baseline.   - Na now normal after 1.5 L fluids in ED  - monitor strict i/o- patient advised to track i/o at home, including ileostomy output  - ileostomy output was increased per patient for a few days, now appears to be back to normal, low suspicion for acute GI pathology  - reasonable to check serum osm, urine osm/na to confirm  - Na 138 (6/13/22)      Problem/Plan - 2:  ·  Problem: Hypothyroidism.   ·  Plan: Check TSH  - continue home synthroid.     Problem/Plan - 3:  ·  Problem: Sjogren's disease.   ·  Plan: Stable, not on any disease modifying therapy  - resume f/u with Dr. Pearson.     Problem/Plan - 4:  ·  Problem: Ocular migraine.   ·  Plan: Resolved- continue monitoring  resume OP f/u    Medically Cleared Discharge 71f hx sjogrens syndrome, hypothyroidism prior hysterectomy complicated by SBO requiring bowel resection and ileostomy, ocular migraines, prior traumtic SDH now resolved presenting after an episode of disorientation     Problem/Plan - 1:  ·  Problem: Metabolic encephalopathy.   ·  Plan: Likely 2/2 hyponatremia- suspect hypovolemic given recent increased ileostomy output and low urine Na vs. less likely low solute intake or SIADH. CTH negative. Neuro exam nonfocal. Patient fully back to baseline.   - Na now normal after 1.5 L fluids in ED  - monitor strict i/o- patient advised to track i/o at home, including ileostomy output  - ileostomy output was increased per patient for a few days, now appears to be back to normal, low suspicion for acute GI pathology  - reasonable to check serum osm, urine osm/na to confirm  - Na 138 (6/13/22)      Problem/Plan - 2:  ·  Problem: Hypothyroidism.   ·  Plan: Check TSH  - continue home synthroid.     Problem/Plan - 3:  ·  Problem: Sjogren's disease.   ·  Plan: Stable, not on any disease modifying therapy  - resume f/u with Dr. Pearson.     Problem/Plan - 4:  ·  Problem: Ocular migraine.   ·  Plan: Resolved- continue monitoring  resume OP f/u    Medically Cleared Discharge with outpatient follow-up

## 2022-06-16 ENCOUNTER — APPOINTMENT (OUTPATIENT)
Dept: SURGERY | Facility: CLINIC | Age: 72
End: 2022-06-16
Payer: MEDICARE

## 2022-06-16 DIAGNOSIS — R60.9 EDEMA, UNSPECIFIED: ICD-10-CM

## 2022-06-16 PROCEDURE — 99213 OFFICE O/P EST LOW 20 MIN: CPT

## 2022-06-16 RX ORDER — TRIAMCINOLONE ACETONIDE 1 MG/G
0.1 CREAM TOPICAL
Qty: 60 | Refills: 0 | Status: ACTIVE | COMMUNITY
Start: 2022-05-01

## 2022-06-16 RX ORDER — NEOMYCIN SULFATE, POLYMYXIN B SULFATE AND DEXAMETHASONE 3.5; 10000; 1 MG/ML; [USP'U]/ML; MG/ML
3.5-10000-0.1 SUSPENSION OPHTHALMIC
Qty: 5 | Refills: 0 | Status: ACTIVE | COMMUNITY
Start: 2021-08-23

## 2022-06-16 NOTE — ASSESSMENT
[FreeTextEntry1] : will observe. to maintain hydration as well as able to with hyponatremia. to use sialagogues.  to return earlier if any change. patient has been given the opportunity to ask questions, and all of the patient's questions have been answered to their satisfaction

## 2022-06-16 NOTE — PHYSICAL EXAM
[de-identified] : no palpable thyroid nodules [Laryngoscopy Performed] : laryngoscopy was performed, see procedure section for findings [Midline] : located in midline position [Normal] : orientation to person, place, and time: normal [de-identified] : dry mucous membranes.  no parotid or submandibular masses. no salivary drainage

## 2022-06-16 NOTE — HISTORY OF PRESENT ILLNESS
[de-identified] : prior evaluation of parotid swelling. history of Sjogren's syndrome. recently hospitalized for hyponatremia. to see nephrologist shortly.  notes occasional right parotid turbid drainage at night.  denies dysphagia.. no further salivary swelling since last visit. I have reviewed all old and new data and available images. Additional information was obtained from others present at the time of visit to ensure the completeness of the history

## 2022-06-17 PROBLEM — Z01.810 ENCOUNTER FOR PRE-OPERATIVE CARDIOVASCULAR CLEARANCE: Status: RESOLVED | Noted: 2022-06-17 | Resolved: 2022-07-01

## 2022-06-20 ENCOUNTER — NON-APPOINTMENT (OUTPATIENT)
Age: 72
End: 2022-06-20

## 2022-06-20 ENCOUNTER — APPOINTMENT (OUTPATIENT)
Dept: CARDIOLOGY | Facility: CLINIC | Age: 72
End: 2022-06-20
Payer: MEDICARE

## 2022-06-20 VITALS
HEART RATE: 76 BPM | BODY MASS INDEX: 20.49 KG/M2 | WEIGHT: 95 LBS | SYSTOLIC BLOOD PRESSURE: 110 MMHG | HEIGHT: 57 IN | DIASTOLIC BLOOD PRESSURE: 50 MMHG | OXYGEN SATURATION: 100 %

## 2022-06-20 DIAGNOSIS — Z01.810 ENCOUNTER FOR PREPROCEDURAL CARDIOVASCULAR EXAMINATION: ICD-10-CM

## 2022-06-20 PROCEDURE — 99215 OFFICE O/P EST HI 40 MIN: CPT

## 2022-06-20 PROCEDURE — 93000 ELECTROCARDIOGRAM COMPLETE: CPT

## 2022-06-21 ENCOUNTER — NON-APPOINTMENT (OUTPATIENT)
Age: 72
End: 2022-06-21

## 2022-06-22 ENCOUNTER — APPOINTMENT (OUTPATIENT)
Dept: NEPHROLOGY | Facility: CLINIC | Age: 72
End: 2022-06-22
Payer: MEDICARE

## 2022-06-22 PROCEDURE — 99205 OFFICE O/P NEW HI 60 MIN: CPT | Mod: 95

## 2022-06-24 NOTE — REASON FOR VISIT
[Home] : at home, [unfilled] , at the time of the visit. [Other Location: e.g. Home (Enter Location, City,State)___] : at [unfilled] [Patient] : the patient [Initial Evaluation] : an initial evaluation

## 2022-06-27 ENCOUNTER — OUTPATIENT (OUTPATIENT)
Dept: OUTPATIENT SERVICES | Facility: HOSPITAL | Age: 72
LOS: 1 days | End: 2022-06-27
Payer: MEDICARE

## 2022-06-27 VITALS
WEIGHT: 97 LBS | SYSTOLIC BLOOD PRESSURE: 127 MMHG | RESPIRATION RATE: 14 BRPM | OXYGEN SATURATION: 97 % | HEART RATE: 79 BPM | DIASTOLIC BLOOD PRESSURE: 72 MMHG | HEIGHT: 57 IN | TEMPERATURE: 97 F

## 2022-06-27 DIAGNOSIS — M20.22 HALLUX RIGIDUS, LEFT FOOT: ICD-10-CM

## 2022-06-27 DIAGNOSIS — Z98.890 OTHER SPECIFIED POSTPROCEDURAL STATES: Chronic | ICD-10-CM

## 2022-06-27 DIAGNOSIS — Z01.818 ENCOUNTER FOR OTHER PREPROCEDURAL EXAMINATION: ICD-10-CM

## 2022-06-27 DIAGNOSIS — M20.42 OTHER HAMMER TOE(S) (ACQUIRED), LEFT FOOT: ICD-10-CM

## 2022-06-27 DIAGNOSIS — Z90.710 ACQUIRED ABSENCE OF BOTH CERVIX AND UTERUS: Chronic | ICD-10-CM

## 2022-06-27 DIAGNOSIS — Z95.5 PRESENCE OF CORONARY ANGIOPLASTY IMPLANT AND GRAFT: Chronic | ICD-10-CM

## 2022-06-27 DIAGNOSIS — K62.3 RECTAL PROLAPSE: Chronic | ICD-10-CM

## 2022-06-27 DIAGNOSIS — Z90.49 ACQUIRED ABSENCE OF OTHER SPECIFIED PARTS OF DIGESTIVE TRACT: Chronic | ICD-10-CM

## 2022-06-27 DIAGNOSIS — Z90.89 ACQUIRED ABSENCE OF OTHER ORGANS: Chronic | ICD-10-CM

## 2022-06-27 LAB
ALBUMIN SERPL ELPH-MCNC: 3.3 G/DL — SIGNIFICANT CHANGE UP (ref 3.3–5)
ALP SERPL-CCNC: 87 U/L — SIGNIFICANT CHANGE UP (ref 30–120)
ALT FLD-CCNC: 30 U/L DA — SIGNIFICANT CHANGE UP (ref 10–60)
ANION GAP SERPL CALC-SCNC: 6 MMOL/L — SIGNIFICANT CHANGE UP (ref 5–17)
AST SERPL-CCNC: 35 U/L — SIGNIFICANT CHANGE UP (ref 10–40)
BILIRUB SERPL-MCNC: 0.6 MG/DL — SIGNIFICANT CHANGE UP (ref 0.2–1.2)
BUN SERPL-MCNC: 20 MG/DL — SIGNIFICANT CHANGE UP (ref 7–23)
CALCIUM SERPL-MCNC: 9.4 MG/DL — SIGNIFICANT CHANGE UP (ref 8.4–10.5)
CHLORIDE SERPL-SCNC: 101 MMOL/L — SIGNIFICANT CHANGE UP (ref 96–108)
CO2 SERPL-SCNC: 29 MMOL/L — SIGNIFICANT CHANGE UP (ref 22–31)
CREAT SERPL-MCNC: 1.15 MG/DL — SIGNIFICANT CHANGE UP (ref 0.5–1.3)
EGFR: 50 ML/MIN/1.73M2 — LOW
GLUCOSE SERPL-MCNC: 98 MG/DL — SIGNIFICANT CHANGE UP (ref 70–99)
HCT VFR BLD CALC: 35.1 % — SIGNIFICANT CHANGE UP (ref 34.5–45)
HGB BLD-MCNC: 11.2 G/DL — LOW (ref 11.5–15.5)
MAGNESIUM SERPL-MCNC: 1.8 MG/DL — SIGNIFICANT CHANGE UP (ref 1.6–2.6)
MCHC RBC-ENTMCNC: 28.7 PG — SIGNIFICANT CHANGE UP (ref 27–34)
MCHC RBC-ENTMCNC: 31.9 GM/DL — LOW (ref 32–36)
MCV RBC AUTO: 90 FL — SIGNIFICANT CHANGE UP (ref 80–100)
NRBC # BLD: 0 /100 WBCS — SIGNIFICANT CHANGE UP (ref 0–0)
PLATELET # BLD AUTO: 166 K/UL — SIGNIFICANT CHANGE UP (ref 150–400)
POTASSIUM SERPL-MCNC: 5 MMOL/L — SIGNIFICANT CHANGE UP (ref 3.5–5.3)
POTASSIUM SERPL-SCNC: 5 MMOL/L — SIGNIFICANT CHANGE UP (ref 3.5–5.3)
PROT SERPL-MCNC: 7.4 G/DL — SIGNIFICANT CHANGE UP (ref 6–8.3)
RBC # BLD: 3.9 M/UL — SIGNIFICANT CHANGE UP (ref 3.8–5.2)
RBC # FLD: 15.3 % — HIGH (ref 10.3–14.5)
SODIUM SERPL-SCNC: 136 MMOL/L — SIGNIFICANT CHANGE UP (ref 135–145)
WBC # BLD: 9.08 K/UL — SIGNIFICANT CHANGE UP (ref 3.8–10.5)
WBC # FLD AUTO: 9.08 K/UL — SIGNIFICANT CHANGE UP (ref 3.8–10.5)

## 2022-06-27 PROCEDURE — 85027 COMPLETE CBC AUTOMATED: CPT

## 2022-06-27 PROCEDURE — 83735 ASSAY OF MAGNESIUM: CPT

## 2022-06-27 PROCEDURE — G0463: CPT

## 2022-06-27 PROCEDURE — 80053 COMPREHEN METABOLIC PANEL: CPT

## 2022-06-27 PROCEDURE — 36415 COLL VENOUS BLD VENIPUNCTURE: CPT

## 2022-06-27 RX ORDER — GABAPENTIN 400 MG/1
1 CAPSULE ORAL
Qty: 0 | Refills: 0 | DISCHARGE

## 2022-06-27 RX ORDER — DENOSUMAB 60 MG/ML
0 INJECTION SUBCUTANEOUS
Qty: 0 | Refills: 0 | DISCHARGE

## 2022-06-27 RX ORDER — UBIDECARENONE 100 MG
0 CAPSULE ORAL
Qty: 0 | Refills: 0 | DISCHARGE

## 2022-06-27 RX ORDER — ROSUVASTATIN CALCIUM 5 MG/1
1 TABLET ORAL
Qty: 0 | Refills: 0 | DISCHARGE

## 2022-06-27 RX ORDER — ESOMEPRAZOLE MAGNESIUM 40 MG/1
1 CAPSULE, DELAYED RELEASE ORAL
Qty: 0 | Refills: 0 | DISCHARGE

## 2022-06-27 NOTE — H&P PST ADULT - PROBLEM SELECTOR PLAN 1
left foot akin procedure with soft tissue release. medical/cardiac clearances requested. obtain EKG, stress and echo reports. Last note from neurosurgery with brain MRI December 2021 obtained and on chart showed resolution of SDH. Nephrology consult after electrolye imbalance and dehydration obtained and on chart. instructed to stop 1 week prior to surgery fish oil 1 week prior to surgery and instructed to take synthroid and pepcid AM of surgery with sips of water. surgical wash instructions reviewed and verbalized understanding. left a message for Mary Ann Ball NP to speak with Dr. Canchola regarding having a PT consult done after surgery while the patient is still in the hospital.  expressed concern about her being able to navigate the home safely. covid PCR appt. confirmed for 7/16/22 at 1000

## 2022-06-27 NOTE — H&P PST ADULT - NSICDXPASTSURGICALHX_GEN_ALL_CORE_FT
PAST SURGICAL HISTORY:  H/O ileostomy 2017    History of appendectomy as teen    History of hysterectomy 2011 for bladder prolapse, bladder lift done also    History of lumbar laminectomy October 2021, with removal of synovial cyst    Rectal prolapse repair 2016    S/P primary angioplasty with coronary stent 2019, 1 stent     PAST SURGICAL HISTORY:  H/O ileostomy 2017    History of appendectomy as teen    History of hysterectomy 2011 for bladder prolapse, bladder lift done also    History of lumbar laminectomy October 2021, with removal of synovial cyst    History of tonsillectomy and adenoidectomy as child    Rectal prolapse repair 2016    S/P primary angioplasty with coronary stent 2019, 1 stent

## 2022-06-27 NOTE — H&P PST ADULT - MUSCULOSKELETAL
general pain with mobility/ROM intact/no joint swelling/no joint erythema/no joint warmth/no calf tenderness/strength 5/5 bilateral upper extremities/strength 5/5 bilateral lower extremities/back exam details…

## 2022-06-27 NOTE — H&P PST ADULT - NSICDXPASTMEDICALHX_GEN_ALL_CORE_FT
PAST MEDICAL HISTORY:  Anxiety     Bilateral dry eyes     CAD (coronary atherosclerotic disease)     Colonic dysmotility     COVID-19 vaccine series completed     Dry mouth     Familial tremor     GERD (gastroesophageal reflux disease)     H/O electrolyte imbalance secondary to dehydration    H/O small bowel obstruction 2017 required surgery, multiple episodes since then, April 2022    History of connective tissue disease diagnosed 2002    History of CREST syndrome     History of dehydration secondary to ileostomy. was hospitalized mid June for hydration    History of hypotension baseline 100/60    History of scleroderma     History of short term memory loss     Hyperlipidemia     Hypomagnesemia secondary to dehydration    Hyponatremia secondary to dehydration    Hypothyroid     Ileostomy in place 2017    Insomnia difficulty staying asleep    Mild anemia     Osteoporosis     Sciatica left    Scoliosis     Sjogren's syndrome     Stented coronary artery     Vasovagal syncope      PAST MEDICAL HISTORY:  Anxiety     Bilateral dry eyes     Bunion of left foot     Bunion of right foot     CAD (coronary atherosclerotic disease)     Colonic dysmotility     Colonic inertia     COVID-19 vaccine series completed     DDD (degenerative disc disease), cervical     DDD (degenerative disc disease), lumbar     Dense breast tissue     Dry mouth     Familial tremor     GERD (gastroesophageal reflux disease)     H/O electrolyte imbalance secondary to dehydration    H/O small bowel obstruction 2017 required surgery, multiple episodes since then, April 2022    Hammer toe of left foot     Hammer toe of right foot     History of connective tissue disease diagnosed 2002    History of CREST syndrome     History of dehydration secondary to ileostomy. was hospitalized mid June for hydration    History of hypotension baseline 100/60    History of scleroderma     History of short term memory loss     History of subdural hematoma small, November 2021 after a fall, last visit to neurosurgeon with MRI of brain revealed resolution    Hyperlipidemia     Hypomagnesemia secondary to dehydration    Hyponatremia secondary to dehydration    Hypothyroid     Ileostomy in place 2017    Insomnia difficulty staying asleep    Mild anemia     Osteoporosis     Raynauds syndrome     Sciatica left    Scoliosis     Sjogren's syndrome     Spondylolisthesis     Stented coronary artery     Vasovagal syncope

## 2022-06-27 NOTE — HISTORY OF PRESENT ILLNESS
[FreeTextEntry1] : referreal for hyponatremia \par \par 72 yo lady who has multiple medical problems as listed below, also an ostomy who recently had an episode of confusion and went to the ER- Found to have a sodium of 130. her ostomy output had been high ( upto 1.5 L /day). urine sodium was low. she was presumed to have hypovolemic hyponatremia. she received some iv fluids. sodium was 128-130. her confusion was self limited. \par \par today she is feeling okay. she has surgery scheduled for end of july to correct bunions \par \par ROS\par - No changes in urination, no blood in urine \par [facility]- no chest pain, no shortness of breath \par all other systems reviewed in detail and were negative except as above \par \par pmh- as below \par \par social h- non smoker, no drugs \par \par family h- none of kidney disease \par

## 2022-06-27 NOTE — H&P PST ADULT - HISTORY OF PRESENT ILLNESS
72 yo female presents with left bunion and hammer toe since age 53. She also reports the right foot has a bunion and hammer toe. Left is worse than right. She has developed corns from the irritation which frequently "get shaved down". She received 1 cortisone injection without relief. Pain is unrelieved with tylenol. She reports 0/10 pain at rest, 3-4/10 with daily activity and 7-8/10 pain with prolonged standing. Pain mildly relieved with support between the toes.

## 2022-06-27 NOTE — ASSESSMENT
[FreeTextEntry1] : Hyponatremia- likely hypovolemic hyponatremia- coupled with increased water intake and poor solute intake. \par currently the output is 1.5 L per day. she has been prescribed Lomotil that she has not started taking yet. I have advised them to start. In addition, I have asked her to limit her water intake to not more than 1 L/day and start taking an increased protein and a normal sodium diet. \par \par her last sodium on 6/16 was 130. This degree of hyponatremia should not preclude any surgery later this month. \par she will also have repeat labs drawn as part of pre-surgical testing that I will look at. \par \par in addition, I will order a repeat BMP and urine studies at the end of July and see her at that time\par

## 2022-06-27 NOTE — H&P PST ADULT - FALL HARM RISK - HARM RISK INTERVENTIONS

## 2022-06-27 NOTE — H&P PST ADULT - NSICDXFAMILYHX_GEN_ALL_CORE_FT
FAMILY HISTORY:  Father  Still living? No  Family history of stroke, Age at diagnosis: Age Unknown  Family history of type 2 diabetes mellitus, Age at diagnosis: Age Unknown  FH: myocardial infarction, Age at diagnosis: Age Unknown    Mother  Still living? No  Family history of breast cancer, Age at diagnosis: Age Unknown  Family history of uterine cancer, Age at diagnosis: Age Unknown  FH: CHF (congestive heart failure), Age at diagnosis: Age Unknown    Child  Still living? Yes, Estimated age: 41-50  Family history of scoliosis, Age at diagnosis: Age Unknown  Family history of ulcerative colitis, Age at diagnosis: Age Unknown

## 2022-07-07 ENCOUNTER — APPOINTMENT (OUTPATIENT)
Dept: INTERNAL MEDICINE | Facility: CLINIC | Age: 72
End: 2022-07-07

## 2022-07-07 VITALS
SYSTOLIC BLOOD PRESSURE: 112 MMHG | DIASTOLIC BLOOD PRESSURE: 76 MMHG | HEIGHT: 57 IN | HEART RATE: 72 BPM | WEIGHT: 100 LBS | RESPIRATION RATE: 14 BRPM | BODY MASS INDEX: 21.57 KG/M2

## 2022-07-07 PROCEDURE — 99214 OFFICE O/P EST MOD 30 MIN: CPT

## 2022-07-10 NOTE — HISTORY OF PRESENT ILLNESS
[de-identified] : Pt presents for f/u evaluation of CAD, hypothyroidism, sciatica, CREST syndrome, hyperlipidemia, hyponatremia\par  was planning on having podiatric surgery, but has decided against it.\par \par no acute complaints.\par feels well.\par has been cutting back on fluid due to hyponatremia

## 2022-07-10 NOTE — ASSESSMENT
[FreeTextEntry1] : Labs reviewed\par \par Continue current meds\par Repeat Renal panel end of July per nephrology\par \par F/U with OV in two months

## 2022-07-15 PROBLEM — M50.30 OTHER CERVICAL DISC DEGENERATION, UNSPECIFIED CERVICAL REGION: Chronic | Status: ACTIVE | Noted: 2022-06-27

## 2022-07-15 PROBLEM — G47.00 INSOMNIA, UNSPECIFIED: Chronic | Status: ACTIVE | Noted: 2022-06-27

## 2022-07-15 PROBLEM — E83.42 HYPOMAGNESEMIA: Chronic | Status: ACTIVE | Noted: 2022-06-27

## 2022-07-15 PROBLEM — M20.42 OTHER HAMMER TOE(S) (ACQUIRED), LEFT FOOT: Chronic | Status: ACTIVE | Noted: 2022-06-27

## 2022-07-15 PROBLEM — Z93.2 ILEOSTOMY STATUS: Chronic | Status: ACTIVE | Noted: 2018-08-03

## 2022-07-15 PROBLEM — E03.9 HYPOTHYROIDISM, UNSPECIFIED: Chronic | Status: ACTIVE | Noted: 2022-06-27

## 2022-07-15 PROBLEM — M21.612 BUNION OF LEFT FOOT: Chronic | Status: ACTIVE | Noted: 2022-06-27

## 2022-07-15 PROBLEM — Z87.39 PERSONAL HISTORY OF OTHER DISEASES OF THE MUSCULOSKELETAL SYSTEM AND CONNECTIVE TISSUE: Chronic | Status: ACTIVE | Noted: 2022-06-27

## 2022-07-15 PROBLEM — M54.30 SCIATICA, UNSPECIFIED SIDE: Chronic | Status: ACTIVE | Noted: 2018-08-07

## 2022-07-15 PROBLEM — H04.123 DRY EYE SYNDROME OF BILATERAL LACRIMAL GLANDS: Chronic | Status: ACTIVE | Noted: 2022-06-27

## 2022-07-15 PROBLEM — Z86.79 PERSONAL HISTORY OF OTHER DISEASES OF THE CIRCULATORY SYSTEM: Chronic | Status: ACTIVE | Noted: 2022-06-27

## 2022-07-15 PROBLEM — G25.0 ESSENTIAL TREMOR: Chronic | Status: ACTIVE | Noted: 2022-06-27

## 2022-07-15 PROBLEM — D64.9 ANEMIA, UNSPECIFIED: Chronic | Status: ACTIVE | Noted: 2022-06-27

## 2022-07-15 PROBLEM — R55 SYNCOPE AND COLLAPSE: Chronic | Status: ACTIVE | Noted: 2022-06-27

## 2022-07-15 PROBLEM — M81.0 AGE-RELATED OSTEOPOROSIS WITHOUT CURRENT PATHOLOGICAL FRACTURE: Chronic | Status: ACTIVE | Noted: 2022-06-27

## 2022-07-15 PROBLEM — E87.1 HYPO-OSMOLALITY AND HYPONATREMIA: Chronic | Status: ACTIVE | Noted: 2022-06-27

## 2022-07-15 PROBLEM — M43.10 SPONDYLOLISTHESIS, SITE UNSPECIFIED: Chronic | Status: ACTIVE | Noted: 2022-06-27

## 2022-07-15 PROBLEM — M21.611 BUNION OF RIGHT FOOT: Chronic | Status: ACTIVE | Noted: 2022-06-27

## 2022-07-15 PROBLEM — Z86.39 PERSONAL HISTORY OF OTHER ENDOCRINE, NUTRITIONAL AND METABOLIC DISEASE: Chronic | Status: ACTIVE | Noted: 2022-06-27

## 2022-07-15 PROBLEM — E78.5 HYPERLIPIDEMIA, UNSPECIFIED: Chronic | Status: ACTIVE | Noted: 2022-06-27

## 2022-07-15 PROBLEM — M41.9 SCOLIOSIS, UNSPECIFIED: Chronic | Status: ACTIVE | Noted: 2022-06-27

## 2022-07-15 PROBLEM — I73.00 RAYNAUD'S SYNDROME WITHOUT GANGRENE: Chronic | Status: ACTIVE | Noted: 2022-06-27

## 2022-07-15 PROBLEM — Z92.29 PERSONAL HISTORY OF OTHER DRUG THERAPY: Chronic | Status: ACTIVE | Noted: 2022-06-27

## 2022-07-15 PROBLEM — M51.36 OTHER INTERVERTEBRAL DISC DEGENERATION, LUMBAR REGION: Chronic | Status: ACTIVE | Noted: 2022-06-27

## 2022-07-15 PROBLEM — R92.2 INCONCLUSIVE MAMMOGRAM: Chronic | Status: ACTIVE | Noted: 2022-06-27

## 2022-07-15 PROBLEM — Z87.19 PERSONAL HISTORY OF OTHER DISEASES OF THE DIGESTIVE SYSTEM: Chronic | Status: ACTIVE | Noted: 2022-06-27

## 2022-07-15 PROBLEM — K59.9 FUNCTIONAL INTESTINAL DISORDER, UNSPECIFIED: Chronic | Status: ACTIVE | Noted: 2022-06-27

## 2022-07-15 PROBLEM — M20.41 OTHER HAMMER TOE(S) (ACQUIRED), RIGHT FOOT: Chronic | Status: ACTIVE | Noted: 2022-06-27

## 2022-07-15 PROBLEM — Z87.898 PERSONAL HISTORY OF OTHER SPECIFIED CONDITIONS: Chronic | Status: ACTIVE | Noted: 2022-06-27

## 2022-07-15 PROBLEM — R68.2 DRY MOUTH, UNSPECIFIED: Chronic | Status: ACTIVE | Noted: 2022-06-27

## 2022-07-18 ENCOUNTER — APPOINTMENT (OUTPATIENT)
Dept: ORTHOPEDIC SURGERY | Facility: HOSPITAL | Age: 72
End: 2022-07-18

## 2022-08-24 ENCOUNTER — LABORATORY RESULT (OUTPATIENT)
Age: 72
End: 2022-08-24

## 2022-09-01 ENCOUNTER — APPOINTMENT (OUTPATIENT)
Dept: OBGYN | Facility: CLINIC | Age: 72
End: 2022-09-01

## 2022-09-01 VITALS — HEIGHT: 57 IN | BODY MASS INDEX: 20.28 KG/M2 | WEIGHT: 94 LBS

## 2022-09-01 VITALS
WEIGHT: 94 LBS | BODY MASS INDEX: 20.28 KG/M2 | HEIGHT: 57 IN | DIASTOLIC BLOOD PRESSURE: 72 MMHG | SYSTOLIC BLOOD PRESSURE: 125 MMHG

## 2022-09-01 DIAGNOSIS — Z01.411 ENCOUNTER FOR GYNECOLOGICAL EXAMINATION (GENERAL) (ROUTINE) WITH ABNORMAL FINDINGS: ICD-10-CM

## 2022-09-01 DIAGNOSIS — K56.609 UNSPECIFIED INTESTINAL OBSTRUCTION, UNSPECIFIED AS TO PARTIAL VERSUS COMPLETE OBSTRUCTION: ICD-10-CM

## 2022-09-01 PROCEDURE — G0101: CPT

## 2022-09-01 PROCEDURE — 99387 INIT PM E/M NEW PAT 65+ YRS: CPT | Mod: GY

## 2022-09-02 ENCOUNTER — APPOINTMENT (OUTPATIENT)
Dept: NEPHROLOGY | Facility: CLINIC | Age: 72
End: 2022-09-02

## 2022-09-02 VITALS
HEIGHT: 57 IN | OXYGEN SATURATION: 98 % | BODY MASS INDEX: 20.93 KG/M2 | TEMPERATURE: 97.6 F | SYSTOLIC BLOOD PRESSURE: 120 MMHG | DIASTOLIC BLOOD PRESSURE: 68 MMHG | HEART RATE: 50 BPM | WEIGHT: 97 LBS

## 2022-09-02 PROCEDURE — 99215 OFFICE O/P EST HI 40 MIN: CPT

## 2022-09-02 NOTE — HISTORY OF PRESENT ILLNESS
[FreeTextEntry1] : Follow up hyponatremia \par \par 72 yo lady who has multiple medical problems as listed below, also an ostomy who recently had an episode of confusion and went to the ER- Found to have a sodium of 130. her ostomy output had been high ( upto 1.5 L /day). urine sodium was low. she was presumed to have hypovolemic hyponatremia. she received some iv fluids. sodium was 128-130. her confusion was self limited. at that time, I advised her to increase her solute intake and not drink that much water. \par \par since last visit, she has been getting the fluid named " iv hydration" which has significantly more electrolytes than gatorade and she has been feeling better. She also intermittently has small bowel obstructions which are usually self limiting. per her, she has definitely been eating more and limiting herself to about a liter of water. \par \par ROS \par  - No changes in urination, no blood in urine \par CVS- No chest pain, no shortness of breath \par all other systems reviewed in detail and were negative except as above

## 2022-09-02 NOTE — ASSESSMENT
[FreeTextEntry1] : Hyponatremia- likely hypovolemic hyponatremia- coupled with increased water intake and poor solute intake. \par her last sodium is now 135. she overall feels better- eating more and limiting her water intake to 1 L that includes the " iv hydration" electrolyte replacement solution. I asked her to continue with the same. she does not need iv fluids at the current time. she will have repeat labs done mid-November and see me after. \par

## 2022-09-02 NOTE — PHYSICAL EXAM
[General Appearance - Alert] : alert [General Appearance - In No Acute Distress] : in no acute distress [General Appearance - Well Nourished] : well nourished [General Appearance - Well Developed] : well developed [General Appearance - Well-Appearing] : healthy appearing [Sclera] : the sclera and conjunctiva were normal [PERRL With Normal Accommodation] : pupils were equal in size, round, and reactive to light [Extraocular Movements] : extraocular movements were intact [Outer Ear] : the ears and nose were normal in appearance [Hearing Threshold Finger Rub Not [place]] : hearing was normal [Examination Of The Oral Cavity] : the lips and gums were normal [Neck Appearance] : the appearance of the neck was normal [Neck Cervical Mass (___cm)] : no neck mass was observed [Jugular Venous Distention Increased] : there was no jugular-venous distention [Thyroid Diffuse Enlargement] : the thyroid was not enlarged [Respiration, Rhythm And Depth] : normal respiratory rhythm and effort [Exaggerated Use Of Accessory Muscles For Inspiration] : no accessory muscle use [Auscultation Breath Sounds / Voice Sounds] : lungs were clear to auscultation bilaterally [Heart Rate And Rhythm] : heart rate was normal and rhythm regular [Heart Sounds] : normal S1 and S2 [Heart Sounds Gallop] : no gallops [Murmurs] : no murmurs [Heart Sounds Pericardial Friction Rub] : no pericardial rub [Arterial Pulses Carotid] : carotid pulses were normal with no bruits [Edema] : there was no peripheral edema [Bowel Sounds] : normal bowel sounds [Abdomen Soft] : soft [Abdomen Tenderness] : non-tender [Abdomen Mass (___ Cm)] : no abdominal mass palpated [No CVA Tenderness] : no ~M costovertebral angle tenderness [No Spinal Tenderness] : no spinal tenderness [Abnormal Walk] : normal gait [Nail Clubbing] : no clubbing  or cyanosis of the fingernails [Involuntary Movements] : no involuntary movements were seen [Musculoskeletal - Swelling] : no joint swelling seen [Motor Tone] : muscle strength and tone were normal [Skin Color & Pigmentation] : normal skin color and pigmentation [Skin Turgor] : normal skin turgor [] : no rash [Skin Lesions] : no skin lesions [Cranial Nerves] : cranial nerves 2-12 were intact [Deep Tendon Reflexes (DTR)] : deep tendon reflexes were 2+ and symmetric [Sensation] : the sensory exam was normal to light touch and pinprick [Motor Exam] : the motor exam was normal [Oriented To Time, Place, And Person] : oriented to person, place, and time [Impaired Insight] : insight and judgment were intact [Affect] : the affect was normal [Mood] : the mood was normal

## 2022-09-06 LAB
ALBUMIN SERPL ELPH-MCNC: 3.8 G/DL
ANION GAP SERPL CALC-SCNC: 12 MMOL/L
BASOPHILS # BLD AUTO: 0.07 K/UL
BASOPHILS NFR BLD AUTO: 0.9 %
BUN SERPL-MCNC: 14 MG/DL
CALCIUM SERPL-MCNC: 8.8 MG/DL
CHLORIDE SERPL-SCNC: 95 MMOL/L
CO2 SERPL-SCNC: 23 MMOL/L
CREAT SERPL-MCNC: 1 MG/DL
EGFR: 59 ML/MIN/1.73M2
EOSINOPHIL # BLD AUTO: 0.13 K/UL
EOSINOPHIL NFR BLD AUTO: 1.7 %
GLUCOSE SERPL-MCNC: 81 MG/DL
HCT VFR BLD CALC: 36.9 %
HGB BLD-MCNC: 11.6 G/DL
IMM GRANULOCYTES NFR BLD AUTO: 0.3 %
LYMPHOCYTES # BLD AUTO: 1.22 K/UL
LYMPHOCYTES NFR BLD AUTO: 16.3 %
MAN DIFF?: NORMAL
MCHC RBC-ENTMCNC: 28.6 PG
MCHC RBC-ENTMCNC: 31.4 GM/DL
MCV RBC AUTO: 90.9 FL
MONOCYTES # BLD AUTO: 0.42 K/UL
MONOCYTES NFR BLD AUTO: 5.6 %
NEUTROPHILS # BLD AUTO: 5.63 K/UL
NEUTROPHILS NFR BLD AUTO: 75.2 %
OSMOLALITY SERPL: 269 MOSMOL/KG
OSMOLALITY UR: 239 MOSM/KG
PHOSPHATE SERPL-MCNC: 3.8 MG/DL
PLATELET # BLD AUTO: 171 K/UL
POTASSIUM SERPL-SCNC: 4.6 MMOL/L
RBC # BLD: 4.06 M/UL
RBC # FLD: 15.9 %
SODIUM ?TM SUB UR QN: <20 MMOL/L
SODIUM SERPL-SCNC: 130 MMOL/L
T3 SERPL-MCNC: 79 NG/DL
T4 SERPL-MCNC: 7.1 UG/DL
TSH SERPL-ACNC: 1.96 UIU/ML
URATE SERPL-MCNC: 4.7 MG/DL
WBC # FLD AUTO: 7.49 K/UL

## 2022-09-16 NOTE — PATIENT PROFILE ADULT - NSPROMUTPARTICIPCAREFT_GEN_A_NUR
Symptoms of retinal detachment and endophthalmitis following intravitreal injection discussed; patient advised to call immediately if symptoms ensue. just to be helpful

## 2022-11-16 ENCOUNTER — LABORATORY RESULT (OUTPATIENT)
Age: 72
End: 2022-11-16

## 2022-11-18 DIAGNOSIS — R79.89 OTHER SPECIFIED ABNORMAL FINDINGS OF BLOOD CHEMISTRY: ICD-10-CM

## 2022-12-01 ENCOUNTER — TRANSCRIPTION ENCOUNTER (OUTPATIENT)
Age: 72
End: 2022-12-01

## 2022-12-01 LAB
ALBUMIN SERPL ELPH-MCNC: 4.4 G/DL
ALP BLD-CCNC: 110 U/L
ALT SERPL-CCNC: 40 U/L
ANION GAP SERPL CALC-SCNC: 11 MMOL/L
AST SERPL-CCNC: 50 U/L
BILIRUB SERPL-MCNC: 0.8 MG/DL
BUN SERPL-MCNC: 23 MG/DL
CALCIUM SERPL-MCNC: 9.9 MG/DL
CHLORIDE SERPL-SCNC: 98 MMOL/L
CO2 SERPL-SCNC: 27 MMOL/L
CREAT SERPL-MCNC: 1 MG/DL
EGFR: 59 ML/MIN/1.73M2
GGT SERPL-CCNC: 28 U/L
GLUCOSE SERPL-MCNC: 78 MG/DL
HAV IGM SER QL: NONREACTIVE
HBV CORE IGM SER QL: NONREACTIVE
HBV SURFACE AG SER QL: NONREACTIVE
HCV AB SER QL: NONREACTIVE
HCV S/CO RATIO: 0.2 S/CO
POTASSIUM SERPL-SCNC: 4.7 MMOL/L
PROT SERPL-MCNC: 7 G/DL
SODIUM SERPL-SCNC: 135 MMOL/L

## 2022-12-02 ENCOUNTER — NON-APPOINTMENT (OUTPATIENT)
Age: 72
End: 2022-12-02

## 2022-12-02 ENCOUNTER — APPOINTMENT (OUTPATIENT)
Dept: INTERNAL MEDICINE | Facility: CLINIC | Age: 72
End: 2022-12-02

## 2022-12-02 VITALS — RESPIRATION RATE: 14 BRPM | SYSTOLIC BLOOD PRESSURE: 122 MMHG | HEART RATE: 66 BPM | DIASTOLIC BLOOD PRESSURE: 70 MMHG

## 2022-12-02 VITALS — WEIGHT: 93 LBS | HEIGHT: 57 IN | BODY MASS INDEX: 20.06 KG/M2

## 2022-12-02 PROCEDURE — 99497 ADVNCD CARE PLAN 30 MIN: CPT

## 2022-12-02 PROCEDURE — G0444 DEPRESSION SCREEN ANNUAL: CPT | Mod: 59

## 2022-12-02 PROCEDURE — 93000 ELECTROCARDIOGRAM COMPLETE: CPT | Mod: 59

## 2022-12-02 PROCEDURE — 99214 OFFICE O/P EST MOD 30 MIN: CPT | Mod: 25

## 2022-12-02 PROCEDURE — G0439: CPT

## 2022-12-14 NOTE — HEALTH RISK ASSESSMENT
[Good] : ~his/her~  mood as  good [No] : In the past 12 months have you used drugs other than those required for medical reasons? No [One fall no injury in past year] : Patient reported one fall in the past year without injury [0] : 2) Feeling down, depressed, or hopeless: Not at all (0) [PHQ-2 Negative - No further assessment needed] : PHQ-2 Negative - No further assessment needed [[id number]] : 0 [HIV test declined] : HIV test declined [Hepatitis C test declined] : Hepatitis C test declined [With Patient/Caregiver] : , with patient/caregiver [Name: ___] : Health Care Proxy's Name: [unfilled]  [Relationship: ___] : Relationship: [unfilled] [I will adhere to the patient's wishes.] : I will adhere to the patient's wishes. [Time Spent: ___ minutes] : Time Spent: [unfilled] minutes [AdvancecareDate] : 12/21

## 2022-12-31 NOTE — ED ADULT TRIAGE NOTE - ADDITIONAL SAFETY/BANDS...
Father of patient called stating child was seen in the ER yesterday for diarrhea, and an ear infection  Today child has had 6 stools so far today which is more than the day before, refusing all PO  Discussed with father if patient is not taking PO fluids should return to the ER for re-evaluation and concern for dehydration       Ann-Marie Wynne CNP  12/30/22 [id number]    
Patient being admitted to the Peds Floor. Bedside report given to SUZANNE Burns.  Patient awake and alert, RR unlabored, skin warm & dry, in stable condition, VSS. IV infusing with D5.9 at 45ml/hr, no IV site complications, dressing clean/dry/intact. Patient in no distress or pain. Parents updated on POC, patient belongings being brought up to the floor with parents.   
This RN to bedside to check fluids running that was hung during day shift. Noticed that D10 was running through pts peripheral IV. Approx 130 mL of D10 was administered. This RN stopped the fluid administration and notified the MD's of the error. This RN instructed to hang NS per MAR (250mL 0.9 to be given over an hour) and to recheck blood glucose.     Phlebotomy at bedside for lab draw.   
Additional Safety/Bands:

## 2023-01-01 NOTE — ED ADULT TRIAGE NOTE - CODE STROKE ACTIVATED DT
This note was copied from the mother's chart.   did DC teaching for NICU mother pumping for her baby. Mother has NICU Mother's Breastfeeding Guide. Reviewed how to work pump, how to keep track of pumpings, how to label nicu breastmilk, how to clean pump parts and bring milk to NICU even if it is only a drop of milk. NICU uses mother's milk for mouth care so even small amounts are ok to bring to NICU. Mother aware to pump 8 or more times a day for 15-20 minutes. Mother is aware of proper techniques for transporting her breastmilk and is aware of the written instructions in her Mother's NICU Breastfeeding Guide. Mother is using a Spectra pump at home and is aware that she can use the Symphony breastpump at the baby's bedside when she visits. Mother has the [facility] phone number and the NICU  phone number to call for further questions.    11-Jun-2022 21:00

## 2023-01-25 NOTE — ASSESSMENT
Venipuncture performed on Right Arm by Erika Silva MA  with good hemostasis. Patient tolerated well. 01/25/23   Aubree Alas MD     [FreeTextEntry1] : Labs reviewed\par \par Trial of meloxicam for continued bursitis pain.\par RN all meds\par \par Rheum f/u suggested\par PT Rx given at pt request\par \par All questions answered\par \par F/U 3 months

## 2023-01-30 ENCOUNTER — APPOINTMENT (OUTPATIENT)
Dept: NEPHROLOGY | Facility: CLINIC | Age: 73
End: 2023-01-30

## 2023-02-06 ENCOUNTER — RX RENEWAL (OUTPATIENT)
Age: 73
End: 2023-02-06

## 2023-03-16 ENCOUNTER — RX RENEWAL (OUTPATIENT)
Age: 73
End: 2023-03-16

## 2023-03-28 NOTE — H&P PST ADULT - NS PRO PASSIVE SMOKE EXP
Patient is here for follow-up she underwent tonsillectomy adenoidectomy and vent tube placement in August of last year.  She had some illness in the fall mostly respiratory including RSV.  She has had very little problems in the last few months no ear drainage in his been quite healthy.      Exam does show both ventilation tubes in place and patent the right 1 is very close to extruding.    Nasal exam today is clear   Oral cavity and oropharynx looks excellent     Assessment plan   History of eustachian tube dysfunction previous tonsillectomy adenoidectomy and vent tube placement last August     Patient still shows both ventilation tubes in position I will see her back in 6 months we can then try to coordinate her visit with her brother Randal will be coming in next month we can then see him again in early fall.   Yes...

## 2023-04-27 ENCOUNTER — LABORATORY RESULT (OUTPATIENT)
Age: 73
End: 2023-04-27

## 2023-05-03 ENCOUNTER — APPOINTMENT (OUTPATIENT)
Dept: INTERNAL MEDICINE | Facility: CLINIC | Age: 73
End: 2023-05-03
Payer: MEDICARE

## 2023-05-03 VITALS — HEART RATE: 72 BPM | SYSTOLIC BLOOD PRESSURE: 148 MMHG | DIASTOLIC BLOOD PRESSURE: 78 MMHG | RESPIRATION RATE: 14 BRPM

## 2023-05-03 PROCEDURE — 99214 OFFICE O/P EST MOD 30 MIN: CPT

## 2023-05-04 NOTE — HISTORY OF PRESENT ILLNESS
[de-identified] : Pt presents for f/u evaluation of CAD, hypothyroidism, sciatica, CREST syndrome, hyperlipidemia, hyponatremia\par \par no acute complaints.\par feels well.\par \par no medical issues while in [place] for the winter

## 2023-05-04 NOTE — ASSESSMENT
[FreeTextEntry1] : Labs reviewed\par \par Continue current meds\par \par F/U with OV and repeat Mg in one months\par to reassess BP

## 2023-05-22 ENCOUNTER — APPOINTMENT (OUTPATIENT)
Dept: CARDIOLOGY | Facility: CLINIC | Age: 73
End: 2023-05-22
Payer: MEDICARE

## 2023-05-22 ENCOUNTER — NON-APPOINTMENT (OUTPATIENT)
Age: 73
End: 2023-05-22

## 2023-05-22 VITALS
OXYGEN SATURATION: 100 % | BODY MASS INDEX: 19.85 KG/M2 | HEART RATE: 87 BPM | SYSTOLIC BLOOD PRESSURE: 124 MMHG | DIASTOLIC BLOOD PRESSURE: 60 MMHG | HEIGHT: 57 IN | WEIGHT: 92 LBS

## 2023-05-22 PROCEDURE — 99215 OFFICE O/P EST HI 40 MIN: CPT

## 2023-05-22 PROCEDURE — 93000 ELECTROCARDIOGRAM COMPLETE: CPT

## 2023-06-08 LAB
ANION GAP SERPL CALC-SCNC: 9 MMOL/L
BUN SERPL-MCNC: 17 MG/DL
CALCIUM SERPL-MCNC: 9.2 MG/DL
CHLORIDE SERPL-SCNC: 103 MMOL/L
CO2 SERPL-SCNC: 27 MMOL/L
CREAT SERPL-MCNC: 1.04 MG/DL
EGFR: 56 ML/MIN/1.73M2
ERYTHROCYTE [SEDIMENTATION RATE] IN BLOOD BY WESTERGREN METHOD: 23 MM/HR
GLUCOSE SERPL-MCNC: 75 MG/DL
MAGNESIUM SERPL-MCNC: 1.6 MG/DL
NT-PROBNP SERPL-MCNC: 426 PG/ML
POTASSIUM SERPL-SCNC: 4.7 MMOL/L
SODIUM SERPL-SCNC: 139 MMOL/L
TSH SERPL-ACNC: 2.1 UIU/ML

## 2023-06-15 ENCOUNTER — APPOINTMENT (OUTPATIENT)
Dept: INTERNAL MEDICINE | Facility: CLINIC | Age: 73
End: 2023-06-15
Payer: MEDICARE

## 2023-06-15 DIAGNOSIS — F51.04 PSYCHOPHYSIOLOGIC INSOMNIA: ICD-10-CM

## 2023-06-15 PROCEDURE — 99214 OFFICE O/P EST MOD 30 MIN: CPT

## 2023-06-15 RX ORDER — RAMELTEON 8 MG/1
8 TABLET ORAL
Qty: 30 | Refills: 2 | Status: ACTIVE | COMMUNITY
Start: 2023-06-15 | End: 1900-01-01

## 2023-06-22 ENCOUNTER — APPOINTMENT (OUTPATIENT)
Dept: SURGERY | Facility: CLINIC | Age: 73
End: 2023-06-22
Payer: MEDICARE

## 2023-06-22 PROCEDURE — 99213 OFFICE O/P EST LOW 20 MIN: CPT

## 2023-06-22 RX ORDER — CLOTRIMAZOLE 10 MG/1
10 LOZENGE ORAL 3 TIMES DAILY
Qty: 21 | Refills: 2 | Status: ACTIVE | COMMUNITY
Start: 2023-06-22 | End: 1900-01-01

## 2023-06-22 NOTE — PHYSICAL EXAM
[de-identified] : no palpable thyroid nodules [Laryngoscopy Performed] : laryngoscopy was performed, see procedure section for findings [Midline] : located in midline position [Normal] : orientation to person, place, and time: normal [de-identified] : dry mucous membranes.  no parotid or submandibular masses. turbid right parotid  drainage

## 2023-06-22 NOTE — HISTORY OF PRESENT ILLNESS
[de-identified] : prior evaluation of parotid swelling. history of Sjogren's syndrome. no further hospitalizations  for hyponatremia.  notes occasional right parotid turbid drainage at night.  denies dysphagia.. no further salivary swelling since last visit. I have reviewed all old and new data and available images. Additional information was obtained from others present at the time of visit to ensure the completeness of the history

## 2023-06-22 NOTE — ADVANCED PRACTICE NURSE CONSULT - RECOMMEDATIONS
Area cleansed w/ water, dried thoroughly, dusted peristomal skin w/ stoma powder to absorb moisture & then dabbed w/ liquid barrier film(Cavilon) to seal. A barrier ring was applied to a cut to fit [place] 2 1/4" convex wafer & a High Output appliance applied. Supplies for 1 additional change given to pt & spouse. All questions answered & emotional support provided. I noted that there was a large amount of the same output as stoma from the  pt's rectum when the diaper was changed. MD Suazo from Dr Cohen's surgical team arrived at end of visit & was informed of my findings. I discussed w/ Staff NATALYA Ramos. I would continue w/ present routine ostomy care. Pt tolerated well.
Wounds

## 2023-06-23 NOTE — ASSESSMENT
[FreeTextEntry1] : Labs reviewed\par \par Continue current meds\par \par F/U with Dr Medrano\par \par OV 3 months with BW

## 2023-06-23 NOTE — HISTORY OF PRESENT ILLNESS
[de-identified] : Pt presents for f/u evaluation of CAD, hypothyroidism, sciatica, CREST syndrome, hyperlipidemia, hyponatremia\par \par no acute complaints.\par feels well.\par \par here to recheck BP which was elevated last visit

## 2023-08-25 ENCOUNTER — LABORATORY RESULT (OUTPATIENT)
Age: 73
End: 2023-08-25

## 2023-09-01 ENCOUNTER — RX RENEWAL (OUTPATIENT)
Age: 73
End: 2023-09-01

## 2023-09-05 ENCOUNTER — RX RENEWAL (OUTPATIENT)
Age: 73
End: 2023-09-05

## 2023-09-06 ENCOUNTER — APPOINTMENT (OUTPATIENT)
Age: 73
End: 2023-09-06
Payer: MEDICARE

## 2023-09-06 ENCOUNTER — APPOINTMENT (OUTPATIENT)
Dept: INTERNAL MEDICINE | Facility: CLINIC | Age: 73
End: 2023-09-06
Payer: MEDICARE

## 2023-09-06 VITALS — HEART RATE: 78 BPM | SYSTOLIC BLOOD PRESSURE: 116 MMHG | RESPIRATION RATE: 14 BRPM | DIASTOLIC BLOOD PRESSURE: 60 MMHG

## 2023-09-06 DIAGNOSIS — M35.00 SICCA SYNDROME, UNSPECIFIED: ICD-10-CM

## 2023-09-06 DIAGNOSIS — D64.9 ANEMIA, UNSPECIFIED: ICD-10-CM

## 2023-09-06 DIAGNOSIS — R21 RASH AND OTHER NONSPECIFIC SKIN ERUPTION: ICD-10-CM

## 2023-09-06 PROCEDURE — 99214 OFFICE O/P EST MOD 30 MIN: CPT | Mod: 25

## 2023-09-06 PROCEDURE — 36415 COLL VENOUS BLD VENIPUNCTURE: CPT

## 2023-09-06 PROCEDURE — 99214 OFFICE O/P EST MOD 30 MIN: CPT

## 2023-09-06 RX ORDER — ESOMEPRAZOLE MAGNESIUM 40 MG/1
40 CAPSULE, DELAYED RELEASE ORAL DAILY
Qty: 90 | Refills: 1 | Status: ACTIVE | COMMUNITY
Start: 2020-10-12 | End: 1900-01-01

## 2023-09-07 NOTE — ASSESSMENT
[FreeTextEntry1] : Blood work was drawn and sent to the lab today. The patient has been instructed to call the office next week to discuss today's lab work. continue current meds RN meds requested F/u with oral pathology Dr Gabby CLARK 3 months

## 2023-09-07 NOTE — HISTORY OF PRESENT ILLNESS
[de-identified] : Pt presents for f/u evaluation of CAD, hypothyroidism, sciatica, CREST syndrome, hyperlipidemia, hyponatremia  no acute complaints has rash around mouth - will be seeing Dr Pierre later today

## 2023-09-12 ENCOUNTER — TRANSCRIPTION ENCOUNTER (OUTPATIENT)
Age: 73
End: 2023-09-12

## 2023-09-15 ENCOUNTER — APPOINTMENT (OUTPATIENT)
Dept: NEPHROLOGY | Facility: CLINIC | Age: 73
End: 2023-09-15
Payer: MEDICARE

## 2023-09-15 VITALS
BODY MASS INDEX: 20.93 KG/M2 | HEIGHT: 57 IN | TEMPERATURE: 97.6 F | WEIGHT: 97 LBS | SYSTOLIC BLOOD PRESSURE: 94 MMHG | HEART RATE: 75 BPM | DIASTOLIC BLOOD PRESSURE: 45 MMHG | OXYGEN SATURATION: 96 %

## 2023-09-15 VITALS — SYSTOLIC BLOOD PRESSURE: 95 MMHG | DIASTOLIC BLOOD PRESSURE: 55 MMHG | HEART RATE: 74 BPM

## 2023-09-15 PROCEDURE — 99214 OFFICE O/P EST MOD 30 MIN: CPT

## 2023-09-16 ENCOUNTER — APPOINTMENT (OUTPATIENT)
Dept: CARDIOLOGY | Facility: CLINIC | Age: 73
End: 2023-09-16
Payer: MEDICARE

## 2023-09-16 PROCEDURE — 93880 EXTRACRANIAL BILAT STUDY: CPT

## 2023-09-29 ENCOUNTER — APPOINTMENT (OUTPATIENT)
Dept: CARDIOLOGY | Facility: CLINIC | Age: 73
End: 2023-09-29
Payer: MEDICARE

## 2023-09-29 ENCOUNTER — NON-APPOINTMENT (OUTPATIENT)
Age: 73
End: 2023-09-29

## 2023-09-29 VITALS
DIASTOLIC BLOOD PRESSURE: 45 MMHG | HEIGHT: 55 IN | SYSTOLIC BLOOD PRESSURE: 100 MMHG | OXYGEN SATURATION: 96 % | WEIGHT: 92 LBS | BODY MASS INDEX: 21.29 KG/M2 | HEART RATE: 71 BPM | RESPIRATION RATE: 14 BRPM

## 2023-09-29 DIAGNOSIS — I51.89 OTHER ILL-DEFINED HEART DISEASES: ICD-10-CM

## 2023-09-29 DIAGNOSIS — R06.09 OTHER FORMS OF DYSPNEA: ICD-10-CM

## 2023-09-29 PROCEDURE — 93000 ELECTROCARDIOGRAM COMPLETE: CPT

## 2023-09-29 PROCEDURE — 99215 OFFICE O/P EST HI 40 MIN: CPT

## 2023-09-29 PROCEDURE — 93306 TTE W/DOPPLER COMPLETE: CPT

## 2023-10-04 RX ORDER — DIAZEPAM 5 MG/1
5 TABLET ORAL
Qty: 4 | Refills: 0 | Status: ACTIVE | COMMUNITY
Start: 2023-10-04 | End: 1900-01-01

## 2023-10-05 ENCOUNTER — APPOINTMENT (OUTPATIENT)
Dept: MRI IMAGING | Facility: CLINIC | Age: 73
End: 2023-10-05
Payer: MEDICARE

## 2023-10-05 ENCOUNTER — OUTPATIENT (OUTPATIENT)
Dept: OUTPATIENT SERVICES | Facility: HOSPITAL | Age: 73
LOS: 1 days | End: 2023-10-05

## 2023-10-05 DIAGNOSIS — Z90.89 ACQUIRED ABSENCE OF OTHER ORGANS: Chronic | ICD-10-CM

## 2023-10-05 DIAGNOSIS — I51.89 OTHER ILL-DEFINED HEART DISEASES: ICD-10-CM

## 2023-10-05 DIAGNOSIS — Z90.49 ACQUIRED ABSENCE OF OTHER SPECIFIED PARTS OF DIGESTIVE TRACT: Chronic | ICD-10-CM

## 2023-10-05 DIAGNOSIS — Z95.5 PRESENCE OF CORONARY ANGIOPLASTY IMPLANT AND GRAFT: Chronic | ICD-10-CM

## 2023-10-05 DIAGNOSIS — Z98.890 OTHER SPECIFIED POSTPROCEDURAL STATES: Chronic | ICD-10-CM

## 2023-10-05 PROCEDURE — 75561 CARDIAC MRI FOR MORPH W/DYE: CPT | Mod: 26

## 2023-10-30 ENCOUNTER — RX RENEWAL (OUTPATIENT)
Age: 73
End: 2023-10-30

## 2023-10-30 RX ORDER — ASPIRIN 81 MG/1
81 TABLET, COATED ORAL
Qty: 90 | Refills: 0 | Status: ACTIVE | COMMUNITY
Start: 2023-10-30 | End: 1900-01-01

## 2023-11-01 ENCOUNTER — RX RENEWAL (OUTPATIENT)
Age: 73
End: 2023-11-01

## 2023-11-03 RX ORDER — GABAPENTIN 100 MG/1
100 CAPSULE ORAL
Qty: 540 | Refills: 1 | Status: ACTIVE | COMMUNITY
Start: 2020-05-11 | End: 1900-01-01

## 2023-11-03 RX ORDER — MAGNESIUM OXIDE 241.3 MG/1000MG
400 TABLET ORAL DAILY
Qty: 360 | Refills: 1 | Status: ACTIVE | COMMUNITY
Start: 2017-05-22 | End: 1900-01-01

## 2023-11-03 RX ORDER — PRIMIDONE 50 MG/1
50 TABLET ORAL
Qty: 270 | Refills: 1 | Status: ACTIVE | COMMUNITY
Start: 2022-12-02 | End: 1900-01-01

## 2023-11-08 RX ORDER — ROSUVASTATIN CALCIUM 20 MG/1
20 TABLET, FILM COATED ORAL
Qty: 90 | Refills: 3 | Status: ACTIVE | COMMUNITY
Start: 2019-07-24 | End: 1900-01-01

## 2023-11-10 ENCOUNTER — LABORATORY RESULT (OUTPATIENT)
Age: 73
End: 2023-11-10

## 2023-11-11 ENCOUNTER — NON-APPOINTMENT (OUTPATIENT)
Age: 73
End: 2023-11-11

## 2023-11-13 ENCOUNTER — APPOINTMENT (OUTPATIENT)
Dept: UROLOGY | Facility: CLINIC | Age: 73
End: 2023-11-13
Payer: MEDICARE

## 2023-11-13 VITALS — SYSTOLIC BLOOD PRESSURE: 122 MMHG | HEART RATE: 80 BPM | DIASTOLIC BLOOD PRESSURE: 70 MMHG

## 2023-11-13 DIAGNOSIS — N93.9 ABNORMAL UTERINE AND VAGINAL BLEEDING, UNSPECIFIED: ICD-10-CM

## 2023-11-13 PROCEDURE — 99213 OFFICE O/P EST LOW 20 MIN: CPT

## 2023-11-30 ENCOUNTER — APPOINTMENT (OUTPATIENT)
Dept: INTERNAL MEDICINE | Facility: CLINIC | Age: 73
End: 2023-11-30
Payer: MEDICARE

## 2023-11-30 VITALS — SYSTOLIC BLOOD PRESSURE: 124 MMHG | DIASTOLIC BLOOD PRESSURE: 70 MMHG | HEART RATE: 78 BPM | RESPIRATION RATE: 14 BRPM

## 2023-11-30 DIAGNOSIS — K21.9 GASTRO-ESOPHAGEAL REFLUX DISEASE W/OUT ESOPHAGITIS: ICD-10-CM

## 2023-11-30 DIAGNOSIS — Z00.00 ENCOUNTER FOR GENERAL ADULT MEDICAL EXAMINATION W/OUT ABNORMAL FINDINGS: ICD-10-CM

## 2023-11-30 DIAGNOSIS — M54.2 CERVICALGIA: ICD-10-CM

## 2023-11-30 LAB
ALBUMIN SERPL ELPH-MCNC: 4.5 G/DL
ALP BLD-CCNC: 106 U/L
ALT SERPL-CCNC: 27 U/L
ANION GAP SERPL CALC-SCNC: 12 MMOL/L
AST SERPL-CCNC: 40 U/L
BILIRUB SERPL-MCNC: 0.5 MG/DL
BUN SERPL-MCNC: 27 MG/DL
CALCIUM SERPL-MCNC: 9.6 MG/DL
CHLORIDE SERPL-SCNC: 96 MMOL/L
CO2 SERPL-SCNC: 22 MMOL/L
CREAT SERPL-MCNC: 1.09 MG/DL
EGFR: 53 ML/MIN/1.73M2
GLUCOSE SERPL-MCNC: 72 MG/DL
POTASSIUM SERPL-SCNC: 4.6 MMOL/L
PROT SERPL-MCNC: 6.7 G/DL
SODIUM SERPL-SCNC: 130 MMOL/L

## 2023-11-30 PROCEDURE — 99214 OFFICE O/P EST MOD 30 MIN: CPT | Mod: 25

## 2023-11-30 PROCEDURE — G0439: CPT

## 2023-11-30 PROCEDURE — 99497 ADVNCD CARE PLAN 30 MIN: CPT

## 2023-12-01 ENCOUNTER — EMERGENCY (EMERGENCY)
Facility: HOSPITAL | Age: 73
LOS: 1 days | Discharge: HOME | End: 2023-12-01
Attending: STUDENT IN AN ORGANIZED HEALTH CARE EDUCATION/TRAINING PROGRAM | Admitting: STUDENT IN AN ORGANIZED HEALTH CARE EDUCATION/TRAINING PROGRAM
Payer: SELF-PAY

## 2023-12-01 VITALS
HEART RATE: 83 BPM | TEMPERATURE: 98 F | WEIGHT: 91.93 LBS | RESPIRATION RATE: 18 BRPM | HEIGHT: 58 IN | DIASTOLIC BLOOD PRESSURE: 50 MMHG | SYSTOLIC BLOOD PRESSURE: 95 MMHG | OXYGEN SATURATION: 99 %

## 2023-12-01 DIAGNOSIS — Z90.49 ACQUIRED ABSENCE OF OTHER SPECIFIED PARTS OF DIGESTIVE TRACT: Chronic | ICD-10-CM

## 2023-12-01 DIAGNOSIS — Z90.89 ACQUIRED ABSENCE OF OTHER ORGANS: Chronic | ICD-10-CM

## 2023-12-01 DIAGNOSIS — K62.3 RECTAL PROLAPSE: Chronic | ICD-10-CM

## 2023-12-01 DIAGNOSIS — Z98.890 OTHER SPECIFIED POSTPROCEDURAL STATES: Chronic | ICD-10-CM

## 2023-12-01 DIAGNOSIS — Z90.710 ACQUIRED ABSENCE OF BOTH CERVIX AND UTERUS: Chronic | ICD-10-CM

## 2023-12-01 DIAGNOSIS — Z95.5 PRESENCE OF CORONARY ANGIOPLASTY IMPLANT AND GRAFT: Chronic | ICD-10-CM

## 2023-12-01 PROCEDURE — 72125 CT NECK SPINE W/O DYE: CPT | Mod: QQ

## 2023-12-01 PROCEDURE — 70450 CT HEAD/BRAIN W/O DYE: CPT | Mod: 26,QQ

## 2023-12-01 PROCEDURE — 71250 CT THORAX DX C-: CPT | Mod: QQ

## 2023-12-01 PROCEDURE — 71250 CT THORAX DX C-: CPT | Mod: 26,QQ

## 2023-12-01 PROCEDURE — 99284 EMERGENCY DEPT VISIT MOD MDM: CPT

## 2023-12-01 PROCEDURE — 99284 EMERGENCY DEPT VISIT MOD MDM: CPT | Mod: 25

## 2023-12-01 PROCEDURE — 70450 CT HEAD/BRAIN W/O DYE: CPT | Mod: QQ

## 2023-12-01 PROCEDURE — 72125 CT NECK SPINE W/O DYE: CPT | Mod: 26,QQ

## 2023-12-01 NOTE — ED PROVIDER NOTE - CARE PROVIDER_API CALL
Alma Huggins  Pulmonary Disease  [address], [address]  [place] [address]  Phone: [phone]  Fax: [phone]  Follow Up Time: Routine    [place] Estrada  Orthopaedic Surgery  [address] [place] [address]  Phone: [phone]  Fax: [phone]  Follow Up Time: Routine   Alma Huggins  Pulmonary Disease  [address], [address]  [place] [address]  Phone: [phone]  Fax: [phone]  Follow Up Time: Routine    [place] Estrada  Orthopaedic Surgery  [address] [place] [address]  Phone: [phone]  Fax: [phone]  Follow Up Time: Routine   Alma Huggins  Pulmonary Disease  [address] [facility] [address] [facility] [address]  [place] [address]  Phone: [phone]  Fax: [phone]  Follow Up Time: Routine    [place] Estrada  Orthopaedic Surgery  [address] [place] [address]  Phone: [phone]  Fax: [phone]  Follow Up Time: Routine

## 2023-12-01 NOTE — ED PROVIDER NOTE - PROVIDER TOKENS
PROVIDER:[TOKEN:[[id number]],FOLLOWUP:[Routine]],PROVIDER:[TOKEN:[[id number]],FOLLOWUP:[Routine]] PROVIDER:[TOKEN:[[id number]],FOLLOWUP:[Routine]],PROVIDER:[TOKEN:[[id number]],FOLLOWUP:[Routine]] PROVIDER:[TOKEN:[[id number]:[Routine]],PROVIDER:[TOKEN:[[id number]],FOLLOWUP:[Routine]]
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
## 2023-12-01 NOTE — ED ADULT NURSE NOTE - NSFALLUNIVINTERV_ED_ALL_ED
Bed/Stretcher in lowest position, wheels locked, appropriate side rails in place/Call bell, personal items and telephone in reach/Instruct patient to call for assistance before getting out of bed/chair/stretcher/Non-slip footwear applied when patient is off stretcher/[place] to call system/Physically safe environment - no spills, clutter or unnecessary equipment/Purposeful proactive rounding/Room/bathroom lighting operational, light cord in reach
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
## 2023-12-01 NOTE — ED PROVIDER NOTE - NSICDXPASTSURGICALHX_GEN_ALL_CORE_FT
PAST SURGICAL HISTORY:  H/O ileostomy 2017    History of appendectomy as teen    History of hysterectomy 2011 for bladder prolapse, bladder lift done also    History of lumbar laminectomy October 2021, with removal of synovial cyst    History of tonsillectomy and adenoidectomy as child    Rectal prolapse repair 2016    S/P primary angioplasty with coronary stent 2019, 1 stent

## 2023-12-01 NOTE — ED PROVIDER NOTE - PATIENT PORTAL LINK FT
You can access the FollowMyHealth Patient Portal offered by [facility] by registering at the following website: [email or web address]. By joining [employer]’s FollowMyHealth portal, you will also be able to view your health information using other applications (apps) compatible with our system. You can access the FollowMyHealth Patient Portal offered by [facility] by registering at the following website: [email or web address]. By joining [employer]’s FollowMyHealth portal, you will also be able to view your health information using other applications (apps) compatible with our system. You can access the FollowMyHealth Patient Portal offered by [facility] by registering at the following website: [email or web address]. By joining [employer]’s FollowMyHealth portal, you will also be able to view your health information using other applications (apps) compatible with our system.

## 2023-12-01 NOTE — ED ADULT NURSE NOTE - NSFALLLASTSIX_ED_ALL_ED
[de-identified] : Patient is WDWN, alert, and in no acute distress. Breathing is unlabored. She is grossly oriented to person, place, and time.\par \par Right Elbow:\par Wound packed with gauze\par Edema and ecchymosis present\par Drainage noted [de-identified] : EXAM:  CT FOREARM ONLY RT - 9/24/21\par IMPRESSION:\par 1. Comminuted fracture of the mid diaphysis of the ulna is again seen with mild displacement and dorsal angulation. Some bridging callus formation is present.\par 2. Mild osteoarthritis of the elbow with borderline small joint effusion and tiny joint body.\par 3. Chondrocalcinosis of the wrist.\par \par NIRANJAN GRISSOM MD; Attending Radiologist\par This document has been electronically signed. Sep 29 2021  9:25AM\par   No.

## 2023-12-01 NOTE — ED PROVIDER NOTE - CARE PROVIDERS DIRECT ADDRESSES
,[email or web address],[email or web address] [email or web address],DirectAddress_Unknown

## 2023-12-01 NOTE — ED PROVIDER NOTE - PROGRESS NOTE DETAILS
James Posadas MD (Attending Physician): Imaging non-actionable. Pt was re-evaluated at bedside, VSS, feeling better overall. Results were discussed with patient as well as return precautions and follow up plan with PCP. Time was taken to answer any questions that the patient had before providing them with discharge paperwork. James Posadas MD (Attending Physician): Imaging non-actionable. Pt was re-evaluated at bedside, VSS, feeling better overall. Results were discussed with patient as well as return precautions and follow up plan with PCP, ortho, and pulm. Time was taken to answer any questions that the patient had before providing them with discharge paperwork.
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
## 2023-12-01 NOTE — ED PROVIDER NOTE - NSICDXPASTMEDICALHX_GEN_ALL_CORE_FT
PAST MEDICAL HISTORY:  Anxiety     Bilateral dry eyes     Bunion of left foot     Bunion of right foot     CAD (coronary atherosclerotic disease)     Colonic dysmotility     Colonic inertia     COVID-19 vaccine series completed     DDD (degenerative disc disease), cervical     DDD (degenerative disc disease), lumbar     Dense breast tissue     Dry mouth     Familial tremor     GERD (gastroesophageal reflux disease)     H/O electrolyte imbalance secondary to dehydration    H/O small bowel obstruction 2017 required surgery, multiple episodes since then, April 2022    Hammer toe of left foot     Hammer toe of right foot     History of connective tissue disease diagnosed 2002    History of CREST syndrome     History of dehydration secondary to ileostomy. was hospitalized mid June for hydration    History of hypotension baseline 100/60    History of scleroderma     History of short term memory loss     History of subdural hematoma small, November 2021 after a fall, last visit to neurosurgeon with MRI of brain revealed resolution    Hyperlipidemia     Hypomagnesemia secondary to dehydration    Hyponatremia secondary to dehydration    Hypothyroid     Ileostomy in place 2017    Insomnia difficulty staying asleep    Mild anemia     Osteoporosis     Raynauds syndrome     Sciatica left    Scoliosis     Sjogren's syndrome     Spondylolisthesis     Stented coronary artery     Vasovagal syncope

## 2023-12-01 NOTE — ED PROVIDER NOTE - PHYSICAL EXAMINATION
GEN - NAD, well appearing, A&Ox3  HEAD - NC/AT  EYES - PERRL, EOMI  ENT - Airway patent, mucous membranes moist  NECK - Supple, no midline C-spine TTP, +b/l para-cervical TTP, no masses  PULMONARY - CTA b/l, symmetric breath sounds, no W/R/R  CARDIAC - +S1S2, RRR, no M/G/R, no JVD  CHEST - +B/l ribs mildly TTP  ABDOMEN - +BS, ND, NT, soft, no guarding, no rebound, no masses, no rigidity   - No CVA TTP b/l  BACK - No midline tenderness  PELVIS - Stable, NT  EXTREMITIES - FROM, symmetric pulses, no edema, 5/5 strength in b/l UE and LE  SKIN - No rash or bruising  NEUROLOGIC - Alert, speech clear, no focal deficits, CN II-XII grossly intact, no pronator drift, normal gait, strength and sensation grossly intact, FTN negative b/l  PSYCH - Normal mood/affect, normal insight
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
## 2023-12-02 VITALS
RESPIRATION RATE: 18 BRPM | HEART RATE: 68 BPM | DIASTOLIC BLOOD PRESSURE: 72 MMHG | OXYGEN SATURATION: 96 % | SYSTOLIC BLOOD PRESSURE: 110 MMHG

## 2024-02-14 RX ORDER — ALPRAZOLAM 0.25 MG/1
0.25 TABLET ORAL
Qty: 10 | Refills: 0 | Status: DISCONTINUED | COMMUNITY
Start: 2019-10-23 | End: 2020-10-12

## 2024-03-01 ENCOUNTER — RX RENEWAL (OUTPATIENT)
Age: 74
End: 2024-03-01

## 2024-04-22 ENCOUNTER — INPATIENT (INPATIENT)
Facility: HOSPITAL | Age: 74
LOS: 1 days | Discharge: HOME | DRG: 392 | End: 2024-04-24
Admitting: COLON & RECTAL SURGERY
Payer: MEDICARE

## 2024-04-22 VITALS
TEMPERATURE: 100 F | WEIGHT: 89.95 LBS | RESPIRATION RATE: 18 BRPM | OXYGEN SATURATION: 100 % | HEART RATE: 86 BPM | SYSTOLIC BLOOD PRESSURE: 106 MMHG | DIASTOLIC BLOOD PRESSURE: 73 MMHG | HEIGHT: 57 IN

## 2024-04-22 DIAGNOSIS — K62.3 RECTAL PROLAPSE: Chronic | ICD-10-CM

## 2024-04-22 DIAGNOSIS — Z90.710 ACQUIRED ABSENCE OF BOTH CERVIX AND UTERUS: Chronic | ICD-10-CM

## 2024-04-22 DIAGNOSIS — Z98.890 OTHER SPECIFIED POSTPROCEDURAL STATES: Chronic | ICD-10-CM

## 2024-04-22 DIAGNOSIS — Z95.5 PRESENCE OF CORONARY ANGIOPLASTY IMPLANT AND GRAFT: Chronic | ICD-10-CM

## 2024-04-22 DIAGNOSIS — R10.9 UNSPECIFIED ABDOMINAL PAIN: ICD-10-CM

## 2024-04-22 DIAGNOSIS — Z90.89 ACQUIRED ABSENCE OF OTHER ORGANS: Chronic | ICD-10-CM

## 2024-04-22 DIAGNOSIS — Z90.49 ACQUIRED ABSENCE OF OTHER SPECIFIED PARTS OF DIGESTIVE TRACT: Chronic | ICD-10-CM

## 2024-04-22 LAB
ALBUMIN SERPL ELPH-MCNC: 4.1 G/DL — SIGNIFICANT CHANGE UP (ref 3.3–5)
ALP SERPL-CCNC: 90 U/L — SIGNIFICANT CHANGE UP (ref 40–120)
ALT FLD-CCNC: 24 U/L — SIGNIFICANT CHANGE UP (ref 10–45)
ANION GAP SERPL CALC-SCNC: 12 MMOL/L — SIGNIFICANT CHANGE UP (ref 5–17)
APTT BLD: 39.4 SEC — HIGH (ref 24.5–35.6)
APTT BLD: 48.4 SEC — HIGH (ref 24.5–35.6)
AST SERPL-CCNC: 32 U/L — SIGNIFICANT CHANGE UP (ref 10–40)
BASE EXCESS BLDV CALC-SCNC: 3.2 MMOL/L — HIGH (ref -2–3)
BASOPHILS # BLD AUTO: 0.02 K/UL — SIGNIFICANT CHANGE UP (ref 0–0.2)
BASOPHILS NFR BLD AUTO: 0.2 % — SIGNIFICANT CHANGE UP (ref 0–2)
BILIRUB SERPL-MCNC: 0.9 MG/DL — SIGNIFICANT CHANGE UP (ref 0.2–1.2)
BUN SERPL-MCNC: 23 MG/DL — SIGNIFICANT CHANGE UP (ref 7–23)
CA-I SERPL-SCNC: 1.24 MMOL/L — SIGNIFICANT CHANGE UP (ref 1.15–1.33)
CALCIUM SERPL-MCNC: 10.2 MG/DL — SIGNIFICANT CHANGE UP (ref 8.4–10.5)
CHLORIDE BLDV-SCNC: 94 MMOL/L — LOW (ref 96–108)
CHLORIDE SERPL-SCNC: 95 MMOL/L — LOW (ref 96–108)
CO2 BLDV-SCNC: 31 MMOL/L — HIGH (ref 22–26)
CO2 SERPL-SCNC: 26 MMOL/L — SIGNIFICANT CHANGE UP (ref 22–31)
CREAT SERPL-MCNC: 1.15 MG/DL — SIGNIFICANT CHANGE UP (ref 0.5–1.3)
EGFR: 50 ML/MIN/1.73M2 — LOW
EOSINOPHIL # BLD AUTO: 0 K/UL — SIGNIFICANT CHANGE UP (ref 0–0.5)
EOSINOPHIL NFR BLD AUTO: 0 % — SIGNIFICANT CHANGE UP (ref 0–6)
GAS PNL BLDV: 129 MMOL/L — LOW (ref 136–145)
GAS PNL BLDV: SIGNIFICANT CHANGE UP
GLUCOSE BLDV-MCNC: 86 MG/DL — SIGNIFICANT CHANGE UP (ref 70–99)
GLUCOSE SERPL-MCNC: 91 MG/DL — SIGNIFICANT CHANGE UP (ref 70–99)
HCO3 BLDV-SCNC: 30 MMOL/L — HIGH (ref 22–29)
HCT VFR BLD CALC: 38.8 % — SIGNIFICANT CHANGE UP (ref 34.5–45)
HCT VFR BLDA CALC: 40 % — SIGNIFICANT CHANGE UP (ref 34.5–46.5)
HGB BLD CALC-MCNC: 13.4 G/DL — SIGNIFICANT CHANGE UP (ref 11.7–16.1)
HGB BLD-MCNC: 12.8 G/DL — SIGNIFICANT CHANGE UP (ref 11.5–15.5)
IMM GRANULOCYTES NFR BLD AUTO: 0.4 % — SIGNIFICANT CHANGE UP (ref 0–0.9)
INR BLD: 5.91 RATIO — CRITICAL HIGH (ref 0.85–1.18)
INR BLD: 7.12 RATIO — CRITICAL HIGH (ref 0.85–1.18)
LACTATE BLDV-MCNC: 0.9 MMOL/L — SIGNIFICANT CHANGE UP (ref 0.5–2)
LIDOCAIN IGE QN: 51 U/L — SIGNIFICANT CHANGE UP (ref 7–60)
LYMPHOCYTES # BLD AUTO: 0.84 K/UL — LOW (ref 1–3.3)
LYMPHOCYTES # BLD AUTO: 8.2 % — LOW (ref 13–44)
MAGNESIUM SERPL-MCNC: 1.6 MG/DL — SIGNIFICANT CHANGE UP (ref 1.6–2.6)
MCHC RBC-ENTMCNC: 29.4 PG — SIGNIFICANT CHANGE UP (ref 27–34)
MCHC RBC-ENTMCNC: 33 GM/DL — SIGNIFICANT CHANGE UP (ref 32–36)
MCV RBC AUTO: 89.2 FL — SIGNIFICANT CHANGE UP (ref 80–100)
MONOCYTES # BLD AUTO: 0.74 K/UL — SIGNIFICANT CHANGE UP (ref 0–0.9)
MONOCYTES NFR BLD AUTO: 7.2 % — SIGNIFICANT CHANGE UP (ref 2–14)
NEUTROPHILS # BLD AUTO: 8.64 K/UL — HIGH (ref 1.8–7.4)
NEUTROPHILS NFR BLD AUTO: 84 % — HIGH (ref 43–77)
NRBC # BLD: 0 /100 WBCS — SIGNIFICANT CHANGE UP (ref 0–0)
PCO2 BLDV: 51 MMHG — HIGH (ref 39–42)
PH BLDV: 7.37 — SIGNIFICANT CHANGE UP (ref 7.32–7.43)
PLATELET # BLD AUTO: 172 K/UL — SIGNIFICANT CHANGE UP (ref 150–400)
PO2 BLDV: 14 MMHG — LOW (ref 25–45)
POTASSIUM BLDV-SCNC: 4.1 MMOL/L — SIGNIFICANT CHANGE UP (ref 3.5–5.1)
POTASSIUM SERPL-MCNC: 4.4 MMOL/L — SIGNIFICANT CHANGE UP (ref 3.5–5.3)
POTASSIUM SERPL-SCNC: 4.4 MMOL/L — SIGNIFICANT CHANGE UP (ref 3.5–5.3)
PROT SERPL-MCNC: 7.9 G/DL — SIGNIFICANT CHANGE UP (ref 6–8.3)
PROTHROM AB SERPL-ACNC: 58.9 SEC — HIGH (ref 9.5–13)
PROTHROM AB SERPL-ACNC: 70.6 SEC — HIGH (ref 9.5–13)
RBC # BLD: 4.35 M/UL — SIGNIFICANT CHANGE UP (ref 3.8–5.2)
RBC # FLD: 14.8 % — HIGH (ref 10.3–14.5)
SAO2 % BLDV: 13.4 % — LOW (ref 67–88)
SODIUM SERPL-SCNC: 133 MMOL/L — LOW (ref 135–145)
WBC # BLD: 10.28 K/UL — SIGNIFICANT CHANGE UP (ref 3.8–10.5)
WBC # FLD AUTO: 10.28 K/UL — SIGNIFICANT CHANGE UP (ref 3.8–10.5)

## 2024-04-22 PROCEDURE — 74018 RADEX ABDOMEN 1 VIEW: CPT | Mod: 26

## 2024-04-22 PROCEDURE — 74177 CT ABD & PELVIS W/CONTRAST: CPT | Mod: 26,MC

## 2024-04-22 PROCEDURE — 99285 EMERGENCY DEPT VISIT HI MDM: CPT

## 2024-04-22 PROCEDURE — 99222 1ST HOSP IP/OBS MODERATE 55: CPT | Mod: GC

## 2024-04-22 RX ORDER — ESOMEPRAZOLE MAGNESIUM 40 MG/1
1 CAPSULE, DELAYED RELEASE ORAL
Qty: 0 | Refills: 0 | DISCHARGE

## 2024-04-22 RX ORDER — SODIUM CHLORIDE 9 MG/ML
1000 INJECTION, SOLUTION INTRAVENOUS
Refills: 0 | Status: DISCONTINUED | OUTPATIENT
Start: 2024-04-22 | End: 2024-04-24

## 2024-04-22 RX ORDER — FAMOTIDINE 10 MG/ML
1 INJECTION INTRAVENOUS
Qty: 0 | Refills: 0 | DISCHARGE

## 2024-04-22 RX ORDER — ACETAMINOPHEN 500 MG
2 TABLET ORAL
Qty: 0 | Refills: 0 | DISCHARGE

## 2024-04-22 RX ORDER — SODIUM CHLORIDE 9 MG/ML
1000 INJECTION, SOLUTION INTRAVENOUS ONCE
Refills: 0 | Status: COMPLETED | OUTPATIENT
Start: 2024-04-22 | End: 2024-04-22

## 2024-04-22 RX ORDER — LEVOTHYROXINE SODIUM 125 MCG
1 TABLET ORAL
Qty: 0 | Refills: 0 | DISCHARGE

## 2024-04-22 RX ORDER — UBIDECARENONE 100 MG
2 CAPSULE ORAL
Qty: 0 | Refills: 0 | DISCHARGE

## 2024-04-22 RX ORDER — ASPIRIN/CALCIUM CARB/MAGNESIUM 324 MG
1 TABLET ORAL
Qty: 0 | Refills: 0 | DISCHARGE

## 2024-04-22 RX ORDER — ALUMINUM HYDROXIDE AND MAGNESIUM TRISILICATE 80; 14.2 MG/1; MG/1
2 TABLET, CHEWABLE ORAL
Qty: 0 | Refills: 0 | DISCHARGE

## 2024-04-22 RX ORDER — ONDANSETRON 8 MG/1
4 TABLET, FILM COATED ORAL ONCE
Refills: 0 | Status: COMPLETED | OUTPATIENT
Start: 2024-04-22 | End: 2024-04-22

## 2024-04-22 RX ORDER — PRIMIDONE 250 MG/1
1 TABLET ORAL
Refills: 0 | DISCHARGE

## 2024-04-22 RX ORDER — OMEGA-3 ACID ETHYL ESTERS 1 G
1 CAPSULE ORAL
Qty: 0 | Refills: 0 | DISCHARGE

## 2024-04-22 RX ORDER — GABAPENTIN 400 MG/1
1 CAPSULE ORAL
Qty: 0 | Refills: 0 | DISCHARGE

## 2024-04-22 RX ORDER — LANOLIN ALCOHOL/MO/W.PET/CERES
1 CREAM (GRAM) TOPICAL
Qty: 0 | Refills: 0 | DISCHARGE

## 2024-04-22 RX ORDER — ROSUVASTATIN CALCIUM 5 MG/1
1 TABLET ORAL
Qty: 0 | Refills: 0 | DISCHARGE

## 2024-04-22 RX ORDER — ACETAMINOPHEN 500 MG
600 TABLET ORAL ONCE
Refills: 0 | Status: COMPLETED | OUTPATIENT
Start: 2024-04-22 | End: 2024-04-22

## 2024-04-22 RX ORDER — SODIUM CHLORIDE 9 MG/ML
500 INJECTION, SOLUTION INTRAVENOUS
Refills: 0 | Status: DISCONTINUED | OUTPATIENT
Start: 2024-04-22 | End: 2024-04-22

## 2024-04-22 RX ORDER — PREGABALIN 225 MG/1
0 CAPSULE ORAL
Qty: 0 | Refills: 0 | DISCHARGE

## 2024-04-22 RX ORDER — CHOLECALCIFEROL (VITAMIN D3) 125 MCG
2 CAPSULE ORAL
Qty: 0 | Refills: 0 | DISCHARGE

## 2024-04-22 RX ORDER — ONDANSETRON 8 MG/1
4 TABLET, FILM COATED ORAL EVERY 6 HOURS
Refills: 0 | Status: DISCONTINUED | OUTPATIENT
Start: 2024-04-22 | End: 2024-04-24

## 2024-04-22 RX ORDER — DENOSUMAB 60 MG/ML
1 INJECTION SUBCUTANEOUS
Qty: 0 | Refills: 0 | DISCHARGE

## 2024-04-22 RX ADMIN — SODIUM CHLORIDE 60 MILLILITER(S): 9 INJECTION, SOLUTION INTRAVENOUS at 23:19

## 2024-04-22 RX ADMIN — SODIUM CHLORIDE 1000 MILLILITER(S): 9 INJECTION, SOLUTION INTRAVENOUS at 18:51

## 2024-04-22 RX ADMIN — ONDANSETRON 4 MILLIGRAM(S): 8 TABLET, FILM COATED ORAL at 18:50

## 2024-04-22 NOTE — H&P ADULT - ATTENDING COMMENTS
Ostomy starting to function.  Abd some distension.  Will have ostomy team irrigate stoma and will get hypague enema for large colon stool burden with colon out of circuit.  Follow up INR and give vitamin K as needed

## 2024-04-22 NOTE — ED ADULT NURSE NOTE - NSFALLHARMRISKINTERV_ED_ALL_ED

## 2024-04-22 NOTE — ED PROVIDER NOTE - PROGRESS NOTE DETAILS
Attending MD Watson: CT showing: Distended loops of small bowel with gradual caliber transition prior to the ileostomy most suggestive of ileus or partial small bowel obstruction without discrete transition point.  Fluid distention and fecalization of large bowel with suggestion of transition point at the rectosigmoid anastomosis.  Surgery paged. Attending MD Watson: Seen by surgery, requested maintenance IVF 60hr, will order.  Will await for final recs.

## 2024-04-22 NOTE — H&P ADULT - NSHPPHYSICALEXAM_GEN_ALL_CORE
GEN: NAD  CV: RRR  Pulm: unlabored breathing on RA  Abd: soft, mildly distended, ostomy w/ minimal output, parastomal tenderness, not peritoneal
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
## 2024-04-22 NOTE — H&P ADULT - ASSESSMENT
68F PMH CAD s/p stents (on ASA), colonic inertia, rectal prolapse, bladder prolapse s/p ?suspensory surgical procedure, s/p colon resection and rectopexy  in 2011 and repeat open rectopexy with mesh in 2016, w/ hx of recurent SBO s/p ex-lap, SBR, diverting loop ileostomy 2017 ([facility])  with subsequent multiple SBO and LBO, CREST syndrome presents with decreased ostomy output and abdominal pain found to have pSBO and large colon burden.     - Admit to Dr. Burnett w/ transition of care to Dr. Bryan   - NPO/IVF  - Repeat AXR to monitor contrast progression   - Pt may need NGT if not opening up, for now pt deferred NGT and understands risk of aspiration   - Pt may need hypec enema in the morning (was helpful previously)   - Repeat INR in the morning - INR 5.9 pt is not on AC  - Will hold chemical DVT ppx for now in the setting of elevated INR, SCD      Plan discussed with chief on call Dr. Shah

## 2024-04-22 NOTE — ED ADULT NURSE NOTE - NS ED NURSE RECORD ANOTHER HT AND WT
Yes 8.2    5.76  )-----------( 133      ( 01 Apr 2021 08:56 )             26.0    04-01-21 @ 08:56    138  |  100  |  36<H>             --------------------------< 116<H>     3.7  |  24  | 3.6<H>    eGFR AA: 14<L>  eGFR N-AA: 12<L>    Calcium: 9.2  Phosphorus: --  Magnesium: 2.1    AST: 73<H>    ALT: 43<H>  AlkPhos: 152<H>  Protein: 5.8<L>  Albumin: 3.5  TBili: 0.3  D-Bili: --  04-01-21 @ 03:19    138  |  99  |  32<H>             --------------------------< 320<H>     3.5  |  18  | 3.5<H>    eGFR AA: 15<L>  eGFR N-AA: 13<L>    Calcium: 10.9<H>  Phosphorus: --  Magnesium: --    AST: 108<H>    ALT: 53<H>  AlkPhos: 193<H>  Protein: 6.4  Albumin: 3.7  TBili: 0.4  D-Bili: --  04-01-21 @ 02:50    139  |  100  |  30<H>             --------------------------< 117<H>     3.8  |  23  | 3.2<H>    eGFR AA: 16<L>  eGFR N-AA: 14<L>    Calcium: 8.9  Phosphorus: --  Magnesium: --    AST: 17    ALT: 10  AlkPhos: 124<H>  Protein: 6.8  Albumin: 4.2  TBili: 0.3  D-Bili: --    < from: CT Abdomen and Pelvis w/ IV Cont (04.01.21 @ 06:15) >    IMPRESSION:    No CTA evidence of acute pulmonary embolism.    Acute comminuted fracture of the right humeral neck.    Acute comminuted right intertrochanteric fracture.    Age-indeterminate fracture of T6 vertebral body, new since prior examination.    Multiple bilateral acute and chronic rib fractures.    Bilateral groundglass and consolidative pulmonary opacities, mostly nodular right base. Small bilateral pleural effusions and abdominopelvic ascites. Interlobular septal thickening consistent with pulmonary edema.    Gallbladder distention with cholelithiasis.        < end of copied text >    < from: CT Cervical Spine No Cont (04.01.21 @ 06:06) >    IMPRESSION:    Lucency through base of odontoid extending into the lateral masses of C2 suggesting nondisplaced fracture. Small volume of blood within anterior extraduralspace. Consider further evaluation with MRI as clinically warranted.    Comminuted fracture right humeral neck, visualized on .    < end of copied text >    < from: CT Head No Cont (04.01.21 @ 06:04) >

## 2024-04-22 NOTE — ED PROVIDER NOTE - RAPID ASSESSMENT
73 year Female with pmhx of Sjogren's Syndrome, hypothyroidism, HLD, prior hysterectomy complicated by SBO requiring bowel resection and ileostomy presents to the ED for decreased ostomy Patient reports that for the last 3 days she has noticed decreased output from her ostomy associated with lower abdominal pain, bloating and chills.  Patient reports that she has had prior obstructions and this feels similar.  She also has had decreased appetite and decreased p.o. intake.  Denies fevers, chest pain, shortness of breath, nausea or vomiting.    Rapid assessment by Elza Strong PA-C full eval to be performed in ED

## 2024-04-22 NOTE — H&P ADULT - HISTORY OF PRESENT ILLNESS
68F PMH CAD s/p stents (on ASA), colonic inertia, rectal prolapse, bladder prolapse s/p ?suspensory surgical procedure, s/p colon resection and rectopexy  in 2011 and repeat open rectopexy with mesh in 2016, w/ hx of recurent SBO s/p ex-lap, SBR, diverting loop ileostomy 2017 ([facility])  with subsequent multiple SBO and LBO, CREST syndrome presents with decreased ostomy output and abdominal pain.  Patient currently manages colonic inertia with bi-weekly warm water enemas and last enema was on 3 days prior to presentation, but pt feels that she didn't eliminate all bezoars and continues to have stool leaking per rectum.  Also reports decreased ostomy output and increased abdominal pain for the past 2-3 days.  Reports nausea for the past few days and had one episode of vomiting in ED. Patient had all her abdominal surgeries at [facility] and is following up with GI in [place].     In ED, HDS, CT c/w pSBO and large colon burden.

## 2024-04-22 NOTE — ED ADULT NURSE NOTE - OBJECTIVE STATEMENT
74 y/o F, pmh multiple SBOs, bowel resection with ileostomy, presents to the ED with decreased ostomy output x 3 days  with associated s/s of chills and bloating.  Pt states she has decreased appetite and decreased PO intake. Pt denies fever, , sob, cp, changes in vision, headache at this time. Pt A&Ox4 well appearing ,  at the bed side. 74 y/o F, pmh multiple SBOs, bowel resection with ileostomy, presents to the ED with decreased ostomy output x 3 days  with associated s/s of chills and bloating.  Pt states she has decreased appetite and decreased PO intake. upon assessment, abd non distended, soft, diffusely tender. ostomy stoma bright red. Pt denies fever, , sob, cp, changes in vision, headache at this time. Pt A&Ox4 well appearing ,  at the bed side.

## 2024-04-22 NOTE — PATIENT PROFILE ADULT - FALL HARM RISK - HARM RISK INTERVENTIONS
While in Labor & Delivery Assistance with ambulation/Assistance OOB with selected safe patient handling equipment/Communicate Risk of Fall with Harm to all staff/Reinforce activity limits and safety measures with patient and family/Tailored Fall Risk Interventions/Visual Cue: Yellow wristband and red socks/Bed in lowest position, wheels locked, appropriate side rails in place/Call bell, personal items and telephone in reach/Instruct patient to call for assistance before getting out of bed or chair/Non-slip footwear when patient is out of bed/[place] to call system/Physically safe environment - no spills, clutter or unnecessary equipment/Purposeful Proactive Rounding/Room/bathroom lighting operational, light cord in reach

## 2024-04-22 NOTE — ED PROVIDER NOTE - OBJECTIVE STATEMENT
73 year-old female with past medical history of Sjogren's syndrome, hypothyroidism, dyslipidemia, previous hysterectomy which was complicated by small bowel, obstruction leading to resection and ileostomy, presents the emergency department due to abdominal pain and decreased output from her ostomy. Yesterday, she only had 800 mL of output when she typically has 1200 mL. Today she has essentially no output. She denies fevers or chills. She endorses nausea.

## 2024-04-22 NOTE — ED PROVIDER NOTE - CLINICAL SUMMARY MEDICAL DECISION MAKING FREE TEXT BOX
The patient currently reports abdominal pain. Based upon the history and exam, the patient requires CT imaging of the abdomen and pelvis. They have abdominal tenderness or pain concerning for an acute intra-abdominal pathology including infection or obstruction. The patient is currently reporting pain that will be addressed with appropriate medication & therapeutics and will require reassessment. Colorectal surgery to be consulted as indicated.

## 2024-04-22 NOTE — H&P ADULT - NSHPLABSRESULTS_GEN_ALL_CORE
Resumed patient care @2313  Patient is Aox2  On room air  Not on tele  Afebrile  Incontinent, Briefed  Denies pain, tylenol PRN  Placed lang @1100  Up with 2 and walker and gait belt  Replaced Magnesium  Patient being discharged to [id number] [facility] in [facility] imbalances  - Administer electrolyte replacement as ordered  - Monitor response to electrolyte replacements, including rhythm and repeat lab results as appropriate  - Fluid restriction as ordered  - Instruct patient on fluid and nutrition restrictions as a for standing, transferring and ambulating w/ or w/o assistive devices  - Assist with transfers and ambulation using safe patient handling equipment as needed  - Ensure adequate protection for wounds/incisions during mobilization  - Obtain PT/OT consults as encourage patient/family in tolerated functional activity level and precautions during self-care     Outcome: Progressing     Problem: Patient/Family Goals  Goal: Patient/Family Long Term Goal  Description  Patient's Long Term Goal: RETURN HOME    Interven .  LABS:                         12.8   10.28 )-----------( 172      ( 22 Apr 2024 15:31 )             38.8     04-22    133<L>  |  95<L>  |  23  ----------------------------<  91  4.4   |  26  |  1.15    Ca    10.2      22 Apr 2024 15:31  Mg     1.6     04-22    TPro  7.9  /  Alb  4.1  /  TBili  0.9  /  DBili  x   /  AST  32  /  ALT  24  /  AlkPhos  90  04-22    PT/INR - ( 22 Apr 2024 15:31 )   PT: 58.9 sec;   INR: 5.91 ratio         PTT - ( 22 Apr 2024 15:31 )  PTT:48.4 sec  Urinalysis Basic - ( 22 Apr 2024 15:31 )    Color: x / Appearance: x / SG: x / pH: x  Gluc: 91 mg/dL / Ketone: x  / Bili: x / Urobili: x   Blood: x / Protein: x / Nitrite: x   Leuk Esterase: x / RBC: x / WBC x   Sq Epi: x / Non Sq Epi: x / Bacteria: x    < from: CT Abdomen and Pelvis w/ Oral Cont and w/ IV Cont (04.22.24 @ 19:18) >    FINDINGS:  LOWER CHEST: Within normal limits.    LIVER: Within normal limits.  BILE DUCTS: Normal caliber.  GALLBLADDER: Minimally distended.  SPLEEN: Within normal limits.  PANCREAS:Within normal limits.  ADRENALS: Within normal limits.  KIDNEYS/URETERS: Symmetric enhancement. No hydronephrosis. Nonobstructing   left upper pole stone measures 3 mm.    BLADDER: Minimally distended.  REPRODUCTIVE ORGANS: Hysterectomy.    BOWEL:  Right lower quadrant ileostomy and ileocecal anastomosis. Fluid   distended loops of small bowel throughout the abdomen with tapering seen   just proximal to the ileostomy, no discrete transition point. Fluid   distention of large bowel loops with fecalized material with suggestion   of transition point at rectosigmoid anastomosis.  PERITONEUM: Small free fluid in the pelvis.  VESSELS: Atherosclerotic changes.  RETROPERITONEUM/LYMPH NODES: No lymphadenopathy.  ABDOMINAL WALL: Right lower quadrant ileostomy.  BONES: Degenerative changes. Grade 1 anterolisthesis of L5 on S1.    IMPRESSION:    Distended loops of small bowel with gradual caliber transition prior to   the ileostomy most suggestive of ileus or partial small bowel obstruction   without discrete transition point.    Fluid distention and fecalization of large bowel with suggestion of   transition point at the rectosigmoid anastomosis,.    < end of copied text >

## 2024-04-23 ENCOUNTER — TRANSCRIPTION ENCOUNTER (OUTPATIENT)
Age: 74
End: 2024-04-23

## 2024-04-23 LAB
ALBUMIN SERPL ELPH-MCNC: 3.6 G/DL — SIGNIFICANT CHANGE UP (ref 3.3–5)
ALP SERPL-CCNC: 84 U/L — SIGNIFICANT CHANGE UP (ref 40–120)
ALT FLD-CCNC: 17 U/L — SIGNIFICANT CHANGE UP (ref 10–45)
ANION GAP SERPL CALC-SCNC: 14 MMOL/L — SIGNIFICANT CHANGE UP (ref 5–17)
APTT BLD: 34.4 SEC — SIGNIFICANT CHANGE UP (ref 24.5–35.6)
AST SERPL-CCNC: 28 U/L — SIGNIFICANT CHANGE UP (ref 10–40)
BILIRUB SERPL-MCNC: 1.2 MG/DL — SIGNIFICANT CHANGE UP (ref 0.2–1.2)
BLD GP AB SCN SERPL QL: NEGATIVE — SIGNIFICANT CHANGE UP
BUN SERPL-MCNC: 18 MG/DL — SIGNIFICANT CHANGE UP (ref 7–23)
CALCIUM SERPL-MCNC: 9.2 MG/DL — SIGNIFICANT CHANGE UP (ref 8.4–10.5)
CHLORIDE SERPL-SCNC: 94 MMOL/L — LOW (ref 96–108)
CO2 SERPL-SCNC: 26 MMOL/L — SIGNIFICANT CHANGE UP (ref 22–31)
CREAT SERPL-MCNC: 1.08 MG/DL — SIGNIFICANT CHANGE UP (ref 0.5–1.3)
EGFR: 54 ML/MIN/1.73M2 — LOW
GLUCOSE SERPL-MCNC: 62 MG/DL — LOW (ref 70–99)
HCT VFR BLD CALC: 37.3 % — SIGNIFICANT CHANGE UP (ref 34.5–45)
HGB BLD-MCNC: 11.5 G/DL — SIGNIFICANT CHANGE UP (ref 11.5–15.5)
INR BLD: 1.34 RATIO — HIGH (ref 0.85–1.18)
MAGNESIUM SERPL-MCNC: 1.5 MG/DL — LOW (ref 1.6–2.6)
MCHC RBC-ENTMCNC: 29.1 PG — SIGNIFICANT CHANGE UP (ref 27–34)
MCHC RBC-ENTMCNC: 30.8 GM/DL — LOW (ref 32–36)
MCV RBC AUTO: 94.4 FL — SIGNIFICANT CHANGE UP (ref 80–100)
NRBC # BLD: 0 /100 WBCS — SIGNIFICANT CHANGE UP (ref 0–0)
PHOSPHATE SERPL-MCNC: 3 MG/DL — SIGNIFICANT CHANGE UP (ref 2.5–4.5)
PLATELET # BLD AUTO: 148 K/UL — LOW (ref 150–400)
POTASSIUM SERPL-MCNC: 3.5 MMOL/L — SIGNIFICANT CHANGE UP (ref 3.5–5.3)
POTASSIUM SERPL-SCNC: 3.5 MMOL/L — SIGNIFICANT CHANGE UP (ref 3.5–5.3)
PROT SERPL-MCNC: 6.4 G/DL — SIGNIFICANT CHANGE UP (ref 6–8.3)
PROTHROM AB SERPL-ACNC: 14.6 SEC — HIGH (ref 9.5–13)
RBC # BLD: 3.95 M/UL — SIGNIFICANT CHANGE UP (ref 3.8–5.2)
RBC # FLD: 15.2 % — HIGH (ref 10.3–14.5)
RH IG SCN BLD-IMP: POSITIVE — SIGNIFICANT CHANGE UP
SODIUM SERPL-SCNC: 134 MMOL/L — LOW (ref 135–145)
WBC # BLD: 6.66 K/UL — SIGNIFICANT CHANGE UP (ref 3.8–10.5)
WBC # FLD AUTO: 6.66 K/UL — SIGNIFICANT CHANGE UP (ref 3.8–10.5)

## 2024-04-23 PROCEDURE — 74270 X-RAY XM COLON 1CNTRST STD: CPT | Mod: 26

## 2024-04-23 RX ORDER — CALCIUM CARBONATE 500(1250)
1 TABLET ORAL DAILY
Refills: 0 | Status: DISCONTINUED | OUTPATIENT
Start: 2024-04-23 | End: 2024-04-24

## 2024-04-23 RX ORDER — DIPHENOXYLATE HCL/ATROPINE 2.5-.025MG
1 TABLET ORAL
Refills: 0 | Status: DISCONTINUED | OUTPATIENT
Start: 2024-04-23 | End: 2024-04-23

## 2024-04-23 RX ORDER — PRIMIDONE 250 MG/1
50 TABLET ORAL DAILY
Refills: 0 | Status: DISCONTINUED | OUTPATIENT
Start: 2024-04-23 | End: 2024-04-24

## 2024-04-23 RX ORDER — LANOLIN ALCOHOL/MO/W.PET/CERES
3 CREAM (GRAM) TOPICAL AT BEDTIME
Refills: 0 | Status: DISCONTINUED | OUTPATIENT
Start: 2024-04-23 | End: 2024-04-24

## 2024-04-23 RX ORDER — PANTOPRAZOLE SODIUM 20 MG/1
40 TABLET, DELAYED RELEASE ORAL DAILY
Refills: 0 | Status: DISCONTINUED | OUTPATIENT
Start: 2024-04-23 | End: 2024-04-24

## 2024-04-23 RX ORDER — PREGABALIN 225 MG/1
1000 CAPSULE ORAL DAILY
Refills: 0 | Status: DISCONTINUED | OUTPATIENT
Start: 2024-04-23 | End: 2024-04-24

## 2024-04-23 RX ORDER — PHYTONADIONE (VIT K1) 5 MG
10 TABLET ORAL ONCE
Refills: 0 | Status: COMPLETED | OUTPATIENT
Start: 2024-04-23 | End: 2024-04-23

## 2024-04-23 RX ORDER — LEVOTHYROXINE SODIUM 125 MCG
88 TABLET ORAL DAILY
Refills: 0 | Status: DISCONTINUED | OUTPATIENT
Start: 2024-04-23 | End: 2024-04-24

## 2024-04-23 RX ORDER — ATORVASTATIN CALCIUM 80 MG/1
80 TABLET, FILM COATED ORAL AT BEDTIME
Refills: 0 | Status: DISCONTINUED | OUTPATIENT
Start: 2024-04-23 | End: 2024-04-24

## 2024-04-23 RX ORDER — FAMOTIDINE 10 MG/ML
20 INJECTION INTRAVENOUS DAILY
Refills: 0 | Status: DISCONTINUED | OUTPATIENT
Start: 2024-04-23 | End: 2024-04-24

## 2024-04-23 RX ORDER — HEPARIN SODIUM 5000 [USP'U]/ML
5000 INJECTION INTRAVENOUS; SUBCUTANEOUS EVERY 8 HOURS
Refills: 0 | Status: DISCONTINUED | OUTPATIENT
Start: 2024-04-23 | End: 2024-04-24

## 2024-04-23 RX ORDER — MAGNESIUM SULFATE 500 MG/ML
2 VIAL (ML) INJECTION ONCE
Refills: 0 | Status: COMPLETED | OUTPATIENT
Start: 2024-04-23 | End: 2024-04-23

## 2024-04-23 RX ORDER — FOLIC ACID 0.8 MG
1 TABLET ORAL DAILY
Refills: 0 | Status: DISCONTINUED | OUTPATIENT
Start: 2024-04-23 | End: 2024-04-24

## 2024-04-23 RX ORDER — ASPIRIN/CALCIUM CARB/MAGNESIUM 324 MG
81 TABLET ORAL DAILY
Refills: 0 | Status: DISCONTINUED | OUTPATIENT
Start: 2024-04-23 | End: 2024-04-24

## 2024-04-23 RX ORDER — POTASSIUM CHLORIDE 20 MEQ
10 PACKET (EA) ORAL
Refills: 0 | Status: COMPLETED | OUTPATIENT
Start: 2024-04-23 | End: 2024-04-23

## 2024-04-23 RX ADMIN — Medication 25 GRAM(S): at 11:44

## 2024-04-23 RX ADMIN — ATORVASTATIN CALCIUM 80 MILLIGRAM(S): 80 TABLET, FILM COATED ORAL at 21:34

## 2024-04-23 RX ADMIN — Medication 102 MILLIGRAM(S): at 01:18

## 2024-04-23 RX ADMIN — HEPARIN SODIUM 5000 UNIT(S): 5000 INJECTION INTRAVENOUS; SUBCUTANEOUS at 14:09

## 2024-04-23 RX ADMIN — Medication 100 MILLIEQUIVALENT(S): at 14:00

## 2024-04-23 RX ADMIN — Medication 100 MILLIEQUIVALENT(S): at 12:15

## 2024-04-23 RX ADMIN — Medication 100 MILLIEQUIVALENT(S): at 11:44

## 2024-04-23 RX ADMIN — FAMOTIDINE 20 MILLIGRAM(S): 10 INJECTION INTRAVENOUS at 14:09

## 2024-04-23 RX ADMIN — HEPARIN SODIUM 5000 UNIT(S): 5000 INJECTION INTRAVENOUS; SUBCUTANEOUS at 21:33

## 2024-04-23 NOTE — DISCHARGE NOTE PROVIDER - NSDCMRMEDTOKEN_GEN_ALL_CORE_FT
aspirin 81 mg oral delayed release tablet: 1 tab(s) orally once a day  Citracal Petites 200 mg-6.25 mcg (250 intl units) oral tablet: 2 tab(s) orally 2 times a day  CoQ10 100 mg oral capsule: 2 cap(s) orally once a day  Fish Oil 1000 mg oral capsule: 1 cap(s) orally once a day  iron: 30 mg orally 2 times a day  Lomotil 2.5 mg-0.025 mg oral tablet: 1 tab(s) orally 2-4 times a day  magnesium oxide 400 mg oral tablet: 1 tab(s) orally 5 times a day  Melatonin 5 mg oral tablet: 1 tab(s) orally once a day (at bedtime)  NexIUM 40 mg oral delayed release capsule: 1 cap(s) orally once a day (in the evening)  Pepcid 20 mg oral tablet: 1 tab(s) orally once a day  primidone 50 mg oral tablet: 1 tab(s) orally once a day  Prolia 60 mg/mL subcutaneous solution: 1 dose(s) subcutaneous every 6 months  rosuvastatin 20 mg oral tablet: 1 tab(s) orally once a day (at bedtime)  Synthroid 88 mcg (0.088 mg) oral tablet: 1 tab(s) orally once a day  Vitamin B12: 1000mcg with folic acid  Vitamin D3 125 mcg/mL (5000 intl units/mL) oral liquid: 2 milliliter(s) orally once a day  Zinc 140 mg (as elemental zinc 50 mg) oral tablet: 1 tab(s) orally once a day   aspirin 81 mg oral delayed release tablet: 1 tab(s) orally once a day  Citracal Petites 200 mg-6.25 mcg (250 intl units) oral tablet: 2 tab(s) orally 2 times a day  CoQ10 100 mg oral capsule: 2 cap(s) orally once a day  cyanocobalamin 1000 mcg oral tablet: 1 tab(s) orally once a day  Fish Oil 1000 mg oral capsule: 1 cap(s) orally once a day  folic acid 1 mg oral tablet: 1 tab(s) orally once a day  iron: 30 mg orally 2 times a day  Lomotil 2.5 mg-0.025 mg oral tablet: 1 tab(s) orally 3 times a day 1 tab(s) orally 2-4 times a day MDD: 3  Melatonin 5 mg oral tablet: 1 tab(s) orally once a day (at bedtime)  NexIUM 40 mg oral delayed release capsule: 1 cap(s) orally once a day (in the evening)  Pepcid 20 mg oral tablet: 1 tab(s) orally once a day  primidone 50 mg oral tablet: 1 tab(s) orally once a day  Prolia 60 mg/mL subcutaneous solution: 1 dose(s) subcutaneous every 6 months  rosuvastatin 20 mg oral tablet: 1 tab(s) orally once a day (at bedtime)  Synthroid 88 mcg (0.088 mg) oral tablet: 1 tab(s) orally once a day  Vitamin B12: 1000mcg with folic acid  Vitamin D3 125 mcg/mL (5000 intl units/mL) oral liquid: 2 milliliter(s) orally once a day

## 2024-04-23 NOTE — DISCHARGE NOTE PROVIDER - NSDCFUSCHEDAPPT_GEN_ALL_CORE_FT
[facility]  DENTAL [address]  Scheduled Appointment: 05/02/2024    Maria D Paniagua  [facility]  NEPHRO [address]  Scheduled Appointment: 05/06/2024    Silvestre Rosen  [facility]  CARDIOLOGY [address] [facility]   Scheduled Appointment: 05/09/2024    Lee Coley  [facility]  INTMED [address]  Scheduled Appointment: 05/16/2024    Lasha Medrano  [facility]  GENSURG [address] [facility]  Scheduled Appointment: 06/20/2024

## 2024-04-23 NOTE — DISCHARGE NOTE PROVIDER - CARE PROVIDER_API CALL
Chauncey Bryan  Surgery  [address] [place] [address], [facility] [address]  [place] [address]  Phone: [phone]  Fax: [phone]  Follow Up Time: 1 week

## 2024-04-23 NOTE — DISCHARGE NOTE PROVIDER - NSDCCPCAREPLAN_GEN_ALL_CORE_FT
PRINCIPAL DISCHARGE DIAGNOSIS  Diagnosis: Acute abdominal pain  Assessment and Plan of Treatment: ostomy care as taught, change and empty as taught monitor ostomy output, notify Dr. Bryan if output decreases to less than 400ml/24 hours or increases to over 1200ml/24 hours.   ACTIVITY: No heavy lifting anything more than 10-15lbs or straining.   DIET: Low fiber diet  NOTIFY YOUR SURGEON IF: You have any bleeding that does not stop, any pus draining from your wound, any fever (over 100.4 F) or chills, persistent nausea/vomiting with inability to tolerate food or liquids, persistent diarrhea, or if your pain is not controlled on your discharge pain medications.  FOLLOW-UP:  1. Please call to make a follow-up appointment within one week of discharge with Dr. Bryan   2. Please follow up with your primary care physician in one week regarding your hospitalization.

## 2024-04-23 NOTE — DISCHARGE NOTE PROVIDER - HOSPITAL COURSE
68F PMH CAD s/p stents (on ASA), colonic inertia, rectal prolapse, bladder prolapse s/p ?suspensory surgical procedure, s/p colon resection and rectopexy  in 2011 and repeat open rectopexy with mesh in 2016, w/ hx of recurent SBO s/p ex-lap, SBR, diverting loop ileostomy 2017 ([facility])  with subsequent multiple SBO and LBO, CREST syndrome presents with decreased ostomy output and abdominal pain.  Patient currently manages colonic inertia with bi-weekly warm water enemas and last enema was on 3 days prior to presentation, but pt feels that she didn't eliminate all bezoars and continues to have stool leaking per rectum.  Also reports decreased ostomy output and increased abdominal pain for the past 2-3 days.  Reports nausea for the past few days and had one episode of vomiting in ED. Patient had all her abdominal surgeries at [facility] and is following up with GI in [place].     In ED, HDS, CT c/w pSBO and large colon burden.  (22 Apr 2024 22:12)    Pt was admitted under Surgery for further evaluation and management. Patient had Hypaque enema xray done 4/23 showing "stricture at the rectosigmoid   anastomosis without complete occlusion. Small residual contrast in the distal descending colon and rectosigmoid."  Patient evaluated by Ostomy RN and stoma digitized and irrigated. Ostomy output improved. No surgical intervention required during this admission. Diet advanced as tolerated.     Patients INR supratherapuetic, patient given Vitamin K and 1 unit FFP with normalization of INR.     On the day of discharge, the patient's vitals are stable, pain is controlled, voiding urine, +stool and gas in ostomy bag, tolerating a low fiber diet, and ambulating well. Pt will f/u with Dr. Bryan in 1-2 weeks. Pt will f/u with PCP in 1-2 weeks.   68F PMH CAD s/p stents (on ASA), colonic inertia, rectal prolapse, bladder prolapse s/p ?suspensory surgical procedure, s/p colon resection and rectopexy  in 2011 and repeat open rectopexy with mesh in 2016, w/ hx of recurent SBO s/p ex-lap, SBR, diverting loop ileostomy 2017 ([facility])  with subsequent multiple SBO and LBO, CREST syndrome presents with decreased ostomy output and abdominal pain.  Patient currently manages colonic inertia with bi-weekly warm water enemas and last enema was on 3 days prior to presentation, but pt feels that she didn't eliminate all bezoars and continues to have stool leaking per rectum.  Also reports decreased ostomy output and increased abdominal pain for the past 2-3 days.  Reports nausea for the past few days and had one episode of vomiting in ED. Patient had all her abdominal surgeries at [facility] and is following up with GI in [place].     In ED, HDS, CT c/w pSBO and large colon burden.  (22 Apr 2024 22:12)    Pt was admitted under Surgery for further evaluation and management. Patient had Hypaque enema xray done 4/23 showing "stricture at the rectosigmoid   anastomosis without complete occlusion. Small residual contrast in the distal descending colon and rectosigmoid."  Patient evaluated by Ostomy RN and stoma digitized and irrigated. Ostomy output improved. No surgical intervention required during this admission. Diet advanced as tolerated.     Patients INR supratherapuetic, patient given Vitamin K and 1 unit FFP with normalization of INR.     On HD2 patient ostomy output increased, and patient reported no abdominal pain. Diet was advance with appropriate tolerance. Decision was made to discharge patient with appropriate follow up.    On the day of discharge, the patient's vitals are stable, pain is controlled, voiding urine, +stool and gas in ostomy bag, tolerating a low fiber diet, and ambulating well. Pt will f/u with Dr. Bryan in 1-2 weeks. Pt will f/u with PCP in 1-2 weeks.

## 2024-04-23 NOTE — PROGRESS NOTE ADULT - SUBJECTIVE AND OBJECTIVE BOX
Surgery Progress Note     Subjective:  Patient seen and examined.       Vital Signs:  Vital Signs Last 24 Hrs  T(C): 37 (22 Apr 2024 23:39), Max: 37.7 (22 Apr 2024 13:18)  T(F): 98.6 (22 Apr 2024 23:39), Max: 99.8 (22 Apr 2024 13:18)  HR: 80 (22 Apr 2024 23:39) (80 - 89)  BP: 103/63 (22 Apr 2024 23:39) (103/63 - 129/65)  BP(mean): 79 (22 Apr 2024 23:22) (79 - 79)  RR: 18 (22 Apr 2024 23:39) (18 - 18)  SpO2: 97% (22 Apr 2024 23:39) (94% - 100%)    Parameters below as of 22 Apr 2024 23:39  Patient On (Oxygen Delivery Method): room air        CAPILLARY BLOOD GLUCOSE          I&O's Detail        Physical Exam:  General: NAD, resting comfortably in bed  Respiratory: Nonlabored respirations  Cardio: pulse present  Abdomen: soft, mildly distended, ostomy w/ minimal output, parastomal tenderness, not peritoneal  Vascular: extremities are warm and well perfused.       Labs:    04-22    133<L>  |  95<L>  |  23  ----------------------------<  91  4.4   |  26  |  1.15    Ca    10.2      22 Apr 2024 15:31  Mg     1.6     04-22    TPro  7.9  /  Alb  4.1  /  TBili  0.9  /  DBili  x   /  AST  32  /  ALT  24  /  AlkPhos  90  04-22    LIVER FUNCTIONS - ( 22 Apr 2024 15:31 )  Alb: 4.1 g/dL / Pro: 7.9 g/dL / ALK PHOS: 90 U/L / ALT: 24 U/L / AST: 32 U/L / GGT: x                                 12.8   10.28 )-----------( 172      ( 22 Apr 2024 15:31 )             38.8     PT/INR - ( 22 Apr 2024 23:21 )   PT: 70.6 sec;   INR: 7.12 ratio         PTT - ( 22 Apr 2024 23:21 )  PTT:39.4 sec     Surgery Progress Note     24hour events: IV vitamin K 10 mg and 1u FFP given for elevated INR    Subjective:  Patient seen and examined.       Vital Signs:  Vital Signs Last 24 Hrs  T(C): 37 (22 Apr 2024 23:39), Max: 37.7 (22 Apr 2024 13:18)  T(F): 98.6 (22 Apr 2024 23:39), Max: 99.8 (22 Apr 2024 13:18)  HR: 80 (22 Apr 2024 23:39) (80 - 89)  BP: 103/63 (22 Apr 2024 23:39) (103/63 - 129/65)  BP(mean): 79 (22 Apr 2024 23:22) (79 - 79)  RR: 18 (22 Apr 2024 23:39) (18 - 18)  SpO2: 97% (22 Apr 2024 23:39) (94% - 100%)    Parameters below as of 22 Apr 2024 23:39  Patient On (Oxygen Delivery Method): room air        CAPILLARY BLOOD GLUCOSE          I&O's Detail        Physical Exam:  General: NAD, resting comfortably in bed  Respiratory: Nonlabored respirations  Cardio: pulse present  Abdomen: soft, mildly distended, ostomy w/ minimal output, parastomal tenderness, not peritoneal  Vascular: extremities are warm and well perfused.       Labs:    04-22    133<L>  |  95<L>  |  23  ----------------------------<  91  4.4   |  26  |  1.15    Ca    10.2      22 Apr 2024 15:31  Mg     1.6     04-22    TPro  7.9  /  Alb  4.1  /  TBili  0.9  /  DBili  x   /  AST  32  /  ALT  24  /  AlkPhos  90  04-22    LIVER FUNCTIONS - ( 22 Apr 2024 15:31 )  Alb: 4.1 g/dL / Pro: 7.9 g/dL / ALK PHOS: 90 U/L / ALT: 24 U/L / AST: 32 U/L / GGT: x                                 12.8   10.28 )-----------( 172      ( 22 Apr 2024 15:31 )             38.8     PT/INR - ( 22 Apr 2024 23:21 )   PT: 70.6 sec;   INR: 7.12 ratio         PTT - ( 22 Apr 2024 23:21 )  PTT:39.4 sec

## 2024-04-24 ENCOUNTER — TRANSCRIPTION ENCOUNTER (OUTPATIENT)
Age: 74
End: 2024-04-24

## 2024-04-24 VITALS
HEART RATE: 79 BPM | OXYGEN SATURATION: 98 % | DIASTOLIC BLOOD PRESSURE: 65 MMHG | TEMPERATURE: 98 F | SYSTOLIC BLOOD PRESSURE: 108 MMHG | RESPIRATION RATE: 18 BRPM

## 2024-04-24 LAB
ANION GAP SERPL CALC-SCNC: 9 MMOL/L — SIGNIFICANT CHANGE UP (ref 5–17)
BASOPHILS # BLD AUTO: 0.06 K/UL — SIGNIFICANT CHANGE UP (ref 0–0.2)
BASOPHILS NFR BLD AUTO: 0.8 % — SIGNIFICANT CHANGE UP (ref 0–2)
BUN SERPL-MCNC: 19 MG/DL — SIGNIFICANT CHANGE UP (ref 7–23)
CALCIUM SERPL-MCNC: 9.3 MG/DL — SIGNIFICANT CHANGE UP (ref 8.4–10.5)
CHLORIDE SERPL-SCNC: 96 MMOL/L — SIGNIFICANT CHANGE UP (ref 96–108)
CO2 SERPL-SCNC: 28 MMOL/L — SIGNIFICANT CHANGE UP (ref 22–31)
CREAT SERPL-MCNC: 1.07 MG/DL — SIGNIFICANT CHANGE UP (ref 0.5–1.3)
EGFR: 54 ML/MIN/1.73M2 — LOW
EOSINOPHIL # BLD AUTO: 0.2 K/UL — SIGNIFICANT CHANGE UP (ref 0–0.5)
EOSINOPHIL NFR BLD AUTO: 2.6 % — SIGNIFICANT CHANGE UP (ref 0–6)
GLUCOSE SERPL-MCNC: 84 MG/DL — SIGNIFICANT CHANGE UP (ref 70–99)
HCT VFR BLD CALC: 34.6 % — SIGNIFICANT CHANGE UP (ref 34.5–45)
HGB BLD-MCNC: 11.4 G/DL — LOW (ref 11.5–15.5)
IMM GRANULOCYTES NFR BLD AUTO: 0.5 % — SIGNIFICANT CHANGE UP (ref 0–0.9)
LYMPHOCYTES # BLD AUTO: 1.37 K/UL — SIGNIFICANT CHANGE UP (ref 1–3.3)
LYMPHOCYTES # BLD AUTO: 17.6 % — SIGNIFICANT CHANGE UP (ref 13–44)
MAGNESIUM SERPL-MCNC: 2.3 MG/DL — SIGNIFICANT CHANGE UP (ref 1.6–2.6)
MCHC RBC-ENTMCNC: 29.3 PG — SIGNIFICANT CHANGE UP (ref 27–34)
MCHC RBC-ENTMCNC: 32.9 GM/DL — SIGNIFICANT CHANGE UP (ref 32–36)
MCV RBC AUTO: 88.9 FL — SIGNIFICANT CHANGE UP (ref 80–100)
MONOCYTES # BLD AUTO: 0.76 K/UL — SIGNIFICANT CHANGE UP (ref 0–0.9)
MONOCYTES NFR BLD AUTO: 9.8 % — SIGNIFICANT CHANGE UP (ref 2–14)
NEUTROPHILS # BLD AUTO: 5.34 K/UL — SIGNIFICANT CHANGE UP (ref 1.8–7.4)
NEUTROPHILS NFR BLD AUTO: 68.7 % — SIGNIFICANT CHANGE UP (ref 43–77)
NRBC # BLD: 0 /100 WBCS — SIGNIFICANT CHANGE UP (ref 0–0)
PHOSPHATE SERPL-MCNC: 2.5 MG/DL — SIGNIFICANT CHANGE UP (ref 2.5–4.5)
PLATELET # BLD AUTO: 184 K/UL — SIGNIFICANT CHANGE UP (ref 150–400)
POTASSIUM SERPL-MCNC: 4.6 MMOL/L — SIGNIFICANT CHANGE UP (ref 3.5–5.3)
POTASSIUM SERPL-SCNC: 4.6 MMOL/L — SIGNIFICANT CHANGE UP (ref 3.5–5.3)
RBC # BLD: 3.89 M/UL — SIGNIFICANT CHANGE UP (ref 3.8–5.2)
RBC # FLD: 14.6 % — HIGH (ref 10.3–14.5)
SODIUM SERPL-SCNC: 133 MMOL/L — LOW (ref 135–145)
WBC # BLD: 7.77 K/UL — SIGNIFICANT CHANGE UP (ref 3.8–10.5)
WBC # FLD AUTO: 7.77 K/UL — SIGNIFICANT CHANGE UP (ref 3.8–10.5)

## 2024-04-24 PROCEDURE — 86900 BLOOD TYPING SEROLOGIC ABO: CPT

## 2024-04-24 PROCEDURE — 82435 ASSAY OF BLOOD CHLORIDE: CPT

## 2024-04-24 PROCEDURE — 84295 ASSAY OF SERUM SODIUM: CPT

## 2024-04-24 PROCEDURE — 83690 ASSAY OF LIPASE: CPT

## 2024-04-24 PROCEDURE — P9059: CPT

## 2024-04-24 PROCEDURE — 96374 THER/PROPH/DIAG INJ IV PUSH: CPT

## 2024-04-24 PROCEDURE — 82803 BLOOD GASES ANY COMBINATION: CPT

## 2024-04-24 PROCEDURE — 84132 ASSAY OF SERUM POTASSIUM: CPT

## 2024-04-24 PROCEDURE — 85014 HEMATOCRIT: CPT

## 2024-04-24 PROCEDURE — 83605 ASSAY OF LACTIC ACID: CPT

## 2024-04-24 PROCEDURE — 99285 EMERGENCY DEPT VISIT HI MDM: CPT

## 2024-04-24 PROCEDURE — 82330 ASSAY OF CALCIUM: CPT

## 2024-04-24 PROCEDURE — 85610 PROTHROMBIN TIME: CPT

## 2024-04-24 PROCEDURE — 36430 TRANSFUSION BLD/BLD COMPNT: CPT

## 2024-04-24 PROCEDURE — 86850 RBC ANTIBODY SCREEN: CPT

## 2024-04-24 PROCEDURE — 80053 COMPREHEN METABOLIC PANEL: CPT

## 2024-04-24 PROCEDURE — 86901 BLOOD TYPING SEROLOGIC RH(D): CPT

## 2024-04-24 PROCEDURE — 85730 THROMBOPLASTIN TIME PARTIAL: CPT

## 2024-04-24 PROCEDURE — 85018 HEMOGLOBIN: CPT

## 2024-04-24 PROCEDURE — 83735 ASSAY OF MAGNESIUM: CPT

## 2024-04-24 PROCEDURE — 85027 COMPLETE CBC AUTOMATED: CPT

## 2024-04-24 PROCEDURE — 82947 ASSAY GLUCOSE BLOOD QUANT: CPT

## 2024-04-24 PROCEDURE — 84100 ASSAY OF PHOSPHORUS: CPT

## 2024-04-24 PROCEDURE — 36415 COLL VENOUS BLD VENIPUNCTURE: CPT

## 2024-04-24 PROCEDURE — 85025 COMPLETE CBC W/AUTO DIFF WBC: CPT

## 2024-04-24 PROCEDURE — 80048 BASIC METABOLIC PNL TOTAL CA: CPT

## 2024-04-24 PROCEDURE — 74018 RADEX ABDOMEN 1 VIEW: CPT

## 2024-04-24 PROCEDURE — 74270 X-RAY XM COLON 1CNTRST STD: CPT

## 2024-04-24 PROCEDURE — 74177 CT ABD & PELVIS W/CONTRAST: CPT | Mod: MC

## 2024-04-24 RX ORDER — PREGABALIN 225 MG/1
1 CAPSULE ORAL
Qty: 0 | Refills: 0 | DISCHARGE
Start: 2024-04-24

## 2024-04-24 RX ORDER — MAGNESIUM OXIDE 400 MG ORAL TABLET 241.3 MG
1 TABLET ORAL
Qty: 0 | Refills: 0 | DISCHARGE

## 2024-04-24 RX ORDER — DIPHENOXYLATE HCL/ATROPINE 2.5-.025MG
1 TABLET ORAL
Qty: 90 | Refills: 0
Start: 2024-04-24 | End: 2024-05-23

## 2024-04-24 RX ORDER — ZINC SULFATE TAB 220 MG (50 MG ZINC EQUIVALENT) 220 (50 ZN) MG
1 TAB ORAL
Qty: 0 | Refills: 0 | DISCHARGE

## 2024-04-24 RX ORDER — FOLIC ACID 0.8 MG
1 TABLET ORAL
Qty: 0 | Refills: 0 | DISCHARGE
Start: 2024-04-24

## 2024-04-24 RX ADMIN — PANTOPRAZOLE SODIUM 40 MILLIGRAM(S): 20 TABLET, DELAYED RELEASE ORAL at 11:34

## 2024-04-24 RX ADMIN — Medication 81 MILLIGRAM(S): at 11:35

## 2024-04-24 RX ADMIN — PREGABALIN 1000 MICROGRAM(S): 225 CAPSULE ORAL at 11:34

## 2024-04-24 RX ADMIN — Medication 88 MICROGRAM(S): at 05:33

## 2024-04-24 RX ADMIN — HEPARIN SODIUM 5000 UNIT(S): 5000 INJECTION INTRAVENOUS; SUBCUTANEOUS at 13:18

## 2024-04-24 RX ADMIN — HEPARIN SODIUM 5000 UNIT(S): 5000 INJECTION INTRAVENOUS; SUBCUTANEOUS at 05:33

## 2024-04-24 RX ADMIN — FAMOTIDINE 20 MILLIGRAM(S): 10 INJECTION INTRAVENOUS at 11:35

## 2024-04-24 RX ADMIN — PRIMIDONE 50 MILLIGRAM(S): 250 TABLET ORAL at 11:35

## 2024-04-24 RX ADMIN — Medication 1 MILLIGRAM(S): at 11:35

## 2024-04-24 NOTE — DISCHARGE NOTE NURSING/CASE MANAGEMENT/SOCIAL WORK - NSDCVIVACCINE_GEN_ALL_CORE_FT
Tdap; 04-Aug-2019 14:09; Rakha, Rozina (RN); [employer]; [id number] (Exp. Date: 06-Aug-2021); IntraMuscular; Deltoid Left.; 0.5 milliLiter(s); VIS (VIS Published: 09-May-2013, VIS Presented: 04-Aug-2019);   Tdap; 04-Nov-2021 21:39; Karie Woody (NATALYA); [employer]; [id number] (Exp. Date: 29-Jul-2023); IntraMuscular; Deltoid Left.; 0.5 milliLiter(s); VIS (VIS Published: 09-May-2013, VIS Presented: 04-Nov-2021);

## 2024-04-24 NOTE — ADVANCED PRACTICE NURSE CONSULT - ASSESSMENT
Patient encountered on 2 Joe. When wound care RN arrived on unit, patient was found standing at bedside about to walk to the bathroom,  at bedside. Patient was alert and oriented and gave consent to skin consult. Ms Avila reports that she is incontinent of bladder and bowel at times. The wound care RN was able to visualize an area of persistent nonblanchable maroon discoloration over B/L buttocks/sacral skin, area measures approximately 5cm x 8cm x 0cm- presentation is consistent with a deep tissue injury with incontinence involvement present on admission. On midline thoracic spine, protruding mino prominences noted, hyperpigmentation is present measuring approximately 6cm x 2cm x 0cm, cannot rule out a deep tissue injury present on admission. Once consult was complete, patient and family were educated regarding the need for routine turning and positioning to prevent pressure injuries.
In to see pt well known to ostomy team from previous surgical admission for ileostomy lavage as requested by . Chart reviewed &events noted to date. Pt in bed "feeling better", s/p CT scan, pt reports passing a lot of "stuff from my rear end" -contrast per pt.. On assessment, pouching system intact, was changed by spouse last night, + liquid stool in pouch noted (approx 100cc). Pt is wearing own pouching system Leyla 2 piece beige pouch with gas filter built in. Pt has agreed to use hospital formulary (transparent & non filtered pouch). Pt familiar with ileostomy lavage& agreed to the procedure.    Digitalized stoma w/gloved lubricated finger w/o difficulty. Inserted Maori 15 lubricated catheter into proximal limb of loop ileostomy. Stoma lavaged w/  total of 200cc NS inserted via cath with +returns. Noted some gas & bilious liquid effluent noted. Pt tolerated procedure, abd soft non distended, denies any discomfort. Repouched w/Leyla 2 1/4" soft convex skin barrier, barrier ring & high output pouch . Briefly reviewed ileostomy dietary guidelines, importance of chewing foods well & maintaining hydration. Pt with reportedly high INR. Recommend cooked green leafy vegetable in moderation. Dietary consult requested.  Discussed plan of care w/ pt, staff RN, surgical THUY Phillips & Dr Bryan. Supplies & pattern left @bedside. Safety maintained. Emotional support provided.

## 2024-04-24 NOTE — DISCHARGE NOTE NURSING/CASE MANAGEMENT/SOCIAL WORK - PATIENT PORTAL LINK FT
You can access the FollowMyHealth Patient Portal offered by [facility] by registering at the following website: [email or web address]. By joining [employer]’s FollowMyHealth portal, you will also be able to view your health information using other applications (apps) compatible with our system.

## 2024-04-24 NOTE — PROGRESS NOTE ADULT - ASSESSMENT
68F PMH CAD s/p stents (on ASA), colonic inertia, rectal prolapse, bladder prolapse s/p ?suspensory surgical procedure, s/p colon resection and rectopexy  in 2011 and repeat open rectopexy with mesh in 2016, w/ hx of recurent SBO s/p ex-lap, SBR, diverting loop ileostomy 2017 ([facility])  with subsequent multiple SBO and LBO, CREST syndrome presents with decreased ostomy output and abdominal pain found to have pSBO and large colon burden.     Plan  - Ostomy consult for stoma irrigation  - CLD  - pain control PRN  - dvt ppx    Surgery Green Team  [phone]    68F PMH CAD s/p stents (on ASA), colonic inertia, rectal prolapse, bladder prolapse s/p ?suspensory surgical procedure, s/p colon resection and rectopexy  in 2011 and repeat open rectopexy with mesh in 2016, w/ hx of recurent SBO s/p ex-lap, SBR, diverting loop ileostomy 2017 ([facility])  with subsequent multiple SBO and LBO, CREST syndrome presents with decreased ostomy output and abdominal pain found to have pSBO and large colon burden.     Plan  - Ostomy consult for stoma irrigation  - CLD, will advance diet to LRD   - Pain control PRN  - Dvt ppx  - Dispo: Home today vs tomorrow     Surgery Green Team  [phone]

## 2024-04-24 NOTE — DIETITIAN INITIAL EVALUATION ADULT - ORAL INTAKE PTA/DIET HISTORY
Pt reports fair to good PO intake and appetite, states she isn't able to eat too much at once. Has followed a strictly low fiber diet since ostomy surgery 2017. Recently began consuming melon, peeled apple, avoids mostly all vegetables. States she is afraid to reintroduce additional foods into her diet. NKFA. Pt denies chewing/swallowing difficulty, nausea, vomiting. Pt admitted with decreased ostomy output, found to have partial SBO.

## 2024-04-24 NOTE — DIETITIAN INITIAL EVALUATION ADULT - NSFNSPHYEXAMSKINFT_GEN_A_CORE
Pressure Injury 1: Right:, buttocks, Stage I  Pressure Injury 2: sacrum, Suspected deep tissue injury

## 2024-04-24 NOTE — PROGRESS NOTE ADULT - ATTENDING COMMENTS
Patient cleaned out diverted colon after Hypaque enema.  Ostomy functioning normally for patient at this point.  Trial of solid diet.  If patient does well then discharge this evening.  Patient will resume antidiarrheals at home for high ostomy output as needed.  She knows to discontinue antidiarrheals immediately if ostomy output decreases.  Dietary instructions given to patient regarding foods that contain vitamin K that she is able to eat.
Ostomy starting to function.  Abd some distension.  Will have ostomy team irrigate stoma and will get hypague enema for large colon stool burden with colon out of circuit.  Follow up INR and give vitamin K as needed

## 2024-04-24 NOTE — ADVANCED PRACTICE NURSE CONSULT - REASON FOR CONSULT
Ileostomy lavage & diet advice. (referred to dietitian). H&P is noted.    68F PMH CAD s/p stents (on ASA), colonic inertia, rectal prolapse, bladder prolapse s/p ?suspensory surgical procedure, s/p colon resection and rectopexy  in 2011 and repeat open rectopexy with mesh in 2016, w/ hx of recurent SBO s/p ex-lap, SBR, diverting loop ileostomy 2017 ([facility])  with subsequent multiple SBO and LBO, CREST syndrome presents with decreased ostomy output and abdominal pain.  Patient currently manages colonic inertia with bi-weekly warm water enemas and last enema was on 3 days prior to presentation, but pt feels that she didn't eliminate all bezoars and continues to have stool leaking per rectum.  Also reports decreased ostomy output and increased abdominal pain for the past 2-3 days.  Reports nausea for the past few days and had one episode of vomiting in ED. Patient had all her abdominal surgeries at [facility] and is following up with GI in [place].     In ED, HDS, CT c/w pSBO and large colon burden.
Wound care consult initiated by RN to assess patient's skin for a possible right buttocks and sacral deep tissue injury present on admission     History of Present Illness:   68F PMH CAD s/p stents (on ASA), colonic inertia, rectal prolapse, bladder prolapse s/p ?suspensory surgical procedure, s/p colon resection and rectopexy  in 2011 and repeat open rectopexy with mesh in 2016, w/ hx of recurent SBO s/p ex-lap, SBR, diverting loop ileostomy 2017 ([facility])  with subsequent multiple SBO and LBO, CREST syndrome presents with decreased ostomy output and abdominal pain.  Patient currently manages colonic inertia with bi-weekly warm water enemas and last enema was on 3 days prior to presentation, but pt feels that she didn't eliminate all bezoars and continues to have stool leaking per rectum.  Also reports decreased ostomy output and increased abdominal pain for the past 2-3 days.  Reports nausea for the past few days and had one episode of vomiting in ED. Patient had all her abdominal surgeries at [facility] and is following up with GI in [place].     In ED, HDS, CT c/w pSBO and large colon burden.

## 2024-04-24 NOTE — DIETITIAN INITIAL EVALUATION ADULT - ENERGY INTAKE
Fair (50-75%) In-house pt reports good tolerance to low fiber diet. No acute GI distress noted. Ostomy output: 1360ml (4/23).

## 2024-04-24 NOTE — PROVIDER CONTACT NOTE (OTHER) - SITUATION
Pt , asymptomatic. Pt previously went to bathroom. States that it is a lot of movement for her so that's why heart rate is high

## 2024-04-24 NOTE — DIETITIAN INITIAL EVALUATION ADULT - ETIOLOGY
related to incomplete exposure to previous diet education  related to increased physiological demand for nutrients

## 2024-04-24 NOTE — DIETITIAN INITIAL EVALUATION ADULT - OTHER INFO
Weight: pt reports used to be ~ 117lbs prior to surgery in 2017, states now it has stabilized ~ 90lbs. Weight noted as 99lbs (4/28/22), 92lbs (5/22/23). Current dosing weight is 89lbs.

## 2024-04-24 NOTE — DIETITIAN INITIAL EVALUATION ADULT - NSFNSGIIOFT_GEN_A_CORE
04-23-24 @ 07:01 - 04-24-24 @ 07:00  --------------------------------------------------------  OUT:    Ileostomy (mL): 1360 mL  Total OUT: 1360 mL    Total NET: -1360 mL      04-24-24 @ 07:01 - 04-24-24 @ 13:41  --------------------------------------------------------  OUT:    Ileostomy (mL): 250 mL  Total OUT: 250 mL    Total NET: -250 mL

## 2024-04-24 NOTE — DIETITIAN INITIAL EVALUATION ADULT - REASON FOR ADMISSION
Abdominal pain    Chart reviewed, events noted. This is a "68F PMH CAD s/p stents (on ASA), colonic inertia, rectal prolapse, bladder prolapse s/p ?suspensory surgical procedure, s/p colon resection and rectopexy  in 2011 and repeat open rectopexy with mesh in 2016, w/ hx of recurent SBO s/p ex-lap, SBR, diverting loop ileostomy 2017 ([facility])  with subsequent multiple SBO and LBO, CREST syndrome presents with decreased ostomy output and abdominal pain found to have pSBO and large colon burden"

## 2024-04-24 NOTE — DIETITIAN INITIAL EVALUATION ADULT - ADD RECOMMEND
1) Continue current diet as tolerated. 2) Encourage PO intake of protein-rich foods. RD to add Mighty Shakes 2x daily to supplement PO intake as needed. 3) Recommend addition of multivitamin, ascorbic acid daily for wound healing if no contraindications. 4) Diet education provided, reinforce as needed.

## 2024-04-24 NOTE — DISCHARGE NOTE NURSING/CASE MANAGEMENT/SOCIAL WORK - NSDCPEFALRISK_GEN_ALL_CORE
For information on Fall & Injury Prevention, visit: [email or web address] OR  [email or web address] OR  https://www.cdc.gov/steadi/patient.html

## 2024-04-24 NOTE — DIETITIAN INITIAL EVALUATION ADULT - EDUCATION DIETARY MODIFICATIONS
discussed gradual reintroduction of fiber back into diet, provided tips for incorporating more vegetables into the diet, specifically leafy green vegetables./(1) partially meets; needs review/practice/verbalization

## 2024-04-24 NOTE — DIETITIAN INITIAL EVALUATION ADULT - PERTINENT LABORATORY DATA
04-24    133<L>  |  96  |  19  ----------------------------<  84  4.6   |  28  |  1.07    Ca    9.3      24 Apr 2024 07:48  Phos  2.5     04-24  Mg     2.3     04-24    TPro  6.4  /  Alb  3.6  /  TBili  1.2  /  DBili  x   /  AST  28  /  ALT  17  /  AlkPhos  84  04-23

## 2024-04-24 NOTE — PROGRESS NOTE ADULT - SUBJECTIVE AND OBJECTIVE BOX
Surgery Progress Note     Subjective:  Patient seen and examined.       Vital Signs:  Vital Signs Last 24 Hrs  T(C): 36.8 (24 Apr 2024 00:14), Max: 37.1 (23 Apr 2024 16:31)  T(F): 98.3 (24 Apr 2024 00:14), Max: 98.8 (23 Apr 2024 16:31)  HR: 102 (24 Apr 2024 00:14) (81 - 102)  BP: 118/61 (24 Apr 2024 00:14) (95/52 - 126/75)  BP(mean): --  RR: 18 (24 Apr 2024 00:14) (17 - 18)  SpO2: 97% (24 Apr 2024 00:14) (96% - 99%)    Parameters below as of 24 Apr 2024 00:14  Patient On (Oxygen Delivery Method): room air        CAPILLARY BLOOD GLUCOSE          I&O's Detail    22 Apr 2024 07:01  -  23 Apr 2024 07:00  --------------------------------------------------------  IN:    IV PiggyBack: 50 mL    Lactated Ringers: 720 mL    Plasma: 350 mL  Total IN: 1120 mL    OUT:    Ileostomy (mL): 150 mL    Oral Fluid: 0 mL    Voided (mL): 650 mL  Total OUT: 800 mL    Total NET: 320 mL      23 Apr 2024 07:01  -  24 Apr 2024 01:49  --------------------------------------------------------  IN:    Oral Fluid: 470 mL  Total IN: 470 mL    OUT:    Ileostomy (mL): 1310 mL  Total OUT: 1310 mL    Total NET: -840 mL            Physical Exam:  General: NAD, resting comfortably in bed  Respiratory: Nonlabored respirations  Cardio: pulse present  Abdomen: soft, mildly distended, ostomy w/ gas and liquid, parastomal tenderness, not peritoneal  Vascular: extremities are warm and well perfused.       Labs:    04-23    134<L>  |  94<L>  |  18  ----------------------------<  62<L>  3.5   |  26  |  1.08    Ca    9.2      23 Apr 2024 08:12  Phos  3.0     04-23  Mg     1.5     04-23    TPro  6.4  /  Alb  3.6  /  TBili  1.2  /  DBili  x   /  AST  28  /  ALT  17  /  AlkPhos  84  04-23    LIVER FUNCTIONS - ( 23 Apr 2024 08:12 )  Alb: 3.6 g/dL / Pro: 6.4 g/dL / ALK PHOS: 84 U/L / ALT: 17 U/L / AST: 28 U/L / GGT: x                                 11.5   6.66  )-----------( 148      ( 23 Apr 2024 08:12 )             37.3     PT/INR - ( 23 Apr 2024 08:12 )   PT: 14.6 sec;   INR: 1.34 ratio         PTT - ( 23 Apr 2024 08:12 )  PTT:34.4 sec     Surgery Progress Note     Subjective:  Patient seen and examined. She reports feeling much better. Tolerating clears without nausea or vomiting. Would like her diet advanced. Ambulating without difficulty.She denies pain.     Vital Signs:  Vital Signs Last 24 Hrs  T(C): 36.8 (24 Apr 2024 00:14), Max: 37.1 (23 Apr 2024 16:31)  T(F): 98.3 (24 Apr 2024 00:14), Max: 98.8 (23 Apr 2024 16:31)  HR: 102 (24 Apr 2024 00:14) (81 - 102)  BP: 118/61 (24 Apr 2024 00:14) (95/52 - 126/75)  BP(mean): --  RR: 18 (24 Apr 2024 00:14) (17 - 18)  SpO2: 97% (24 Apr 2024 00:14) (96% - 99%)    Parameters below as of 24 Apr 2024 00:14  Patient On (Oxygen Delivery Method): room air      CAPILLARY BLOOD GLUCOSE    I&O's Detail  22 Apr 2024 07:01  -  23 Apr 2024 07:00  --------------------------------------------------------  IN:    IV PiggyBack: 50 mL    Lactated Ringers: 720 mL    Plasma: 350 mL  Total IN: 1120 mL    OUT:    Ileostomy (mL): 150 mL    Oral Fluid: 0 mL    Voided (mL): 650 mL  Total OUT: 800 mL    Total NET: 320 mL      23 Apr 2024 07:01  -  24 Apr 2024 01:49  --------------------------------------------------------  IN:    Oral Fluid: 470 mL  Total IN: 470 mL    OUT:    Ileostomy (mL): 1310 mL  Total OUT: 1310 mL    Total NET: -840 mL        Physical Exam  General: NAD, resting comfortably in bed  Respiratory: Nonlabored respirations  Cardio: pulse present  Abdomen: soft, mildly distended, ostomy w/ gas and liquid, parastomal tenderness, not peritoneal  Vascular: extremities are warm and well perfused.       Labs:    04-23    134<L>  |  94<L>  |  18  ----------------------------<  62<L>  3.5   |  26  |  1.08    Ca    9.2      23 Apr 2024 08:12  Phos  3.0     04-23  Mg     1.5     04-23    TPro  6.4  /  Alb  3.6  /  TBili  1.2  /  DBili  x   /  AST  28  /  ALT  17  /  AlkPhos  84  04-23    LIVER FUNCTIONS - ( 23 Apr 2024 08:12 )  Alb: 3.6 g/dL / Pro: 6.4 g/dL / ALK PHOS: 84 U/L / ALT: 17 U/L / AST: 28 U/L / GGT: x                                 11.5   6.66  )-----------( 148      ( 23 Apr 2024 08:12 )             37.3     PT/INR - ( 23 Apr 2024 08:12 )   PT: 14.6 sec;   INR: 1.34 ratio         PTT - ( 23 Apr 2024 08:12 )  PTT:34.4 sec

## 2024-04-24 NOTE — ADVANCED PRACTICE NURSE CONSULT - RECOMMEDATIONS
Impression:     midline spine hyperpigmentation, cannot rule out a deep tissue injury present on admission   B/L buttocks/sacral deep tissue injury present on admission  fecal incontinence  urinary incontinence    Recommendations:     1) turn and position q2 and PRN utilizing offloading assistive devices  2) routine pericare daily and PRN soiling  3) encourage optimal nutrition  4) waffle cushion when oob to chair  5) B/L LE complete cair air fluidized boots or jitendra-lock pillow to offload heels/feet  6) triad protective barrier cream to B/L buttocks/sacrum daily and PRN soiling  7) incontinence management - consider external urinary catheter to divert urine from skin if incontinent  8) midline spine -cleanse skin and pat dry then apply cavilon to skin and cover with allevyn foam dressing, change every other day and/or PRN soiling     Plan discussed with NATALYA Johnson on unit    For questions/comments regarding the recommendations in this consult, please contact [place] at [phone]. Thank you!
Will recommend:  1. Monitor stoma output.  2. Pouching system changes 2x a week & as needed for leakage.   3. Empty pouch when 1/3- 1/2 full, burp to release gas as needed.  4. Contact ostomy RN for any stoma issues, concerns, questions.  5. Supplies provided; Leyla 2 1/4" soft convex skin barrier, #[id number], pouch #[id number], stoma powder #[id number], cavilon barrier film wipe #[id number], #stoma paste -[employer] #[id number]  Patient to switch back to own supplies upon discharge.

## 2024-04-24 NOTE — DIETITIAN INITIAL EVALUATION ADULT - PERTINENT MEDS FT
MEDICATIONS  (STANDING):  aspirin  chewable 81 milliGRAM(s) Oral daily  atorvastatin 80 milliGRAM(s) Oral at bedtime  cyanocobalamin 1000 MICROGram(s) Oral daily  famotidine    Tablet 20 milliGRAM(s) Oral daily  folic acid 1 milliGRAM(s) Oral daily  heparin   Injectable 5000 Unit(s) SubCutaneous every 8 hours  lactated ringers. 1000 milliLiter(s) (60 mL/Hr) IV Continuous <Continuous>  levothyroxine 88 MICROGram(s) Oral daily  pantoprazole    Tablet 40 milliGRAM(s) Oral daily  primidone 50 milliGRAM(s) Oral daily    MEDICATIONS  (PRN):  calcium carbonate    500 mG (Tums) Chewable 1 Tablet(s) Chew daily PRN Heartburn  melatonin 3 milliGRAM(s) Oral at bedtime PRN Insomnia  ondansetron Injectable 4 milliGRAM(s) IV Push every 6 hours PRN Nausea and/or Vomiting

## 2024-04-25 RX ORDER — DIPHENOXYLATE HCL/ATROPINE 2.5-.025MG
1 TABLET ORAL
Qty: 0 | Refills: 0 | DISCHARGE
Start: 2024-04-25

## 2024-04-29 ENCOUNTER — LABORATORY RESULT (OUTPATIENT)
Age: 74
End: 2024-04-29

## 2024-05-02 ENCOUNTER — APPOINTMENT (OUTPATIENT)
Age: 74
End: 2024-05-02
Payer: MEDICARE

## 2024-05-02 PROCEDURE — 99213 OFFICE O/P EST LOW 20 MIN: CPT

## 2024-05-04 ENCOUNTER — APPOINTMENT (OUTPATIENT)
Dept: INTERNAL MEDICINE | Facility: CLINIC | Age: 74
End: 2024-05-04
Payer: MEDICARE

## 2024-05-04 VITALS — BODY MASS INDEX: 19.99 KG/M2 | HEIGHT: 55 IN

## 2024-05-04 VITALS — BODY MASS INDEX: 21.53 KG/M2 | WEIGHT: 86 LBS

## 2024-05-04 DIAGNOSIS — F32.A DEPRESSION, UNSPECIFIED: ICD-10-CM

## 2024-05-04 DIAGNOSIS — E03.9 HYPOTHYROIDISM, UNSPECIFIED: ICD-10-CM

## 2024-05-04 DIAGNOSIS — M34.1 CR(E)ST SYNDROME: ICD-10-CM

## 2024-05-04 DIAGNOSIS — K59.9 FUNCTIONAL INTESTINAL DISORDER, UNSPECIFIED: ICD-10-CM

## 2024-05-04 PROCEDURE — 99495 TRANSJ CARE MGMT MOD F2F 14D: CPT

## 2024-05-04 RX ORDER — ASPIRIN ENTERIC COATED TABLETS 81 MG 81 MG/1
81 TABLET, DELAYED RELEASE ORAL
Qty: 90 | Refills: 3 | Status: ACTIVE | COMMUNITY
Start: 2019-07-24 | End: 1900-01-01

## 2024-05-06 ENCOUNTER — APPOINTMENT (OUTPATIENT)
Dept: NEPHROLOGY | Facility: CLINIC | Age: 74
End: 2024-05-06
Payer: MEDICARE

## 2024-05-06 VITALS
DIASTOLIC BLOOD PRESSURE: 57 MMHG | WEIGHT: 84 LBS | SYSTOLIC BLOOD PRESSURE: 117 MMHG | OXYGEN SATURATION: 97 % | BODY MASS INDEX: 19.44 KG/M2 | TEMPERATURE: 97.5 F | HEIGHT: 55 IN | HEART RATE: 71 BPM

## 2024-05-06 DIAGNOSIS — E83.42 HYPOMAGNESEMIA: ICD-10-CM

## 2024-05-06 DIAGNOSIS — E87.1 HYPO-OSMOLALITY AND HYPONATREMIA: ICD-10-CM

## 2024-05-06 DIAGNOSIS — E87.6 HYPOKALEMIA: ICD-10-CM

## 2024-05-06 PROCEDURE — G2211 COMPLEX E/M VISIT ADD ON: CPT

## 2024-05-06 PROCEDURE — 99215 OFFICE O/P EST HI 40 MIN: CPT

## 2024-05-06 NOTE — PHYSICAL EXAM
[General Appearance - Alert] : alert [General Appearance - In No Acute Distress] : in no acute distress [General Appearance - Well Nourished] : well nourished [General Appearance - Well Developed] : well developed [General Appearance - Well-Appearing] : healthy appearing [Sclera] : the sclera and conjunctiva were normal [PERRL With Normal Accommodation] : pupils were equal in size, round, and reactive to light [Extraocular Movements] : extraocular movements were intact [Outer Ear] : the ears and nose were normal in appearance [Hearing Threshold Finger Rub Not Pepin] : hearing was normal [Examination Of The Oral Cavity] : the lips and gums were normal [Neck Appearance] : the appearance of the neck was normal [Neck Cervical Mass (___cm)] : no neck mass was observed [Jugular Venous Distention Increased] : there was no jugular-venous distention [Thyroid Diffuse Enlargement] : the thyroid was not enlarged [Respiration, Rhythm And Depth] : normal respiratory rhythm and effort [Exaggerated Use Of Accessory Muscles For Inspiration] : no accessory muscle use [Auscultation Breath Sounds / Voice Sounds] : lungs were clear to auscultation bilaterally [Heart Rate And Rhythm] : heart rate was normal and rhythm regular [Heart Sounds] : normal S1 and S2 [Heart Sounds Gallop] : no gallops [Murmurs] : no murmurs [Heart Sounds Pericardial Friction Rub] : no pericardial rub [Arterial Pulses Carotid] : carotid pulses were normal with no bruits [Edema] : there was no peripheral edema [Bowel Sounds] : normal bowel sounds [Abdomen Soft] : soft [Abdomen Tenderness] : non-tender [Abdomen Mass (___ Cm)] : no abdominal mass palpated [No CVA Tenderness] : no ~M costovertebral angle tenderness [No Spinal Tenderness] : no spinal tenderness [Abnormal Walk] : normal gait [Nail Clubbing] : no clubbing  or cyanosis of the fingernails [Involuntary Movements] : no involuntary movements were seen [Musculoskeletal - Swelling] : no joint swelling seen [Motor Tone] : muscle strength and tone were normal [Skin Color & Pigmentation] : normal skin color and pigmentation [Skin Turgor] : normal skin turgor [] : no rash [Skin Lesions] : no skin lesions [Cranial Nerves] : cranial nerves 2-12 were intact [Deep Tendon Reflexes (DTR)] : deep tendon reflexes were 2+ and symmetric [Sensation] : the sensory exam was normal to light touch and pinprick [Motor Exam] : the motor exam was normal [Oriented To Time, Place, And Person] : oriented to person, place, and time [Impaired Insight] : insight and judgment were intact [Affect] : the affect was normal [Mood] : the mood was normal

## 2024-05-08 PROBLEM — R55 VASOVAGAL SYNCOPE: Status: ACTIVE | Noted: 2020-08-03

## 2024-05-08 PROBLEM — I25.10 CAD S/P PERCUTANEOUS CORONARY ANGIOPLASTY: Status: ACTIVE | Noted: 2019-07-17

## 2024-05-09 ENCOUNTER — APPOINTMENT (OUTPATIENT)
Dept: CARDIOLOGY | Facility: CLINIC | Age: 74
End: 2024-05-09
Payer: MEDICARE

## 2024-05-09 ENCOUNTER — NON-APPOINTMENT (OUTPATIENT)
Age: 74
End: 2024-05-09

## 2024-05-09 VITALS
BODY MASS INDEX: 18.55 KG/M2 | WEIGHT: 86 LBS | HEIGHT: 57 IN | SYSTOLIC BLOOD PRESSURE: 102 MMHG | DIASTOLIC BLOOD PRESSURE: 60 MMHG | OXYGEN SATURATION: 94 % | RESPIRATION RATE: 18 BRPM | HEART RATE: 74 BPM

## 2024-05-09 DIAGNOSIS — R42 DIZZINESS AND GIDDINESS: ICD-10-CM

## 2024-05-09 DIAGNOSIS — I25.10 ATHEROSCLEROTIC HEART DISEASE OF NATIVE CORONARY ARTERY W/OUT ANGINA PECTORIS: ICD-10-CM

## 2024-05-09 DIAGNOSIS — Z98.61 ATHEROSCLEROTIC HEART DISEASE OF NATIVE CORONARY ARTERY W/OUT ANGINA PECTORIS: ICD-10-CM

## 2024-05-09 DIAGNOSIS — R55 SYNCOPE AND COLLAPSE: ICD-10-CM

## 2024-05-09 PROCEDURE — 99215 OFFICE O/P EST HI 40 MIN: CPT

## 2024-05-09 PROCEDURE — 93000 ELECTROCARDIOGRAM COMPLETE: CPT

## 2024-05-16 PROBLEM — E87.1 HYPONATREMIA: Status: ACTIVE | Noted: 2022-06-27

## 2024-05-16 LAB
APPEARANCE: CLEAR
BACTERIA: NEGATIVE /HPF
BILIRUBIN URINE: NEGATIVE
BLOOD URINE: NEGATIVE
CAST: 1 /LPF
COLOR: YELLOW
EPITHELIAL CELLS: 0 /HPF
GLUCOSE QUALITATIVE U: NEGATIVE MG/DL
KETONES URINE: NEGATIVE MG/DL
LEUKOCYTE ESTERASE URINE: NEGATIVE
MICROSCOPIC-UA: NORMAL
NITRITE URINE: NEGATIVE
OSMOLALITY UR: 315 MOSM/KG
OSMOLALITY UR: 491 MOSM/KG
PH URINE: 6.5
POTASSIUM UR-SCNC: 24 MMOL/L
POTASSIUM UR-SCNC: 91.8 MMOL/L
PROTEIN URINE: 30 MG/DL
RED BLOOD CELLS URINE: 1 /HPF
SODIUM ?TM SUB UR QN: <20 MMOL/L
SODIUM ?TM SUB UR QN: <20 MMOL/L
SPECIFIC GRAVITY URINE: 1.02
UROBILINOGEN URINE: 0.2 MG/DL
WHITE BLOOD CELLS URINE: 1 /HPF

## 2024-05-16 NOTE — HISTORY OF PRESENT ILLNESS
[FreeTextEntry1] : Follow up hyponatremia   71 yo lady who has multiple medical problems as listed below, also an ostomy who recently had an episode of confusion and went to the ER- Found to have a sodium of 130. her ostomy output had been high ( upto 1.5 L /day). urine sodium was low. she was presumed to have hypovolemic hyponatremia. she received some iv fluids. sodium was 128-130. her confusion was self limited. at that time, I advised her to increase her solute intake and not drink that much water.    she has been getting the fluid named " iv hydration" which has significantly more electrolytes than gatorade and she has been feeling better. She also intermittently has small bowel obstructions which are usually self limiting. per her, she has definitely been eating more and limiting herself to about a liter of water.   her last visit with me was about a year ago   9/15/2023   Last sodium was 130  states she has been drinking lots of fluids  appetite is not great

## 2024-05-16 NOTE — ASSESSMENT
[FreeTextEntry1] : Hyponatremia-  likely hypovolemic hyponatremia- coupled with increased water intake and poor solute intake.  her last sodium is 130.   - advised to restrict water intake to 35 ounces - Switch to liquid IV (electrolytes)  - increase salt and protein intake as able  - check urine sodium, potassium and osmolality  - repeat labs in 4-6 weeks and call me

## 2024-05-20 ENCOUNTER — APPOINTMENT (OUTPATIENT)
Dept: UROLOGY | Facility: CLINIC | Age: 74
End: 2024-05-20
Payer: MEDICARE

## 2024-05-20 VITALS — SYSTOLIC BLOOD PRESSURE: 99 MMHG | DIASTOLIC BLOOD PRESSURE: 61 MMHG | HEART RATE: 91 BPM

## 2024-05-20 DIAGNOSIS — N20.0 CALCULUS OF KIDNEY: ICD-10-CM

## 2024-05-20 DIAGNOSIS — R35.0 FREQUENCY OF MICTURITION: ICD-10-CM

## 2024-05-20 PROCEDURE — 99213 OFFICE O/P EST LOW 20 MIN: CPT

## 2024-05-20 NOTE — ASSESSMENT
[FreeTextEntry1] : New stone. Likely 2/2 malabsorptive issues.  --Renal US in 6-12mo  Mild urinary bother. No interventions at this time  --F/u with Dr Aguirre

## 2024-05-20 NOTE — PHYSICAL EXAM
[General Appearance - Well Developed] : well developed [General Appearance - Well Nourished] : well nourished [Normal Appearance] : normal appearance [Well Groomed] : well groomed [General Appearance - In No Acute Distress] : no acute distress [Abdomen Soft] : soft [Abdomen Tenderness] : non-tender [Costovertebral Angle Tenderness] : no ~M costovertebral angle tenderness [Urethral Meatus] : normal urethra [Urinary Bladder Findings] : the bladder was normal on palpation [Edema] : no peripheral edema [] : no respiratory distress [Respiration, Rhythm And Depth] : normal respiratory rhythm and effort [Exaggerated Use Of Accessory Muscles For Inspiration] : no accessory muscle use [Oriented To Time, Place, And Person] : oriented to person, place, and time [Affect] : the affect was normal [Mood] : the mood was normal [Not Anxious] : not anxious [Normal Station and Gait] : the gait and station were normal for the patient's age [Cervical Lymph Nodes Enlarged Anterior Bilaterally] : anterior cervical [Supraclavicular Lymph Nodes Enlarged Bilaterally] : supraclavicular [FreeTextEntry1] : atrophy

## 2024-05-20 NOTE — HISTORY OF PRESENT ILLNESS
[FreeTextEntry1] : 73 year old female with cc of stone.   Pt has been seen by me in the past on several occasions for hydro. Seen by me in 10/2019 for mild B hydro and distended bladder. She has complicated  hx including sacral colpopexy, supracervical hysterectomy, BSO and carrington urethroplasty. This failed and she had open repair with mesh. Had SBO in Jan 2017 had to have emergent surgery and creation of ileostomy. Second SBO in Aug 2019 medically managed. During this second episode she was noted to have hydro. Repeat renal US showed pt emptying well and hydro was resolved. Seen in 2021 after third SBO medically managed. CT reported bilateral mild hydronephrosis. Again underwent US that showed hydro resolved and PVR0. Had another SBO 4/2022. Noted to have mass vs bezoar. Saw another opinion and thought to be bezoar. Last seen 11/2023 and reported that she was voiding and changing her ileostomy. She noted dripping of blood from below. Urine itself was clear. UA showed no blood.   Pt returns today for follow-up. Had another partial SBO last month. Had CT that showed small 3mm LUP stone. most recently they have been talking about another abd surgery due to recurrent blockages. Pt reports occasional bother from urgency when her ileostomy is full.

## 2024-05-27 ENCOUNTER — RX RENEWAL (OUTPATIENT)
Age: 74
End: 2024-05-27

## 2024-05-27 RX ORDER — LEVOTHYROXINE SODIUM 88 UG/1
88 TABLET ORAL
Qty: 90 | Refills: 0 | Status: ACTIVE | COMMUNITY
Start: 2019-12-02 | End: 1900-01-01

## 2024-05-29 ENCOUNTER — NON-APPOINTMENT (OUTPATIENT)
Age: 74
End: 2024-05-29

## 2024-05-30 PROBLEM — M34.1 CREST SYNDROME: Status: ACTIVE | Noted: 2019-06-20

## 2024-05-30 NOTE — ASSESSMENT
[FreeTextEntry1] : Labs/ records reviewed continue current medications   f/u with GI/Cardiology/ Endocrine  f/u 2-3 months

## 2024-05-30 NOTE — HISTORY OF PRESENT ILLNESS
[Post-hospitalization from ___ Hospital] : Post-hospitalization from [unfilled] Hospital [Discharge Summary] : discharge summary [Discharge Med List] : discharge medication list [Patient Contacted By: ____] : and contacted by [unfilled] [FreeTextEntry2] : Hospital Course: Discharge Date	24-Apr-2024 Admission Date	22-Apr-2024 22:02 Reason for Admission	Decreased ostomy output Hospital Course	   68F PMH CAD s/p stents (on ASA), colonic inertia, rectal prolapse, bladder prolapse s/p ?suspensory surgical procedure, s/p colon resection and rectopexy in 2011 and repeat open rectopexy with mesh in 2016, w/ hx of recurent SBO s/p ex-lap, SBR, diverting loop ileostomy 2017 ([facility])  with subsequent multiple SBO and LBO, CREST syndrome presents with decreased ostomy output and abdominal pain.  Patient currently manages colonic inertia with bi-weekly warm water enemas and last enema was on 3 days prior to presentation, but pt feels that she didn't eliminate all bezoars and continues to have stool leaking per rectum.  Also reports decreased ostomy output and increased abdominal pain for the past 2-3 days.  Reports nausea for the past few days and had one episode of vomiting in ED. Patient had all her abdominal surgeries at [facility] and is following up with GI in [place].   In ED, HDS, CT c/w pSBO and large colon burden.  (22 Apr 2024 22:12)   Pt was admitted under Surgery for further evaluation and management. Patient had Hypaque enema xray done 4/23 showing "stricture at the rectosigmoid anastomosis without complete occlusion. Small residual contrast in the distal descending colon and rectosigmoid."  Patient evaluated by Ostomy RN and stoma digitized and irrigated. Ostomy output improved. No surgical intervention required during this admission. Diet advanced as tolerated.   Patients INR supratherapuetic, patient given Vitamin K and 1 unit FFP with normalization of INR.   On HD2 patient ostomy output increased, and patient reported no abdominal pain. Diet was advance with appropriate tolerance. Decision was made to discharge patient with appropriate follow up.   On the day of discharge, the patient's vitals are stable, pain is controlled, voiding urine, +stool and gas in ostomy bag, tolerating a low fiber diet, and ambulating well. Pt will f/u with Dr. Bryan in 1-2 weeks. Pt will f/u with PCP in 1-2 weeks.   Med Reconciliation: Override IMPROVE-DD recommendations due to:	This is a surgical and/or non-medical patient. Recommended Post-Discharge VTE Prophylaxis	This is a surgical and/or non-medical patient. Medication Reconciliation Status	Admission Reconciliation is Completed Discharge Reconciliation is Completed   Discharge Medications	aspirin 81 mg oral delayed release tablet: 1 tab(s) orally once a day Citracal Petites 200 mg-6.25 mcg (250 intl units) oral tablet: 2 tab(s) orally 2 times a day CoQ10 100 mg oral capsule: 2 cap(s) orally once a day cyanocobalamin 1000 mcg oral tablet: 1 tab(s) orally once a day Fish Oil 1000 mg oral capsule: 1 cap(s) orally once a day folic acid 1 mg oral tablet: 1 tab(s) orally once a day iron: 30 mg orally 2 times a day Lomotil 2.5 mg-0.025 mg oral tablet: 1 tab(s) orally 3 times a day 1 tab(s) orally 2-4 times a day MDD: 3 Melatonin 5 mg oral tablet: 1 tab(s) orally once a day (at bedtime) NexIUM 40 mg oral delayed release capsule: 1 cap(s) orally once a day (in the evening) Pepcid 20 mg oral tablet: 1 tab(s) orally once a day primidone 50 mg oral tablet: 1 tab(s) orally once a day Prolia 60 mg/mL subcutaneous solution: 1 dose(s) subcutaneous every 6 months rosuvastatin 20 mg oral tablet: 1 tab(s) orally once a day (at bedtime) Synthroid 88 mcg (0.088 mg) oral tablet: 1 tab(s) orally once a day Vitamin B12: 1000mcg with folic acid Vitamin D3 125 mcg/mL (5000 intl units/mL) oral liquid: 2 milliliter(s) orally once a day   , , Care Plan/Procedures: Discharge Diagnoses, Assessment and Plan of Treatment	PRINCIPAL DISCHARGE DIAGNOSIS Diagnosis: Acute abdominal pain Assessment and Plan of Treatment: ostomy care as taught, change and empty as taught monitor ostomy output, notify Dr. Bryan if output decreases to less than 400ml/24 hours or increases to over 1200ml/24 hours. ACTIVITY: No heavy lifting anything more than 10-15lbs or straining. DIET: Low fiber diet NOTIFY YOUR SURGEON IF: You have any bleeding that does not stop, any pus draining from your wound, any fever (over 100.4 F) or chills, persistent nausea/vomiting with inability to tolerate food or liquids, persistent diarrhea, or if your pain is not controlled on your discharge pain medications. FOLLOW-UP: 1. Please call to make a follow-up appointment within one week of discharge with Dr. Bryan 2. Please follow up with your primary care physician in one week regarding your hospitalization. Goal(s)	To get better and follow your care plan as instructed. Follow Up: Care Providers for Follow up (PCP/Outpatient Provider)	[facility] [address] [place] [address], [address] [place] [address] Phone: [phone] Fax: [phone] Follow Up Time: 1 week Additional Provider Info (For SysAdmin Use Only)	 PROVIDER:[TOKEN:[[id number]],FOLLOWUP:[1 week]] Care Providers Direct Addresses (For SYSAdmin Use Only)	 ,[email or web address] NPI number (For SysAdmin Use Only) :	[[id number]] Patient's Scheduled Appointments	[facility] DENTAL [address] Scheduled Appointment: 05/02/2024   Maria D Paniagua [facility] NEPHRO [address] Scheduled Appointment: 05/06/2024   Silvestre Rosen [facility] CARDIOLOGY [address] [facility] Scheduled Appointment: 05/09/2024   Lee Coley [facility] INTMED [address] Scheduled Appointment: 05/16/2024   Lasha Medrano [facility] Physician [facility] GENSURG [address] [employer] Scheduled Appointment: 06/20/2024 Discharge Diet	Low Fiber Diet Activity	No heavy lifting/straining, Follow Instructions Provided by your Surgical Team Quality Measures: Does the patient have difficulty running errands alone like visiting a doctors office or shopping?	No Does the patient have difficulty climbing stairs?	No Cognition: The patient has	No difficulties Patient Condition	Stable Hospice Patient	No Core Measure Site	No Does the patient have a principal diagnosis of ischemic stroke, hemorrhagic stroke, or TIA?	No Does the patient have a principal diagnosis of Acute Myocardial Infarction?	No Has the patient had a Percutaneous Coronary Intervention?	No Document Complete: Care Provider Seen in Hospital	[facility] Section Complete	This document is complete and the patient is ready for discharge. For questions about your prescriptions, please call:	[phone] Is this contact telephone number correct?	Yes Attending Attestation Statement	I have personally seen and examined the patient. I have collaborated with and supervised the .	on the discharge service for the patient. I have reviewed and made amendments to the documentation where necessary.     Electronic Signatures: Arsen Sue)   (Signed 24-Apr-2024 09:18) 	Authored: Hospital Course, Quality Measures Chauncey Bryan)   (Signed 25-Apr-2024 08:53) 	Co-Signer: Med Reconciliation, Care Plan/Procedures, Follow Up, Document Complete, Hospital Course, Quality Measures Jas Chapman)   (Signed 24-Apr-2024 17:55) 	Authored: Med Reconciliation Julita Gandara)   (Signed 23-Apr-2024 16:54) 	Authored: Med Reconciliation, Care Plan/Procedures, Follow Up, Document Complete, Hospital Course, Quality Measures   Last Updated: 25-Apr-2024 08:53 by Chauncey Bryan)

## 2024-06-03 NOTE — PATIENT PROFILE ADULT - NSPROPOAPRESSUREINJURY_GEN_A_NUR
Toothache    Toothaches are generally caused by tooth decay.  If you have severe pain or swelling around a tooth, you may have a deep tooth infection.  Tooth decay and infections require evaluation and treatment by a dentist or an oral surgeon.    The emergency department will provide you with the best possible care available for your dental problem.  Unfortunately, there is not a dentist or dental clinic available in the department.  Routine dental care, such as fillings, tooth extractions, or liat canals are not available in the emergency department.  However, we are avaialbe for emergencies including abscesses, fractures of the jaw and other oral trauma such as a tooth that is knocked out.  Any other dental problem is best treated by a dentist.  Please see the list of dental clinics in the area if you do not currently have a dentist.      Treatment That Can Be Provided for Toothache in the Emergency Department    If you have a severe toothache, medication may be prescribed for you until you are able to see a dentist.  If you are given an antibiotic, take it as prescribed and continue to take it until gone.  Even if you start to feel better, your toothache will need to be treated by a dentist or an oral surgeon.    Pain medication may be prescribed for you.  As is the policy of the [facility], the emergency department uses nonsteroidal anti-inflammatory medication (NSAIDs) as the primary medication for pain.  These may include ibuprofen (Motrin®) or naproxyn sodium (Naprosyn®).    Patients who are unable to take nonsteroidal anti-inflammatory medications will generally be advised to take acetaminophen (Tylenol®) for dental pain.    As is the policy of the [facility] dental clinics, the [facility] emergency department policy strongly discourages the use of the narcotic medications. (Tylenol #3®, Vicodin®, etc) are restricted by specific, strict guidelines.    If you have any  no

## 2024-06-07 LAB
ANION GAP SERPL CALC-SCNC: 10 MMOL/L
BUN SERPL-MCNC: 26 MG/DL
CALCIUM SERPL-MCNC: 9.3 MG/DL
CHLORIDE SERPL-SCNC: 96 MMOL/L
CO2 SERPL-SCNC: 26 MMOL/L
CREAT SERPL-MCNC: 1.18 MG/DL
EGFR: 48 ML/MIN/1.73M2
GLUCOSE SERPL-MCNC: 80 MG/DL
MAGNESIUM SERPL-MCNC: 1.7 MG/DL
POTASSIUM SERPL-SCNC: 4.6 MMOL/L
SODIUM SERPL-SCNC: 132 MMOL/L

## 2024-06-13 PROBLEM — R07.9 CHEST PAIN OF UNCERTAIN ETIOLOGY: Status: ACTIVE | Noted: 2019-11-11

## 2024-06-14 ENCOUNTER — APPOINTMENT (OUTPATIENT)
Dept: CARDIOLOGY | Facility: CLINIC | Age: 74
End: 2024-06-14
Payer: MEDICARE

## 2024-06-14 VITALS
BODY MASS INDEX: 18.55 KG/M2 | HEART RATE: 70 BPM | OXYGEN SATURATION: 97 % | DIASTOLIC BLOOD PRESSURE: 42 MMHG | SYSTOLIC BLOOD PRESSURE: 100 MMHG | HEIGHT: 57 IN | WEIGHT: 86 LBS

## 2024-06-14 DIAGNOSIS — R07.9 CHEST PAIN, UNSPECIFIED: ICD-10-CM

## 2024-06-14 DIAGNOSIS — E78.5 HYPERLIPIDEMIA, UNSPECIFIED: ICD-10-CM

## 2024-06-14 PROCEDURE — G2211 COMPLEX E/M VISIT ADD ON: CPT

## 2024-06-14 PROCEDURE — 99214 OFFICE O/P EST MOD 30 MIN: CPT

## 2024-06-17 NOTE — ED PROVIDER NOTE - PSH
Patient: Erik Fuentes    Procedure Summary       Date: 06/17/24 Room / [facility]    Anesthesia Start: 1048 Anesthesia Stop: 1106    Procedure: ESOPHAGOGASTRODUODENOSCOPY WITH BIOPSIES Diagnosis:       Esophageal varices without bleeding      (Esophageal varices without bleeding [I85.00])    Surgeons: Rito Ruth MD Provider: Galina Thompson MD    Anesthesia Type: general ASA Status: 3            Anesthesia Type: general    Vitals  Vitals Value Taken Time   /80 06/17/24 1128   Temp     Pulse 71 06/17/24 1130   Resp 18 06/17/24 1116   SpO2 95 % 06/17/24 1130   Vitals shown include unfiled device data.        Post Anesthesia Care and Evaluation    Patient location during evaluation: PACU  Patient participation: complete - patient participated  Level of consciousness: awake and alert  Pain management: satisfactory to patient    Airway patency: patent  Anesthetic complications: No anesthetic complications  PONV Status: none  Cardiovascular status: acceptable  Respiratory status: acceptable  Hydration status: acceptable     H/O ileostomy    History of appendectomy    History of hysterectomy

## 2024-06-20 ENCOUNTER — APPOINTMENT (OUTPATIENT)
Dept: SURGERY | Facility: CLINIC | Age: 74
End: 2024-06-20

## 2024-06-24 ENCOUNTER — APPOINTMENT (OUTPATIENT)
Dept: COLORECTAL SURGERY | Facility: CLINIC | Age: 74
End: 2024-06-24

## 2024-06-26 ENCOUNTER — APPOINTMENT (OUTPATIENT)
Dept: ORTHOPEDIC SURGERY | Facility: CLINIC | Age: 74
End: 2024-06-26
Payer: MEDICARE

## 2024-06-26 VITALS — WEIGHT: 86 LBS | BODY MASS INDEX: 18.55 KG/M2 | HEIGHT: 57 IN

## 2024-06-26 DIAGNOSIS — M70.61 TROCHANTERIC BURSITIS, RIGHT HIP: ICD-10-CM

## 2024-06-26 PROCEDURE — 99204 OFFICE O/P NEW MOD 45 MIN: CPT

## 2024-06-26 PROCEDURE — 99214 OFFICE O/P EST MOD 30 MIN: CPT

## 2024-06-26 PROCEDURE — 73502 X-RAY EXAM HIP UNI 2-3 VIEWS: CPT

## 2024-07-18 ENCOUNTER — APPOINTMENT (OUTPATIENT)
Age: 74
End: 2024-07-18
Payer: MEDICARE

## 2024-07-18 PROCEDURE — 99213 OFFICE O/P EST LOW 20 MIN: CPT

## 2024-08-05 ENCOUNTER — LABORATORY RESULT (OUTPATIENT)
Age: 74
End: 2024-08-05

## 2024-08-05 ENCOUNTER — INPATIENT (INPATIENT)
Facility: HOSPITAL | Age: 74
LOS: 2 days | Discharge: HOME | DRG: 641 | End: 2024-08-08
Attending: STUDENT IN AN ORGANIZED HEALTH CARE EDUCATION/TRAINING PROGRAM | Admitting: STUDENT IN AN ORGANIZED HEALTH CARE EDUCATION/TRAINING PROGRAM
Payer: MEDICARE

## 2024-08-05 VITALS
HEIGHT: 57 IN | RESPIRATION RATE: 18 BRPM | OXYGEN SATURATION: 98 % | SYSTOLIC BLOOD PRESSURE: 123 MMHG | HEART RATE: 96 BPM | TEMPERATURE: 98 F | WEIGHT: 87.08 LBS | DIASTOLIC BLOOD PRESSURE: 72 MMHG

## 2024-08-05 DIAGNOSIS — K62.3 RECTAL PROLAPSE: Chronic | ICD-10-CM

## 2024-08-05 DIAGNOSIS — Z90.89 ACQUIRED ABSENCE OF OTHER ORGANS: Chronic | ICD-10-CM

## 2024-08-05 DIAGNOSIS — Z95.5 PRESENCE OF CORONARY ANGIOPLASTY IMPLANT AND GRAFT: Chronic | ICD-10-CM

## 2024-08-05 DIAGNOSIS — Z90.710 ACQUIRED ABSENCE OF BOTH CERVIX AND UTERUS: Chronic | ICD-10-CM

## 2024-08-05 DIAGNOSIS — E87.1 HYPO-OSMOLALITY AND HYPONATREMIA: ICD-10-CM

## 2024-08-05 DIAGNOSIS — Z98.890 OTHER SPECIFIED POSTPROCEDURAL STATES: Chronic | ICD-10-CM

## 2024-08-05 DIAGNOSIS — Z90.49 ACQUIRED ABSENCE OF OTHER SPECIFIED PARTS OF DIGESTIVE TRACT: Chronic | ICD-10-CM

## 2024-08-05 LAB
ANION GAP SERPL CALC-SCNC: 13 MMOL/L — SIGNIFICANT CHANGE UP (ref 5–17)
ANION GAP SERPL CALC-SCNC: 13 MMOL/L — SIGNIFICANT CHANGE UP (ref 5–17)
ANION GAP SERPL CALC-SCNC: 14 MMOL/L — SIGNIFICANT CHANGE UP (ref 5–17)
BUN SERPL-MCNC: 25 MG/DL — HIGH (ref 7–23)
BUN SERPL-MCNC: 25 MG/DL — HIGH (ref 7–23)
BUN SERPL-MCNC: 28 MG/DL — HIGH (ref 7–23)
CALCIUM SERPL-MCNC: 8.6 MG/DL — SIGNIFICANT CHANGE UP (ref 8.4–10.5)
CALCIUM SERPL-MCNC: 9.1 MG/DL — SIGNIFICANT CHANGE UP (ref 8.4–10.5)
CALCIUM SERPL-MCNC: 9.3 MG/DL — SIGNIFICANT CHANGE UP (ref 8.4–10.5)
CHLORIDE SERPL-SCNC: 75 MMOL/L — LOW (ref 96–108)
CHLORIDE SERPL-SCNC: 79 MMOL/L — LOW (ref 96–108)
CHLORIDE SERPL-SCNC: 82 MMOL/L — LOW (ref 96–108)
CO2 SERPL-SCNC: 25 MMOL/L — SIGNIFICANT CHANGE UP (ref 22–31)
CO2 SERPL-SCNC: 27 MMOL/L — SIGNIFICANT CHANGE UP (ref 22–31)
CO2 SERPL-SCNC: 28 MMOL/L — SIGNIFICANT CHANGE UP (ref 22–31)
CREAT SERPL-MCNC: 1.05 MG/DL — SIGNIFICANT CHANGE UP (ref 0.5–1.3)
CREAT SERPL-MCNC: 1.08 MG/DL — SIGNIFICANT CHANGE UP (ref 0.5–1.3)
CREAT SERPL-MCNC: 1.09 MG/DL — SIGNIFICANT CHANGE UP (ref 0.5–1.3)
EGFR: 53 ML/MIN/1.73M2 — LOW
EGFR: 54 ML/MIN/1.73M2 — LOW
EGFR: 55 ML/MIN/1.73M2 — LOW
GLUCOSE SERPL-MCNC: 108 MG/DL — HIGH (ref 70–99)
GLUCOSE SERPL-MCNC: 83 MG/DL — SIGNIFICANT CHANGE UP (ref 70–99)
GLUCOSE SERPL-MCNC: 99 MG/DL — SIGNIFICANT CHANGE UP (ref 70–99)
HCT VFR BLD CALC: 34.7 % — SIGNIFICANT CHANGE UP (ref 34.5–45)
HGB BLD-MCNC: 11.9 G/DL — SIGNIFICANT CHANGE UP (ref 11.5–15.5)
MCHC RBC-ENTMCNC: 28.7 PG — SIGNIFICANT CHANGE UP (ref 27–34)
MCHC RBC-ENTMCNC: 34.3 GM/DL — SIGNIFICANT CHANGE UP (ref 32–36)
MCV RBC AUTO: 83.8 FL — SIGNIFICANT CHANGE UP (ref 80–100)
NRBC # BLD: 0 /100 WBCS — SIGNIFICANT CHANGE UP (ref 0–0)
OSMOLALITY SERPL: 249 MOSMOL/KG — LOW (ref 280–301)
OSMOLALITY UR: 214 MOS/KG — LOW (ref 300–900)
PLATELET # BLD AUTO: 155 K/UL — SIGNIFICANT CHANGE UP (ref 150–400)
POTASSIUM SERPL-MCNC: 3.5 MMOL/L — SIGNIFICANT CHANGE UP (ref 3.5–5.3)
POTASSIUM SERPL-MCNC: 3.6 MMOL/L — SIGNIFICANT CHANGE UP (ref 3.5–5.3)
POTASSIUM SERPL-MCNC: 5.9 MMOL/L — HIGH (ref 3.5–5.3)
POTASSIUM SERPL-SCNC: 3.5 MMOL/L — SIGNIFICANT CHANGE UP (ref 3.5–5.3)
POTASSIUM SERPL-SCNC: 3.6 MMOL/L — SIGNIFICANT CHANGE UP (ref 3.5–5.3)
POTASSIUM SERPL-SCNC: 5.9 MMOL/L — HIGH (ref 3.5–5.3)
POTASSIUM UR-SCNC: 42 MMOL/L — SIGNIFICANT CHANGE UP
RBC # BLD: 4.14 M/UL — SIGNIFICANT CHANGE UP (ref 3.8–5.2)
RBC # FLD: 12.8 % — SIGNIFICANT CHANGE UP (ref 10.3–14.5)
SODIUM SERPL-SCNC: 113 MMOL/L — CRITICAL LOW (ref 135–145)
SODIUM SERPL-SCNC: 120 MMOL/L — CRITICAL LOW (ref 135–145)
SODIUM SERPL-SCNC: 123 MMOL/L — LOW (ref 135–145)
SODIUM UR-SCNC: 9 MMOL/L — SIGNIFICANT CHANGE UP
URATE SERPL-MCNC: 6.7 MG/DL — SIGNIFICANT CHANGE UP (ref 2.5–7)
WBC # BLD: 7.93 K/UL — SIGNIFICANT CHANGE UP (ref 3.8–10.5)
WBC # FLD AUTO: 7.93 K/UL — SIGNIFICANT CHANGE UP (ref 3.8–10.5)

## 2024-08-05 PROCEDURE — 99223 1ST HOSP IP/OBS HIGH 75: CPT

## 2024-08-05 PROCEDURE — 99285 EMERGENCY DEPT VISIT HI MDM: CPT | Mod: GC

## 2024-08-05 PROCEDURE — 99222 1ST HOSP IP/OBS MODERATE 55: CPT | Mod: GC

## 2024-08-05 RX ORDER — SODIUM CHLORIDE 5 % 5 %
100 INTRAVENOUS SOLUTION INTRAVENOUS
Refills: 0 | Status: COMPLETED | OUTPATIENT
Start: 2024-08-05 | End: 2024-08-05

## 2024-08-05 RX ORDER — BACTERIOSTATIC SODIUM CHLORIDE 0.9 %
1000 VIAL (ML) INJECTION
Refills: 0 | Status: DISCONTINUED | OUTPATIENT
Start: 2024-08-05 | End: 2024-08-05

## 2024-08-05 RX ADMIN — Medication 500 MILLILITER(S): at 21:41

## 2024-08-05 NOTE — H&P ADULT - PROBLEM SELECTOR PLAN 1
followed as outpatient by nephrology Dr. Maria D Paniagua, Brie 6/2024 132 -> 117 today referred to ED  here Na 113 -> 120 -> 123 (s/p 2% HTS 100cc, Sosm 249, Uosm 214, Micky 9evaluated by nephrology c/f 2/2 ostomy loss w/ decreased solute intake and relatively increased water intake.  - f/u further nephrology recommendations, will continue to monitor BMP q4h (goal correct < 6-8meq/24h and Is/Os  - fall/aspiration/seizure precautions followed as outpatient by nephrology Dr. Maria D Paniagua, Brie 6/2024 132 -> 117 today referred to ED  here Na 113 -> 120 -> 123 (s/p 2% HTS 100cc, Sosm 249, Uosm 214, Micky 9evaluated by nephrology c/f 2/2 ostomy loss w/ decreased solute intake and relatively increased water intake.  - am cortisol, TSH per nephro  - f/u further nephrology recommendations, will continue to monitor BMP q4h (goal correct < 6-8meq/24h and Is/Os  - fall/aspiration/seizure precautions followed as outpatient by nephrology Dr. Maria D Paniagua, Brie 6/2024 132 -> 117 today referred to ED  here Na 113 -> 120 -> 123 (s/p 2% HTS 100cc, Sosm 249, Uosm 214, Micky 9evaluated by nephrology c/f 2/2 ostomy loss w/ decreased solute intake and relatively increased water intake.  - am cortisol, TSH per nephro  - f/u further nephrology recommendations (will clarify necessity for ICU c/s), will continue to monitor BMP q4h (goal correct < 6-8meq/24h and Is/Os  - fall/aspiration/seizure precautions  - neuro checks per routine

## 2024-08-05 NOTE — CONSULT NOTE ADULT - ASSESSMENT
72yo F with Hx CREST syndrome, hypothyroidism, CKD3, CAD (s/p stents, on ASA), colonic inertia, rectal prolapse (s/p ?suspensory surgical procedure, colon resection and rectopexy [2011], and repeat open rectopexy with mesh [2016]), and recurent SBOs (s/p ex-lap, SBR, diverting loop ileostomy [2017], and recent admission in April 2014 for rectal stricture, managed non-operatively) who presents with hyponatremia seen on routine OP labs. General Surgery consulted for high ileostomy OP by ED. As patient states that her ileostomy OP is at its baseline and her OP is within the normal range for an ileostomy, patient's ileostomy OP is unlikely to be the cause of her hyponatremia at this time.     PLAN:   - No acute surgical intervention at this time  - Recommend IM/Renal consult for workup and management of hyponatremia  - Consider MICU consult for hyponatremia correction    To be discussed with MIS fellow, Dr. Smyth, on behalf of Dr. Tori Lopez, PGY-3  Green Team Surgery  #[id number]  74yo F with Hx CREST syndrome, hypothyroidism, CKD3, CAD (s/p stents, on ASA), colonic inertia, rectal prolapse (s/p ?suspensory surgical procedure, colon resection and rectopexy [2011], and repeat open rectopexy with mesh [2016]), and recurent SBOs (s/p ex-lap, SBR, diverting loop ileostomy [2017], and recent admission in April 2014 for rectal stricture, managed non-operatively) who presents with hyponatremia seen on routine OP labs. General Surgery consulted for high ileostomy OP by ED. As patient states that her ileostomy OP is at its baseline and her OP is within the normal range for an ileostomy, patient's ileostomy OP is unlikely to be the cause of her hyponatremia at this time.     PLAN:   - No acute surgical intervention at this time  - Recommend IM/Renal consult for workup and management of hyponatremia  - Consider MICU consult for hyponatremia correction    Discussed with MIS fellow, Dr. Smyth, on behalf of Dr. Tori Lopez, PGY-3  Green Team Surgery  #[id number]  72yo F with Hx CREST syndrome, hypothyroidism, CKD3, CAD (s/p stents, on ASA), colonic inertia, rectal prolapse (s/p ?suspensory surgical procedure, colon resection and rectopexy [2011], and repeat open rectopexy with mesh [2016]), and recurrent SBOs (s/p ex-lap, SBR, diverting loop ileostomy [2017], and recent admission in April 2014 for rectal stricture, managed non-operatively) who presents with hyponatremia seen on routine OP labs. General Surgery consulted for high ileostomy OP by ED. As patient states that her ileostomy OP is at its baseline and her OP is within the normal range for an ileostomy, patient's ileostomy OP is unlikely to be the cause of her hyponatremia at this time.     PLAN:   - No acute surgical intervention at this time  - Recommend IM/Renal consult for workup and management of hyponatremia  - Consider MICU consult for hyponatremia correction  - Consider restarting lomotil if workup concerning for dehydration    Discussed with MIS fellow, Dr. Smyth, on behalf of Dr. Tori Lopez, PGY-3  [facility] Surgery  #[id number]

## 2024-08-05 NOTE — ED PROVIDER NOTE - PROGRESS NOTE DETAILS
Alma Rosa Robles MD, PGY3:  signed out to me pending labs and admission, pt hyponatremic to 113, nephrology aware, recommends 100cc bolus at 2% w/ repeat BMP after. Pt has an ostomy, unclear how much output. Alma Rosa Robles MD, PGY3:  rpt bmp obtained before 2% was given showed a sodium of 120, now she is s/p 100cc of 2%. discussed with nephrology fellow, will reassess after rpt bmp. Alma Rosa Robles MD, PGY3: pt updated on findings and plan. post 2% BMP in lab now. pt accepted for admission Alma Rosa Robles MD, PGY3:  rpt bmp obtained before 2% was given showed a sodium of 120, now she is s/p 100cc of 2%. likely 113 was in the setting of hemolysis and may not have reflected her true serum sodium concentration. discussed with nephrology fellow, will reassess after rpt bmp.

## 2024-08-05 NOTE — CONSULT NOTE ADULT - ASSESSMENT
71 yo lady chronic mild hyponatremia (saw Dr. Paniagua in May 2024), ostomy had recent outpatient labs showing Na 117 and sent to ER.      #Acute on Chronic Hyponatremia 2' ostomy losses with decreased solute intake and relatively increased water intake  -sNa 117 on out-patient labs (Aug 5)    Recs:  -Check BMP STAT, and serum Osms  -Check Urine Na, Urine Osm and Urine potassium   -Will determine what fluids does she need  -Check AM Cortisol, TSH and Uri acid   -Discussed with ER staff to expedite work up         Michael Madison  Nephrology Fellow  Feel free to contact me on TEAMS  After 5 pm please contact the on-call Fellow

## 2024-08-05 NOTE — H&P ADULT - NSHPOUTPATIENTPROVIDERS_GEN_ALL_CORE
PCP: Lee Coley  Nephro: Maria D Paniagua  Rheum: Lili  Surgery: Randi  Cardiology: Jessika PCP: Lee Coley  Nephro: Maria D Paniagua  Rheum: Lili  Surgery: Randi  Cardiology: Jessika  Dermatology; Smith

## 2024-08-05 NOTE — H&P ADULT - PROBLEM SELECTOR PLAN 6
- HSQ VTE ppx  - diet pending RN dysphagia screen  - precautions above  - dispo pending course, PT evaluation - monitor output  - ostomy care RN c/s

## 2024-08-05 NOTE — H&P ADULT - NSHPPHYSICALEXAM_GEN_ALL_CORE
PHYSICAL EXAM:  GENERAL: NAD, well-groomed, well-developed, pleasant to interview, accompanied by   HEAD: Atraumatic, Normocephalic  EYES: PERRL, conjunctiva and sclera clear  ENMT: No tonsillar erythema, exudates, or enlargement; Moist mucous membranes  NECK: Supple w/o JVD   NERVOUS SYSTEM: Alert & Oriented X4, Good concentration; Motor Strength 5/5 B/L upper and lower extremities. Sensation equal/intact b/l UE and LE. CN II-XII grossly intact. Not tremulous on exam.   CHEST/LUNG: Clear to auscultation bilaterally; No rales, rhonchi, wheezing, or rubs  HEART: Regular rate and rhythm; No murmurs, rubs, or gallops  ABDOMEN: Soft, Nontender, Nondistended; Bowel sounds present. No guarding, rebound tenderness, or rigidity. +ostomy in place, difficult to examine site comprehensively given patient clothing  EXTREMITIES: 2+ Peripheral Pulses, No edema/warmth/tenderness to palpation b/l LE  SKIN: +round erythematous rash to L of ostomy and of ventral LUE and smaller erythematous areas of medial proximal LEs b/l (examined w/ patient permission and  present)

## 2024-08-05 NOTE — ED ADULT NURSE NOTE - OBJECTIVE STATEMENT
72YO female with pmhx of anxiety, CAD, and gerd comes to the ED with c/o abnormal labs. Pt endorses feeling shaky the last few days, was getting labwork out patient received a phone call that her potassium and sodium were low and to come to the ED. Pt is A&Ox3, respirations are even and nonlabored, abdominal is nondistended. pt denies SOB, CP, palpations or abdominal pain.  at bedside. Pt placed in position of comfort. Bed in lowest position, wheels locked, appropriate side rails raised. Pt denies needs at this time.

## 2024-08-05 NOTE — H&P ADULT - PROBLEM SELECTOR PLAN 5
- monitor output  - ostomy care RN c/s stable  - monitor BMP, dose meds per GFR, avoid toxins, monitor Is/Os

## 2024-08-05 NOTE — CONSULT NOTE ADULT - SUBJECTIVE AND OBJECTIVE BOX
GENERAL SURGERY CONSULT NOTE  Consulting surgical team: [facility]  Consulting attending: Dr. Valle    HPI:  72yo F with Hx CREST syndrome, hypothyroidism, CKD3, CAD (s/p stents, on ASA), colonic inertia, rectal prolapse (s/p ?suspensory surgical procedure, colon resection and rectopexy [2011], and repeat open rectopexy with mesh [2016]), and recurent SBOs (s/p ex-lap, SBR, diverting loop ileostomy [2017], and recent admission in April 2014 for rectal stricture, managed non-operatively) who presents with abnormal OP lab findings. Patient states that she was in her usual state of health other than decreased appetite for the last few days and was told to report to ED for hyponatremia found on routine OP labs. Patient denies nausea, vomiting, diarrhea, fevers, chills, or inability to tolerate PO. She states that her ileostomy OP is at baseline (1000-1200cc/d). General Surgery consulted for high ileostomy OP by ED.     PAST MEDICAL HISTORY:  Sjogren's syndrome  Ileostomy in place  Colonic dysmotility  GERD (gastroesophageal reflux disease)  Anxiety  Sciatica  CAD (coronary atherosclerotic disease)  Stented coronary artery  History of dehydration  H/O small bowel obstruction  S/P primary angioplasty with coronary stent  Hypothyroid  H/O electrolyte imbalance  History of connective tissue disease  Hyperlipidemia  Mild anemia  Osteoporosis  Insomnia  Vasovagal syncope  History of hypotension  Scoliosis  History of CREST syndrome  History of scleroderma  Bilateral dry eyes  Dry mouth  Hyponatremia  Hypomagnesemia  COVID-19 vaccine series completed  History of short term memory loss  Familial tremor  Raynauds syndrome  Bunion of left foot  Bunion of right foot  Hammer toe of right foot  Hammer toe of left foot  Spondylolisthesis  Colonic inertia  DDD (degenerative disc disease), cervical  DDD (degenerative disc disease), lumbar  Dense breast tissue  History of subdural hematoma    PAST SURGICAL HISTORY:  History of hysterectomy  History of appendectomy  H/O ileostomy  S/P primary angioplasty with coronary stent  History of lumbar laminectomy  Rectal prolapse  History of tonsillectomy and adenoidectomy    MEDICATIONS:    ALLERGIES:  adhesives (Rash)  No Known Allergies    VITALS & I/Os:  Vital Signs Last 24 Hrs  T(C): 36.8 (05 Aug 2024 14:30), Max: 36.8 (05 Aug 2024 14:30)  T(F): 98.3 (05 Aug 2024 14:30), Max: 98.3 (05 Aug 2024 14:30)  HR: 71 (05 Aug 2024 17:20) (71 - 96)  BP: 110/67 (05 Aug 2024 17:20) (110/67 - 123/72)  BP(mean): 82 (05 Aug 2024 17:20) (82 - 82)  RR: 16 (05 Aug 2024 23:00) (16 - 18)  SpO2: 100% (05 Aug 2024 17:20) (98% - 100%)    Parameters below as of 05 Aug 2024 17:20  Patient On (Oxygen Delivery Method): room air    PHYSICAL EXAM:  GEN: resting in bed comfortably in NAD  NEURO: awake, alert  CV: warm, well-perfused  RESP: no increased WOB  ABD: soft, non-distended, non-tender without rebound tenderness or guarding; stoma pink and viable, productive of gas and stool in bag   EXTR: no gross deformities; spontaneous movement in b/l U/L extrem     LABS:             11.9   7.93  )-----------( 155      ( 05 Aug 2024 21:19 )             34.7     123<L>  |  82<L>  |  25<H>  ----------------------------<  108<H>  3.6   |  27  |  1.08    Ca    8.6      05 Aug 2024 22:33    Urinalysis Basic - ( 05 Aug 2024 22:33 )    Color: x / Appearance: x / SG: x / pH: x  Gluc: 108 mg/dL / Ketone: x  / Bili: x / Urobili: x   Blood: x / Protein: x / Nitrite: x   Leuk Esterase: x / RBC: x / WBC x   Sq Epi: x / Non Sq Epi: x / Bacteria: x      IMAGING:   GENERAL SURGERY CONSULT NOTE  Consulting surgical team: [facility]  Consulting attending: Dr. Valle    HPI:  74yo F with Hx CREST syndrome, hypothyroidism, CKD3, CAD (s/p stents, on ASA), colonic inertia, rectal prolapse (s/p ?suspensory surgical procedure, colon resection and rectopexy [2011], and repeat open rectopexy with mesh [2016]), and recurent SBOs (s/p ex-lap, SBR, diverting loop ileostomy [2017], and recent admission in April 2014 for rectal stricture, managed non-operatively) who presents with abnormal OP lab findings. Patient states that she was in her usual state of health other than decreased appetite for the last few days and was told to report to ED for hyponatremia found on routine OP labs. Patient denies nausea, vomiting, diarrhea, fevers, chills, or inability to tolerate PO. She states that her ileostomy OP is at baseline (1000-1200cc/d). Previously took lomotil up to 4x per day but stopped taking as of May. General Surgery consulted for high ileostomy OP by ED.     PAST MEDICAL HISTORY:  Sjogren's syndrome  Ileostomy in place  Colonic dysmotility  GERD (gastroesophageal reflux disease)  Anxiety  Sciatica  CAD (coronary atherosclerotic disease)  Stented coronary artery  History of dehydration  H/O small bowel obstruction  S/P primary angioplasty with coronary stent  Hypothyroid  H/O electrolyte imbalance  History of connective tissue disease  Hyperlipidemia  Mild anemia  Osteoporosis  Insomnia  Vasovagal syncope  History of hypotension  Scoliosis  History of CREST syndrome  History of scleroderma  Bilateral dry eyes  Dry mouth  Hyponatremia  Hypomagnesemia  COVID-19 vaccine series completed  History of short term memory loss  Familial tremor  Raynauds syndrome  Bunion of left foot  Bunion of right foot  Hammer toe of right foot  Hammer toe of left foot  Spondylolisthesis  Colonic inertia  DDD (degenerative disc disease), cervical  DDD (degenerative disc disease), lumbar  Dense breast tissue  History of subdural hematoma    PAST SURGICAL HISTORY:  History of hysterectomy  History of appendectomy  H/O ileostomy  S/P primary angioplasty with coronary stent  History of lumbar laminectomy  Rectal prolapse  History of tonsillectomy and adenoidectomy    MEDICATIONS:    ALLERGIES:  adhesives (Rash)  No Known Allergies    VITALS & I/Os:  Vital Signs Last 24 Hrs  T(C): 36.8 (05 Aug 2024 14:30), Max: 36.8 (05 Aug 2024 14:30)  T(F): 98.3 (05 Aug 2024 14:30), Max: 98.3 (05 Aug 2024 14:30)  HR: 71 (05 Aug 2024 17:20) (71 - 96)  BP: 110/67 (05 Aug 2024 17:20) (110/67 - 123/72)  BP(mean): 82 (05 Aug 2024 17:20) (82 - 82)  RR: 16 (05 Aug 2024 23:00) (16 - 18)  SpO2: 100% (05 Aug 2024 17:20) (98% - 100%)    Parameters below as of 05 Aug 2024 17:20  Patient On (Oxygen Delivery Method): room air    PHYSICAL EXAM:  GEN: resting in bed comfortably in NAD  NEURO: awake, alert  CV: warm, well-perfused  RESP: no increased WOB  ABD: soft, non-distended, non-tender without rebound tenderness or guarding; stoma pink and viable, productive of gas and stool in bag   EXTR: no gross deformities; spontaneous movement in b/l U/L extrem     LABS:             11.9   7.93  )-----------( 155      ( 05 Aug 2024 21:19 )             34.7     123<L>  |  82<L>  |  25<H>  ----------------------------<  108<H>  3.6   |  27  |  1.08    Ca    8.6      05 Aug 2024 22:33    Urinalysis Basic - ( 05 Aug 2024 22:33 )    Color: x / Appearance: x / SG: x / pH: x  Gluc: 108 mg/dL / Ketone: x  / Bili: x / Urobili: x   Blood: x / Protein: x / Nitrite: x   Leuk Esterase: x / RBC: x / WBC x   Sq Epi: x / Non Sq Epi: x / Bacteria: x      IMAGING:   GENERAL SURGERY CONSULT NOTE  Consulting surgical team: [facility]  Consulting attending: Dr. Valle / Dr. Bryan    HPI:  72yo F with Hx CREST syndrome, hypothyroidism, CKD3, CAD (s/p stents, on ASA), colonic inertia, rectal prolapse (s/p ?suspensory surgical procedure, colon resection and rectopexy [2011], and repeat open rectopexy with mesh [2016]), and recurent SBOs (s/p ex-lap, SBR, diverting loop ileostomy [2017], and recent admission in April 2014 for rectal stricture, managed non-operatively) who presents with abnormal OP lab findings. Patient states that she was in her usual state of health other than decreased appetite for the last few days and was told to report to ED for hyponatremia found on routine OP labs. Patient denies nausea, vomiting, diarrhea, fevers, chills, or inability to tolerate PO. She states that her ileostomy OP is at baseline (1000-1200cc/d). Previously took lomotil up to 4x per day but stopped taking as of May. General Surgery consulted for high ileostomy OP by ED.     PAST MEDICAL HISTORY:  Sjogren's syndrome  Ileostomy in place  Colonic dysmotility  GERD (gastroesophageal reflux disease)  Anxiety  Sciatica  CAD (coronary atherosclerotic disease)  Stented coronary artery  History of dehydration  H/O small bowel obstruction  S/P primary angioplasty with coronary stent  Hypothyroid  H/O electrolyte imbalance  History of connective tissue disease  Hyperlipidemia  Mild anemia  Osteoporosis  Insomnia  Vasovagal syncope  History of hypotension  Scoliosis  History of CREST syndrome  History of scleroderma  Bilateral dry eyes  Dry mouth  Hyponatremia  Hypomagnesemia  COVID-19 vaccine series completed  History of short term memory loss  Familial tremor  Raynauds syndrome  Bunion of left foot  Bunion of right foot  Hammer toe of right foot  Hammer toe of left foot  Spondylolisthesis  Colonic inertia  DDD (degenerative disc disease), cervical  DDD (degenerative disc disease), lumbar  Dense breast tissue  History of subdural hematoma    PAST SURGICAL HISTORY:  History of hysterectomy  History of appendectomy  H/O ileostomy  S/P primary angioplasty with coronary stent  History of lumbar laminectomy  Rectal prolapse  History of tonsillectomy and adenoidectomy    MEDICATIONS:    ALLERGIES:  adhesives (Rash)  No Known Allergies    VITALS & I/Os:  Vital Signs Last 24 Hrs  T(C): 36.8 (05 Aug 2024 14:30), Max: 36.8 (05 Aug 2024 14:30)  T(F): 98.3 (05 Aug 2024 14:30), Max: 98.3 (05 Aug 2024 14:30)  HR: 71 (05 Aug 2024 17:20) (71 - 96)  BP: 110/67 (05 Aug 2024 17:20) (110/67 - 123/72)  BP(mean): 82 (05 Aug 2024 17:20) (82 - 82)  RR: 16 (05 Aug 2024 23:00) (16 - 18)  SpO2: 100% (05 Aug 2024 17:20) (98% - 100%)    Parameters below as of 05 Aug 2024 17:20  Patient On (Oxygen Delivery Method): room air    PHYSICAL EXAM:  GEN: resting in bed comfortably in NAD  NEURO: awake, alert  CV: warm, well-perfused  RESP: no increased WOB  ABD: soft, non-distended, non-tender without rebound tenderness or guarding; stoma pink and viable, productive of gas and stool in bag   EXTR: no gross deformities; spontaneous movement in b/l U/L extrem     LABS:             11.9   7.93  )-----------( 155      ( 05 Aug 2024 21:19 )             34.7     123<L>  |  82<L>  |  25<H>  ----------------------------<  108<H>  3.6   |  27  |  1.08    Ca    8.6      05 Aug 2024 22:33    Urinalysis Basic - ( 05 Aug 2024 22:33 )    Color: x / Appearance: x / SG: x / pH: x  Gluc: 108 mg/dL / Ketone: x  / Bili: x / Urobili: x   Blood: x / Protein: x / Nitrite: x   Leuk Esterase: x / RBC: x / WBC x   Sq Epi: x / Non Sq Epi: x / Bacteria: x      IMAGING:

## 2024-08-05 NOTE — H&P ADULT - NSICDXPASTSURGICALHX_GEN_ALL_CORE_FT
PAST SURGICAL HISTORY:  H/O ileostomy 2017    History of appendectomy as teen    History of hysterectomy 2011 for bladder prolapse, bladder lift done also    History of lumbar laminectomy October 2021, with removal of synovial cyst    History of tonsillectomy and adenoidectomy as child    Rectal prolapse repair 2016    S/P primary angioplasty with coronary stent 2019, 1 stent     PAST SURGICAL HISTORY:  H/O ileostomy 2017    History of appendectomy as teen    History of bowel resection     History of hysterectomy 2011 for bladder prolapse, bladder lift done also    History of lumbar laminectomy October 2021, with removal of synovial cyst    History of tonsillectomy and adenoidectomy as child    Rectal prolapse repair 2016    S/P primary angioplasty with coronary stent 2019, 1 stent

## 2024-08-05 NOTE — ED ADULT NURSE REASSESSMENT NOTE - NS ED NURSE REASSESS COMMENT FT1
IV access placed in patient, contacting pharmacy for sodium chloride 2%, pharmacy tech states medication will be tubed to pink area.

## 2024-08-05 NOTE — H&P ADULT - PROBLEM SELECTOR PLAN 2
followed as outpatient by dermatology Dr. Chase, recent visit presumed poison ivy as etiology rx'd cortisone  -  to bring home cream in AM, contact Dr. Chase for collateral in AM

## 2024-08-05 NOTE — H&P ADULT - ASSESSMENT
73F PMH Sjogren's syndrome, familial tremor, hypothyroidism, ocular migraines, prior traumatic SDH, CAD (s/p stents, on ASA), colonic inertia, rectal prolapse, bladder prolapse s/p ?suspensory surgical procedure, s/p colon resection and rectopexy  in 2011 and repeat open rectopexy with mesh in 2016, w/ hx of recurent SBO s/p ex-lap, SBR, diverting loop ileostomy 2017 ([facility])  with subsequent multiple LBO and SBO (recent admit 4/2024), chronic hyponatremia (followed by nephrology Dr. Maria D Meyer) p/w acute on chronic hyponatremia  73F PMH Sjogren's syndrome, familial tremor, hypothyroidism, ocular migraines, prior traumatic SDH, CKD3, CAD (s/p stents, on ASA), colonic inertia, rectal prolapse, bladder prolapse s/p ?suspensory surgical procedure, s/p colon resection and rectopexy  in 2011 and repeat open rectopexy with mesh in 2016, w/ hx of recurent SBO s/p ex-lap, SBR, diverting loop ileostomy 2017 ([facility])  with subsequent multiple LBO and SBO (recent admit 4/2024), chronic hyponatremia (followed by nephrology Dr. Maria D Meyer) p/w acute on chronic hyponatremia

## 2024-08-05 NOTE — ED PROVIDER NOTE - ATTENDING CONTRIBUTION TO CARE
I performed a face to face history and physical exam of the patient and discussed their management with the resident. I reviewed the resident's note and agree with the documented findings and plan of care.     73 yo lady chronic mild hyponatremia (saw Dr. Paniagua in May 2024), ostomy with recent labs that shows Na 117, Pt states she always had a lot of output from her ostomy (but nothing new). Has not been eating well. Drinks about 2.5-3L of water. States she feels intermittently dizzy for last 2-3 weeks, generalized weakness and has not been feeling herself for the same duration. She has chronic tremors which is stable as per  at her side.   Patient denies any shortness of breath, chest pain, nausea, vomiting, abdominal pain, urinary changes.  Pt saw dermatologist today for the rash on her arm and abdomen, treated for bug bite and fungal infection    Vitals reviewed, Gen: NAD, Head: atraumatic, PERRLA, EOMI, dry mucous membranes, Neck: supple, Chest: clear to ausculation, Cardiac: regular rate and rhythm, Abdomen: soft and nontender, Extremities: well perfused and without edema, Skin: + macularpapular rash on LUE, + circular rash adjacent to ileostomy bag, , Psych: appropriate mood, Neuro: AxOx3, no focal neurologic deficit     Will get EKG to check for arrhythmias  Repeat labs to check for electrolyte imbalance  Check U/A, urine culture  Reassess  Possible admission

## 2024-08-05 NOTE — CONSULT NOTE ADULT - SUBJECTIVE AND OBJECTIVE BOX
[facility] DIVISION OF KIDNEY DISEASES AND HYPERTENSION -- [phone]  -- INITIAL CONSULT NOTE  --------------------------------------------------------------------------------  HPI:  73 yo lady chronic mild hyponatremia (saw Dr. Paniagua in May 2024), ostomy had recent outpatient labs showing Na 117 and sent to ER.    Pt states she always had a lot of output from her ostomy (but nothing new). Has not been eating well. Drinks about 2.5-3L of water. States she feels intermittently dizzy for last 2-3 weeks and has not been feeling herself for the same duration.     Has chronic tremors which is stable as per  at her side.     No thiazide or NSAID use.   Patient denies any shortness of breath, chest pain, nausea, vomiting, abdominal pain, urinary changes.    Nephrology made aware of patient's ER arrival for severe hyponatremia.       PAST HISTORY  --------------------------------------------------------------------------------  PAST MEDICAL & SURGICAL HISTORY:  Sjogren's syndrome      Ileostomy in place  2017      Colonic dysmotility      GERD (gastroesophageal reflux disease)      Anxiety      Sciatica  left      CAD (coronary atherosclerotic disease)      Stented coronary artery      History of dehydration  secondary to ileostomy. was hospitalized mid June for hydration      H/O small bowel obstruction  2017 required surgery, multiple episodes since then, April 2022      Hypothyroid      H/O electrolyte imbalance  secondary to dehydration      History of connective tissue disease  diagnosed 2002      Hyperlipidemia      Mild anemia      Osteoporosis      Insomnia  difficulty staying asleep      Vasovagal syncope      History of hypotension  baseline 100/60      Scoliosis      History of CREST syndrome      History of scleroderma      Bilateral dry eyes      Dry mouth      Hyponatremia  secondary to dehydration      Hypomagnesemia  secondary to dehydration      COVID-19 vaccine series completed      History of short term memory loss      Familial tremor      Raynauds syndrome      Bunion of left foot      Bunion of right foot      Hammer toe of right foot      Hammer toe of left foot      Spondylolisthesis      Colonic inertia      DDD (degenerative disc disease), cervical      DDD (degenerative disc disease), lumbar      Dense breast tissue      History of subdural hematoma  small, November 2021 after a fall, last visit to neurosurgeon with MRI of brain revealed resolution      History of hysterectomy  2011 for bladder prolapse, bladder lift done also      History of appendectomy  as teen      H/O ileostomy  2017      S/P primary angioplasty with coronary stent  2019, 1 stent      History of lumbar laminectomy  October 2021, with removal of synovial cyst      Rectal prolapse  repair 2016      History of tonsillectomy and adenoidectomy  as child        FAMILY HISTORY:  Family history of uterine cancer (Mother)    FH: myocardial infarction (Father)    FH: CHF (congestive heart failure) (Mother)    Family history of breast cancer (Mother)    Family history of stroke (Father)    Family history of type 2 diabetes mellitus (Father)    Family history of ulcerative colitis (Child)    Family history of scoliosis (Child)      PAST SOCIAL HISTORY:    ALLERGIES & MEDICATIONS  --------------------------------------------------------------------------------  Allergies    No Known Allergies    Intolerances    adhesives (Rash)    Standing Inpatient Medications    PRN Inpatient Medications      REVIEW OF SYSTEMS  --------------------------------------------------------------------------------    All other systems were reviewed and are negative, except as noted.    VITALS/PHYSICAL EXAM  --------------------------------------------------------------------------------  T(C): 36.8 (08-05-24 @ 14:30), Max: 36.8 (08-05-24 @ 14:30)  HR: 96 (08-05-24 @ 14:30) (96 - 96)  BP: 123/72 (08-05-24 @ 14:30) (123/72 - 123/72)  RR: 18 (08-05-24 @ 14:30) (18 - 18)  SpO2: 98% (08-05-24 @ 14:30) (98% - 98%)  Wt(kg): --  Height (cm): 144.8 (08-05-24 @ 14:30)  Weight (kg): 39.5 (08-05-24 @ 14:30)  BMI (kg/m2): 18.8 (08-05-24 @ 14:30)  BSA (m2): 1.26 (08-05-24 @ 14:30)        Physical Exam:  	Gen: NAD  	HEENT: Anicteric  	Pulm: CTA B/L  	CV: S1S2+  	Abd: Soft, +BS    	Ext: No LE edema B/L  	Neuro: Awake  	Skin: Warm and dry    LABS/STUDIES  --------------------------------------------------------------------------------                Creatinine Trend:    Urinalysis - [04-24-24 @ 07:48]      Color  / Appearance  / SG  / pH       Gluc 84 / Ketone   / Bili  / Urobili        Blood  / Protein  / Leuk Est  / Nitrite       RBC  / WBC  / Hyaline  / Gran  / Sq Epi  / Non Sq Epi  / Bacteria           Tacrolimus  Cyclosporine  Sirolimus  Mycophenolate  BK PCR  CMV PCR  Parvo PCR  EBV PCR

## 2024-08-05 NOTE — H&P ADULT - HISTORY OF PRESENT ILLNESS
73F PMH Sjogren's syndrome, familial tremor, hypothyroidism, ocular migraines, prior traumatic SDH, CAD (s/p stents, on ASA), colonic inertia, rectal prolapse, bladder prolapse s/p ?suspensory surgical procedure, s/p colon resection and rectopexy  in 2011 and repeat open rectopexy with mesh in 2016, w/ hx of recurent SBO s/p ex-lap, SBR, diverting loop ileostomy 2017 ([facility])  with subsequent multiple LBO and SBO (recent admit 4/2024), chronic hyponatremia (followed by nephrology Dr. Maria D Meyer) p/w hyponatremia 117 on outpatient labs, referred to ED.     Na 113 -> 120 (w/o intervention, prior  to 100cc 2% HTS given by ED) -> 123 with Sosm 249, Uosm 214, Micky 9, BUN/SCr 25/1.05 (GFR 55)      Evaluated by nephrology on ED arrival, c/f 2/2 ostomy loss w/ decreased solute intake and relatively increased water intake. Recommended to ED to give 2% HTS 100cc which was given however reportedly repeat labs drawn prior to administration revealed Na 113 -> 120. Nephrology made aware, pending repeat BMP and further recommendations 73F PMH Sjogren's syndrome, familial tremor, hypothyroidism, ocular migraines, prior traumatic SDH, CAD (s/p stents, on ASA), colonic inertia, rectal prolapse, bladder prolapse s/p ?suspensory surgical procedure, s/p colon resection and rectopexy  in 2011 and repeat open rectopexy with mesh in 2016, w/ hx of recurent SBO s/p ex-lap, SBR, diverting loop ileostomy 2017 ([facility])  with subsequent multiple LBO and SBO (recent admit 4/2024), chronic hyponatremia (followed by nephrology Dr. Maria D Meyer) p/w hyponatremia 117 on outpatient labs, referred to ED. AOx4, accompanied by  at bedside who assists in collateral, reports 2 weeks of lethargy, weakness, and new erythematous rash near ostomy, R forearm, and b/l LEs evaluated by dermatology Dr. Chase to be c/f poison ivy (given  w/ recent exposure) rx'd cortisone cream. Went for routine outpatient labs and was informed to report to ED d/t low sodium. Otherwise denies fever, chills, HA, lightheadedness, dizziness, visual disturbance, CP, palpitations, SOB, cough, abd pain, n/v/d, change in ostomy output (has been high output x years followed by surgery team), melena/hematochezia, dysuria/hematuria, numbness/tingling, or other complaint.  also recently recovered from COVID-19 infxn.     Na 113 -> 120 (w/o intervention, prior  to 100cc 2% HTS given by ED) -> 123 with Sosm 249, Uosm 214, Micky 9, BUN/SCr 25/1.05 (GFR 55)      Evaluated by nephrology on ED arrival, c/f 2/2 ostomy loss w/ decreased solute intake and relatively increased water intake. Recommended to ED to give 2% HTS 100cc which was given however reportedly repeat labs drawn prior to administration revealed Na 113 -> 120. Nephrology made aware, pending repeat BMP and further recommendations 73F PMH Sjogren's syndrome, familial tremor, hypothyroidism, ocular migraines, prior traumatic SDH, CKD3, CAD (s/p stents, on ASA), colonic inertia, rectal prolapse, bladder prolapse s/p ?suspensory surgical procedure, s/p colon resection and rectopexy  in 2011 and repeat open rectopexy with mesh in 2016, w/ hx of recurent SBO s/p ex-lap, SBR, diverting loop ileostomy 2017 ([facility])  with subsequent multiple LBO and SBO (recent admit 4/2024), chronic hyponatremia (followed by nephrology Dr. Maria D Meyer) p/w hyponatremia 117 on outpatient labs, referred to ED. AOx4, accompanied by  at bedside who assists in collateral, reports 2 weeks of lethargy, weakness, and new erythematous rash near ostomy, R forearm, and b/l LEs evaluated by dermatology Dr. Chase to be c/f poison ivy (given  w/ recent exposure) rx'd cortisone cream. Went for routine outpatient labs and was informed to report to ED d/t low sodium. Otherwise denies fever, chills, HA, lightheadedness, dizziness, visual disturbance, CP, palpitations, SOB, cough, abd pain, n/v/d, change in ostomy output (has been high output x years followed by surgery team), melena/hematochezia, dysuria/hematuria, numbness/tingling, or other complaint.  also recently recovered from COVID-19 infxn.     Na 113 -> 120 (w/o intervention, prior  to 100cc 2% HTS given by ED) -> 123 with Sosm 249, Uosm 214, Micky 9, BUN/SCr 25/1.05 (GFR 55)      Evaluated by nephrology on ED arrival, c/f 2/2 ostomy loss w/ decreased solute intake and relatively increased water intake. Recommended to ED to give 2% HTS 100cc which was given however reportedly repeat labs drawn prior to administration revealed Na 113 -> 120. Nephrology made aware, pending repeat BMP and further recommendations

## 2024-08-05 NOTE — ED ADULT NURSE NOTE - NSFALLUNIVINTERV_ED_ALL_ED
Bed/Stretcher in lowest position, wheels locked, appropriate side rails in place/Call bell, personal items and telephone in reach/Instruct patient to call for assistance before getting out of bed/chair/stretcher/Non-slip footwear applied when patient is off stretcher/[place] to call system/Physically safe environment - no spills, clutter or unnecessary equipment/Purposeful proactive rounding/Room/bathroom lighting operational, light cord in reach

## 2024-08-05 NOTE — ED ADULT NURSE REASSESSMENT NOTE - NS ED NURSE REASSESS COMMENT FT1
unable to obtain butterfly as pt Is a difficulty stick. 3 RNS attempted to redraw BMP as requested by provider. pt will require an arterial stick for labs which RN is unable to complete. will notify admitting provider.

## 2024-08-05 NOTE — ED PROVIDER NOTE - CLINICAL SUMMARY MEDICAL DECISION MAKING FREE TEXT BOX
Patient presents complaining of abnormal labs.  Patient is hemodynamically stable afebrile presentation.  Patient physical exam unremarkable at this time.  We will repeat patient's lab work and reevaluate once we have results.  Pending labs and imaging for further disposition.  Nephrology has seen the patient will contact after lab work.

## 2024-08-05 NOTE — H&P ADULT - PROBLEM SELECTOR PLAN 7
- HSQ VTE ppx  - diet pending RN dysphagia screen  - precautions above  - dispo pending course, PT evaluation - HSQ VTE ppx  - diet pending RN dysphagia screen  - precautions above  - dispo pending course, PT evaluation  - supplement doses unconventional, clarify w/ PCP in AM, continued at lower dose in interim (fish oil and coq10 unavailable per pharmacy)

## 2024-08-05 NOTE — CONSULT NOTE ADULT - ATTENDING COMMENTS
Hyponatremia- ADH mediated in the setting of volume depletion coupled with poor solute and excess water intake   high protein, high salt diet   restrict water to 1 L   2% saline 40 cc/hr for 6 hours   repeat BMP in pm    abby jarvis  nephrology attending   please contact me on TEAMS   Office- [phone]
Ostomy output of 1000 to 1200 daily is the upper limit of normal and patient's output has been stable except that the consistency has been more watery.  I suggested she take the Lomotil again if the output is watery.  I also suggested that she eat creamy peanut butter with saltines as a snack daily.  This will help thicken the output and increase her salt intake.  Please call back as needed

## 2024-08-05 NOTE — ED ADULT NURSE REASSESSMENT NOTE - NS ED NURSE REASSESS COMMENT FT1
As per break coverage NATALYA Kolb. Pt is requesting an ultrasound IV placement for blood work. Pt does not wish to be butterflied for blood work. MD burleson made aware.

## 2024-08-06 DIAGNOSIS — N18.30 CHRONIC KIDNEY DISEASE, STAGE 3 UNSPECIFIED: ICD-10-CM

## 2024-08-06 DIAGNOSIS — I25.10 ATHEROSCLEROTIC HEART DISEASE OF NATIVE CORONARY ARTERY WITHOUT ANGINA PECTORIS: ICD-10-CM

## 2024-08-06 DIAGNOSIS — L23.9 ALLERGIC CONTACT DERMATITIS, UNSPECIFIED CAUSE: ICD-10-CM

## 2024-08-06 DIAGNOSIS — E03.9 HYPOTHYROIDISM, UNSPECIFIED: ICD-10-CM

## 2024-08-06 DIAGNOSIS — Z90.49 ACQUIRED ABSENCE OF OTHER SPECIFIED PARTS OF DIGESTIVE TRACT: Chronic | ICD-10-CM

## 2024-08-06 DIAGNOSIS — Z29.9 ENCOUNTER FOR PROPHYLACTIC MEASURES, UNSPECIFIED: ICD-10-CM

## 2024-08-06 DIAGNOSIS — E87.1 HYPO-OSMOLALITY AND HYPONATREMIA: ICD-10-CM

## 2024-08-06 DIAGNOSIS — Z98.890 OTHER SPECIFIED POSTPROCEDURAL STATES: ICD-10-CM

## 2024-08-06 LAB
ANION GAP SERPL CALC-SCNC: 11 MMOL/L — SIGNIFICANT CHANGE UP (ref 5–17)
ANION GAP SERPL CALC-SCNC: 12 MMOL/L — SIGNIFICANT CHANGE UP (ref 5–17)
ANION GAP SERPL CALC-SCNC: 9 MMOL/L — SIGNIFICANT CHANGE UP (ref 5–17)
BUN SERPL-MCNC: 21 MG/DL — SIGNIFICANT CHANGE UP (ref 7–23)
BUN SERPL-MCNC: 23 MG/DL — SIGNIFICANT CHANGE UP (ref 7–23)
BUN SERPL-MCNC: 25 MG/DL — HIGH (ref 7–23)
CALCIUM SERPL-MCNC: 8.4 MG/DL — SIGNIFICANT CHANGE UP (ref 8.4–10.5)
CALCIUM SERPL-MCNC: 8.9 MG/DL — SIGNIFICANT CHANGE UP (ref 8.4–10.5)
CALCIUM SERPL-MCNC: 9.5 MG/DL — SIGNIFICANT CHANGE UP (ref 8.4–10.5)
CHLORIDE SERPL-SCNC: 83 MMOL/L — LOW (ref 96–108)
CHLORIDE SERPL-SCNC: 85 MMOL/L — LOW (ref 96–108)
CHLORIDE SERPL-SCNC: 90 MMOL/L — LOW (ref 96–108)
CO2 SERPL-SCNC: 25 MMOL/L — SIGNIFICANT CHANGE UP (ref 22–31)
CO2 SERPL-SCNC: 27 MMOL/L — SIGNIFICANT CHANGE UP (ref 22–31)
CO2 SERPL-SCNC: 31 MMOL/L — SIGNIFICANT CHANGE UP (ref 22–31)
CORTIS AM PEAK SERPL-MCNC: 12.7 UG/DL — SIGNIFICANT CHANGE UP (ref 6–18.4)
CREAT SERPL-MCNC: 0.94 MG/DL — SIGNIFICANT CHANGE UP (ref 0.5–1.3)
CREAT SERPL-MCNC: 1.11 MG/DL — SIGNIFICANT CHANGE UP (ref 0.5–1.3)
CREAT SERPL-MCNC: 1.25 MG/DL — SIGNIFICANT CHANGE UP (ref 0.5–1.3)
EGFR: 45 ML/MIN/1.73M2 — LOW
EGFR: 52 ML/MIN/1.73M2 — LOW
EGFR: 63 ML/MIN/1.73M2 — SIGNIFICANT CHANGE UP
GLUCOSE SERPL-MCNC: 88 MG/DL — SIGNIFICANT CHANGE UP (ref 70–99)
GLUCOSE SERPL-MCNC: 90 MG/DL — SIGNIFICANT CHANGE UP (ref 70–99)
GLUCOSE SERPL-MCNC: 93 MG/DL — SIGNIFICANT CHANGE UP (ref 70–99)
HCT VFR BLD CALC: 35.9 % — SIGNIFICANT CHANGE UP (ref 34.5–45)
HGB BLD-MCNC: 12.4 G/DL — SIGNIFICANT CHANGE UP (ref 11.5–15.5)
MAGNESIUM SERPL-MCNC: 1.6 MG/DL — SIGNIFICANT CHANGE UP (ref 1.6–2.6)
MCHC RBC-ENTMCNC: 29.5 PG — SIGNIFICANT CHANGE UP (ref 27–34)
MCHC RBC-ENTMCNC: 34.5 GM/DL — SIGNIFICANT CHANGE UP (ref 32–36)
MCV RBC AUTO: 85.5 FL — SIGNIFICANT CHANGE UP (ref 80–100)
NRBC # BLD: 0 /100 WBCS — SIGNIFICANT CHANGE UP (ref 0–0)
PHOSPHATE SERPL-MCNC: 2.8 MG/DL — SIGNIFICANT CHANGE UP (ref 2.5–4.5)
PLATELET # BLD AUTO: 179 K/UL — SIGNIFICANT CHANGE UP (ref 150–400)
POTASSIUM SERPL-MCNC: 3 MMOL/L — LOW (ref 3.5–5.3)
POTASSIUM SERPL-MCNC: 4.1 MMOL/L — SIGNIFICANT CHANGE UP (ref 3.5–5.3)
POTASSIUM SERPL-MCNC: 4.2 MMOL/L — SIGNIFICANT CHANGE UP (ref 3.5–5.3)
POTASSIUM SERPL-SCNC: 3 MMOL/L — LOW (ref 3.5–5.3)
POTASSIUM SERPL-SCNC: 4.1 MMOL/L — SIGNIFICANT CHANGE UP (ref 3.5–5.3)
POTASSIUM SERPL-SCNC: 4.2 MMOL/L — SIGNIFICANT CHANGE UP (ref 3.5–5.3)
RBC # BLD: 4.2 M/UL — SIGNIFICANT CHANGE UP (ref 3.8–5.2)
RBC # FLD: 13.2 % — SIGNIFICANT CHANGE UP (ref 10.3–14.5)
SODIUM SERPL-SCNC: 123 MMOL/L — LOW (ref 135–145)
SODIUM SERPL-SCNC: 123 MMOL/L — LOW (ref 135–145)
SODIUM SERPL-SCNC: 127 MMOL/L — LOW (ref 135–145)
TSH SERPL-MCNC: 0.89 UIU/ML — SIGNIFICANT CHANGE UP (ref 0.27–4.2)
WBC # BLD: 7.97 K/UL — SIGNIFICANT CHANGE UP (ref 3.8–10.5)
WBC # FLD AUTO: 7.97 K/UL — SIGNIFICANT CHANGE UP (ref 3.8–10.5)

## 2024-08-06 PROCEDURE — 99223 1ST HOSP IP/OBS HIGH 75: CPT | Mod: GC

## 2024-08-06 PROCEDURE — 93010 ELECTROCARDIOGRAM REPORT: CPT

## 2024-08-06 PROCEDURE — 99233 SBSQ HOSP IP/OBS HIGH 50: CPT

## 2024-08-06 RX ORDER — ASPIRIN 500 MG
81 TABLET ORAL DAILY
Refills: 0 | Status: DISCONTINUED | OUTPATIENT
Start: 2024-08-06 | End: 2024-08-08

## 2024-08-06 RX ORDER — PROBIOTIC PRODUCT - TAB 1B-250 MG
1 TAB ORAL DAILY
Refills: 0 | Status: DISCONTINUED | OUTPATIENT
Start: 2024-08-06 | End: 2024-08-08

## 2024-08-06 RX ORDER — SIMETHICONE 125 MG/1
0 TABLET, CHEWABLE ORAL
Refills: 0 | DISCHARGE

## 2024-08-06 RX ORDER — PROPYLTHIOURACIL 50 MG
50 TABLET ORAL DAILY
Refills: 0 | Status: DISCONTINUED | OUTPATIENT
Start: 2024-08-06 | End: 2024-08-08

## 2024-08-06 RX ORDER — MAGNESIUM OXIDE 253 MG/1
400 TABLET ORAL
Refills: 0 | Status: DISCONTINUED | OUTPATIENT
Start: 2024-08-06 | End: 2024-08-08

## 2024-08-06 RX ORDER — PANTOPRAZOLE SODIUM 20 MG/1
40 TABLET, DELAYED RELEASE ORAL AT BEDTIME
Refills: 0 | Status: DISCONTINUED | OUTPATIENT
Start: 2024-08-06 | End: 2024-08-08

## 2024-08-06 RX ORDER — SODIUM CHLORIDE 5 % 5 %
1000 INTRAVENOUS SOLUTION INTRAVENOUS
Refills: 0 | Status: DISCONTINUED | OUTPATIENT
Start: 2024-08-06 | End: 2024-08-07

## 2024-08-06 RX ORDER — ROSUVASTATIN CALCIUM 10 MG
20 TABLET ORAL AT BEDTIME
Refills: 0 | Status: DISCONTINUED | OUTPATIENT
Start: 2024-08-06 | End: 2024-08-08

## 2024-08-06 RX ORDER — FERROUS SULFATE 325(65) MG
325 TABLET ORAL
Refills: 0 | Status: DISCONTINUED | OUTPATIENT
Start: 2024-08-06 | End: 2024-08-08

## 2024-08-06 RX ORDER — MULTIVITAMIN/IRON/FOLIC ACID 18MG-0.4MG
1 TABLET ORAL DAILY
Refills: 0 | Status: DISCONTINUED | OUTPATIENT
Start: 2024-08-06 | End: 2024-08-08

## 2024-08-06 RX ORDER — LEVOTHYROXINE SODIUM 175 MCG
88 TABLET ORAL DAILY
Refills: 0 | Status: DISCONTINUED | OUTPATIENT
Start: 2024-08-06 | End: 2024-08-08

## 2024-08-06 RX ORDER — MELATONIN 3 MG
5 TABLET ORAL AT BEDTIME
Refills: 0 | Status: DISCONTINUED | OUTPATIENT
Start: 2024-08-06 | End: 2024-08-08

## 2024-08-06 RX ORDER — CYANOCOBALAMIN 1000 UG/ML
1000 INJECTION, SOLUTION INTRAMUSCULAR; SUBCUTANEOUS DAILY
Refills: 0 | Status: DISCONTINUED | OUTPATIENT
Start: 2024-08-06 | End: 2024-08-08

## 2024-08-06 RX ORDER — MAGNESIUM SULFATE 500 MG/ML
1 VIAL (ML) INJECTION ONCE
Refills: 0 | Status: COMPLETED | OUTPATIENT
Start: 2024-08-06 | End: 2024-08-06

## 2024-08-06 RX ORDER — HEPARIN SODIUM 1000 [USP'U]/ML
5000 INJECTION, SOLUTION INTRAVENOUS; SUBCUTANEOUS EVERY 8 HOURS
Refills: 0 | Status: DISCONTINUED | OUTPATIENT
Start: 2024-08-06 | End: 2024-08-08

## 2024-08-06 RX ORDER — FAMOTIDINE 40 MG/1
20 TABLET, FILM COATED ORAL DAILY
Refills: 0 | Status: DISCONTINUED | OUTPATIENT
Start: 2024-08-06 | End: 2024-08-08

## 2024-08-06 RX ORDER — POTASSIUM CHLORIDE 1500 MG/1
40 TABLET, EXTENDED RELEASE ORAL EVERY 4 HOURS
Refills: 0 | Status: DISCONTINUED | OUTPATIENT
Start: 2024-08-06 | End: 2024-08-06

## 2024-08-06 RX ORDER — LORATADINE 10 MG
10 TABLET ORAL DAILY
Refills: 0 | Status: DISCONTINUED | OUTPATIENT
Start: 2024-08-06 | End: 2024-08-08

## 2024-08-06 RX ADMIN — MAGNESIUM OXIDE 400 MILLIGRAM(S): 253 TABLET ORAL at 08:55

## 2024-08-06 RX ADMIN — MAGNESIUM OXIDE 400 MILLIGRAM(S): 253 TABLET ORAL at 12:30

## 2024-08-06 RX ADMIN — PANTOPRAZOLE SODIUM 40 MILLIGRAM(S): 20 TABLET, DELAYED RELEASE ORAL at 22:04

## 2024-08-06 RX ADMIN — Medication 20 MILLIGRAM(S): at 22:04

## 2024-08-06 RX ADMIN — HEPARIN SODIUM 5000 UNIT(S): 1000 INJECTION, SOLUTION INTRAVENOUS; SUBCUTANEOUS at 05:01

## 2024-08-06 RX ADMIN — Medication 1 MILLIGRAM(S): at 12:30

## 2024-08-06 RX ADMIN — Medication 100 GRAM(S): at 04:57

## 2024-08-06 RX ADMIN — HEPARIN SODIUM 5000 UNIT(S): 1000 INJECTION, SOLUTION INTRAVENOUS; SUBCUTANEOUS at 22:04

## 2024-08-06 RX ADMIN — MAGNESIUM OXIDE 400 MILLIGRAM(S): 253 TABLET ORAL at 17:14

## 2024-08-06 RX ADMIN — CYANOCOBALAMIN 1000 MICROGRAM(S): 1000 INJECTION, SOLUTION INTRAMUSCULAR; SUBCUTANEOUS at 14:11

## 2024-08-06 RX ADMIN — Medication 1000 UNIT(S): at 12:30

## 2024-08-06 RX ADMIN — Medication 88 MICROGRAM(S): at 05:01

## 2024-08-06 RX ADMIN — HEPARIN SODIUM 5000 UNIT(S): 1000 INJECTION, SOLUTION INTRAVENOUS; SUBCUTANEOUS at 14:11

## 2024-08-06 RX ADMIN — PROBIOTIC PRODUCT - TAB 1 TABLET(S): TAB at 12:30

## 2024-08-06 RX ADMIN — Medication 50 MILLIGRAM(S): at 12:30

## 2024-08-06 RX ADMIN — POTASSIUM CHLORIDE 40 MILLIEQUIVALENT(S): 1500 TABLET, EXTENDED RELEASE ORAL at 04:56

## 2024-08-06 RX ADMIN — Medication 40 MILLILITER(S): at 14:11

## 2024-08-06 RX ADMIN — FAMOTIDINE 20 MILLIGRAM(S): 40 TABLET, FILM COATED ORAL at 12:30

## 2024-08-06 RX ADMIN — Medication 81 MILLIGRAM(S): at 12:30

## 2024-08-06 NOTE — ADVANCED PRACTICE NURSE CONSULT - ASSESSMENT
Chart reviewed &events noted. In at bedside to assess ostomy care needs. Pt known to ostomy service from previous surgical admissions w/ hx of ileostomy since 2017.  Pt is independent with ostomy care, and spouse assumes all care with routine pouching system changes. On assessment pouching system intact ,wearing Leyla pre cut 2 piece system with beige pouch  + function for liquid stool. Pt reports she is due for change. Complete pouching system changed. Loop ileostomy 1 1/4" sl oval, peristomal skin slightly denuded. Dusting of stoma powder applied to absorb moisture & dabbed w/ cavilon 3 M to seal & protect skin. [place] 2 piece, beige pouch -pt's own supplies as preferred. Discussed findings & recommendations with pt & spouse. Emotional support provided.

## 2024-08-06 NOTE — ADVANCED PRACTICE NURSE CONSULT - RECOMMEDATIONS
Will recommend:  1. Encourage self care -participation w/ ostomy care.  2. Empty or dispose of pouch when 1/3-1/2 full   3. Change pouching system every 3-4 days & prn leakage  4. Contact ostomy specialists if questions, concerns/issues .  5. Supplies: Leyla 2 1/4" pre cut convex skin barrier (pt's own supplies) (#71865 ), Leyla 2 1/4" drainable pouch (#[id number]); Accessory products:  stoma paste #[id number], stoma powder (#0932) & Cavilon No sting barrier film wipe (#9124)  Emotional support provided. Pt's own supplies at bedside.

## 2024-08-06 NOTE — ADVANCED PRACTICE NURSE CONSULT - REASON FOR CONSULT
Drain management , pouching assessment (hx of loop ileostomy since 2017). HPI is noted.    Patient is a 72 y/o F PMH Sjogren's syndrome, familial tremor, hypothyroidism, ocular migraines, prior traumatic SDH, CKD3, CAD (s/p stents, on ASA), colonic inertia, rectal prolapse, bladder prolapse s/p ?suspensory surgical procedure, s/p colon resection and rectopexy  in 2011 and repeat open rectopexy with mesh in 2016, w/ hx of recurrent SBO s/p ex-lap, SBR, diverting loop ileostomy 2017 ([facility])  with subsequent multiple LBO and SBO (recent admit 4/2024), chronic hyponatremia (followed by nephrology Dr. Maria D Meyer) p/w hyponatremia 117 on outpatient labs, referred to ED. AOx4, accompanied by  at bedside who assists in collateral, reports 2 weeks of lethargy, weakness, and new erythematous rash near ostomy, R forearm, and b/l LEs evaluated by dermatology Dr. Chase to be c/f poison ivy (given  w/ recent exposure) rx'd cortisone cream. Went for routine outpatient labs and was informed to report to ED d/t low sodium. Otherwise denies fever, chills, HA, lightheadedness, dizziness, visual disturbance, CP, palpitations, SOB, cough, abd pain, n/v/d, change in ostomy output (has been high output x years followed by surgery team), melena/hematochezia, dysuria/hematuria, numbness/tingling, or other complaint.  also recently recovered from COVID-19.

## 2024-08-06 NOTE — PATIENT PROFILE ADULT - FALL HARM RISK - HARM RISK INTERVENTIONS
Assistance with ambulation/Assistance OOB with selected safe patient handling equipment/Communicate Risk of Fall with Harm to all staff/Monitor for mental status changes/Monitor gait and stability/Reinforce activity limits and safety measures with patient and family/Tailored Fall Risk Interventions/Visual Cue: Yellow wristband and red socks/Bed in lowest position, wheels locked, appropriate side rails in place/Call bell, personal items and telephone in reach/Instruct patient to call for assistance before getting out of bed or chair/Non-slip footwear when patient is out of bed/[place] to call system/Physically safe environment - no spills, clutter or unnecessary equipment/Purposeful Proactive Rounding/Room/bathroom lighting operational, light cord in reach

## 2024-08-06 NOTE — PROGRESS NOTE ADULT - PROBLEM SELECTOR PLAN 7
- HSQ VTE ppx  - diet pending RN dysphagia screen  - precautions above  - dispo pending course, PT evaluation

## 2024-08-07 LAB
ANION GAP SERPL CALC-SCNC: 15 MMOL/L — SIGNIFICANT CHANGE UP (ref 5–17)
ANION GAP SERPL CALC-SCNC: 15 MMOL/L — SIGNIFICANT CHANGE UP (ref 5–17)
BUN SERPL-MCNC: 20 MG/DL — SIGNIFICANT CHANGE UP (ref 7–23)
BUN SERPL-MCNC: 29 MG/DL — HIGH (ref 7–23)
CALCIUM SERPL-MCNC: 8.8 MG/DL — SIGNIFICANT CHANGE UP (ref 8.4–10.5)
CALCIUM SERPL-MCNC: 9 MG/DL — SIGNIFICANT CHANGE UP (ref 8.4–10.5)
CHLORIDE SERPL-SCNC: 89 MMOL/L — LOW (ref 96–108)
CHLORIDE SERPL-SCNC: 94 MMOL/L — LOW (ref 96–108)
CO2 SERPL-SCNC: 24 MMOL/L — SIGNIFICANT CHANGE UP (ref 22–31)
CO2 SERPL-SCNC: 25 MMOL/L — SIGNIFICANT CHANGE UP (ref 22–31)
CREAT SERPL-MCNC: 0.93 MG/DL — SIGNIFICANT CHANGE UP (ref 0.5–1.3)
CREAT SERPL-MCNC: 1.15 MG/DL — SIGNIFICANT CHANGE UP (ref 0.5–1.3)
EGFR: 50 ML/MIN/1.73M2 — LOW
EGFR: 64 ML/MIN/1.73M2 — SIGNIFICANT CHANGE UP
GLUCOSE SERPL-MCNC: 70 MG/DL — SIGNIFICANT CHANGE UP (ref 70–99)
GLUCOSE SERPL-MCNC: 84 MG/DL — SIGNIFICANT CHANGE UP (ref 70–99)
MAGNESIUM SERPL-MCNC: 1.8 MG/DL — SIGNIFICANT CHANGE UP (ref 1.6–2.6)
MAGNESIUM SERPL-MCNC: 1.9 MG/DL — SIGNIFICANT CHANGE UP (ref 1.6–2.6)
PHOSPHATE SERPL-MCNC: 2.1 MG/DL — LOW (ref 2.5–4.5)
PHOSPHATE SERPL-MCNC: 3.1 MG/DL — SIGNIFICANT CHANGE UP (ref 2.5–4.5)
POTASSIUM SERPL-MCNC: 3.9 MMOL/L — SIGNIFICANT CHANGE UP (ref 3.5–5.3)
POTASSIUM SERPL-MCNC: 4.3 MMOL/L — SIGNIFICANT CHANGE UP (ref 3.5–5.3)
POTASSIUM SERPL-SCNC: 3.9 MMOL/L — SIGNIFICANT CHANGE UP (ref 3.5–5.3)
POTASSIUM SERPL-SCNC: 4.3 MMOL/L — SIGNIFICANT CHANGE UP (ref 3.5–5.3)
SODIUM SERPL-SCNC: 129 MMOL/L — LOW (ref 135–145)
SODIUM SERPL-SCNC: 133 MMOL/L — LOW (ref 135–145)

## 2024-08-07 PROCEDURE — 99232 SBSQ HOSP IP/OBS MODERATE 35: CPT | Mod: GC

## 2024-08-07 PROCEDURE — 99232 SBSQ HOSP IP/OBS MODERATE 35: CPT

## 2024-08-07 RX ORDER — SOD PHOS DI, MONO/K PHOS MONO 250 MG
1 TABLET ORAL
Refills: 0 | Status: ACTIVE | OUTPATIENT
Start: 2024-08-07 | End: 2024-08-08

## 2024-08-07 RX ORDER — DIPHENOXYLATE HCL/ATROPINE 2.5-.025MG
1 TABLET ORAL EVERY 8 HOURS
Refills: 0 | Status: DISCONTINUED | OUTPATIENT
Start: 2024-08-07 | End: 2024-08-07

## 2024-08-07 RX ORDER — DIPHENOXYLATE HCL/ATROPINE 2.5-.025MG
1 TABLET ORAL EVERY 8 HOURS
Refills: 0 | Status: DISCONTINUED | OUTPATIENT
Start: 2024-08-07 | End: 2024-08-08

## 2024-08-07 RX ORDER — BACTERIOSTATIC SODIUM CHLORIDE 0.9 %
2 VIAL (ML) INJECTION THREE TIMES A DAY
Refills: 0 | Status: DISCONTINUED | OUTPATIENT
Start: 2024-08-07 | End: 2024-08-08

## 2024-08-07 RX ADMIN — FAMOTIDINE 20 MILLIGRAM(S): 40 TABLET, FILM COATED ORAL at 12:24

## 2024-08-07 RX ADMIN — HEPARIN SODIUM 5000 UNIT(S): 1000 INJECTION, SOLUTION INTRAVENOUS; SUBCUTANEOUS at 13:05

## 2024-08-07 RX ADMIN — MAGNESIUM OXIDE 400 MILLIGRAM(S): 253 TABLET ORAL at 08:53

## 2024-08-07 RX ADMIN — Medication 325 MILLIGRAM(S): at 08:53

## 2024-08-07 RX ADMIN — MAGNESIUM OXIDE 400 MILLIGRAM(S): 253 TABLET ORAL at 12:24

## 2024-08-07 RX ADMIN — HEPARIN SODIUM 5000 UNIT(S): 1000 INJECTION, SOLUTION INTRAVENOUS; SUBCUTANEOUS at 21:20

## 2024-08-07 RX ADMIN — Medication 81 MILLIGRAM(S): at 12:24

## 2024-08-07 RX ADMIN — Medication 50 MILLIGRAM(S): at 12:24

## 2024-08-07 RX ADMIN — Medication 2 GRAM(S): at 13:02

## 2024-08-07 RX ADMIN — CYANOCOBALAMIN 1000 MICROGRAM(S): 1000 INJECTION, SOLUTION INTRAMUSCULAR; SUBCUTANEOUS at 12:23

## 2024-08-07 RX ADMIN — HEPARIN SODIUM 5000 UNIT(S): 1000 INJECTION, SOLUTION INTRAVENOUS; SUBCUTANEOUS at 05:11

## 2024-08-07 RX ADMIN — Medication 20 MILLIGRAM(S): at 21:19

## 2024-08-07 RX ADMIN — Medication 1000 UNIT(S): at 12:24

## 2024-08-07 RX ADMIN — Medication 5 MILLIGRAM(S): at 21:20

## 2024-08-07 RX ADMIN — Medication 88 MICROGRAM(S): at 05:11

## 2024-08-07 RX ADMIN — PANTOPRAZOLE SODIUM 40 MILLIGRAM(S): 20 TABLET, DELAYED RELEASE ORAL at 21:19

## 2024-08-07 RX ADMIN — Medication 1 MILLIGRAM(S): at 12:24

## 2024-08-07 RX ADMIN — Medication 1 TABLET(S): at 18:24

## 2024-08-07 RX ADMIN — PROBIOTIC PRODUCT - TAB 1 TABLET(S): TAB at 12:24

## 2024-08-07 NOTE — DIETITIAN INITIAL EVALUATION ADULT - PROBLEM SELECTOR PLAN 1
followed as outpatient by nephrology Dr. Maria D Paniagua, Brie 6/2024 132 -> 117 today referred to ED  here Na 113 -> 120 -> 123 (s/p 2% HTS 100cc, Sosm 249, Uosm 214, Micky 9evaluated by nephrology c/f 2/2 ostomy loss w/ decreased solute intake and relatively increased water intake.  - am cortisol, TSH per nephro  - f/u further nephrology recommendations (will clarify necessity for ICU c/s), will continue to monitor BMP q4h (goal correct < 6-8meq/24h and Is/Os  - fall/aspiration/seizure precautions  - neuro checks per routine

## 2024-08-07 NOTE — DIETITIAN NUTRITION RISK NOTIFICATION - TREATMENT: THE FOLLOWING DIET HAS BEEN RECOMMENDED
Diet, Regular:   1000mL Fluid Restriction ([id number])  Supplement Feeding Modality:  Oral  Ensure Enlive Cans or Servings Per Day:  1       Frequency:  Three Times a day (08-06-24 @ 12:38) [Active]

## 2024-08-07 NOTE — DIETITIAN INITIAL EVALUATION ADULT - ORAL NUTRITION SUPPLEMENTS
continue Ensure plus high protein 3x daily (1050kcal, 60g proteins) to provide additional calories and nutrients to encourage PO intake while in house.

## 2024-08-07 NOTE — DIETITIAN INITIAL EVALUATION ADULT - ENERGY INTAKE
Fair (50-75%) Pt reports fair PO intake in house consumed <75% of foods provided. Protein supplements taken well per pt. Menu at bedside, pt is aware of menu ordering procedure in house, has been ordering preferred foods as needed since admission.

## 2024-08-07 NOTE — DIETITIAN INITIAL EVALUATION ADULT - NSFNSGIIOFT_GEN_A_CORE
08-06-24 @ 07:01  -  08-07-24 @ 07:00  --------------------------------------------------------  OUT:    Ileostomy (mL): 800 mL  Total OUT: 800 mL    Total NET: -800 mL    08-07-24 @ 07:01  -  08-07-24 @ 10:51  --------------------------------------------------------  OUT:    Ileostomy (mL): 350 mL  Total OUT: 350 mL    Total NET: -350 mL

## 2024-08-07 NOTE — DIETITIAN INITIAL EVALUATION ADULT - NSICDXPASTSURGICALHX_GEN_ALL_CORE_FT
PAST SURGICAL HISTORY:  H/O ileostomy 2017    History of appendectomy as teen    History of bowel resection     History of hysterectomy 2011 for bladder prolapse, bladder lift done also    History of lumbar laminectomy October 2021, with removal of synovial cyst    History of tonsillectomy and adenoidectomy as child    Rectal prolapse repair 2016    S/P primary angioplasty with coronary stent 2019, 1 stent

## 2024-08-07 NOTE — DIETITIAN INITIAL EVALUATION ADULT - NS FNS DIET ORDER
Diet, Regular:   1000mL Fluid Restriction ([id number])  Supplement Feeding Modality:  Oral  Ensure Enlive Cans or Servings Per Day:  1       Frequency:  Three Times a day (08-06-24 @ 12:38)

## 2024-08-07 NOTE — PROGRESS NOTE ADULT - PROBLEM SELECTOR PLAN 2
followed as outpatient by dermatology Dr. Chase, recent visit presumed poison ivy as etiology rx'd cortisone  -  brought in cream - to continue inpatient
followed as outpatient by dermatology Dr. Chase, recent visit presumed poison ivy as etiology rx'd cortisone  -  brought in cream - to continue inpatient

## 2024-08-07 NOTE — PROGRESS NOTE ADULT - PROBLEM SELECTOR PLAN 1
followed as outpatient by nephrology Dr. Maria D Paniagua, Brie 6/2024 132 -> 117 today referred to ED  here Na 113 -> 120 -> 123 (s/p 2% HTS 100cc, Sosm 249, Uosm 214, Micky 9evaluated by nephrology c/f 2/2 ostomy loss w/ decreased solute intake and relatively increased water intake.  - am cortisol, TSH per nephro  - fall/aspiration/seizure precautions  - neuro checks per routine  - getting another round of 2% NaCl
followed as outpatient by nephrology Dr. Maria D Paniagua, [place] 6/2024 132 -> 117 today referred to ED  here Na 113 -> 120 -> 123 (s/p 2% HTS 100cc, Sosm 249, Uosm 214, Micky 9evaluated by nephrology c/f 2/2 ostomy loss w/ decreased solute intake and relatively increased water intake.  - am cortisol, TSH per nephro  - fall/aspiration/seizure precautions  - neuro checks per routine  - got another round of 2% NaCl  - Na improving to 129, spoke to nephrology - start 2g NaCl today  - repeat BMP in am, if ok - likely can be discharged

## 2024-08-07 NOTE — PROGRESS NOTE ADULT - PROBLEM SELECTOR PLAN 5
stable  - monitor BMP, dose meds per GFR, avoid toxins, monitor Is/Os
stable  - monitor BMP, dose meds per GFR, avoid toxins, monitor Is/Os

## 2024-08-07 NOTE — DIETITIAN INITIAL EVALUATION ADULT - ORAL INTAKE PTA/DIET HISTORY
Pt reports decreased PO intake since Ostomy placement in 2017. Pt states she feel full easily, continue to follow low fiber diet, states some high fiber foods causing blockage. Pt reports drinking a lot of water everyday as she is concerned for dehydration. Confirms no known food allergies. Denies history of chewing or swallowing issues. Denies GI distress. On Fish oil, Citracal Petites, CoQ10, Magnesium, Iron, Vitamin D3, Vitamin B12 and Probiotics. Also repots drinking Ensure ~ 1x daily.

## 2024-08-07 NOTE — DIETITIAN INITIAL EVALUATION ADULT - OTHER INFO
-- Renal: Pt with hyponatremia, s/p IVF NaCl, on 1000ml fluid restriction   -- Micronutrient/Other supplementation: Vitamin D3, Vitamin B12, Feso4, Magnesium, Folic acid, Lactobacillus   -- Hypothyroidism on Synthroid   -- GI: Pepcid, Protonix

## 2024-08-07 NOTE — DIETITIAN INITIAL EVALUATION ADULT - CONTINUE CURRENT NUTRITION CARE PLAN
Continue diet free of therapeutic restriction, on 1000ml fluid restriction per team. RD remains available to adjust diet as needed./yes

## 2024-08-07 NOTE — DIETITIAN INITIAL EVALUATION ADULT - PERTINENT MEDS FT
MEDICATIONS  (STANDING):  aspirin enteric coated 81 milliGRAM(s) Oral daily  cholecalciferol 1000 Unit(s) Oral daily  cyanocobalamin 1000 MICROGram(s) Oral daily  famotidine    Tablet 20 milliGRAM(s) Oral daily  ferrous    sulfate 325 milliGRAM(s) Oral <User Schedule>  folic acid 1 milliGRAM(s) Oral daily  heparin   Injectable 5000 Unit(s) SubCutaneous every 8 hours  lactobacillus acidophilus 1 Tablet(s) Oral daily  levothyroxine 88 MICROGram(s) Oral daily  loratadine 10 milliGRAM(s) Oral daily  magnesium oxide 400 milliGRAM(s) Oral three times a day with meals  melatonin 5 milliGRAM(s) Oral at bedtime  pantoprazole    Tablet 40 milliGRAM(s) Oral at bedtime  primidone 50 milliGRAM(s) Oral daily  rosuvastatin 20 milliGRAM(s) Oral at bedtime  sodium chloride 2% . 1000 milliLiter(s) (40 mL/Hr) IV Continuous <Continuous>    MEDICATIONS  (PRN):

## 2024-08-07 NOTE — DIETITIAN INITIAL EVALUATION ADULT - OTHER CALCULATIONS
Fluid needs deferred to team. Energy and protein needs based on current wt of 40.7kg in consideration of malnutrition.

## 2024-08-07 NOTE — PROGRESS NOTE ADULT - NUTRITIONAL ASSESSMENT
This patient has been assessed with a concern for Malnutrition and has been determined to have a diagnosis/diagnoses of Moderate protein-calorie malnutrition.    This patient is being managed with:   Diet Regular-  1000mL Fluid Restriction ([id number])  Supplement Feeding Modality:  Oral  Ensure Enlive Cans or Servings Per Day:  1       Frequency:  Three Times a day  Entered: Aug  6 2024 12:36PM

## 2024-08-07 NOTE — DIETITIAN INITIAL EVALUATION ADULT - REASON INDICATOR FOR ASSESSMENT
Pt seen for consult for nutrition assessment/education. Information obtained from pt, RN, electronic medical record,  at bedside. Chart reviewed, events noted.

## 2024-08-07 NOTE — DIETITIAN INITIAL EVALUATION ADULT - PHYSCIAL ASSESSMENT
Drug Dosing Weight  Height (cm): 144.8 (05 Aug 2024 14:30)  Weight (kg): 40.9 (06 Aug 2024 01:34)  BMI (kg/m2): 19.5 (06 Aug 2024 01:34)    Daily weight (standing): 40.7kg (8/6)     Weight history:   Per pt: ~88-90lb, reports wt loss happened after ostomy placement but weight stable wt for the past few years  Previous RD notes: 40.8kg (4/24/24), 41.9kg (8/3/18)      **stable wt between current wt and stated wt of pt. RD will continue to monitor wt trends as available/able.     IBW: 94lb, 95% IBW

## 2024-08-07 NOTE — DIETITIAN INITIAL EVALUATION ADULT - ADD RECOMMEND
-- Continue micronutrient supplements as ordered, pending no medical contraindications, for micronutrient support.  -- Monitor PO intake, GI tolerance, skin integrity, labs, weight, and bowel movement regularity.   -- Encourage use of daily menus. Honor dietary preferences as expressed as able.   -- Malnutrition alert placed in chart.

## 2024-08-07 NOTE — DIETITIAN INITIAL EVALUATION ADULT - PROBLEM SELECTOR PLAN 7
- HSQ VTE ppx  - diet pending RN dysphagia screen  - precautions above  - dispo pending course, PT evaluation  - supplement doses unconventional, clarify w/ PCP in AM, continued at lower dose in interim (fish oil and coq10 unavailable per pharmacy)

## 2024-08-07 NOTE — DIETITIAN INITIAL EVALUATION ADULT - PERTINENT LABORATORY DATA
08-06    127<L>  |  90<L>  |  23  ----------------------------<  88  4.1   |  25  |  1.25    Ca    8.4      06 Aug 2024 23:06  Phos  2.8     08-06  Mg     1.6     08-06

## 2024-08-08 ENCOUNTER — TRANSCRIPTION ENCOUNTER (OUTPATIENT)
Age: 74
End: 2024-08-08

## 2024-08-08 VITALS
OXYGEN SATURATION: 94 % | RESPIRATION RATE: 18 BRPM | HEART RATE: 66 BPM | SYSTOLIC BLOOD PRESSURE: 169 MMHG | TEMPERATURE: 98 F | DIASTOLIC BLOOD PRESSURE: 65 MMHG

## 2024-08-08 LAB
ANION GAP SERPL CALC-SCNC: 13 MMOL/L — SIGNIFICANT CHANGE UP (ref 5–17)
BUN SERPL-MCNC: 25 MG/DL — HIGH (ref 7–23)
CALCIUM SERPL-MCNC: 9.1 MG/DL — SIGNIFICANT CHANGE UP (ref 8.4–10.5)
CHLORIDE SERPL-SCNC: 93 MMOL/L — LOW (ref 96–108)
CO2 SERPL-SCNC: 27 MMOL/L — SIGNIFICANT CHANGE UP (ref 22–31)
CREAT SERPL-MCNC: 1.02 MG/DL — SIGNIFICANT CHANGE UP (ref 0.5–1.3)
EGFR: 57 ML/MIN/1.73M2 — LOW
GI PCR PANEL: SIGNIFICANT CHANGE UP
GLUCOSE SERPL-MCNC: 75 MG/DL — SIGNIFICANT CHANGE UP (ref 70–99)
MAGNESIUM SERPL-MCNC: 1.9 MG/DL — SIGNIFICANT CHANGE UP (ref 1.6–2.6)
PHOSPHATE SERPL-MCNC: 2.7 MG/DL — SIGNIFICANT CHANGE UP (ref 2.5–4.5)
POTASSIUM SERPL-MCNC: 4.1 MMOL/L — SIGNIFICANT CHANGE UP (ref 3.5–5.3)
POTASSIUM SERPL-SCNC: 4.1 MMOL/L — SIGNIFICANT CHANGE UP (ref 3.5–5.3)
SODIUM SERPL-SCNC: 133 MMOL/L — LOW (ref 135–145)

## 2024-08-08 PROCEDURE — 93005 ELECTROCARDIOGRAM TRACING: CPT

## 2024-08-08 PROCEDURE — 84300 ASSAY OF URINE SODIUM: CPT

## 2024-08-08 PROCEDURE — 84133 ASSAY OF URINE POTASSIUM: CPT

## 2024-08-08 PROCEDURE — 99232 SBSQ HOSP IP/OBS MODERATE 35: CPT | Mod: GC

## 2024-08-08 PROCEDURE — 83930 ASSAY OF BLOOD OSMOLALITY: CPT

## 2024-08-08 PROCEDURE — 84100 ASSAY OF PHOSPHORUS: CPT

## 2024-08-08 PROCEDURE — 99239 HOSP IP/OBS DSCHRG MGMT >30: CPT

## 2024-08-08 PROCEDURE — 84443 ASSAY THYROID STIM HORMONE: CPT

## 2024-08-08 PROCEDURE — 84550 ASSAY OF BLOOD/URIC ACID: CPT

## 2024-08-08 PROCEDURE — 83735 ASSAY OF MAGNESIUM: CPT

## 2024-08-08 PROCEDURE — 99285 EMERGENCY DEPT VISIT HI MDM: CPT

## 2024-08-08 PROCEDURE — 85027 COMPLETE CBC AUTOMATED: CPT

## 2024-08-08 PROCEDURE — 83935 ASSAY OF URINE OSMOLALITY: CPT

## 2024-08-08 PROCEDURE — 80048 BASIC METABOLIC PNL TOTAL CA: CPT

## 2024-08-08 PROCEDURE — 82533 TOTAL CORTISOL: CPT

## 2024-08-08 PROCEDURE — 36415 COLL VENOUS BLD VENIPUNCTURE: CPT

## 2024-08-08 PROCEDURE — 87507 IADNA-DNA/RNA PROBE TQ 12-25: CPT

## 2024-08-08 RX ORDER — MULTIVITAMIN/IRON/FOLIC ACID 18MG-0.4MG
1 TABLET ORAL
Qty: 0 | Refills: 0 | DISCHARGE
Start: 2024-08-08

## 2024-08-08 RX ORDER — MULTIVITAMIN/IRON/FOLIC ACID 18MG-0.4MG
1 TABLET ORAL
Qty: 30 | Refills: 0
Start: 2024-08-08 | End: 2024-09-06

## 2024-08-08 RX ORDER — PROBIOTIC PRODUCT - TAB 1B-250 MG
1 TAB ORAL
Qty: 30 | Refills: 0
Start: 2024-08-08 | End: 2024-09-06

## 2024-08-08 RX ORDER — FERROUS SULFATE 325(65) MG
1 TABLET ORAL
Qty: 13 | Refills: 0
Start: 2024-08-08 | End: 2024-09-06

## 2024-08-08 RX ORDER — FERROUS SULFATE 325(65) MG
1 TABLET ORAL
Qty: 0 | Refills: 0 | DISCHARGE
Start: 2024-08-08

## 2024-08-08 RX ORDER — DIPHENOXYLATE HCL/ATROPINE 2.5-.025MG
1 TABLET ORAL
Qty: 42 | Refills: 0
Start: 2024-08-08 | End: 2024-08-21

## 2024-08-08 RX ADMIN — Medication 1 TABLET(S): at 05:15

## 2024-08-08 RX ADMIN — Medication 88 MICROGRAM(S): at 05:15

## 2024-08-08 RX ADMIN — Medication 1 TABLET(S): at 13:18

## 2024-08-08 RX ADMIN — HEPARIN SODIUM 5000 UNIT(S): 1000 INJECTION, SOLUTION INTRAVENOUS; SUBCUTANEOUS at 05:15

## 2024-08-08 RX ADMIN — Medication 1 PACKET(S): at 07:39

## 2024-08-08 NOTE — DISCHARGE NOTE PROVIDER - HOSPITAL COURSE
HPI:  73F PMH Sjogren's syndrome, familial tremor, hypothyroidism, ocular migraines, prior traumatic SDH, CKD3, CAD (s/p stents, on ASA), colonic inertia, rectal prolapse, bladder prolapse s/p ?suspensory surgical procedure, s/p colon resection and rectopexy  in 2011 and repeat open rectopexy with mesh in 2016, w/ hx of recurent SBO s/p ex-lap, SBR, diverting loop ileostomy 2017 ([facility])  with subsequent multiple LBO and SBO (recent admit 4/2024), chronic hyponatremia (followed by nephrology Dr. Maria D Meyer) p/w hyponatremia 117 on outpatient labs, referred to ED. AOx4, accompanied by  at bedside who assists in collateral, reports 2 weeks of lethargy, weakness, and new erythematous rash near ostomy, R forearm, and b/l LEs evaluated by dermatology Dr. Chase to be c/f poison ivy (given  w/ recent exposure) rx'd cortisone cream. Went for routine outpatient labs and was informed to report to ED d/t low sodium. Otherwise denies fever, chills, HA, lightheadedness, dizziness, visual disturbance, CP, palpitations, SOB, cough, abd pain, n/v/d, change in ostomy output (has been high output x years followed by surgery team), melena/hematochezia, dysuria/hematuria, numbness/tingling, or other complaint.  also recently recovered from COVID-19 infxn.     Na 113 -> 120 (w/o intervention, prior  to 100cc 2% HTS given by ED) -> 123 with Sosm 249, Uosm 214, Micky 9, BUN/SCr 25/1.05 (GFR 55)      Evaluated by nephrology on ED arrival, c/f 2/2 ostomy loss w/ decreased solute intake and relatively increased water intake. Recommended to ED to give 2% HTS 100cc which was given however reportedly repeat labs drawn prior to administration revealed Na 113 -> 120. Nephrology made aware, pending repeat BMP and further recommendations (05 Aug 2024 22:51)    Hospital Course:  73F PMH Sjogren's syndrome, familial tremor, hypothyroidism, ocular migraines, prior traumatic SDH, CKD3, CAD (s/p stents, on ASA), colonic inertia, rectal prolapse, bladder prolapse s/p ?suspensory surgical procedure, s/p colon resection and rectopexy  in 2011 and repeat open rectopexy with mesh in 2016, w/ hx of recurent SBO s/p ex-lap, SBR, diverting loop ileostomy 2017 ([facility])  with subsequent multiple LBO and SBO (recent admit 4/2024), chronic hyponatremia (followed by nephrology Dr. Maria D Meyer) p/w acute on chronic hyponatremia        Nutritional Assessment:  · Nutritional Assessment	This patient has been assessed with a concern for Malnutrition and has been determined to have a diagnosis/diagnoses of Moderate protein-calorie malnutrition.    This patient is being managed with:   Diet Regular-  1000mL Fluid Restriction ([id number])  Supplement Feeding Modality:  Oral  Ensure Enlive Cans or Servings Per Day:  1       Frequency:  Three Times a day  Entered: Aug  6 2024 12:36PM     Problem/Plan - 1:  ·  Problem: Hyponatremia.   ·  Plan: followed as outpatient by nephrology Dr. Maria D Paniagua, Na 6/2024 132 -> 117 today referred to ED  here Na 113 -> 120 -> 123 (s/p 2% HTS 100cc, Sosm 249, Uosm 214, Micky 9evaluated by nephrology c/f 2/2 ostomy loss w/ decreased solute intake and relatively increased water intake.  - am cortisol, TSH per nephro  - fall/aspiration/seizure precautions  - neuro checks per routine  - got another round of 2% NaCl  - Na improving to 129, spoke to nephrology - start 2g NaCl today  - repeat BMP in am, if ok - likely can be discharged.     Problem/Plan - 2:  ·  Problem: Allergic contact dermatitis.   ·  Plan: followed as outpatient by dermatology Dr. Chase, recent visit presumed poison ivy as etiology rx'd cortisone  -  brought in cream - to continue inpatient.     Problem/Plan - 3:  ·  Problem: Hypothyroidism.   ·  Plan: - c/w synthroid.     Problem/Plan - 4:  ·  Problem: CAD (coronary artery disease).   ·  Plan: - c/w ASA, statin.     Problem/Plan - 5:  ·  Problem: Stage 3 chronic kidney disease.   ·  Plan: stable  - monitor BMP, dose meds per GFR, avoid toxins, monitor Is/Os.     Problem/Plan - 6:  ·  Problem: H/O ileostomy.   ·  Plan: - monitor output  - ostomy care RN c/s.     Problem/Plan - 7:  ·  Problem: Need for prophylactic measure.   ·  Plan: - HSQ VTE ppx  - independent.    Important Medication Changes and Reason: Lomotil for 2 weeks.  Follow up with pcp in 1 week for repeat lab work.     Advanced Directives:   [x] Full code  [ ] DNR  [ ] Hospice    Discharge Diagnoses:  (Admit Diagnosis) Hyponatremia, hypo-osmolarity, or hypo-osmolar hyponatremia  (PMH) History of subdural hematoma       HPI:  73F PMH Sjogren's syndrome, familial tremor, hypothyroidism, ocular migraines, prior traumatic SDH, CKD3, CAD (s/p stents, on ASA), colonic inertia, rectal prolapse, bladder prolapse s/p ?suspensory surgical procedure, s/p colon resection and rectopexy  in 2011 and repeat open rectopexy with mesh in 2016, w/ hx of recurent SBO s/p ex-lap, SBR, diverting loop ileostomy 2017 ([facility])  with subsequent multiple LBO and SBO (recent admit 4/2024), chronic hyponatremia (followed by nephrology Dr. Maria D Meyer) p/w hyponatremia 117 on outpatient labs, referred to ED. AOx4, accompanied by  at bedside who assists in collateral, reports 2 weeks of lethargy, weakness, and new erythematous rash near ostomy, R forearm, and b/l LEs evaluated by dermatology Dr. Chase to be c/f poison ivy (given  w/ recent exposure) rx'd cortisone cream. Went for routine outpatient labs and was informed to report to ED d/t low sodium. Otherwise denies fever, chills, HA, lightheadedness, dizziness, visual disturbance, CP, palpitations, SOB, cough, abd pain, n/v/d, change in ostomy output (has been high output x years followed by surgery team), melena/hematochezia, dysuria/hematuria, numbness/tingling, or other complaint.  also recently recovered from COVID-19 infxn.     Na 113 -> 120 (w/o intervention, prior  to 100cc 2% HTS given by ED) -> 123 with Sosm 249, Uosm 214, Micky 9, BUN/SCr 25/1.05 (GFR 55)      Evaluated by nephrology on ED arrival, c/f 2/2 ostomy loss w/ decreased solute intake and relatively increased water intake. Recommended to ED to give 2% HTS 100cc which was given however reportedly repeat labs drawn prior to administration revealed Na 113 -> 120. Nephrology made aware, pending repeat BMP and further recommendations (05 Aug 2024 22:51)    Hospital Course:  73F PMH Sjogren's syndrome, familial tremor, hypothyroidism, ocular migraines, prior traumatic SDH, CKD3, CAD (s/p stents, on ASA), colonic inertia, rectal prolapse, bladder prolapse s/p ?suspensory surgical procedure, s/p colon resection and rectopexy  in 2011 and repeat open rectopexy with mesh in 2016, w/ hx of recurent SBO s/p ex-lap, SBR, diverting loop ileostomy 2017 ([facility])  with subsequent multiple LBO and SBO (recent admit 4/2024), chronic hyponatremia (followed by nephrology Dr. Maria D Meyer) p/w acute on chronic hyponatremia     Nutritional Assessment:  · Nutritional Assessment	This patient has been assessed with a concern for Malnutrition and has been determined to have a diagnosis/diagnoses of Moderate protein-calorie malnutrition.    This patient is being managed with:   Diet Regular-  1000mL Fluid Restriction ([id number])  Supplement Feeding Modality:  Oral  Ensure Enlive Cans or Servings Per Day:  1       Frequency:  Three Times a day  Entered: Aug  6 2024 12:36PM     Problem/Plan - 1:  ·  Problem: Hyponatremia.   ·  Plan: followed as outpatient by nephrology Dr. Maria D Paniagua, Na 6/2024 132 -> 117 today referred to ED  here Na 113 -> 120 -> 123 (s/p 2% HTS 100cc, Sosm 249, Uosm 214, Micky 9evaluated by nephrology c/f 2/2 ostomy loss w/ decreased solute intake and relatively increased water intake.  - am cortisol, TSH per nephro  - fall/aspiration/seizure precautions  - neuro checks per routine  - got another round of 2% NaCl  - Na improving to 129, spoke to nephrology - start 2g NaCl today  - repeat BMP in am, if ok - likely can be discharged.     Problem/Plan - 2:  ·  Problem: Allergic contact dermatitis.   ·  Plan: followed as outpatient by dermatology Dr. Chase, recent visit presumed poison ivy as etiology rx'd cortisone  -  brought in cream - to continue inpatient.     Problem/Plan - 3:  ·  Problem: Hypothyroidism.   ·  Plan: - c/w synthroid.     Problem/Plan - 4:  ·  Problem: CAD (coronary artery disease).   ·  Plan: - c/w ASA, statin.     Problem/Plan - 5:  ·  Problem: Stage 3 chronic kidney disease.   ·  Plan: stable  - monitor BMP, dose meds per GFR, avoid toxins, monitor Is/Os.     Problem/Plan - 6:  ·  Problem: H/O ileostomy.   ·  Plan: - monitor output  - ostomy care RN c/s.     Problem/Plan - 7:  ·  Problem: Need for prophylactic measure.   ·  Plan: - HSQ VTE ppx  - independent.    Important Medication Changes and Reason: Lomotil for 2 weeks.  Follow up with pcp in 1 week for repeat lab work.     Advanced Directives:   [x] Full code  [ ] DNR  [ ] Hospice    Discharge Diagnoses:  (Admit Diagnosis) Hyponatremia, hypo-osmolarity, or hypo-osmolar hyponatremia  (PMH) History of subdural hematoma       HPI:  73F PMH Sjogren's syndrome, familial tremor, hypothyroidism, ocular migraines, prior traumatic SDH, CKD3, CAD (s/p stents, on ASA), colonic inertia, rectal prolapse, bladder prolapse s/p ?suspensory surgical procedure, s/p colon resection and rectopexy  in 2011 and repeat open rectopexy with mesh in 2016, w/ hx of recurent SBO s/p ex-lap, SBR, diverting loop ileostomy 2017 ([facility])  with subsequent multiple LBO and SBO (recent admit 4/2024), chronic hyponatremia (followed by nephrology Dr. Maria D Meyer) p/w hyponatremia 117 on outpatient labs, referred to ED. AOx4, accompanied by  at bedside who assists in collateral, reports 2 weeks of lethargy, weakness, and new erythematous rash near ostomy, R forearm, and b/l LEs evaluated by dermatology Dr. Chase to be c/f poison ivy (given  w/ recent exposure) rx'd cortisone cream. Went for routine outpatient labs and was informed to report to ED d/t low sodium. Otherwise denies fever, chills, HA, lightheadedness, dizziness, visual disturbance, CP, palpitations, SOB, cough, abd pain, n/v/d, change in ostomy output (has been high output x years followed by surgery team), melena/hematochezia, dysuria/hematuria, numbness/tingling, or other complaint.  also recently recovered from COVID-19 infxn.     Na 113 -> 120 (w/o intervention, prior  to 100cc 2% HTS given by ED) -> 123 with Sosm 249, Uosm 214, Micky 9, BUN/SCr 25/1.05 (GFR 55)      Evaluated by nephrology on ED arrival, c/f 2/2 ostomy loss w/ decreased solute intake and relatively increased water intake. Recommended to ED to give 2% HTS 100cc which was given however reportedly repeat labs drawn prior to administration revealed Na 113 -> 120. Nephrology made aware, pending repeat BMP and further recommendations (05 Aug 2024 22:51)    Hospital Course:  73F PMH Sjogren's syndrome, familial tremor, hypothyroidism, ocular migraines, prior traumatic SDH, CKD3, CAD (s/p stents, on ASA), colonic inertia, rectal prolapse, bladder prolapse s/p ?suspensory surgical procedure, s/p colon resection and rectopexy  in 2011 and repeat open rectopexy with mesh in 2016, w/ hx of recurent SBO s/p ex-lap, SBR, diverting loop ileostomy 2017 ([facility])  with subsequent multiple LBO and SBO (recent admit 4/2024), chronic hyponatremia (followed by nephrology Dr. Maria D Meyer) p/w acute on chronic hyponatremia     # Hyponatremia.   - followed as outpatient by nephrology Dr. Maria D Paniagua, Na 6/2024 132 -> 117 today referred to ED  here Na 113 -> 120 -> 123 (s/p 2% HTS 100cc, Sosm 249, Uosm 214, Micky 9evaluated by nephrology c/f 2/2 ostomy loss w/ decreased solute intake and relatively increased water intake.  - improved with rounds of 2% and one dose of salt tabs  - , cleared to be dc from nephrology standpoint   - started on lomotil PRN to prevent worsening diarrhea/increased stool output    #Allergic contact dermatitis.   - followed as outpatient by dermatology Dr. Chase, recent visit presumed poison ivy as etiology rx'd cortisone  -  brought in cream - to continue inpatient.    #Hypothyroidism.  - c/w synthroid.    #CAD (coronary artery disease).   - c/w ASA, statin.    #ileostomy.    - monitor output  - ostomy care RN c/s.    Important Medication Changes and Reason: Lomotil for 2 weeks.  Follow up with pcp in 1 week for repeat lab work.     Advanced Directives:   [x] Full code  [ ] DNR  [ ] Hospice    Discharge Diagnoses:  #Hyponatremia  #Allergic contact dermatitis.   #ileostomy  #History of subdural hematoma

## 2024-08-08 NOTE — DISCHARGE NOTE PROVIDER - CARE PROVIDER_API CALL
Maria D Paniagua  Nephrology  [address] [place] [address], [address]  [place] [address]  Phone: [phone]  Fax: [phone]  Follow Up Time:    Maria D Paniagua  Nephrology  [address] [place] [address], [address]  [place] [address]  Phone: [phone]  Fax: [phone]  Follow Up Time:     Lee Coley  Internal Medicine  [address] [facility], [address]  [place] [address]  Phone: [phone]  Fax: [phone]  Follow Up Time: 1 week

## 2024-08-08 NOTE — DISCHARGE NOTE NURSING/CASE MANAGEMENT/SOCIAL WORK - NSDCVIVACCINE_GEN_ALL_CORE_FT
Tdap; 04-Aug-2019 14:09; Rakha, Rozina (RN); [employer]; [id number] (Exp. Date: 06-Aug-2021); IntraMuscular; Deltoid Left.; 0.5 milliLiter(s); VIS (VIS Published: 09-May-2013, VIS Presented: 04-Aug-2019);   Tdap; 04-Nov-2021 21:39; Karie Woody (NATALYA); [employer]; [id number] (Exp. Date: 29-Jul-2023); IntraMuscular; Deltoid Left.; 0.5 milliLiter(s); VIS (VIS Published: 09-May-2013, VIS Presented: 04-Nov-2021);

## 2024-08-08 NOTE — DISCHARGE NOTE PROVIDER - PROVIDER TOKENS
PROVIDER:[TOKEN:[[id number]]] PROVIDER:[TOKEN:[[id number]]],PROVIDER:[TOKEN:[[id number]],FOLLOWUP:[1 week]]

## 2024-08-08 NOTE — DISCHARGE NOTE PROVIDER - NSDCMRMEDTOKEN_GEN_ALL_CORE_FT
aspirin 81 mg oral delayed release tablet: 1 tab(s) orally once a day  Citracal Petites 200 mg-6.25 mcg (250 intl units) oral tablet: 2 tab(s) orally 2 times a day  CoQ10 100 mg oral capsule: 2 cap(s) orally once a day  Fish Oil 1000 mg oral capsule: 1 cap(s) orally 2 times a day  gas-x:   iron: 30 mg orally 2 times a day  Magnesium Glycinate: 400 milligram(s) orally 4 times a day  Melatonin 5 mg oral tablet: 1 tab(s) orally once a day (at bedtime)  NexIUM 40 mg oral delayed release capsule: 1 cap(s) orally once a day (in the evening)  Pepcid 20 mg oral tablet: 1 tab(s) orally once a day  primidone 50 mg oral tablet: 1 tab(s) orally once a day  probiotic:   Prolia 60 mg/mL subcutaneous solution: 1 dose(s) subcutaneous every 6 months  rosuvastatin 20 mg oral tablet: 1 tab(s) orally once a day (at bedtime)  Synthroid 88 mcg (0.088 mg) oral tablet: 1 tab(s) orally once a day  Vitamin B12: 1000mcg with folic acid  Vitamin D3 125 mcg/mL (5000 intl units/mL) oral liquid: 2 milliliter(s) orally once a day   aspirin 81 mg oral delayed release tablet: 1 tab(s) orally once a day  Citracal Petites 200 mg-6.25 mcg (250 intl units) oral tablet: 2 tab(s) orally 2 times a day  CoQ10 100 mg oral capsule: 2 cap(s) orally once a day  diphenoxylate-atropine 2.5 mg-0.025 mg oral tablet: 1 tab(s) orally every 8 hours MDD: 3 tablets  ferrous sulfate 325 mg (65 mg elemental iron) oral tablet: 1 tab(s) orally Monday, Wednesday, and Friday  Fish Oil 1000 mg oral capsule: 1 cap(s) orally 2 times a day  folic acid 1 mg oral tablet: 1 tab(s) orally once a day  gas-x:   iron: 30 mg orally 2 times a day  Magnesium Glycinate: 400 milligram(s) orally 4 times a day  Melatonin 5 mg oral tablet: 1 tab(s) orally once a day (at bedtime)  NexIUM 40 mg oral delayed release capsule: 1 cap(s) orally once a day (in the evening)  Pepcid 20 mg oral tablet: 1 tab(s) orally once a day  primidone 50 mg oral tablet: 1 tab(s) orally once a day  probiotic:   Prolia 60 mg/mL subcutaneous solution: 1 dose(s) subcutaneous every 6 months  rosuvastatin 20 mg oral tablet: 1 tab(s) orally once a day (at bedtime)  Synthroid 88 mcg (0.088 mg) oral tablet: 1 tab(s) orally once a day  Vitamin B12: 1000mcg with folic acid  Vitamin D3 125 mcg/mL (5000 intl units/mL) oral liquid: 2 milliliter(s) orally once a day   aspirin 81 mg oral delayed release tablet: 1 tab(s) orally once a day  Citracal Petites 200 mg-6.25 mcg (250 intl units) oral tablet: 2 tab(s) orally 2 times a day  CoQ10 100 mg oral capsule: 2 cap(s) orally once a day  diphenoxylate-atropine 2.5 mg-0.025 mg oral tablet: 1 tab(s) orally every 8 hours MDD: 3 tablets  ferrous sulfate 325 mg (65 mg elemental iron) oral tablet: 1 tab(s) orally Monday, Wednesday, and Friday  Fish Oil 1000 mg oral capsule: 1 cap(s) orally 2 times a day  folic acid 1 mg oral tablet: 1 tab(s) orally once a day  gas-x:   iron: 30 mg orally 2 times a day  Melatonin 5 mg oral tablet: 1 tab(s) orally once a day (at bedtime)  NexIUM 40 mg oral delayed release capsule: 1 cap(s) orally once a day (in the evening)  Pepcid 20 mg oral tablet: 1 tab(s) orally once a day  primidone 50 mg oral tablet: 1 tab(s) orally once a day  probiotic:   Prolia 60 mg/mL subcutaneous solution: 1 dose(s) subcutaneous every 6 months  rosuvastatin 20 mg oral tablet: 1 tab(s) orally once a day (at bedtime)  Synthroid 88 mcg (0.088 mg) oral tablet: 1 tab(s) orally once a day  Vitamin B12: 1000mcg with folic acid  Vitamin D3 125 mcg/mL (5000 intl units/mL) oral liquid: 2 milliliter(s) orally once a day

## 2024-08-08 NOTE — PROGRESS NOTE ADULT - ASSESSMENT
71 yo lady chronic mild hyponatremia (saw Dr. Paniagua in May 2024), ostomy had recent outpatient labs showing Na 117 and sent to ER.      #Acute on Chronic Hyponatremia 2' ostomy losses with decreased solute intake and relatively increased water intake  -sNa 117 on out-patient labs (Aug 5). sNa 113 (but hemolyzed). Received 2%  cc >>>123. Received 2% >>> 127.  -Micky 9; UOsm 214; SOsm 249; UKa 42; Uric acid 6.7; TSH 0.89; AM cortisol 12.7  -Na improved to 133, with increasing PO salt intake and fluid restriction. Stable from our standpoint. Dc salt tabs. Can Mg oral supplementation.       Upon discharge, patient needs a close follow up with nephrology (Dr. Paniagua). Please have patient call [phone] or email [email or web address] to make a followup appointment. Office located at [address] [place] [address]. [address] [place] [address].      Michael Madison  Nephrology Fellow  Feel free to contact me on TEAMS  After 5 pm please contact the on-call Fellow.  
73 yo lady chronic mild hyponatremia (saw Dr. Paniagua in May 2024), ostomy had recent outpatient labs showing Na 117 and sent to ER.      #Acute on Chronic Hyponatremia 2' ostomy losses with decreased solute intake and relatively increased water intake  -sNa 117 on out-patient labs (Aug 5). sNa 113 (but hemolyzed). Received 2%  cc >>>123. Received 2% >>> 127.  -Micky 9; UOsm 214; SOsm 249; UKa 42; Uric acid 6.7; TSH 0.89; AM cortisol 12.7  Recs:  -Start Na-tabs 2 gm TID  -Increase PO solute intake as tolerated.       Upon discharge, patient needs a close follow up with nephrology (Dr. Paniagua). Please have patient call [phone] or email [email or web address] to make a followup appointment. Office located at [address] [place] [address]. [address] [place] [address].      Michael Madison  Nephrology Fellow  Feel free to contact me on TEAMS  After 5 pm please contact the on-call Fellow.  
74yo F with Hx CREST syndrome, hypothyroidism, CKD3, CAD (s/p stents, on ASA), colonic inertia, rectal prolapse (s/p ?suspensory surgical procedure, colon resection and rectopexy [2011], and repeat open rectopexy with mesh [2016]), and recurrent SBOs (s/p ex-lap, SBR, diverting loop ileostomy [2017], and recent admission in April 2014 for rectal stricture, managed non-operatively) who presents with hyponatremia seen on routine OP labs. General Surgery consulted for high ileostomy OP by ED. As patient states that her ileostomy OP is at its baseline and her OP is within the normal range for an ileostomy, patient's ileostomy OP is unlikely to be the cause of her hyponatremia at this time.     Plan:  - No acute surgical intervention at this time  - C/w medicine/renal work up and management for hyponatremia  - consider lomotil if workup concerning for dehydration  - surgery to follow    [facility]  [phone]  
73F PMH Sjogren's syndrome, familial tremor, hypothyroidism, ocular migraines, prior traumatic SDH, CKD3, CAD (s/p stents, on ASA), colonic inertia, rectal prolapse, bladder prolapse s/p ?suspensory surgical procedure, s/p colon resection and rectopexy  in 2011 and repeat open rectopexy with mesh in 2016, w/ hx of recurent SBO s/p ex-lap, SBR, diverting loop ileostomy 2017 ([facility])  with subsequent multiple LBO and SBO (recent admit 4/2024), chronic hyponatremia (followed by nephrology Dr. Maria D Meyer) p/w acute on chronic hyponatremia 
73F PMH Sjogren's syndrome, familial tremor, hypothyroidism, ocular migraines, prior traumatic SDH, CKD3, CAD (s/p stents, on ASA), colonic inertia, rectal prolapse, bladder prolapse s/p ?suspensory surgical procedure, s/p colon resection and rectopexy  in 2011 and repeat open rectopexy with mesh in 2016, w/ hx of recurent SBO s/p ex-lap, SBR, diverting loop ileostomy 2017 ([facility])  with subsequent multiple LBO and SBO (recent admit 4/2024), chronic hyponatremia (followed by nephrology Dr. Maria D Meyer) p/w acute on chronic hyponatremia

## 2024-08-08 NOTE — DISCHARGE NOTE PROVIDER - DETAILS OF MALNUTRITION DIAGNOSIS/DIAGNOSES
This patient has been assessed with a concern for Malnutrition and was treated during this hospitalization for the following Nutrition diagnosis/diagnoses:     -  08/07/2024: Moderate protein-calorie malnutrition

## 2024-08-08 NOTE — DISCHARGE NOTE PROVIDER - NSDCFUADDAPPT_GEN_ALL_CORE_FT
APPTS ARE READY TO BE MADE: [x] YES    Best Family or Patient Contact (if needed):    Additional Information about above appointments (if needed):    1: Upon discharge, patient needs a close follow up with nephrology (Dr. Paniagua). Please have patient call [phone] or email [email or web address] to make a followup appointment. Office located at [address] [place] [address]. [address] [place] [address].   2: Dr. Coley in 1 week for repeat lab work to check sodium levels.   3:     Other comments or requests:    APPTS ARE READY TO BE MADE: [x] YES    Best Family or Patient Contact (if needed):    Additional Information about above appointments (if needed):    1: Upon discharge, patient needs a close follow up with nephrology (Dr. Paniagua). Please have patient call [phone] or email [email or web address] to make a followup appointment. Office located at [address] [place] [address]. [address] [place] [address].   2: Dr. Coley in 1 week for repeat lab work to check sodium levels.   3:     Other comments or requests:   Prior to outreaching the patient, it was visible that the patient has secured a follow up appointment which was not scheduled by our team. Appt is on 8/27/24 at 2:40pm with  [address] [place] [address]    Prior to outreaching the patient, it was visible that the patient has secured a follow up appointment which was not scheduled by our team. Appt is on 8/24/24 at 12:00pm with  [address] [place] [address]

## 2024-08-08 NOTE — DISCHARGE NOTE PROVIDER - NSDCCPCAREPLAN_GEN_ALL_CORE_FT
PRINCIPAL DISCHARGE DIAGNOSIS  Diagnosis: Hyponatremia  Assessment and Plan of Treatment: Sodium now 133.  Increase salt intake  Upon discharge, patient needs a close follow up with nephrology (Dr. Paniagua). Please have patient call [phone] or email [email or web address] to make a followup appointment. Office located at [address] [place] [address].        SECONDARY DISCHARGE DIAGNOSES  Diagnosis: H/O ileostomy  Assessment and Plan of Treatment:      PRINCIPAL DISCHARGE DIAGNOSIS  Diagnosis: Hyponatremia  Assessment and Plan of Treatment: Sodium now 133.  Increase salt intake in diet.  Please have repeat labwork to check sodium levels in 1 week.   Upon discharge, patient needs a close follow up with nephrology (Dr. Paniagua). Please have patient call [phone] or email [email or web address] to make a followup appointment. Office located at [address] [place] [address]. [address] [place] [address].        SECONDARY DISCHARGE DIAGNOSES  Diagnosis: H/O ileostomy  Assessment and Plan of Treatment: Ostomy output of 1000 to 1200 daily is the upper limit of normal and patient's output has been stable except that the consistency has been more watery. Take Lomotil for 2 weeks  again if the output is watery.  I also suggested that she eat creamy peanut butter with saltines as a snack daily.  This will help thicken the output and increase her salt intake.     PRINCIPAL DISCHARGE DIAGNOSIS  Diagnosis: Hyponatremia  Assessment and Plan of Treatment: Sodium now 133.  Increase salt intake in diet.  Please have repeat labwork to check sodium levels in 1 week.   Upon discharge, patient needs a close follow up with nephrology (Dr. Paniagua). Please have patient call [phone] or email [email or web address] to make a followup appointment. Office located at [address] [place] [address]. [address] [place] [address].        SECONDARY DISCHARGE DIAGNOSES  Diagnosis: H/O ileostomy  Assessment and Plan of Treatment: Ostomy output of 1000 to 1200 daily is the upper limit of normal and patient's output has been stable except that the consistency has been more watery. Take Lomotil for 2 weeks again if the output is watery. Suggest eating creamy peanut butter with saltines as a snack daily.  This will help thicken the output and increase salt intake.

## 2024-08-08 NOTE — DISCHARGE NOTE PROVIDER - NSDCFUSCHEDAPPT_GEN_ALL_CORE_FT
Lee Coley  [facility]  INTMED [address]  Scheduled Appointment: 08/10/2024    Steve Puri  [facility]  GASTRO  [address]  Scheduled Appointment: 08/23/2024    [facility]  DENTAL [address]  Scheduled Appointment: 08/28/2024    [facility]  CARDIOLOGY [address] [facility]   Scheduled Appointment: 10/23/2024    Silvestre Rosen  [facility]  CARDIOLOGY [address] [facility]   Scheduled Appointment: 10/23/2024     Steve Puri  [facility]  GASTRO  [address]  Scheduled Appointment: 08/23/2024    Lee Coley  [facility]  INTMED [address]  Scheduled Appointment: 08/24/2024    Maria D Paniagua  [facility]  NEPHRO [address]  Scheduled Appointment: 08/27/2024    [facility]  DENTAL [address]  Scheduled Appointment: 08/28/2024    [facility]  CARDIOLOGY [address] [facility]   Scheduled Appointment: 10/23/2024    Silvestre Rosen  [facility]  CARDIOLOGY [address] [facility]   Scheduled Appointment: 10/23/2024

## 2024-08-08 NOTE — PHARMACOTHERAPY INTERVENTION NOTE - COMMENTS
Counseled patient and patient's spouse on the following discharge medications names (brand/generic), indication, and possible side effects:    Discharge Medications:  aspirin 81 mg oral delayed release tablet: 1 tab(s) orally once a day  Citracal Petites 200 mg-6.25 mcg (250 intl units) oral tablet: 2 tab(s) orally 2 times a day  CoQ10 100 mg oral capsule: 2 cap(s) orally once a day  diphenoxylate-atropine 2.5 mg-0.025 mg oral tablet: 1 tab(s) orally every 8 hours MDD: 3 tablets  ferrous sulfate 325 mg (65 mg elemental iron) oral tablet: 1 tab(s) orally Monday, Wednesday, and Friday  Fish Oil 1000 mg oral capsule: 1 cap(s) orally 2 times a day  folic acid 1 mg oral tablet: 1 tab(s) orally once a day  gas-x  iron: 30 mg orally 2 times a day  Melatonin 5 mg oral tablet: 1 tab(s) orally once a day (at bedtime)  NexIUM 40 mg oral delayed release capsule: 1 cap(s) orally once a day (in the evening)  Pepcid 20 mg oral tablet: 1 tab(s) orally once a day  primidone 50 mg oral tablet: 1 tab(s) orally once a day  probiotic  Prolia 60 mg/mL subcutaneous solution: 1 dose(s) subcutaneous every 6 months  rosuvastatin 20 mg oral tablet: 1 tab(s) orally once a day (at bedtime)  Synthroid 88 mcg (0.088 mg) oral tablet: 1 tab(s) orally once a day  Vitamin B12: 1000mcg with folic acid  Vitamin D3 125 mcg/mL (5000 intl units/mL) oral liquid: 2 milliliter(s) orally once a day    Patient questions and concerns were answered and addressed. Patient demonstrated understanding. New prescription for Lomotil will be sent to patient's preferred [facility] for .    Charlene Mckay, PharmD, BCPS  Clinical Pharmacy Specialist  Available on [employer] Teams (preferred)  Cell: [phone]

## 2024-08-08 NOTE — DISCHARGE NOTE PROVIDER - CARE PROVIDERS DIRECT ADDRESSES
[email or web address]

## 2024-08-08 NOTE — PROGRESS NOTE ADULT - REASON FOR ADMISSION
Acute on chronic hyponatremia

## 2024-08-08 NOTE — DISCHARGE NOTE NURSING/CASE MANAGEMENT/SOCIAL WORK - PATIENT PORTAL LINK FT
You can access the FollowMyHealth Patient Portal offered by [facility] by registering at the following website: [email or web address]. By joining [employer]’s FollowMyHealth portal, you will also be able to view your health information using other applications (apps) compatible with our system.

## 2024-08-08 NOTE — PROGRESS NOTE ADULT - ATTENDING COMMENTS
Hyponatremia- increased ostomy output+ reduced solute intake and excessive water intake   now much improved   start salt tabs 2 grams TID   Increased protein in food   fluid restrict to 1 L     abby jarvis  nephrology attending   please contact me on TEAMS   Office- [phone]
See consult note from yesterday.   Ostomy output of 1000 to 1200 daily is the upper limit of normal and patient's output has been stable except that the consistency has been more watery.  I suggested she take the Lomotil again if the output is watery.  I also suggested that she eat creamy peanut butter with saltines as a snack daily.  This will help thicken the output and increase her salt intake.  Please call back as needed
Hyponatremia- improved   key thing on discharge is 1 L fluid restriction   she does not tolerate sodium chloride jesse jarvis  nephrology attending   please contact me on TEAMS   Office- [phone]

## 2024-08-08 NOTE — DISCHARGE NOTE PROVIDER - ATTENDING DISCHARGE PHYSICAL EXAMINATION:
GENERAL: NAD, well-groomed, well-developed, pleasant to interview, accompanied by   NERVOUS SYSTEM: Alert & Oriented X4, strength preserved   CHEST/LUNG: Clear to auscultation bilaterally; No rales, rhonchi, wheezing, or rubs  HEART: Regular rate and rhythm; No murmurs, rubs, or gallops  ABDOMEN: Soft, Nontender, Nondistended; Bowel sounds present. No guarding, rebound tenderness, or rigidity. +ostomy in place,  EXTREMITIES: 2+ Peripheral Pulses, No edema/warmth/tenderness to palpation b/l LE  SKIN: +round erythematous rash to L of ostomy and of ventral LUE and smaller erythematous areas of medial proximal LEs b/l (examined w/ patient permission and  present)

## 2024-08-08 NOTE — PROGRESS NOTE ADULT - SUBJECTIVE AND OBJECTIVE BOX
[facility] DIVISION OF KIDNEY DISEASES AND HYPERTENSION   FOLLOW UP NOTE    --------------------------------------------------------------------------------  Chief Complaint:    24 hour events/subjective: Pt. was seen and examined today.   Feels ok        PAST HISTORY  --------------------------------------------------------------------------------  No significant changes to PMH, PSH, FHx, SHx, unless otherwise noted    ALLERGIES & MEDICATIONS  --------------------------------------------------------------------------------  Allergies    latex (Unknown)  No Known Drug Allergies    Intolerances    adhesives (Rash)    Standing Inpatient Medications  aspirin enteric coated 81 milliGRAM(s) Oral daily  cholecalciferol 1000 Unit(s) Oral daily  cyanocobalamin 1000 MICROGram(s) Oral daily  famotidine    Tablet 20 milliGRAM(s) Oral daily  ferrous    sulfate 325 milliGRAM(s) Oral <User Schedule>  folic acid 1 milliGRAM(s) Oral daily  heparin   Injectable 5000 Unit(s) SubCutaneous every 8 hours  lactobacillus acidophilus 1 Tablet(s) Oral daily  levothyroxine 88 MICROGram(s) Oral daily  loratadine 10 milliGRAM(s) Oral daily  magnesium oxide 400 milliGRAM(s) Oral three times a day with meals  melatonin 5 milliGRAM(s) Oral at bedtime  pantoprazole    Tablet 40 milliGRAM(s) Oral at bedtime  primidone 50 milliGRAM(s) Oral daily  rosuvastatin 20 milliGRAM(s) Oral at bedtime  sodium chloride 2% . 1000 milliLiter(s) IV Continuous <Continuous>    PRN Inpatient Medications      REVIEW OF SYSTEMS  --------------------------------------------------------------------------------    All other systems were reviewed and are negative, except as noted.    VITALS/PHYSICAL EXAM  --------------------------------------------------------------------------------  T(C): 36.4 (08-07-24 @ 10:59), Max: 36.4 (08-06-24 @ 22:54)  HR: 76 (08-07-24 @ 10:59) (64 - 78)  BP: 109/69 (08-07-24 @ 10:59) (99/44 - 125/53)  RR: 18 (08-07-24 @ 10:59) (18 - 18)  SpO2: 98% (08-07-24 @ 10:59) (95% - 98%)  Wt(kg): --  Height (cm): 144.8 (08-05-24 @ 14:30)  Weight (kg): 40.9 (08-06-24 @ 01:34)  BMI (kg/m2): 19.5 (08-06-24 @ 01:34)  BSA (m2): 1.28 (08-06-24 @ 01:34)      08-06-24 @ 07:01  -  08-07-24 @ 07:00  --------------------------------------------------------  IN: 960 mL / OUT: 800 mL / NET: 160 mL    08-07-24 @ 07:01  -  08-07-24 @ 11:16  --------------------------------------------------------  IN: 0 mL / OUT: 350 mL / NET: -350 mL        Physical Exam:  	Gen: NAD  	HEENT: Anicteric  	Pulm: CTA B/L  	CV: S1S2+  	Abd: Soft, +ostomy   	Ext: No LE edema B/L  	Neuro: Awake  	Skin: Warm and dry        LABS/STUDIES  --------------------------------------------------------------------------------              12.4   7.97  >-----------<  179      [08-06-24 @ 11:28]              35.9     127  |  90  |  23  ----------------------------<  88      [08-06-24 @ 23:06]  4.1   |  25  |  1.25        Ca     8.4     [08-06-24 @ 23:06]      Mg     1.6     [08-06-24 @ 02:40]      Phos  2.8     [08-06-24 @ 02:40]          Uric acid 6.7      [08-05-24 @ 18:14]  Serum Osmolality 249      [08-05-24 @ 18:13]    Creatinine Trend:  SCr 1.25 [08-06 @ 23:06]  SCr 0.94 [08-06 @ 11:28]  SCr 1.11 [08-06 @ 02:40]  SCr 1.08 [08-05 @ 22:33]  SCr 1.05 [08-05 @ 21:19]    Urinalysis - [08-06-24 @ 23:06]      Color  / Appearance  / SG  / pH       Gluc 88 / Ketone   / Bili  / Urobili        Blood  / Protein  / Leuk Est  / Nitrite       RBC  / WBC  / Hyaline  / Gran  / Sq Epi  / Non Sq Epi  / Bacteria     Urine Sodium 9      [08-05-24 @ 18:15]  Urine Potassium 42      [08-05-24 @ 18:15]  Urine Osmolality 214      [08-05-24 @ 18:15]        Tacrolimus  Cyclosporine  Sirolimus  Mycophenolate  BK PCR  CMV PCR  Parvo PCR  EBV PCR
SUBJECTIVE: NAEO. Admitted to medicine service for hyponatremia work up.   Denies high ileostomy output overnight.     Vital Signs Last 24 Hrs  T(C): 36.4 (06 Aug 2024 05:18), Max: 37.1 (05 Aug 2024 23:14)  T(F): 97.5 (06 Aug 2024 05:18), Max: 98.7 (05 Aug 2024 23:14)  HR: 64 (06 Aug 2024 05:18) (64 - 96)  BP: 122/59 (06 Aug 2024 05:18) (94/53 - 123/72)  BP(mean): 82 (05 Aug 2024 17:20) (82 - 82)  RR: 18 (06 Aug 2024 05:18) (16 - 18)  SpO2: 100% (06 Aug 2024 05:18) (95% - 100%)    Parameters below as of 06 Aug 2024 05:18  Patient On (Oxygen Delivery Method): room air        I&O's Detail    05 Aug 2024 07:01  -  06 Aug 2024 05:52  --------------------------------------------------------  IN:  Total IN: 0 mL    OUT:    Ileostomy (mL): 150 mL    Voided (mL): 200 mL  Total OUT: 350 mL    Total NET: -350 mL          Physical Exam:  GEN: resting in bed comfortably in NAD  NEURO: awake, alert  CV: warm, well-perfused  RESP: no increased WOB  ABD: soft, non-distended, non-tender without rebound tenderness or guarding; stoma pink and viable, productive of gas and stool in bag   EXTR: no gross deformities; spontaneous movement in b/l U/L extrem     LABS:                        11.9   7.93  )-----------( 155      ( 05 Aug 2024 21:19 )             34.7     08-06    123<L>  |  83<L>  |  25<H>  ----------------------------<  90  3.0<L>   |  31  |  1.11    Ca    8.9      06 Aug 2024 02:40  Phos  2.8     08-06  Mg     1.6     08-06        Urinalysis Basic - ( 06 Aug 2024 02:40 )    Color: x / Appearance: x / SG: x / pH: x  Gluc: 90 mg/dL / Ketone: x  / Bili: x / Urobili: x   Blood: x / Protein: x / Nitrite: x   Leuk Esterase: x / RBC: x / WBC x   Sq Epi: x / Non Sq Epi: x / Bacteria: x        RADIOLOGY & ADDITIONAL STUDIES:  
[facility] DIVISION OF KIDNEY DISEASES AND HYPERTENSION   FOLLOW UP NOTE    --------------------------------------------------------------------------------  Chief Complaint:    24 hour events/subjective: Pt. was seen and examined today.   Feels ok  had diarrhea yesterday after taking salt tabs.       PAST HISTORY  --------------------------------------------------------------------------------  No significant changes to PMH, PSH, FHx, SHx, unless otherwise noted    ALLERGIES & MEDICATIONS  --------------------------------------------------------------------------------  Allergies    latex (Unknown)  No Known Drug Allergies    Intolerances    adhesives (Rash)    Standing Inpatient Medications  aspirin enteric coated 81 milliGRAM(s) Oral daily  cholecalciferol 1000 Unit(s) Oral daily  cyanocobalamin 1000 MICROGram(s) Oral daily  diphenoxylate/atropine 1 Tablet(s) Oral every 8 hours  famotidine    Tablet 20 milliGRAM(s) Oral daily  ferrous    sulfate 325 milliGRAM(s) Oral <User Schedule>  folic acid 1 milliGRAM(s) Oral daily  heparin   Injectable 5000 Unit(s) SubCutaneous every 8 hours  lactobacillus acidophilus 1 Tablet(s) Oral daily  levothyroxine 88 MICROGram(s) Oral daily  loratadine 10 milliGRAM(s) Oral daily  magnesium oxide 400 milliGRAM(s) Oral three times a day with meals  melatonin 5 milliGRAM(s) Oral at bedtime  pantoprazole    Tablet 40 milliGRAM(s) Oral at bedtime  primidone 50 milliGRAM(s) Oral daily  rosuvastatin 20 milliGRAM(s) Oral at bedtime  sodium chloride 2 Gram(s) Oral three times a day    PRN Inpatient Medications      REVIEW OF SYSTEMS  --------------------------------------------------------------------------------  All other systems were reviewed and are negative, except as noted.    VITALS/PHYSICAL EXAM  --------------------------------------------------------------------------------  T(C): 36.6 (08-08-24 @ 11:51), Max: 37.2 (08-07-24 @ 20:25)  HR: 67 (08-08-24 @ 11:51) (67 - 96)  BP: 105/59 (08-08-24 @ 11:51) (101/59 - 110/51)  RR: 18 (08-08-24 @ 11:51) (18 - 18)  SpO2: 96% (08-08-24 @ 11:51) (94% - 99%)  Wt(kg): --        08-07-24 @ 07:01  -  08-08-24 @ 07:00  --------------------------------------------------------  IN: 240 mL / OUT: 550 mL / NET: -310 mL        Physical Exam:  	Gen: NAD  	HEENT: Anicteric  	Pulm: CTA B/L  	CV: S1S2+  	Abd: Soft, +ostomy   	Ext: No LE edema B/L  	Neuro: Awake  	Skin: Warm and dry      LABS/STUDIES  --------------------------------------------------------------------------------    133  |  93  |  25  ----------------------------<  75      [08-08-24 @ 09:17]  4.1   |  27  |  1.02        Ca     9.1     [08-08-24 @ 09:17]      Mg     1.9     [08-08-24 @ 09:17]      Phos  2.7     [08-08-24 @ 09:17]            Creatinine Trend:  SCr 1.02 [08-08 @ 09:17]  SCr 1.15 [08-07 @ 20:23]  SCr 0.93 [08-07 @ 11:42]  SCr 1.25 [08-06 @ 23:06]  SCr 0.94 [08-06 @ 11:28]    Urinalysis - [08-08-24 @ 09:17]      Color  / Appearance  / SG  / pH       Gluc 75 / Ketone   / Bili  / Urobili        Blood  / Protein  / Leuk Est  / Nitrite       RBC  / WBC  / Hyaline  / Gran  / Sq Epi  / Non Sq Epi  / Bacteria     Urine Sodium 9      [08-05-24 @ 18:15]  Urine Potassium 42      [08-05-24 @ 18:15]  Urine Osmolality 214      [08-05-24 @ 18:15]        Tacrolimus  Cyclosporine  Sirolimus  Mycophenolate  BK PCR  CMV PCR  Parvo PCR  EBV PCR
Leanne Toribio MD  [facility] Division of Hospital Medicine    SUBJECTIVE / OVERNIGHT EVENTS:  - no events overnight, appetite is good, no n/v/abd pain/cough/chest pain/sob.     MEDICATIONS  (STANDING):  aspirin enteric coated 81 milliGRAM(s) Oral daily  cholecalciferol 1000 Unit(s) Oral daily  cyanocobalamin 1000 MICROGram(s) Oral daily  famotidine    Tablet 20 milliGRAM(s) Oral daily  ferrous    sulfate 325 milliGRAM(s) Oral <User Schedule>  folic acid 1 milliGRAM(s) Oral daily  heparin   Injectable 5000 Unit(s) SubCutaneous every 8 hours  lactobacillus acidophilus 1 Tablet(s) Oral daily  levothyroxine 88 MICROGram(s) Oral daily  loratadine 10 milliGRAM(s) Oral daily  magnesium oxide 400 milliGRAM(s) Oral three times a day with meals  melatonin 5 milliGRAM(s) Oral at bedtime  pantoprazole    Tablet 40 milliGRAM(s) Oral at bedtime  primidone 50 milliGRAM(s) Oral daily  rosuvastatin 20 milliGRAM(s) Oral at bedtime  sodium chloride 2% . 1000 milliLiter(s) (40 mL/Hr) IV Continuous <Continuous>    MEDICATIONS  (PRN):      I&O's Summary    05 Aug 2024 07:01  -  06 Aug 2024 07:00  --------------------------------------------------------  IN: 0 mL / OUT: 350 mL / NET: -350 mL    06 Aug 2024 07:01  -  06 Aug 2024 14:57  --------------------------------------------------------  IN: 720 mL / OUT: 200 mL / NET: 520 mL        PHYSICAL EXAM:  Vital Signs Last 24 Hrs  T(C): 36.3 (06 Aug 2024 11:23), Max: 37.1 (05 Aug 2024 23:14)  T(F): 97.3 (06 Aug 2024 11:23), Max: 98.7 (05 Aug 2024 23:14)  HR: 71 (06 Aug 2024 11:23) (64 - 92)  BP: 113/64 (06 Aug 2024 11:23) (94/53 - 122/59)  BP(mean): 82 (05 Aug 2024 17:20) (82 - 82)  RR: 18 (06 Aug 2024 05:18) (16 - 18)  SpO2: 100% (06 Aug 2024 05:18) (95% - 100%)    Parameters below as of 06 Aug 2024 07:24  Patient On (Oxygen Delivery Method): room air      GENERAL: NAD, well-groomed, well-developed, pleasant to interview, accompanied by   ENMT: No tonsillar erythema, exudates, or enlargement; Moist mucous membranes  NERVOUS SYSTEM: Alert & Oriented X4, strength preserved   CHEST/LUNG: Clear to auscultation bilaterally; No rales, rhonchi, wheezing, or rubs  HEART: Regular rate and rhythm; No murmurs, rubs, or gallops  ABDOMEN: Soft, Nontender, Nondistended; Bowel sounds present. No guarding, rebound tenderness, or rigidity. +ostomy in place,  EXTREMITIES: 2+ Peripheral Pulses, No edema/warmth/tenderness to palpation b/l LE  SKIN: +round erythematous rash to L of ostomy and of ventral LUE and smaller erythematous areas of medial proximal LEs b/l (examined w/ patient permission and  present)    LABS:                        12.4   7.97  )-----------( 179      ( 06 Aug 2024 11:28 )             35.9     08-06    123<L>  |  85<L>  |  21  ----------------------------<  93  4.2   |  27  |  0.94    Ca    9.5      06 Aug 2024 11:28  Phos  2.8     08-06  Mg     1.6     08-06            Urinalysis Basic - ( 06 Aug 2024 11:28 )    Color: x / Appearance: x / SG: x / pH: x  Gluc: 93 mg/dL / Ketone: x  / Bili: x / Urobili: x   Blood: x / Protein: x / Nitrite: x   Leuk Esterase: x / RBC: x / WBC x   Sq Epi: x / Non Sq Epi: x / Bacteria: x          
Leanne Toribio MD  [facility] Division of Hospital Medicine    SUBJECTIVE / OVERNIGHT EVENTS:  - no events overnight, no n/v/abd pain/cough/chest pain. overall better, [place].   MEDICATIONS  (STANDING):  aspirin enteric coated 81 milliGRAM(s) Oral daily  cholecalciferol 1000 Unit(s) Oral daily  cyanocobalamin 1000 MICROGram(s) Oral daily  famotidine    Tablet 20 milliGRAM(s) Oral daily  ferrous    sulfate 325 milliGRAM(s) Oral <User Schedule>  folic acid 1 milliGRAM(s) Oral daily  heparin   Injectable 5000 Unit(s) SubCutaneous every 8 hours  lactobacillus acidophilus 1 Tablet(s) Oral daily  levothyroxine 88 MICROGram(s) Oral daily  loratadine 10 milliGRAM(s) Oral daily  magnesium oxide 400 milliGRAM(s) Oral three times a day with meals  melatonin 5 milliGRAM(s) Oral at bedtime  pantoprazole    Tablet 40 milliGRAM(s) Oral at bedtime  potassium phosphate / sodium phosphate Powder (PHOS-NaK) 1 Packet(s) Oral three times a day before meals  primidone 50 milliGRAM(s) Oral daily  rosuvastatin 20 milliGRAM(s) Oral at bedtime  sodium chloride 2 Gram(s) Oral three times a day    MEDICATIONS  (PRN):      I&O's Summary    06 Aug 2024 07:01  -  07 Aug 2024 07:00  --------------------------------------------------------  IN: 960 mL / OUT: 800 mL / NET: 160 mL    07 Aug 2024 07:01  -  07 Aug 2024 12:41  --------------------------------------------------------  IN: 0 mL / OUT: 350 mL / NET: -350 mL        PHYSICAL EXAM:  Vital Signs Last 24 Hrs  T(C): 36.4 (07 Aug 2024 10:59), Max: 36.4 (06 Aug 2024 22:54)  T(F): 97.6 (07 Aug 2024 10:59), Max: 97.6 (07 Aug 2024 10:59)  HR: 76 (07 Aug 2024 10:59) (64 - 78)  BP: 109/69 (07 Aug 2024 10:59) (99/44 - 125/53)  BP(mean): --  RR: 18 (07 Aug 2024 10:59) (18 - 18)  SpO2: 98% (07 Aug 2024 10:59) (95% - 98%)    Parameters below as of 07 Aug 2024 10:59  Patient On (Oxygen Delivery Method): room air      GENERAL: NAD, well-groomed, well-developed, pleasant to interview, accompanied by   ENMT: No tonsillar erythema, exudates, or enlargement; Moist mucous membranes  NERVOUS SYSTEM: Alert & Oriented X4, strength preserved   CHEST/LUNG: Clear to auscultation bilaterally; No rales, rhonchi, wheezing, or rubs  HEART: Regular rate and rhythm; No murmurs, rubs, or gallops  ABDOMEN: Soft, Nontender, Nondistended; Bowel sounds present. No guarding, rebound tenderness, or rigidity. +ostomy in place,  EXTREMITIES: 2+ Peripheral Pulses, No edema/warmth/tenderness to palpation b/l LE  SKIN: +round erythematous rash to L of ostomy and of ventral LUE and smaller erythematous areas of medial proximal LEs b/l (examined w/ patient permission and  present)    LABS:                        12.4   7.97  )-----------( 179      ( 06 Aug 2024 11:28 )             35.9     08-07    129<L>  |  89<L>  |  20  ----------------------------<  70  3.9   |  25  |  0.93    Ca    8.8      07 Aug 2024 11:42  Phos  2.1     08-07  Mg     1.9     08-07            Urinalysis Basic - ( 07 Aug 2024 11:42 )    Color: x / Appearance: x / SG: x / pH: x  Gluc: 70 mg/dL / Ketone: x  / Bili: x / Urobili: x   Blood: x / Protein: x / Nitrite: x   Leuk Esterase: x / RBC: x / WBC x   Sq Epi: x / Non Sq Epi: x / Bacteria: x

## 2024-08-23 ENCOUNTER — APPOINTMENT (OUTPATIENT)
Dept: GASTROENTEROLOGY | Facility: CLINIC | Age: 74
End: 2024-08-23
Payer: MEDICARE

## 2024-08-23 VITALS
RESPIRATION RATE: 16 BRPM | SYSTOLIC BLOOD PRESSURE: 110 MMHG | DIASTOLIC BLOOD PRESSURE: 55 MMHG | BODY MASS INDEX: 19.41 KG/M2 | OXYGEN SATURATION: 100 % | WEIGHT: 90 LBS | HEART RATE: 87 BPM | HEIGHT: 57 IN

## 2024-08-23 DIAGNOSIS — M34.1 CR(E)ST SYNDROME: ICD-10-CM

## 2024-08-23 DIAGNOSIS — K21.9 GASTRO-ESOPHAGEAL REFLUX DISEASE W/OUT ESOPHAGITIS: ICD-10-CM

## 2024-08-23 DIAGNOSIS — K56.609 UNSPECIFIED INTESTINAL OBSTRUCTION, UNSPECIFIED AS TO PARTIAL VERSUS COMPLETE OBSTRUCTION: ICD-10-CM

## 2024-08-23 PROCEDURE — 99205 OFFICE O/P NEW HI 60 MIN: CPT

## 2024-08-23 NOTE — HISTORY OF PRESENT ILLNESS
[FreeTextEntry1] : 73-year-old female Evaluation, additional opinion for recurrent small bowel obstructions Patient with a history of reflux, dysphagia, scleroderma Prior upper endoscopies, no evidence of narrowing of the esophagus reported  Patient mainly here today to discuss her recurrent small bowel obstructions, advice about her ileostomy  Patient with extensive surgical history including hysterectomy 2011 Failed initial surgery for rectal prolapse in 2016 Scar tissue, small bowel obstruction with loop ileostomy constructed 2017  Since that time patient has suffered several small bowel obstructions Most recent 1 at [place] several months ago In total approximately 4  Patient has received various surgical opinions, GI opinions Mostly recommendation to avoid surgery or revision due to scar tissue Ileoscopy performed by one of her surgeons in the city, Dr. Limon at [place] last month Normal ileoscopy Some diversion colitis on proctoscopy observed Again, recommendation to avoid surgery

## 2024-08-23 NOTE — PHYSICAL EXAM
[Bowel Sounds] : normal bowel sounds [Normal] : oriented to person, place, and time [de-identified] : stoma

## 2024-08-23 NOTE — REASON FOR VISIT
[Initial Evaluation] : an initial evaluation [Spouse] : spouse [FreeTextEntry1] : history of ileostomy, sbo, reflux, scleroderma

## 2024-08-23 NOTE — ASSESSMENT
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 Recent unremarkable ileoscopy Agree with recommendation to avoid elective surgery at this time Based on mechanism of obstructions, do not see any role for dietary or medical therapies at this time  With regards to her reflux, periodic dysphagia, consistent with her scleroderma Continue her daily PPI, H2 blocker, and Gaviscon which she states is the most effective, and quickest of her medications  Patient also with periodic high ostomy output, somewhat responsive to Lomotil Also discussed Imodium, Pepto-Bismol  Notably recent hospitalization for hyponatremia, following up with nephrologist  Office visit here in 6 months sooner as needed

## 2024-08-24 ENCOUNTER — APPOINTMENT (OUTPATIENT)
Dept: INTERNAL MEDICINE | Facility: CLINIC | Age: 74
End: 2024-08-24
Payer: MEDICARE

## 2024-08-24 DIAGNOSIS — E78.5 HYPERLIPIDEMIA, UNSPECIFIED: ICD-10-CM

## 2024-08-24 DIAGNOSIS — M54.2 CERVICALGIA: ICD-10-CM

## 2024-08-24 DIAGNOSIS — E03.9 HYPOTHYROIDISM, UNSPECIFIED: ICD-10-CM

## 2024-08-24 DIAGNOSIS — E83.42 HYPOMAGNESEMIA: ICD-10-CM

## 2024-08-24 DIAGNOSIS — R26.81 UNSTEADINESS ON FEET: ICD-10-CM

## 2024-08-24 PROCEDURE — G2211 COMPLEX E/M VISIT ADD ON: CPT

## 2024-08-24 PROCEDURE — 99214 OFFICE O/P EST MOD 30 MIN: CPT

## 2024-08-24 RX ORDER — DULOXETINE HYDROCHLORIDE 20 MG/1
20 CAPSULE, DELAYED RELEASE PELLETS ORAL
Qty: 90 | Refills: 0 | Status: ACTIVE | COMMUNITY
Start: 2024-08-24 | End: 1900-01-01

## 2024-08-26 NOTE — ASSESSMENT
[FreeTextEntry1] : f/u with nephrology later this week will need to repeat BMP and Mg  begin duloxetine for depression  rn meds  PT for gait instability and cervicalgia  f/u 6 weeks

## 2024-08-26 NOTE — HISTORY OF PRESENT ILLNESS
[de-identified] : Pt presents for f/u evaluation of CAD, hypothyroidism, sciatica, CREST syndrome, hyperlipidemia, hyponatremia  no acute complaints hospitalized recently for hyponatremia now on water restriction 1L  admits to depression

## 2024-08-27 ENCOUNTER — APPOINTMENT (OUTPATIENT)
Dept: NEPHROLOGY | Facility: CLINIC | Age: 74
End: 2024-08-27
Payer: MEDICARE

## 2024-08-27 VITALS
HEART RATE: 68 BPM | WEIGHT: 90.19 LBS | BODY MASS INDEX: 19.46 KG/M2 | SYSTOLIC BLOOD PRESSURE: 120 MMHG | OXYGEN SATURATION: 98 % | DIASTOLIC BLOOD PRESSURE: 51 MMHG | TEMPERATURE: 97.9 F | HEIGHT: 57 IN

## 2024-08-27 DIAGNOSIS — E87.1 HYPO-OSMOLALITY AND HYPONATREMIA: ICD-10-CM

## 2024-08-27 PROCEDURE — 99215 OFFICE O/P EST HI 40 MIN: CPT

## 2024-08-27 PROCEDURE — G2211 COMPLEX E/M VISIT ADD ON: CPT

## 2024-08-28 ENCOUNTER — APPOINTMENT (OUTPATIENT)
Age: 74
End: 2024-08-28

## 2024-08-29 NOTE — ASSESSMENT
[FreeTextEntry1] : Hyponatremia  due to increased water intake and poor solute intake.  her last sodium is 140 after fluid restriction and liberalizing salt intake   - advised to restrict water intake to 35 ounces - Switch to liquid IV (electrolytes)  - Liberalize salt intake  - follow up 4-6 weeks

## 2024-08-29 NOTE — HISTORY OF PRESENT ILLNESS
[FreeTextEntry1] : Follow up hyponatremia   74 yo lady who has multiple medical problems as listed below, also an ostomy who recently had an episode of confusion and went to the ER- Found to have a sodium of 130. her ostomy output had been high ( upto 1.5 L /day). urine sodium was low. she was presumed to have hypovolemic hyponatremia. she received some iv fluids. sodium was 128-130. her confusion was self limited. at that time, I advised her to increase her solute intake and not drink that much water.    she has been getting the fluid named " iv hydration" which has significantly more electrolytes than gatorade and she has been feeling better. She also intermittently has small bowel obstructions which are usually self limiting. per her, she has definitely been eating more and limiting herself to about a liter of water.   since the last visit, she was hospitalized for severe hyponatremia thought to be due to poor solute and excessive water intake. she was fluid restricted and started on salt tablets, but could not tolerate it- so salt intake was liberalized   her last sodium was 140.   ROS   - No changes in urination, no blood in urine, no foamy urine  CVS- No chest pain, no shortness of breath GI - no nausea/ vomiting, no changes in appetite  all other systems reviewed in detail and were negative except as above

## 2024-08-29 NOTE — PHYSICAL EXAM
[General Appearance - Alert] : alert [General Appearance - In No Acute Distress] : in no acute distress [General Appearance - Well Nourished] : well nourished [General Appearance - Well Developed] : well developed [General Appearance - Well-Appearing] : healthy appearing [Sclera] : the sclera and conjunctiva were normal [PERRL With Normal Accommodation] : pupils were equal in size, round, and reactive to light [Extraocular Movements] : extraocular movements were intact [Outer Ear] : the ears and nose were normal in appearance [Examination Of The Oral Cavity] : the lips and gums were normal [Hearing Threshold Finger Rub Not [place]] : hearing was normal [Neck Appearance] : the appearance of the neck was normal [Neck Cervical Mass (___cm)] : no neck mass was observed [Jugular Venous Distention Increased] : there was no jugular-venous distention [Thyroid Diffuse Enlargement] : the thyroid was not enlarged [Respiration, Rhythm And Depth] : normal respiratory rhythm and effort [Exaggerated Use Of Accessory Muscles For Inspiration] : no accessory muscle use [Auscultation Breath Sounds / Voice Sounds] : lungs were clear to auscultation bilaterally [Heart Rate And Rhythm] : heart rate was normal and rhythm regular [Heart Sounds] : normal S1 and S2 [Heart Sounds Gallop] : no gallops [Murmurs] : no murmurs [Heart Sounds Pericardial Friction Rub] : no pericardial rub [Arterial Pulses Carotid] : carotid pulses were normal with no bruits [Edema] : there was no peripheral edema [Bowel Sounds] : normal bowel sounds [Abdomen Soft] : soft [Abdomen Tenderness] : non-tender [Abdomen Mass (___ Cm)] : no abdominal mass palpated [No CVA Tenderness] : no ~M costovertebral angle tenderness [No Spinal Tenderness] : no spinal tenderness [Abnormal Walk] : normal gait [Nail Clubbing] : no clubbing  or cyanosis of the fingernails [Involuntary Movements] : no involuntary movements were seen [Motor Tone] : muscle strength and tone were normal [Musculoskeletal - Swelling] : no joint swelling seen [Skin Color & Pigmentation] : normal skin color and pigmentation [Skin Turgor] : normal skin turgor [] : no rash [Skin Lesions] : no skin lesions [Cranial Nerves] : cranial nerves 2-12 were intact [Deep Tendon Reflexes (DTR)] : deep tendon reflexes were 2+ and symmetric [Sensation] : the sensory exam was normal to light touch and pinprick [Motor Exam] : the motor exam was normal [Impaired Insight] : insight and judgment were intact [Oriented To Time, Place, And Person] : oriented to person, place, and time [Affect] : the affect was normal [Mood] : the mood was normal

## 2024-09-06 ENCOUNTER — TRANSCRIPTION ENCOUNTER (OUTPATIENT)
Age: 74
End: 2024-09-06

## 2024-09-09 ENCOUNTER — LABORATORY RESULT (OUTPATIENT)
Age: 74
End: 2024-09-09

## 2024-09-30 ENCOUNTER — LABORATORY RESULT (OUTPATIENT)
Age: 74
End: 2024-09-30

## 2024-10-04 ENCOUNTER — NON-APPOINTMENT (OUTPATIENT)
Age: 74
End: 2024-10-04

## 2024-10-04 ENCOUNTER — APPOINTMENT (OUTPATIENT)
Dept: NEPHROLOGY | Facility: CLINIC | Age: 74
End: 2024-10-04
Payer: MEDICARE

## 2024-10-04 DIAGNOSIS — E87.1 HYPO-OSMOLALITY AND HYPONATREMIA: ICD-10-CM

## 2024-10-04 PROCEDURE — 99443: CPT | Mod: 93

## 2024-10-07 NOTE — HISTORY OF PRESENT ILLNESS
[Home] : at home, [unfilled] , at the time of the visit. [Medical Office: ([facility])___] : at the medical office located in  [Verbal consent obtained from patient] : the patient, [unfilled] [FreeTextEntry1] : follow up Hyponatremia reviewed recent labs  sodium stays within normal range  creatinine is within normal range   no changes to her current regimen

## 2024-10-12 ENCOUNTER — APPOINTMENT (OUTPATIENT)
Dept: INTERNAL MEDICINE | Facility: CLINIC | Age: 74
End: 2024-10-12
Payer: MEDICARE

## 2024-10-12 VITALS — SYSTOLIC BLOOD PRESSURE: 134 MMHG | RESPIRATION RATE: 14 BRPM | DIASTOLIC BLOOD PRESSURE: 60 MMHG | HEART RATE: 66 BPM

## 2024-10-12 DIAGNOSIS — M34.1 CR(E)ST SYNDROME: ICD-10-CM

## 2024-10-12 DIAGNOSIS — E78.5 HYPERLIPIDEMIA, UNSPECIFIED: ICD-10-CM

## 2024-10-12 DIAGNOSIS — E03.9 HYPOTHYROIDISM, UNSPECIFIED: ICD-10-CM

## 2024-10-12 DIAGNOSIS — Z23 ENCOUNTER FOR IMMUNIZATION: ICD-10-CM

## 2024-10-12 DIAGNOSIS — K21.9 GASTRO-ESOPHAGEAL REFLUX DISEASE W/OUT ESOPHAGITIS: ICD-10-CM

## 2024-10-12 PROCEDURE — G2211 COMPLEX E/M VISIT ADD ON: CPT

## 2024-10-12 PROCEDURE — G0008: CPT

## 2024-10-12 PROCEDURE — 99214 OFFICE O/P EST MOD 30 MIN: CPT

## 2024-10-12 PROCEDURE — 90662 IIV NO PRSV INCREASED AG IM: CPT

## 2024-10-17 NOTE — ED CDU PROVIDER INITIAL DAY NOTE - SKIN, MLM
Appointment was canceled.  
Please cancel the appt with me on 10/30, I will see her in December.  
Skin normal color for race, warm, dry and intact. No evidence of rash.

## 2024-10-18 ENCOUNTER — INPATIENT (INPATIENT)
Facility: HOSPITAL | Age: 74
LOS: 1 days | Discharge: HOME | DRG: 206 | End: 2024-10-20
Attending: STUDENT IN AN ORGANIZED HEALTH CARE EDUCATION/TRAINING PROGRAM | Admitting: STUDENT IN AN ORGANIZED HEALTH CARE EDUCATION/TRAINING PROGRAM
Payer: COMMERCIAL

## 2024-10-18 VITALS
TEMPERATURE: 98 F | SYSTOLIC BLOOD PRESSURE: 124 MMHG | WEIGHT: 87.08 LBS | HEIGHT: 57 IN | HEART RATE: 80 BPM | DIASTOLIC BLOOD PRESSURE: 74 MMHG | OXYGEN SATURATION: 100 % | RESPIRATION RATE: 18 BRPM

## 2024-10-18 DIAGNOSIS — Z98.890 OTHER SPECIFIED POSTPROCEDURAL STATES: Chronic | ICD-10-CM

## 2024-10-18 DIAGNOSIS — Z90.89 ACQUIRED ABSENCE OF OTHER ORGANS: Chronic | ICD-10-CM

## 2024-10-18 DIAGNOSIS — Z90.710 ACQUIRED ABSENCE OF BOTH CERVIX AND UTERUS: Chronic | ICD-10-CM

## 2024-10-18 DIAGNOSIS — Z90.49 ACQUIRED ABSENCE OF OTHER SPECIFIED PARTS OF DIGESTIVE TRACT: Chronic | ICD-10-CM

## 2024-10-18 DIAGNOSIS — Z95.5 PRESENCE OF CORONARY ANGIOPLASTY IMPLANT AND GRAFT: Chronic | ICD-10-CM

## 2024-10-18 DIAGNOSIS — K62.3 RECTAL PROLAPSE: Chronic | ICD-10-CM

## 2024-10-18 PROCEDURE — 99285 EMERGENCY DEPT VISIT HI MDM: CPT | Mod: GC

## 2024-10-19 DIAGNOSIS — S22.39XA FRACTURE OF ONE RIB, UNSPECIFIED SIDE, INITIAL ENCOUNTER FOR CLOSED FRACTURE: ICD-10-CM

## 2024-10-19 LAB
ALBUMIN SERPL ELPH-MCNC: 3.5 G/DL — SIGNIFICANT CHANGE UP (ref 3.3–5)
ALP SERPL-CCNC: 63 U/L — SIGNIFICANT CHANGE UP (ref 40–120)
ALT FLD-CCNC: 22 U/L — SIGNIFICANT CHANGE UP (ref 10–45)
ANION GAP SERPL CALC-SCNC: 11 MMOL/L — SIGNIFICANT CHANGE UP (ref 5–17)
AST SERPL-CCNC: 41 U/L — HIGH (ref 10–40)
BASOPHILS # BLD AUTO: 0.04 K/UL — SIGNIFICANT CHANGE UP (ref 0–0.2)
BASOPHILS NFR BLD AUTO: 0.4 % — SIGNIFICANT CHANGE UP (ref 0–2)
BILIRUB SERPL-MCNC: 0.3 MG/DL — SIGNIFICANT CHANGE UP (ref 0.2–1.2)
BUN SERPL-MCNC: 32 MG/DL — HIGH (ref 7–23)
CALCIUM SERPL-MCNC: 8.4 MG/DL — SIGNIFICANT CHANGE UP (ref 8.4–10.5)
CHLORIDE SERPL-SCNC: 101 MMOL/L — SIGNIFICANT CHANGE UP (ref 96–108)
CO2 SERPL-SCNC: 26 MMOL/L — SIGNIFICANT CHANGE UP (ref 22–31)
CREAT SERPL-MCNC: 0.97 MG/DL — SIGNIFICANT CHANGE UP (ref 0.5–1.3)
EGFR: 61 ML/MIN/1.73M2 — SIGNIFICANT CHANGE UP
EOSINOPHIL # BLD AUTO: 0.04 K/UL — SIGNIFICANT CHANGE UP (ref 0–0.5)
EOSINOPHIL NFR BLD AUTO: 0.4 % — SIGNIFICANT CHANGE UP (ref 0–6)
GLUCOSE SERPL-MCNC: 92 MG/DL — SIGNIFICANT CHANGE UP (ref 70–99)
HCT VFR BLD CALC: 40.9 % — SIGNIFICANT CHANGE UP (ref 34.5–45)
HGB BLD-MCNC: 13.4 G/DL — SIGNIFICANT CHANGE UP (ref 11.5–15.5)
IMM GRANULOCYTES NFR BLD AUTO: 0.5 % — SIGNIFICANT CHANGE UP (ref 0–0.9)
LYMPHOCYTES # BLD AUTO: 1.32 K/UL — SIGNIFICANT CHANGE UP (ref 1–3.3)
LYMPHOCYTES # BLD AUTO: 13.8 % — SIGNIFICANT CHANGE UP (ref 13–44)
MCHC RBC-ENTMCNC: 30 PG — SIGNIFICANT CHANGE UP (ref 27–34)
MCHC RBC-ENTMCNC: 32.8 GM/DL — SIGNIFICANT CHANGE UP (ref 32–36)
MCV RBC AUTO: 91.5 FL — SIGNIFICANT CHANGE UP (ref 80–100)
MONOCYTES # BLD AUTO: 0.4 K/UL — SIGNIFICANT CHANGE UP (ref 0–0.9)
MONOCYTES NFR BLD AUTO: 4.2 % — SIGNIFICANT CHANGE UP (ref 2–14)
NEUTROPHILS # BLD AUTO: 7.74 K/UL — HIGH (ref 1.8–7.4)
NEUTROPHILS NFR BLD AUTO: 80.7 % — HIGH (ref 43–77)
NRBC # BLD: 0 /100 WBCS — SIGNIFICANT CHANGE UP (ref 0–0)
PLATELET # BLD AUTO: 134 K/UL — LOW (ref 150–400)
POTASSIUM SERPL-MCNC: 3.9 MMOL/L — SIGNIFICANT CHANGE UP (ref 3.5–5.3)
POTASSIUM SERPL-SCNC: 3.9 MMOL/L — SIGNIFICANT CHANGE UP (ref 3.5–5.3)
PROT SERPL-MCNC: 6.2 G/DL — SIGNIFICANT CHANGE UP (ref 6–8.3)
RBC # BLD: 4.47 M/UL — SIGNIFICANT CHANGE UP (ref 3.8–5.2)
RBC # FLD: 15.8 % — HIGH (ref 10.3–14.5)
SODIUM SERPL-SCNC: 138 MMOL/L — SIGNIFICANT CHANGE UP (ref 135–145)
WBC # BLD: 9.59 K/UL — SIGNIFICANT CHANGE UP (ref 3.8–10.5)
WBC # FLD AUTO: 9.59 K/UL — SIGNIFICANT CHANGE UP (ref 3.8–10.5)

## 2024-10-19 PROCEDURE — 99221 1ST HOSP IP/OBS SF/LOW 40: CPT | Mod: GC

## 2024-10-19 PROCEDURE — 71045 X-RAY EXAM CHEST 1 VIEW: CPT | Mod: 26

## 2024-10-19 PROCEDURE — 72128 CT CHEST SPINE W/O DYE: CPT | Mod: 26,MC

## 2024-10-19 PROCEDURE — 70450 CT HEAD/BRAIN W/O DYE: CPT | Mod: 26,MC

## 2024-10-19 PROCEDURE — 71250 CT THORAX DX C-: CPT | Mod: 26,MC

## 2024-10-19 PROCEDURE — 72125 CT NECK SPINE W/O DYE: CPT | Mod: 26,MC

## 2024-10-19 RX ORDER — POLYETHYLENE GLYCOL 3350 17 G/17G
17 POWDER, FOR SOLUTION ORAL ONCE
Refills: 0 | Status: COMPLETED | OUTPATIENT
Start: 2024-10-19 | End: 2024-10-19

## 2024-10-19 RX ORDER — FAMOTIDINE 10 MG/ML
20 INJECTION INTRAVENOUS DAILY
Refills: 0 | Status: DISCONTINUED | OUTPATIENT
Start: 2024-10-19 | End: 2024-10-20

## 2024-10-19 RX ORDER — ACETAMINOPHEN 500 MG
600 TABLET ORAL EVERY 6 HOURS
Refills: 0 | Status: COMPLETED | OUTPATIENT
Start: 2024-10-19 | End: 2024-10-20

## 2024-10-19 RX ORDER — DULOXETINE HYDROCHLORIDE 30 MG/1
20 CAPSULE, DELAYED RELEASE ORAL DAILY
Refills: 0 | Status: DISCONTINUED | OUTPATIENT
Start: 2024-10-19 | End: 2024-10-20

## 2024-10-19 RX ORDER — OXYCODONE HYDROCHLORIDE 30 MG/1
2.5 TABLET ORAL EVERY 6 HOURS
Refills: 0 | Status: DISCONTINUED | OUTPATIENT
Start: 2024-10-19 | End: 2024-10-20

## 2024-10-19 RX ORDER — IBUPROFEN 200 MG
400 TABLET ORAL EVERY 6 HOURS
Refills: 0 | Status: DISCONTINUED | OUTPATIENT
Start: 2024-10-19 | End: 2024-10-19

## 2024-10-19 RX ORDER — CHOLESTEROL/SOYBEAN OIL/C/E 60-200-80
15 POWDER (GRAM) ORAL DAILY
Refills: 0 | Status: DISCONTINUED | OUTPATIENT
Start: 2024-10-19 | End: 2024-10-20

## 2024-10-19 RX ORDER — OXYCODONE HYDROCHLORIDE 30 MG/1
5 TABLET ORAL EVERY 6 HOURS
Refills: 0 | Status: DISCONTINUED | OUTPATIENT
Start: 2024-10-19 | End: 2024-10-20

## 2024-10-19 RX ORDER — ENOXAPARIN SODIUM 40MG/0.4ML
40 SYRINGE (ML) SUBCUTANEOUS EVERY 24 HOURS
Refills: 0 | Status: DISCONTINUED | OUTPATIENT
Start: 2024-10-19 | End: 2024-10-20

## 2024-10-19 RX ORDER — ROSUVASTATIN CALCIUM 10 MG
20 TABLET ORAL AT BEDTIME
Refills: 0 | Status: DISCONTINUED | OUTPATIENT
Start: 2024-10-19 | End: 2024-10-20

## 2024-10-19 RX ORDER — LEVOTHYROXINE SODIUM 88 MCG
88 TABLET ORAL DAILY
Refills: 0 | Status: DISCONTINUED | OUTPATIENT
Start: 2024-10-19 | End: 2024-10-20

## 2024-10-19 RX ORDER — ACETAMINOPHEN 500 MG
1000 TABLET ORAL EVERY 6 HOURS
Refills: 0 | Status: DISCONTINUED | OUTPATIENT
Start: 2024-10-19 | End: 2024-10-19

## 2024-10-19 RX ORDER — ACETAMINOPHEN 500 MG
975 TABLET ORAL ONCE
Refills: 0 | Status: COMPLETED | OUTPATIENT
Start: 2024-10-19 | End: 2024-10-19

## 2024-10-19 RX ORDER — SENNA 187 MG
2 TABLET ORAL AT BEDTIME
Refills: 0 | Status: DISCONTINUED | OUTPATIENT
Start: 2024-10-19 | End: 2024-10-20

## 2024-10-19 RX ORDER — PRIMIDONE 50 MG/1
50 TABLET ORAL DAILY
Refills: 0 | Status: DISCONTINUED | OUTPATIENT
Start: 2024-10-19 | End: 2024-10-20

## 2024-10-19 RX ORDER — LIDOCAINE HYDROCHLORIDE 40 MG/ML
1 SOLUTION TOPICAL ONCE
Refills: 0 | Status: COMPLETED | OUTPATIENT
Start: 2024-10-19 | End: 2024-10-19

## 2024-10-19 RX ORDER — ASPIRIN/MAG CARB/ALUMINUM AMIN 325 MG
81 TABLET ORAL DAILY
Refills: 0 | Status: DISCONTINUED | OUTPATIENT
Start: 2024-10-19 | End: 2024-10-20

## 2024-10-19 RX ORDER — DIPHENOXYLATE HYDROCHLORIDE AND ATROPINE SULFATE 2.5; .025 MG/1; MG/1
1 TABLET ORAL EVERY 8 HOURS
Refills: 0 | Status: DISCONTINUED | OUTPATIENT
Start: 2024-10-19 | End: 2024-10-20

## 2024-10-19 RX ORDER — CYCLOBENZAPRINE HYDROCHLORIDE 30 MG/1
10 CAPSULE, EXTENDED RELEASE ORAL ONCE
Refills: 0 | Status: COMPLETED | OUTPATIENT
Start: 2024-10-19 | End: 2024-10-19

## 2024-10-19 RX ADMIN — Medication 600 MILLIGRAM(S): at 18:12

## 2024-10-19 RX ADMIN — Medication 400 MILLIGRAM(S): at 14:32

## 2024-10-19 RX ADMIN — DULOXETINE HYDROCHLORIDE 20 MILLIGRAM(S): 30 CAPSULE, DELAYED RELEASE ORAL at 07:27

## 2024-10-19 RX ADMIN — Medication 975 MILLIGRAM(S): at 07:53

## 2024-10-19 RX ADMIN — LIDOCAINE HYDROCHLORIDE 1 PATCH: 40 SOLUTION TOPICAL at 07:53

## 2024-10-19 RX ADMIN — DIPHENOXYLATE HYDROCHLORIDE AND ATROPINE SULFATE 1 TABLET(S): 2.5; .025 TABLET ORAL at 17:11

## 2024-10-19 RX ADMIN — PRIMIDONE 50 MILLIGRAM(S): 50 TABLET ORAL at 11:38

## 2024-10-19 RX ADMIN — Medication 20 MILLIGRAM(S): at 21:14

## 2024-10-19 RX ADMIN — Medication 240 MILLIGRAM(S): at 23:44

## 2024-10-19 RX ADMIN — Medication 240 MILLIGRAM(S): at 17:12

## 2024-10-19 RX ADMIN — Medication 81 MILLIGRAM(S): at 07:27

## 2024-10-19 RX ADMIN — FAMOTIDINE 20 MILLIGRAM(S): 10 INJECTION INTRAVENOUS at 07:27

## 2024-10-19 RX ADMIN — CYCLOBENZAPRINE HYDROCHLORIDE 10 MILLIGRAM(S): 30 CAPSULE, EXTENDED RELEASE ORAL at 01:29

## 2024-10-19 RX ADMIN — Medication 15 MILLILITER(S): at 11:37

## 2024-10-19 RX ADMIN — Medication 400 MILLIGRAM(S): at 15:32

## 2024-10-19 RX ADMIN — Medication 975 MILLIGRAM(S): at 01:29

## 2024-10-19 RX ADMIN — LIDOCAINE HYDROCHLORIDE 1 PATCH: 40 SOLUTION TOPICAL at 15:56

## 2024-10-19 RX ADMIN — LIDOCAINE HYDROCHLORIDE 1 PATCH: 40 SOLUTION TOPICAL at 01:29

## 2024-10-19 RX ADMIN — Medication 40 MILLIGRAM(S): at 11:36

## 2024-10-19 RX ADMIN — Medication 88 MICROGRAM(S): at 07:27

## 2024-10-19 NOTE — CHART NOTE - NSCHARTNOTEFT_GEN_A_CORE
Tertiary Trauma Survey (TTS)    Date of TTS:  10/19/2024                            Time: 2356   Admit Date: 10/18/2024    HPI:    HPI: 73 y F hypotension, HLD, hypothyroid, CAD s/p stent/bowel resection s/p ileostomy, presenting following ground level fall last night. Patient reports trying to get into the bedroom, tripped over box and hit her right side against wood ledge, Patient did strike her head, unknown LOC.       Primary Survey  A - Airway intact, trachea midline, patient verbal.   B - Equal chest wall expansion, clear to auscultation bilaterally. SpO2 98% by pulse oximetry in ED with RR 16.   C - BP in ED BP: 111/69 (10-19-24 @ 05:43) with HR 64 (64 - 80), RRR on continuous cardiac telemetry, palpable peripheral pulses.   D - GCS 15 on arrival.    Secondary survey  Exposure obtained as appropriate  General: Patient recument in stretcher, in no acute distress.   Neurologic: AOx3, GCS 15. Pupils equally reactive bilaterally.   HEENT: Normocephalic, posterior scalp small superficial hematoma. No hemotympanum or Waite sign bilaterally.   Spine: No palpable stepoffs, gross bony deformity, or point tenderness in the cervical, thoracic or lumbar spine. No paraspinal tenderness.   Cardiovascular: RRR, in normal sinus rhythm on continuous telemetry. Peripheral pulses palpable.   Pulmonary and chest: Equal chest wall expansion, lungs clear to auscultation bilaterally. No hypoxemia on continuous pulse oximetry in ED. Chest nontender to palpation with no ecchymosis and no seatbelt sign.   Abdominal: Soft NTND, no rebound tenderness or guarding, Ostomy functional.   : Pelvis stable. No blood at the urethral meatus.   Extremities: No abrasions, lacerations, or gross deformity with full range of motion and intact peripheral pulses in bilateral upper and lower extremities.     PMH  Sjogren's syndrome    Ileostomy in place    Colonic dysmotility    GERD (gastroesophageal reflux disease)    Anxiety    Sciatica    CAD (coronary atherosclerotic disease)    Stented coronary artery    History of dehydration    H/O small bowel obstruction    S/P primary angioplasty with coronary stent    Hypothyroid    H/O electrolyte imbalance    History of connective tissue disease    Hyperlipidemia    Mild anemia    Osteoporosis    Insomnia    Vasovagal syncope    History of hypotension    Scoliosis    History of CREST syndrome    History of scleroderma    Bilateral dry eyes    Dry mouth    Hyponatremia    Hypomagnesemia    COVID-19 vaccine series completed    History of short term memory loss    Familial tremor    Raynauds syndrome    Bunion of left foot    Bunion of right foot    Hammer toe of right foot    Hammer toe of left foot    Spondylolisthesis    Colonic inertia    DDD (degenerative disc disease), cervical    DDD (degenerative disc disease), lumbar    Dense breast tissue    History of subdural hematoma      PSH  History of hysterectomy    History of appendectomy    H/O ileostomy    S/P primary angioplasty with coronary stent    History of lumbar laminectomy    Rectal prolapse    History of tonsillectomy and adenoidectomy    History of bowel resection        Medications  Antimicrobial and Immunologic    Neuro, Psych and Analgesia      Cardiovascular and Hematologic (includes DVT ppx/AC/Antiplatelet agents)      Pulmonary    Oncologic    GI, Endocrine and Nutrition          Allergies/Insensitivities  Allergies    No Known Drug Allergies  latex (Unknown)    Intolerances    adhesives (Rash)      Social    Labs                    Imaging (19 Oct 2024 06:49)      PAST MEDICAL & SURGICAL HISTORY:  Sjogren's syndrome      Ileostomy in place  2017      Colonic dysmotility      GERD (gastroesophageal reflux disease)      Anxiety      Sciatica  left      CAD (coronary atherosclerotic disease)      Stented coronary artery      History of dehydration  secondary to ileostomy. was hospitalized mid June for hydration      H/O small bowel obstruction  2017 required surgery, multiple episodes since then, April 2022      Hypothyroid      H/O electrolyte imbalance  secondary to dehydration      History of connective tissue disease  diagnosed 2002      Hyperlipidemia      Mild anemia      Osteoporosis      Insomnia  difficulty staying asleep      Vasovagal syncope      History of hypotension  baseline 100/60      Scoliosis      History of CREST syndrome      History of scleroderma      Bilateral dry eyes      Dry mouth      Hyponatremia  secondary to dehydration      Hypomagnesemia  secondary to dehydration      COVID-19 vaccine series completed      History of short term memory loss      Familial tremor      Raynauds syndrome      Bunion of left foot      Bunion of right foot      Hammer toe of right foot      Hammer toe of left foot      Spondylolisthesis      Colonic inertia      DDD (degenerative disc disease), cervical      DDD (degenerative disc disease), lumbar      Dense breast tissue      History of subdural hematoma  small, November 2021 after a fall, last visit to neurosurgeon with MRI of brain revealed resolution      History of hysterectomy  2011 for bladder prolapse, bladder lift done also      History of appendectomy  as teen      H/O ileostomy  2017      S/P primary angioplasty with coronary stent  2019, 1 stent      History of lumbar laminectomy  October 2021, with removal of synovial cyst      Rectal prolapse  repair 2016      History of tonsillectomy and adenoidectomy  as child      History of bowel resection        [  ] No significant past history as reviewed with the patient and family    TERTIARY SURVEY:   GEN: resting comfortably in bed, in NAD  HEENT: normocephalic, non-tender to palpation, no abrasions visible, no step-offs palpated  NECK: no JVD, non-tender to palpation at posterior midline, no pain with flexion, extension, and bilateral neck rotation  CHEST: mild tenderness on right side of chest  BACK: non-tender to palpation along cervical, thoracic, lumbar spine midline and b/l posterior ribs; no palpable step-offs or hematomas  ABD: soft, non-distended, non-tender to palpation in all quadrants without rebound tenderness or guarding  LUE: non-tender to palpation across upper and lower arm, 5/5  strength, fingers warm, well-perfused, full ROM in shoulder, elbow, wrist, and fingers, palpable radial + ulnar pulses  RUE: non-tender to palpation across upper and lower arm, 5/5  strength, fingers warm, well-perfused, full ROM in shoulder, elbow, wrist, and fingers, palpable radial + ulnar pulses  LLE: non-tender to palpation across upper and lower leg; full ROM in hip, knee, ankle, and toes, 5/5 dorsiflexion + plantarflexion, palpable DP + PT pulses; warm, well-perfused  RLE: non-tender to palpation across upper and lower leg; full ROM in hip, knee, ankle, and toes, 5/5 dorsiflexion + plantarflexion, palpable DP + PT pulses; warm, well-perfused  NEURO: AAOx4, no focal neuro deficits; CN II-IX intact     Medications (inpatient): acetaminophen   IVPB .. 600 milliGRAM(s) IV Intermittent every 6 hours  aspirin  chewable 81 milliGRAM(s) Oral daily  diphenoxylate/atropine 1 Tablet(s) Oral every 8 hours  DULoxetine 20 milliGRAM(s) Oral daily  enoxaparin Injectable 40 milliGRAM(s) SubCutaneous every 24 hours  famotidine    Tablet 20 milliGRAM(s) Oral daily  levothyroxine 88 MICROGram(s) Oral daily  multivitamin/minerals/iron Oral Solution (CENTRUM) 15 milliLiter(s) Oral daily  primidone 50 milliGRAM(s) Oral daily  rosuvastatin 20 milliGRAM(s) Oral at bedtime  senna 2 Tablet(s) Oral at bedtime    Medications (PRN):oxyCODONE    IR 5 milliGRAM(s) Oral every 6 hours PRN  oxyCODONE    IR 2.5 milliGRAM(s) Oral every 6 hours PRN    Allergies: No Known Drug Allergies  latex (Unknown)  (Intolerances: adhesives (Rash)  )    Vital Signs Last 24 Hrs  T(C): 36.8 (19 Oct 2024 20:26), Max: 36.8 (19 Oct 2024 20:26)  T(F): 98.3 (19 Oct 2024 20:26), Max: 98.3 (19 Oct 2024 20:26)  HR: 67 (19 Oct 2024 20:26) (58 - 67)  BP: 115/70 (19 Oct 2024 20:26) (106/50 - 129/69)  BP(mean): 66 (19 Oct 2024 07:35) (66 - 66)  RR: 18 (19 Oct 2024 20:26) (16 - 18)  SpO2: 100% (19 Oct 2024 20:26) (98% - 100%)    Parameters below as of 19 Oct 2024 20:26  Patient On (Oxygen Delivery Method): nasal cannula  O2 Flow (L/min): 2    Drug Dosing Weight  Height (cm): 144.8 (18 Oct 2024 23:45)  Weight (kg): 39.5 (18 Oct 2024 23:45)  BMI (kg/m2): 18.8 (18 Oct 2024 23:45)  BSA (m2): 1.26 (18 Oct 2024 23:45)                          13.4   9.59  )-----------( 134      ( 19 Oct 2024 07:50 )             40.9     10-19    138  |  101  |  32[H]  ----------------------------<  92  3.9   |  26  |  0.97    Ca    8.4      19 Oct 2024 07:50    TPro  6.2  /  Alb  3.5  /  TBili  0.3  /  DBili  x   /  AST  41[H]  /  ALT  22  /  AlkPhos  63  10-19      Urinalysis Basic - ( 19 Oct 2024 07:50 )    Color: x / Appearance: x / SG: x / pH: x  Gluc: 92 mg/dL / Ketone: x  / Bili: x / Urobili: x   Blood: x / Protein: x / Nitrite: x   Leuk Esterase: x / RBC: x / WBC x   Sq Epi: x / Non Sq Epi: x / Bacteria: x        List Operative and Interventional Radiological Procedures:     Consults (Date):  [  ] Neurosurgery   [  ] Orthopedics  [  ] Plastics  [  ] Urology  [  ] PM&R  [  ] Social Work    RADIOLOGICAL FINDINGS REVIEW:    10/19 CT chest    IMPRESSION:  Acute fractures of the right sixth through eighth ribs and right   transverse processes of T6-T8. Small right ipneumothorax.    Couple left upper lobe pulmonary nodules measuring up to 6 mm, new since   12/1/2023.    10/19 CT Head/c-spine/thoracic spine    IMPRESSION:    CT HEAD:  No acute intracranial hemorrhage, mass effect, or midline shift.    CT CERVICAL SPINE:  No acute fracture or traumatic subluxation.    Multi-level degenerative changes.    Partially visualized right pneumothorax.    CT THORACIC SPINE:  Acute nondisplaced right transverse process fractures of T6-T9. No spinal   canal narrowing or bony displacement    10/19 X ray chest    IMPRESSION:    Small right apical pneumothorax as seen on chest CT scan.    ASSESSMENT:     73 y F hypotension, HLD, hypothyroid, CAD s/p stent/bowel resection s/p ileostomy, presenting following ground level fall last night. Patient reports trying to get into the bedroom, tripped over box and hit her right side against wood ledge, Patient did strike her head, unknown LOC.     CT HEAD:  No acute intracranial hemorrhage, mass effect, or midline shift.    CT CERVICAL SPINE:  No acute fracture or traumatic subluxation.    Injuries:  Acute fractures of the right sixth through eighth ribs  right transverse processes of T6-T8  Small right pneumothorax.    No new injuries identified.    Incidental pul nodules to be disclosed.    ACS/Trauma surgery  p [id number]

## 2024-10-19 NOTE — H&P ADULT - HISTORY OF PRESENT ILLNESS
HPI: 73 y F hypotension, HLD, hypothyroid, CAD s/p stent/bowel resection s/p ileostomy, presenting following mechanical fall tonight. Patient reports trying to get into the bedroom, tripped over box and hit her right side against wood ledge,       Primary Survey  A - Airway intact, trachea midline, patient verbal.   B - Equal chest wall expansion, clear to auscultation bilaterally. SpO2 98% by pulse oximetry in ED with RR 16.   C - BP in ED BP: 111/69 (10-19-24 @ 05:43) with HR 64 (64 - 80), RRR on continuous cardiac telemetry, palpable peripheral pulses.   D - GCS 15 on arrival.    Secondary survey  Exposure obtained as appropriate  General: Patient recument in stretcher, in no acute distress.   Neurologic: AOx3, GCS 15. Pupils *** mm and equally reactive bilaterally.   HEENT: Normocephalic and atraumatic. No hemotympanum or Waite sign bilaterally.   Spine: No palpable stepoffs, gross bony deformity, or point tenderness in the cervical, thoracic or lumbar spine. No paraspinal tenderness.   Cardiovascular: RRR, in normal sinus rhythm on continuous telemetry. Peripheral pulses ***.   Pulmonary and chest: Equal chest wall expansion, lungs clear to auscultation bilaterally. No hypoxemia on continuous pulse oximetry in ED. Chest nontender to palpation with no ecchymosis and no seatbelt sign.   Abdominal: Soft NTND, no rebound tenderness or guarding.   : Pelvis stable. No blood at the urethral meatus.   Extremities: No abrasions, lacerations, or gross deformity with full range of motion and intact peripheral pulses in bilateral upper and lower extremities.     PMH  Sjogren's syndrome    Ileostomy in place    Colonic dysmotility    GERD (gastroesophageal reflux disease)    Anxiety    Sciatica    CAD (coronary atherosclerotic disease)    Stented coronary artery    History of dehydration    H/O small bowel obstruction    S/P primary angioplasty with coronary stent    Hypothyroid    H/O electrolyte imbalance    History of connective tissue disease    Hyperlipidemia    Mild anemia    Osteoporosis    Insomnia    Vasovagal syncope    History of hypotension    Scoliosis    History of CREST syndrome    History of scleroderma    Bilateral dry eyes    Dry mouth    Hyponatremia    Hypomagnesemia    COVID-19 vaccine series completed    History of short term memory loss    Familial tremor    Raynauds syndrome    Bunion of left foot    Bunion of right foot    Hammer toe of right foot    Hammer toe of left foot    Spondylolisthesis    Colonic inertia    DDD (degenerative disc disease), cervical    DDD (degenerative disc disease), lumbar    Dense breast tissue    History of subdural hematoma      PSH  History of hysterectomy    History of appendectomy    H/O ileostomy    S/P primary angioplasty with coronary stent    History of lumbar laminectomy    Rectal prolapse    History of tonsillectomy and adenoidectomy    History of bowel resection        Medications  Antimicrobial and Immunologic    Neuro, Psych and Analgesia      Cardiovascular and Hematologic (includes DVT ppx/AC/Antiplatelet agents)      Pulmonary    Oncologic    GI, Endocrine and Nutrition          Allergies/Insensitivities  Allergies    No Known Drug Allergies  latex (Unknown)    Intolerances    adhesives (Rash)      Social    Labs                    Imaging   HPI: 73 y F hypotension, HLD, hypothyroid, CAD s/p stent/bowel resection s/p ileostomy, presenting following mechanical fall tonight. Patient reports trying to get into the bedroom, tripped over box and hit her right side against wood ledge, Patient did strike her head, unknown LOC.       Primary Survey  A - Airway intact, trachea midline, patient verbal.   B - Equal chest wall expansion, clear to auscultation bilaterally. SpO2 98% by pulse oximetry in ED with RR 16.   C - BP in ED BP: 111/69 (10-19-24 @ 05:43) with HR 64 (64 - 80), RRR on continuous cardiac telemetry, palpable peripheral pulses.   D - GCS 15 on arrival.    Secondary survey  Exposure obtained as appropriate  General: Patient recument in stretcher, in no acute distress.   Neurologic: AOx3, GCS 15. Pupils equally reactive bilaterally.   HEENT: Normocephalic, posterior scalp small superficial hematoma. No hemotympanum or Waite sign bilaterally.   Spine: No palpable stepoffs, gross bony deformity, or point tenderness in the cervical, thoracic or lumbar spine. No paraspinal tenderness.   Cardiovascular: RRR, in normal sinus rhythm on continuous telemetry. Peripheral pulses palpable.   Pulmonary and chest: Equal chest wall expansion, lungs clear to auscultation bilaterally. No hypoxemia on continuous pulse oximetry in ED. Chest nontender to palpation with no ecchymosis and no seatbelt sign.   Abdominal: Soft NTND, no rebound tenderness or guarding.   : Pelvis stable. No blood at the urethral meatus.   Extremities: No abrasions, lacerations, or gross deformity with full range of motion and intact peripheral pulses in bilateral upper and lower extremities.     PMH  Sjogren's syndrome    Ileostomy in place    Colonic dysmotility    GERD (gastroesophageal reflux disease)    Anxiety    Sciatica    CAD (coronary atherosclerotic disease)    Stented coronary artery    History of dehydration    H/O small bowel obstruction    S/P primary angioplasty with coronary stent    Hypothyroid    H/O electrolyte imbalance    History of connective tissue disease    Hyperlipidemia    Mild anemia    Osteoporosis    Insomnia    Vasovagal syncope    History of hypotension    Scoliosis    History of CREST syndrome    History of scleroderma    Bilateral dry eyes    Dry mouth    Hyponatremia    Hypomagnesemia    COVID-19 vaccine series completed    History of short term memory loss    Familial tremor    Raynauds syndrome    Bunion of left foot    Bunion of right foot    Hammer toe of right foot    Hammer toe of left foot    Spondylolisthesis    Colonic inertia    DDD (degenerative disc disease), cervical    DDD (degenerative disc disease), lumbar    Dense breast tissue    History of subdural hematoma      PSH  History of hysterectomy    History of appendectomy    H/O ileostomy    S/P primary angioplasty with coronary stent    History of lumbar laminectomy    Rectal prolapse    History of tonsillectomy and adenoidectomy    History of bowel resection        Medications  Antimicrobial and Immunologic    Neuro, Psych and Analgesia      Cardiovascular and Hematologic (includes DVT ppx/AC/Antiplatelet agents)      Pulmonary    Oncologic    GI, Endocrine and Nutrition          Allergies/Insensitivities  Allergies    No Known Drug Allergies  latex (Unknown)    Intolerances    adhesives (Rash)      Social    Labs                    Imaging   HPI: 73 y F hypotension, HLD, hypothyroid, CAD s/p stent/bowel resection s/p ileostomy, presenting following ground level fall last night. Patient reports trying to get into the bedroom, tripped over box and hit her right side against wood ledge, Patient did strike her head, unknown LOC.       Primary Survey  A - Airway intact, trachea midline, patient verbal.   B - Equal chest wall expansion, clear to auscultation bilaterally. SpO2 98% by pulse oximetry in ED with RR 16.   C - BP in ED BP: 111/69 (10-19-24 @ 05:43) with HR 64 (64 - 80), RRR on continuous cardiac telemetry, palpable peripheral pulses.   D - GCS 15 on arrival.    Secondary survey  Exposure obtained as appropriate  General: Patient recument in stretcher, in no acute distress.   Neurologic: AOx3, GCS 15. Pupils equally reactive bilaterally.   HEENT: Normocephalic, posterior scalp small superficial hematoma. No hemotympanum or Waite sign bilaterally.   Spine: No palpable stepoffs, gross bony deformity, or point tenderness in the cervical, thoracic or lumbar spine. No paraspinal tenderness.   Cardiovascular: RRR, in normal sinus rhythm on continuous telemetry. Peripheral pulses palpable.   Pulmonary and chest: Equal chest wall expansion, lungs clear to auscultation bilaterally. No hypoxemia on continuous pulse oximetry in ED. Chest nontender to palpation with no ecchymosis and no seatbelt sign.   Abdominal: Soft NTND, no rebound tenderness or guarding, Ostomy functional.   : Pelvis stable. No blood at the urethral meatus.   Extremities: No abrasions, lacerations, or gross deformity with full range of motion and intact peripheral pulses in bilateral upper and lower extremities.     PMH  Sjogren's syndrome    Ileostomy in place    Colonic dysmotility    GERD (gastroesophageal reflux disease)    Anxiety    Sciatica    CAD (coronary atherosclerotic disease)    Stented coronary artery    History of dehydration    H/O small bowel obstruction    S/P primary angioplasty with coronary stent    Hypothyroid    H/O electrolyte imbalance    History of connective tissue disease    Hyperlipidemia    Mild anemia    Osteoporosis    Insomnia    Vasovagal syncope    History of hypotension    Scoliosis    History of CREST syndrome    History of scleroderma    Bilateral dry eyes    Dry mouth    Hyponatremia    Hypomagnesemia    COVID-19 vaccine series completed    History of short term memory loss    Familial tremor    Raynauds syndrome    Bunion of left foot    Bunion of right foot    Hammer toe of right foot    Hammer toe of left foot    Spondylolisthesis    Colonic inertia    DDD (degenerative disc disease), cervical    DDD (degenerative disc disease), lumbar    Dense breast tissue    History of subdural hematoma      PSH  History of hysterectomy    History of appendectomy    H/O ileostomy    S/P primary angioplasty with coronary stent    History of lumbar laminectomy    Rectal prolapse    History of tonsillectomy and adenoidectomy    History of bowel resection        Medications  Antimicrobial and Immunologic    Neuro, Psych and Analgesia      Cardiovascular and Hematologic (includes DVT ppx/AC/Antiplatelet agents)      Pulmonary    Oncologic    GI, Endocrine and Nutrition          Allergies/Insensitivities  Allergies    No Known Drug Allergies  latex (Unknown)    Intolerances    adhesives (Rash)      Social    Labs                    Imaging

## 2024-10-19 NOTE — PHYSICAL THERAPY INITIAL EVALUATION ADULT - ADDITIONAL COMMENTS
Pt lives with spouse in private home with 1 step to enter, + elevator inside. PTA, pt was independent with all mobility without use of assistive device. Has rolling walker, cane and raised toilet seat at home in bathroom Complex Repair And Rhombic Flap Text: The defect edges were debeveled with a #15 scalpel blade.  The primary defect was closed partially with a complex linear closure.  Given the location of the remaining defect, shape of the defect and the proximity to free margins a rhombic flap was deemed most appropriate for complete closure of the defect.  Using a sterile surgical marker, an appropriate advancement flap was drawn incorporating the defect and placing the expected incisions within the relaxed skin tension lines where possible.    The area thus outlined was incised deep to adipose tissue with a #15 scalpel blade.  The skin margins were undermined to an appropriate distance in all directions utilizing iris scissors.

## 2024-10-19 NOTE — H&P ADULT - ASSESSMENT
73 y F hypotension, HLD, hypothyroid, CAD s/p stent/bowel resection s/p ileostomy, presenting following mechanical fall tonight. Patient reports trying to get into the bedroom, tripped over box and hit her right side against wood ledge, Patient did strike her head, unknown LOC.     CT HEAD:  No acute intracranial hemorrhage, mass effect, or midline shift.    CT CERVICAL SPINE:  No acute fracture or traumatic subluxation.    Injuries:  Acute fractures of the right sixth through eighth ribs  right transverse processes of T6-T8  Small right pneumothorax.      Plan  Admit to Surgery under Dr. Esparza   f/u repeat chest xray, if expanding ptx will need pigtail   Regular diet   Multimodal pain control   Chest PT  PT consult   Med rec completed     Discussed with Dr. Esparza     [facility]   [address] 73 y F hypotension, HLD, hypothyroid, CAD s/p stent/bowel resection s/p ileostomy, presenting following ground level fall last night. Patient reports trying to get into the bedroom, tripped over box and hit her right side against wood ledge, Patient did strike her head, unknown LOC.     CT HEAD:  No acute intracranial hemorrhage, mass effect, or midline shift.    CT CERVICAL SPINE:  No acute fracture or traumatic subluxation.    Injuries:  Acute fractures of the right sixth through eighth ribs  right transverse processes of T6-T8  Small right pneumothorax.    pulling 750 on IS, room air    Plan  Admit to Surgery under Dr. Esparza   f/u repeat chest xray, if expanding ptx will need pigtail   Regular diet   Multimodal pain control   Chest PT  PT consult   Med rec completed     Discussed with Dr. Esparza     [facility]   [id number]

## 2024-10-19 NOTE — ED ADULT NURSE NOTE - NSFALLHARMRISKINTERV_ED_ALL_ED

## 2024-10-19 NOTE — CHART NOTE - NSCHARTNOTEFT_GEN_A_CORE
As per Hollis et al (J Trauma 2008 doi: 10.1097/TA.[id number]) isolated transverse process factures are not associated with neurologic deficit or structural instability requiring spine service intervention. Conservative management per primary team is therefore appropriate.  - Please consult neurosurgery as needed if other spinal injuries are found or patient develops neurologic symptoms  - Pt may f/u w/ Dr. Jeevan MALIK

## 2024-10-19 NOTE — ED ADULT NURSE NOTE - OBJECTIVE STATEMENT
73YOF hx hypotension/HLD/hypothyroid/CAD s/p stent/bowel resection s/p ileostomy BIB self with  c/o head and back pain. Pt reports recent fall, has had severe pain to R upper back with pain on inspiration, generalized weakness.

## 2024-10-19 NOTE — ED ADULT NURSE NOTE - ED CARDIAC RHYTHM
Subjective   History of Present Illness  64-year-old male status post total right knee arthroplasty with Mechelle robot on the 20th.  He had been discharged yesterday.  He presents after went to physical therapy today and leg was swollen red bruised warm.  He reports he has had no fever.  He has had no chest pain or shortness of breath.  He reports his pain is unchanged he has been using oxycodone for pain.  Review of Systems  Negative for fever cough shortness of breath  Past Medical History:   Diagnosis Date   • Aortic stenosis, moderate 04/05/2022   • Arthritis    • Asthma    • Benign prostatic hyperplasia 06/20/2017   • Chronic diastolic congestive heart failure (HCC) 02/04/2019    states didn't really have.  no sx's present   • COPD (chronic obstructive pulmonary disease) (HCC)    • Gout    • Hay fever 01/28/2016   • Heart murmur    • History of alcohol abuse 04/18/2019   • Hx of pancreatitis 04/18/2019   • Infection of left knee (HCC) 05/2022   • Insomnia    • Iron deficiency anemia 01/11/2019   • Obesity (BMI 35.0-39.9 without comorbidity)    • Peripheral edema     none presently   • Primary hypertension 01/28/2016   • Seasonal allergies    • Thrombocytopenia (HCC)     when had PNA       No Known Allergies    Past Surgical History:   Procedure Laterality Date   • CATARACT EXTRACTION, BILATERAL     • KNEE INCISION AND DRAINAGE Left 06/07/2022    Procedure: KNEE INCISION AND DRAINAGE;  Surgeon: Yash Celis MD;  Location: [facility] OR;  Service: Orthopedics;  Laterality: Left;   • TOTAL KNEE ARTHROPLASTY Left 04/05/2022    Procedure: TOTAL KNEE ARTHROPLASTY;  Surgeon: Yash Celis MD;  Location: [facility] MAIN OR;  Service: Orthopedics;  Laterality: Left;   • TOTAL KNEE ARTHROPLASTY Right 9/20/2022    Procedure: TOTAL KNEE ARTHROPLASTY WITH MECHELLE ROBOT;  Surgeon: Yash Celis MD;  Location: [facility] MAIN OR;  Service: Robotics - Ortho;  Laterality: Right;   • TOTAL SHOULDER ARTHROPLASTY W/ DISTAL CLAVICLE  EXCISION Right 10/22/2019    Procedure: TOTAL SHOULDER REVERSE ARTHROPLASTY with Biceps Tenodesis;  Surgeon: Chris Lafleur MD;  Location: [facility] MAIN OR;  Service: Orthopedics       Family History   Problem Relation Age of Onset   • Cancer Father    • Heart disease Sister    • Hypertension Brother        Social History     Socioeconomic History   • Marital status:    Tobacco Use   • Smoking status: Never Smoker   • Smokeless tobacco: Never Used   Vaping Use   • Vaping Use: Never used   Substance and Sexual Activity   • Alcohol use: Yes     Comment: occasionally   • Drug use: No   • Sexual activity: Defer     Prior to Admission medications    Medication Sig Start Date End Date Taking? Authorizing Provider   albuterol sulfate  (90 Base) MCG/ACT inhaler Inhale 2 puffs Every 6 (Six) Hours As Needed for Wheezing or Shortness of Air.  Patient taking differently: Inhale 2 puffs As Needed for Wheezing or Shortness of Air. 8/22/22   Kerri Cruz APRN   allopurinol (ZYLOPRIM) 300 MG tablet Take 1 tablet by mouth Daily. 8/22/22   Kerri Cruz APRN   apixaban (ELIQUIS) 2.5 MG tablet tablet Take 1 tablet by mouth 2 (Two) Times a Day. 9/20/22   Yash Celis MD   Calcium Carbonate Antacid (CALCIUM CARBONATE PO) Take 1 tablet by mouth Every Night.    ProviderJessica MD   carvedilol (COREG) 3.125 MG tablet Take 1 tablet by mouth Daily. 8/22/22   Kerri Cruz APRN   Cetirizine HCl (ZYRTEC PO) Take 10 mg by mouth Daily.    ProviderJessica MD   ferrous sulfate 324 (65 Fe) MG tablet delayed-release EC tablet Take 1 tablet by mouth Daily With Breakfast.  Patient taking differently: Take 324 mg by mouth Every Night. 8/22/22   Kerri Cruz APRN   fluticasone (FLONASE) 50 MCG/ACT nasal spray 2 sprays into the nostril(s) as directed by provider 2 (Two) Times a Day.    Jessica Barrios MD   furosemide (LASIX) 20 MG tablet Take 1 tablet by mouth Daily As Needed (swelling).  swelling  Patient taking differently: Take 20 mg by mouth Every Night. 8/22/22   Kerri Cruz APRN   guaiFENesin (MUCINEX) 600 MG 12 hr tablet Take 1,200 mg by mouth 2 (Two) Times a Day.    Provider, MD Jessica   lisinopril (PRINIVIL,ZESTRIL) 10 MG tablet Take 1 tablet by mouth Daily. 8/26/22   Kerri Cruz APRN   meloxicam (MOBIC) 15 MG tablet Take 1 tablet by mouth Daily. Prn joint pain 7/27/22   Jaymie Woods PA   mupirocin (BACTROBAN) 2 % ointment Apply a pea-sized amount to each nostril twice daily for 5 days prior to surgery. 8/17/22   Yash Celis MD   ondansetron (Zofran) 4 MG tablet Take 1 tablet by mouth Every 6 (Six) Hours As Needed for Nausea or Vomiting. 9/20/22   Yash Celis MD   oxyCODONE-acetaminophen (PERCOCET) 7.5-325 MG per tablet Take one tablet by mouth every 4-6 hours as needed for pain 9/20/22   Yash Celis MD   potassium chloride 10 MEQ CR tablet Take 1 tablet by mouth Daily. WITH LASIX  Patient taking differently: Take 10 mEq by mouth Every Morning. WITH LASIX 6/27/22   Kerri Cruz APRN   Symbicort 160-4.5 MCG/ACT inhaler Inhale 2 puffs 2 (Two) Times a Day.  Patient taking differently: Inhale 2 puffs 2 (Two) Times a Day. USES SEASONALLLY 2/8/21   Kerri Cruz APRN   traZODone (DESYREL) 50 MG tablet Take 1 tablet by mouth Every Night. 8/22/22   Kerri Cruz APRN           Objective   Physical Exam  64-year-old male awake alert.  Generally well-developed well-nourished.  Overweight.  Pupils equal round react light.  Neck supple chest clear equal breath sounds cardiovascular 3/6 holosystolic murmur abdomen soft nontender.  Examination of right leg reveals a soft tissue swelling bruising warmth erythema.  There are a couple of fluid-filled bulla to the medial aspect of knee.  The  Seth vacuum dressing over his incision is full.  He is neuro vasc intact distally.  Procedures  Patient's seth dressing was taken down.  His incision is clean.  There was bullae to the  "medial aspect of knee which did not remain intact and devitalized skin was debrided.  A new seth dressing was placed and leg was wrapped in an Ace wrap.         ED Course      Results for orders placed or performed during the hospital encounter of 09/23/22   Basic Metabolic Panel    Specimen: Arm, Right; Blood   Result Value Ref Range    Glucose 113 (H) 65 - 99 mg/dL    BUN 18 8 - 23 mg/dL    Creatinine 0.86 0.76 - 1.27 mg/dL    Sodium 136 136 - 145 mmol/L    Potassium 4.5 3.5 - 5.2 mmol/L    Chloride 107 98 - 107 mmol/L    CO2 20.0 (L) 22.0 - 29.0 mmol/L    Calcium 8.7 8.6 - 10.5 mg/dL    BUN/Creatinine Ratio 20.9 7.0 - 25.0    Anion Gap 9.0 5.0 - 15.0 mmol/L    eGFR 95.5 >60.0 mL/min/1.73   CBC Auto Differential    Specimen: Arm, Right; Blood   Result Value Ref Range    WBC 6.90 3.40 - 10.80 10*3/mm3    RBC 2.67 (L) 4.14 - 5.80 10*6/mm3    Hemoglobin 8.7 (L) 13.0 - 17.7 g/dL    Hematocrit 27.0 (L) 37.5 - 51.0 %    .1 (H) 79.0 - 97.0 fL    MCH 32.8 26.6 - 33.0 pg    MCHC 32.4 31.5 - 35.7 g/dL    RDW 15.3 12.3 - 15.4 %    RDW-SD 53.8 37.0 - 54.0 fl    MPV 9.1 6.0 - 12.0 fL    Platelets 69 (L) 140 - 450 10*3/mm3    Neutrophil % 68.2 42.7 - 76.0 %    Lymphocyte % 12.3 (L) 19.6 - 45.3 %    Monocyte % 16.8 (H) 5.0 - 12.0 %    Eosinophil % 2.1 0.3 - 6.2 %    Basophil % 0.6 0.0 - 1.5 %    Neutrophils, Absolute 4.70 1.70 - 7.00 10*3/mm3    Lymphocytes, Absolute 0.80 0.70 - 3.10 10*3/mm3    Monocytes, Absolute 1.20 (H) 0.10 - 0.90 10*3/mm3    Eosinophils, Absolute 0.10 0.00 - 0.40 10*3/mm3    Basophils, Absolute 0.00 0.00 - 0.20 10*3/mm3    nRBC 0.0 0.0 - 0.2 /100 WBC     No radiology results for the last day  Medications - No data to display  /51   Pulse 74   Temp 98.7 °F (37.1 °C) (Oral)   Resp 20   Ht 188 cm (74\")   Wt 129 kg (285 lb)   SpO2 97%   BMI 36.59 kg/m²                                        [facility]  Chart review: Status post total knee arthroplasty on the 20th of this month  Comorbidity: As per " past history  Differential: Wound infection, postop inflammation, bruising  My EKG interpretation:   Lab: White count 6.9 with hemoglobin 8.7 platelet count 69 68 segs no bands basic metabolic panel glucose 113 BUN 18 creatinine 0.86  Radiology:   Discussion/treatment: Patient was discussed with Dr. Celis.  He reports he had a fairly extensive surgery he was not surprised that he was having increased bruising swelling.  He also has some thrombocytopenia which likely contributes to this also.  He is currently on Eliquis.  He felt that he needed to continue with this due to DVT risk.  His dressing was changed.  Advised to keep his leg wrapped elevated.  As needed ice packs.  To follow-up Dr. Celis return new or worsening symptoms.   He was discharged placed on short course of Keflex.  Review of his chart shows that his platelet count has been running in the 90s preop.  Most recent platelet count was 82.  Patient was evaluated using appropriate PPE      Final diagnoses:   Postoperative ecchymosis   Thrombocytopenia (HCC)       ED Disposition  ED Disposition     ED Disposition   Discharge    Condition   Stable    Comment   --             Yash Celis MD  [facility] [place] [address]  [phone]    Schedule an appointment as soon as possible for a visit            Medication List      New Prescriptions    cephalexin 500 MG capsule  Commonly known as: KEFLEX  Take 1 capsule by mouth 4 (Four) Times a Day.        Changed    albuterol sulfate  (90 Base) MCG/ACT inhaler  Commonly known as: PROVENTIL HFA;VENTOLIN HFA;PROAIR HFA  Inhale 2 puffs Every 6 (Six) Hours As Needed for Wheezing or Shortness of Air.  What changed: when to take this     ferrous sulfate 324 (65 Fe) MG tablet delayed-release EC tablet  Take 1 tablet by mouth Daily With Breakfast.  What changed: when to take this     furosemide 20 MG tablet  Commonly known as: LASIX  Take 1 tablet by mouth Daily As Needed (swelling). swelling  What  changed:   · when to take this  · additional instructions     potassium chloride 10 MEQ CR tablet  Take 1 tablet by mouth Daily. WITH LASIX  What changed: when to take this     Symbicort 160-4.5 MCG/ACT inhaler  Generic drug: budesonide-formoterol  Inhale 2 puffs 2 (Two) Times a Day.  What changed: additional instructions           Where to Get Your Medications      Information about where to get these medications is not yet available    Ask your nurse or doctor about these medications  · cephalexin 500 MG capsule          Noah Sy MD  09/23/22 1320       Noah Sy MD  09/23/22 1043     regular

## 2024-10-19 NOTE — H&P ADULT - ATTENDING COMMENTS
Trauma consult. Patient examined by me in ED.   73 year old woman s/p mechanical fall.   Injuries include:   -Acute fractures of the right sixth through eighth ribs.  -Right transverse processes of T6-T8.  -Small right pneumothorax.    Vitals stable.   Alert and oriented x4.   No increased WOB or tachypnea.   Abdomen non tender, non distended.     A/P:   -F/up labs.  -CXR. If PTX stable then admit to floor.   -Multimodal pain control.   -PT evaluation.   -Incidental pulmonary nodules discussed. Patient plans to follow up with PCP. Trauma consult. Patient examined by me in ED.   73 year old woman s/p mechanical fall.   Injuries include:   -Acute fractures of the right sixth through eighth ribs.  -Right transverse processes of T6-T8.  -Small right pneumothorax.    Vitals stable.   Alert and oriented x4.   No increased WOB or tachypnea.   Abdomen non tender, non distended.     A/P:   -F/up labs.  -CXR. If PTX stable then admit to floor. If PTX expands may require a chest tube.   -Multimodal pain control.   -PT evaluation.   -Incidental pulmonary nodules discussed. Patient plans to follow up with PCP.

## 2024-10-19 NOTE — PHYSICAL THERAPY INITIAL EVALUATION ADULT - PERTINENT HX OF CURRENT PROBLEM, REHAB EVAL
73 y F hypotension, HLD, hypothyroid, CAD s/p stent/bowel resection s/p ileostomy, presenting following ground level fall last night. Patient reports trying to get into the bedroom, tripped over box and hit her right side against wood ledge, Patient did strike her head, unknown LOC.     + acute fracture R 6th-8th ribs, small R PTX

## 2024-10-19 NOTE — ED ADULT NURSE REASSESSMENT NOTE - NS ED NURSE REASSESS COMMENT FT1
Report received from NATALYA Giron. PT is resting comfortably in bed, breathing unlabored on room air but remains on 2L nasal cannula per trauma's orders and PT is speaking in complete sentences. Updated PT on plan of care. Awaiting admission from MD's. PT medicated per MD orders and labs sent per MD orders. Safety and comfort maintained. Call bell within reach. Family at the bedside.

## 2024-10-19 NOTE — ED PROVIDER NOTE - CLINICAL SUMMARY MEDICAL DECISION MAKING FREE TEXT BOX
73 y F hypotension, HLD, hypothyroid, CAD s/p stent/bowel resection s/p ileostomy, presenting following ground level fall last night. Patient reports trying to get into the bedroom, tripped over box and hit her right side, struck head. unknown if LOC. did hit head. no fevers no chills, n/v/d. no numbness or tingling in extremities bl UE and LE.     VS reviewed. Primary intact. Secondary: NAD. No hemotympanum bl, No auricular leakage bl. Nares bl clear, no hematoma bl. No chest wall ttp, no abd ttp. No eccymosis noted.     ptx, fall, fx, soft tissue contusion, c/f pna in future   cbc cmp pt ptt ts xr ct admit

## 2024-10-19 NOTE — ED PROVIDER NOTE - BIRTH SEX
Female Chonodrocutaneous Helical Advancement Flap Text: The defect edges were debeveled with a #15 scalpel blade.  Given the location of the defect and the proximity to free margins a chondrocutaneous helical advancement flap was deemed most appropriate.  Using a sterile surgical marker, the appropriate advancement flap was drawn incorporating the defect and placing the expected incisions within the relaxed skin tension lines where possible.    The area thus outlined was incised deep to adipose tissue with a #15 scalpel blade.  The skin margins were undermined to an appropriate distance in all directions utilizing iris scissors.

## 2024-10-19 NOTE — ED PROVIDER NOTE - ATTENDING CONTRIBUTION TO CARE
James Posadas MD (Attending Physician):    I performed a history and physical exam of the patient and discussed their management with the resident/fellow/ACP/student. I have reviewed the resident/fellow/ACP/student note and agree with the documented findings and plan of care, except as noted. I have personally performed a substantive portion of the visit including all aspects of the medical decision making. My medical decision making and observations are found below. Please refer to any progress notes for updates on clinical course.    HPI:  73F PMH Sjogren's syndrome, familial tremor, hypothyroidism, ocular migraines, prior traumatic SDH, CKD3, CAD (s/p stents, on ASA), colonic inertia, rectal prolapse, bladder prolapse s/p suspensory surgical procedure, s/p colon resection and rectopexy  in 2011 and repeat open rectopexy with mesh in 2016, w/ hx of recurrent SBO s/p ex-lap, SBR, diverting loop ileostomy 2017 ([facility])  with subsequent multiple LBO and SBO (recent admit 4/2024), chronic hyponatremia (followed by nephrology Dr. Maria D Meyer) p/w right sided rib pain after mechanical fall. Says she was in a dark room at home, tripped and fell onto a cabinet striking her R sided ribs and subsequently fell to the ground. Thinks she may have hit her head too, but unsure if she lost consciousness. Endorsing back to R sided ribs, posterior head, and back. Denies neck pain, extremity pain, or n/v. Takes one baby ASA daily.    PE:  GEN - +In mild discomfort, A&Ox3  HEAD - NC/AT  EYES - PERRL, EOMI  ENT - Airway patent, mucous membranes moist, oropharynx wnl  NECK - Supple, non-tender without lymphadenopathy, no masses  PULMONARY - CTA b/l, symmetric breath sounds, no W/R/R, satting 97% on RA  CARDIAC - +S1S2, RRR, no M/G/R, no JVD  CHEST - +Right sided ribs TTP, no crepitus  ABDOMEN - +BS, ND, NT, soft, no guarding, no rebound, no masses, no rigidity   - No CVA TTP b/l  BACK - +Midline tenderness from T5-T10  EXTREMITIES - FROM, symmetric pulses, no edema. All extremities NT.  SKIN - No rash or bruising  NEUROLOGIC - Alert, speech clear, moving all extremities spontaneously, CN II-XII grossly intact, no pronator drift, +unable to assess gait at this time, strength and sensation grossly intact, FTN negative b/l  PSYCH - Normal mood/affect, normal insight    MDM:  DDx includes, but not limited to: intracranial bleed, c/t spine fractures, right sided rib fractures, PTX. CT head/c-spine/t-spine/chest, labs, pain control, reassess. Dispo pending w/u.

## 2024-10-20 ENCOUNTER — INPATIENT (INPATIENT)
Facility: HOSPITAL | Age: 74
LOS: 0 days | Discharge: HOME | DRG: 185 | End: 2024-10-21
Attending: TRANSPLANT SURGERY | Admitting: TRANSPLANT SURGERY
Payer: MEDICARE

## 2024-10-20 VITALS
HEART RATE: 90 BPM | TEMPERATURE: 98 F | HEIGHT: 57 IN | SYSTOLIC BLOOD PRESSURE: 109 MMHG | RESPIRATION RATE: 17 BRPM | WEIGHT: 85.98 LBS | DIASTOLIC BLOOD PRESSURE: 67 MMHG | OXYGEN SATURATION: 100 %

## 2024-10-20 VITALS
HEART RATE: 66 BPM | TEMPERATURE: 98 F | SYSTOLIC BLOOD PRESSURE: 119 MMHG | DIASTOLIC BLOOD PRESSURE: 85 MMHG | OXYGEN SATURATION: 96 % | RESPIRATION RATE: 18 BRPM

## 2024-10-20 DIAGNOSIS — S22.41XA MULTIPLE FRACTURES OF RIBS, RIGHT SIDE, INITIAL ENCOUNTER FOR CLOSED FRACTURE: ICD-10-CM

## 2024-10-20 DIAGNOSIS — Z90.49 ACQUIRED ABSENCE OF OTHER SPECIFIED PARTS OF DIGESTIVE TRACT: Chronic | ICD-10-CM

## 2024-10-20 DIAGNOSIS — K62.3 RECTAL PROLAPSE: Chronic | ICD-10-CM

## 2024-10-20 DIAGNOSIS — Z98.890 OTHER SPECIFIED POSTPROCEDURAL STATES: Chronic | ICD-10-CM

## 2024-10-20 DIAGNOSIS — Z95.5 PRESENCE OF CORONARY ANGIOPLASTY IMPLANT AND GRAFT: Chronic | ICD-10-CM

## 2024-10-20 DIAGNOSIS — Z90.710 ACQUIRED ABSENCE OF BOTH CERVIX AND UTERUS: Chronic | ICD-10-CM

## 2024-10-20 DIAGNOSIS — Z90.89 ACQUIRED ABSENCE OF OTHER ORGANS: Chronic | ICD-10-CM

## 2024-10-20 LAB
ALBUMIN SERPL ELPH-MCNC: 4.3 G/DL — SIGNIFICANT CHANGE UP (ref 3.3–5)
ALP SERPL-CCNC: 84 U/L — SIGNIFICANT CHANGE UP (ref 40–120)
ALT FLD-CCNC: 23 U/L — SIGNIFICANT CHANGE UP (ref 10–45)
ANION GAP SERPL CALC-SCNC: 11 MMOL/L — SIGNIFICANT CHANGE UP (ref 5–17)
APTT BLD: 40.5 SEC — HIGH (ref 24.5–35.6)
AST SERPL-CCNC: 41 U/L — HIGH (ref 10–40)
BASOPHILS # BLD AUTO: 0.03 K/UL — SIGNIFICANT CHANGE UP (ref 0–0.2)
BASOPHILS NFR BLD AUTO: 0.3 % — SIGNIFICANT CHANGE UP (ref 0–2)
BILIRUB SERPL-MCNC: 0.4 MG/DL — SIGNIFICANT CHANGE UP (ref 0.2–1.2)
BLD GP AB SCN SERPL QL: NEGATIVE — SIGNIFICANT CHANGE UP
BUN SERPL-MCNC: 30 MG/DL — HIGH (ref 7–23)
CALCIUM SERPL-MCNC: 9.5 MG/DL — SIGNIFICANT CHANGE UP (ref 8.4–10.5)
CHLORIDE SERPL-SCNC: 91 MMOL/L — LOW (ref 96–108)
CO2 SERPL-SCNC: 29 MMOL/L — SIGNIFICANT CHANGE UP (ref 22–31)
CREAT SERPL-MCNC: 1.16 MG/DL — SIGNIFICANT CHANGE UP (ref 0.5–1.3)
EGFR: 49 ML/MIN/1.73M2 — LOW
EOSINOPHIL # BLD AUTO: 0.17 K/UL — SIGNIFICANT CHANGE UP (ref 0–0.5)
EOSINOPHIL NFR BLD AUTO: 1.6 % — SIGNIFICANT CHANGE UP (ref 0–6)
GLUCOSE SERPL-MCNC: 121 MG/DL — HIGH (ref 70–99)
HCT VFR BLD CALC: 38.9 % — SIGNIFICANT CHANGE UP (ref 34.5–45)
HGB BLD-MCNC: 12.7 G/DL — SIGNIFICANT CHANGE UP (ref 11.5–15.5)
IMM GRANULOCYTES NFR BLD AUTO: 0.2 % — SIGNIFICANT CHANGE UP (ref 0–0.9)
INR BLD: 1.24 RATIO — HIGH (ref 0.85–1.16)
LYMPHOCYTES # BLD AUTO: 1.24 K/UL — SIGNIFICANT CHANGE UP (ref 1–3.3)
LYMPHOCYTES # BLD AUTO: 12 % — LOW (ref 13–44)
MAGNESIUM SERPL-MCNC: 1.7 MG/DL — SIGNIFICANT CHANGE UP (ref 1.6–2.6)
MCHC RBC-ENTMCNC: 29.1 PG — SIGNIFICANT CHANGE UP (ref 27–34)
MCHC RBC-ENTMCNC: 32.6 GM/DL — SIGNIFICANT CHANGE UP (ref 32–36)
MCV RBC AUTO: 89 FL — SIGNIFICANT CHANGE UP (ref 80–100)
MONOCYTES # BLD AUTO: 0.44 K/UL — SIGNIFICANT CHANGE UP (ref 0–0.9)
MONOCYTES NFR BLD AUTO: 4.3 % — SIGNIFICANT CHANGE UP (ref 2–14)
NEUTROPHILS # BLD AUTO: 8.45 K/UL — HIGH (ref 1.8–7.4)
NEUTROPHILS NFR BLD AUTO: 81.6 % — HIGH (ref 43–77)
NRBC # BLD: 0 /100 WBCS — SIGNIFICANT CHANGE UP (ref 0–0)
PLATELET # BLD AUTO: 154 K/UL — SIGNIFICANT CHANGE UP (ref 150–400)
POTASSIUM SERPL-MCNC: 3.9 MMOL/L — SIGNIFICANT CHANGE UP (ref 3.5–5.3)
POTASSIUM SERPL-SCNC: 3.9 MMOL/L — SIGNIFICANT CHANGE UP (ref 3.5–5.3)
PROT SERPL-MCNC: 7.8 G/DL — SIGNIFICANT CHANGE UP (ref 6–8.3)
PROTHROM AB SERPL-ACNC: 14.1 SEC — HIGH (ref 9.9–13.4)
RBC # BLD: 4.37 M/UL — SIGNIFICANT CHANGE UP (ref 3.8–5.2)
RBC # FLD: 15.5 % — HIGH (ref 10.3–14.5)
RH IG SCN BLD-IMP: POSITIVE — SIGNIFICANT CHANGE UP
SODIUM SERPL-SCNC: 131 MMOL/L — LOW (ref 135–145)
WBC # BLD: 10.35 K/UL — SIGNIFICANT CHANGE UP (ref 3.8–10.5)
WBC # FLD AUTO: 10.35 K/UL — SIGNIFICANT CHANGE UP (ref 3.8–10.5)

## 2024-10-20 PROCEDURE — 70450 CT HEAD/BRAIN W/O DYE: CPT | Mod: MC

## 2024-10-20 PROCEDURE — 99222 1ST HOSP IP/OBS MODERATE 55: CPT

## 2024-10-20 PROCEDURE — 80053 COMPREHEN METABOLIC PANEL: CPT

## 2024-10-20 PROCEDURE — 85025 COMPLETE CBC W/AUTO DIFF WBC: CPT

## 2024-10-20 PROCEDURE — 71046 X-RAY EXAM CHEST 2 VIEWS: CPT | Mod: 26

## 2024-10-20 PROCEDURE — 72125 CT NECK SPINE W/O DYE: CPT | Mod: MC

## 2024-10-20 PROCEDURE — 99285 EMERGENCY DEPT VISIT HI MDM: CPT | Mod: 25

## 2024-10-20 PROCEDURE — 97161 PT EVAL LOW COMPLEX 20 MIN: CPT

## 2024-10-20 PROCEDURE — 71045 X-RAY EXAM CHEST 1 VIEW: CPT | Mod: 26

## 2024-10-20 PROCEDURE — 71250 CT THORAX DX C-: CPT | Mod: MC

## 2024-10-20 PROCEDURE — 71045 X-RAY EXAM CHEST 1 VIEW: CPT

## 2024-10-20 PROCEDURE — 99285 EMERGENCY DEPT VISIT HI MDM: CPT | Mod: GC

## 2024-10-20 RX ORDER — ACETAMINOPHEN 500 MG
650 TABLET ORAL EVERY 6 HOURS
Refills: 0 | Status: DISCONTINUED | OUTPATIENT
Start: 2024-10-20 | End: 2024-10-21

## 2024-10-20 RX ORDER — SIMETHICONE 80 MG/1
80 TABLET, CHEWABLE ORAL EVERY 6 HOURS
Refills: 0 | Status: DISCONTINUED | OUTPATIENT
Start: 2024-10-20 | End: 2024-10-21

## 2024-10-20 RX ORDER — PRIMIDONE 50 MG/1
50 TABLET ORAL DAILY
Refills: 0 | Status: DISCONTINUED | OUTPATIENT
Start: 2024-10-20 | End: 2024-10-21

## 2024-10-20 RX ORDER — LEVOTHYROXINE SODIUM 88 MCG
88 TABLET ORAL DAILY
Refills: 0 | Status: DISCONTINUED | OUTPATIENT
Start: 2024-10-20 | End: 2024-10-21

## 2024-10-20 RX ORDER — MELATONIN 5 MG
3 TABLET ORAL AT BEDTIME
Refills: 0 | Status: DISCONTINUED | OUTPATIENT
Start: 2024-10-20 | End: 2024-10-21

## 2024-10-20 RX ORDER — OXYCODONE HYDROCHLORIDE 30 MG/1
1 TABLET ORAL
Qty: 10 | Refills: 0
Start: 2024-10-20

## 2024-10-20 RX ORDER — ROSUVASTATIN CALCIUM 10 MG
20 TABLET ORAL AT BEDTIME
Refills: 0 | Status: DISCONTINUED | OUTPATIENT
Start: 2024-10-20 | End: 2024-10-21

## 2024-10-20 RX ORDER — ENOXAPARIN SODIUM 40MG/0.4ML
40 SYRINGE (ML) SUBCUTANEOUS EVERY 24 HOURS
Refills: 0 | Status: DISCONTINUED | OUTPATIENT
Start: 2024-10-20 | End: 2024-10-21

## 2024-10-20 RX ORDER — PANTOPRAZOLE SODIUM 40 MG/1
40 TABLET, DELAYED RELEASE ORAL
Refills: 0 | Status: DISCONTINUED | OUTPATIENT
Start: 2024-10-20 | End: 2024-10-21

## 2024-10-20 RX ORDER — ASPIRIN/MAG CARB/ALUMINUM AMIN 325 MG
81 TABLET ORAL DAILY
Refills: 0 | Status: DISCONTINUED | OUTPATIENT
Start: 2024-10-20 | End: 2024-10-21

## 2024-10-20 RX ORDER — TRAMADOL HYDROCHLORIDE 50 MG/1
25 TABLET, COATED ORAL EVERY 6 HOURS
Refills: 0 | Status: DISCONTINUED | OUTPATIENT
Start: 2024-10-20 | End: 2024-10-21

## 2024-10-20 RX ORDER — FAMOTIDINE 10 MG/ML
20 INJECTION INTRAVENOUS DAILY
Refills: 0 | Status: DISCONTINUED | OUTPATIENT
Start: 2024-10-20 | End: 2024-10-21

## 2024-10-20 RX ORDER — DIPHENOXYLATE HYDROCHLORIDE AND ATROPINE SULFATE 2.5; .025 MG/1; MG/1
1 TABLET ORAL EVERY 8 HOURS
Refills: 0 | Status: DISCONTINUED | OUTPATIENT
Start: 2024-10-20 | End: 2024-10-21

## 2024-10-20 RX ADMIN — FAMOTIDINE 20 MILLIGRAM(S): 10 INJECTION INTRAVENOUS at 11:23

## 2024-10-20 RX ADMIN — Medication 240 MILLIGRAM(S): at 11:28

## 2024-10-20 RX ADMIN — OXYCODONE HYDROCHLORIDE 2.5 MILLIGRAM(S): 30 TABLET ORAL at 10:27

## 2024-10-20 RX ADMIN — Medication 600 MILLIGRAM(S): at 00:44

## 2024-10-20 RX ADMIN — Medication 40 MILLIGRAM(S): at 11:23

## 2024-10-20 RX ADMIN — DIPHENOXYLATE HYDROCHLORIDE AND ATROPINE SULFATE 1 TABLET(S): 2.5; .025 TABLET ORAL at 05:21

## 2024-10-20 RX ADMIN — Medication 15 MILLILITER(S): at 11:22

## 2024-10-20 RX ADMIN — Medication 88 MICROGRAM(S): at 05:21

## 2024-10-20 RX ADMIN — DIPHENOXYLATE HYDROCHLORIDE AND ATROPINE SULFATE 1 TABLET(S): 2.5; .025 TABLET ORAL at 15:09

## 2024-10-20 RX ADMIN — Medication 81 MILLIGRAM(S): at 11:23

## 2024-10-20 RX ADMIN — Medication 600 MILLIGRAM(S): at 12:28

## 2024-10-20 RX ADMIN — OXYCODONE HYDROCHLORIDE 2.5 MILLIGRAM(S): 30 TABLET ORAL at 11:27

## 2024-10-20 RX ADMIN — Medication 20 MILLIGRAM(S): at 22:25

## 2024-10-20 RX ADMIN — PRIMIDONE 50 MILLIGRAM(S): 50 TABLET ORAL at 11:22

## 2024-10-20 RX ADMIN — DIPHENOXYLATE HYDROCHLORIDE AND ATROPINE SULFATE 1 TABLET(S): 2.5; .025 TABLET ORAL at 22:24

## 2024-10-20 RX ADMIN — Medication 240 MILLIGRAM(S): at 05:21

## 2024-10-20 RX ADMIN — DULOXETINE HYDROCHLORIDE 20 MILLIGRAM(S): 30 CAPSULE, DELAYED RELEASE ORAL at 11:22

## 2024-10-20 RX ADMIN — Medication 600 MILLIGRAM(S): at 06:21

## 2024-10-20 NOTE — DISCHARGE NOTE NURSING/CASE MANAGEMENT/SOCIAL WORK - NSDCPEFALRISK_GEN_ALL_CORE
For information on Fall & Injury Prevention, visit: [email or web address] [place]  [email or web address] [place]  https://www.cdc.gov/steadi/patient.html

## 2024-10-20 NOTE — ED ADULT NURSE NOTE - OBJECTIVE STATEMENT
72 y/o F PMH HLD, osteoarthritis, hypothyroid, CAD s/p stent, bowel resection s/p ileostomy 7 years ago presented to ED via walk in triage s/p worsening pneumothorax per trauma team, pt was admitted to surgery on 7 tower for right pneumothorax in addition to right sided rib fractures 6-8 s/p fall, pt states she was DCed from 7 tower at 3pm today, was called by trauma team when she got home to return to hospital due to worsening pneumothorax seen on scans performed prior to discharge. On arrival to ED, pt endorsing right sided pain, states she has mild shortness of breath with ambulation however respirations even and unlabored on room air, speaking full sentences. Pt A&Ox4, neuro status in tact, appropriate safety measures in place, family at bedside. Pt is on CM and continuous pulse ox.

## 2024-10-20 NOTE — ED ADULT NURSE NOTE - NSFALLRISKASMT_ED_ALL_ED_DT
I have reviewed discharge instructions with the patient. The patient verbalized understanding. Patient armband removed and given to patient to take home. Patient was informed of the privacy risks if armband lost or stolen  Current Discharge Medication List      START taking these medications    Details   traMADol (ULTRAM) 50 mg tablet Take 1 Tab by mouth every six (6) hours as needed for Pain for up to 7 days. Max Daily Amount: 200 mg. Qty: 15 Tab, Refills: 0    Associated Diagnoses: Dental caries; TMJ syndrome      ibuprofen (MOTRIN) 600 mg tablet Take 1 Tab by mouth every six (6) hours as needed for Pain. Qty: 18 Tab, Refills: 0      penicillin v potassium (VEETID) 500 mg tablet Take 1 Tab by mouth three (3) times daily for 7 days.   Qty: 21 Tab, Refills: 0 20-Oct-2024 20:01

## 2024-10-20 NOTE — DISCHARGE NOTE PROVIDER - NSDCCPCAREPLAN_GEN_ALL_CORE_FT
PRINCIPAL DISCHARGE DIAGNOSIS  Diagnosis: Closed rib fracture  Assessment and Plan of Treatment:       SECONDARY DISCHARGE DIAGNOSES  Diagnosis: Pneumothorax  Assessment and Plan of Treatment:     Diagnosis: Fracture of thoracic transverse process  Assessment and Plan of Treatment:

## 2024-10-20 NOTE — H&P ADULT - NSHPPHYSICALEXAM_GEN_ALL_CORE
T(C): 36.7 (10-20-24 @ 18:27), Max: 36.7 (10-20-24 @ 00:00)  HR: 72 (10-20-24 @ 20:11) (60 - 90)  BP: 115/58 (10-20-24 @ 20:11) (101/62 - 142/47)  RR: 21 (10-20-24 @ 20:11) (17 - 21)  SpO2: 98% (10-20-24 @ 20:11) (95% - 100%)    CONSTITUTIONAL: Well groomed, no apparent distress  EYES: PERRLA and symmetric, EOMI, No conjunctival or scleral injection, non-icteric  ENMT: Oral mucosa with dry membranes. Normal dentition  NECK: Symmetric and without tracheal deviation   RESP: No respiratory distress, no use of accessory muscles, comfortable on RA  CV: Regular rate, normotensive  MSK: TTP over R post chest  NEURO: CN II-XII intact on gross examination  PSYCH: Appropriate insight/judgment; A+O x 3, mood and affect appropriate, recent/remote memory intact

## 2024-10-20 NOTE — H&P ADULT - HISTORY OF PRESENT ILLNESS
73 y F hypotension, HLD, hypothyroid, CAD s/p stent/bowel resection s/p ileostomy, presenting following ground level fall last night. Patient reports trying to get into the bedroom, tripped over box and hit her right side against wood ledge, Patient did strike her head, unknown LOC. Patient returned this evening for worsening pneumothorax seen on CXR. Patient denies worsening SOB, or lightheadedness. Endorses pain in the right posterior chest and back.

## 2024-10-20 NOTE — ED ADULT NURSE NOTE - NSFALLHARMRISKINTERV_ED_ALL_ED

## 2024-10-20 NOTE — CHART NOTE - NSCHARTNOTEFT_GEN_A_CORE
Incidental findings are findings on history, physical examination, lab work, and imaging that are not directly related to the patient's chief complaint and cause for hospital admission.     1. The incidental findings for this patient are (please list):  - Left upper lobe 6mm and 4mm pulmonary nodules, new since 12/1/2023.    2. Were these findings explained to the patient and/or their family (in the event that either the patient requests communication with their family or the patient is unable to understand or remember the findings and plan)?  - YES    3. Was a copy of the lab work/ imaging report given to the patient and/or their family?  - Patient's  had CT report already     4. Was the patient asked whether they can follow up with their PMD regarding this finding? If the patient says no, or does not have a PMD, you must identify a provider (i.e. Medicine Clinic or specialist) with whom the patient will follow up.  - Yes, patient will follow up with PCP and already has appointment scheduled with outpatient pulmonologist.     5. Was the finding documented on the discharge summary?  - Yes

## 2024-10-20 NOTE — DISCHARGE NOTE PROVIDER - CARE PROVIDER_API CALL
Milind Esparza Anatoliy  Surgery  [address] [place] [id number]  Phone: [phone]  Fax: [phone]  Follow Up Time: 2 weeks

## 2024-10-20 NOTE — DISCHARGE NOTE NURSING/CASE MANAGEMENT/SOCIAL WORK - FINANCIAL ASSISTANCE
[facility] provides services at a reduced cost to those who are determined to be eligible through [facility]’s financial assistance program. Information regarding [facility]’s financial assistance program can be found by going to [email or web address] or by calling [phone].

## 2024-10-20 NOTE — PROGRESS NOTE ADULT - SUBJECTIVE AND OBJECTIVE BOX
Subjective:  Patient seen at bedside this AM. Reports feeling well, without complaints. Denies chest pain, SOB. Tolerating diet without N/V. Pulling approx 1000 on IS.     24h Events:   - Overnight, no acute events    Objective:  Vital Signs  T(C): 36.7 (10-20 @ 11:47), Max: 36.8 (10-19 @ 20:26)  HR: 66 (10-20 @ 11:47) (60 - 67)  BP: 119/85 (10-20 @ 11:47) (101/62 - 119/85)  RR: 18 (10-20 @ 11:47) (18 - 18)  SpO2: 96% (10-20 @ 11:47) (95% - 100%)  10-19-24 @ 07:01  -  10-20-24 @ 07:00  --------------------------------------------------------  IN:  Total IN: 0 mL    OUT:    Ileostomy (mL): 5 mL  Total OUT: 5 mL    Total NET: -5 mL          Physical Exam:  GEN: resting in bed comfortably in NAD  RESP: no increased WOB  ABD: soft, non-distended, non-tender to palpation without rebound tenderness or guarding  EXTR: warm, well-perfused without gross deformities; spontaneous movement in b/l U/L extrem  NEURO: awake, alert    Labs:                        13.4   9.59  )-----------( 134      ( 19 Oct 2024 07:50 )             40.9   10-19    138  |  101  |  32[H]  ----------------------------<  92  3.9   |  26  |  0.97    Ca    8.4      19 Oct 2024 07:50    TPro  6.2  /  Alb  3.5  /  TBili  0.3  /  DBili  x   /  AST  41[H]  /  ALT  22  /  AlkPhos  63  10-19    CAPILLARY BLOOD GLUCOSE          Medications:  MEDICATIONS  (STANDING):  aspirin  chewable 81 milliGRAM(s) Oral daily  diphenoxylate/atropine 1 Tablet(s) Oral every 8 hours  DULoxetine 20 milliGRAM(s) Oral daily  enoxaparin Injectable 40 milliGRAM(s) SubCutaneous every 24 hours  famotidine    Tablet 20 milliGRAM(s) Oral daily  levothyroxine 88 MICROGram(s) Oral daily  multivitamin/minerals/iron Oral Solution (CENTRUM) 15 milliLiter(s) Oral daily  primidone 50 milliGRAM(s) Oral daily  rosuvastatin 20 milliGRAM(s) Oral at bedtime  senna 2 Tablet(s) Oral at bedtime    MEDICATIONS  (PRN):  oxyCODONE    IR 5 milliGRAM(s) Oral every 6 hours PRN Severe Pain (7 - 10)  oxyCODONE    IR 2.5 milliGRAM(s) Oral every 6 hours PRN Moderate Pain (4 - 6)      Imaging:

## 2024-10-20 NOTE — DISCHARGE NOTE PROVIDER - NSDCMRMEDTOKEN_GEN_ALL_CORE_FT
aspirin 81 mg oral delayed release tablet: 1 tab(s) orally once a day  Citracal Petites 200 mg-6.25 mcg (250 intl units) oral tablet: 2 tab(s) orally 2 times a day  CoQ10 100 mg oral capsule: 2 cap(s) orally once a day  diphenoxylate-atropine 2.5 mg-0.025 mg oral tablet: 1 tab(s) orally every 8 hours MDD: 3 tablets  ferrous sulfate 325 mg (65 mg elemental iron) oral tablet: 1 tab(s) orally Monday, Wednesday, and Friday  Fish Oil 1000 mg oral capsule: 1 cap(s) orally 2 times a day  folic acid 1 mg oral tablet: 1 tab(s) orally once a day  gas-x:   iron: 30 mg orally 2 times a day  Melatonin 5 mg oral tablet: 1 tab(s) orally once a day (at bedtime)  NexIUM 40 mg oral delayed release capsule: 1 cap(s) orally once a day (in the evening)  oxyCODONE 5 mg oral tablet: 1 tab(s) orally every 6 hours MDD: 20  Pepcid 20 mg oral tablet: 1 tab(s) orally once a day  primidone 50 mg oral tablet: 1 tab(s) orally once a day  probiotic:   Prolia 60 mg/mL subcutaneous solution: 1 dose(s) subcutaneous every 6 months  rosuvastatin 20 mg oral tablet: 1 tab(s) orally once a day (at bedtime)  Synthroid 88 mcg (0.088 mg) oral tablet: 1 tab(s) orally once a day  Vitamin B12: 1000mcg with folic acid  Vitamin D3 125 mcg/mL (5000 intl units/mL) oral liquid: 2 milliliter(s) orally once a day

## 2024-10-20 NOTE — ED PROVIDER NOTE - NSICDXPASTMEDICALHX_GEN_ALL_CORE_FT
You can access the FollowMyHealth Patient Portal offered by [facility] by registering at the following website: [email or web address]. By joining [employer]’s FollowMyHealth portal, you will also be able to view your health information using other applications (apps) compatible with our system. PAST MEDICAL HISTORY:  Anxiety     Bilateral dry eyes     Bunion of left foot     Bunion of right foot     CAD (coronary atherosclerotic disease)     Colonic dysmotility     Colonic inertia     COVID-19 vaccine series completed     DDD (degenerative disc disease), cervical     DDD (degenerative disc disease), lumbar     Dense breast tissue     Dry mouth     Familial tremor     GERD (gastroesophageal reflux disease)     H/O electrolyte imbalance secondary to dehydration    H/O small bowel obstruction 2017 required surgery, multiple episodes since then, April 2022    Hammer toe of left foot     Hammer toe of right foot     History of connective tissue disease diagnosed 2002    History of CREST syndrome     History of dehydration secondary to ileostomy. was hospitalized mid June for hydration    History of hypotension baseline 100/60    History of scleroderma     History of short term memory loss     History of subdural hematoma small, November 2021 after a fall, last visit to neurosurgeon with MRI of brain revealed resolution    Hyperlipidemia     Hypomagnesemia secondary to dehydration    Hyponatremia secondary to dehydration    Hypothyroid     Ileostomy in place 2017    Insomnia difficulty staying asleep    Mild anemia     Osteoporosis     Raynauds syndrome     Sciatica left    Scoliosis     Sjogren's syndrome     Spondylolisthesis     Stented coronary artery     Vasovagal syncope

## 2024-10-20 NOTE — ED ADULT TRIAGE NOTE - CHIEF COMPLAINT QUOTE
Pt recent fall, right sided rib fractures, d/c'd today, states was called to come back for worsening pneumothorax on xray taken today. Pt endorses sob, pain w/ breathing.

## 2024-10-20 NOTE — ED PROVIDER NOTE - CARE PLAN
1 Principal Discharge DX:	Pneumothorax, unspecified   Principal Discharge DX:	Traumatic fracture of ribs of right side with pneumothorax

## 2024-10-20 NOTE — ED PROVIDER NOTE - CLINICAL SUMMARY MEDICAL DECISION MAKING FREE TEXT BOX
73-year-old female with shortness of breath, there is a history of a pneumothorax yesterday and the status of a fall with rib fractures, discharged today.  With pain control, pain was not controlled having shortness of breath and is not performing incentive spirometer at home adequately.  Patient without fevers no chills no nausea no vomiting.  Patient took some pain medications earlier today at least 3 hours ago.    Normal S1-S2, she is in no apparent distress, not using accessory muscles.  Not tachypneic.  Speaking full sentences.  Patient with bilaterally intact breath sounds grossly.  Patient without any tracheal deviation.  Patient with soft abdomen.    Plan for basic labs, twelve-lead, possible chest tube. trauma will be made aware.

## 2024-10-20 NOTE — H&P ADULT - NSHPLABSRESULTS_GEN_ALL_CORE
CXR 10/20/2024 @ 0916  FINDINGS:  The cardiomediastinal silhouette is normal limits.  No focal consolidation. No sizable pleural effusion.  Increasing upper right chest pneumothorax - it was 5% on 10/19/24 exam -   now it's about 10% by volume.  The right sixth through eighth rib fractures better appreciated on chest   CT scan.    IMPRESSION:  Increasing size of upper right chest pneumothorax since AM x-ray.

## 2024-10-20 NOTE — DISCHARGE NOTE PROVIDER - CARE PROVIDERS DIRECT ADDRESSES
[email or web address]

## 2024-10-20 NOTE — DISCHARGE NOTE PROVIDER - HOSPITAL COURSE
Ms. Panchal is a 73 y F hypotension, HLD, hypothyroid, CAD s/p stent/bowel resection s/p ileostomy, presenting following ground level fall last night. Patient reports trying to get into the bedroom, tripped over box and hit her right side against wood ledge, Patient did strike her head, unknown LOC. Patient found to have acute fractures of the R 6-8th ribs, a right transverse proicess fx of T6-T8 and a small right pneumothorax, patient able to pull 750 on IS upon admission.     Patient admitted for respiratory watch and pain control. During admission patient pain well controlled with multimodal pain regimen. At the time of discharge, the patient was hemodynamically stable, voiding urine and passing stool. Comfortable with adequate pain control.  The patient was instructed to follow up with Dr. Esparza within 1-2 weeks after discharge from the hospital.  The patient felt comfortable with discharge.  The patient had no other issues.    During trauma workup incidental finding of pulmonary nodules was found on CT chest. This finding was disclosed to patient and . Patient was provided with copy or report and plans to follow up with PCP and pulmonologist.

## 2024-10-20 NOTE — H&P ADULT - ASSESSMENT
73 y F hypotension, HLD, hypothyroid, CAD s/p stent/bowel resection s/p ileostomy, presenting following ground level fall last night. Patient reports trying to get into the bedroom, tripped over box and hit her right side against wood ledge with fractures of the right 6-8th ribs. Patient returned to ED for CXR w/ interval increase in R sided pneumothorax. Repeat imaging on arrival to ED unchanged from this AM, and patient satting >96% on RA.     CT HEAD:  No acute intracranial hemorrhage, mass effect, or midline shift.    CT CERVICAL SPINE:  No acute fracture or traumatic subluxation.    Injuries:  Acute fractures of the right sixth through eighth ribs  right transverse processes of T6-T8  Small right pneumothorax.    Plan  Admit to Surgery under Dr. Antonio  f/u repeat chest xray in AM, if expanding ptx will need pigtail   LRD  Multimodal pain control   Med rec completed     Discussed with Dr. Rey    [facility]   [id number]

## 2024-10-20 NOTE — DISCHARGE NOTE PROVIDER - NSDCFUSCHEDAPPT_GEN_ALL_CORE_FT
[facility]  CARDIOLOGY [address] [facility]   Scheduled Appointment: 10/23/2024    Silvestre Rosen  [facility]  CARDIOLOGY [address] [facility]   Scheduled Appointment: 10/23/2024    Lee Coley  [facility]  INTMED [address]  Scheduled Appointment: 11/20/2024

## 2024-10-20 NOTE — PROGRESS NOTE ADULT - ASSESSMENT
73 y F hypotension, HLD, hypothyroid, CAD s/p stent/bowel resection s/p ileostomy, presenting following ground level fall last night. Patient reports trying to get into the bedroom, tripped over box and hit her right side against wood ledge, Patient did strike her head, unknown LOC. Patient currently saturating well on RA with pain under control, patient evaluated by PT who recommends outpatient PT. Patient aware of plans for discharge and agreeable with plan.     Plan:  - Multimodal pain control   - Encourage IS  - OOB ambulating as tolerated   - Discharge with outpatient PT     Elmer Oakes, PGY1  General Surgery  Trauma/ACS [id number]

## 2024-10-20 NOTE — ED PROVIDER NOTE - ATTENDING CONTRIBUTION TO CARE
------------ATTENDING NOTE------------  pt w/  sent to hospital by Trauma for concerns of enlarged R traumatic pneumothorax, pt c/o continued moderate L sided sharp chest pain, HD stable on arrival, ABCs intact, Trauma called on arrival, awaiting labs/imaging and Trauma consult -->  - Leland Aldana MD   ------------------------------------------------------

## 2024-10-20 NOTE — DISCHARGE NOTE NURSING/CASE MANAGEMENT/SOCIAL WORK - NSDCVIVACCINE_GEN_ALL_CORE_FT
Tdap; 04-Aug-2019 14:09; Rakha, Rozina (RN); [employer]; [id number] (Exp. Date: 06-Aug-2021); IntraMuscular; Deltoid Left.; 0.5 milliLiter(s); VIS (VIS Published: 09-May-2013, VIS Presented: 04-Aug-2019);   Tdap; 04-Nov-2021 21:39; Karie Woody (NATALYA); [employer]; [id number] (Exp. Date: 29-Jul-2023); IntraMuscular; Deltoid Left.; 0.5 milliLiter(s); VIS (VIS Published: 09-May-2013, VIS Presented: 04-Nov-2021);

## 2024-10-20 NOTE — DISCHARGE NOTE NURSING/CASE MANAGEMENT/SOCIAL WORK - PATIENT PORTAL LINK FT
You can access the FollowMyHealth Patient Portal offered by [facility] by registering at the following website: [email or web address]. By joining [employer]’s FollowMyHealth portal, you will also be able to view your health information using other applications (apps) compatible with our system.

## 2024-10-20 NOTE — ED ADULT NURSE REASSESSMENT NOTE - NS ED NURSE REASSESS COMMENT FT1
Report received from NATALYA Kolb. Pt sitting up in bed, speaking full sentences. Pt placed on , JOSE [facility] tech Rozina made aware.

## 2024-10-20 NOTE — PATIENT PROFILE ADULT - FALL HARM RISK - HARM RISK INTERVENTIONS
Assistance with ambulation/Assistance OOB with selected safe patient handling equipment/Communicate Risk of Fall with Harm to all staff/Discuss with provider need for PT consult/Monitor gait and stability/Reinforce activity limits and safety measures with patient and family/Tailored Fall Risk Interventions/Visual Cue: Yellow wristband and red socks/Bed in lowest position, wheels locked, appropriate side rails in place/Call bell, personal items and telephone in reach/Instruct patient to call for assistance before getting out of bed or chair/Non-slip footwear when patient is out of bed/[place] to call system/Physically safe environment - no spills, clutter or unnecessary equipment/Purposeful Proactive Rounding/Room/bathroom lighting operational, light cord in reach

## 2024-10-21 ENCOUNTER — TRANSCRIPTION ENCOUNTER (OUTPATIENT)
Age: 74
End: 2024-10-21

## 2024-10-21 VITALS
HEART RATE: 69 BPM | SYSTOLIC BLOOD PRESSURE: 95 MMHG | DIASTOLIC BLOOD PRESSURE: 55 MMHG | RESPIRATION RATE: 18 BRPM | OXYGEN SATURATION: 99 % | TEMPERATURE: 98 F

## 2024-10-21 DIAGNOSIS — E87.1 HYPO-OSMOLALITY AND HYPONATREMIA: ICD-10-CM

## 2024-10-21 DIAGNOSIS — S22.41XA MULTIPLE FRACTURES OF RIBS, RIGHT SIDE, INITIAL ENCOUNTER FOR CLOSED FRACTURE: ICD-10-CM

## 2024-10-21 DIAGNOSIS — R71.0 PRECIPITOUS DROP IN HEMATOCRIT: ICD-10-CM

## 2024-10-21 DIAGNOSIS — S27.0XXS TRAUMATIC PNEUMOTHORAX, SEQUELA: ICD-10-CM

## 2024-10-21 LAB
ANION GAP SERPL CALC-SCNC: 12 MMOL/L — SIGNIFICANT CHANGE UP (ref 5–17)
BUN SERPL-MCNC: 22 MG/DL — SIGNIFICANT CHANGE UP (ref 7–23)
CALCIUM SERPL-MCNC: 8.2 MG/DL — LOW (ref 8.4–10.5)
CHLORIDE SERPL-SCNC: 95 MMOL/L — LOW (ref 96–108)
CO2 SERPL-SCNC: 24 MMOL/L — SIGNIFICANT CHANGE UP (ref 22–31)
CREAT SERPL-MCNC: 0.87 MG/DL — SIGNIFICANT CHANGE UP (ref 0.5–1.3)
EGFR: 69 ML/MIN/1.73M2 — SIGNIFICANT CHANGE UP
GLUCOSE SERPL-MCNC: 89 MG/DL — SIGNIFICANT CHANGE UP (ref 70–99)
HCT VFR BLD CALC: 29.7 % — LOW (ref 34.5–45)
HCT VFR BLD CALC: 34.2 % — LOW (ref 34.5–45)
HGB BLD-MCNC: 10 G/DL — LOW (ref 11.5–15.5)
HGB BLD-MCNC: 11.1 G/DL — LOW (ref 11.5–15.5)
MAGNESIUM SERPL-MCNC: 1.5 MG/DL — LOW (ref 1.6–2.6)
MCHC RBC-ENTMCNC: 28.9 PG — SIGNIFICANT CHANGE UP (ref 27–34)
MCHC RBC-ENTMCNC: 29.7 PG — SIGNIFICANT CHANGE UP (ref 27–34)
MCHC RBC-ENTMCNC: 32.5 GM/DL — SIGNIFICANT CHANGE UP (ref 32–36)
MCHC RBC-ENTMCNC: 33.7 GM/DL — SIGNIFICANT CHANGE UP (ref 32–36)
MCV RBC AUTO: 88.1 FL — SIGNIFICANT CHANGE UP (ref 80–100)
MCV RBC AUTO: 89.1 FL — SIGNIFICANT CHANGE UP (ref 80–100)
NRBC # BLD: 0 /100 WBCS — SIGNIFICANT CHANGE UP (ref 0–0)
NRBC # BLD: 0 /100 WBCS — SIGNIFICANT CHANGE UP (ref 0–0)
OSMOLALITY UR: 275 MOS/KG — LOW (ref 300–900)
PHOSPHATE SERPL-MCNC: 2.2 MG/DL — LOW (ref 2.5–4.5)
PLATELET # BLD AUTO: 132 K/UL — LOW (ref 150–400)
PLATELET # BLD AUTO: 138 K/UL — LOW (ref 150–400)
POTASSIUM SERPL-MCNC: 3.6 MMOL/L — SIGNIFICANT CHANGE UP (ref 3.5–5.3)
POTASSIUM SERPL-SCNC: 3.6 MMOL/L — SIGNIFICANT CHANGE UP (ref 3.5–5.3)
RBC # BLD: 3.37 M/UL — LOW (ref 3.8–5.2)
RBC # BLD: 3.84 M/UL — SIGNIFICANT CHANGE UP (ref 3.8–5.2)
RBC # FLD: 15.4 % — HIGH (ref 10.3–14.5)
RBC # FLD: 15.7 % — HIGH (ref 10.3–14.5)
SODIUM SERPL-SCNC: 131 MMOL/L — LOW (ref 135–145)
SODIUM UR-SCNC: 11 MMOL/L — SIGNIFICANT CHANGE UP
TSH SERPL-MCNC: 4 UIU/ML — SIGNIFICANT CHANGE UP (ref 0.27–4.2)
WBC # BLD: 8.73 K/UL — SIGNIFICANT CHANGE UP (ref 3.8–10.5)
WBC # BLD: 8.94 K/UL — SIGNIFICANT CHANGE UP (ref 3.8–10.5)
WBC # FLD AUTO: 8.73 K/UL — SIGNIFICANT CHANGE UP (ref 3.8–10.5)
WBC # FLD AUTO: 8.94 K/UL — SIGNIFICANT CHANGE UP (ref 3.8–10.5)

## 2024-10-21 PROCEDURE — 99223 1ST HOSP IP/OBS HIGH 75: CPT

## 2024-10-21 PROCEDURE — 83735 ASSAY OF MAGNESIUM: CPT

## 2024-10-21 PROCEDURE — 80048 BASIC METABOLIC PNL TOTAL CA: CPT

## 2024-10-21 PROCEDURE — 71046 X-RAY EXAM CHEST 2 VIEWS: CPT

## 2024-10-21 PROCEDURE — 85610 PROTHROMBIN TIME: CPT

## 2024-10-21 PROCEDURE — 85025 COMPLETE CBC W/AUTO DIFF WBC: CPT

## 2024-10-21 PROCEDURE — 85730 THROMBOPLASTIN TIME PARTIAL: CPT

## 2024-10-21 PROCEDURE — 99285 EMERGENCY DEPT VISIT HI MDM: CPT

## 2024-10-21 PROCEDURE — 99233 SBSQ HOSP IP/OBS HIGH 50: CPT | Mod: FS

## 2024-10-21 PROCEDURE — 86901 BLOOD TYPING SEROLOGIC RH(D): CPT

## 2024-10-21 PROCEDURE — 83935 ASSAY OF URINE OSMOLALITY: CPT

## 2024-10-21 PROCEDURE — 93005 ELECTROCARDIOGRAM TRACING: CPT

## 2024-10-21 PROCEDURE — 84443 ASSAY THYROID STIM HORMONE: CPT

## 2024-10-21 PROCEDURE — 36415 COLL VENOUS BLD VENIPUNCTURE: CPT

## 2024-10-21 PROCEDURE — 85027 COMPLETE CBC AUTOMATED: CPT

## 2024-10-21 PROCEDURE — 84100 ASSAY OF PHOSPHORUS: CPT

## 2024-10-21 PROCEDURE — 80053 COMPREHEN METABOLIC PANEL: CPT

## 2024-10-21 PROCEDURE — 84300 ASSAY OF URINE SODIUM: CPT

## 2024-10-21 PROCEDURE — 71045 X-RAY EXAM CHEST 1 VIEW: CPT | Mod: 26

## 2024-10-21 PROCEDURE — 71045 X-RAY EXAM CHEST 1 VIEW: CPT

## 2024-10-21 PROCEDURE — 86900 BLOOD TYPING SEROLOGIC ABO: CPT

## 2024-10-21 PROCEDURE — 86850 RBC ANTIBODY SCREEN: CPT

## 2024-10-21 RX ORDER — SODIUM PHOSPHATE, MONOBASIC, MONOHYDRATE AND SODIUM PHOSPHATE, DIBASIC ANHYDROUS 276; 142 MG/ML; MG/ML
30 INJECTION, SOLUTION INTRAVENOUS ONCE
Refills: 0 | Status: COMPLETED | OUTPATIENT
Start: 2024-10-21 | End: 2024-10-21

## 2024-10-21 RX ORDER — TRAMADOL HYDROCHLORIDE 50 MG/1
1 TABLET, COATED ORAL
Qty: 12 | Refills: 0
Start: 2024-10-21 | End: 2024-10-24

## 2024-10-21 RX ORDER — MAGNESIUM SULFATE IN 0.9% NACL 2 G/50 ML
2 INTRAVENOUS SOLUTION, PIGGYBACK (ML) INTRAVENOUS ONCE
Refills: 0 | Status: COMPLETED | OUTPATIENT
Start: 2024-10-21 | End: 2024-10-21

## 2024-10-21 RX ORDER — TRAMADOL HYDROCHLORIDE 50 MG/1
1 TABLET, COATED ORAL
Qty: 12 | Refills: 0
Start: 2024-10-21 | End: 2024-10-23

## 2024-10-21 RX ORDER — ACETAMINOPHEN 500 MG
2 TABLET ORAL
Qty: 0 | Refills: 0 | DISCHARGE
Start: 2024-10-21

## 2024-10-21 RX ADMIN — PANTOPRAZOLE SODIUM 40 MILLIGRAM(S): 40 TABLET, DELAYED RELEASE ORAL at 05:36

## 2024-10-21 RX ADMIN — Medication 650 MILLIGRAM(S): at 01:17

## 2024-10-21 RX ADMIN — SODIUM PHOSPHATE, MONOBASIC, MONOHYDRATE AND SODIUM PHOSPHATE, DIBASIC ANHYDROUS 85 MILLIMOLE(S): 276; 142 INJECTION, SOLUTION INTRAVENOUS at 14:06

## 2024-10-21 RX ADMIN — Medication 40 MILLIGRAM(S): at 05:36

## 2024-10-21 RX ADMIN — TRAMADOL HYDROCHLORIDE 25 MILLIGRAM(S): 50 TABLET, COATED ORAL at 06:27

## 2024-10-21 RX ADMIN — TRAMADOL HYDROCHLORIDE 25 MILLIGRAM(S): 50 TABLET, COATED ORAL at 19:23

## 2024-10-21 RX ADMIN — Medication 650 MILLIGRAM(S): at 06:27

## 2024-10-21 RX ADMIN — Medication 650 MILLIGRAM(S): at 07:25

## 2024-10-21 RX ADMIN — TRAMADOL HYDROCHLORIDE 25 MILLIGRAM(S): 50 TABLET, COATED ORAL at 01:17

## 2024-10-21 RX ADMIN — FAMOTIDINE 20 MILLIGRAM(S): 10 INJECTION INTRAVENOUS at 11:39

## 2024-10-21 RX ADMIN — TRAMADOL HYDROCHLORIDE 25 MILLIGRAM(S): 50 TABLET, COATED ORAL at 00:17

## 2024-10-21 RX ADMIN — Medication 25 GRAM(S): at 12:44

## 2024-10-21 RX ADMIN — Medication 81 MILLIGRAM(S): at 11:39

## 2024-10-21 RX ADMIN — TRAMADOL HYDROCHLORIDE 25 MILLIGRAM(S): 50 TABLET, COATED ORAL at 07:25

## 2024-10-21 RX ADMIN — Medication 650 MILLIGRAM(S): at 00:08

## 2024-10-21 RX ADMIN — DIPHENOXYLATE HYDROCHLORIDE AND ATROPINE SULFATE 1 TABLET(S): 2.5; .025 TABLET ORAL at 05:36

## 2024-10-21 RX ADMIN — PRIMIDONE 50 MILLIGRAM(S): 50 TABLET ORAL at 11:39

## 2024-10-21 RX ADMIN — DIPHENOXYLATE HYDROCHLORIDE AND ATROPINE SULFATE 1 TABLET(S): 2.5; .025 TABLET ORAL at 14:08

## 2024-10-21 RX ADMIN — Medication 88 MICROGRAM(S): at 05:35

## 2024-10-21 NOTE — DISCHARGE NOTE PROVIDER - NSDCFUSCHEDAPPT_GEN_ALL_CORE_FT
[facility]  CARDIOLOGY [address] [facility]   Scheduled Appointment: 10/23/2024    Silvestre Rosen  [facility]  CARDIOLOGY [address] [facility]   Scheduled Appointment: 10/23/2024    Lee Coley  [facility]  INTMED [address]  Scheduled Appointment: 11/20/2024

## 2024-10-21 NOTE — DISCHARGE NOTE NURSING/CASE MANAGEMENT/SOCIAL WORK - NSDCVIVACCINE_GEN_ALL_CORE_FT
Tdap; 04-Aug-2019 14:09; Rakha, Rozina (RN); [employer]; [id number] (Exp. Date: 06-Aug-2021); IntraMuscular; Deltoid Left.; 0.5 milliLiter(s); VIS (VIS Published: 09-May-2013, VIS Presented: 04-Aug-2019);   Tdap; 04-Nov-2021 21:39; Karie Woody (NATALYA); [employer]; [id number] (Exp. Date: 29-Jul-2023); IntraMuscular; Deltoid Left.; 0.5 milliLiter(s); VIS (VIS Published: 09-May-2013, VIS Presented: 04-Nov-2021);

## 2024-10-21 NOTE — CONSULT NOTE ADULT - PROBLEM SELECTOR RECOMMENDATION 9
- from mechanical fall  - fractures of the right 6-8th ribs  - Patient returned to ED for CXR w/ interval increase in R sided pneumothorax  - Repeat imaging on arrival to ED unchanged from this AM, and patient satting >96% on RA.   - drop in hb noted from 13 to 10   - no signs/symptoms of bleeding  - ?delayed hemothorax  - would consider CT chest to further evaluate  - c/w IS  - c/w multimodal pain control - from mechanical fall  - fractures of the right 6-8th ribs  - Patient returned to ED for CXR w/ interval increase in R sided pneumothorax  - Repeat imaging on arrival to ED unchanged from this AM, and patient satting >96% on RA.   - drop in hb noted from 13 to 10 ; repeat CBC stat to confirm  - no signs/symptoms of bleeding  -  if Hb drop confirmed would consider CT chest to further evaluate  - c/w IS  - c/w multimodal pain control

## 2024-10-21 NOTE — DISCHARGE NOTE NURSING/CASE MANAGEMENT/SOCIAL WORK - FINANCIAL ASSISTANCE
[facility] provides services at a reduced cost to those who are determined to be eligible through [facility]’s financial assistance program. Information regarding [facility]’s financial assistance program can be found by going to [email or web address] or by calling [phone].

## 2024-10-21 NOTE — CONSULT NOTE ADULT - PROBLEM SELECTOR RECOMMENDATION 3
- Na 131 ; was 138 on presentation on 10/19  - h/o chronic hyponatremia  - follows with Dr Paniagua [facility]  - fluid restriction  - pt does not tolerate salt tabs  - may have component of SIADH iso pain  - c/t monitor  - can check serum /urine osm, urine Na - Na 131 ; was 138 on presentation on 10/19  - h/o chronic hyponatremia  - follows with Dr Paniagua [facility]  - recommend fluid restriction  - pt does not tolerate salt tabs  - may have component of SIADH iso pain  - c/t monitor  - can check serum /urine osm, urine Na  - replete Mg, Phos

## 2024-10-21 NOTE — DISCHARGE NOTE NURSING/CASE MANAGEMENT/SOCIAL WORK - PATIENT PORTAL LINK FT
You can access the FollowMyHealth Patient Portal offered by [facility] by registering at the following website: [email or web address]. By joining [facility]’s FollowMyHealth portal, you will also be able to view your health information using other applications (apps) compatible with our system.

## 2024-10-21 NOTE — CONSULT NOTE ADULT - SUBJECTIVE AND OBJECTIVE BOX
TRAUMA/ACUTE CARE SURGERY - HOSPITAL MEDICINE CO-MANAGEMENT INITIAL VISIT NOTE    CHIEF COMPLAINT: Patient is a 73y old  Female who presents with a chief complaint of Pneumothorax (21 Oct 2024 10:30)      HPI: 73F with h/o hypotension, HLD, hypothyroid, CAD s/p stent/bowel resection s/p ileostomy, presenting following ground level fall 2 days ago. Patient reported trying to get into the bedroom, tripped over box and hit her right side against wood ledge, Patient did strike her head, unknown LOC. CXR showed r 6-8 rib fractures and a right apical pneumothorax. She was admitted 10/19 and discharged 10/20 as pneumothorax was deemed stable on repeat imaging. Patient ntcryyly17/20 evening after she was called back to the ED for worsening pneumothorax seen on final read of CXR.   Patient denies worsening SOB, or lightheadedness. Endorses pain in the right posterior chest and back.     Allergies    latex (Unknown)  No Known Drug Allergies    Intolerances    adhesives (Rash)      HOME MEDICATIONS: [ x] Reviewed  Home Medications:  acetaminophen 325 mg oral tablet: 2 tab(s) orally every 6 hours As needed Mild Pain (1 - 3) (21 Oct 2024 10:51)  aspirin 81 mg oral delayed release tablet: 1 tab(s) orally once a day (06 Aug 2024 00:17)  Citracal Petites 200 mg-6.25 mcg (250 intl units) oral tablet: 2 tab(s) orally 2 times a day (06 Aug 2024 00:17)  CoQ10 100 mg oral capsule: 2 cap(s) orally once a day (06 Aug 2024 00:17)  ferrous sulfate 325 mg (65 mg elemental iron) oral tablet: 1 tab(s) orally Monday, Wednesday, and Friday (08 Aug 2024 12:01)  Fish Oil 1000 mg oral capsule: 1 cap(s) orally 2 times a day (06 Aug 2024 00:14)  folic acid 1 mg oral tablet: 1 tab(s) orally once a day (08 Aug 2024 12:01)  gas-x:  (06 Aug 2024 00:16)  iron: 30 mg orally 2 times a day (06 Aug 2024 00:17)  Melatonin 5 mg oral tablet: 1 tab(s) orally once a day (at bedtime) (06 Aug 2024 00:17)  NexIUM 40 mg oral delayed release capsule: 1 cap(s) orally once a day (in the evening) (06 Aug 2024 00:17)  Pepcid 20 mg oral tablet: 1 tab(s) orally once a day (06 Aug 2024 00:17)  primidone 50 mg oral tablet: 1 tab(s) orally once a day (06 Aug 2024 00:17)  probiotic:  (06 Aug 2024 00:15)  Prolia 60 mg/mL subcutaneous solution: 1 dose(s) subcutaneous every 6 months (06 Aug 2024 00:17)  rosuvastatin 20 mg oral tablet: 1 tab(s) orally once a day (at bedtime) (06 Aug 2024 00:17)  Synthroid 88 mcg (0.088 mg) oral tablet: 1 tab(s) orally once a day (06 Aug 2024 00:17)  Vitamin B12: 1000mcg with folic acid (06 Aug 2024 00:17)  Vitamin D3 125 mcg/mL (5000 intl units/mL) oral liquid: 2 milliliter(s) orally once a day (06 Aug 2024 00:17)      MEDICATIONS  (STANDING):  aspirin enteric coated 81 milliGRAM(s) Oral daily  diphenoxylate/atropine 1 Tablet(s) Oral every 8 hours  enoxaparin Injectable 40 milliGRAM(s) SubCutaneous every 24 hours  famotidine    Tablet 20 milliGRAM(s) Oral daily  levothyroxine 88 MICROGram(s) Oral daily  lidocaine 1%/epinephrine 1:100,000 Inj 20 milliLiter(s) Local Injection Once  pantoprazole    Tablet 40 milliGRAM(s) Oral before breakfast  primidone 50 milliGRAM(s) Oral daily  rosuvastatin 20 milliGRAM(s) Oral at bedtime    MEDICATIONS  (PRN):  acetaminophen     Tablet .. 650 milliGRAM(s) Oral every 6 hours PRN Mild Pain (1 - 3)  melatonin 3 milliGRAM(s) Oral at bedtime PRN Insomnia  simethicone 80 milliGRAM(s) Chew every 6 hours PRN Gas  traMADol 25 milliGRAM(s) Oral every 6 hours PRN Moderate Pain (4 - 6)      PAST MEDICAL & SURGICAL HISTORY:  Sjogren's syndrome      Ileostomy in place  2017      Colonic dysmotility      GERD (gastroesophageal reflux disease)      Anxiety      Sciatica  left      CAD (coronary atherosclerotic disease)      Stented coronary artery      History of dehydration  secondary to ileostomy. was hospitalized mid June for hydration      H/O small bowel obstruction  2017 required surgery, multiple episodes since then, April 2022      Hypothyroid      H/O electrolyte imbalance  secondary to dehydration      History of connective tissue disease  diagnosed 2002      Hyperlipidemia      Mild anemia      Osteoporosis      Insomnia  difficulty staying asleep      Vasovagal syncope      History of hypotension  baseline 100/60      Scoliosis      History of CREST syndrome      History of scleroderma      Bilateral dry eyes      Dry mouth      Hyponatremia  secondary to dehydration      Hypomagnesemia  secondary to dehydration      COVID-19 vaccine series completed      History of short term memory loss      Familial tremor      Raynauds syndrome      Bunion of left foot      Bunion of right foot      Hammer toe of right foot      Hammer toe of left foot      Spondylolisthesis      Colonic inertia      DDD (degenerative disc disease), cervical      DDD (degenerative disc disease), lumbar      Dense breast tissue      History of subdural hematoma  small, November 2021 after a fall, last visit to neurosurgeon with MRI of brain revealed resolution      History of hysterectomy  2011 for bladder prolapse, bladder lift done also      History of appendectomy  as teen      H/O ileostomy  2017      S/P primary angioplasty with coronary stent  2019, 1 stent      History of lumbar laminectomy  October 2021, with removal of synovial cyst      Rectal prolapse  repair 2016      History of tonsillectomy and adenoidectomy  as child      History of bowel resection      [ ] Reviewed     Functional Assessment: [ ] Independent  [ x] Assistance  [ ] Total care  [ ] Non-ambulatory    SOCIAL HISTORY:  Residence: [ ] [facility]  [ ] SNF  [ x] Community  [ ] Substance abuse: no  [ ] Tobacco: no  [ ] Alcohol use: no    FAMILY HISTORY:  Family history of uterine cancer (Mother)    FH: myocardial infarction (Father)    FH: CHF (congestive heart failure) (Mother)    Family history of breast cancer (Mother)    Family history of stroke (Father)    Family history of type 2 diabetes mellitus (Father)    Family history of ulcerative colitis (Child)    Family history of scoliosis (Child)    REVIEW OF SYSTEMS:    CONSTITUTIONAL: No fever, weight loss, or fatigue  RESPIRATORY: No cough, wheezing, chills or hemoptysis; No shortness of breath  CARDIOVASCULAR: No chest pain, palpitations, dizziness, or leg swelling  GASTROINTESTINAL: No abdominal or epigastric pain. No nausea, vomiting  GENITOURINARY: No dysuria, frequency, hematuria, or incontinence  NEUROLOGICAL: No headaches, memory loss, loss of strength, numbness, or tremors  SKIN: No itching, burning, rashes, or lesions   MUSCULOSKELETAL: +pain in right posterior chest and back.   PSYCHIATRIC: No depression, anxiety, mood swings, or difficulty sleeping  HEME/LYMPH: No easy bruising, or bleeding gums    [ x ] All other ROS negative        PHYSICAL EXAM:    Vital Signs Last 24 Hrs  T(C): 36.9 (21 Oct 2024 12:21), Max: 37.2 (21 Oct 2024 08:03)  T(F): 98.5 (21 Oct 2024 12:21), Max: 98.9 (21 Oct 2024 08:03)  HR: 78 (21 Oct 2024 12:25) (66 - 90)  BP: 107/63 (21 Oct 2024 12:25) (92/57 - 142/47)  BP(mean): 69 (20 Oct 2024 20:11) (65 - 71)  RR: 18 (21 Oct 2024 12:25) (17 - 21)  SpO2: 98% (21 Oct 2024 12:25) (95% - 100%)    Parameters below as of 21 Oct 2024 12:25  Patient On (Oxygen Delivery Method): nasal cannula  O2 Flow (L/min): 2      GENERAL: NAD, frail  HEAD:  Atraumatic, Normocephalic  EYES:  conjunctiva and sclera clear  ENMT: Moist mucous membranes  NECK: Supple, No JVD  RESPIRATORY: Clear to auscultation bilaterally; No rales, rhonchi, wheezing, or rubs  CARDIOVASCULAR: Regular rate and rhythm; No murmurs, rubs, or gallops  GASTROINTESTINAL: Soft, Nontender, Nondistended; Bowel sounds present; + ostomy with loose dark green stool  EXTREMITIES:  2+ Peripheral Pulses, No clubbing, cyanosis, or edema  NERVOUS SYSTEM:  Alert & Oriented X3; Moving all 4 extremities; No gross sensory deficits  SKIN: No rashes or lesions    LABS:                        10.0   8.94  )-----------( 132      ( 21 Oct 2024 10:53 )             29.7     Hemoglobin: 10.0 g/dL (10-21 @ 10:53)  Hemoglobin: 12.7 g/dL (10-20 @ 20:28)  Hemoglobin: 13.4 g/dL (10-19 @ 07:50)    10-21    131[L]  |  95[L]  |  22  ----------------------------<  89  3.6   |  24  |  0.87    Ca    8.2[L]      21 Oct 2024 10:53  Phos  2.2     10-21  Mg     1.5     10-21    TPro  7.8  /  Alb  4.3  /  TBili  0.4  /  DBili  x   /  AST  41[H]  /  ALT  23  /  AlkPhos  84  10-20    PT/INR - ( 20 Oct 2024 20:28 )   PT: 14.1 sec;   INR: 1.24 ratio         PTT - ( 20 Oct 2024 20:28 )  PTT:40.5 sec  Urinalysis Basic - ( 21 Oct 2024 10:53 )    Color: x / Appearance: x / SG: x / pH: x  Gluc: 89 mg/dL / Ketone: x  / Bili: x / Urobili: x   Blood: x / Protein: x / Nitrite: x   Leuk Esterase: x / RBC: x / WBC x   Sq Epi: x / Non Sq Epi: x / Bacteria: x      CAPILLARY BLOOD GLUCOSE            RADIOLOGY & ADDITIONAL STUDIES:    Imaging:   Personally Reviewed:  [ x] YES   CHEST XRAY 10/20  Unchanged small to moderate right apical pneumothorax.  Small right pleural effusion                [x] Care Discussed with Consultants/Other Providers: Trauma Team      [X ] Increased delirium risk    [ X] Delirium and other risks can be reduced by:          -early ambulation          -minimizing "tethers" - IV, oxygen, catheters, etc          -avoiding hypnotics and sedatives          -maintaining hydration/nutrition          -avoid anticholinergics - diphenhydramine, etc          -pain control          -supportive environment

## 2024-10-21 NOTE — PROGRESS NOTE ADULT - ASSESSMENT
73 y F hypotension, HLD, hypothyroid, CAD s/p stent/bowel resection s/p ileostomy, presenting following ground level fall last night. Patient reports trying to get into the bedroom, tripped over box and hit her right side against wood ledge with fractures of the right 6-8th ribs. Patient returned to ED for CXR w/ interval increase in R sided pneumothorax. Repeat imaging on arrival to ED unchanged from this AM, and patient satting >96% on RA.     CT HEAD:  No acute intracranial hemorrhage, mass effect, or midline shift.    CT CERVICAL SPINE:  No acute fracture or traumatic subluxation.    Injuries:  Acute fractures of the right sixth through eighth ribs  right transverse processes of T6-T8  Small right pneumothorax.    Plan  f/u repeat chest xray in AM, if expanding ptx will need pigtail   LRD  Multimodal pain control   Med rec completed       Trauma Surgery  [phone]

## 2024-10-21 NOTE — DISCHARGE NOTE PROVIDER - NSDCCPCAREPLAN_GEN_ALL_CORE_FT
PRINCIPAL DISCHARGE DIAGNOSIS  Diagnosis: Traumatic fracture of ribs of right side with pneumothorax  Assessment and Plan of Treatment: You had no surgical intervention during this admission.   Please call to make an appointment to follow up with Dr. Myrick on November 4, 2024.  Please have chest xray PA and lateral performed before your November 4th follow up.  Rib fractures take time to heal on their own in 1-3 months. The longer healing period is often associated with a continued cough or other aggravating activities.  You may take over the counter Tylenol for mild to moderate pain.  Please be sure not exceed 4000mg of acetaminophen (Tylenol) in a 24 hour period. You may take narcotic pain medication for breakthrough (severe) pain not relieved with medications. Topical Salanpas lidocaine patch 1% can be applied to chest wall for topical pain relief. You may purchase a lidocaine patch at your local pharmacy over the counter. If you utilize narcotics, please take over the counter Miralax, Senna, or Colace to prevent constipation.  Use your incentive spirometer 10 times every hour for deep breathing exercises and to keep your lungs healthy.  If you experience fever > 101, pain not controlled with oxycodone, persistent nausea/vomiting, please call your surgeon or return to emergency room immediately.  Please follow up with your primary care doctor within 1-2 weeks of discharge from the hospital.

## 2024-10-21 NOTE — CONSULT NOTE ADULT - ASSESSMENT
73 y F hypotension, HLD, hypothyroid, CAD s/p stent/bowel resection s/p ileostomy, presenting following ground level fall last night. Patient reports trying to get into the bedroom, tripped over box and hit her right side against wood ledge with fractures of the right 6-8th ribs. Patient returned to ED for CXR w/ interval increase in R sided pneumothorax. Repeat imaging on arrival to ED unchanged from this AM, and patient satting >96% on RA.

## 2024-10-21 NOTE — PATIENT PROFILE ADULT - FALL HARM RISK - DATE OF FALL

## 2024-10-21 NOTE — CONSULT NOTE ADULT - PROBLEM SELECTOR PROBLEM 2
PA NOTE:  POD#1 rpt csection   HIV + undetectable viral load last test 8/4/17   on HIV meds regimen  delayed PPH noted in PACU pt now on methergine x24hrs  s/p SD cytotec 1000mcg  -rpt cbc in am  -pt made aware of cardio-sx of acute blood loss anemia  -at this time pt wants conservative management of the asymptomatic anemia  -pain meds prn  -dvt ppx  -ambulation Drop in hemoglobin PA NOTE:  POD#1 rpt csection   HIV + undetectable viral load last test 8/4/17   on HIV meds regimen  delayed PPH noted in PACU pt now on methergine x24hrs  s/p IN cytotec 1000mcg  -rpt cbc in am  -pt made aware of cardio-sx of acute blood loss anemia  -at this time pt wants conservative management of the asymptomatic anemia  -pain meds prn  -dvt ppx  -ambulation  -anticipate dc date pod#3 8/18/17

## 2024-10-21 NOTE — DISCHARGE NOTE PROVIDER - NSDCMRMEDTOKEN_GEN_ALL_CORE_FT
acetaminophen 325 mg oral tablet: 2 tab(s) orally every 6 hours As needed Mild Pain (1 - 3)  aspirin 81 mg oral delayed release tablet: 1 tab(s) orally once a day  Citracal Petites 200 mg-6.25 mcg (250 intl units) oral tablet: 2 tab(s) orally 2 times a day  CoQ10 100 mg oral capsule: 2 cap(s) orally once a day  diphenoxylate-atropine 2.5 mg-0.025 mg oral tablet: 1 tab(s) orally every 8 hours MDD: 3 tablets  ferrous sulfate 325 mg (65 mg elemental iron) oral tablet: 1 tab(s) orally Monday, Wednesday, and Friday  Fish Oil 1000 mg oral capsule: 1 cap(s) orally 2 times a day  folic acid 1 mg oral tablet: 1 tab(s) orally once a day  gas-x:   iron: 30 mg orally 2 times a day  Melatonin 5 mg oral tablet: 1 tab(s) orally once a day (at bedtime)  NexIUM 40 mg oral delayed release capsule: 1 cap(s) orally once a day (in the evening)  oxyCODONE 5 mg oral tablet: 1 tab(s) orally every 6 hours MDD: 20  Pepcid 20 mg oral tablet: 1 tab(s) orally once a day  Physical Therapy: Outpatient physical therapy 2x per week for 4 weeks  primidone 50 mg oral tablet: 1 tab(s) orally once a day  probiotic:   Prolia 60 mg/mL subcutaneous solution: 1 dose(s) subcutaneous every 6 months  rosuvastatin 20 mg oral tablet: 1 tab(s) orally once a day (at bedtime)  Synthroid 88 mcg (0.088 mg) oral tablet: 1 tab(s) orally once a day  Vitamin B12: 1000mcg with folic acid  Vitamin D3 125 mcg/mL (5000 intl units/mL) oral liquid: 2 milliliter(s) orally once a day

## 2024-10-21 NOTE — PROGRESS NOTE ADULT - SUBJECTIVE AND OBJECTIVE BOX
SURGERY DAILY PROGRESS NOTE:     Overnight Events:  No acute events overnight.    SUBJECTIVE: Patient seen and evaluated on AM rounds. Pt is resting comfortably in bed.      OBJECTIVE:  Vital Signs Last 24 Hrs  T(C): 36.8 (21 Oct 2024 04:45), Max: 36.8 (20 Oct 2024 21:15)  T(F): 98.3 (21 Oct 2024 04:45), Max: 98.3 (20 Oct 2024 21:15)  HR: 73 (21 Oct 2024 04:45) (66 - 90)  BP: 107/63 (21 Oct 2024 04:45) (107/63 - 142/47)  BP(mean): 69 (20 Oct 2024 20:11) (65 - 71)  RR: 18 (21 Oct 2024 04:45) (17 - 21)  SpO2: 100% (21 Oct 2024 04:45) (95% - 100%)    Parameters below as of 21 Oct 2024 04:45  Patient On (Oxygen Delivery Method): nasal cannula  O2 Flow (L/min): 2    I&O's Detail    20 Oct 2024 07:01  -  21 Oct 2024 07:00  --------------------------------------------------------  IN:    Oral Fluid: 300 mL  Total IN: 300 mL    OUT:    Ileostomy (mL): 250 mL  Total OUT: 250 mL    Total NET: 50 mL        Daily Height in cm: 144.78 (20 Oct 2024 18:27)    Daily     LABS:                        12.7   10.35 )-----------( 154      ( 20 Oct 2024 20:28 )             38.9     10-20    131[L]  |  91[L]  |  30[H]  ----------------------------<  121[H]  3.9   |  29  |  1.16    Ca    9.5      20 Oct 2024 20:28  Mg     1.7     10-20    TPro  7.8  /  Alb  4.3  /  TBili  0.4  /  DBili  x   /  AST  41[H]  /  ALT  23  /  AlkPhos  84  10-20    PT/INR - ( 20 Oct 2024 20:28 )   PT: 14.1 sec;   INR: 1.24 ratio         PTT - ( 20 Oct 2024 20:28 )  PTT:40.5 sec  Urinalysis Basic - ( 20 Oct 2024 20:28 )    Color: x / Appearance: x / SG: x / pH: x  Gluc: 121 mg/dL / Ketone: x  / Bili: x / Urobili: x   Blood: x / Protein: x / Nitrite: x   Leuk Esterase: x / RBC: x / WBC x   Sq Epi: x / Non Sq Epi: x / Bacteria: x          PHYSICAL EXAM:  CONSTITUTIONAL: Well groomed, no apparent distress  EYES: PERRLA and symmetric, EOMI, No conjunctival or scleral injection, non-icteric  ENMT: Oral mucosa with dry membranes. Normal dentition  NECK: Symmetric and without tracheal deviation   RESP: No respiratory distress, no use of accessory muscles, comfortable on RA  CV: Regular rate, normotensive  MSK: TTP over R post chest  NEURO: CN II-XII intact on gross examination  PSYCH: Appropriate insight/judgment; A+O x 3, mood and affect appropriate, recent/remote memory intact

## 2024-10-21 NOTE — CONSULT NOTE ADULT - PROBLEM SELECTOR RECOMMENDATION 2
- drop in hb noted from 13 to 10   -no signs/symptoms of bleeding  - ?delayed hemothorax  - would consider CT chest to further evaluate - drop in hb noted from 13 to 10 ; repeat CBC stat to confirm  -no signs/symptoms of bleeding  - ?delayed hemothorax  - if Hb drop confirmed would consider CT chest to further evaluate

## 2024-10-21 NOTE — PATIENT PROFILE ADULT - LEGAL HELP
[facility]                                      Department of Human Services                                   Certificate of Child Health Examination       Student's Name  Bela Harvey Birth Date  2/10/2024  Sex  Female Race/Ethnicity   School/Grade Level/ID#     [place]  [address] [place] [address] Parent/Guardian      Telephone# - Home   Telephone# - Work                              IMMUNIZATIONS:  To be completed by health care provider.  The mo/da/yr for every dose administered is required.  If a specific vaccine is medically contraindicated, a separate written statement must be attached by the health care provider responsible for completing the health examination explaining the medical reason for the contradiction.   VACCINE/DOSE DATE DATE DATE DATE   Diphtheria, Tetanus and Pertussis (DTP or DTap) 4/15/2022 6/27/2022 9/26/2022 6/21/2024   Tdap       Td       Pediatric DT       Inactivate Polio (IPV) 4/15/2022 6/27/2022 9/26/2022    Oral Polio (OPV)       Haemophilus Influenza Type B (Hib) 4/15/2022 6/27/2022 6/28/2023    Hepatitis B (HB) 2/11/2022 4/15/2022 6/27/2022 9/26/2022   Varicella (Chickenpox) 6/28/2023      Combined Measles, Mumps and Rubella (MMR) 3/24/2023      Measles (Rubeola)       Rubella (3-day measles)       Mumps       Pneumococcal 4/15/2022 6/27/2022 9/26/2022 3/24/2023   Meningococcal Conjugate          RECOMMENDED, BUT NOT REQUIRED  Vaccine/Dose        VACCINE/DOSE DATE DATE   Hepatitis A 3/24/2023 6/21/2024   HPV     Influenza 11/9/2022    Men B     Covid 11/9/2022       Other:  Specify Immunization/Adminstered Dates:   Health care provider (MD, DO, APN, PA , school health professional) verifying above immunization history must sign below.  Signature         Title                           Date  6/21/2024   Signature                                                                                                                                               Title                           Date    (If adding dates to the above immunization history section, put your initials by date(s) and sign here.)   ALTERNATIVE PROOF OF IMMUNITY   1.Clinical diagnosis (measles, mumps, hepatits B) is allowed when verified by physician & supported with lab confirmation. Attach copy of lab result.       *MEASLES (Rubeola)  MO/DA/YR        * MUMPS MO/DA/YR       HEPATITIS B   MO/DA/YR        VARICELLA MO/DA/YR           2.  History of varicella (chickenpox) disease is acceptable if verified by health care provider, school health professional, or health official.       Person signing below is verifying  parent/guardian’s description of varicella disease is indicative of past infection and is accepting such hx as documentation of disease.       Date of Disease                                  Signature                                                                         Title                           Date             3.  Lab Evidence of Immunity (check one)    __Measles*       __Mumps *       __Rubella        __Varicella      __Hepatitis B       *Measles diagnosed on/after 7/1/2002 AND mumps diagnosed on/after 7/1/2013 must be confirmed by laboratory evidence   Completion of Alternatives 1 or 3 MUST be accompanied by Labs & Physician Signature:  Physician Statements of Immunity MUST be submitted to [facility] for review.   Certificates of Yarsanism Exemption to Immunizations or Physician Medical Statements of Medical Contraindication are Reviewed and Maintained by the School Authority.           Student's Name  Bela Harvey Birth Date  2/10/2024  Sex  Female School   Grade Level/ID#     HEALTH HISTORY          TO BE COMPLETED AND SIGNED BY PARENT/GUARDIAN AND VERIFIED BY HEALTH CARE PROVIDER    ALLERGIES  (Food, drug, insect, other)  Patient has no known allergies. MEDICATION  (List all prescribed or taken on a regular basis.)      Diagnosis of asthma?  Child wakes during the night coughing   Yes   No    Yes   No    Loss of function of one of paired organs? (eye/ear/kidney/testicle)   Yes   No      Birth Defects?  Developmental delay?   Yes   No    Yes   No  Hospitalizations?  When?  What for?   Yes   No    Blood disorders?  Hemophilia, Sickle Cell, Other?  Explain.   Yes   No  Surgery?  (List all.)  When?  What for?   Yes   No    Diabetes?   Yes   No  Serious injury or illness?   Yes   No    Head Injury/Concussion/Passed out?   Yes   No  TB skin text positive (past/present)?   Yes   No *If yes, refer to local    Seizures?  What are they like?   Yes   No  TB disease (past or present)?   Yes   No *health department   Heart problem/Shortness of breath?   Yes   No  Tobacco use (type, frequency)?   Yes   No    Heart murmur/High blood pressure?   Yes   No  Alcohol/Drug use?   Yes   No    Dizziness or chest pain with exercise?   Yes   No  Fam hx sudden death < age 50 (Cause?)    Yes   No    Eye/Vision problems?  Yes  No   Glasses  Yes   No  Contacts  Yes    No   Last eye exam___  Other concerns? (crossed eye, drooping lids, squinting, difficulty reading) Dental:  ____Braces    ____Bridge    ____Plate    ____Other  Other concerns?     Ear/Hearing problems?   Yes   No  Information may be shared with appropriate personnel for health /educational purposes.   Bone/Joint problem/injury/scoliosis?   Yes   No  Parent/Guardian Signature                                          Date     PHYSICAL EXAMINATION REQUIREMENTS    Entire section below to be completed by MD//APN/PA       PHYSICAL EXAMINATION REQUIREMENTS (head circumference if <2-3 years old):   Ht 36\"   Wt 11.7 kg (25 lb 13 oz)   HC 47.5 cm   BMI 14.00 kg/m²     DIABETES SCREENING  BMI>85% age/sex  No And any two of the following:  Family History No    Ethnic Minority  Yes          Signs of Insulin Resistance (hypertension, dyslipidemia, polycystic ovarian syndrome, acanthosis nigricans)    No            At Risk  No   Lead Risk Questionnaire  Req'd for children 6 months thru 6 yrs enrolled in licensed or public school operated day care, ,  nursery school and/or  (blood test req’d if resides in [facility] or high risk zip)   Questionnaire Administered:Yes   Blood Test Indicated:yes   Blood Test Date                 Result:                 TB Skin OR Blood Test   Rec.only for children in high-risk groups incl. children immunosuppressed due to HIV infection or other conditions, frequent travel to or born in high prevalence countries or those exposed to adults in high-risk categories.  See CDCguidelines.  http://www.cdc.gov/tb/publications/factsheets/testing/TB_testing.htm.      No Test Needed        Skin Test:     Date Read                  /      /              Result:                     mm    ______________                         Blood Test:   Date Reported          /      /              Result:                  Value ______________               LAB TESTS (Recommended) Date Results  Date Results   Hemoglobin or Hematocrit   Sickle Cell  (when indicated)     Urinalysis   Developmental Screening Tool     SYSTEM REVIEW Normal Comments/Follow-up/Needs  Normal Comments/Follow-up/Needs   Skin Yes  Endocrine Yes    Ears Yes                      Screen result: Gastrointestinal Yes    Eyes Yes     Screen result:   Genito-Urinary Yes  LMP   Nose Yes  Neurological Yes    Throat Yes  Musculoskeletal Yes    Mouth/Dental Yes  Spinal examination Yes    Cardiovascular/HTN Yes  Nutritional status Yes    Respiratory Yes                   Diagnosis of Asthma: No Mental Health Yes        Currently Prescribed Asthma Medication:            Quick-relief  medication (e.g. Short Acting Beta Antagonist): No          Controller medication (e.g. inhaled corticosteroid):   No Other   NEEDS/MODIFICATIONS required in the school setting  None DIETARY Needs/Restrictions     None   SPECIAL INSTRUCTIONS/DEVICES e.g. safety  glasses, glass eye, chest protector for arrhythmia, pacemaker, prosthetic device, dental bridge, false teeth, athleticsupport/cup     None   MENTAL HEALTH/OTHER   Is there anything else the school should know about this student?  No  If you would like to discuss this student's health with school or school health professional, check title:  __Nurse  __Teacher  __Counselor  __Principal   EMERGENCY ACTION  needed while at school due to child's health condition (e.g., seizures, asthma, insect sting, food, peanut allergy, bleeding problem, diabetes, heart problem)?  No  If yes, please describe.     On the basis of the examination on this day, I approve this child's participation in        (If No or Modified, please attach explanation.)  PHYSICAL EDUCATION    Yes      [employer]OR[employer]   N/A   Physician/Advanced Practice Nurse/Physician Assistant performing examination  Print Name  SALVADOR JONES                                            Signature         Date  6/21/2024   Address/Phone  [facility], [address] [address]  [phone]   Rev 11/15                                                                    Printed by the Authority of the [facility]         no

## 2024-10-21 NOTE — PATIENT PROFILE ADULT - FUNCTIONAL ASSESSMENT - DAILY ACTIVITY 4.
Medication(s) Requested:    Disp Refills Start End    zolpidem (AMBIEN) 10 MG tablet 45 tablet 2 12/11/2022     Sig - Route: Take 1 to 1 and HALF tablets by mouth at bedtime.  Must last 30 days. - Oral    Sent to pharmacy as: Zolpidem Tartrate 10 MG Oral Tablet (AMBIEN)    Class: Eprescribe    Notes to Pharmacy: Must last 30 days.        Last appointment: 11/29/2022  Advised follow up: 4 months  Appointment: 04/11/2023  Last refill: 02/12/2022 per PDMP  Is the patient due for refill of this medication(s): Yes, with fill date 03/14/2023  PDMP review: Criteria met. Forwarded to Physician/FREDDIE for signature.   [facility]       4 = No assist / stand by assistance

## 2024-10-21 NOTE — CONSULT NOTE ADULT - NSCONSULTADDITIONALINFOA_GEN_ALL_CORE
time spent reviewing prior charts, meds, discussing plan with patient and  = 75 minutes    d/w Trauma team

## 2024-10-21 NOTE — DISCHARGE NOTE PROVIDER - CARE PROVIDER_API CALL
Arslan Myrick  Surgery  [address] [place] [address], [facility] [address]  [place] [address]  Phone: [phone]  Fax: [phone]  Scheduled Appointment: 11/04/2024

## 2024-10-21 NOTE — DISCHARGE NOTE PROVIDER - NSDCFUADDAPPT_GEN_ALL_CORE_FT
Please follow up with your primary care provider in 1-2 weeks after discharge.    Please have chest xray PA and lateral performed before appointment to follow up with Dr. Myrick on November 4, 2024.

## 2024-10-21 NOTE — DISCHARGE NOTE PROVIDER - HOSPITAL COURSE
HPI: 72 yo F hypotension, HLD, hypothyroid, CAD s/p stent/bowel resection s/p ileostomy, presenting following ground level fall last night. Patient reports trying to get into the bedroom, tripped over box and hit her right side against wood ledge, Patient did strike her head, unknown LOC. Patient returned this evening for worsening pneumothorax seen on CXR. Patient denies worsening SOB, or lightheadedness. Endorses pain in the right posterior chest and back.  (20 Oct 2024 20:19)    Pt was admitted under [facility] for further evaluation and management. Repeat chest xray was performed with no changes.  Patient to have chest xray PA and lateral performed before appointment to follow up with Dr. Myrick on November 4, 2024.    On the day of discharge, the patient's vitals are stable, pain is controlled, voiding urine, tolerating an oral diet, and ambulating well. Pt will f/u with Dr. Myrick on November 4.

## 2024-10-22 RX ORDER — TRAMADOL HYDROCHLORIDE 50 MG/1
0.5 TABLET, COATED ORAL
Qty: 12 | Refills: 0
Start: 2024-10-22

## 2024-10-23 ENCOUNTER — APPOINTMENT (OUTPATIENT)
Dept: CARDIOLOGY | Facility: CLINIC | Age: 74
End: 2024-10-23
Payer: MEDICARE

## 2024-10-23 VITALS
HEART RATE: 66 BPM | BODY MASS INDEX: 18.55 KG/M2 | DIASTOLIC BLOOD PRESSURE: 58 MMHG | OXYGEN SATURATION: 98 % | SYSTOLIC BLOOD PRESSURE: 120 MMHG | WEIGHT: 86 LBS | HEIGHT: 57 IN

## 2024-10-23 DIAGNOSIS — R79.89 OTHER SPECIFIED ABNORMAL FINDINGS OF BLOOD CHEMISTRY: ICD-10-CM

## 2024-10-23 DIAGNOSIS — Z00.00 ENCOUNTER FOR GENERAL ADULT MEDICAL EXAMINATION W/OUT ABNORMAL FINDINGS: ICD-10-CM

## 2024-10-23 PROCEDURE — 99214 OFFICE O/P EST MOD 30 MIN: CPT

## 2024-10-23 PROCEDURE — G2211 COMPLEX E/M VISIT ADD ON: CPT

## 2024-10-23 PROCEDURE — 93306 TTE W/DOPPLER COMPLETE: CPT

## 2024-10-24 DIAGNOSIS — S22.41XD MULTIPLE FRACTURES OF RIBS, RIGHT SIDE, SUBSEQUENT ENCOUNTER FOR FRACTURE WITH ROUTINE HEALING: ICD-10-CM

## 2024-10-24 RX ORDER — TRAMADOL HYDROCHLORIDE 50 MG/1
50 TABLET, COATED ORAL 4 TIMES DAILY
Qty: 20 | Refills: 0 | Status: ACTIVE | COMMUNITY
Start: 2024-10-24 | End: 1900-01-01

## 2024-10-26 ENCOUNTER — LABORATORY RESULT (OUTPATIENT)
Age: 74
End: 2024-10-26

## 2024-10-29 ENCOUNTER — APPOINTMENT (OUTPATIENT)
Dept: INTERNAL MEDICINE | Facility: CLINIC | Age: 74
End: 2024-10-29
Payer: MEDICARE

## 2024-10-29 DIAGNOSIS — S22.49XA MULTIPLE FRACTURES OF RIBS, UNSPECIFIED SIDE, INITIAL ENCOUNTER FOR CLOSED FRACTURE: ICD-10-CM

## 2024-10-29 DIAGNOSIS — J93.83 OTHER PNEUMOTHORAX: ICD-10-CM

## 2024-10-29 DIAGNOSIS — E78.5 HYPERLIPIDEMIA, UNSPECIFIED: ICD-10-CM

## 2024-10-29 DIAGNOSIS — E03.9 HYPOTHYROIDISM, UNSPECIFIED: ICD-10-CM

## 2024-10-29 PROCEDURE — 99495 TRANSJ CARE MGMT MOD F2F 14D: CPT

## 2024-10-30 PROBLEM — J93.83: Status: ACTIVE | Noted: 2024-10-30

## 2024-10-30 PROBLEM — S22.49XA RIB FRACTURES: Status: ACTIVE | Noted: 2024-10-30

## 2024-10-31 ENCOUNTER — APPOINTMENT (OUTPATIENT)
Dept: RADIOLOGY | Facility: CLINIC | Age: 74
End: 2024-10-31
Payer: MEDICARE

## 2024-10-31 ENCOUNTER — OUTPATIENT (OUTPATIENT)
Dept: OUTPATIENT SERVICES | Facility: HOSPITAL | Age: 74
LOS: 1 days | End: 2024-10-31
Payer: MEDICARE

## 2024-10-31 DIAGNOSIS — Z95.5 PRESENCE OF CORONARY ANGIOPLASTY IMPLANT AND GRAFT: Chronic | ICD-10-CM

## 2024-10-31 DIAGNOSIS — Z90.49 ACQUIRED ABSENCE OF OTHER SPECIFIED PARTS OF DIGESTIVE TRACT: Chronic | ICD-10-CM

## 2024-10-31 DIAGNOSIS — Z98.890 OTHER SPECIFIED POSTPROCEDURAL STATES: Chronic | ICD-10-CM

## 2024-10-31 DIAGNOSIS — Z90.89 ACQUIRED ABSENCE OF OTHER ORGANS: Chronic | ICD-10-CM

## 2024-10-31 DIAGNOSIS — Z90.710 ACQUIRED ABSENCE OF BOTH CERVIX AND UTERUS: Chronic | ICD-10-CM

## 2024-10-31 DIAGNOSIS — S27.0XXS TRAUMATIC PNEUMOTHORAX, SEQUELA: ICD-10-CM

## 2024-10-31 DIAGNOSIS — K62.3 RECTAL PROLAPSE: Chronic | ICD-10-CM

## 2024-10-31 PROCEDURE — 71046 X-RAY EXAM CHEST 2 VIEWS: CPT | Mod: 26

## 2024-11-01 ENCOUNTER — NON-APPOINTMENT (OUTPATIENT)
Age: 74
End: 2024-11-01

## 2024-11-01 ENCOUNTER — INPATIENT (INPATIENT)
Facility: HOSPITAL | Age: 74
LOS: 5 days | Discharge: HOME HEALTH | DRG: 872 | End: 2024-11-07
Attending: HOSPITALIST | Admitting: HOSPITALIST
Payer: MEDICARE

## 2024-11-01 VITALS
OXYGEN SATURATION: 95 % | HEIGHT: 57 IN | WEIGHT: 85.98 LBS | DIASTOLIC BLOOD PRESSURE: 48 MMHG | SYSTOLIC BLOOD PRESSURE: 96 MMHG | TEMPERATURE: 100 F | HEART RATE: 104 BPM | RESPIRATION RATE: 20 BRPM

## 2024-11-01 DIAGNOSIS — Z90.49 ACQUIRED ABSENCE OF OTHER SPECIFIED PARTS OF DIGESTIVE TRACT: Chronic | ICD-10-CM

## 2024-11-01 DIAGNOSIS — Z90.89 ACQUIRED ABSENCE OF OTHER ORGANS: Chronic | ICD-10-CM

## 2024-11-01 DIAGNOSIS — Z98.890 OTHER SPECIFIED POSTPROCEDURAL STATES: Chronic | ICD-10-CM

## 2024-11-01 DIAGNOSIS — Z95.5 PRESENCE OF CORONARY ANGIOPLASTY IMPLANT AND GRAFT: Chronic | ICD-10-CM

## 2024-11-01 DIAGNOSIS — K62.3 RECTAL PROLAPSE: Chronic | ICD-10-CM

## 2024-11-01 DIAGNOSIS — Z90.710 ACQUIRED ABSENCE OF BOTH CERVIX AND UTERUS: Chronic | ICD-10-CM

## 2024-11-01 LAB
ALBUMIN SERPL ELPH-MCNC: 3.5 G/DL — SIGNIFICANT CHANGE UP (ref 3.3–5)
ALP SERPL-CCNC: 107 U/L — SIGNIFICANT CHANGE UP (ref 40–120)
ALT FLD-CCNC: 17 U/L — SIGNIFICANT CHANGE UP (ref 10–45)
ANION GAP SERPL CALC-SCNC: 14 MMOL/L — SIGNIFICANT CHANGE UP (ref 5–17)
ANION GAP SERPL CALC-SCNC: 15 MMOL/L — SIGNIFICANT CHANGE UP (ref 5–17)
APPEARANCE UR: ABNORMAL
APTT BLD: 37.6 SEC — HIGH (ref 24.5–35.6)
AST SERPL-CCNC: 21 U/L — SIGNIFICANT CHANGE UP (ref 10–40)
BACTERIA # UR AUTO: NEGATIVE /HPF — SIGNIFICANT CHANGE UP
BASOPHILS # BLD AUTO: 0 K/UL — SIGNIFICANT CHANGE UP (ref 0–0.2)
BASOPHILS NFR BLD AUTO: 0 % — SIGNIFICANT CHANGE UP (ref 0–2)
BILIRUB SERPL-MCNC: 0.8 MG/DL — SIGNIFICANT CHANGE UP (ref 0.2–1.2)
BILIRUB UR-MCNC: NEGATIVE — SIGNIFICANT CHANGE UP
BUN SERPL-MCNC: 26 MG/DL — HIGH (ref 7–23)
BUN SERPL-MCNC: 31 MG/DL — HIGH (ref 7–23)
CALCIUM SERPL-MCNC: 8 MG/DL — LOW (ref 8.4–10.5)
CALCIUM SERPL-MCNC: 9 MG/DL — SIGNIFICANT CHANGE UP (ref 8.4–10.5)
CAST: 0 /LPF — SIGNIFICANT CHANGE UP (ref 0–4)
CHLORIDE SERPL-SCNC: 84 MMOL/L — LOW (ref 96–108)
CHLORIDE SERPL-SCNC: 89 MMOL/L — LOW (ref 96–108)
CO2 SERPL-SCNC: 25 MMOL/L — SIGNIFICANT CHANGE UP (ref 22–31)
CO2 SERPL-SCNC: 31 MMOL/L — SIGNIFICANT CHANGE UP (ref 22–31)
COLOR SPEC: YELLOW — SIGNIFICANT CHANGE UP
CREAT SERPL-MCNC: 1 MG/DL — SIGNIFICANT CHANGE UP (ref 0.5–1.3)
CREAT SERPL-MCNC: 1.28 MG/DL — SIGNIFICANT CHANGE UP (ref 0.5–1.3)
DIFF PNL FLD: NEGATIVE — SIGNIFICANT CHANGE UP
EGFR: 44 ML/MIN/1.73M2 — LOW
EGFR: 59 ML/MIN/1.73M2 — LOW
EOSINOPHIL # BLD AUTO: 0 K/UL — SIGNIFICANT CHANGE UP (ref 0–0.5)
EOSINOPHIL NFR BLD AUTO: 0 % — SIGNIFICANT CHANGE UP (ref 0–6)
FLUAV AG NPH QL: SIGNIFICANT CHANGE UP
FLUBV AG NPH QL: SIGNIFICANT CHANGE UP
GAS PNL BLDV: SIGNIFICANT CHANGE UP
GIANT PLATELETS BLD QL SMEAR: PRESENT — SIGNIFICANT CHANGE UP
GLUCOSE SERPL-MCNC: 104 MG/DL — HIGH (ref 70–99)
GLUCOSE SERPL-MCNC: 87 MG/DL — SIGNIFICANT CHANGE UP (ref 70–99)
GLUCOSE UR QL: NEGATIVE MG/DL — SIGNIFICANT CHANGE UP
HCT VFR BLD CALC: 33.1 % — LOW (ref 34.5–45)
HGB BLD-MCNC: 10.9 G/DL — LOW (ref 11.5–15.5)
INR BLD: 2.47 RATIO — HIGH (ref 0.85–1.16)
KETONES UR-MCNC: NEGATIVE MG/DL — SIGNIFICANT CHANGE UP
LEUKOCYTE ESTERASE UR-ACNC: NEGATIVE — SIGNIFICANT CHANGE UP
LYMPHOCYTES # BLD AUTO: 0.68 K/UL — LOW (ref 1–3.3)
LYMPHOCYTES # BLD AUTO: 10.7 % — LOW (ref 13–44)
MANUAL SMEAR VERIFICATION: SIGNIFICANT CHANGE UP
MCHC RBC-ENTMCNC: 28.5 PG — SIGNIFICANT CHANGE UP (ref 27–34)
MCHC RBC-ENTMCNC: 32.9 G/DL — SIGNIFICANT CHANGE UP (ref 32–36)
MCV RBC AUTO: 86.6 FL — SIGNIFICANT CHANGE UP (ref 80–100)
METAMYELOCYTES # FLD: 13.4 % — HIGH (ref 0–0)
MONOCYTES # BLD AUTO: 0.23 K/UL — SIGNIFICANT CHANGE UP (ref 0–0.9)
MONOCYTES NFR BLD AUTO: 3.6 % — SIGNIFICANT CHANGE UP (ref 2–14)
MYELOCYTES NFR BLD: 0.9 % — HIGH (ref 0–0)
NEUTROPHILS # BLD AUTO: 4.56 K/UL — SIGNIFICANT CHANGE UP (ref 1.8–7.4)
NEUTROPHILS NFR BLD AUTO: 45.5 % — SIGNIFICANT CHANGE UP (ref 43–77)
NEUTS BAND # BLD: 25.9 % — HIGH (ref 0–8)
NITRITE UR-MCNC: NEGATIVE — SIGNIFICANT CHANGE UP
PH UR: 6.5 — SIGNIFICANT CHANGE UP (ref 5–8)
PLAT MORPH BLD: NORMAL — SIGNIFICANT CHANGE UP
PLATELET # BLD AUTO: 251 K/UL — SIGNIFICANT CHANGE UP (ref 150–400)
POTASSIUM SERPL-MCNC: 2.9 MMOL/L — CRITICAL LOW (ref 3.5–5.3)
POTASSIUM SERPL-MCNC: 3.3 MMOL/L — LOW (ref 3.5–5.3)
POTASSIUM SERPL-SCNC: 2.9 MMOL/L — CRITICAL LOW (ref 3.5–5.3)
POTASSIUM SERPL-SCNC: 3.3 MMOL/L — LOW (ref 3.5–5.3)
PROT SERPL-MCNC: 6.8 G/DL — SIGNIFICANT CHANGE UP (ref 6–8.3)
PROT UR-MCNC: 100 MG/DL
PROTHROM AB SERPL-ACNC: 28.2 SEC — HIGH (ref 9.9–13.4)
RBC # BLD: 3.82 M/UL — SIGNIFICANT CHANGE UP (ref 3.8–5.2)
RBC # FLD: 15 % — HIGH (ref 10.3–14.5)
RBC BLD AUTO: SIGNIFICANT CHANGE UP
RBC CASTS # UR COMP ASSIST: 3 /HPF — SIGNIFICANT CHANGE UP (ref 0–4)
RSV RNA NPH QL NAA+NON-PROBE: SIGNIFICANT CHANGE UP
SARS-COV-2 RNA SPEC QL NAA+PROBE: SIGNIFICANT CHANGE UP
SODIUM SERPL-SCNC: 128 MMOL/L — LOW (ref 135–145)
SODIUM SERPL-SCNC: 130 MMOL/L — LOW (ref 135–145)
SP GR SPEC: 1.02 — SIGNIFICANT CHANGE UP (ref 1–1.03)
SQUAMOUS # UR AUTO: 1 /HPF — SIGNIFICANT CHANGE UP (ref 0–5)
UROBILINOGEN FLD QL: 0.2 MG/DL — SIGNIFICANT CHANGE UP (ref 0.2–1)
WBC # BLD: 6.39 K/UL — SIGNIFICANT CHANGE UP (ref 3.8–10.5)
WBC # FLD AUTO: 6.39 K/UL — SIGNIFICANT CHANGE UP (ref 3.8–10.5)
WBC UR QL: 0 /HPF — SIGNIFICANT CHANGE UP (ref 0–5)

## 2024-11-01 PROCEDURE — 74177 CT ABD & PELVIS W/CONTRAST: CPT | Mod: 26,MC

## 2024-11-01 PROCEDURE — 70450 CT HEAD/BRAIN W/O DYE: CPT | Mod: 26,MC

## 2024-11-01 PROCEDURE — 99285 EMERGENCY DEPT VISIT HI MDM: CPT | Mod: GC

## 2024-11-01 PROCEDURE — 71275 CT ANGIOGRAPHY CHEST: CPT | Mod: 26,MC

## 2024-11-01 PROCEDURE — 71045 X-RAY EXAM CHEST 1 VIEW: CPT | Mod: 26

## 2024-11-01 RX ORDER — ACETAMINOPHEN 500 MG
575 TABLET ORAL ONCE
Refills: 0 | Status: COMPLETED | OUTPATIENT
Start: 2024-11-01 | End: 2024-11-01

## 2024-11-01 RX ORDER — SODIUM CHLORIDE 9 MG/ML
1200 INJECTION, SOLUTION INTRAMUSCULAR; INTRAVENOUS; SUBCUTANEOUS ONCE
Refills: 0 | Status: COMPLETED | OUTPATIENT
Start: 2024-11-01 | End: 2024-11-01

## 2024-11-01 RX ORDER — MORPHINE SULFATE 30 MG/1
4 TABLET, EXTENDED RELEASE ORAL ONCE
Refills: 0 | Status: DISCONTINUED | OUTPATIENT
Start: 2024-11-01 | End: 2024-11-01

## 2024-11-01 RX ORDER — SODIUM CHLORIDE 9 MG/ML
300 INJECTION, SOLUTION INTRAMUSCULAR; INTRAVENOUS; SUBCUTANEOUS ONCE
Refills: 0 | Status: COMPLETED | OUTPATIENT
Start: 2024-11-01 | End: 2024-11-01

## 2024-11-01 RX ORDER — POTASSIUM CHLORIDE 10 MEQ
40 TABLET, EXTENDED RELEASE ORAL ONCE
Refills: 0 | Status: COMPLETED | OUTPATIENT
Start: 2024-11-01 | End: 2024-11-01

## 2024-11-01 RX ORDER — VANCOMYCIN HYDROCHLORIDE 50 MG/ML
1000 KIT ORAL ONCE
Refills: 0 | Status: COMPLETED | OUTPATIENT
Start: 2024-11-01 | End: 2024-11-01

## 2024-11-01 RX ORDER — POTASSIUM CHLORIDE 10 MEQ
10 TABLET, EXTENDED RELEASE ORAL
Refills: 0 | Status: COMPLETED | OUTPATIENT
Start: 2024-11-01 | End: 2024-11-01

## 2024-11-01 RX ORDER — ACYCLOVIR 400 MG
390 TABLET ORAL EVERY 8 HOURS
Refills: 0 | Status: DISCONTINUED | OUTPATIENT
Start: 2024-11-01 | End: 2024-11-01

## 2024-11-01 RX ORDER — PIPERACILLIN AND TAZOBACTAM .5; 4 G/20ML; G/20ML
3.38 INJECTION, POWDER, LYOPHILIZED, FOR SOLUTION INTRAVENOUS ONCE
Refills: 0 | Status: COMPLETED | OUTPATIENT
Start: 2024-11-01 | End: 2024-11-01

## 2024-11-01 RX ORDER — OXYCODONE HYDROCHLORIDE 30 MG/1
5 TABLET ORAL ONCE
Refills: 0 | Status: DISCONTINUED | OUTPATIENT
Start: 2024-11-01 | End: 2024-11-01

## 2024-11-01 RX ADMIN — OXYCODONE HYDROCHLORIDE 5 MILLIGRAM(S): 30 TABLET ORAL at 19:52

## 2024-11-01 RX ADMIN — Medication 230 MILLIGRAM(S): at 16:54

## 2024-11-01 RX ADMIN — Medication 40 MILLIEQUIVALENT(S): at 18:39

## 2024-11-01 RX ADMIN — SODIUM CHLORIDE 1200 MILLILITER(S): 9 INJECTION, SOLUTION INTRAMUSCULAR; INTRAVENOUS; SUBCUTANEOUS at 16:37

## 2024-11-01 RX ADMIN — Medication 100 MILLIEQUIVALENT(S): at 21:42

## 2024-11-01 RX ADMIN — Medication 100 MILLIEQUIVALENT(S): at 23:32

## 2024-11-01 RX ADMIN — Medication 575 MILLIGRAM(S): at 17:37

## 2024-11-01 RX ADMIN — Medication 100 MILLIEQUIVALENT(S): at 19:51

## 2024-11-01 RX ADMIN — PIPERACILLIN AND TAZOBACTAM 200 GRAM(S): .5; 4 INJECTION, POWDER, LYOPHILIZED, FOR SOLUTION INTRAVENOUS at 16:36

## 2024-11-01 RX ADMIN — Medication 10 MILLIEQUIVALENT(S): at 21:44

## 2024-11-01 RX ADMIN — VANCOMYCIN HYDROCHLORIDE 250 MILLIGRAM(S): KIT at 16:53

## 2024-11-01 NOTE — ED ADULT NURSE NOTE - NSFALLHARMRISKINTERV_ED_ALL_ED

## 2024-11-01 NOTE — ED ADULT TRIAGE NOTE - CHIEF COMPLAINT QUOTE
Recently here for injuries from a fall.    Complaining of '"shaking" and confusion x today.  Hx ileostomy Recently here for injuries from a fall.    Complaining of '"shaking" and confusion x today.   Pt A&Ox2-disoriented to time in triage.   Hx ileostomy

## 2024-11-01 NOTE — ED PROVIDER NOTE - ATTENDING CONTRIBUTION TO CARE
This is a 73-year-old female recently admitted for multiple rib fractures after a fall, ileostomy, memory loss, familial tremor, crest syndrome, CAD.  Now she is coming in because she had shaking chills and confusion.  She has been having a lot of pain to her ribs since she stopped taking Motrin on Wednesday.  No vomiting.  She has had mucus discharge from her anus.  She has had normal output from her ostomy.  Patient is drowsy but arousable moving all 4 extremities tachycardic regular with clear lungs.  Slight diffuse abdominal tenderness with ostomy in place.  Legs are warm and there is no edema.  Patient likely has sepsis.  CBC CMP blood cultures urine culture chest x-ray CT abdomen pelvis with IV contrast.  Vancomycin and Zosyn.  IV fluid.  Plan to admit the patient.  Clinically this does not appear to be meningitis.

## 2024-11-01 NOTE — ED PROVIDER NOTE - PHYSICAL EXAMINATION
General: appears weak, drowsy  Psych: mood appropriate  Head: normocephalic; atraumatic  Eyes: conjunctivae clear bilaterally, sclerae anicteric  ENT: no nasal flaring, patent nares  Cardio: tachycardia with regular rhythm  Resp: clear to auscultation bilaterally  GI: abdomen soft, nondistended; ostomy in place; tenderness to lower abdomen  Neuro: A&OX2 (disoriented to time), moving all extremities, answering questions appropriately  Skin: no rashes or bruising noted  MSK: normal movement of extremities; full ROM of neck  Lymph/Vasc: no LE edema

## 2024-11-01 NOTE — ED PROVIDER NOTE - CLINICAL SUMMARY MEDICAL DECISION MAKING FREE TEXT BOX
FABIAN Dominguez PGY2- patient here with chills and confusion today, appearing weak and drowsy, A&Ox2 but answering questions appropriately. febrile and tachycardic on exam, with lower abd tenderness. concern for sepsis, possible sources including uri, pneumonia, uti, intraabdominal infection. will obtain labs, cxr, ct abd/pelvis, UA, give antipyretics, fluids, abx. plan for admit.

## 2024-11-01 NOTE — ED ADULT NURSE REASSESSMENT NOTE - NS ED NURSE REASSESS COMMENT FT1
contacted JOSE room to inform pt is to be placed on continuous pulse ox, parameters below 90% to make RN aware.

## 2024-11-01 NOTE — ED ADULT NURSE NOTE - ED STAT RN HANDOFF DETAILS
1900 Report given to receiving change of shift CHRISTIAN KENNY patient is in no acute distress. Patient vital signs stable, plan of care explained. in Red

## 2024-11-01 NOTE — ED ADULT NURSE NOTE - CHIEF COMPLAINT QUOTE
Recently here for injuries from a fall.    Complaining of '"shaking" and confusion x today.   Pt A&Ox2-disoriented to time in triage.   Hx ileostomy

## 2024-11-01 NOTE — ED PROVIDER NOTE - PROGRESS NOTE DETAILS
Alma Rosa Robles MD, PGY3:  Patient signed out to me pending imaging.  Patient found to have partial SBO on CT, originally concerned that this is the source of the fever as patient was negative on chest x-ray UA and flu COVID RSV.  Surgery evaluated patient at bedside, surgery attending states that partial SBO is not the cause of fever at this time and does not need any further intervention, patient is having adequate bowel movements with good output into ostomy.  Will send GI PCR and C. difficile.  Will expand RVP.  Given patient's INR of 2.4, this is contraindicated to perform lumbar puncture and patient does not have any headache neck stiffness and altered mental status has improved patient now alert and oriented x 4 and back to baseline after treatment.  However given fever of unknown origin will expand antibiotic coverage to include encephalitis/meningitis.  Has been in patient informed of all findings and plan surgery states patient can be on clear diet.  All questions answered patient ready for admission.

## 2024-11-01 NOTE — ED ADULT NURSE NOTE - OBJECTIVE STATEMENT
74yo F aaox2 disoriented to date, ileostomy, presents to Ed with son , as per pt's son yesterday sudden onset "body shakiness" , and confusion, pt had a mechanical fall 2 weeks ago: few ribs Fx, recent Dc d home,  pt endorses abdominal pain , Pt denies CP, SOB, HA, vision changes, n/v/d, Safety and comfort measures initiated- bed placed in lowest position and side rails raised.

## 2024-11-01 NOTE — ED ADULT NURSE REASSESSMENT NOTE - NS ED NURSE REASSESS COMMENT FT1
Received patient from NATALYA Miller, patient at baseline mental status, able to make needs known, NAD, patient agreeable to plan of care, pending radiology, comfort and safety provided.

## 2024-11-01 NOTE — ED ADULT NURSE REASSESSMENT NOTE - NS ED NURSE REASSESS COMMENT FT1
Medication not available in pyxis. Spoke with pharmacist who states medication will be sent to tube # red . Awaiting medication arrival.

## 2024-11-01 NOTE — ED PROVIDER NOTE - OBJECTIVE STATEMENT
The patient is a 74 y/o F with pmhx of HLD, hypothyroid, CAD s/p stent, crest syndrome, bowel resection s/p ileostomy, presenting with generalized shaking, confusion, and chills. Symptoms began today. Discharged from here approx 10 days ago after admission for rib fractures from a fall. Was taking motrin for rib pain but stopped taking it several days ago. No new fall or injury. Endorsing normal output from the ileostomy, as well as mucous discharge from her rectum which she states she has occasionally. Still tolerating PO intake, no vomiting. No chest pain or shortness of breath. No dysuria. No neck stiffness. No meds taken prior to arrival for pain/fever.

## 2024-11-02 DIAGNOSIS — R50.9 FEVER, UNSPECIFIED: ICD-10-CM

## 2024-11-02 DIAGNOSIS — R25.1 TREMOR, UNSPECIFIED: ICD-10-CM

## 2024-11-02 DIAGNOSIS — R79.1 ABNORMAL COAGULATION PROFILE: ICD-10-CM

## 2024-11-02 DIAGNOSIS — S22.39XA FRACTURE OF ONE RIB, UNSPECIFIED SIDE, INITIAL ENCOUNTER FOR CLOSED FRACTURE: ICD-10-CM

## 2024-11-02 DIAGNOSIS — Z29.9 ENCOUNTER FOR PROPHYLACTIC MEASURES, UNSPECIFIED: ICD-10-CM

## 2024-11-02 DIAGNOSIS — I25.10 ATHEROSCLEROTIC HEART DISEASE OF NATIVE CORONARY ARTERY WITHOUT ANGINA PECTORIS: ICD-10-CM

## 2024-11-02 DIAGNOSIS — M81.0 AGE-RELATED OSTEOPOROSIS WITHOUT CURRENT PATHOLOGICAL FRACTURE: ICD-10-CM

## 2024-11-02 DIAGNOSIS — M34.1 CR(E)ST SYNDROME: ICD-10-CM

## 2024-11-02 DIAGNOSIS — K56.600 PARTIAL INTESTINAL OBSTRUCTION, UNSPECIFIED AS TO CAUSE: ICD-10-CM

## 2024-11-02 DIAGNOSIS — A41.9 SEPSIS, UNSPECIFIED ORGANISM: ICD-10-CM

## 2024-11-02 DIAGNOSIS — N18.30 CHRONIC KIDNEY DISEASE, STAGE 3 UNSPECIFIED: ICD-10-CM

## 2024-11-02 DIAGNOSIS — R91.1 SOLITARY PULMONARY NODULE: ICD-10-CM

## 2024-11-02 DIAGNOSIS — E03.9 HYPOTHYROIDISM, UNSPECIFIED: ICD-10-CM

## 2024-11-02 DIAGNOSIS — E78.5 HYPERLIPIDEMIA, UNSPECIFIED: ICD-10-CM

## 2024-11-02 LAB
ALBUMIN SERPL ELPH-MCNC: 3.1 G/DL — LOW (ref 3.3–5)
ALP SERPL-CCNC: 91 U/L — SIGNIFICANT CHANGE UP (ref 40–120)
ALT FLD-CCNC: 14 U/L — SIGNIFICANT CHANGE UP (ref 10–45)
ANION GAP SERPL CALC-SCNC: 12 MMOL/L — SIGNIFICANT CHANGE UP (ref 5–17)
AST SERPL-CCNC: 20 U/L — SIGNIFICANT CHANGE UP (ref 10–40)
BASOPHILS # BLD AUTO: 0.09 K/UL — SIGNIFICANT CHANGE UP (ref 0–0.2)
BASOPHILS NFR BLD AUTO: 1.3 % — SIGNIFICANT CHANGE UP (ref 0–2)
BILIRUB SERPL-MCNC: 0.6 MG/DL — SIGNIFICANT CHANGE UP (ref 0.2–1.2)
BLD GP AB SCN SERPL QL: NEGATIVE — SIGNIFICANT CHANGE UP
BUN SERPL-MCNC: 24 MG/DL — HIGH (ref 7–23)
C DIFF GDH STL QL: NEGATIVE — SIGNIFICANT CHANGE UP
C DIFF GDH STL QL: SIGNIFICANT CHANGE UP
CALCIUM SERPL-MCNC: 8.5 MG/DL — SIGNIFICANT CHANGE UP (ref 8.4–10.5)
CHLORIDE SERPL-SCNC: 89 MMOL/L — LOW (ref 96–108)
CO2 SERPL-SCNC: 27 MMOL/L — SIGNIFICANT CHANGE UP (ref 22–31)
CREAT SERPL-MCNC: 1.17 MG/DL — SIGNIFICANT CHANGE UP (ref 0.5–1.3)
EGFR: 49 ML/MIN/1.73M2 — LOW
EOSINOPHIL # BLD AUTO: 0.27 K/UL — SIGNIFICANT CHANGE UP (ref 0–0.5)
EOSINOPHIL NFR BLD AUTO: 4 % — SIGNIFICANT CHANGE UP (ref 0–6)
GI PCR PANEL: ABNORMAL
GLUCOSE SERPL-MCNC: 75 MG/DL — SIGNIFICANT CHANGE UP (ref 70–99)
HCT VFR BLD CALC: 30.3 % — LOW (ref 34.5–45)
HGB BLD-MCNC: 10.2 G/DL — LOW (ref 11.5–15.5)
IMM GRANULOCYTES NFR BLD AUTO: 0.7 % — SIGNIFICANT CHANGE UP (ref 0–0.9)
INR BLD: 2.95 RATIO — HIGH (ref 0.85–1.16)
LYMPHOCYTES # BLD AUTO: 0.8 K/UL — LOW (ref 1–3.3)
LYMPHOCYTES # BLD AUTO: 11.8 % — LOW (ref 13–44)
MAGNESIUM SERPL-MCNC: 1.3 MG/DL — LOW (ref 1.6–2.6)
MCHC RBC-ENTMCNC: 29.7 PG — SIGNIFICANT CHANGE UP (ref 27–34)
MCHC RBC-ENTMCNC: 33.7 G/DL — SIGNIFICANT CHANGE UP (ref 32–36)
MCV RBC AUTO: 88.3 FL — SIGNIFICANT CHANGE UP (ref 80–100)
MONOCYTES # BLD AUTO: 0.37 K/UL — SIGNIFICANT CHANGE UP (ref 0–0.9)
MONOCYTES NFR BLD AUTO: 5.4 % — SIGNIFICANT CHANGE UP (ref 2–14)
NEUTROPHILS # BLD AUTO: 5.22 K/UL — SIGNIFICANT CHANGE UP (ref 1.8–7.4)
NEUTROPHILS NFR BLD AUTO: 76.8 % — SIGNIFICANT CHANGE UP (ref 43–77)
NOROVIRUS GI+II RNA STL QL NAA+NON-PROBE: ABNORMAL
NRBC # BLD: 0 /100 WBCS — SIGNIFICANT CHANGE UP (ref 0–0)
PHOSPHATE SERPL-MCNC: 2.2 MG/DL — LOW (ref 2.5–4.5)
PLATELET # BLD AUTO: 219 K/UL — SIGNIFICANT CHANGE UP (ref 150–400)
POTASSIUM SERPL-MCNC: 3.6 MMOL/L — SIGNIFICANT CHANGE UP (ref 3.5–5.3)
POTASSIUM SERPL-SCNC: 3.6 MMOL/L — SIGNIFICANT CHANGE UP (ref 3.5–5.3)
PROT SERPL-MCNC: 6 G/DL — SIGNIFICANT CHANGE UP (ref 6–8.3)
PROTHROM AB SERPL-ACNC: 33.3 SEC — HIGH (ref 9.9–13.4)
RBC # BLD: 3.43 M/UL — LOW (ref 3.8–5.2)
RBC # FLD: 15.2 % — HIGH (ref 10.3–14.5)
RH IG SCN BLD-IMP: POSITIVE — SIGNIFICANT CHANGE UP
SODIUM SERPL-SCNC: 128 MMOL/L — LOW (ref 135–145)
WBC # BLD: 6.8 K/UL — SIGNIFICANT CHANGE UP (ref 3.8–10.5)
WBC # FLD AUTO: 6.8 K/UL — SIGNIFICANT CHANGE UP (ref 3.8–10.5)

## 2024-11-02 PROCEDURE — 99223 1ST HOSP IP/OBS HIGH 75: CPT

## 2024-11-02 PROCEDURE — 99221 1ST HOSP IP/OBS SF/LOW 40: CPT

## 2024-11-02 RX ORDER — ACYCLOVIR 400 MG
390 TABLET ORAL EVERY 8 HOURS
Refills: 0 | Status: DISCONTINUED | OUTPATIENT
Start: 2024-11-02 | End: 2024-11-02

## 2024-11-02 RX ORDER — CHOLECALCIFEROL (VITAMIN D3) 625 MCG
5000 CAPSULE ORAL DAILY
Refills: 0 | Status: DISCONTINUED | OUTPATIENT
Start: 2024-11-02 | End: 2024-11-07

## 2024-11-02 RX ORDER — VANCOMYCIN HYDROCHLORIDE 50 MG/ML
KIT ORAL
Refills: 0 | Status: DISCONTINUED | OUTPATIENT
Start: 2024-11-02 | End: 2024-11-02

## 2024-11-02 RX ORDER — PRIMIDONE 50 MG/1
50 TABLET ORAL DAILY
Refills: 0 | Status: DISCONTINUED | OUTPATIENT
Start: 2024-11-02 | End: 2024-11-07

## 2024-11-02 RX ORDER — KETOROLAC TROMETHAMINE 30 MG/ML
15 INJECTION INTRAMUSCULAR; INTRAVENOUS ONCE
Refills: 0 | Status: DISCONTINUED | OUTPATIENT
Start: 2024-11-02 | End: 2024-11-02

## 2024-11-02 RX ORDER — ASPIRIN/MAG CARB/ALUMINUM AMIN 325 MG
81 TABLET ORAL DAILY
Refills: 0 | Status: DISCONTINUED | OUTPATIENT
Start: 2024-11-02 | End: 2024-11-07

## 2024-11-02 RX ORDER — MAGNESIUM SULFATE IN 0.9% NACL 2 G/50 ML
2 INTRAVENOUS SOLUTION, PIGGYBACK (ML) INTRAVENOUS ONCE
Refills: 0 | Status: COMPLETED | OUTPATIENT
Start: 2024-11-02 | End: 2024-11-02

## 2024-11-02 RX ORDER — MELATONIN 5 MG
3 TABLET ORAL AT BEDTIME
Refills: 0 | Status: DISCONTINUED | OUTPATIENT
Start: 2024-11-02 | End: 2024-11-02

## 2024-11-02 RX ORDER — PIPERACILLIN AND TAZOBACTAM .5; 4 G/20ML; G/20ML
3.38 INJECTION, POWDER, LYOPHILIZED, FOR SOLUTION INTRAVENOUS EVERY 8 HOURS
Refills: 0 | Status: DISCONTINUED | OUTPATIENT
Start: 2024-11-02 | End: 2024-11-02

## 2024-11-02 RX ORDER — ACYCLOVIR 400 MG
390 TABLET ORAL EVERY 12 HOURS
Refills: 0 | Status: DISCONTINUED | OUTPATIENT
Start: 2024-11-02 | End: 2024-11-02

## 2024-11-02 RX ORDER — CEFTRIAXONE SODIUM 10 G
1000 VIAL (EA) INJECTION EVERY 24 HOURS
Refills: 0 | Status: DISCONTINUED | OUTPATIENT
Start: 2024-11-03 | End: 2024-11-03

## 2024-11-02 RX ORDER — VANCOMYCIN HYDROCHLORIDE 50 MG/ML
KIT ORAL
Refills: 0 | Status: DISCONTINUED | OUTPATIENT
Start: 2024-11-02 | End: 2024-11-03

## 2024-11-02 RX ORDER — LIDOCAINE HYDROCHLORIDE 40 MG/ML
1 SOLUTION TOPICAL ONCE
Refills: 0 | Status: COMPLETED | OUTPATIENT
Start: 2024-11-02 | End: 2024-11-02

## 2024-11-02 RX ORDER — VANCOMYCIN HYDROCHLORIDE 50 MG/ML
750 KIT ORAL EVERY 24 HOURS
Refills: 0 | Status: DISCONTINUED | OUTPATIENT
Start: 2024-11-02 | End: 2024-11-02

## 2024-11-02 RX ORDER — CEFTRIAXONE SODIUM 10 G
2000 VIAL (EA) INJECTION ONCE
Refills: 0 | Status: COMPLETED | OUTPATIENT
Start: 2024-11-02 | End: 2024-11-02

## 2024-11-02 RX ORDER — ACETAMINOPHEN 500 MG
575 TABLET ORAL ONCE
Refills: 0 | Status: COMPLETED | OUTPATIENT
Start: 2024-11-02 | End: 2024-11-02

## 2024-11-02 RX ORDER — SODIUM CHLORIDE 9 MG/ML
1000 INJECTION, SOLUTION INTRAMUSCULAR; INTRAVENOUS; SUBCUTANEOUS
Refills: 0 | Status: DISCONTINUED | OUTPATIENT
Start: 2024-11-02 | End: 2024-11-03

## 2024-11-02 RX ORDER — ROSUVASTATIN CALCIUM 10 MG
20 TABLET ORAL AT BEDTIME
Refills: 0 | Status: DISCONTINUED | OUTPATIENT
Start: 2024-11-02 | End: 2024-11-07

## 2024-11-02 RX ORDER — LEVOTHYROXINE SODIUM 88 MCG
88 TABLET ORAL DAILY
Refills: 0 | Status: DISCONTINUED | OUTPATIENT
Start: 2024-11-02 | End: 2024-11-07

## 2024-11-02 RX ORDER — VANCOMYCIN HYDROCHLORIDE 50 MG/ML
750 KIT ORAL EVERY 24 HOURS
Refills: 0 | Status: DISCONTINUED | OUTPATIENT
Start: 2024-11-03 | End: 2024-11-03

## 2024-11-02 RX ORDER — FERROUS SULFATE 325(65) MG
325 TABLET ORAL DAILY
Refills: 0 | Status: DISCONTINUED | OUTPATIENT
Start: 2024-11-02 | End: 2024-11-07

## 2024-11-02 RX ORDER — VANCOMYCIN HYDROCHLORIDE 50 MG/ML
750 KIT ORAL ONCE
Refills: 0 | Status: COMPLETED | OUTPATIENT
Start: 2024-11-02 | End: 2024-11-02

## 2024-11-02 RX ORDER — METRONIDAZOLE 250 MG/1
500 TABLET ORAL THREE TIMES A DAY
Refills: 0 | Status: DISCONTINUED | OUTPATIENT
Start: 2024-11-02 | End: 2024-11-03

## 2024-11-02 RX ORDER — ACETAMINOPHEN 500 MG
650 TABLET ORAL EVERY 6 HOURS
Refills: 0 | Status: DISCONTINUED | OUTPATIENT
Start: 2024-11-02 | End: 2024-11-07

## 2024-11-02 RX ORDER — LIDOCAINE HYDROCHLORIDE 40 MG/ML
1 SOLUTION TOPICAL DAILY
Refills: 0 | Status: DISCONTINUED | OUTPATIENT
Start: 2024-11-03 | End: 2024-11-03

## 2024-11-02 RX ORDER — PIPERACILLIN AND TAZOBACTAM .5; 4 G/20ML; G/20ML
3.38 INJECTION, POWDER, LYOPHILIZED, FOR SOLUTION INTRAVENOUS ONCE
Refills: 0 | Status: COMPLETED | OUTPATIENT
Start: 2024-11-02 | End: 2024-11-02

## 2024-11-02 RX ORDER — FOLIC ACID 1 MG/1
1 TABLET ORAL DAILY
Refills: 0 | Status: DISCONTINUED | OUTPATIENT
Start: 2024-11-02 | End: 2024-11-07

## 2024-11-02 RX ORDER — TRAMADOL HYDROCHLORIDE 50 MG/1
25 TABLET, COATED ORAL EVERY 12 HOURS
Refills: 0 | Status: DISCONTINUED | OUTPATIENT
Start: 2024-11-02 | End: 2024-11-02

## 2024-11-02 RX ORDER — MELATONIN 5 MG
6 TABLET ORAL AT BEDTIME
Refills: 0 | Status: DISCONTINUED | OUTPATIENT
Start: 2024-11-02 | End: 2024-11-07

## 2024-11-02 RX ORDER — CYANOCOBALAMIN (VITAMIN B-12) 1000MCG/15
1000 LIQUID (ML) ORAL DAILY
Refills: 0 | Status: DISCONTINUED | OUTPATIENT
Start: 2024-11-02 | End: 2024-11-07

## 2024-11-02 RX ORDER — DIBASIC SODIUM PHOSPHATE, MONOBASIC POTASSIUM PHOSPHATE AND MONOBASIC SODIUM PHOSPHATE 852; 155; 130 MG/1; MG/1; MG/1
2 TABLET ORAL ONCE
Refills: 0 | Status: COMPLETED | OUTPATIENT
Start: 2024-11-02 | End: 2024-11-02

## 2024-11-02 RX ORDER — VANCOMYCIN HYDROCHLORIDE 50 MG/ML
1000 KIT ORAL EVERY 24 HOURS
Refills: 0 | Status: DISCONTINUED | OUTPATIENT
Start: 2024-11-02 | End: 2024-11-02

## 2024-11-02 RX ORDER — TRAMADOL HYDROCHLORIDE 50 MG/1
25 TABLET, COATED ORAL EVERY 6 HOURS
Refills: 0 | Status: DISCONTINUED | OUTPATIENT
Start: 2024-11-02 | End: 2024-11-02

## 2024-11-02 RX ADMIN — Medication 5000 UNIT(S): at 11:51

## 2024-11-02 RX ADMIN — OXYCODONE HYDROCHLORIDE 5 MILLIGRAM(S): 30 TABLET ORAL at 00:16

## 2024-11-02 RX ADMIN — Medication 500 MILLILITER(S): at 06:49

## 2024-11-02 RX ADMIN — LIDOCAINE HYDROCHLORIDE 1 PATCH: 40 SOLUTION TOPICAL at 07:26

## 2024-11-02 RX ADMIN — Medication 20 MILLIGRAM(S): at 21:33

## 2024-11-02 RX ADMIN — LIDOCAINE HYDROCHLORIDE 1 PATCH: 40 SOLUTION TOPICAL at 11:58

## 2024-11-02 RX ADMIN — FOLIC ACID 1 MILLIGRAM(S): 1 TABLET ORAL at 11:52

## 2024-11-02 RX ADMIN — Medication 81 MILLIGRAM(S): at 11:52

## 2024-11-02 RX ADMIN — Medication 325 MILLIGRAM(S): at 11:51

## 2024-11-02 RX ADMIN — Medication 107.8 MILLIGRAM(S): at 04:24

## 2024-11-02 RX ADMIN — SODIUM CHLORIDE 75 MILLILITER(S): 9 INJECTION, SOLUTION INTRAMUSCULAR; INTRAVENOUS; SUBCUTANEOUS at 14:55

## 2024-11-02 RX ADMIN — PIPERACILLIN AND TAZOBACTAM 200 GRAM(S): .5; 4 INJECTION, POWDER, LYOPHILIZED, FOR SOLUTION INTRAVENOUS at 07:59

## 2024-11-02 RX ADMIN — VANCOMYCIN HYDROCHLORIDE 250 MILLIGRAM(S): KIT at 15:51

## 2024-11-02 RX ADMIN — Medication 6 MILLIGRAM(S): at 21:33

## 2024-11-02 RX ADMIN — METRONIDAZOLE 500 MILLIGRAM(S): 250 TABLET ORAL at 21:33

## 2024-11-02 RX ADMIN — KETOROLAC TROMETHAMINE 15 MILLIGRAM(S): 30 INJECTION INTRAMUSCULAR; INTRAVENOUS at 00:25

## 2024-11-02 RX ADMIN — Medication 100 MILLIGRAM(S): at 03:24

## 2024-11-02 RX ADMIN — Medication 650 MILLIGRAM(S): at 23:35

## 2024-11-02 RX ADMIN — Medication 1000 MICROGRAM(S): at 11:52

## 2024-11-02 RX ADMIN — Medication 230 MILLIGRAM(S): at 00:25

## 2024-11-02 RX ADMIN — KETOROLAC TROMETHAMINE 15 MILLIGRAM(S): 30 INJECTION INTRAMUSCULAR; INTRAVENOUS at 06:50

## 2024-11-02 RX ADMIN — Medication 10 MILLIEQUIVALENT(S): at 00:30

## 2024-11-02 RX ADMIN — DIBASIC SODIUM PHOSPHATE, MONOBASIC POTASSIUM PHOSPHATE AND MONOBASIC SODIUM PHOSPHATE 2 PACKET(S): 852; 155; 130 TABLET ORAL at 08:37

## 2024-11-02 RX ADMIN — Medication 25 GRAM(S): at 08:37

## 2024-11-02 RX ADMIN — LIDOCAINE HYDROCHLORIDE 1 PATCH: 40 SOLUTION TOPICAL at 19:20

## 2024-11-02 RX ADMIN — TRAMADOL HYDROCHLORIDE 25 MILLIGRAM(S): 50 TABLET, COATED ORAL at 12:42

## 2024-11-02 RX ADMIN — LIDOCAINE HYDROCHLORIDE 1 PATCH: 40 SOLUTION TOPICAL at 00:25

## 2024-11-02 RX ADMIN — Medication 650 MILLIGRAM(S): at 21:32

## 2024-11-02 RX ADMIN — LIDOCAINE HYDROCHLORIDE 1 PATCH: 40 SOLUTION TOPICAL at 14:58

## 2024-11-02 RX ADMIN — PIPERACILLIN AND TAZOBACTAM 25 GRAM(S): .5; 4 INJECTION, POWDER, LYOPHILIZED, FOR SOLUTION INTRAVENOUS at 11:54

## 2024-11-02 NOTE — H&P ADULT - PROBLEM SELECTOR PLAN 3
CT angio chest 11/1: No acute pulmonary embolism,  redemonstrated small right apical pneumothorax, similar to 10/31/2024 chest radiographs, decreased in size from 10/19/2024 CT, small right pleural effusion/hemothorax, similar to prior. Redemonstrated mildly displaced fractures of the right posterolateral 4th rib and posterior 6th through 9th ribs, right T6-T9 transverse processes, and mildly displaced fractures of the T5-9 spinous processes.    Plan  - Acetaminophen 650 mg q6h prn. CT angio chest 11/1: No acute pulmonary embolism, New displaced posterior 4th rib fracture. Redemonstrated small right apical pneumothorax, similar to 10/31/2024, decreased from 10/19/2024 CT, small right pleural effusion/hemothorax, similar to prior. Redemonstrated mildly displaced fractures of the right posterolateral 4th rib and posterior 6th through 9th ribs, right T6-T9 transverse processes, and mildly displaced fractures of the T5-9 spinous processes. Unchanged cluster of  nodules up to 6 mm in the left upper lobe    Plan  - Acetaminophen 650 mg q6h prn for mild pain.  - Tramadol 25 mg q12h prn for severe pain. CT angio chest 11/1: No acute pulmonary embolism, New displaced posterior 4th rib fracture. Redemonstrated small right apical pneumothorax, similar to 10/31/2024, decreased from 10/19/2024 CT, small right pleural effusion/hemothorax, similar to prior. Redemonstrated mildly displaced fractures of the right posterolateral 4th rib and posterior 6th through 9th ribs, right T6-T9 transverse processes, and mildly displaced fractures of the T5-9 spinous processes. Unchanged cluster of  nodules up to 6 mm in the left upper lobe    Plan  - Acetaminophen 650 mg q6h prn for mild pain.  - Lidocaine patch prn for back pain

## 2024-11-02 NOTE — H&P ADULT - ATTENDING COMMENTS
I have seen and examined the patient independent of the residents. I have discussed with the resident/medical student team.   I agree with above documentation.     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 She is brought to ER for shaking and  chills and episode of confusion. No recorded fever at home. She also had a recent R sided rib Fx and takes Advil and tramadol at home. Patient reports her Ileostomy OP was decreased in last 2 days. Its improved now.    In the ED, patient was febrile 103F, Bp soft but not hypotensive, WBC 6.4, Hgb 11/33, INR 2.47, Na 128, K 3.3, UA negative. CT chows dilated SB in RLQ, likely partial bowel obstruction. Large and SB wall thickening and mural hyperemia - c/f inflammatory, infectious, or ischemic process. CTB unremarkable.  blood cultures in progress.     She was seen by GS and recommended conservative management. No Nausea or vomiting. AAOX3.     PE:  AAO x3, appears tired and fatigued.   Abdomen- mild tenderness in  id region, liquid OP from ileostomy.   CTAB.   S1S2 normal.     A/P:  Partial SBO: Expectant management. GS to follow.   Concern for enteritis/colitis on Imaging: Febrile in ER. Does not meet sepsis criteria. Infectious w/u. Will continue CTX, Metronidazole and Vanco. De-escalate based on cx. IVF. CLD advance as tolerated.   Acute on chronic HypoNa: Na 128. Start NS- 75cc/h. BMP in am.   Recent traumatic Rib Fx: Symptomatic Management..   Elevated INR:  unclear etiology. LFTs OK. ? VK def. Monitor for now.   CKD-3: Cr at baseline. Avoid Nephrotoxins.   Chronic Anemia: HH at baseline. Monitor.  HypoMag/P: Replete.  PT OT evaluation.  Full code- Discussed F-F with patient.

## 2024-11-02 NOTE — H&P ADULT - PROBLEM SELECTOR PLAN 4
C/w home synthroid 88mcg qd INR 2.95  Plan  Trend INR. Can consider vitamin K supplementation pending consistent elevation.

## 2024-11-02 NOTE — H&P ADULT - PROBLEM SELECTOR PLAN 5
C/w home rosuvastatin 20 mg qd Plan  2 MARJORIE nodules found on CT during last admission. Patient to repeat CT in 6 months,

## 2024-11-02 NOTE — PHYSICAL THERAPY INITIAL EVALUATION ADULT - ADDITIONAL COMMENTS
Pt lives with spouse in private home with 1 step to enter, + elevator inside. PTA, pt was independent with all mobility without use of assistive device. Has rolling walker, cane and raised toilet seat at home in bathroom

## 2024-11-02 NOTE — CONSULT NOTE ADULT - SUBJECTIVE AND OBJECTIVE BOX
SURGERY CONSULT NOTE    HPI: 73F PMH CREST syndrome, HLD, hypothyroid, CAD s/p stent, partial SBOs and high ileostomy output, PSH ex-lap, SBR, DLI, Hysterectomy p/t ED for altered mental status, generalized shaking, confusion, and chills. Of note, patient was previously admitted 2 weeks ago following fall l/t rib fractures and T spine TP fx. Endorses abdominal pain a/w nausea, no emesis. Normal ostomy output.     In the ED, patient was febrile 103F, VSS, WBC 6.4, Hgb 11/33, INR 2.47, Na 128, K 3.3, UA negative.     CT shows dilated SB in RLQ, c/f partial bowel obstruction. Large and SB wall thickening and mural hyperemia - c/f inflammatory, infectious, or ischemic process.     Surgery consulted for partial bowel obstruction.     PMH/PSH: Sjogren's syndrome  Ileostomy in place  Colonic dysmotility  GERD (gastroesophageal reflux disease)  Anxiety  Sciatica  CAD (coronary atherosclerotic disease)  Stented coronary artery  History of dehydration  H/O small bowel obstruction  Hypothyroid  H/O electrolyte imbalance  History of connective tissue disease  Hyperlipidemia  Mild anemia  Osteoporosis  Insomnia  Vasovagal syncope  History of hypotension  Scoliosis  History of CREST syndrome  History of scleroderma  Bilateral dry eyes  Dry mouth  Hyponatremia  Hypomagnesemia  COVID-19 vaccine series completed  History of short term memory loss  Familial tremor  Raynauds syndrome  Bunion of left foot  Bunion of right foot  Hammer toe of right foot  Hammer toe of left foot  Spondylolisthesis  Colonic inertia  DDD (degenerative disc disease), cervical  DDD (degenerative disc disease), lumbar  Dense breast tissue  History of subdural hematoma  History of hysterectomy  History of appendectomy  H/O ileostomy  S/P primary angioplasty with coronary stent  History of lumbar laminectomy  Rectal prolapse  History of tonsillectomy and adenoidectomy  History of bowel resection    MEDS:    ALLERGIES: NKDA    REVIEW OF SYSTEMS: All ROS negative except as per HPI.  ____________________________________________    VITALS:T(C): 37.5, Max: 39.5 (11-01)  T(F): 99.5, Max: 103.1 (11-01)  HR: 84 (79 - 104)  BP: 125/57 (96/48 - 125/57)  BP(mean): 77 (66 - 90)  RR: 16 (16 - 20)  SpO2: 100% (95% - 100%) nasal cannula 3       PHYSICAL EXAM:  General: AAOx3, no acute distress.  Respiratory: breathing comfortably, no increased WOB   Abdomen: soft, TTP in four quadrants, nondistended, no rebound, +guarding  Extremities: Moves all four.   ____________________________________________    LABS:                      10.9  6.39 )-----------( 251    ( 01 Nov 2024 16:33 )             33.1  128[L]  |  89[L]  |  26[H]  ----------------------------<  87    (11-01)  3.3[L]   |  25  |  1.00          Ca    8.0[L]      11-01  Mg    x  Phos  x        LIVER FUNCTIONS - ( 01 Nov 2024 16:33 )  Alb: 3.5 g/dL / Pro: 6.8 g/dL / ALK PHOS: 107 U/L / ALT: 17 U/L / AST: 21 U/L / GGT: x      PT/INR - ( 01 Nov 2024 17:17 )   PT: 28.2 sec;   INR: 2.47 ratio    PTT - ( 01 Nov 2024 17:17 )  PTT:37.6 sec  Urinalysis Basic - ( 01 Nov 2024 19:59 )  Color: x / Appearance: x / SG: x / pH: x  Gluc: 87 mg/dL / Ketone: x  / Bili: x / Urobili: x   Blood: x / Protein: x / Nitrite: x   Leuk Esterase: x / RBC: x / WBC x   Sq Epi: x / Non Sq Epi: x / Bacteria: x  ____________________________________________    RADIOLOGY & ADDITIONAL STUDIES:  < from: CT Abdomen and Pelvis w/ IV Cont (11.01.24 @ 20:22) >  FINDINGS:  Pleural spaces: Small bilateral pleural effusions.  Coronary arteries: Severe multivessel coronary artery calcification.  Esophagus: Patulous fluid-filled esophagus.    Liver: Liver is unremarkable.  Gallbladder and biliary ducts: No calcified stones. No pericholecystic   fluid.  Common bile duct measures 7 mm.  Pancreas: Unremarkable.  Spleen: Spleen is unremarkable.  Adrenal glands: No nodules.  Kidneys and ureters: Moderate bilateral hydronephrosis without  nephroureterolithiasis.  Stomach and bowel: Dilated small bowel with area of transition in the   right  lower quadrant upstream to the ileostomy, concerning for partial bowel  obstruction.  Diffuse small bowel mural hyperemia.  Surgical changes of   partial  colectomy with apparent rectosigmoid and ileocecal anastomoses, and right   lower  quadrant ileostomy.  Mildly distended fluid-filled large bowel with wall  thickening and mural hyperemia, again noted caliber change near the  rectosigmoid anastomosis, consistent with stricture seen on prior barium   enema.  Appendix: No evidence of appendicitis.    Intraperitoneal space: No free air. No significant fluid collection.  Vasculature: Severe aortoiliac atherosclerosis. No aortic aneurysm.  Lymph nodes: No enlarged lymph nodes.  Urinary bladder: Distended.  Reproductive: Hysterectomy.  Bones/joints: No acute fracture. Degenerative changes. Old right   posterior 9th  rib fracture deformity.  Soft tissues: Unremarkable.    IMPRESSION:  1.   Dilated small bowel with area of transition in the right lower   quadrant  upstream to the ileostomy, concerning for partial bowel obstruction.   Large and  small bowel wall thickening and mural hyperemia, may be infectious,  inflammatory, or ischemic, correlation with lactate recommended.  2.   Moderate bilateral hydronephrosis without nephroureterolithiasis.   Suspect  related to mass effect and/or reflux given bladder distension.  3.   Patulous fluid-filled esophagus. Can be seen with reflux.  4.   Small bilateral pleural effusions.    < end of copied text >  < from: CT Head No Cont (11.01.24 @ 20:21) >  FINDINGS:  HEMORRHAGE:  No evidence of intracranial hemorrhage.  BRAIN PARENCHYMA:  Mild to moderate atrophy. Mild periventricular white   matter ischemic changes.  No mass or mass effect.  VENTRICLES / SHIFT:  No hydrocephalus. No midline shift.  EXTRA-AXIAL / BASAL CISTERNS:  No extra-axial mass. Basal cisterns   preserved.  CALVARIUM AND EXTRACRANIAL SOFT TISSUES:  No depressed calvarial fracture.  SINUSES, ORBITS, MASTOIDS:  The visualized paranasal sinuses and mastoid   air cells are well aerated.    IMPRESSION:  No evidence of acute intracranial hemorrhage or hydrocephalus. Atrophy   with white matter ischemic changes.    --- End of Report ---    < end of copied text >  < from: CT Angio Chest PE Protocol w/ IV Cont (11.01.24 @ 20:22) >  IMPRESSION:  1.   No acute pulmonary embolism.  2.   Redemonstrated small right apical pneumothorax, similar to 10/31/2024  chest radiographs, decreased in size from 10/19/2024 CT.  3.   Small right pleural effusion/hemothorax, similar to prior. Trace left  pleural effusion.  4.   Redemonstrated mildly displaced fractures of the right   posterolateral 4th  rib and posterior 6th through 9th ribs. The 6th rib fracture is segmental.  Redemonstrated minimally displaced fractures of the right T6-T9 transverse  processes, and mildly displaced fractures of the T5-9 spinous processes.    < end of copied text >   SURGERY CONSULT NOTE    HPI: 73F PMH CREST syndrome, HLD, hypothyroid, CAD s/p stent, partial SBOs and high ileostomy output, PSH ex-lap, SBR, DLI, Hysterectomy p/t ED for altered mental status, generalized shaking, confusion, and chills. Of note, patient was previously admitted 2 weeks ago following fall l/t rib fractures and T spine TP fx. Endorses abdominal pain a/w nausea, no emesis. Normal ostomy output. Has had decreased PO intake for the last few days. States that this feels similar to last SBO but with increased abdominal pain.     In the ED, patient was febrile 103F, VSS, WBC 6.4, Hgb 11/33, INR 2.47, Na 128, K 3.3, UA negative.     CT shows dilated SB in RLQ, c/f partial bowel obstruction. Large and SB wall thickening and mural hyperemia - c/f inflammatory, infectious, or ischemic process.     Surgery consulted for partial bowel obstruction.     PMH/PSH: Sjogren's syndrome  Ileostomy in place  Colonic dysmotility  GERD (gastroesophageal reflux disease)  Anxiety  Sciatica  CAD (coronary atherosclerotic disease)  Stented coronary artery  History of dehydration  H/O small bowel obstruction  Hypothyroid  H/O electrolyte imbalance  History of connective tissue disease  Hyperlipidemia  Mild anemia  Osteoporosis  Insomnia  Vasovagal syncope  History of hypotension  Scoliosis  History of CREST syndrome  History of scleroderma  Bilateral dry eyes  Dry mouth  Hyponatremia  Hypomagnesemia  COVID-19 vaccine series completed  History of short term memory loss  Familial tremor  Raynauds syndrome  Bunion of left foot  Bunion of right foot  Hammer toe of right foot  Hammer toe of left foot  Spondylolisthesis  Colonic inertia  DDD (degenerative disc disease), cervical  DDD (degenerative disc disease), lumbar  Dense breast tissue  History of subdural hematoma  History of hysterectomy  History of appendectomy  H/O ileostomy  S/P primary angioplasty with coronary stent  History of lumbar laminectomy  Rectal prolapse  History of tonsillectomy and adenoidectomy  History of bowel resection    MEDS:  Home Medications:  acetaminophen 325 mg oral tablet: 2 tab(s) orally every 6 hours As needed Mild Pain (1 - 3) (21 Oct 2024 10:51)  aspirin 81 mg oral delayed release tablet: 1 tab(s) orally once a day (06 Aug 2024 00:17)  Citracal Petites 200 mg-6.25 mcg (250 intl units) oral tablet: 2 tab(s) orally 2 times a day (06 Aug 2024 00:17)  CoQ10 100 mg oral capsule: 2 cap(s) orally once a day (06 Aug 2024 00:17)  ferrous sulfate 325 mg (65 mg elemental iron) oral tablet: 1 tab(s) orally Monday, Wednesday, and Friday (08 Aug 2024 12:01)  Fish Oil 1000 mg oral capsule: 1 cap(s) orally 2 times a day (06 Aug 2024 00:14)  folic acid 1 mg oral tablet: 1 tab(s) orally once a day (08 Aug 2024 12:01)  gas-x:  (06 Aug 2024 00:16)  iron: 30 mg orally 2 times a day (06 Aug 2024 00:17)  Melatonin 5 mg oral tablet: 1 tab(s) orally once a day (at bedtime) (06 Aug 2024 00:17)  NexIUM 40 mg oral delayed release capsule: 1 cap(s) orally once a day (in the evening) (06 Aug 2024 00:17)  Pepcid 20 mg oral tablet: 1 tab(s) orally once a day (06 Aug 2024 00:17)  primidone 50 mg oral tablet: 1 tab(s) orally once a day (06 Aug 2024 00:17)  probiotic:  (06 Aug 2024 00:15)  Prolia 60 mg/mL subcutaneous solution: 1 dose(s) subcutaneous every 6 months (06 Aug 2024 00:17)  rosuvastatin 20 mg oral tablet: 1 tab(s) orally once a day (at bedtime) (06 Aug 2024 00:17)  Synthroid 88 mcg (0.088 mg) oral tablet: 1 tab(s) orally once a day (06 Aug 2024 00:17)  Vitamin B12: 1000mcg with folic acid (06 Aug 2024 00:17)  Vitamin D3 125 mcg/mL (5000 intl units/mL) oral liquid: 2 milliliter(s) orally once a day (06 Aug 2024 00:17)      ALLERGIES: NKDA    REVIEW OF SYSTEMS: All ROS negative except as per HPI.  ____________________________________________    VITALS:T(C): 37.5, Max: 39.5 (11-01)  T(F): 99.5, Max: 103.1 (11-01)  HR: 84 (79 - 104)  BP: 125/57 (96/48 - 125/57)  BP(mean): 77 (66 - 90)  RR: 16 (16 - 20)  SpO2: 100% (95% - 100%) nasal cannula 3       PHYSICAL EXAM:  General: AAOx3, uncomfortable  Respiratory: breathing comfortably, no increased WOB   Abdomen: soft, TTP in four quadrants w most in LLQ, nondistended, no rebound, +guarding, ileostomy with output  Extremities: Moves all four.   ____________________________________________    LABS:                      10.9  6.39 )-----------( 251    ( 01 Nov 2024 16:33 )             33.1  128[L]  |  89[L]  |  26[H]  ----------------------------<  87    (11-01)  3.3[L]   |  25  |  1.00          Ca    8.0[L]      11-01  Mg    x  Phos  x        LIVER FUNCTIONS - ( 01 Nov 2024 16:33 )  Alb: 3.5 g/dL / Pro: 6.8 g/dL / ALK PHOS: 107 U/L / ALT: 17 U/L / AST: 21 U/L / GGT: x      PT/INR - ( 01 Nov 2024 17:17 )   PT: 28.2 sec;   INR: 2.47 ratio    PTT - ( 01 Nov 2024 17:17 )  PTT:37.6 sec  Urinalysis Basic - ( 01 Nov 2024 19:59 )  Color: x / Appearance: x / SG: x / pH: x  Gluc: 87 mg/dL / Ketone: x  / Bili: x / Urobili: x   Blood: x / Protein: x / Nitrite: x   Leuk Esterase: x / RBC: x / WBC x   Sq Epi: x / Non Sq Epi: x / Bacteria: x  ____________________________________________    RADIOLOGY & ADDITIONAL STUDIES:  < from: CT Abdomen and Pelvis w/ IV Cont (11.01.24 @ 20:22) >  FINDINGS:  Pleural spaces: Small bilateral pleural effusions.  Coronary arteries: Severe multivessel coronary artery calcification.  Esophagus: Patulous fluid-filled esophagus.    Liver: Liver is unremarkable.  Gallbladder and biliary ducts: No calcified stones. No pericholecystic   fluid.  Common bile duct measures 7 mm.  Pancreas: Unremarkable.  Spleen: Spleen is unremarkable.  Adrenal glands: No nodules.  Kidneys and ureters: Moderate bilateral hydronephrosis without  nephroureterolithiasis.  Stomach and bowel: Dilated small bowel with area of transition in the   right lower quadrant upstream to the ileostomy, concerning for partial bowel  obstruction.  Diffuse small bowel mural hyperemia.  Surgical changes of   partial colectomy with apparent rectosigmoid and ileocecal anastomoses, and right   lower quadrant ileostomy.  Mildly distended fluid-filled large bowel with wall  thickening and mural hyperemia, again noted caliber change near the  rectosigmoid anastomosis, consistent with stricture seen on prior barium   enema.  Appendix: No evidence of appendicitis.    Intraperitoneal space: No free air. No significant fluid collection.  Vasculature: Severe aortoiliac atherosclerosis. No aortic aneurysm.  Lymph nodes: No enlarged lymph nodes.  Urinary bladder: Distended.  Reproductive: Hysterectomy.  Bones/joints: No acute fracture. Degenerative changes. Old right   posterior 9th  rib fracture deformity.  Soft tissues: Unremarkable.    IMPRESSION:  1.   Dilated small bowel with area of transition in the right lower   quadrant  upstream to the ileostomy, concerning for partial bowel obstruction.   Large and  small bowel wall thickening and mural hyperemia, may be infectious,  inflammatory, or ischemic, correlation with lactate recommended.  2.   Moderate bilateral hydronephrosis without nephroureterolithiasis.   Suspect  related to mass effect and/or reflux given bladder distension.  3.   Patulous fluid-filled esophagus. Can be seen with reflux.  4.   Small bilateral pleural effusions.    < end of copied text >  < from: CT Head No Cont (11.01.24 @ 20:21) >  FINDINGS:  HEMORRHAGE:  No evidence of intracranial hemorrhage.  BRAIN PARENCHYMA:  Mild to moderate atrophy. Mild periventricular white   matter ischemic changes.  No mass or mass effect.  VENTRICLES / SHIFT:  No hydrocephalus. No midline shift.  EXTRA-AXIAL / BASAL CISTERNS:  No extra-axial mass. Basal cisterns   preserved.  CALVARIUM AND EXTRACRANIAL SOFT TISSUES:  No depressed calvarial fracture.  SINUSES, ORBITS, MASTOIDS:  The visualized paranasal sinuses and mastoid   air cells are well aerated.    IMPRESSION:  No evidence of acute intracranial hemorrhage or hydrocephalus. Atrophy   with white matter ischemic changes.    --- End of Report ---    < end of copied text >  < from: CT Angio Chest PE Protocol w/ IV Cont (11.01.24 @ 20:22) >  IMPRESSION:  1.   No acute pulmonary embolism.  2.   Redemonstrated small right apical pneumothorax, similar to 10/31/2024  chest radiographs, decreased in size from 10/19/2024 CT.  3.   Small right pleural effusion/hemothorax, similar to prior. Trace left  pleural effusion.  4.   Redemonstrated mildly displaced fractures of the right   posterolateral 4th  rib and posterior 6th through 9th ribs. The 6th rib fracture is segmental.  Redemonstrated minimally displaced fractures of the right T6-T9 transverse  processes, and mildly displaced fractures of the T5-9 spinous processes.    < end of copied text >   SURGERY CONSULT NOTE    HPI: 5 F with Hx CREST syndrome, hypothyroidism, CKD3, CAD (s/p stents, on ASA), colonic inertia, rectal prolapse (s/p ?suspensory surgical procedure, colon resection and rectopexy [2011], and repeat open rectopexy with mesh [2016]), and recurent SBOs (s/p ex-lap, SBR, diverting loop ileostomy [2017], and rectal stricture, managed non-operatively) p/t ED for altered mental status, generalized shaking, confusion, and chills. Of note, patient was previously admitted 2 weeks ago following fall l/t rib fractures and T spine TP fx. Endorses abdominal pain a/w nausea, no emesis. Normal ostomy output. Has had decreased PO intake for the last few days. States that this feels similar to last SBO but with increased abdominal pain.     In the ED, patient was febrile 103F, VSS, WBC 6.4, Hgb 11/33, INR 2.47, Na 128, K 3.3, UA negative.     CT shows dilated SB in RLQ, c/f partial bowel obstruction. Large and SB wall thickening and mural hyperemia - c/f inflammatory, infectious, or ischemic process.     Surgery consulted for partial bowel obstruction.     PMH/PSH: Sjogren's syndrome  Ileostomy in place  Colonic dysmotility  GERD (gastroesophageal reflux disease)  Anxiety  Sciatica  CAD (coronary atherosclerotic disease)  Stented coronary artery  History of dehydration  H/O small bowel obstruction  Hypothyroid  H/O electrolyte imbalance  History of connective tissue disease  Hyperlipidemia  Mild anemia  Osteoporosis  Insomnia  Vasovagal syncope  History of hypotension  Scoliosis  History of CREST syndrome  History of scleroderma  Bilateral dry eyes  Dry mouth  Hyponatremia  Hypomagnesemia  COVID-19 vaccine series completed  History of short term memory loss  Familial tremor  Raynauds syndrome  Bunion of left foot  Bunion of right foot  Hammer toe of right foot  Hammer toe of left foot  Spondylolisthesis  Colonic inertia  DDD (degenerative disc disease), cervical  DDD (degenerative disc disease), lumbar  Dense breast tissue  History of subdural hematoma  History of hysterectomy  History of appendectomy  H/O ileostomy  S/P primary angioplasty with coronary stent  History of lumbar laminectomy  Rectal prolapse  History of tonsillectomy and adenoidectomy  History of bowel resection    MEDS:  Home Medications:  acetaminophen 325 mg oral tablet: 2 tab(s) orally every 6 hours As needed Mild Pain (1 - 3) (21 Oct 2024 10:51)  aspirin 81 mg oral delayed release tablet: 1 tab(s) orally once a day (06 Aug 2024 00:17)  Citracal Petites 200 mg-6.25 mcg (250 intl units) oral tablet: 2 tab(s) orally 2 times a day (06 Aug 2024 00:17)  CoQ10 100 mg oral capsule: 2 cap(s) orally once a day (06 Aug 2024 00:17)  ferrous sulfate 325 mg (65 mg elemental iron) oral tablet: 1 tab(s) orally Monday, Wednesday, and Friday (08 Aug 2024 12:01)  Fish Oil 1000 mg oral capsule: 1 cap(s) orally 2 times a day (06 Aug 2024 00:14)  folic acid 1 mg oral tablet: 1 tab(s) orally once a day (08 Aug 2024 12:01)  gas-x:  (06 Aug 2024 00:16)  iron: 30 mg orally 2 times a day (06 Aug 2024 00:17)  Melatonin 5 mg oral tablet: 1 tab(s) orally once a day (at bedtime) (06 Aug 2024 00:17)  NexIUM 40 mg oral delayed release capsule: 1 cap(s) orally once a day (in the evening) (06 Aug 2024 00:17)  Pepcid 20 mg oral tablet: 1 tab(s) orally once a day (06 Aug 2024 00:17)  primidone 50 mg oral tablet: 1 tab(s) orally once a day (06 Aug 2024 00:17)  probiotic:  (06 Aug 2024 00:15)  Prolia 60 mg/mL subcutaneous solution: 1 dose(s) subcutaneous every 6 months (06 Aug 2024 00:17)  rosuvastatin 20 mg oral tablet: 1 tab(s) orally once a day (at bedtime) (06 Aug 2024 00:17)  Synthroid 88 mcg (0.088 mg) oral tablet: 1 tab(s) orally once a day (06 Aug 2024 00:17)  Vitamin B12: 1000mcg with folic acid (06 Aug 2024 00:17)  Vitamin D3 125 mcg/mL (5000 intl units/mL) oral liquid: 2 milliliter(s) orally once a day (06 Aug 2024 00:17)      ALLERGIES: NKDA    REVIEW OF SYSTEMS: All ROS negative except as per HPI.  ____________________________________________    VITALS:T(C): 37.5, Max: 39.5 (11-01)  T(F): 99.5, Max: 103.1 (11-01)  HR: 84 (79 - 104)  BP: 125/57 (96/48 - 125/57)  BP(mean): 77 (66 - 90)  RR: 16 (16 - 20)  SpO2: 100% (95% - 100%) nasal cannula 3       PHYSICAL EXAM:  General: AAOx3, uncomfortable  Respiratory: breathing comfortably, no increased WOB   Abdomen: soft, TTP in four quadrants w most in LLQ, nondistended, no rebound, +guarding, ileostomy with output  Extremities: Moves all four.   ____________________________________________    LABS:                      10.9  6.39 )-----------( 251    ( 01 Nov 2024 16:33 )             33.1  128[L]  |  89[L]  |  26[H]  ----------------------------<  87    (11-01)  3.3[L]   |  25  |  1.00          Ca    8.0[L]      11-01  Mg    x  Phos  x        LIVER FUNCTIONS - ( 01 Nov 2024 16:33 )  Alb: 3.5 g/dL / Pro: 6.8 g/dL / ALK PHOS: 107 U/L / ALT: 17 U/L / AST: 21 U/L / GGT: x      PT/INR - ( 01 Nov 2024 17:17 )   PT: 28.2 sec;   INR: 2.47 ratio    PTT - ( 01 Nov 2024 17:17 )  PTT:37.6 sec  Urinalysis Basic - ( 01 Nov 2024 19:59 )  Color: x / Appearance: x / SG: x / pH: x  Gluc: 87 mg/dL / Ketone: x  / Bili: x / Urobili: x   Blood: x / Protein: x / Nitrite: x   Leuk Esterase: x / RBC: x / WBC x   Sq Epi: x / Non Sq Epi: x / Bacteria: x  ____________________________________________    RADIOLOGY & ADDITIONAL STUDIES:  < from: CT Abdomen and Pelvis w/ IV Cont (11.01.24 @ 20:22) >  FINDINGS:  Pleural spaces: Small bilateral pleural effusions.  Coronary arteries: Severe multivessel coronary artery calcification.  Esophagus: Patulous fluid-filled esophagus.    Liver: Liver is unremarkable.  Gallbladder and biliary ducts: No calcified stones. No pericholecystic   fluid.  Common bile duct measures 7 mm.  Pancreas: Unremarkable.  Spleen: Spleen is unremarkable.  Adrenal glands: No nodules.  Kidneys and ureters: Moderate bilateral hydronephrosis without  nephroureterolithiasis.  Stomach and bowel: Dilated small bowel with area of transition in the   right lower quadrant upstream to the ileostomy, concerning for partial bowel  obstruction.  Diffuse small bowel mural hyperemia.  Surgical changes of   partial colectomy with apparent rectosigmoid and ileocecal anastomoses, and right   lower quadrant ileostomy.  Mildly distended fluid-filled large bowel with wall  thickening and mural hyperemia, again noted caliber change near the  rectosigmoid anastomosis, consistent with stricture seen on prior barium   enema.  Appendix: No evidence of appendicitis.    Intraperitoneal space: No free air. No significant fluid collection.  Vasculature: Severe aortoiliac atherosclerosis. No aortic aneurysm.  Lymph nodes: No enlarged lymph nodes.  Urinary bladder: Distended.  Reproductive: Hysterectomy.  Bones/joints: No acute fracture. Degenerative changes. Old right   posterior 9th  rib fracture deformity.  Soft tissues: Unremarkable.    IMPRESSION:  1.   Dilated small bowel with area of transition in the right lower   quadrant  upstream to the ileostomy, concerning for partial bowel obstruction.   Large and  small bowel wall thickening and mural hyperemia, may be infectious,  inflammatory, or ischemic, correlation with lactate recommended.  2.   Moderate bilateral hydronephrosis without nephroureterolithiasis.   Suspect  related to mass effect and/or reflux given bladder distension.  3.   Patulous fluid-filled esophagus. Can be seen with reflux.  4.   Small bilateral pleural effusions.    < end of copied text >  < from: CT Head No Cont (11.01.24 @ 20:21) >  FINDINGS:  HEMORRHAGE:  No evidence of intracranial hemorrhage.  BRAIN PARENCHYMA:  Mild to moderate atrophy. Mild periventricular white   matter ischemic changes.  No mass or mass effect.  VENTRICLES / SHIFT:  No hydrocephalus. No midline shift.  EXTRA-AXIAL / BASAL CISTERNS:  No extra-axial mass. Basal cisterns   preserved.  CALVARIUM AND EXTRACRANIAL SOFT TISSUES:  No depressed calvarial fracture.  SINUSES, ORBITS, MASTOIDS:  The visualized paranasal sinuses and mastoid   air cells are well aerated.    IMPRESSION:  No evidence of acute intracranial hemorrhage or hydrocephalus. Atrophy   with white matter ischemic changes.    --- End of Report ---    < end of copied text >  < from: CT Angio Chest PE Protocol w/ IV Cont (11.01.24 @ 20:22) >  IMPRESSION:  1.   No acute pulmonary embolism.  2.   Redemonstrated small right apical pneumothorax, similar to 10/31/2024  chest radiographs, decreased in size from 10/19/2024 CT.  3.   Small right pleural effusion/hemothorax, similar to prior. Trace left  pleural effusion.  4.   Redemonstrated mildly displaced fractures of the right   posterolateral 4th  rib and posterior 6th through 9th ribs. The 6th rib fracture is segmental.  Redemonstrated minimally displaced fractures of the right T6-T9 transverse  processes, and mildly displaced fractures of the T5-9 spinous processes.    < end of copied text >   SURGERY CONSULT NOTE    HPI: 73 F with Hx CREST syndrome, hypothyroidism, CKD3, CAD (s/p stents, on ASA), colonic inertia, rectal prolapse (s/p ?suspensory surgical procedure, colon resection and rectopexy [2011], and repeat open rectopexy with mesh [2016]), and recurent SBOs (s/p ex-lap, SBR, diverting loop ileostomy [2017], and rectal stricture, managed non-operatively) p/t ED for altered mental status, generalized shaking, confusion, and chills. Of note, patient was previously admitted 2 weeks ago following fall l/t rib fractures and T spine TP fx. Endorses abdominal pain a/w nausea, no emesis. Normal ostomy output. Has had decreased PO intake for the last few days. States that this feels similar to last SBO but with increased abdominal pain.     In the ED, patient was febrile 103F, VSS, WBC 6.4, Hgb 11/33, INR 2.47, Na 128, K 3.3, UA negative.     CT shows dilated SB in RLQ, c/f partial bowel obstruction. Large and SB wall thickening and mural hyperemia - c/f inflammatory, infectious, or ischemic process.     Surgery consulted for partial bowel obstruction.     PMH/PSH: Sjogren's syndrome  Ileostomy in place  Colonic dysmotility  GERD (gastroesophageal reflux disease)  Anxiety  Sciatica  CAD (coronary atherosclerotic disease)  Stented coronary artery  History of dehydration  H/O small bowel obstruction  Hypothyroid  H/O electrolyte imbalance  History of connective tissue disease  Hyperlipidemia  Mild anemia  Osteoporosis  Insomnia  Vasovagal syncope  History of hypotension  Scoliosis  History of CREST syndrome  History of scleroderma  Bilateral dry eyes  Dry mouth  Hyponatremia  Hypomagnesemia  COVID-19 vaccine series completed  History of short term memory loss  Familial tremor  Raynauds syndrome  Bunion of left foot  Bunion of right foot  Hammer toe of right foot  Hammer toe of left foot  Spondylolisthesis  Colonic inertia  DDD (degenerative disc disease), cervical  DDD (degenerative disc disease), lumbar  Dense breast tissue  History of subdural hematoma  History of hysterectomy  History of appendectomy  H/O ileostomy  S/P primary angioplasty with coronary stent  History of lumbar laminectomy  Rectal prolapse  History of tonsillectomy and adenoidectomy  History of bowel resection    MEDS:  Home Medications:  acetaminophen 325 mg oral tablet: 2 tab(s) orally every 6 hours As needed Mild Pain (1 - 3) (21 Oct 2024 10:51)  aspirin 81 mg oral delayed release tablet: 1 tab(s) orally once a day (06 Aug 2024 00:17)  Citracal Petites 200 mg-6.25 mcg (250 intl units) oral tablet: 2 tab(s) orally 2 times a day (06 Aug 2024 00:17)  CoQ10 100 mg oral capsule: 2 cap(s) orally once a day (06 Aug 2024 00:17)  ferrous sulfate 325 mg (65 mg elemental iron) oral tablet: 1 tab(s) orally Monday, Wednesday, and Friday (08 Aug 2024 12:01)  Fish Oil 1000 mg oral capsule: 1 cap(s) orally 2 times a day (06 Aug 2024 00:14)  folic acid 1 mg oral tablet: 1 tab(s) orally once a day (08 Aug 2024 12:01)  gas-x:  (06 Aug 2024 00:16)  iron: 30 mg orally 2 times a day (06 Aug 2024 00:17)  Melatonin 5 mg oral tablet: 1 tab(s) orally once a day (at bedtime) (06 Aug 2024 00:17)  NexIUM 40 mg oral delayed release capsule: 1 cap(s) orally once a day (in the evening) (06 Aug 2024 00:17)  Pepcid 20 mg oral tablet: 1 tab(s) orally once a day (06 Aug 2024 00:17)  primidone 50 mg oral tablet: 1 tab(s) orally once a day (06 Aug 2024 00:17)  probiotic:  (06 Aug 2024 00:15)  Prolia 60 mg/mL subcutaneous solution: 1 dose(s) subcutaneous every 6 months (06 Aug 2024 00:17)  rosuvastatin 20 mg oral tablet: 1 tab(s) orally once a day (at bedtime) (06 Aug 2024 00:17)  Synthroid 88 mcg (0.088 mg) oral tablet: 1 tab(s) orally once a day (06 Aug 2024 00:17)  Vitamin B12: 1000mcg with folic acid (06 Aug 2024 00:17)  Vitamin D3 125 mcg/mL (5000 intl units/mL) oral liquid: 2 milliliter(s) orally once a day (06 Aug 2024 00:17)      ALLERGIES: NKDA    REVIEW OF SYSTEMS: All ROS negative except as per HPI.  ____________________________________________    VITALS:T(C): 37.5, Max: 39.5 (11-01)  T(F): 99.5, Max: 103.1 (11-01)  HR: 84 (79 - 104)  BP: 125/57 (96/48 - 125/57)  BP(mean): 77 (66 - 90)  RR: 16 (16 - 20)  SpO2: 100% (95% - 100%) nasal cannula 3       PHYSICAL EXAM:  General: AAOx3, uncomfortable  Respiratory: breathing comfortably, no increased WOB   Abdomen: soft, TTP in four quadrants w most in LLQ, nondistended, no rebound, +guarding, ileostomy with output  Extremities: Moves all four.   ____________________________________________    LABS:                      10.9  6.39 )-----------( 251    ( 01 Nov 2024 16:33 )             33.1  128[L]  |  89[L]  |  26[H]  ----------------------------<  87    (11-01)  3.3[L]   |  25  |  1.00          Ca    8.0[L]      11-01  Mg    x  Phos  x        LIVER FUNCTIONS - ( 01 Nov 2024 16:33 )  Alb: 3.5 g/dL / Pro: 6.8 g/dL / ALK PHOS: 107 U/L / ALT: 17 U/L / AST: 21 U/L / GGT: x      PT/INR - ( 01 Nov 2024 17:17 )   PT: 28.2 sec;   INR: 2.47 ratio    PTT - ( 01 Nov 2024 17:17 )  PTT:37.6 sec  Urinalysis Basic - ( 01 Nov 2024 19:59 )  Color: x / Appearance: x / SG: x / pH: x  Gluc: 87 mg/dL / Ketone: x  / Bili: x / Urobili: x   Blood: x / Protein: x / Nitrite: x   Leuk Esterase: x / RBC: x / WBC x   Sq Epi: x / Non Sq Epi: x / Bacteria: x  ____________________________________________    RADIOLOGY & ADDITIONAL STUDIES:  < from: CT Abdomen and Pelvis w/ IV Cont (11.01.24 @ 20:22) >  FINDINGS:  Pleural spaces: Small bilateral pleural effusions.  Coronary arteries: Severe multivessel coronary artery calcification.  Esophagus: Patulous fluid-filled esophagus.    Liver: Liver is unremarkable.  Gallbladder and biliary ducts: No calcified stones. No pericholecystic   fluid.  Common bile duct measures 7 mm.  Pancreas: Unremarkable.  Spleen: Spleen is unremarkable.  Adrenal glands: No nodules.  Kidneys and ureters: Moderate bilateral hydronephrosis without  nephroureterolithiasis.  Stomach and bowel: Dilated small bowel with area of transition in the   right lower quadrant upstream to the ileostomy, concerning for partial bowel  obstruction.  Diffuse small bowel mural hyperemia.  Surgical changes of   partial colectomy with apparent rectosigmoid and ileocecal anastomoses, and right   lower quadrant ileostomy.  Mildly distended fluid-filled large bowel with wall  thickening and mural hyperemia, again noted caliber change near the  rectosigmoid anastomosis, consistent with stricture seen on prior barium   enema.  Appendix: No evidence of appendicitis.    Intraperitoneal space: No free air. No significant fluid collection.  Vasculature: Severe aortoiliac atherosclerosis. No aortic aneurysm.  Lymph nodes: No enlarged lymph nodes.  Urinary bladder: Distended.  Reproductive: Hysterectomy.  Bones/joints: No acute fracture. Degenerative changes. Old right   posterior 9th  rib fracture deformity.  Soft tissues: Unremarkable.    IMPRESSION:  1.   Dilated small bowel with area of transition in the right lower   quadrant  upstream to the ileostomy, concerning for partial bowel obstruction.   Large and  small bowel wall thickening and mural hyperemia, may be infectious,  inflammatory, or ischemic, correlation with lactate recommended.  2.   Moderate bilateral hydronephrosis without nephroureterolithiasis.   Suspect  related to mass effect and/or reflux given bladder distension.  3.   Patulous fluid-filled esophagus. Can be seen with reflux.  4.   Small bilateral pleural effusions.    < end of copied text >  < from: CT Head No Cont (11.01.24 @ 20:21) >  FINDINGS:  HEMORRHAGE:  No evidence of intracranial hemorrhage.  BRAIN PARENCHYMA:  Mild to moderate atrophy. Mild periventricular white   matter ischemic changes.  No mass or mass effect.  VENTRICLES / SHIFT:  No hydrocephalus. No midline shift.  EXTRA-AXIAL / BASAL CISTERNS:  No extra-axial mass. Basal cisterns   preserved.  CALVARIUM AND EXTRACRANIAL SOFT TISSUES:  No depressed calvarial fracture.  SINUSES, ORBITS, MASTOIDS:  The visualized paranasal sinuses and mastoid   air cells are well aerated.    IMPRESSION:  No evidence of acute intracranial hemorrhage or hydrocephalus. Atrophy   with white matter ischemic changes.    --- End of Report ---    < end of copied text >  < from: CT Angio Chest PE Protocol w/ IV Cont (11.01.24 @ 20:22) >  IMPRESSION:  1.   No acute pulmonary embolism.  2.   Redemonstrated small right apical pneumothorax, similar to 10/31/2024  chest radiographs, decreased in size from 10/19/2024 CT.  3.   Small right pleural effusion/hemothorax, similar to prior. Trace left  pleural effusion.  4.   Redemonstrated mildly displaced fractures of the right   posterolateral 4th  rib and posterior 6th through 9th ribs. The 6th rib fracture is segmental.  Redemonstrated minimally displaced fractures of the right T6-T9 transverse  processes, and mildly displaced fractures of the T5-9 spinous processes.    < end of copied text >

## 2024-11-02 NOTE — H&P ADULT - HISTORY OF PRESENT ILLNESS
Patient is a 73 year old female with Hx CREST syndrome, hypothyroidism, hld CKD3, CAD (s/p stents, on ASA), colonic inertia, rectal prolapse (s/p ?suspensory surgical procedure, colon resection and rectopexy [2011], and repeat open rectopexy with mesh [2016]), and recurent SBOs (s/p ex-lap, SBR, diverting loop ileostomy [2017], and rectal stricture, managed non-operatively) p/t ED for altered mental status, generalized shaking, confusion, and chills. Of note, patient was previously admitted 2 weeks ago following fall l/t rib fractures and T spine TP fx. Endorses abdominal pain a/w nausea, no emesis. Normal ostomy output. Has had decreased PO intake for the last few days. States that this feels similar to last SBO but with increased abdominal pain. Still tolerating PO intake, no vomiting. No chest pain or shortness of breath. No dysuria. No neck stiffness. No meds taken prior to arrival for pain/fever. Patient is a 73 year old female with Hx CREST syndrome, hypothyroidism, hld CKD3, CAD (s/p stents, on ASA), colonic inertia, rectal prolapse (s/p ?suspensory surgical procedure, colon resection and rectopexy [2011], and repeat open rectopexy with mesh [2016]), and recurent SBOs (s/p ex-lap, SBR, diverting loop ileostomy [2017], and rectal stricture, managed non-operatively) p/t ED for altered mental status, generalized shaking, confusion, and chills. Of note, patient was previously admitted 2 weeks ago following fall l/t rib fractures and T spine TP fx. Endorses abdominal pain a/w nausea, no emesis. Normal ostomy output. Has had decreased PO intake for the last few days. States that this feels similar to last SBO but with increased abdominal pain. Still tolerating PO intake, no vomiting. No chest pain or shortness of breath. No dysuria. No neck stiffness. No meds taken prior to arrival for pain/fever.    In the ED, patient was given a dose of ceftriaxone, and then was transitioned to vancomycin and zosyn to cover for meginigitis/encephalitis. CT abdomen performed which showed partial SBO and small/large bowel wall thickening. Patient reports ostomy output increased from baseline.

## 2024-11-02 NOTE — H&P ADULT - PROBLEM SELECTOR PLAN 11
INR 2.95  Plan  Trend INR. Can consider vitamin K supplementation pending consistent elevation. Plan  C/W home cholecalciferol 5000 qd

## 2024-11-02 NOTE — PHYSICAL THERAPY INITIAL EVALUATION ADULT - PLANNED THERAPY INTERVENTIONS, PT EVAL
Stair training... GOAL: In 2 weeks pt will negotiate 4 stairs independently with least restrictive device./balance training/bed mobility training/gait training/strengthening/transfer training

## 2024-11-02 NOTE — H&P ADULT - PROBLEM SELECTOR PLAN 1
Noncon CT head negative  Acyclovir given 11/2 and d/c, ce  11/1: vancomycin 1 dose  11/2: acyclovir 1 dose, ctx 1 dose, zosyn,   c. diff negative  RVP negative  U/A negative  Plan  - Lumbar puncture contraindicated due to INR 2.95  - Continue zosyn for coverage of meningitis/encephalitis pending infectious workup  - GI PCR, urine culture, blood culture pending Noncon CT head negative  Acyclovir given 11/2 and d/c, ce  11/1: vancomycin 1 dose  11/2: acyclovir 1 dose, ctx 1 dose, zosyn,   c. diff negative  RVP negative  U/A negative  Plan  - Lumbar puncture contraindicated due to INR 2.95  - Continue zosyn and vanc for coverage of meningitis/encephalitis pending infectious workup  - GI PCR, urine culture, blood culture pending Noncon CT head negative  Acyclovir given 11/2 and d/c, ce  11/1: vancomycin 1 dose  11/2: acyclovir 1 dose, ctx 1 dose, zosyn,   c. diff negative  RVP negative  U/A negative  Plan  - Lumbar puncture contraindicated due to INR 2.95  - Switch abx from vanc + zosyn to metronidazole + ceftriaxone for GI coverage pending infectious workup  - GI PCR, urine culture, blood culture pending

## 2024-11-02 NOTE — H&P ADULT - PROBLEM SELECTOR PLAN 12
CT angio chest 11/1: No acute pulmonary embolism, New displaced posterior 4th rib fracture. Redemonstrated small right apical pneumothorax, similar to 10/31/2024, decreased from 10/19/2024 CT, small right pleural effusion/hemothorax, similar to prior. Redemonstrated mildly displaced fractures of the right posterolateral 4th rib and posterior 6th through 9th ribs, right T6-T9 transverse processes, and mildly displaced fractures of the T5-9 spinous processes. Unchanged cluster of  nodules up to 6 mm in the left upper lobe    Plan  Patient will continue to follow pulmonology and repeat CT in 6 months. Plan  c/w home primidone

## 2024-11-02 NOTE — PHYSICAL THERAPY INITIAL EVALUATION ADULT - GENERAL OBSERVATIONS, REHAB EVAL
Chart reviewed events to date noted. Blood glucose reviewed. Pt tolerated 45min PT initial evaluation well. Rec' din bed in NAD, +ostomy, agreeable to PT.

## 2024-11-02 NOTE — H&P ADULT - NSHPPHYSICALEXAM_GEN_ALL_CORE
T(C): 36.9 (11-02-24 @ 10:50), Max: 39.5 (11-01-24 @ 16:14)  HR: 87 (11-02-24 @ 10:50) (79 - 104)  BP: 96/60 (11-02-24 @ 10:50) (96/48 - 125/57)  RR: 18 (11-02-24 @ 10:50) (16 - 20)  SpO2: 97% (11-02-24 @ 10:50) (95% - 100%)    GENERAL: well-appearing, NAD, appears comfortable  HEENT: NC/AT, EOMI, no conjunctival icterus, moist mucus membranes  NECK: supple, non-tender, no JVD, no LAD  LUNGS: non-labored breathing, CTAB, no wheezing/rales/rhonchi  CV: RRR, no m/r/g, 2+ palpable peripheral pulses bi/l, cap refill <2 secs  ABD: non-distended, soft, non-tender, no rebound tenderness or guarding  : no CVA tenderness  MSK: full ROM, strength 5/5 bi/l  EXT: warm and well-perfused, no peripheral edema  SKIN: no rashes or lesions  NEURO: AAOx3, no focal deficits

## 2024-11-02 NOTE — H&P ADULT - PROBLEM SELECTOR PLAN 2
CT abdomen 11/1: dilated SB with transition point in RLQ, c/f partial bowel obstruction. Large and SB wall thickening and mural hyperemia - c/f inflammatory, infectious, or ischemic process.  -Partial SBO resolving with increased ostomy output.    Plan  - No acute surgical intervention indicated as per general surgery. CT abdomen 11/1: dilated SB with transition point in RLQ, c/f partial bowel obstruction. Large and SB wall thickening and mural hyperemia - c/f inflammatory, infectious, or ischemic process.  -Partial SBO resolving with increased ostomy output.    Plan  - No acute surgical intervention indicated as per general surgery.  - On CLD. Can advance as tolerated.

## 2024-11-02 NOTE — CONSULT NOTE ADULT - ASSESSMENT
73F PMH CREST syndrome, HLD, hypothyroid, CAD s/p stent, partial SBOs and high ileostomy output, PSH ex-lap, SBR, DLI, Hysterectomy p/t ED for altered mental status, generalized shaking, confusion, and chills. In the ED, patient was febrile 103F, VSS, WBC 6.4, Hgb 11/33, INR 2.47, Na 128, K 3.3, UA negative. CT chows dilated SB in RLQ, c/f partial bowel obstruction. Large and SB wall thickening and mural hyperemia - c/f inflammatory, infectious, or ischemic process.     PLAN  - No acute surgical intervention  - Partial obstruction resolving based on ostomy output in ED  - Recommend GI PCR  - Remainder of fever work-up per ED    Discussed with attending    ACS/Trauma  [phone] (pager)    73F PMH CREST syndrome, HLD, hypothyroid, CAD s/p stent, partial SBOs and high ileostomy output, PSH ex-lap, SBR, DLI, Hysterectomy p/t ED for altered mental status, generalized shaking, confusion, and chills. In the ED, patient was febrile 103F, VSS, WBC 6.4, Hgb 11/33, INR 2.47, Na 128, K 3.3, UA negative. CT chows dilated SB in RLQ, c/f partial bowel obstruction. Large and SB wall thickening and mural hyperemia - c/f inflammatory, infectious, or ischemic process.     PLAN  - No acute surgical intervention  - Partial obstruction resolving based on ostomy output in ED  - Recommend GI PCR  - Remainder of fever work-up per ED    Discussed with attending, Dr. Encarnacion    ACS/Trauma  [phone] (pager)    73 F with Hx CREST syndrome, hypothyroidism, CKD3, CAD (s/p stents, on ASA), colonic inertia, rectal prolapse (s/p ?suspensory surgical procedure, colon resection and rectopexy [2011], and repeat open rectopexy with mesh [2016]), and recurent SBOs (s/p ex-lap, SBR, diverting loop ileostomy [2017], and rectal stricture, managed non-operatively) p/t ED for altered mental status, generalized shaking, confusion, and chills. In the ED, patient was febrile 103F, VSS, WBC 6.4, Hgb 11/33, INR 2.47, Na 128, K 3.3, UA negative. CT chows dilated SB in RLQ, c/f partial bowel obstruction. Large and SB wall thickening and mural hyperemia - c/f inflammatory, infectious, or ischemic process.     PLAN  - No acute surgical intervention  - Partial obstruction resolving based on ostomy output in ED  - Recommend GI PCR  - Remainder of fever work-up per ED    Discussed with attending, Dr. Encarnacion    ACS/Trauma  [phone] (pager)

## 2024-11-02 NOTE — PHYSICAL THERAPY INITIAL EVALUATION ADULT - GAIT DEVIATIONS NOTED, PT EVAL
decreased jewels/increased time in double stance/decreased step length/decreased stride length/decreased weight-shifting ability

## 2024-11-02 NOTE — H&P ADULT - PROBLEM SELECTOR PLAN 9
Plan  C/W home cholecalciferol 5000 qd - patient has hx of esophageal dysmotility and raynaud's    Plan  CTM

## 2024-11-02 NOTE — PHYSICAL THERAPY INITIAL EVALUATION ADULT - PERTINENT HX OF CURRENT PROBLEM, REHAB EVAL
74y/o F with Hx CREST syndrome, hypothyroidism, hld CKD3, CAD (s/p stents, on ASA), colonic inertia, rectal prolapse (s/p ?suspensory surgical procedure, colon resection and rectopexy [2011], and repeat open rectopexy with mesh [2016]), and recurent SBOs (s/p ex-lap, SBR, diverting loop ileostomy [2017], and rectal stricture, managed non-operatively) p/t ED for altered mental status, generalized shaking, confusion, and chills. Of note, patient was previously admitted 2 weeks ago following fall l/t rib fractures and T spine TP fx. Endorses abdominal pain a/w nausea, no emesis. Normal ostomy output. Has had decreased PO intake for the last few days. States that this feels similar to last SBO but with increased abdominal pain. Still tolerating PO intake, no vomiting. No chest pain or shortness of breath. No dysuria. No neck stiffness. No meds taken prior to arrival for pain/fever.  CTH (-) for acute findings  CT A&P: No pulmonary embolus. Decreased trace right pneumothorax. Unchanged nodules up to 6 mm in the left upper lobe. Recommend   surveillance noncontrast CT chest in 6 months. Findings suggestive of partial small bowel obstruction upstream from right lower quadrant ileostomy, as well as large bowel obstruction proximal to known rectal sigmoid anastomosis stricture. Similar findings were present on CT abdomen/pelvis April 2024. Wall thickening and hyperemia of bowel associated with the latter which may be infectious, inflammatory, or ischemic. Recommend correlation with lactate.

## 2024-11-02 NOTE — H&P ADULT - PROBLEM SELECTOR PLAN 7
- patient has hx of esophageal dysmotility and raynaud's    Plan  CTM C/w home rosuvastatin 20 mg qd

## 2024-11-02 NOTE — H&P ADULT - PROBLEM/PLAN-10
"Oncology Rooming Note    April 3, 2018 8:18 AM   Rosa Maria Kelley is a 37 year old female who presents for:    Chief Complaint   Patient presents with     Oncology Clinic Visit     Breast: Hypertrophy     Initial Vitals: BP (P) 112/78  Pulse (P) 90  Temp (P) 99.2  F (37.3  C) (Oral)  Resp 16  Ht 1.676 m (5' 6\")  Wt 81.7 kg (180 lb 3 oz)  SpO2 98%  BMI 29.08 kg/m2 Estimated body mass index is 29.08 kg/(m^2) as calculated from the following:    Height as of this encounter: 1.676 m (5' 6\").    Weight as of this encounter: 81.7 kg (180 lb 3 oz). Body surface area is 1.95 meters squared.  No Pain (0) Comment: Data Unavailable   No LMP recorded. Patient has had a hysterectomy.  Allergies reviewed: Yes  Medications reviewed: Yes    Medications: Medication refills not needed today.  Pharmacy name entered into [employer]:    [facility] [place], [place] - [address] [place] [address] [address] [place] [address] DRUG STORE [address] [place] [address] - [address] [place] [address] AT [address]    Clinical concerns: new concerns are that she feels that her right breast is larger than the left and she has more bruising on that side as well. Dr. Rahman was NOT notified.    8 minutes for nursing intake (face to face time)     Diana Macdonald CMA              "
DISPLAY PLAN FREE TEXT

## 2024-11-02 NOTE — H&P ADULT - MLM HIDDEN
"Subjective:      Virginia Guzman is a 36 y.o. female who presents with Follow-Up and Eye Injury (feels much better)      DOI: 11/21/19  Patient was seen in  initially after injury on 11/21.  Was working with a blower and blew dust and other particles into her eye.  No obvious corneal abrasion noted on initial exam, patient has been on erythromycin ointment since.  She presents today at 24 hours for follow-up as requested.  Patient states she has improved significantly and states she is pain-free at this time.  No visual changes.  She has been using the ophthalmic ointment as directed.     Eye Injury    The right eye is affected. This is a new problem. The current episode started yesterday. The problem has been resolved. The injury mechanism was a foreign body. The patient is experiencing no pain. Pertinent negatives include no blurred vision, eye discharge, eye redness, foreign body sensation, nausea, photophobia or vomiting. Treatments tried: ophthalmic ointment. The treatment provided significant relief.       Review of Systems   Eyes: Positive for pain. Negative for blurred vision, photophobia, discharge and redness.   Gastrointestinal: Negative for nausea and vomiting.          Objective:     /80   Pulse 78   Temp 36.7 °C (98.1 °F) (Temporal)   Resp 16   Ht 1.702 m (5' 7\")   Wt 88.5 kg (195 lb)   SpO2 99%   BMI 30.54 kg/m²      Physical Exam    No injection, redness, tearing, or drainage is noted in the right eye at this time.  No sub-conjunctival hemorrhage noted.       Assessment/Plan:       1. Abrasion of right cornea, subsequent encounter    Symptoms have resolved  Patient discharge for MMI  Continue ophthalmic ointment as directed  Return for recurrent symptoms    " yes

## 2024-11-02 NOTE — H&P ADULT - ASSESSMENT
Patient is a 73 year old female with Hx CREST syndrome, hypothyroidism, hld CKD3, CAD (s/p stents, on ASA), colonic inertia, rectal prolapse (s/p ?suspensory surgical procedure, colon resection and rectopexy [2011], and repeat open rectopexy with mesh [2016]), and recurent SBOs (s/p ex-lap, SBR, diverting loop ileostomy [2017], and rectal stricture, managed non-operatively) p/t ED for altered mental status, generalized shaking, confusion, and chills. Given the increased watery ostomy output, this likely is a GI infection pending stool studies.

## 2024-11-03 LAB
ALBUMIN SERPL ELPH-MCNC: 3 G/DL — LOW (ref 3.3–5)
ALP SERPL-CCNC: 98 U/L — SIGNIFICANT CHANGE UP (ref 40–120)
ALT FLD-CCNC: 12 U/L — SIGNIFICANT CHANGE UP (ref 10–45)
ANION GAP SERPL CALC-SCNC: 9 MMOL/L — SIGNIFICANT CHANGE UP (ref 5–17)
APTT BLD: 44 SEC — HIGH (ref 24.5–35.6)
AST SERPL-CCNC: 18 U/L — SIGNIFICANT CHANGE UP (ref 10–40)
BASOPHILS # BLD AUTO: 0.05 K/UL — SIGNIFICANT CHANGE UP (ref 0–0.2)
BASOPHILS NFR BLD AUTO: 0.7 % — SIGNIFICANT CHANGE UP (ref 0–2)
BILIRUB SERPL-MCNC: 0.3 MG/DL — SIGNIFICANT CHANGE UP (ref 0.2–1.2)
BUN SERPL-MCNC: 16 MG/DL — SIGNIFICANT CHANGE UP (ref 7–23)
CALCIUM SERPL-MCNC: 8.4 MG/DL — SIGNIFICANT CHANGE UP (ref 8.4–10.5)
CHLORIDE SERPL-SCNC: 96 MMOL/L — SIGNIFICANT CHANGE UP (ref 96–108)
CO2 SERPL-SCNC: 29 MMOL/L — SIGNIFICANT CHANGE UP (ref 22–31)
CREAT SERPL-MCNC: 0.98 MG/DL — SIGNIFICANT CHANGE UP (ref 0.5–1.3)
CULTURE RESULTS: SIGNIFICANT CHANGE UP
EGFR: 60 ML/MIN/1.73M2 — SIGNIFICANT CHANGE UP
EOSINOPHIL # BLD AUTO: 0.57 K/UL — HIGH (ref 0–0.5)
EOSINOPHIL NFR BLD AUTO: 8.4 % — HIGH (ref 0–6)
GLUCOSE SERPL-MCNC: 109 MG/DL — HIGH (ref 70–99)
HCT VFR BLD CALC: 32.1 % — LOW (ref 34.5–45)
HGB BLD-MCNC: 10.5 G/DL — LOW (ref 11.5–15.5)
IMM GRANULOCYTES NFR BLD AUTO: 0.7 % — SIGNIFICANT CHANGE UP (ref 0–0.9)
INR BLD: 3.33 RATIO — HIGH (ref 0.85–1.16)
LYMPHOCYTES # BLD AUTO: 0.48 K/UL — LOW (ref 1–3.3)
LYMPHOCYTES # BLD AUTO: 7.1 % — LOW (ref 13–44)
MAGNESIUM SERPL-MCNC: 1.9 MG/DL — SIGNIFICANT CHANGE UP (ref 1.6–2.6)
MCHC RBC-ENTMCNC: 28.8 PG — SIGNIFICANT CHANGE UP (ref 27–34)
MCHC RBC-ENTMCNC: 32.7 G/DL — SIGNIFICANT CHANGE UP (ref 32–36)
MCV RBC AUTO: 87.9 FL — SIGNIFICANT CHANGE UP (ref 80–100)
MONOCYTES # BLD AUTO: 0.43 K/UL — SIGNIFICANT CHANGE UP (ref 0–0.9)
MONOCYTES NFR BLD AUTO: 6.4 % — SIGNIFICANT CHANGE UP (ref 2–14)
NEUTROPHILS # BLD AUTO: 5.19 K/UL — SIGNIFICANT CHANGE UP (ref 1.8–7.4)
NEUTROPHILS NFR BLD AUTO: 76.7 % — SIGNIFICANT CHANGE UP (ref 43–77)
NRBC # BLD: 0 /100 WBCS — SIGNIFICANT CHANGE UP (ref 0–0)
PHOSPHATE SERPL-MCNC: 1.8 MG/DL — LOW (ref 2.5–4.5)
PLATELET # BLD AUTO: 286 K/UL — SIGNIFICANT CHANGE UP (ref 150–400)
POTASSIUM SERPL-MCNC: 3.2 MMOL/L — LOW (ref 3.5–5.3)
POTASSIUM SERPL-SCNC: 3.2 MMOL/L — LOW (ref 3.5–5.3)
PROT SERPL-MCNC: 6 G/DL — SIGNIFICANT CHANGE UP (ref 6–8.3)
PROTHROM AB SERPL-ACNC: 37.8 SEC — HIGH (ref 9.9–13.4)
RBC # BLD: 3.65 M/UL — LOW (ref 3.8–5.2)
RBC # FLD: 15.6 % — HIGH (ref 10.3–14.5)
SODIUM SERPL-SCNC: 134 MMOL/L — LOW (ref 135–145)
SPECIMEN SOURCE: SIGNIFICANT CHANGE UP
WBC # BLD: 6.77 K/UL — SIGNIFICANT CHANGE UP (ref 3.8–10.5)
WBC # FLD AUTO: 6.77 K/UL — SIGNIFICANT CHANGE UP (ref 3.8–10.5)

## 2024-11-03 PROCEDURE — 99233 SBSQ HOSP IP/OBS HIGH 50: CPT | Mod: GC

## 2024-11-03 RX ORDER — LIDOCAINE HYDROCHLORIDE 40 MG/ML
2 SOLUTION TOPICAL DAILY
Refills: 0 | Status: DISCONTINUED | OUTPATIENT
Start: 2024-11-03 | End: 2024-11-07

## 2024-11-03 RX ORDER — PIPERACILLIN AND TAZOBACTAM .5; 4 G/20ML; G/20ML
3.38 INJECTION, POWDER, LYOPHILIZED, FOR SOLUTION INTRAVENOUS EVERY 8 HOURS
Refills: 0 | Status: DISCONTINUED | OUTPATIENT
Start: 2024-11-04 | End: 2024-11-05

## 2024-11-03 RX ORDER — DIBASIC SODIUM PHOSPHATE, MONOBASIC POTASSIUM PHOSPHATE AND MONOBASIC SODIUM PHOSPHATE 852; 155; 130 MG/1; MG/1; MG/1
1 TABLET ORAL ONCE
Refills: 0 | Status: COMPLETED | OUTPATIENT
Start: 2024-11-03 | End: 2024-11-03

## 2024-11-03 RX ORDER — PIPERACILLIN AND TAZOBACTAM .5; 4 G/20ML; G/20ML
3.38 INJECTION, POWDER, LYOPHILIZED, FOR SOLUTION INTRAVENOUS ONCE
Refills: 0 | Status: COMPLETED | OUTPATIENT
Start: 2024-11-03 | End: 2024-11-03

## 2024-11-03 RX ORDER — LOPERAMIDE HCL 2 MG
2 TABLET ORAL ONCE
Refills: 0 | Status: COMPLETED | OUTPATIENT
Start: 2024-11-03 | End: 2024-11-03

## 2024-11-03 RX ORDER — POTASSIUM CHLORIDE 10 MEQ
40 TABLET, EXTENDED RELEASE ORAL EVERY 4 HOURS
Refills: 0 | Status: COMPLETED | OUTPATIENT
Start: 2024-11-03 | End: 2024-11-03

## 2024-11-03 RX ORDER — SODIUM CHLORIDE 9 MG/ML
1000 INJECTION, SOLUTION INTRAMUSCULAR; INTRAVENOUS; SUBCUTANEOUS
Refills: 0 | Status: DISCONTINUED | OUTPATIENT
Start: 2024-11-03 | End: 2024-11-05

## 2024-11-03 RX ORDER — CALCIUM CARBONATE 400(1000)
2 TABLET,CHEWABLE ORAL EVERY 12 HOURS
Refills: 0 | Status: DISCONTINUED | OUTPATIENT
Start: 2024-11-03 | End: 2024-11-07

## 2024-11-03 RX ADMIN — Medication 325 MILLIGRAM(S): at 12:37

## 2024-11-03 RX ADMIN — LIDOCAINE HYDROCHLORIDE 2 PATCH: 40 SOLUTION TOPICAL at 19:45

## 2024-11-03 RX ADMIN — Medication 6 MILLIGRAM(S): at 21:08

## 2024-11-03 RX ADMIN — Medication 5000 UNIT(S): at 12:37

## 2024-11-03 RX ADMIN — SODIUM CHLORIDE 100 MILLILITER(S): 9 INJECTION, SOLUTION INTRAMUSCULAR; INTRAVENOUS; SUBCUTANEOUS at 08:01

## 2024-11-03 RX ADMIN — Medication 2 TABLET(S): at 23:37

## 2024-11-03 RX ADMIN — LIDOCAINE HYDROCHLORIDE 2 PATCH: 40 SOLUTION TOPICAL at 12:36

## 2024-11-03 RX ADMIN — PRIMIDONE 50 MILLIGRAM(S): 50 TABLET ORAL at 12:37

## 2024-11-03 RX ADMIN — Medication 40 MILLIEQUIVALENT(S): at 12:36

## 2024-11-03 RX ADMIN — Medication 20 MILLIGRAM(S): at 21:08

## 2024-11-03 RX ADMIN — Medication 81 MILLIGRAM(S): at 12:37

## 2024-11-03 RX ADMIN — SODIUM CHLORIDE 75 MILLILITER(S): 9 INJECTION, SOLUTION INTRAMUSCULAR; INTRAVENOUS; SUBCUTANEOUS at 06:44

## 2024-11-03 RX ADMIN — Medication 2 MILLIGRAM(S): at 16:22

## 2024-11-03 RX ADMIN — DIBASIC SODIUM PHOSPHATE, MONOBASIC POTASSIUM PHOSPHATE AND MONOBASIC SODIUM PHOSPHATE 1 PACKET(S): 852; 155; 130 TABLET ORAL at 12:36

## 2024-11-03 RX ADMIN — Medication 100 MILLIGRAM(S): at 03:54

## 2024-11-03 RX ADMIN — FOLIC ACID 1 MILLIGRAM(S): 1 TABLET ORAL at 12:37

## 2024-11-03 RX ADMIN — Medication 1000 MICROGRAM(S): at 12:37

## 2024-11-03 RX ADMIN — Medication 40 MILLIEQUIVALENT(S): at 16:22

## 2024-11-03 RX ADMIN — METRONIDAZOLE 500 MILLIGRAM(S): 250 TABLET ORAL at 06:44

## 2024-11-03 RX ADMIN — Medication 88 MICROGRAM(S): at 06:44

## 2024-11-03 NOTE — PROGRESS NOTE ADULT - PROBLEM SELECTOR PLAN 4
INR 2.95  Plan  Trend INR. Can consider vitamin K supplementation pending consistent elevation. INR 2.95  Plan  Trend INR. Can consider vitamin K supplementation pending consistent elevation.  - Vitamin D, B12 and folate ordered

## 2024-11-03 NOTE — DISCHARGE NOTE PROVIDER - NSDCCPCAREPLAN_GEN_ALL_CORE_FT
PRINCIPAL DISCHARGE DIAGNOSIS  Diagnosis: Fever  Assessment and Plan of Treatment: When you came to the hospital, you had a fever of 103 and had altered mental status, chills and tremors. You were given antibiotics for this. After we did infectious workup which was negative, we stopped the antibiotics and you were treated supportively for a viral infection.      SECONDARY DISCHARGE DIAGNOSES  Diagnosis: Elevated INR  Assessment and Plan of Treatment: When you came into the hospital, your labs showed that your blood had low blood clotting factors, likely due to a vitamin K deficiency. You were given a transfusion of these clotting factors.    Diagnosis: Partial small bowel obstruction  Assessment and Plan of Treatment: When you came in, you had abdominal pain and cat scan was done which showed obstructiuon in small intestine, which you have had previous episodes of in the past. Surgery saw you and stated no need for surgical intervention and the obstruction relieved on its own.    Diagnosis: Rib fracture  Assessment and Plan of Treatment: A few weeks ago, you presented the hospital after a fall with rib fractures. You were treated with pain control and discharged. You had a cat scan done in the hospital which showed similar to last admission, however a new fracture in rib 4. You were treated with pain control for it during this hospital stay.    Diagnosis: Superficial thrombophlebitis  Assessment and Plan of Treatment: On 11/4, you developed swelling in your arms, mostly near your left elbow. You were treated with warm compresses and had an ultrasound done which did not show any deep clots.    Diagnosis: Pulmonary nodule  Assessment and Plan of Treatment: A cat scan done in the hospital of your chest, showed a few nodules in your left upper lung. You should followup with pulmonologist outpatient for monitoring.    Diagnosis: Hematemesis  Assessment and Plan of Treatment: During the early morning of 11/3, you vomited blood and your blood counts dropped. You were given a blood transfusionand an endoscopy which showed severe esophagitis and stomach ulcers. Your blood counts improved. You were put on acid stopping medications which you will continue outpatient.     PRINCIPAL DISCHARGE DIAGNOSIS  Diagnosis: Fever  Assessment and Plan of Treatment: When you came to the hospital, you had a fever of 103 and had altered mental status, chills and tremors. You were given antibiotics for this. After we did infectious workup which was negative, we stopped the antibiotics and you were treated supportively for a viral infection.  Medication Changes:  PLEASE START TAKIN. Protonix 40 mg twice a day  PLEASE STOP TAKIN. Nexium 40 mg once a day  Please make sure to follow up with all the clinics listed in the "Follow-up" section of your discharge paperwork. If an appointment has been made for you, please make sure to attend the appointment, and call the clinic if you would like to reschedule. If an appointment has not bee made for you, please call the clinic in the number listed to make the appointment.  Please make sure to follow up with your primary care physician within the next 1-2 weeks to follow up on your condition.  Please return to the emergency room if you experience persistent fever, chills, nausea, vomiting, shortness of breath, lightheadedness/syncope.      SECONDARY DISCHARGE DIAGNOSES  Diagnosis: Elevated INR  Assessment and Plan of Treatment: When you came into the hospital, your labs showed that your blood had low blood clotting factors, likely due to a vitamin K deficiency. You were given a transfusion of these clotting factors.    Diagnosis: Partial small bowel obstruction  Assessment and Plan of Treatment: When you came in, you had abdominal pain and cat scan was done which showed obstructiuon in small intestine, which you have had previous episodes of in the past. Surgery saw you and stated no need for surgical intervention and the obstruction relieved on its own.    Diagnosis: Rib fracture  Assessment and Plan of Treatment: A few weeks ago, you presented the hospital after a fall with rib fractures. You were treated with pain control and discharged. You had a cat scan done in the hospital which showed similar to last admission, however a new fracture in rib 4. You were treated with pain control for it during this hospital stay.    Diagnosis: Superficial thrombophlebitis  Assessment and Plan of Treatment: On , you developed swelling in your arms, mostly near your left elbow. You were treated with warm compresses and had an ultrasound done which did not show any deep clots.    Diagnosis: Pulmonary nodule  Assessment and Plan of Treatment: A cat scan done in the hospital of your chest, showed a few nodules in your left upper lung. You should followup with pulmonologist outpatient for monitoring.    Diagnosis: Hematemesis  Assessment and Plan of Treatment: During the early morning of 11/3, you vomited blood and your blood counts dropped. You were given a blood transfusionand an endoscopy which showed severe esophagitis and stomach ulcers. Your blood counts improved. You were put on acid stopping medications which you will continue outpatient.     PRINCIPAL DISCHARGE DIAGNOSIS  Diagnosis: Fever  Assessment and Plan of Treatment: When you came to the hospital, you had a fever of 103 and had altered mental status, chills and tremors. You were given antibiotics for this. After we did infectious workup which was negative except for Norovirus, thus we stopped the antibiotics and you were treated supportively for a viral infection.  Medication Changes:  PLEASE START TAKIN. Protonix 40 mg twice a day  PLEASE STOP TAKIN. Nexium 40 mg once a day  Please make sure to follow up with all the clinics listed in the "Follow-up" section of your discharge paperwork. If an appointment has been made for you, please make sure to attend the appointment, and call the clinic if you would like to reschedule. If an appointment has not bee made for you, please call the clinic in the number listed to make the appointment.  Please make sure to follow up with your primary care physician within the next 1-2 weeks to follow up on your condition.  Please return to the emergency room if you experience persistent fever, chills, nausea, vomiting, shortness of breath, lightheadedness/syncope.      SECONDARY DISCHARGE DIAGNOSES  Diagnosis: Hematemesis  Assessment and Plan of Treatment: During the early morning of 11/3, you vomited blood and your blood counts dropped. You were given a blood transfusionand an endoscopy which showed severe esophagitis and stomach ulcers. Your blood counts improved. You were put on acid stopping medications which you will continue outpatient.    Diagnosis: Elevated INR  Assessment and Plan of Treatment: When you came into the hospital, your labs showed that your blood had low blood clotting factors, likely due to a vitamin K deficiency. You were given a transfusion of these clotting factors.    Diagnosis: Partial small bowel obstruction  Assessment and Plan of Treatment: When you came in, you had abdominal pain and cat scan was done which showed obstructiuon in small intestine, which you have had previous episodes of in the past. Surgery saw you and stated no need for surgical intervention and the obstruction relieved on its own.    Diagnosis: Rib fracture  Assessment and Plan of Treatment: A few weeks ago, you presented the hospital after a fall with rib fractures. You were treated with pain control and discharged. You had a cat scan done in the hospital which showed similar to last admission, however a new fracture in rib 4. You were treated with pain control for it during this hospital stay.    Diagnosis: Superficial thrombophlebitis  Assessment and Plan of Treatment: On , you developed swelling in your arms, mostly near your left elbow. You were treated with warm compresses and had an ultrasound done which did not show any deep clots.    Diagnosis: Pulmonary nodule  Assessment and Plan of Treatment: A CT scan done in the hospital of your chest, showed a few nodules in your left upper lung. You should followup with pulmonologist outpatient for monitoring.

## 2024-11-03 NOTE — PATIENT PROFILE ADULT - STATED REASON FOR ADMISSION
[facility]    [address] [place] [address]    Phone:  [phone]    Fax:  [phone]       Thank You for choosing us for your health care visit. We are glad to serve you and happy to provide you with this summary of your visit. Please help us to ensure we have accurate records. If you find anything that needs to be changed, please let our staff know as soon as possible.          Your Demographic Information     Patient Name Sex     Linda Khan Female 2019       Ethnic Group Patient Race    Not of  or  Origin White      Your Visit Details     Date & Time Provider Department    2017 9:00 AM Johanna Dey MD [facility]      Your Upcoming Appointment*(Max 10)     2017  8:00 AM CDT   Well Child Exam with Johanna Dey MD   [facility] ([facility]    [address] [place] [address]   [phone]              Your To Do List     Follow-Up    Return for 4 month well exam.      We Ordered or Performed the Following     DTAP HIB IPV 0 THRU 4 YRS     HEPATITIS B VACCINE,PED/ADOL,IM     PNEUMOCOCCAL CONJUGATE 13 VALENT     ROTAVIRUS VACCINE - MONOVALENT - 2 DOSE       Conditions Discussed Today or Order-Related Diagnoses        Comments    Encounter for routine child health examination without abnormal findings    -  Primary     Need for vaccination           Your Vitals Were     Temp Height Weight HC BMI Smoking Status    99.1 °F (37.3 °C) (Rectal) 1' 11\" (0.584 m) (70 %, Z= 0.53)* 10 lb 1 oz (4.564 kg) (16 %, Z= -1.00)* 37.5 cm (14.76\") (23 %, Z= -0.72)* 13.37 kg/m2 Never Smoker    *Growth percentiles are based on WHO (Girls, 0-2 years) data.      Medications Prescribed or Re-Ordered Today     None      Your Current Medications Are        Disp Refills Start End    VITAMIN D, CHOLECALCIFEROL, PO        Class: Historical Med    Route: Oral      Allergies     No Known Allergies      Immunizations History as of 2017     Name Date    DTaP/HIB/IPV 2017    Hepatitis B Child 2017, 2017  1:31 AM    Pneumococcal Conjugate 13 Valent 2017    Rotavirus - Monovalent 2017      Problem List as of 2017     Term  delivered vaginally, current hospitalization            Patient Instructions     None       6-8 rib fx,  sepsis

## 2024-11-03 NOTE — DISCHARGE NOTE PROVIDER - DETAILS OF MALNUTRITION DIAGNOSIS/DIAGNOSES
This patient has been assessed with a concern for Malnutrition and was treated during this hospitalization for the following Nutrition diagnosis/diagnoses:     -  11/06/2024: Severe protein-calorie malnutrition   -  11/06/2024: Underweight (BMI < 19)

## 2024-11-03 NOTE — PROGRESS NOTE ADULT - PROBLEM SELECTOR PLAN 1
Noncon CT head negative  Acyclovir given 11/2 and d/c, ce  11/1: vancomycin 1 dose  11/2: acyclovir 1 dose, ctx 1 dose, zosyn,   c. diff negative  RVP negative  U/A negative  Blood culture negative x 24 hours  Plan  - D/c antibiotics given negative infectious workup  - Urine culture pending Noncon CT head negative  Acyclovir given 11/2 and d/c, ce  11/1: vancomycin 1 dose  11/2: acyclovir 1 dose, ctx 1 dose, zosyn,   c. diff negative  RVP negative  U/A negative  Blood culture negative x 24 hours  Urine culture negative  Plan  - D/c antibiotics given negative infectious workup

## 2024-11-03 NOTE — DISCHARGE NOTE PROVIDER - CARE PROVIDER_API CALL
Steve Puri  Gastroenterology  [address], [address]  [place] [address]  Phone: [phone]  Fax: [phone]  Follow Up Time:     Chauncey Bryan  Surgery  [address], [address]  [place] [address]  Phone: [phone]  Fax: [phone]  Follow Up Time:     Lee Coley  Internal Medicine  [address], [address]  [place] [address]  Phone: [phone]  Fax: [phone]  Follow Up Time:

## 2024-11-03 NOTE — PATIENT PROFILE ADULT - FALL HARM RISK - HARM RISK INTERVENTIONS
Assistance with ambulation/Assistance OOB with selected safe patient handling equipment/Communicate Risk of Fall with Harm to all staff/Discuss with provider need for PT consult/Monitor gait and stability/Reinforce activity limits and safety measures with patient and family/Tailored Fall Risk Interventions/Visual Cue: Yellow wristband and red socks/Bed in lowest position, wheels locked, appropriate side rails in place/Call bell, personal items and telephone in reach/Instruct patient to call for assistance before getting out of bed or chair/Non-slip footwear when patient is out of bed/[place] to call system/Physically safe environment - no spills, clutter or unnecessary equipment/Purposeful Proactive Rounding/Room/bathroom lighting operational, light cord in reach

## 2024-11-03 NOTE — PROGRESS NOTE ADULT - PROBLEM SELECTOR PLAN 2
CT abdomen 11/1: dilated SB with transition point in RLQ, c/f partial bowel obstruction. Large and SB wall thickening and mural hyperemia - c/f inflammatory, infectious, or ischemic process.  -Partial SBO resolving with increased ostomy output.    Plan  - No acute surgical intervention indicated as per general surgery.  - On CLD. Can advance as tolerated. CT abdomen 11/1: dilated SB with transition point in RLQ, c/f partial bowel obstruction. Large and SB wall thickening and mural hyperemia - c/f inflammatory, infectious, or ischemic process.  -Partial SBO resolving with increased ostomy output.    Plan  - No acute surgical intervention indicated as per general surgery.  - Diet advanced to regular diet with ensure 3x day

## 2024-11-03 NOTE — PROGRESS NOTE ADULT - PROBLEM SELECTOR PLAN 3
CT angio chest 11/1: No acute pulmonary embolism, New displaced posterior 4th rib fracture. Redemonstrated small right apical pneumothorax, similar to 10/31/2024, decreased from 10/19/2024 CT, small right pleural effusion/hemothorax, similar to prior. Redemonstrated mildly displaced fractures of the right posterolateral 4th rib and posterior 6th through 9th ribs, right T6-T9 transverse processes, and mildly displaced fractures of the T5-9 spinous processes. Unchanged cluster of  nodules up to 6 mm in the left upper lobe    Plan  - Acetaminophen 650 mg q6h prn for mild pain.  - Lidocaine patch prn for back pain

## 2024-11-03 NOTE — DISCHARGE NOTE PROVIDER - NSDCMRMEDTOKEN_GEN_ALL_CORE_FT
acetaminophen 325 mg oral tablet: 2 tab(s) orally every 6 hours As needed Mild Pain (1 - 3)  aspirin 81 mg oral delayed release tablet: 1 tab(s) orally once a day  Citracal Petites 200 mg-6.25 mcg (250 intl units) oral tablet: 2 tab(s) orally 2 times a day  CoQ10 100 mg oral capsule: 2 cap(s) orally once a day  diphenoxylate-atropine 2.5 mg-0.025 mg oral tablet: 1 tab(s) orally every 8 hours MDD: 3 tablets  ferrous sulfate 325 mg (65 mg elemental iron) oral tablet: 1 tab(s) orally Monday, Wednesday, and Friday  Fish Oil 1000 mg oral capsule: 1 cap(s) orally 2 times a day  folic acid 1 mg oral tablet: 1 tab(s) orally once a day  gas-x:   iron: 30 mg orally 2 times a day  Melatonin 5 mg oral tablet: 1 tab(s) orally once a day (at bedtime)  NexIUM 40 mg oral delayed release capsule: 1 cap(s) orally once a day (in the evening)  Pepcid 20 mg oral tablet: 1 tab(s) orally once a day  Physical Therapy: Outpatient physical therapy 2x per week for 4 weeks  primidone 50 mg oral tablet: 1 tab(s) orally once a day  probiotic:   Prolia 60 mg/mL subcutaneous solution: 1 dose(s) subcutaneous every 6 months  rosuvastatin 20 mg oral tablet: 1 tab(s) orally once a day (at bedtime)  Synthroid 88 mcg (0.088 mg) oral tablet: 1 tab(s) orally once a day  traMADol 25 mg oral tablet: 1 tab(s) orally every 6 hours as needed for  severe pain MDD: 100mg  traMADol 50 mg oral tablet: 0.5 tab(s) orally every 6 hours as needed for  severe pain MDD: 100mg  Vitamin B12: 1000mcg with folic acid  Vitamin D3 125 mcg/mL (5000 intl units/mL) oral liquid: 2 milliliter(s) orally once a day   acetaminophen 325 mg oral tablet: 2 tab(s) orally every 6 hours As needed Mild Pain (1 - 3)  aspirin 81 mg oral delayed release tablet: 1 tab(s) orally once a day  Citracal Petites 200 mg-6.25 mcg (250 intl units) oral tablet: 2 tab(s) orally 2 times a day  CoQ10 100 mg oral capsule: 2 cap(s) orally once a day  diphenoxylate-atropine 2.5 mg-0.025 mg oral tablet: 1 tab(s) orally every 8 hours MDD: 3 tablets  DULoxetine 20 mg oral delayed release capsule: 1 cap(s) orally once a day  ferrous sulfate 325 mg (65 mg elemental iron) oral tablet: 1 tab(s) orally Monday, Wednesday, and Friday  Fish Oil 1000 mg oral capsule: 1 cap(s) orally 2 times a day  folic acid 1 mg oral tablet: 1 tab(s) orally once a day  gas-x:   iron: 30 mg orally 2 times a day  Melatonin 5 mg oral tablet: 1 tab(s) orally once a day (at bedtime)  NexIUM 40 mg oral delayed release capsule: 1 cap(s) orally once a day (in the evening)  Pepcid 20 mg oral tablet: 1 tab(s) orally once a day  Physical Therapy: Outpatient physical therapy 2x per week for 4 weeks  primidone 50 mg oral tablet: 1 tab(s) orally once a day  probiotic:   Prolia 60 mg/mL subcutaneous solution: 1 dose(s) subcutaneous every 6 months  rosuvastatin 20 mg oral tablet: 1 tab(s) orally once a day (at bedtime)  Synthroid 88 mcg (0.088 mg) oral tablet: 1 tab(s) orally once a day  traMADol 25 mg oral tablet: 1 tab(s) orally every 6 hours as needed for  severe pain MDD: 100mg  traMADol 50 mg oral tablet: 0.5 tab(s) orally every 6 hours as needed for  severe pain MDD: 100mg  Vitamin B12: 1000mcg with folic acid  Vitamin D3 125 mcg/mL (5000 intl units/mL) oral liquid: 2 milliliter(s) orally once a day   acetaminophen 325 mg oral tablet: 2 tab(s) orally every 6 hours As needed Mild Pain (1 - 3)  aspirin 81 mg oral delayed release tablet: 1 tab(s) orally once a day  Citracal Petites 200 mg-6.25 mcg (250 intl units) oral tablet: 2 tab(s) orally 2 times a day  CoQ10 100 mg oral capsule: 2 cap(s) orally once a day  diphenoxylate-atropine 2.5 mg-0.025 mg oral tablet: 1 tab(s) orally every 8 hours MDD: 3 tablets  DULoxetine 20 mg oral delayed release capsule: 1 cap(s) orally once a day  ferrous sulfate 325 mg (65 mg elemental iron) oral tablet: 1 tab(s) orally Monday, Wednesday, and Friday  Fish Oil 1000 mg oral capsule: 1 cap(s) orally 2 times a day  folic acid 1 mg oral tablet: 1 tab(s) orally once a day  gas-x:   iron: 30 mg orally 2 times a day  Melatonin 5 mg oral tablet: 1 tab(s) orally once a day (at bedtime)  NexIUM 40 mg oral delayed release capsule: 1 cap(s) orally 2 times a day  Pepcid 20 mg oral tablet: 1 tab(s) orally once a day  primidone 50 mg oral tablet: 1 tab(s) orally once a day  probiotic:   Prolia 60 mg/mL subcutaneous solution: 1 dose(s) subcutaneous every 6 months  rosuvastatin 20 mg oral tablet: 1 tab(s) orally once a day (at bedtime)  Synthroid 88 mcg (0.088 mg) oral tablet: 1 tab(s) orally once a day  Vitamin B12: 1000mcg with folic acid  Vitamin D3 125 mcg/mL (5000 intl units/mL) oral liquid: 2 milliliter(s) orally once a day

## 2024-11-03 NOTE — PATIENT PROFILE ADULT - NSTOBACCONEVERSMOKERY/N_GEN_A
From: Sangeeta Sweeney  To: Steven J Heyden  Sent: 5/12/2022 7:58 AM CDT  Subject: Update and refills    Good morning    Could I have refills for the following:    - LEVOTHYROXINE 0.112  - OMEPRAZOLE   - Aspirin 81 low dose tablets  Sent to [facility] in [place].    Also the temporal pain is still sustaining. I do notice when I lay down it calms down a bit but upon rising it throbs. I am scheduled for an IV infiltration therapy tomorrow morning. Went to ophthalmology yesterday and from  That standpoint I was told no issue.  thank you    Sangeeta   No

## 2024-11-03 NOTE — DISCHARGE NOTE PROVIDER - HOSPITAL COURSE
Patient is a 73 year old female with Hx CREST syndrome, hypothyroidism, hld CKD3, CAD (s/p stents, on ASA), colonic inertia, rectal prolapse (s/p ?suspensory surgical procedure, colon resection and rectopexy [2011], and repeat open rectopexy with mesh [2016]), and recurent SBOs (s/p ex-lap, SBR, diverting loop ileostomy [2017], and rectal stricture, managed non-operatively) presented to the ED with altered mental status, generalized shaking, confusion, and chills. Of note, patient was previously admitted 2 weeks ago following fall l/t rib fractures and T spine TP fx. She endorsed abdominal pain a/w nausea, no emesis. Patient had decreased PO intake for the last few days before admission.      In the ED, noncon CT head was performed which was negative. CT abdomen showed dilated SB with transition point in RLQ, c/f partial bowel obstruction. Large and SB wall thickening and mural hyperemia. Patient was treated empirically for meningitis with vancomycin, ceftriaxone zosyn and acyclovir. Surgery saw patient and recommended spportive treatment for partial SBO. The following day, patient was transitioned to .discharge    Patient is a 73 year old female with Hx CREST syndrome, hypothyroidism, hld CKD3, CAD (s/p stents, on ASA), colonic inertia, rectal prolapse (s/p ?suspensory surgical procedure, colon resection and rectopexy [2011], and repeat open rectopexy with mesh [2016]), and recurent SBOs (s/p ex-lap, SBR, diverting loop ileostomy [2017], and rectal stricture, managed non-operatively) presented to the ED with altered mental status, generalized shaking, confusion, and chills. Of note, patient was previously admitted 2 weeks ago following fall l/t rib fractures and T spine TP fx. She endorsed abdominal pain a/w nausea, no emesis. Patient had decreased PO intake for the last few days before admission.      In the ED, noncon CT head was performed which was negative. CT abdomen showed dilated SB with transition point in RLQ, c/f partial bowel obstruction. Large and SB wall thickening and mural hyperemia. Patient was treated empirically for meningitis with vancomycin, ceftriaxone zosyn and acyclovir. Surgery saw patient and recommended spportive treatment for partial SBO. The following day, patient was transitioned to For full details, please see H&P, progress notes, consult notes and ancillary notes.    78M with PMH HTN, DM2, HLD, syncope/ saddle PE/ LLE DVT (on Eliquis) underwent thrombectomy 6/2023,  severe pulmonary HTN on sildenafil,, prostate cancer s/p radiation 2008, recently underwent total knee Replacement on Sept 30th and has been in rehab afterwards presented to the ED with complaint of hematuria since 11/1 morning.     Patient admitted to Internal Medicine for further management.    Hospital Course:  Patient had a positive u/a and was started on ceftriaxone. To address, the hematuria, a bridges was placed and CBI was started per urology and home Eliquis was held. After the urine culture came back negative, antibiotics were stopped on 11/4.  On 11/4, CBI was clamped and since then there has been no signs of hematuria and Eliquis was restarted then.     On day of discharge, patient is clinically stable with no new exam findings or acute symptoms compared to prior. The patient was seen by the attending physician on the date of discharge and deemed stable and acceptable for discharge. The patient's chronic medical conditions were treated accordingly per the patient's home medication regimen. The patient's medication reconciliation (with changes made to chronic medications), follow up appointments, discharge orders, instructions, and significant lab and diagnostic studies are as noted.     Active or Pending Issues Requiring Follow-up:  - Followup with urologist Dr. Guerrero outpatient for hematuria workup and bridges catheter removal.  -     Discharge follow up action items:   1. Follow up with PCP in 1-2 weeks. Follow up with urologist Dr. Guerrero in 1-2 weeks.  2. Follow up labs: none  3. Medication changes: None  4. On hold medications: none  5. Incidental findings: none    Patient's ordered code status: Full code  Patient disposition: SANDY      Discharge Diagnoses:  # Hematuria  # UTI      Patient is a 73 year old female with Hx CREST syndrome, hypothyroidism, hld CKD3, CAD (s/p stents, on ASA), colonic inertia, rectal prolapse (s/p ?suspensory surgical procedure, colon resection and rectopexy [2011], and repeat open rectopexy with mesh [2016]), and recurent SBOs (s/p ex-lap, SBR, diverting loop ileostomy [2017], and rectal stricture, managed non-operatively) presented to the ED with altered mental status, generalized shaking, confusion, and chills. Of note, patient was previously admitted 2 weeks ago following fall l/t rib fractures and T spine TP fx. She endorsed abdominal pain a/w nausea, no emesis. Patient had decreased PO intake for the last few days before admission.      In the ED, noncon CT head was performed which was negative. CT abdomen showed dilated SB with transition point in RLQ, c/f partial bowel obstruction. Large and SB wall thickening and mural hyperemia. Patient was treated empirically for meningitis with vancomycin, ceftriaxone zosyn and acyclovir. Surgery saw patient and recommended spportive treatment for partial SBO. The following day, patient was transitioned to For full details, please see H&P, progress notes, consult notes and ancillary notes.    Patient is a 73 year old female with Hx CREST syndrome, hypothyroidism, hld CKD3, CAD (s/p stents, on ASA), colonic inertia, rectal prolapse (s/p ?suspensory surgical procedure, colon resection and rectopexy [2011], and repeat open rectopexy with mesh [2016]), and recurent SBOs (s/p ex-lap, SBR, diverting loop ileostomy [2017], and rectal stricture, managed non-operatively) presented to the ED with altered mental status, generalized shaking, confusion, and chills. Of note, patient was previously admitted 2 weeks ago following fall l/t rib fractures and T spine TP fx. She endorsed abdominal pain a/w nausea, no emesis. Patient had decreased PO intake for the last few days before admission.      Patient admitted to Internal Medicine for further management.    Hospital Course:    In the ED, noncon CT head was performed which was negative. CT abdomen showed dilated SB with transition point in RLQ, c/f partial bowel obstruction. Large and SB wall thickening and mural hyperemia. Patient was treated empirically for meningitis with vancomycin, ceftriaxone zosyn and acyclovir. Surgery saw patient and recommended spportive treatment for partial SBO. The following day, patient was transitioned to     On day of discharge, patient is clinically stable with no new exam findings or acute symptoms compared to prior. The patient was seen by the attending physician on the date of discharge and deemed stable and acceptable for discharge. The patient's chronic medical conditions were treated accordingly per the patient's home medication regimen. The patient's medication reconciliation (with changes made to chronic medications), follow up appointments, discharge orders, instructions, and significant lab and diagnostic studies are as noted.     Active or Pending Issues Requiring Follow-up:  - Followup with GI Dr. Puri in 8 weeks for repeat EGD  - Followup with Colorectal surgeon Dr. Bryan upon discharge    Discharge follow up action items:   1. Follow up with PCP in 1-2 weeks. Follow up with Fr. Bryan in 1-2 weeks.  2. Follow up labs: Vitamin K  3. Medication changes: Nexium 40 mg every day changed to pantoprazole 40 mg twice a day.  4. On hold medications: none  5. Incidental findings:  Unchanged cluster of nodules up to 6 mm in the left upper lobe found on CT 11/1/24.    Patient's ordered code status: Full code  Patient disposition: Home PT      Discharge Diagnoses:  # Hematuria  # UTI           For full details, please see H&P, progress notes, consult notes and ancillary notes.    Patient is a 73 year old female with Hx CREST syndrome, hypothyroidism, hld CKD3, CAD (s/p stents, on ASA), colonic inertia, rectal prolapse (s/p suspensory surgical procedure, colon resection and rectopexy [2011], and repeat open rectopexy with mesh [2016]), and recurent SBOs (s/p ex-lap, SBR, diverting loop ileostomy [2017], and rectal stricture, managed non-operatively) presented to the ED with altered mental status, generalized shaking, confusion, and chills.      Patient admitted to Internal Medicine for further management.    Hospital Course:    In the ED, noncon CT head was performed which was negative. CT abdomen showed dilated SB with transition point in RLQ, c/f partial bowel obstruction. Large and SB wall thickening and mural hyperemia. Patient was treated empirically for meningitis with vancomycin, ceftriaxone zosyn and acyclovir. Surgery saw patient and recommended supportive treatment for partial SBO. After a negative infectious workup and patient remained afebrile with normal wbc, patient was discontinued from antibiotics and treated supportively for viral gastroenteritis. On 11/4 early morning, patient developed coffee-ground emesis and Hb 10.5 to 6.3. Patient had a rapid response called in morning time as patient became hemodynamically unstable. IV access was established and patient was transfused with 2 units of blood, correcting to Hb 12.8. Patient also had an increasing INR up to 4.21 and was given PCCs. An EGD was performed which showed severe grade d esophagitis and gastric ulcers likely related to NSAID use as a result of recent rib fracture. Patient was placed on a PPI drip x 24 hours and was then transitioned to PPO BID which should be continued for 8 weeks with repeat EGD after.     On day of discharge, patient is clinically stable with no new exam findings or acute symptoms compared to prior. The patient was seen by the attending physician on the date of discharge and deemed stable and acceptable for discharge. The patient's chronic medical conditions were treated accordingly per the patient's home medication regimen. The patient's medication reconciliation (with changes made to chronic medications), follow up appointments, discharge orders, instructions, and significant lab and diagnostic studies are as noted.     Active or Pending Issues Requiring Follow-up:  - Followup with GI Dr. Puri in 8 weeks for repeat EGD  - Followup with Colorectal surgeon Dr. Bryan upon discharge    Discharge follow up action items:   1. Follow up with PCP in 1-2 weeks. Follow up with Fr. Bryan in 1-2 weeks.  2. Follow up labs: Vitamin K  3. Medication changes: Nexium 40 mg every day changed to pantoprazole 40 mg twice a day.  4. On hold medications: none  5. Incidental findings:  Unchanged cluster of nodules up to 6 mm in the left upper lobe found on CT 11/1/24.    Patient's ordered code status: Full code  Patient disposition: Home PT      Discharge Diagnoses:  # grade d esophagitis  # peptic ulcer disease  # partial SBO           For full details, please see H&P, progress notes, consult notes and ancillary notes.    Patient is a 73 year old female with Hx CREST syndrome, hypothyroidism, hld CKD3, CAD (s/p stents, on ASA), colonic inertia, rectal prolapse (s/p suspensory surgical procedure, colon resection and rectopexy [2011], and repeat open rectopexy with mesh [2016]), and recurent SBOs (s/p ex-lap, SBR, diverting loop ileostomy [2017], and rectal stricture, managed non-operatively) presented to the ED with altered mental status, generalized shaking, confusion, and chills.      Patient admitted to Internal Medicine for further management.    Hospital Course:    In the ED, noncon CT head was performed which was negative. CT AP w/ partial SBO, Large and SB wall thickening and mural hyperemia, surgery c/s but low suspicion for cause of symptoms or obstruction. CXR clear, UA and rapid RVP neg. ED planned for LP despite low c/f meningitis but INR 2.4 contraindication. Patient was treated empirically for meningitis with vancomycin, ceftriaxone zosyn and acyclovir. Bcx/Ucx neg, GI PCR Norovirus indeterminate. After a negative infectious workup and patient remained afebrile with normal wbc, patient was discontinued from antibiotics and treated supportively for viral gastroenteritis. On 11/4 early morning, patient developed coffee-ground emesis and Hb 10.5 to 6.3. Patient had a rapid response called in morning time as patient became hemodynamically unstable. IV access was established and patient was transfused with 2 units of blood, correcting to Hb 12.8. Patient also had an increasing INR up to 4.21 and was given PCCs. INR elevated for unclear reasons but presumed vitamin K deficiency. Abx resumed with Zosyn but again discontinued, after 24 hrs. An EGD was performed which showed severe grade D esophagitis likely 2/2 scleroderma and gastric ulcers likely related to NSAID use as a result of recent rib fracture. Patient was placed on a PPI drip x 24 hours and was then transitioned to PPO BID which should be continued for 8 weeks with repeat EGD after.     On day of discharge, patient is clinically stable with no new exam findings or acute symptoms compared to prior. The patient was seen by the attending physician on the date of discharge and deemed stable and acceptable for discharge. The patient's chronic medical conditions were treated accordingly per the patient's home medication regimen. The patient's medication reconciliation (with changes made to chronic medications), follow up appointments, discharge orders, instructions, and significant lab and diagnostic studies are as noted.     Active or Pending Issues Requiring Follow-up:  - Followup with GI Dr. Puri in 8 weeks for repeat EGD  - Followup with Colorectal surgeon Dr. Bryan upon discharge    Discharge follow up action items:   1. Follow up with PCP in 1-2 weeks. Follow up with Fr. Bryan in 1-2 weeks.  2. Follow up labs: Vitamin K  3. Medication changes: Nexium 40 mg every day changed to pantoprazole 40 mg twice a day to finish 8 weeks course or until repeat upper endoscopy complete  4. On hold medications: none  5. Incidental findings:  Unchanged cluster of nodules up to 6 mm in the left upper lobe found on CT 11/1/24.    Patient's ordered code status: Full code  Patient disposition: Home PT      Discharge Diagnoses:  # grade d esophagitis  # peptic ulcer disease  # partial SBO

## 2024-11-03 NOTE — PROGRESS NOTE ADULT - PROBLEM SELECTOR PROBLEM 3
Rib fracture Earring removal done today on site using 2 hemostats, 11 blade and lidocaine. Detail Level: Zone

## 2024-11-03 NOTE — DISCHARGE NOTE PROVIDER - NSDCFUSCHEDAPPT_GEN_ALL_CORE_FT
Arslan Myrick  [place] [facility]  [address] [facility]   Scheduled Appointment: 11/04/2024    Lee Coley  [place] [facility]  [address]  Scheduled Appointment: 11/20/2024     [facility] [address]  Scheduled Appointment: 11/20/2024     Lee Coley Physician [facility]  [address]  Scheduled Appointment: 11/20/2024    Chauncey Bryan Physician [facility]  [address]  Scheduled Appointment: 11/27/2024

## 2024-11-03 NOTE — DISCHARGE NOTE PROVIDER - NSDCFUADDAPPT_GEN_ALL_CORE_FT
APPTS ARE READY TO BE MADE: [X] YES    Best Family or Patient Contact (if needed):    Additional Information about above appointments (if needed):    1: PCP Dr Coley  2: GI Dr Puri  3: Colorectal surgery Dr Bryan    Other comments or requests:    APPTS ARE READY TO BE MADE: [X] YES    Best Family or Patient Contact (if needed):    Additional Information about above appointments (if needed):    1: PCP Dr Coley  2: GI Dr Puri  3: Colorectal surgery Dr Bryan    Other comments or requests:       A [facility] office was contacted to secure an appointment, however the office will follow up with the patient/caregiver directly.   Prior to outreaching the patient, it was visible that the patient has secured a follow up appointment which was not scheduled by our team.Dr. Coley on 11/20 at 4pm and Dr. Bryan on 11/27 at 1:30pm

## 2024-11-03 NOTE — DISCHARGE NOTE PROVIDER - CARE PROVIDERS DIRECT ADDRESSES
[email or web address]

## 2024-11-03 NOTE — PROGRESS NOTE ADULT - ASSESSMENT
Patient is a 73 year old female with Hx CREST syndrome, hypothyroidism, hld CKD3, CAD (s/p stents, on ASA), colonic inertia, rectal prolapse (s/p ?suspensory surgical procedure, colon resection and rectopexy [2011], and repeat open rectopexy with mesh [2016]), and recurent SBOs (s/p ex-lap, SBR, diverting loop ileostomy [2017], and rectal stricture, managed non-operatively) p/t ED for altered mental status, generalized shaking, confusion, and chills. Given the increased watery ostomy output, this likely is a GI infection.

## 2024-11-03 NOTE — PROGRESS NOTE ADULT - ATTENDING COMMENTS
73 year old female with Hx CREST syndrome, hypothyroidism, hld CKD3, CAD (s/p stents, on ASA), colonic inertia, rectal prolapse (s/p ?suspensory surgical procedure, colon resection and rectopexy [2011], and repeat open rectopexy with mesh [2016]), and recurent SBOs (s/p ex-lap, SBR, diverting loop ileostomy [2017], and rectal stricture, managed non-operatively) p/t ED for altered mental status, generalized shaking, confusion, and chills. Of note, patient was previously admitted 2 weeks ago following fall l/t rib fractures and T spine TP fx. Endorses abdominal pain a/w nausea, no emesis.     -still having increased ostomy output, GI PCR ind positive for norovirus  -Though no leukocytosis, neutrophilic predominance, clear urine and Bcx, noted to have 25% bands on admission, will cover for intra-abdominal source ?colitis with Zosyn. ID consult in AM  -closely monitor ostomy output, replete lytes aggressively  -elevated INR, ?possible vit K deficiency, uptrending, consider vitamin K if INR not improving  -check vitamin levels for possible malabsorptive process  -monitor Cr, renal pelvic fullness noted on CT scans. Bladder scans ordered  -Esophageal debris noted on CT scan as well, at time of ?partial bowel obstruction, tolerated clears, diet advanced, however if not tolerating would make NPO.     d/w HS 2 and covering resident.

## 2024-11-03 NOTE — PROGRESS NOTE ADULT - SUBJECTIVE AND OBJECTIVE BOX
Patient is a 73 year old female with Hx CREST syndrome, hypothyroidism, hld CKD3, CAD (s/p stents, on ASA), colonic inertia, rectal prolapse (s/p ?suspensory surgical procedure, colon resection and rectopexy [2011], and repeat open rectopexy with mesh [2016]), and recurent SBOs (s/p ex-lap, SBR, diverting loop ileostomy [2017], and rectal stricture, managed non-operatively) p/t ED for altered mental status, generalized shaking, confusion, and chills. Of note, patient was previously admitted 2 weeks ago following fall l/t rib fractures and T spine TP fx. Endorses abdominal pain a/w nausea, no emesis. Normal ostomy output. Has had decreased PO intake for the last few days. States that this feels similar to last SBO but with increased abdominal pain. Still tolerating PO intake, no vomiting. No chest pain or shortness of breath. No dysuria. No neck stiffness. No meds taken prior to arrival for pain/fever.    In the ED, patient was given a dose of ceftriaxone, and then was transitioned to vancomycin and zosyn to cover for meginigitis/encephalitis. CT abdomen performed which showed partial SBO and small/large bowel wall thickening. Patient reports ostomy output increased from baseline.    No events overnight, no acute complaints this morning. Patient with appropriate PO intake and urinating well with BM(s). Denies CP, SOB, fevers/chills, N/V, or abdominal pain. Patient reminded of ongoing careplan.    MEDICATIONS  (STANDING):  aspirin enteric coated 81 milliGRAM(s) Oral daily  cefTRIAXone   IVPB 1000 milliGRAM(s) IV Intermittent every 24 hours  cholecalciferol 5000 Unit(s) Oral daily  cyanocobalamin 1000 MICROGram(s) Oral daily  ferrous    sulfate 325 milliGRAM(s) Oral daily  folic acid 1 milliGRAM(s) Oral daily  levothyroxine 88 MICROGram(s) Oral daily  melatonin 6 milliGRAM(s) Oral at bedtime  metroNIDAZOLE    Tablet 500 milliGRAM(s) Oral three times a day  primidone 50 milliGRAM(s) Oral daily  rosuvastatin 20 milliGRAM(s) Oral at bedtime  sodium chloride 0.9%. 1000 milliLiter(s) (75 mL/Hr) IV Continuous <Continuous>  vancomycin  IVPB 750 milliGRAM(s) IV Intermittent every 24 hours  vancomycin  IVPB        MEDICATIONS  (PRN):  acetaminophen     Tablet .. 650 milliGRAM(s) Oral every 6 hours PRN Temp greater or equal to 38C (100.4F), Mild Pain (1 - 3)  lidocaine   4% Patch 1 Patch Transdermal daily PRN Back pain      CAPILLARY BLOOD GLUCOSE        I&O's Summary    02 Nov 2024 08:01  -  03 Nov 2024 07:00  --------------------------------------------------------  IN: 0 mL / OUT: 1310 mL / NET: -1310 mL        PHYSICAL EXAM:  Vital Signs Last 24 Hrs  T(C): 36.8 (03 Nov 2024 07:04), Max: 37.8 (02 Nov 2024 21:12)  T(F): 98.3 (03 Nov 2024 07:04), Max: 100 (02 Nov 2024 21:12)  HR: 77 (03 Nov 2024 07:04) (77 - 93)  BP: 113/50 (03 Nov 2024 07:04) (94/48 - 113/50)  BP(mean): --  RR: 18 (03 Nov 2024 07:04) (16 - 18)  SpO2: 95% (03 Nov 2024 07:04) (94% - 97%)    Parameters below as of 03 Nov 2024 07:04  Patient On (Oxygen Delivery Method): room air        T(C): 36.8 (11-03-24 @ 07:04), Max: 37.8 (11-02-24 @ 21:12)  HR: 77 (11-03-24 @ 07:04) (77 - 93)  BP: 113/50 (11-03-24 @ 07:04) (94/48 - 113/50)  RR: 18 (11-03-24 @ 07:04) (16 - 18)  SpO2: 95% (11-03-24 @ 07:04) (94% - 97%)    GENERAL: well-appearing, NAD, appears comfortable  HEENT: NC/AT, EOMI, no conjunctival icterus, moist mucus membranes  NECK: supple, non-tender, no JVD, no LAD  LUNGS: non-labored breathing, CTAB, no wheezing/rales/rhonchi  CV: RRR, no m/r/g, 2+ palpable peripheral pulses bi/l, cap refill <2 secs  ABD: non-distended, soft, non-tender, no rebound tenderness or guarding  : no CVA tenderness  MSK: full ROM, strength 5/5 bi/l  EXT: warm and well-perfused, no peripheral edema  SKIN: no rashes or lesions  NEURO: AAOx3, no focal deficits    LABS:                        10.2   6.80  )-----------( 219      ( 02 Nov 2024 07:02 )             30.3     11-02    128[L]  |  89[L]  |  24[H]  ----------------------------<  75  3.6   |  27  |  1.17    Ca    8.5      02 Nov 2024 07:02  Phos  2.2     11-02  Mg     1.3     11-02    TPro  6.0  /  Alb  3.1[L]  /  TBili  0.6  /  DBili  x   /  AST  20  /  ALT  14  /  AlkPhos  91  11-02    PT/INR - ( 02 Nov 2024 07:02 )   PT: 33.3 sec;   INR: 2.95 ratio         PTT - ( 01 Nov 2024 17:17 )  PTT:37.6 sec      Urinalysis Basic - ( 02 Nov 2024 07:02 )    Color: x / Appearance: x / SG: x / pH: x  Gluc: 75 mg/dL / Ketone: x  / Bili: x / Urobili: x   Blood: x / Protein: x / Nitrite: x   Leuk Esterase: x / RBC: x / WBC x   Sq Epi: x / Non Sq Epi: x / Bacteria: x        Culture - Blood (collected 01 Nov 2024 16:15)  Source: .Blood BLOOD  Preliminary Report (03 Nov 2024 01:19):    No growth at 24 hours    Culture - Blood (collected 01 Nov 2024 14:00)  Source: .Blood BLOOD  Preliminary Report (03 Nov 2024 01:19):    No growth at 24 hours        IMAGING & OTHER TESTS:  NNI.

## 2024-11-04 ENCOUNTER — APPOINTMENT (OUTPATIENT)
Dept: TRAUMA SURGERY | Facility: CLINIC | Age: 74
End: 2024-11-04

## 2024-11-04 DIAGNOSIS — K92.0 HEMATEMESIS: ICD-10-CM

## 2024-11-04 LAB
24R-OH-CALCIDIOL SERPL-MCNC: 43.6 NG/ML — SIGNIFICANT CHANGE UP (ref 30–80)
ACANTHOCYTES BLD QL SMEAR: SLIGHT — SIGNIFICANT CHANGE UP
ADD ON TEST-SPECIMEN IN LAB: SIGNIFICANT CHANGE UP
ANION GAP SERPL CALC-SCNC: 13 MMOL/L — SIGNIFICANT CHANGE UP (ref 5–17)
APTT BLD: 32.3 SEC — SIGNIFICANT CHANGE UP (ref 24.5–35.6)
APTT BLD: 41 SEC — HIGH (ref 24.5–35.6)
APTT BLD: 43.3 SEC — HIGH (ref 24.5–35.6)
BASOPHILS # BLD AUTO: 0 K/UL — SIGNIFICANT CHANGE UP (ref 0–0.2)
BASOPHILS # BLD AUTO: 0.05 K/UL — SIGNIFICANT CHANGE UP (ref 0–0.2)
BASOPHILS # BLD AUTO: 0.07 K/UL — SIGNIFICANT CHANGE UP (ref 0–0.2)
BASOPHILS NFR BLD AUTO: 0 % — SIGNIFICANT CHANGE UP (ref 0–2)
BASOPHILS NFR BLD AUTO: 0.7 % — SIGNIFICANT CHANGE UP (ref 0–2)
BASOPHILS NFR BLD AUTO: 0.8 % — SIGNIFICANT CHANGE UP (ref 0–2)
BLD GP AB SCN SERPL QL: NEGATIVE — SIGNIFICANT CHANGE UP
BUN SERPL-MCNC: 37 MG/DL — HIGH (ref 7–23)
CALCIUM SERPL-MCNC: 8.1 MG/DL — LOW (ref 8.4–10.5)
CHLORIDE SERPL-SCNC: 104 MMOL/L — SIGNIFICANT CHANGE UP (ref 96–108)
CO2 SERPL-SCNC: 22 MMOL/L — SIGNIFICANT CHANGE UP (ref 22–31)
CREAT SERPL-MCNC: 1.04 MG/DL — SIGNIFICANT CHANGE UP (ref 0.5–1.3)
EGFR: 56 ML/MIN/1.73M2 — LOW
ELLIPTOCYTES BLD QL SMEAR: SLIGHT — SIGNIFICANT CHANGE UP
EOSINOPHIL # BLD AUTO: 0.1 K/UL — SIGNIFICANT CHANGE UP (ref 0–0.5)
EOSINOPHIL # BLD AUTO: 0.17 K/UL — SIGNIFICANT CHANGE UP (ref 0–0.5)
EOSINOPHIL # BLD AUTO: 0.24 K/UL — SIGNIFICANT CHANGE UP (ref 0–0.5)
EOSINOPHIL NFR BLD AUTO: 1.4 % — SIGNIFICANT CHANGE UP (ref 0–6)
EOSINOPHIL NFR BLD AUTO: 2 % — SIGNIFICANT CHANGE UP (ref 0–6)
EOSINOPHIL NFR BLD AUTO: 3.5 % — SIGNIFICANT CHANGE UP (ref 0–6)
FOLATE SERPL-MCNC: >20 NG/ML — SIGNIFICANT CHANGE UP
GAS PNL BLDV: SIGNIFICANT CHANGE UP
GAS PNL BLDV: SIGNIFICANT CHANGE UP
GLUCOSE BLDC GLUCOMTR-MCNC: 42 MG/DL — CRITICAL LOW (ref 70–99)
GLUCOSE BLDC GLUCOMTR-MCNC: 50 MG/DL — CRITICAL LOW (ref 70–99)
GLUCOSE BLDC GLUCOMTR-MCNC: 51 MG/DL — CRITICAL LOW (ref 70–99)
GLUCOSE SERPL-MCNC: 78 MG/DL — SIGNIFICANT CHANGE UP (ref 70–99)
HCT VFR BLD CALC: 19.3 % — CRITICAL LOW (ref 34.5–45)
HCT VFR BLD CALC: 23 % — LOW (ref 34.5–45)
HCT VFR BLD CALC: 26.6 % — LOW (ref 34.5–45)
HCT VFR BLD CALC: 35.8 % — SIGNIFICANT CHANGE UP (ref 34.5–45)
HCT VFR BLD CALC: 37.4 % — SIGNIFICANT CHANGE UP (ref 34.5–45)
HGB BLD-MCNC: 12.3 G/DL — SIGNIFICANT CHANGE UP (ref 11.5–15.5)
HGB BLD-MCNC: 12.8 G/DL — SIGNIFICANT CHANGE UP (ref 11.5–15.5)
HGB BLD-MCNC: 6.3 G/DL — CRITICAL LOW (ref 11.5–15.5)
HGB BLD-MCNC: 7.4 G/DL — LOW (ref 11.5–15.5)
HGB BLD-MCNC: 8.6 G/DL — LOW (ref 11.5–15.5)
IMM GRANULOCYTES NFR BLD AUTO: 1.3 % — HIGH (ref 0–0.9)
IMM GRANULOCYTES NFR BLD AUTO: 1.4 % — HIGH (ref 0–0.9)
INR BLD: 1.2 RATIO — HIGH (ref 0.85–1.16)
INR BLD: 4.14 RATIO — HIGH (ref 0.85–1.16)
INR BLD: 4.21 RATIO — HIGH (ref 0.85–1.16)
LYMPHOCYTES # BLD AUTO: 0.77 K/UL — LOW (ref 1–3.3)
LYMPHOCYTES # BLD AUTO: 1.03 K/UL — SIGNIFICANT CHANGE UP (ref 1–3.3)
LYMPHOCYTES # BLD AUTO: 1.19 K/UL — SIGNIFICANT CHANGE UP (ref 1–3.3)
LYMPHOCYTES # BLD AUTO: 15.1 % — SIGNIFICANT CHANGE UP (ref 13–44)
LYMPHOCYTES # BLD AUTO: 16.9 % — SIGNIFICANT CHANGE UP (ref 13–44)
LYMPHOCYTES # BLD AUTO: 9.1 % — LOW (ref 13–44)
MAGNESIUM SERPL-MCNC: 1.7 MG/DL — SIGNIFICANT CHANGE UP (ref 1.6–2.6)
MAGNESIUM SERPL-MCNC: 1.8 MG/DL — SIGNIFICANT CHANGE UP (ref 1.6–2.6)
MANUAL SMEAR VERIFICATION: SIGNIFICANT CHANGE UP
MCHC RBC-ENTMCNC: 29 PG — SIGNIFICANT CHANGE UP (ref 27–34)
MCHC RBC-ENTMCNC: 29.9 PG — SIGNIFICANT CHANGE UP (ref 27–34)
MCHC RBC-ENTMCNC: 30 PG — SIGNIFICANT CHANGE UP (ref 27–34)
MCHC RBC-ENTMCNC: 30.8 PG — SIGNIFICANT CHANGE UP (ref 27–34)
MCHC RBC-ENTMCNC: 30.8 PG — SIGNIFICANT CHANGE UP (ref 27–34)
MCHC RBC-ENTMCNC: 32.2 G/DL — SIGNIFICANT CHANGE UP (ref 32–36)
MCHC RBC-ENTMCNC: 32.3 G/DL — SIGNIFICANT CHANGE UP (ref 32–36)
MCHC RBC-ENTMCNC: 32.6 G/DL — SIGNIFICANT CHANGE UP (ref 32–36)
MCHC RBC-ENTMCNC: 34.2 G/DL — SIGNIFICANT CHANGE UP (ref 32–36)
MCHC RBC-ENTMCNC: 34.4 G/DL — SIGNIFICANT CHANGE UP (ref 32–36)
MCV RBC AUTO: 89.7 FL — SIGNIFICANT CHANGE UP (ref 80–100)
MCV RBC AUTO: 89.9 FL — SIGNIFICANT CHANGE UP (ref 80–100)
MCV RBC AUTO: 90.2 FL — SIGNIFICANT CHANGE UP (ref 80–100)
MCV RBC AUTO: 91.9 FL — SIGNIFICANT CHANGE UP (ref 80–100)
MCV RBC AUTO: 92.4 FL — SIGNIFICANT CHANGE UP (ref 80–100)
MONOCYTES # BLD AUTO: 0.06 K/UL — SIGNIFICANT CHANGE UP (ref 0–0.9)
MONOCYTES # BLD AUTO: 0.83 K/UL — SIGNIFICANT CHANGE UP (ref 0–0.9)
MONOCYTES # BLD AUTO: 0.94 K/UL — HIGH (ref 0–0.9)
MONOCYTES NFR BLD AUTO: 0.9 % — LOW (ref 2–14)
MONOCYTES NFR BLD AUTO: 13.4 % — SIGNIFICANT CHANGE UP (ref 2–14)
MONOCYTES NFR BLD AUTO: 9.8 % — SIGNIFICANT CHANGE UP (ref 2–14)
NEUTROPHILS # BLD AUTO: 4.65 K/UL — SIGNIFICANT CHANGE UP (ref 1.8–7.4)
NEUTROPHILS # BLD AUTO: 5.51 K/UL — SIGNIFICANT CHANGE UP (ref 1.8–7.4)
NEUTROPHILS # BLD AUTO: 6.5 K/UL — SIGNIFICANT CHANGE UP (ref 1.8–7.4)
NEUTROPHILS NFR BLD AUTO: 66.2 % — SIGNIFICANT CHANGE UP (ref 43–77)
NEUTROPHILS NFR BLD AUTO: 77 % — SIGNIFICANT CHANGE UP (ref 43–77)
NEUTROPHILS NFR BLD AUTO: 80.5 % — HIGH (ref 43–77)
NRBC # BLD: 0 /100 WBCS — SIGNIFICANT CHANGE UP (ref 0–0)
OVALOCYTES BLD QL SMEAR: SLIGHT — SIGNIFICANT CHANGE UP
PHOSPHATE SERPL-MCNC: 1.6 MG/DL — LOW (ref 2.5–4.5)
PHOSPHATE SERPL-MCNC: 1.9 MG/DL — LOW (ref 2.5–4.5)
PLAT MORPH BLD: NORMAL — SIGNIFICANT CHANGE UP
PLATELET # BLD AUTO: 204 K/UL — SIGNIFICANT CHANGE UP (ref 150–400)
PLATELET # BLD AUTO: 211 K/UL — SIGNIFICANT CHANGE UP (ref 150–400)
PLATELET # BLD AUTO: 216 K/UL — SIGNIFICANT CHANGE UP (ref 150–400)
PLATELET # BLD AUTO: 314 K/UL — SIGNIFICANT CHANGE UP (ref 150–400)
PLATELET # BLD AUTO: 331 K/UL — SIGNIFICANT CHANGE UP (ref 150–400)
POIKILOCYTOSIS BLD QL AUTO: SLIGHT — SIGNIFICANT CHANGE UP
POTASSIUM SERPL-MCNC: 4.2 MMOL/L — SIGNIFICANT CHANGE UP (ref 3.5–5.3)
POTASSIUM SERPL-SCNC: 4.2 MMOL/L — SIGNIFICANT CHANGE UP (ref 3.5–5.3)
PROTHROM AB SERPL-ACNC: 13.7 SEC — HIGH (ref 9.9–13.4)
PROTHROM AB SERPL-ACNC: 47 SEC — HIGH (ref 9.9–13.4)
PROTHROM AB SERPL-ACNC: 47.7 SEC — HIGH (ref 9.9–13.4)
RBC # BLD: 2.1 M/UL — LOW (ref 3.8–5.2)
RBC # BLD: 2.55 M/UL — LOW (ref 3.8–5.2)
RBC # BLD: 2.88 M/UL — LOW (ref 3.8–5.2)
RBC # BLD: 3.99 M/UL — SIGNIFICANT CHANGE UP (ref 3.8–5.2)
RBC # BLD: 4.16 M/UL — SIGNIFICANT CHANGE UP (ref 3.8–5.2)
RBC # FLD: 14.3 % — SIGNIFICANT CHANGE UP (ref 10.3–14.5)
RBC # FLD: 14.8 % — HIGH (ref 10.3–14.5)
RBC # FLD: 15.4 % — HIGH (ref 10.3–14.5)
RBC # FLD: 15.6 % — HIGH (ref 10.3–14.5)
RBC # FLD: 15.7 % — HIGH (ref 10.3–14.5)
RBC BLD AUTO: ABNORMAL
RH IG SCN BLD-IMP: POSITIVE — SIGNIFICANT CHANGE UP
SODIUM SERPL-SCNC: 139 MMOL/L — SIGNIFICANT CHANGE UP (ref 135–145)
VIT B12 SERPL-MCNC: >2000 PG/ML — HIGH (ref 232–1245)
VIT D25+D1,25 OH+D1,25 PNL SERPL-MCNC: 73.5 PG/ML — SIGNIFICANT CHANGE UP (ref 19.9–79.3)
WBC # BLD: 6.85 K/UL — SIGNIFICANT CHANGE UP (ref 3.8–10.5)
WBC # BLD: 7.03 K/UL — SIGNIFICANT CHANGE UP (ref 3.8–10.5)
WBC # BLD: 8.18 K/UL — SIGNIFICANT CHANGE UP (ref 3.8–10.5)
WBC # BLD: 8.45 K/UL — SIGNIFICANT CHANGE UP (ref 3.8–10.5)
WBC # BLD: 9.99 K/UL — SIGNIFICANT CHANGE UP (ref 3.8–10.5)
WBC # FLD AUTO: 6.85 K/UL — SIGNIFICANT CHANGE UP (ref 3.8–10.5)
WBC # FLD AUTO: 7.03 K/UL — SIGNIFICANT CHANGE UP (ref 3.8–10.5)
WBC # FLD AUTO: 8.18 K/UL — SIGNIFICANT CHANGE UP (ref 3.8–10.5)
WBC # FLD AUTO: 8.45 K/UL — SIGNIFICANT CHANGE UP (ref 3.8–10.5)
WBC # FLD AUTO: 9.99 K/UL — SIGNIFICANT CHANGE UP (ref 3.8–10.5)

## 2024-11-04 PROCEDURE — 99222 1ST HOSP IP/OBS MODERATE 55: CPT

## 2024-11-04 PROCEDURE — 99233 SBSQ HOSP IP/OBS HIGH 50: CPT | Mod: GC

## 2024-11-04 PROCEDURE — 44360 SMALL BOWEL ENDOSCOPY: CPT | Mod: GC

## 2024-11-04 RX ORDER — ONDANSETRON HYDROCHLORIDE 2 MG/ML
4 INJECTION, SOLUTION INTRAMUSCULAR; INTRAVENOUS ONCE
Refills: 0 | Status: COMPLETED | OUTPATIENT
Start: 2024-11-04 | End: 2024-11-04

## 2024-11-04 RX ORDER — PANTOPRAZOLE SODIUM 40 MG/1
8 TABLET, DELAYED RELEASE ORAL
Qty: 80 | Refills: 0 | Status: DISCONTINUED | OUTPATIENT
Start: 2024-11-04 | End: 2024-11-05

## 2024-11-04 RX ORDER — ACETAMINOPHEN 500 MG
575 TABLET ORAL ONCE
Refills: 0 | Status: COMPLETED | OUTPATIENT
Start: 2024-11-04 | End: 2024-11-04

## 2024-11-04 RX ORDER — PHYTONADIONE 5 MG/1
10 TABLET ORAL ONCE
Refills: 0 | Status: COMPLETED | OUTPATIENT
Start: 2024-11-04 | End: 2024-11-04

## 2024-11-04 RX ORDER — PROTHROMBIN COMPLEX CONCENTRATE (HUMAN) 25.5; 16.5; 24; 22; 22; 26 [IU]/ML; [IU]/ML; [IU]/ML; [IU]/ML; [IU]/ML; [IU]/ML
500 POWDER, FOR SOLUTION INTRAVENOUS ONCE
Refills: 0 | Status: COMPLETED | OUTPATIENT
Start: 2024-11-04 | End: 2024-11-04

## 2024-11-04 RX ORDER — PANTOPRAZOLE SODIUM 40 MG/1
40 TABLET, DELAYED RELEASE ORAL
Refills: 0 | Status: DISCONTINUED | OUTPATIENT
Start: 2024-11-04 | End: 2024-11-04

## 2024-11-04 RX ORDER — PANTOPRAZOLE SODIUM 40 MG/1
40 TABLET, DELAYED RELEASE ORAL ONCE
Refills: 0 | Status: COMPLETED | OUTPATIENT
Start: 2024-11-04 | End: 2024-11-04

## 2024-11-04 RX ORDER — PHYTONADIONE 5 MG/1
2.5 TABLET ORAL ONCE
Refills: 0 | Status: COMPLETED | OUTPATIENT
Start: 2024-11-04 | End: 2024-11-04

## 2024-11-04 RX ORDER — ONDANSETRON HYDROCHLORIDE 2 MG/ML
4 INJECTION, SOLUTION INTRAMUSCULAR; INTRAVENOUS EVERY 4 HOURS
Refills: 0 | Status: DISCONTINUED | OUTPATIENT
Start: 2024-11-04 | End: 2024-11-07

## 2024-11-04 RX ORDER — LIDOCAINE HYDROCHLORIDE 40 MG/ML
1 SOLUTION TOPICAL ONCE
Refills: 0 | Status: COMPLETED | OUTPATIENT
Start: 2024-11-04 | End: 2024-11-04

## 2024-11-04 RX ADMIN — Medication 325 MILLIGRAM(S): at 12:10

## 2024-11-04 RX ADMIN — Medication 500 MILLILITER(S): at 01:04

## 2024-11-04 RX ADMIN — PHYTONADIONE 2.5 MILLIGRAM(S): 5 TABLET ORAL at 06:05

## 2024-11-04 RX ADMIN — LIDOCAINE HYDROCHLORIDE 2 PATCH: 40 SOLUTION TOPICAL at 00:00

## 2024-11-04 RX ADMIN — PANTOPRAZOLE SODIUM 10 MG/HR: 40 TABLET, DELAYED RELEASE ORAL at 12:09

## 2024-11-04 RX ADMIN — PANTOPRAZOLE SODIUM 40 MILLIGRAM(S): 40 TABLET, DELAYED RELEASE ORAL at 01:04

## 2024-11-04 RX ADMIN — Medication 81 MILLIGRAM(S): at 12:10

## 2024-11-04 RX ADMIN — PANTOPRAZOLE SODIUM 10 MG/HR: 40 TABLET, DELAYED RELEASE ORAL at 20:15

## 2024-11-04 RX ADMIN — PIPERACILLIN AND TAZOBACTAM 25 GRAM(S): .5; 4 INJECTION, POWDER, LYOPHILIZED, FOR SOLUTION INTRAVENOUS at 21:22

## 2024-11-04 RX ADMIN — PANTOPRAZOLE SODIUM 40 MILLIGRAM(S): 40 TABLET, DELAYED RELEASE ORAL at 06:05

## 2024-11-04 RX ADMIN — LIDOCAINE HYDROCHLORIDE 1 PATCH: 40 SOLUTION TOPICAL at 23:53

## 2024-11-04 RX ADMIN — Medication 5000 UNIT(S): at 12:30

## 2024-11-04 RX ADMIN — FOLIC ACID 1 MILLIGRAM(S): 1 TABLET ORAL at 12:10

## 2024-11-04 RX ADMIN — Medication 1000 MICROGRAM(S): at 12:10

## 2024-11-04 RX ADMIN — Medication 20 MILLIGRAM(S): at 21:22

## 2024-11-04 RX ADMIN — Medication 230 MILLIGRAM(S): at 23:54

## 2024-11-04 RX ADMIN — Medication 30 MILLILITER(S): at 20:15

## 2024-11-04 RX ADMIN — PHYTONADIONE 102 MILLIGRAM(S): 5 TABLET ORAL at 19:11

## 2024-11-04 RX ADMIN — PRIMIDONE 50 MILLIGRAM(S): 50 TABLET ORAL at 12:10

## 2024-11-04 RX ADMIN — ONDANSETRON HYDROCHLORIDE 4 MILLIGRAM(S): 2 INJECTION, SOLUTION INTRAMUSCULAR; INTRAVENOUS at 01:04

## 2024-11-04 RX ADMIN — Medication 88 MICROGRAM(S): at 06:05

## 2024-11-04 RX ADMIN — Medication 6 MILLIGRAM(S): at 21:22

## 2024-11-04 RX ADMIN — PIPERACILLIN AND TAZOBACTAM 25 GRAM(S): .5; 4 INJECTION, POWDER, LYOPHILIZED, FOR SOLUTION INTRAVENOUS at 14:01

## 2024-11-04 NOTE — CHART NOTE - NSCHARTNOTEFT_GEN_A_CORE
Called to bedside by RN for coffee ground emesis. Patient states she feels nauseous and lightheaded. Never had any bloody emesis in the past. No history of upper endoscopy. States she has been on high dose steroids in the past for her ulcerative colitis. Notes some shortness of breath.     ROS: No fevers, chills, cp, cough, abd pain.     Vitals: /70, HR99, O2 sat 99%  Exam: blood soiled gown, increased pallor, cardiopulmonary exam wnl, no abdominal tenderness    A/P:  Pt w/ UGIB unclear etiology. Possibly stress ulcer 2/2 h/o high dose steroids vs elevated INR   - IV access  - Stat CBC, T&S, coags (pt with h/o CAD w/ stents, hgb goal > 8)  - IV zofran 4mg   - IV pantoprazole 40mg BID  - 500cc bolus LR  - GI consult, email sent    Wil Chase PGY1 Called to bedside by RN for coffee ground emesis. Patient states she feels nauseous and lightheaded. Never had any bloody emesis in the past. No history of upper endoscopy. States she has been on high dose steroids in the past for her ulcerative colitis. Notes some shortness of breath.     ROS: No fevers, chills, cp, cough, abd pain.     Vitals: /70, HR99, O2 sat 99%  Exam: blood soiled gown, increased pallor, cardiopulmonary exam wnl, no abdominal tenderness    A/P:  Pt w/ UGIB unclear etiology. Possibly stress ulcer 2/2 h/o high dose steroids vs elevated INR not currently on AC  - IV access  - Stat CBC, T&S, coags (pt with h/o CAD w/ stents, hgb goal > 8)  - IV zofran 4mg   - IV pantoprazole 40mg BID  - 500cc bolus LR  - GI consult, email sent    Wil Chase PGY1 Called to bedside by RN for coffee ground emesis. Patient states she feels nauseous and lightheaded. Never had any bloody emesis in the past. No history of upper endoscopy. States she has been on high dose steroids in the past for her ulcerative colitis. Notes some shortness of breath.     ROS: No fevers, chills, cp, cough, abd pain.     Vitals: /70, HR99, O2 sat 99%  Exam: blood soiled gown, increased pallor, cardiopulmonary exam wnl, no abdominal tenderness    A/P:  Pt w/ UGIB unclear etiology. Possibly stress ulcer 2/2 h/o high dose steroids vs elevated INR not currently on AC  - IV access  - Stat CBC, T&S, coags (pt with h/o CAD w/ stents, hgb goal > 8)  - IV zofran 4mg   - IV pantoprazole 40mg BID  - 500cc bolus LR  - GI consult, email sent  - NPO except meds    Wil Chase, PGY1

## 2024-11-04 NOTE — PRE-OP CHECKLIST - ORDERS/MEDICATION ADMINISTRATION RECORD ON CHART
Subjective   Patient ID: Denver is a 50 year old male.    Chief Complaint   Patient presents with   • Office Visit   • Hospital F/U   Patient has given consent to record this visit for documentation in their clinical record.    HPI    Hospital discharge follow-up/Type 2 diabetes mellitus with hyperglycemia, with long-term current use of insulin (CMS/[facility]):  Pt was admitted on 1/20/2022 10:18 AM to /26/2022  4:30 PM, six days at [facility] for hx of DM, CAD s/p CABG, depression presented with insulin/sulfonylurea overdose and admitted to MICU for titratable D10 infusion and hourly glucose checks. In the ED, His blood sugar was found to be 76 pre-arrival, and he was given an amp of dextrose. His blood sugar continued to drop to 27, and he was placed on a D10 infusion at 100 cc/hr, which was increased to 150 cc/hr after glucose dropped to 59. Pt was also noted to be hypotensive requiring ~5 mcg of levophed for which he was given 1L of NS and stress dose steroids. He was given octreotide and glucagon before transfer to the MICU.  In the MICU, he has not required pressors and D10 infusion is currently at 25cc/hr with stability of his blood glucose. Blood glucose checks have been spaced to q4h. Psychiatry was consulted for a psych eval.   Psychiatry cleared the patient as patient adamantly refused any acute depressive symptoms or suicidal ideation.  Patient also did note that he had had a number of low glucose readings at home to the 30s.  Endocrinology was consulted.  His Metformin 1000 twice daily, Januvia 100 mg daily, Jardiance 25 mg daily were resumed.  Glipizide was stopped indefinitely.  Patient will also be going home without any insulin.  While inpatient, patient received a sensor carla continuous glucose monitor.  His metoprolol was continued but his lisinopril was held in the setting of recent need for vasopressors.  He was instructed to continue these outpatients and to follow-up with his PCP.  He is now stable  to go home with his new medication regimen and close follow-up with PCP and endocrinology.  He was also instructed to make a yearly ophthalmology appointment.    Hypertension associated with type 2 diabetes mellitus (CMS/HCC): On Toprol  mg.     Anxiety associated with depression: On Zoloft 100 mg, Xanax 1 mg, Buspar 15 mg, Wellbutrin 300 mg.      Colonoscopy refused/Encounter for screening fecal occult blood testing: Due for labs.     Need for shingles vaccine: Due for vaccine.     Acute bilateral low back pain without sciatica: Has acute low back pain.     Review of Systems  Constitutional: Negative for chills, diaphoresis, fever and malaise/fatigue.  HENT: Negative for congestion, ear discharge, ear pain, hearing loss, nosebleeds, sinus pain and sore throat.    Eyes: Negative for blurred vision, pain, discharge and redness.  Respiratory: Negative for cough, shortness of breath and wheezing.    Cardiovascular: Negative for chest pain and palpitations.  Gastrointestinal: Negative for constipation, diarrhea, nausea and vomiting.  Genitourinary: Negative for dysuria.  Musculoskeletal: Per HPI.  Skin: Negative for itching and rash.  Neurological: Negative for dizziness and headaches.  Psychiatric/Behavioral: Negative.   All other systems reviewed and are negative.   Objective   Physical Exam  Constitutional:       General: He is not in acute distress.     Appearance: He is well-developed. He is not diaphoretic.   HENT:      Head: Normocephalic and atraumatic.      Right Ear: External ear normal.      Left Ear: External ear normal.      Nose: Nose normal.      Mouth/Throat:      Pharynx: No oropharyngeal exudate.      Neck: Normal range of motion.   Eyes:      General:         Right eye: No discharge.         Left eye: No discharge.      Conjunctiva/sclera: Conjunctivae normal.      Pupils: Pupils are equal, round, and reactive to light.   Neck:      Thyroid: No thyromegaly.      Vascular: No JVD.      Trachea:  No tracheal deviation.   Cardiovascular:      Rate and Rhythm: Normal rate and regular rhythm.      Heart sounds: Normal heart sounds. No murmur heard.    No friction rub. No gallop.   Pulmonary:      Effort: Pulmonary effort is normal. No respiratory distress.      Breath sounds: Normal breath sounds. No stridor. No wheezing or rales.   Chest:      Chest wall: No tenderness.   Abdominal:      General: Bowel sounds are normal. There is no distension.      Palpations: Abdomen is soft. There is no mass.      Tenderness: There is no abdominal tenderness. There is no guarding or rebound.      Hernia: No hernia is present.   Musculoskeletal:         General: Normal range of motion.      Lumbar back: Negative right straight leg raise test and negative left straight leg raise test.      Right foot: Normal range of motion. No deformity.      Left foot: Normal range of motion. No deformity.   Feet:      Right foot:      Protective Sensation: 10 sites tested. 10 sites sensed.      Skin integrity: No ulcer, blister, skin breakdown, erythema, warmth, callus or dry skin.      Left foot:      Skin integrity: No ulcer, blister, skin breakdown, erythema, warmth, callus or dry skin.   Lymphadenopathy:      Cervical: No cervical adenopathy.   Skin:     General: Skin is warm and dry.      Coloration: Skin is not pale.      Findings: No erythema or rash.   Neurological:      Mental Status: He is alert and oriented to person, place, and time.   Psychiatric:         Behavior: Behavior normal.         Thought Content: Thought content normal.         Judgment: Judgment normal.       Back Exam     Tenderness   The patient is experiencing tenderness in the lumbar.    Range of Motion   Extension: normal   Flexion: normal   Lateral bend right: normal   Rotation right: normal     Muscle Strength   The patient has normal back strength.    Tests   Straight leg raise right: negative  Straight leg raise left: negative    Reflexes   Patellar:  normal  Achilles: normal  Biceps: normal  Babinski's sign: normal     Other   Sensation: normal  Gait: normal   Erythema: no back redness  Scars: absent    Comments:  Mild tenderness noted on lower lumbar sacral region.             Assessment   Problem List Items Addressed This Visit        Endocrine and Metabolic    Hypertension associated with type 2 diabetes mellitus (CMS/HCC)    Relevant Medications    metoPROLOL succinate (Toprol XL) 100 MG 24 hr tablet    Type 2 diabetes mellitus with hyperglycemia, with long-term current use of insulin (CMS/HCC)    Relevant Medications    MetFORMIN ER, osmotic, (FORTAMET) 1000 MG extended release 24 hr tablet    sitaGLIPtin (Januvia) 100 MG tablet       Gastrointestinal and Abdominal    Colonoscopy refused       Mental Health    Anxiety associated with depression    Relevant Medications    sertraline (ZOLOFT) 100 MG tablet    ALPRAZolam (XANAX) 1 MG tablet    busPIRone (BUSPAR) 15 MG tablet    buPROPion XL (WELLBUTRIN XL) 300 MG 24 hr tablet      Other Visit Diagnoses     Hospital discharge follow-up    -  Primary    Encounter for screening fecal occult blood testing        Relevant Orders    OCCULT BLOOD - FIT    Need for shingles vaccine        Relevant Orders    ZOSTER SHINGLES RECOMBINANT ADJUVANTED IM(SHINGRIX) (Completed)    Acute bilateral low back pain without sciatica        Relevant Orders    SERVICE TO PHYSICAL THERAPY      Hospital discharge follow-up/Type 2 diabetes mellitus with hyperglycemia, with long-term   current use of insulin (CMS/HCC):  Reviewed hospital summary, treatment, discharge summary in detail.  Continue current medication.  Advised to follow up with specialist as scheduled/needed.     Hypertension associated with type 2 diabetes mellitus (CMS/HCC):  Continue current medication.    Anxiety associated with depression:  Continue current medication.    Colonoscopy refused/Encounter for screening fecal occult blood testing  Ordered labs. Refer to  orders. Reports will be notified.     Need for shingles vaccine:  Administered shingles vaccine in office today without any complications; guidelines, significance and side effects explained.    Acute bilateral low back pain without sciatica:  Referral provided for physical therapy.     Schedule follow up: as needed - Instructed to call if symptoms worsens, do not improve or new symptoms arise   Refer to orders.  Medical compliance with plan discussed and risks of non-compliance reviewed.  Patient education completed on disease process, etiology & prognosis.  Proper usage and side effects of medications reviewed & discussed.  Return to clinic as clinically indicated as discussed with patient who verbalized understanding of the plan and is in agreement with the plan.    I,  Dr. Nohelia Granado, have created a visit summary document based on the audio recording between Dr. Darnell Bhatt MD and this patient for the physician to review, edit as needed, and authenticate.  Creation Date: 2/8/2022      I have reviewed and edited the visit summary above and attest that it is accurate.       done

## 2024-11-04 NOTE — PROGRESS NOTE ADULT - PROBLEM SELECTOR PLAN 3
CT angio chest 11/1: No acute pulmonary embolism, New displaced posterior 4th rib fracture. Redemonstrated small right apical pneumothorax, similar to 10/31/2024, decreased from 10/19/2024 CT, small right pleural effusion/hemothorax, similar to prior. Redemonstrated mildly displaced fractures of the right posterolateral 4th rib and posterior 6th through 9th ribs, right T6-T9 transverse processes, and mildly displaced fractures of the T5-9 spinous processes. Unchanged cluster of  nodules up to 6 mm in the left upper lobe    Plan  - Acetaminophen 650 mg q6h prn for mild pain.  - Lidocaine patch prn for back pain CT abdomen 11/1: dilated SB with transition point in RLQ, c/f partial bowel obstruction. Large and SB wall thickening and mural hyperemia - c/f inflammatory, infectious, or ischemic process.  -Partial SBO resolving with increased ostomy output.    Plan  - No acute surgical intervention indicated as per general surgery.  - Patient made NPO after episode of hematemesis. CT abdomen 11/1: dilated SB with transition point in RLQ, c/f partial bowel obstruction. Large and SB wall thickening and mural hyperemia - c/f inflammatory, infectious, or ischemic process.  -Partial SBO resolving with increased ostomy output.    Plan  - No acute surgical intervention indicated as per general surgery.

## 2024-11-04 NOTE — PROGRESS NOTE ADULT - PROBLEM SELECTOR PLAN 6
C/w home synthroid 88mcg qd Plan  2 MARJORIE nodules found on CT during last admission. Patient to repeat CT in 6 months,

## 2024-11-04 NOTE — PROGRESS NOTE ADULT - ATTENDING COMMENTS
73 year old female with Hx CREST syndrome, hypothyroidism, hld CKD3, CAD (s/p stents, on ASA), colonic inertia, rectal prolapse (s/p ?suspensory surgical procedure, colon resection and rectopexy [2011], and repeat open rectopexy with mesh [2016]), and recurent SBOs (s/p ex-lap, SBR, diverting loop ileostomy [2017], and rectal stricture, managed non-operatively) p/t ED for altered mental status, generalized shaking, confusion, and chills. Of note, patient was previously admitted 2 weeks ago following fall l/t rib fractures and T spine TP fx. Endorses abdominal pain a/w nausea, no emesis.      # UGIB: overnight into this am coffee ground emesis with melena in ostomy bag.   H/h acutely dropped to 7 from baseline 10, hypotension, RRT this am -MICU not accepted.   PRBC in progress improved BP now. serial CBC q6hrs   started on PPI gtt, 2 large bore IV.   NPO for now pending possible EGD today if improved Hemodynamics and h/h.   INR 4s - reverse INR - KCentra given. repeat INR.     # Fever: started on emperic abx on admit.   All infectious gu so far had been negative  Given GIB and acute hypotension will cont with emperica abx for now    # Bowel obstruction: CT on admit with evidence of partial SBP and LBO.   Clinically without overt signs of obstruction. good ostomy outpt.   Seen by surg without intervention. will monitor closely.     # Rib Fx: recent fall with multiple Fx.   pain control avoid NSAIDs    # Pulm nodule: need outpt follow up.    d/w  at bedside

## 2024-11-04 NOTE — CONSULT NOTE ADULT - ATTENDING COMMENTS
73F, complex pmhx as above including CREST syndrome, recurrent s/p resection and ileostomy, now with coffee ground emesis and melena in ostomy bag. Labs showing acute post hemorrhagic anemia, elevated INR.     Patient requires medical optimization prior to procedure   Would recommend prbc tranfusion to goal hgb > 8   recommend reversal of INR   IV PPI   monitor ostomy output   prn anti emetics   keep NPO  GI to follow, please call w questions
I have seen and examined this patient with the resident housestaff. I have reviewed all labs, imaging and reports. I have participated in formulating the plan, and have read and agree with the history, ROS, exam, assessment and plan as stated above.        74yo F with Hx CREST syndrome, hypothyroidism, CKD3, CAD (s/p stents, on ASA), colonic inertia, rectal prolapse (s/p ?suspensory surgical procedure, colon resection and rectopexy [2011], and repeat open rectopexy with mesh [2016]), and recurent SBOs s/p ex-lap, SBR, diverting loop ileostomy [2017] who presents with fevers, chills, confusion. Patient complains of significant pain at the sight of her known rib fractures as well as abdominal pain. Her pain has improved with tylenol/toradol in the ED. She has had decreased ostomy output today, but now since receiving PO contrast for CT she has had increased ostomy output. She endorses some nausea, no vomiting. On my exam her abdomen is soft, non distended, tender to palpation intermittently in various areas not consistent throughout my exam. CT with diffuse small and large bowel hyperemia, possibly infectious. The partial SBO seen on CT seems to have already improved and would not explain her overall presentation. I would recommend GI PCR panel and c diff to rule out infectious enteritis.      Total time spent in the care of this patient today (excluding critical care, teaching, & procedures): 40 minutes                  Over 50% of the total time was spent on counseling and coordination of care.     Yanni Encarnacion MD    Acute Care Surgery

## 2024-11-04 NOTE — RAPID RESPONSE TEAM SUMMARY - NSSITUATIONBACKGROUNDRRT_GEN_ALL_CORE
73 female with PMHx CREST syndrome, Sjogren's syndrome, HLD, CKD3, CAD s/p stent, HYPOthyroidism,  s/p colon resection/rectopexy (2011; 2016 with mesh, diverting ileostomy (2017), recurrent large bowel/small bowel obstruction, rectal stricture,  recent hospitalization 10/19/24-10/20/24; 10/20/24-10/21/24 for pneumothorax s/p fall and fx Rt ribs/T-spine fx, with pulm nodules found on CT scan 10/19/24, medical management with surgery f/u as out pt. Presents this hospitalization 11/2/24 with confusion, shaking chills, fever. Found to have SBO. and GI PCR + for norovirus. Pt admitted to medicine with partial SBO. ceftriaxone -> zosyn. Cc/b hematemesis x2 last evening and dark melena secretions from ileostomy, SBP 90's-100's pt received  cc's at that time. Pt developed HoTN with SBP 70's, so rapid called. Initialy VS notable for BP 77/45, HR 90s, satting well. Patient already receiveing 1U pRBC prior to rapid, another 0.5U pRBC delivered during rapid. FSG notably 52 x2 despite 2 amps D50. Labs drawn, notable for glucose 230s on VBG. INR 4.4, believed to be iso of malabsorption. Likely that FSG abnormal iso known reynauds and HoTN. MICU consulted, determined not a candidate at this time. BP improved to 110s/50s with additional 0.5U pRBC and IVF bolus. Primary team to f/u remainder of lab continue volume resuscitation/treatment for bleeding.

## 2024-11-04 NOTE — CONSULT NOTE ADULT - ASSESSMENT
Assessment:     73y old Female with stated hx significant for CREST syndrome, Sjogren's syndrome, HLD, CKD3, CAD s/p stent, HYPOthyroidism, s/p colon resection/rectopexy,  diverting ileostomy (2017), recurrent large bowel/small bowel obstruction, rectal stricture, ecent hospitalization 10/19/24-10/20/24; 10/20/24-10/21/24 for pneumothorax s/p fall and fx Rt ribs/T-spine fx, Pt admitted to medicine with partial SBO.      This short course c/b development of hematemesis x 2 last evening and dark melena secretions from ileostomy, SBP 90's-100's. Pt developed HYPOtn with SBP 70's, found to have Hgb of 7.4->6.3 from 10.5 requiring RRT for hypotn secondary to acute blood loss anemia also found to have elevated INR of 4.2 from 2.47.       MICU consult called for HYPOtn secondary to acute blood loss anemia    Plan:     #==Hypovolemia  #==acute blood loss anemia   Assessment:     73y old Female with stated hx significant for CREST syndrome, Sjogren's syndrome, HLD, CKD3, CAD s/p stent, HYPOthyroidism, s/p colon resection/rectopexy,  diverting ileostomy (2017), recurrent large bowel/small bowel obstruction, rectal stricture, ecent hospitalization 10/19/24-10/20/24; 10/20/24-10/21/24 for pneumothorax s/p fall and fx Rt ribs/T-spine fx, Pt admitted to medicine with partial SBO.      This short course c/b development of hematemesis x 2 last evening and dark melena secretions from ileostomy, SBP 90's-100's. Pt developed HYPOtn with SBP 70's, found to have Hgb of 7.4->6.3 from 10.5 requiring RRT for hypotn secondary to acute blood loss anemia also found to have elevated INR of 4.2 from 2.47.       MICU consult called for HYPOtn secondary to acute blood loss anemia    Plan:     #==Hypovolemia  #==acute blood loss anemia secondary to GI bleed  #==elevated INR  #==partial SBO    -obtain/follow up GI consult  -obtain CBC q 6 hrs  -obtain CMP/Mg++/PO--4/CBC with diff/PT/PTT/INR q. a.m. and prn  -obtain ABG now and prn  -transfuse PRBC to maintain Hgb >/= 7.0-7.5  -transfuse PCC   -trend INR  -vitamin K 10 mg IVSS q day x 3 days  -continue zosyn therapy  -f/u surg recs   Assessment:     73y old Female with stated hx significant for CREST syndrome, Sjogren's syndrome, HLD, CKD3, CAD s/p stent, HYPOthyroidism, s/p colon resection/rectopexy,  diverting ileostomy (2017), recurrent large bowel/small bowel obstruction, rectal stricture, ecent hospitalization 10/19/24-10/20/24; 10/20/24-10/21/24 for pneumothorax s/p fall and fx Rt ribs/T-spine fx, Pt admitted to medicine with partial SBO.      This short course c/b development of hematemesis x 2 last evening and dark melena secretions from ileostomy, SBP 90's-100's. Pt developed HYPOtn with SBP 70's, found to have Hgb of 7.4->6.3 from 10.5 requiring RRT for hypotn secondary to acute blood loss anemia also found to have elevated INR of 4.2 from 2.47.       MICU consult called for HYPOtn secondary to acute blood loss anemia    Plan:     #==Hypovolemia  #==acute blood loss anemia secondary to GI bleed  #==elevated INR  #==partial SBO    -obtain/follow up GI consult  -obtain CBC q 6 hrs  -obtain CMP/Mg++/PO--4/CBC with diff/PT/PTT/INR q. a.m. and prn  -obtain ABG now and prn  -transfuse PRBC to maintain Hgb >/= 7.0-7.5  -transfuse PCC   -trend INR  -vitamin K 10 mg IVSS q day x 3 days  -continue zosyn therapy  -f/u surg recs  -transfer to telem floor for closer monitoring

## 2024-11-04 NOTE — PROGRESS NOTE ADULT - PROBLEM SELECTOR PLAN 4
INR 2.95  Plan  Trend INR. Can consider vitamin K supplementation pending consistent elevation.  - Vitamin D, B12 and folate ordered CT angio chest 11/1: No acute pulmonary embolism, New displaced posterior 4th rib fracture. Redemonstrated small right apical pneumothorax, similar to 10/31/2024, decreased from 10/19/2024 CT, small right pleural effusion/hemothorax, similar to prior. Redemonstrated mildly displaced fractures of the right posterolateral 4th rib and posterior 6th through 9th ribs, right T6-T9 transverse processes, and mildly displaced fractures of the T5-9 spinous processes. Unchanged cluster of  nodules up to 6 mm in the left upper lobe    Plan  - Acetaminophen 650 mg q6h prn for mild pain.  - Lidocaine patch prn for back pain

## 2024-11-04 NOTE — CONSULT NOTE ADULT - NS ATTEND AMEND GEN_ALL_CORE FT
72 y/o F w/hx as above including CREST syndrome, CKD, s/p colon resection/rectopexy and diverting ileostomy (2017) c/b recurrent large bowel/small bowel obstruction admitted for partial SBO w/hospital course now c/b hypotension likely secondary to acute blood loss anemia due to likely UGIB - presumably secondary to gastric ulcer due to recent NSAID use for pain from rib fractures. Hypotension now resolved following IV fluids and some blood.    - Transfuse PRN for hgb < 7 or hypotension with active bleeding  - Trend CBC  - PPI  - Avoid NSAIDs  - GI consulted, awaiting EGD  - No current need for ICU level of care please recall with questions/concerns

## 2024-11-04 NOTE — CONSULT NOTE ADULT - ASSESSMENT
Siena Panchal is a 73 year old female with Hx CREST syndrome, hypothyroidism, HLD, CKD3, CAD (s/p stents, on ASA), colonic inertia, rectal prolapse (s/p ?suspensory surgical procedure, colon resection and rectopexy [2011], and repeat open rectopexy with mesh [2016]), and recurent SBOs (s/p ex-lap, SBR, diverting loop ileostomy [2017], and rectal stricture, managed non-operatively) p/t ED for altered mental status, generalized shaking, confusion, and chills with partial SBO s/p course of abx with negative infectious workup with SBO resolving with supportive care. Hospital course c/b episode of CGE and melena on 11/4. GI consulted for further workup and management of the above.     #CGE/Melena   #CAD s/p PCI on ASA   #Recent NSAID Use  #Elevated INR   #Partial SBO now resolved  Hospital course c/b new onset CGE and melena overnight with workup notable for hgb of 7.4 from b/l of ~10 with normal BUN/Cr ratio. Risk factors for ongoing bleeding include ongoing ASA use in addition to recent Advil use with superimposed coagulapathy with INR of 4 possibly 2/2 to recent abx use. Last EGD many years ago with note of esophagitis with colonoscopy in 2019 with friability with no bleeding in the rectum, in the recto-sigmoid colon and in the sigmoid colon and patent end-to-side colo-colonic anastomosis, characterized by tight angulation. Etiology of ongoing CGE/melena likely 2/2 to PUD 2/2 to recent high dose NSAID use vs. esophagitis vs. gastritis 2/2 to recent SBO. Pt not currently optimized for endoscopic intervention at this time given soft BPs and elevated INR and needs to be resuscitated. Please give 1-2 units pRBC to target a goal hgb>8, transfuse PCC to help decrease INR to <1.8 and start IV PPI gtt. Once more medically optimized and resuscitated  (hgb>8 and INR<1.8) can plan to perform EGD possibly later today although more likely tomorrow.     Recommendations:  -Please keep NPO, plan for EGD once more medically optimized and resuscitated  (hgb>8 and INR<1.8) possibly later today although more likely tomorrow.   -Please transfuse 1-2 units pRBC for goal hgb>8 given hx of CAD  -Please give PCC for goal INR<1.8  -Start IV PPI gtt  -Ok to continue ASA given hx of CAD   -Maintain two large bore IV(pt currently with one 22G IV), active T&S  -Ok to monitor off abx given no current clinical signs of infection     All recommendations are tentative until note is attested by attending.

## 2024-11-04 NOTE — CONSULT NOTE ADULT - SUBJECTIVE AND OBJECTIVE BOX
HPI:  Siena Panchal is a 73 year old female with Hx CREST syndrome, hypothyroidism, HLD, CKD3, CAD (s/p stents, on ASA), colonic inertia, rectal prolapse (s/p ?suspensory surgical procedure, colon resection and rectopexy [2011], and repeat open rectopexy with mesh [2016]), and recurent SBOs (s/p ex-lap, SBR, diverting loop ileostomy [2017], and rectal stricture, managed non-operatively) p/t ED for altered mental status, generalized shaking, confusion, and chills with partial SBO s/p course of abx with negative infectious workup with SBO resolving with supportive care. Hospital course c/b episode of CGE and melena on 11/4. GI consulted for further workup and management of the above.     Of note, patient was previously admitted 2 weeks ago following fall l/t rib fractures and T spine TP fx. She notes that over the past 2 weeks she has been taking Tramadol and Advil q6h to help wit the above. She presents to the hospital with decreased PO intake for the last few days with decreased ostomy output with associated fevers. In the ED, patient was given a dose of ceftriaxone, and then was transitioned to vancomycin and zosyn to cover for meginigitis/encephalitis. CT abdomen performed which showed partial SBO and small/large bowel wall thickening. While in the ED, ostomy output increased and back to baseline. Infectious workup with GI PCR notable for indeterminate norovirus with pt changed to Zosyn for abx. Overnight, patient had an episode of coffee ground hematemesis and Hb/Hct dropped from 10.5/32.1 to 7.4/23.0 with new onset melena from her ostomy. Of note INR has been increasing this admission and up to 4 today. Regarding medications, patient is on ASA and has been taking Advil  for the past 2 weeks but denies any other AP or AC use.   No melena or hematochezia. No hematemesis. Last EGD many years ago with note of esophagitis with colonoscopy in 2019 with friability with no bleeding in the rectum, in the recto-sigmoid colon and in the sigmoid colon and patent end-to-side colo-colonic anastomosis, characterized by tight angulation. Pt given one unit of blood while on the floor which is transfusing.       Allergies:  No Known Drug Allergies  latex (Unknown)  adhesives (Rash)      Home Medications:  acetaminophen 325 mg oral tablet: 2 tab(s) orally every 6 hours As needed Mild Pain (1 - 3) (02 Nov 2024 10:34)  aspirin 81 mg oral delayed release tablet: 1 tab(s) orally once a day (02 Nov 2024 10:34)  Citracal Petites 200 mg-6.25 mcg (250 intl units) oral tablet: 2 tab(s) orally 2 times a day (02 Nov 2024 10:34)  CoQ10 100 mg oral capsule: 2 cap(s) orally once a day (02 Nov 2024 10:34)  ferrous sulfate 325 mg (65 mg elemental iron) oral tablet: 1 tab(s) orally Monday, Wednesday, and Friday (02 Nov 2024 10:34)  Fish Oil 1000 mg oral capsule: 1 cap(s) orally 2 times a day (02 Nov 2024 10:34)  folic acid 1 mg oral tablet: 1 tab(s) orally once a day (02 Nov 2024 10:34)  gas-x:  (02 Nov 2024 10:34)  iron: 30 mg orally 2 times a day (02 Nov 2024 10:34)  Melatonin 5 mg oral tablet: 1 tab(s) orally once a day (at bedtime) (02 Nov 2024 10:34)  NexIUM 40 mg oral delayed release capsule: 1 cap(s) orally once a day (in the evening) (02 Nov 2024 10:34)  Pepcid 20 mg oral tablet: 1 tab(s) orally once a day (02 Nov 2024 10:34)  primidone 50 mg oral tablet: 1 tab(s) orally once a day (02 Nov 2024 10:34)  probiotic:  (02 Nov 2024 10:34)  Prolia 60 mg/mL subcutaneous solution: 1 dose(s) subcutaneous every 6 months (02 Nov 2024 10:34)  rosuvastatin 20 mg oral tablet: 1 tab(s) orally once a day (at bedtime) (02 Nov 2024 10:34)  Synthroid 88 mcg (0.088 mg) oral tablet: 1 tab(s) orally once a day (02 Nov 2024 10:34)  Vitamin B12: 1000mcg with folic acid (02 Nov 2024 10:34)  Vitamin D3 125 mcg/mL (5000 intl units/mL) oral liquid: 2 milliliter(s) orally once a day (02 Nov 2024 10:34)      Hospital Medications:  acetaminophen     Tablet .. 650 milliGRAM(s) Oral every 6 hours PRN  aspirin enteric coated 81 milliGRAM(s) Oral daily  calcium carbonate    500 mG (Tums) Chewable 2 Tablet(s) Chew every 12 hours PRN  cholecalciferol 5000 Unit(s) Oral daily  cyanocobalamin 1000 MICROGram(s) Oral daily  ferrous    sulfate 325 milliGRAM(s) Oral daily  folic acid 1 milliGRAM(s) Oral daily  levothyroxine 88 MICROGram(s) Oral daily  lidocaine   4% Patch 2 Patch Transdermal daily  melatonin 6 milliGRAM(s) Oral at bedtime  ondansetron Injectable 4 milliGRAM(s) IV Push every 4 hours PRN  pantoprazole  Injectable 40 milliGRAM(s) IV Push two times a day  piperacillin/tazobactam IVPB.. 3.375 Gram(s) IV Intermittent every 8 hours  primidone 50 milliGRAM(s) Oral daily  rosuvastatin 20 milliGRAM(s) Oral at bedtime  sodium chloride 0.9%. 1000 milliLiter(s) IV Continuous <Continuous>      PMHX/PSHX:    Sjogren's syndrome  Ileostomy in place  Colonic dysmotility  GERD (gastroesophageal reflux disease)  Anxiety  Sciatica  CAD (coronary atherosclerotic disease)  Stented coronary artery  History of dehydration  H/O small bowel obstruction  Hypothyroid  H/Oelectrolyte imbalance  History of connective tissue disease  Hyperlipidemia  Mild anemia  Osteoporosis  Insomnia  Vasovagal syncope  History of hypotension  Scoliosis  History of CREST syndrome  History of scleroderma  Bilateral dry eyes  Dry mouth  Hyponatremia  Hypomagnesemia  History of short term memory loss  Familial tremor  Raynauds syndrome  Bunion of left foot  Bunion of right foot  Hammer toe of right foot  Hammer toe of left foot  Spondylolisthesis  DDD (degenerative disc disease), cervical  DDD (degenerative disc disease), lumbar  Dense breast tissue  History of subdural hematoma  History of hysterectomy  History of appendectomy  H/O ileostomy    Family history:    Family history of uterine cancer (Mother)  FH: myocardial infarction (Father)  FH: CHF (congestive heart failure) (Mother)  Family history of breast cancer (Mother)  Family history of stroke (Father)  Family history of type 2 diabetes mellitus (Father)  Family history of ulcerative colitis (Child)  Family history of scoliosis (Child)      Social History:   Tob: Denies  EtOH: Denies  Illicit Drugs: Denies    ROS: Complete and normal except as mentioned above    PHYSICAL EXAM:   GENERAL:  No acute distress  HEENT:  NCAT, no scleral icterus   CHEST:  no respiratory distress  HEART:  tachycardic   ABDOMEN:  Soft, non-tender, non-distended, normoactive bowel sounds. Ostomy with melena in the ostomy bag.   EXTREMITIES: No edema  NEURO:  Alert and oriented x 3    Vital Signs:  Vital Signs Last 24 Hrs  T(C): 36.9 (04 Nov 2024 05:27), Max: 37.4 (03 Nov 2024 20:30)  T(F): 98.4 (04 Nov 2024 05:27), Max: 99.3 (03 Nov 2024 20:30)  HR: 84 (04 Nov 2024 05:27) (84 - 99)  BP: 99/64 (04 Nov 2024 05:27) (99/64 - 124/58)  BP(mean): --  RR: 18 (04 Nov 2024 05:27) (18 - 18)  SpO2: 94% (04 Nov 2024 05:27) (93% - 96%)    Parameters below as of 04 Nov 2024 05:27  Patient On (Oxygen Delivery Method): room air      LABS:                        7.4    7.03  )-----------( 314      ( 04 Nov 2024 05:46 )             23.0     Mean Cell Volume: 90.2 fl (11-04-24 @ 05:46)    11-03    134[L]  |  96  |  16  ----------------------------<  109[H]  3.2[L]   |  29  |  0.98    Ca    8.4      03 Nov 2024 11:23  Phos  1.9     11-04  Mg     1.8     11-04    TPro  6.0  /  Alb  3.0[L]  /  TBili  0.3  /  DBili  x   /  AST  18  /  ALT  12  /  AlkPhos  98  11-03    LIVER FUNCTIONS - ( 03 Nov 2024 11:23 )  Alb: 3.0 g/dL / Pro: 6.0 g/dL / ALK PHOS: 98 U/L / ALT: 12 U/L / AST: 18 U/L / GGT: x           PT/INR - ( 04 Nov 2024 05:46 )   PT: 47.7 sec;   INR: 4.21 ratio         PTT - ( 04 Nov 2024 05:46 )  PTT:43.3 sec  Urinalysis Basic - ( 03 Nov 2024 11:23 )    Color: x / Appearance: x / SG: x / pH: x  Gluc: 109 mg/dL / Ketone: x  / Bili: x / Urobili: x   Blood: x / Protein: x / Nitrite: x   Leuk Esterase: x / RBC: x / WBC x   Sq Epi: x / Non Sq Epi: x / Bacteria: x                              7.4    7.03  )-----------( 314      ( 04 Nov 2024 05:46 )             23.0                         8.6    8.45  )-----------( 331      ( 04 Nov 2024 01:14 )             26.6                         10.5   6.77  )-----------( 286      ( 03 Nov 2024 11:23 )             32.1                         10.2   6.80  )-----------( 219      ( 02 Nov 2024 07:02 )             30.3                         10.9   6.39  )-----------( 251      ( 01 Nov 2024 16:33 )             33.1       Imaging:  < from: Colonoscopy (08.12.19 @ 16:11) >  Impression:          - Friability with no bleeding in the rectum, in the recto-sigmoid colon and                        in the sigmoid colon.                       - Patent end-to-side colo-colonic anastomosis, characterized by tight            angulation.                       - No specimens collected.    < end of copied text >  < from: CT Abdomen and Pelvis w/ IV Cont (11.01.24 @ 20:22) >    IMPRESSION:    No pulmonary embolus.    Decreased trace right pneumothorax.    Unchanged nodules up to 6 mm in the left upper lobe. Recommend   surveillance noncontrast CT chest in 6 months.    Findings suggestive of partial small bowel obstruction upstream from   right lower quadrant ileostomy, as well as large bowel obstruction   proximalto known rectal sigmoid anastomosis stricture. Similar findings   were present on CT abdomen/pelvis April 2024. Wall thickening and   hyperemia of bowel associated with the latter which may be infectious,   inflammatory, or ischemic. Recommend correlation with lactate.      < end of copied text >

## 2024-11-04 NOTE — CONSULT NOTE ADULT - SUBJECTIVE AND OBJECTIVE BOX
CHIEF COMPLAINT:    HPI:    IN PROGRESS            PAST MEDICAL & SURGICAL HISTORY:  Sjogren's syndrome    Ileostomy in place  2017    Colonic dysmotility    GERD (gastroesophageal reflux disease)    Anxiety    Sciatica  left    CAD (coronary atherosclerotic disease)    Stented coronary artery    History of dehydration  secondary to ileostomy. was hospitalized mid June for hydration    H/O small bowel obstruction  2017 required surgery, multiple episodes since then, April 2022    Hypothyroid    H/O electrolyte imbalance  secondary to dehydration    History of connective tissue disease  diagnosed 2002    Hyperlipidemia    Mild anemia    Osteoporosis    Insomnia  difficulty staying asleep    Vasovagal syncope    History of hypotension  baseline 100/60    Scoliosis    History of CREST syndrome    History of scleroderma    Bilateral dry eyes    Dry mouth    Hyponatremia  secondary to dehydration    Hypomagnesemia  secondary to dehydration    COVID-19 vaccine series completed    History of short term memory loss    Familial tremor      Raynauds syndrome      Bunion of left foot      Bunion of right foot      Hammer toe of right foot      Hammer toe of left foot      Spondylolisthesis      Colonic inertia      DDD (degenerative disc disease), cervical      DDD (degenerative disc disease), lumbar      Dense breast tissue      History of subdural hematoma  small, November 2021 after a fall, last visit to neurosurgeon with MRI of brain revealed resolution      History of hysterectomy  2011 for bladder prolapse, bladder lift done also      History of appendectomy  as teen      H/O ileostomy  2017      S/P primary angioplasty with coronary stent  2019, 1 stent      History of lumbar laminectomy  October 2021, with removal of synovial cyst      Rectal prolapse  repair 2016      History of tonsillectomy and adenoidectomy  as child      History of bowel resection          FAMILY HISTORY:  Family history of uterine cancer (Mother)    FH: myocardial infarction (Father)    FH: CHF (congestive heart failure) (Mother)    Family history of breast cancer (Mother)    Family history of stroke (Father)    Family history of type 2 diabetes mellitus (Father)    Family history of ulcerative colitis (Child)    Family history of scoliosis (Child)        SOCIAL HISTORY:  Smoking: [ ] Never Smoked [ ] Former Smoker (__ packs x ___ years) [ ] Current Smoker  (__ packs x ___ years)  Substance Use: [ ] Never Used [ ] Used ____  EtOH Use:  Marital Status: [ ] Single [ ]  [ ]  [ ]   Sexual History:   Occupation:  Recent Travel:  Country of Birth:  Advance Directives:    Allergies    No Known Drug Allergies  latex (Unknown)    Intolerances    adhesives (Rash)      HOME MEDICATIONS:    REVIEW OF SYSTEMS:            OBJECTIVE:  ICU Vital Signs Last 24 Hrs  T(C): 36.9 (04 Nov 2024 05:27), Max: 37.4 (03 Nov 2024 20:30)  T(F): 98.4 (04 Nov 2024 05:27), Max: 99.3 (03 Nov 2024 20:30)  HR: 84 (04 Nov 2024 05:27) (84 - 99)  BP: 99/64 (04 Nov 2024 05:27) (99/64 - 124/58)  BP(mean): --  ABP: --  ABP(mean): --  RR: 18 (04 Nov 2024 05:27) (18 - 18)  SpO2: 94% (04 Nov 2024 05:27) (93% - 96%)    O2 Parameters below as of 04 Nov 2024 05:27  Patient On (Oxygen Delivery Method): room air              11-03 @ 07:01  -  11-04 @ 07:00  --------------------------------------------------------  IN: 480 mL / OUT: 1675 mL / NET: -1195 mL      CAPILLARY BLOOD GLUCOSE          PHYSICAL EXAM:        HOSPITAL MEDICATIONS:  MEDICATIONS  (STANDING):  aspirin enteric coated 81 milliGRAM(s) Oral daily  cholecalciferol 5000 Unit(s) Oral daily  cyanocobalamin 1000 MICROGram(s) Oral daily  ferrous    sulfate 325 milliGRAM(s) Oral daily  folic acid 1 milliGRAM(s) Oral daily  levothyroxine 88 MICROGram(s) Oral daily  lidocaine   4% Patch 2 Patch Transdermal daily  melatonin 6 milliGRAM(s) Oral at bedtime  pantoprazole  Injectable 40 milliGRAM(s) IV Push two times a day  piperacillin/tazobactam IVPB.. 3.375 Gram(s) IV Intermittent every 8 hours  primidone 50 milliGRAM(s) Oral daily  rosuvastatin 20 milliGRAM(s) Oral at bedtime  sodium chloride 0.9%. 1000 milliLiter(s) (100 mL/Hr) IV Continuous <Continuous>    MEDICATIONS  (PRN):  acetaminophen     Tablet .. 650 milliGRAM(s) Oral every 6 hours PRN Temp greater or equal to 38C (100.4F), Mild Pain (1 - 3)  calcium carbonate    500 mG (Tums) Chewable 2 Tablet(s) Chew every 12 hours PRN Indigestion  ondansetron Injectable 4 milliGRAM(s) IV Push every 4 hours PRN Nausea and/or Vomiting      LABS:                        7.4    7.03  )-----------( 314      ( 04 Nov 2024 05:46 )             23.0     Hgb Trend: 7.4<--, 8.6<--, 10.5<--, 10.2<--, 10.9<--  11-03    134[L]  |  96  |  16  ----------------------------<  109[H]  3.2[L]   |  29  |  0.98    Ca    8.4      03 Nov 2024 11:23  Phos  1.9     11-04  Mg     1.8     11-04    TPro  6.0  /  Alb  3.0[L]  /  TBili  0.3  /  DBili  x   /  AST  18  /  ALT  12  /  AlkPhos  98  11-03    Creatinine Trend: 0.98<--, 1.17<--, 1.00<--, 1.28<--, 0.87<--, 1.16<--  PT/INR - ( 04 Nov 2024 05:46 )   PT: 47.7 sec;   INR: 4.21 ratio         PTT - ( 04 Nov 2024 05:46 )  PTT:43.3 sec  Urinalysis Basic - ( 03 Nov 2024 11:23 )    Color: x / Appearance: x / SG: x / pH: x  Gluc: 109 mg/dL / Ketone: x  / Bili: x / Urobili: x   Blood: x / Protein: x / Nitrite: x   Leuk Esterase: x / RBC: x / WBC x   Sq Epi: x / Non Sq Epi: x / Bacteria: x            MICROBIOLOGY:     RADIOLOGY:  [ ] Reviewed and interpreted by me    EKG:        [facility] (ext. [id number])           CHIEF COMPLAINT:    HPI:    73 yr old female with PMHx 73 female with PMHx CREST syndrome, Sjogren's syndrome, HLD, CKD3, CAD s/p stent, HYPOthyroidism,  s/p colon resection/rectopexy (2011; 2016 with mesh, diverting ileostomy (2017), recurrent large bowel/small bowel obstruction, rectal stricture,  recent hospitalization 10/19/24-10/20/24; 10/20/24-10/21/24 for pneumothorax s/p fall and fx Rt ribs/T-spine fx, with pulm nodules found on CT scan 10/19/24, medical management with surgery f/u as out pt. Presents this hospitalization 11/2/24 with confusion, shaking chills, fever. Found to have SBO. Pt admitted to medicine with partial SBO. ceftriaxone -> zosyn.    This short course c/b development of hematemesis x 2 last evening and dark maroon bloody secretions from ileostomy, SBP 90's-100's pt received  cc's at that time. Pt developed HYPOtn with SBP 70's, found to have Hgb of 7.4->6.3 from 10.5 requiring RRT for hypotn secondary to hypovolemic shock.  Pt received  cc's IV boluse, 2 half units PRBC with improvement of SBP's 99- 110. Pt also found to have elevated INR of 4.2 from 2.47.       MICU consult called for HYPOtn secondary to hypovolemic shock    As pt with improved vital signs, AOx3, currently without need for MICU transfer at this time          IN PROGRESS            PAST MEDICAL & SURGICAL HISTORY:  Sjogren's syndrome    Ileostomy in place  2017    Colonic dysmotility    GERD (gastroesophageal reflux disease)    Anxiety    Sciatica  left    CAD (coronary atherosclerotic disease)    Stented coronary artery    History of dehydration  secondary to ileostomy. was hospitalized mid June for hydration    H/O small bowel obstruction  2017 required surgery, multiple episodes since then, April 2022    Hypothyroid    H/O electrolyte imbalance  secondary to dehydration    History of connective tissue disease  diagnosed 2002    Hyperlipidemia    Mild anemia    Osteoporosis    Insomnia  difficulty staying asleep    Vasovagal syncope    History of hypotension  baseline 100/60    Scoliosis    History of CREST syndrome    History of scleroderma    Bilateral dry eyes    Dry mouth    Hyponatremia  secondary to dehydration    Hypomagnesemia  secondary to dehydration    COVID-19 vaccine series completed    History of short term memory loss    Familial tremor      Raynauds syndrome      Bunion of left foot      Bunion of right foot      Hammer toe of right foot      Hammer toe of left foot      Spondylolisthesis      Colonic inertia      DDD (degenerative disc disease), cervical      DDD (degenerative disc disease), lumbar      Dense breast tissue      History of subdural hematoma  small, November 2021 after a fall, last visit to neurosurgeon with MRI of brain revealed resolution      History of hysterectomy  2011 for bladder prolapse, bladder lift done also      History of appendectomy  as teen      H/O ileostomy  2017      S/P primary angioplasty with coronary stent  2019, 1 stent      History of lumbar laminectomy  October 2021, with removal of synovial cyst      Rectal prolapse  repair 2016      History of tonsillectomy and adenoidectomy  as child      History of bowel resection          FAMILY HISTORY:  Family history of uterine cancer (Mother)    FH: myocardial infarction (Father)    FH: CHF (congestive heart failure) (Mother)    Family history of breast cancer (Mother)    Family history of stroke (Father)    Family history of type 2 diabetes mellitus (Father)    Family history of ulcerative colitis (Child)    Family history of scoliosis (Child)        SOCIAL HISTORY:  Smoking: [ ] Never Smoked [ ] Former Smoker (__ packs x ___ years) [ ] Current Smoker  (__ packs x ___ years)  Substance Use: [ ] Never Used [ ] Used ____  EtOH Use:  Marital Status: [ ] Single [ ]  [ ]  [ ]   Sexual History:   Occupation:  Recent Travel:  Country of Birth:  Advance Directives:    Allergies    No Known Drug Allergies  latex (Unknown)    Intolerances    adhesives (Rash)      HOME MEDICATIONS:    REVIEW OF SYSTEMS:            OBJECTIVE:  ICU Vital Signs Last 24 Hrs  T(C): 36.9 (04 Nov 2024 05:27), Max: 37.4 (03 Nov 2024 20:30)  T(F): 98.4 (04 Nov 2024 05:27), Max: 99.3 (03 Nov 2024 20:30)  HR: 84 (04 Nov 2024 05:27) (84 - 99)  BP: 99/64 (04 Nov 2024 05:27) (99/64 - 124/58)  BP(mean): --  ABP: --  ABP(mean): --  RR: 18 (04 Nov 2024 05:27) (18 - 18)  SpO2: 94% (04 Nov 2024 05:27) (93% - 96%)    O2 Parameters below as of 04 Nov 2024 05:27  Patient On (Oxygen Delivery Method): room air              11-03 @ 07:01  -  11-04 @ 07:00  --------------------------------------------------------  IN: 480 mL / OUT: 1675 mL / NET: -1195 mL      CAPILLARY BLOOD GLUCOSE          PHYSICAL EXAM:        HOSPITAL MEDICATIONS:  MEDICATIONS  (STANDING):  aspirin enteric coated 81 milliGRAM(s) Oral daily  cholecalciferol 5000 Unit(s) Oral daily  cyanocobalamin 1000 MICROGram(s) Oral daily  ferrous    sulfate 325 milliGRAM(s) Oral daily  folic acid 1 milliGRAM(s) Oral daily  levothyroxine 88 MICROGram(s) Oral daily  lidocaine   4% Patch 2 Patch Transdermal daily  melatonin 6 milliGRAM(s) Oral at bedtime  pantoprazole  Injectable 40 milliGRAM(s) IV Push two times a day  piperacillin/tazobactam IVPB.. 3.375 Gram(s) IV Intermittent every 8 hours  primidone 50 milliGRAM(s) Oral daily  rosuvastatin 20 milliGRAM(s) Oral at bedtime  sodium chloride 0.9%. 1000 milliLiter(s) (100 mL/Hr) IV Continuous <Continuous>    MEDICATIONS  (PRN):  acetaminophen     Tablet .. 650 milliGRAM(s) Oral every 6 hours PRN Temp greater or equal to 38C (100.4F), Mild Pain (1 - 3)  calcium carbonate    500 mG (Tums) Chewable 2 Tablet(s) Chew every 12 hours PRN Indigestion  ondansetron Injectable 4 milliGRAM(s) IV Push every 4 hours PRN Nausea and/or Vomiting      LABS:                        7.4    7.03  )-----------( 314      ( 04 Nov 2024 05:46 )             23.0     Hgb Trend: 7.4<--, 8.6<--, 10.5<--, 10.2<--, 10.9<--  11-03    134[L]  |  96  |  16  ----------------------------<  109[H]  3.2[L]   |  29  |  0.98    Ca    8.4      03 Nov 2024 11:23  Phos  1.9     11-04  Mg     1.8     11-04    TPro  6.0  /  Alb  3.0[L]  /  TBili  0.3  /  DBili  x   /  AST  18  /  ALT  12  /  AlkPhos  98  11-03    Creatinine Trend: 0.98<--, 1.17<--, 1.00<--, 1.28<--, 0.87<--, 1.16<--  PT/INR - ( 04 Nov 2024 05:46 )   PT: 47.7 sec;   INR: 4.21 ratio         PTT - ( 04 Nov 2024 05:46 )  PTT:43.3 sec  Urinalysis Basic - ( 03 Nov 2024 11:23 )    Color: x / Appearance: x / SG: x / pH: x  Gluc: 109 mg/dL / Ketone: x  / Bili: x / Urobili: x   Blood: x / Protein: x / Nitrite: x   Leuk Esterase: x / RBC: x / WBC x   Sq Epi: x / Non Sq Epi: x / Bacteria: x            MICROBIOLOGY:     RADIOLOGY:  [ ] Reviewed and interpreted by me    EKG:        [facility]-BC (ext. [id number])           CHIEF COMPLAINT:    HPI:    73 yr old female with PMHx 73 female with PMHx CREST syndrome, Sjogren's syndrome, HLD, CKD3, CAD s/p stent, HYPOthyroidism,  s/p colon resection/rectopexy (2011; 2016 with mesh, diverting ileostomy (2017), recurrent large bowel/small bowel obstruction, rectal stricture,  recent hospitalization 10/19/24-10/20/24; 10/20/24-10/21/24 for pneumothorax s/p fall and fx Rt ribs/T-spine fx, with pulm nodules found on CT scan 10/19/24, medical management with surgery f/u as out pt. Presents this hospitalization 11/2/24 with confusion, shaking chills, fever. Found to have SBO. and GI PCR + for norovirus. Pt admitted to medicine with partial SBO. ceftriaxone -> zosyn.    This short course c/b development of hematemesis x 2 last evening and dark melena secretions from ileostomy, SBP 90's-100's pt received  cc's at that time. Pt developed HYPOtn with SBP 70's, found to have Hgb of 7.4->6.3 from 10.5 requiring RRT for hypotn secondary to hypovolemic shock.  Pt received  cc's IV boluse, 2 half units PRBC with improvement of SBP's 99- 110. Pt also found to have elevated INR of 4.2 from 2.47.       MICU consult called for HYPOtn secondary to hypovolemic shock    As pt with improved vital signs, AOx3, currently without need for MICU transfer at this time      PAST MEDICAL & SURGICAL HISTORY:  Sjogren's syndrome    Ileostomy in place  2017    Colonic dysmotility    GERD (gastroesophageal reflux disease)    Anxiety    Sciatica  left    CAD (coronary atherosclerotic disease)    Stented coronary artery    History of dehydration  secondary to ileostomy. was hospitalized mid June for hydration    H/O small bowel obstruction  2017 required surgery, multiple episodes since then, April 2022    Hypothyroid    H/O electrolyte imbalance  secondary to dehydration    History of connective tissue disease  diagnosed 2002    Hyperlipidemia    Mild anemia    Osteoporosis    Insomnia  difficulty staying asleep    Vasovagal syncope    History of hypotension  baseline 100/60    Scoliosis    History of CREST syndrome    History of scleroderma    Bilateral dry eyes    Dry mouth    Hyponatremia  secondary to dehydration    Hypomagnesemia  secondary to dehydration    COVID-19 vaccine series completed    History of short term memory loss    Familial tremor      Raynauds syndrome      Bunion of left foot      Bunion of right foot      Hammer toe of right foot      Hammer toe of left foot      Spondylolisthesis      Colonic inertia      DDD (degenerative disc disease), cervical      DDD (degenerative disc disease), lumbar      Dense breast tissue      History of subdural hematoma  small, November 2021 after a fall, last visit to neurosurgeon with MRI of brain revealed resolution      History of hysterectomy  2011 for bladder prolapse, bladder lift done also      History of appendectomy  as teen      H/O ileostomy  2017      S/P primary angioplasty with coronary stent  2019, 1 stent      History of lumbar laminectomy  October 2021, with removal of synovial cyst      Rectal prolapse  repair 2016      History of tonsillectomy and adenoidectomy  as child      History of bowel resection          FAMILY HISTORY:  Family history of uterine cancer (Mother)    FH: myocardial infarction (Father)    FH: CHF (congestive heart failure) (Mother)    Family history of breast cancer (Mother)    Family history of stroke (Father)    Family history of type 2 diabetes mellitus (Father)    Family history of ulcerative colitis (Child)    Family history of scoliosis (Child)        SOCIAL HISTORY:  Smoking: [ ] Never Smoked [ ] Former Smoker (__ packs x ___ years) [ ] Current Smoker  (__ packs x ___ years)  Substance Use: [ ] Never Used [ ] Used ____  EtOH Use:  Marital Status: [ ] Single [x ]  [ ]  [ ]   Sexual History:   Occupation:  Recent Travel:  Country of Birth:  Advance Directives:    Allergies    No Known Drug Allergies  latex (Unknown)    Intolerances    adhesives (Rash)      HOME MEDICATIONS:    REVIEW OF SYSTEMS:    CONSTITUTIONAL:      + weakness, withoutfevers or chills  EYES/ENT:           No visual changes;  No vertigo or throat pain   NECK:            No pain or stiffness  RESPIRATORY:            No cough, wheezing, hemoptysis; + shortness of breath  CARDIOVASCULAR:            No chest pain or palpitations  GASTROINTESTINAL:           No abdominal or epigastric pain. + nausea, vomiting, + hematemesis; No diarrhea or constipation. + melena without hematochezia.  GENITOURINARY:            No dysuria, frequency or hematuria  NEUROLOGICAL:            No numbness or weakness  SKIN:            No pruritis , rashes            OBJECTIVE:  ICU Vital Signs Last 24 Hrs  T(C): 36.9 (04 Nov 2024 05:27), Max: 37.4 (03 Nov 2024 20:30)  T(F): 98.4 (04 Nov 2024 05:27), Max: 99.3 (03 Nov 2024 20:30)  HR: 84 (04 Nov 2024 05:27) (84 - 99)  BP: 99/64 (04 Nov 2024 05:27) (99/64 - 124/58)  BP(mean): --  ABP: --  ABP(mean): --  RR: 18 (04 Nov 2024 05:27) (18 - 18)  SpO2: 94% (04 Nov 2024 05:27) (93% - 96%)    O2 Parameters below as of 04 Nov 2024 05:27  Patient On (Oxygen Delivery Method): room air              11-03 @ 07:01  -  11-04 @ 07:00  --------------------------------------------------------  IN: 480 mL / OUT: 1675 mL / NET: -1195 mL      CAPILLARY BLOOD GLUCOSE    VITAL SIGNS AT TIME OF CONSULT  BP: 99/58   ; HR: 88 (NSR)  ; RR: 24  ; SPO2: 99% (2 liters N/C)  ; Temp:        PHYSICAL EXAM:  GENERAL:      NAD, well-groomed, cachectic     HEAD:       Atraumatic, Normocephalic    EYES:       EOMI, PERRLA 3 mm, conjunctiva and sclera clear    ENMT:      No oropharyngeal exudates, erythema or lesions,  Moist mucous membranes    NECK:       Supple, no cervical lymphadenopathy, no JVD    NERVOUS SYSTEM:      Alert & Oriented X3, CN II-XII intact, has spontaneous purposeful movement of  all extremities; Upper extremities  4/5; Lower extremities 4/5, full sensation to light touch     CHEST/LUNG:      placed on 2 liters N/C .Breath sounds bilaterally,  No crackles, rhonchi, wheezing, or rubs.     HEART:       cardiac monitor NSR without ectopy   ; S1/S2 No murmurs, rubs, or gallops,     ABDOMEN:       Soft, Nontender, slight distended; Bowel sounds present, Bladder non distended, non palpable, ileostomy pink with dark melena    EXTREMITIES:       Pulses palpable radial pulses 2+ bilat, DP/PT 1+/1+ bilat, without clubbing, cyanosis. Digits warm to touch with good cap refill < 3 secs    SKIN:      warm, dry, intact, normal color, no rash or abnormal lesions, without palpable nodes      HOSPITAL MEDICATIONS:  MEDICATIONS  (STANDING):  aspirin enteric coated 81 milliGRAM(s) Oral daily  cholecalciferol 5000 Unit(s) Oral daily  cyanocobalamin 1000 MICROGram(s) Oral daily  ferrous    sulfate 325 milliGRAM(s) Oral daily  folic acid 1 milliGRAM(s) Oral daily  levothyroxine 88 MICROGram(s) Oral daily  lidocaine   4% Patch 2 Patch Transdermal daily  melatonin 6 milliGRAM(s) Oral at bedtime  pantoprazole  Injectable 40 milliGRAM(s) IV Push two times a day  piperacillin/tazobactam IVPB.. 3.375 Gram(s) IV Intermittent every 8 hours  primidone 50 milliGRAM(s) Oral daily  rosuvastatin 20 milliGRAM(s) Oral at bedtime  sodium chloride 0.9%. 1000 milliLiter(s) (100 mL/Hr) IV Continuous <Continuous>    MEDICATIONS  (PRN):  acetaminophen     Tablet .. 650 milliGRAM(s) Oral every 6 hours PRN Temp greater or equal to 38C (100.4F), Mild Pain (1 - 3)  calcium carbonate    500 mG (Tums) Chewable 2 Tablet(s) Chew every 12 hours PRN Indigestion  ondansetron Injectable 4 milliGRAM(s) IV Push every 4 hours PRN Nausea and/or Vomiting      LABS:                        7.4    7.03  )-----------( 314      ( 04 Nov 2024 05:46 )             23.0     Hgb Trend: 7.4<--, 8.6<--, 10.5<--, 10.2<--, 10.9<--  11-03    134[L]  |  96  |  16  ----------------------------<  109[H]  3.2[L]   |  29  |  0.98    Ca    8.4      03 Nov 2024 11:23  Phos  1.9     11-04  Mg     1.8     11-04    TPro  6.0  /  Alb  3.0[L]  /  TBili  0.3  /  DBili  x   /  AST  18  /  ALT  12  /  AlkPhos  98  11-03    Creatinine Trend: 0.98<--, 1.17<--, 1.00<--, 1.28<--, 0.87<--, 1.16<--  PT/INR - ( 04 Nov 2024 05:46 )   PT: 47.7 sec;   INR: 4.21 ratio         PTT - ( 04 Nov 2024 05:46 )  PTT:43.3 sec  Urinalysis Basic - ( 03 Nov 2024 11:23 )    Color: x / Appearance: x / SG: x / pH: x  Gluc: 109 mg/dL / Ketone: x  / Bili: x / Urobili: x   Blood: x / Protein: x / Nitrite: x   Leuk Esterase: x / RBC: x / WBC x   Sq Epi: x / Non Sq Epi: x / Bacteria: x            MICROBIOLOGY:     RADIOLOGY:  [ ] Reviewed and interpreted by me    EKG:        [facility] (ext. [id number])           CHIEF COMPLAINT:    HPI:    73 yr old female with PMHx 73 female with PMHx CREST syndrome, Sjogren's syndrome, HLD, CKD3, CAD s/p stent, HYPOthyroidism,  s/p colon resection/rectopexy (2011; 2016 with mesh, diverting ileostomy (2017), recurrent large bowel/small bowel obstruction, rectal stricture,  recent hospitalization 10/19/24-10/20/24; 10/20/24-10/21/24 for pneumothorax s/p fall and fx Rt ribs/T-spine fx, with pulm nodules found on CT scan 10/19/24, medical management with surgery f/u as out pt. Presents this hospitalization 11/2/24 with confusion, shaking chills, fever. Found to have SBO. and GI PCR + for norovirus. Pt admitted to medicine with partial SBO. ceftriaxone -> zosyn.    This short course c/b development of hematemesis x 2 last evening and dark melena secretions from ileostomy, SBP 90's-100's pt received  cc's at that time. Pt developed HYPOtn with SBP 70's, found to have Hgb of 7.4->6.3 from 10.5 requiring RRT for hypotn acute blood loss aneima. Pt received  cc's IV boluse, 2 half units PRBC with improvement of SBP's 99- 110. Pt also found to have elevated INR of 4.2 from 2.47.       MICU consult called for HYPOtn secondary to acute blood loss anemia    As pt with improved vital signs, AOx3, currently without need for MICU transfer at this time      PAST MEDICAL & SURGICAL HISTORY:  Sjogren's syndrome    Ileostomy in place  2017    Colonic dysmotility    GERD (gastroesophageal reflux disease)    Anxiety    Sciatica  left    CAD (coronary atherosclerotic disease)    Stented coronary artery    History of dehydration  secondary to ileostomy. was hospitalized mid June for hydration    H/O small bowel obstruction  2017 required surgery, multiple episodes since then, April 2022    Hypothyroid    H/O electrolyte imbalance  secondary to dehydration    History of connective tissue disease  diagnosed 2002    Hyperlipidemia    Mild anemia    Osteoporosis    Insomnia  difficulty staying asleep    Vasovagal syncope    History of hypotension  baseline 100/60    Scoliosis    History of CREST syndrome    History of scleroderma    Bilateral dry eyes    Dry mouth    Hyponatremia  secondary to dehydration    Hypomagnesemia  secondary to dehydration    COVID-19 vaccine series completed    History of short term memory loss    Familial tremor      Raynauds syndrome      Bunion of left foot      Bunion of right foot      Hammer toe of right foot      Hammer toe of left foot      Spondylolisthesis      Colonic inertia      DDD (degenerative disc disease), cervical      DDD (degenerative disc disease), lumbar      Dense breast tissue      History of subdural hematoma  small, November 2021 after a fall, last visit to neurosurgeon with MRI of brain revealed resolution      History of hysterectomy  2011 for bladder prolapse, bladder lift done also      History of appendectomy  as teen      H/O ileostomy  2017      S/P primary angioplasty with coronary stent  2019, 1 stent      History of lumbar laminectomy  October 2021, with removal of synovial cyst      Rectal prolapse  repair 2016      History of tonsillectomy and adenoidectomy  as child      History of bowel resection          FAMILY HISTORY:  Family history of uterine cancer (Mother)    FH: myocardial infarction (Father)    FH: CHF (congestive heart failure) (Mother)    Family history of breast cancer (Mother)    Family history of stroke (Father)    Family history of type 2 diabetes mellitus (Father)    Family history of ulcerative colitis (Child)    Family history of scoliosis (Child)        SOCIAL HISTORY:  Smoking: [ ] Never Smoked [ ] Former Smoker (__ packs x ___ years) [ ] Current Smoker  (__ packs x ___ years)  Substance Use: [ ] Never Used [ ] Used ____  EtOH Use:  Marital Status: [ ] Single [x ]  [ ]  [ ]   Sexual History:   Occupation:  Recent Travel:  Country of Birth:  Advance Directives:    Allergies    No Known Drug Allergies  latex (Unknown)    Intolerances    adhesives (Rash)      HOME MEDICATIONS:    REVIEW OF SYSTEMS:    CONSTITUTIONAL:      + weakness, withoutfevers or chills  EYES/ENT:           No visual changes;  No vertigo or throat pain   NECK:            No pain or stiffness  RESPIRATORY:            No cough, wheezing, hemoptysis; + shortness of breath  CARDIOVASCULAR:            No chest pain or palpitations  GASTROINTESTINAL:           No abdominal or epigastric pain. + nausea, vomiting, + hematemesis; No diarrhea or constipation. + melena without hematochezia.  GENITOURINARY:            No dysuria, frequency or hematuria  NEUROLOGICAL:            No numbness or weakness  SKIN:            No pruritis , rashes            OBJECTIVE:  ICU Vital Signs Last 24 Hrs  T(C): 36.9 (04 Nov 2024 05:27), Max: 37.4 (03 Nov 2024 20:30)  T(F): 98.4 (04 Nov 2024 05:27), Max: 99.3 (03 Nov 2024 20:30)  HR: 84 (04 Nov 2024 05:27) (84 - 99)  BP: 99/64 (04 Nov 2024 05:27) (99/64 - 124/58)  BP(mean): --  ABP: --  ABP(mean): --  RR: 18 (04 Nov 2024 05:27) (18 - 18)  SpO2: 94% (04 Nov 2024 05:27) (93% - 96%)    O2 Parameters below as of 04 Nov 2024 05:27  Patient On (Oxygen Delivery Method): room air              11-03 @ 07:01  -  11-04 @ 07:00  --------------------------------------------------------  IN: 480 mL / OUT: 1675 mL / NET: -1195 mL      CAPILLARY BLOOD GLUCOSE    VITAL SIGNS AT TIME OF CONSULT  BP: 99/58   ; HR: 88 (NSR)  ; RR: 24  ; SPO2: 99% (2 liters N/C)  ; Temp:        PHYSICAL EXAM:  GENERAL:      NAD, well-groomed, cachectic     HEAD:       Atraumatic, Normocephalic    EYES:       EOMI, PERRLA 3 mm, conjunctiva and sclera clear    ENMT:      No oropharyngeal exudates, erythema or lesions,  Moist mucous membranes    NECK:       Supple, no cervical lymphadenopathy, no JVD    NERVOUS SYSTEM:      Alert & Oriented X3, CN II-XII intact, has spontaneous purposeful movement of  all extremities; Upper extremities  4/5; Lower extremities 4/5, full sensation to light touch     CHEST/LUNG:      placed on 2 liters N/C .Breath sounds bilaterally,  No crackles, rhonchi, wheezing, or rubs.     HEART:       cardiac monitor NSR without ectopy   ; S1/S2 No murmurs, rubs, or gallops,     ABDOMEN:       Soft, Nontender, slight distended; Bowel sounds present, Bladder non distended, non palpable, ileostomy pink with dark melena    EXTREMITIES:       Pulses palpable radial pulses 2+ bilat, DP/PT 1+/1+ bilat, without clubbing, cyanosis. Digits warm to touch with good cap refill < 3 secs    SKIN:      warm, dry, intact, normal color, no rash or abnormal lesions, without palpable nodes      HOSPITAL MEDICATIONS:  MEDICATIONS  (STANDING):  aspirin enteric coated 81 milliGRAM(s) Oral daily  cholecalciferol 5000 Unit(s) Oral daily  cyanocobalamin 1000 MICROGram(s) Oral daily  ferrous    sulfate 325 milliGRAM(s) Oral daily  folic acid 1 milliGRAM(s) Oral daily  levothyroxine 88 MICROGram(s) Oral daily  lidocaine   4% Patch 2 Patch Transdermal daily  melatonin 6 milliGRAM(s) Oral at bedtime  pantoprazole  Injectable 40 milliGRAM(s) IV Push two times a day  piperacillin/tazobactam IVPB.. 3.375 Gram(s) IV Intermittent every 8 hours  primidone 50 milliGRAM(s) Oral daily  rosuvastatin 20 milliGRAM(s) Oral at bedtime  sodium chloride 0.9%. 1000 milliLiter(s) (100 mL/Hr) IV Continuous <Continuous>    MEDICATIONS  (PRN):  acetaminophen     Tablet .. 650 milliGRAM(s) Oral every 6 hours PRN Temp greater or equal to 38C (100.4F), Mild Pain (1 - 3)  calcium carbonate    500 mG (Tums) Chewable 2 Tablet(s) Chew every 12 hours PRN Indigestion  ondansetron Injectable 4 milliGRAM(s) IV Push every 4 hours PRN Nausea and/or Vomiting      LABS:                        7.4    7.03  )-----------( 314      ( 04 Nov 2024 05:46 )             23.0     Hgb Trend: 7.4<--, 8.6<--, 10.5<--, 10.2<--, 10.9<--  11-03    134[L]  |  96  |  16  ----------------------------<  109[H]  3.2[L]   |  29  |  0.98    Ca    8.4      03 Nov 2024 11:23  Phos  1.9     11-04  Mg     1.8     11-04    TPro  6.0  /  Alb  3.0[L]  /  TBili  0.3  /  DBili  x   /  AST  18  /  ALT  12  /  AlkPhos  98  11-03    Creatinine Trend: 0.98<--, 1.17<--, 1.00<--, 1.28<--, 0.87<--, 1.16<--  PT/INR - ( 04 Nov 2024 05:46 )   PT: 47.7 sec;   INR: 4.21 ratio         PTT - ( 04 Nov 2024 05:46 )  PTT:43.3 sec  Urinalysis Basic - ( 03 Nov 2024 11:23 )    Color: x / Appearance: x / SG: x / pH: x  Gluc: 109 mg/dL / Ketone: x  / Bili: x / Urobili: x   Blood: x / Protein: x / Nitrite: x   Leuk Esterase: x / RBC: x / WBC x   Sq Epi: x / Non Sq Epi: x / Bacteria: x            MICROBIOLOGY:     RADIOLOGY:  [ ] Reviewed and interpreted by me    EKG:        Lidia ANP-BC (ext. [id number])           CHIEF COMPLAINT:    HPI:    73 yr old female with PMHx 73 female with PMHx CREST syndrome, Sjogren's syndrome, HLD, CKD3, CAD s/p stent, HYPOthyroidism,  s/p colon resection/rectopexy (2011; 2016 with mesh, diverting ileostomy (2017), recurrent large bowel/small bowel obstruction, rectal stricture,  recent hospitalization 10/19/24-10/20/24; 10/20/24-10/21/24 for pneumothorax s/p fall and fx Rt ribs/T-spine fx, with pulm nodules found on CT scan 10/19/24, medical management with surgery f/u as out pt. Presents this hospitalization 11/2/24 with confusion, shaking chills, fever. Found to have SBO. and GI PCR + for norovirus. Pt admitted to medicine with partial SBO. ceftriaxone -> zosyn.    This short course c/b development of hematemesis x 2 last evening and dark melena secretions from ileostomy, SBP 90's-100's pt received  cc's at that time. Pt developed HYPOtn with SBP 70's, found to have Hgb of 7.4->6.3 from 10.5 requiring RRT for hypotn acute blood loss aneima/GI bleed. Pt received  cc's IV boluse, 2 half units PRBC with improvement of SBP's 99- 110. Pt also found to have elevated INR of 4.2 from 2.47.       MICU consult called for HYPOtn secondary to acute blood loss anemia due to GI bleed    As pt with improved vital signs, AOx3, currently without need for MICU transfer at this time      PAST MEDICAL & SURGICAL HISTORY:  Sjogren's syndrome    Ileostomy in place  2017    Colonic dysmotility    GERD (gastroesophageal reflux disease)    Anxiety    Sciatica  left    CAD (coronary atherosclerotic disease)    Stented coronary artery    History of dehydration  secondary to ileostomy. was hospitalized mid June for hydration    H/O small bowel obstruction  2017 required surgery, multiple episodes since then, April 2022    Hypothyroid    H/O electrolyte imbalance  secondary to dehydration    History of connective tissue disease  diagnosed 2002    Hyperlipidemia    Mild anemia    Osteoporosis    Insomnia  difficulty staying asleep    Vasovagal syncope    History of hypotension  baseline 100/60    Scoliosis    History of CREST syndrome    History of scleroderma    Bilateral dry eyes    Dry mouth    Hyponatremia  secondary to dehydration    Hypomagnesemia  secondary to dehydration    COVID-19 vaccine series completed    History of short term memory loss    Familial tremor      Raynauds syndrome      Bunion of left foot      Bunion of right foot      Hammer toe of right foot      Hammer toe of left foot      Spondylolisthesis      Colonic inertia      DDD (degenerative disc disease), cervical      DDD (degenerative disc disease), lumbar      Dense breast tissue      History of subdural hematoma  small, November 2021 after a fall, last visit to neurosurgeon with MRI of brain revealed resolution      History of hysterectomy  2011 for bladder prolapse, bladder lift done also      History of appendectomy  as teen      H/O ileostomy  2017      S/P primary angioplasty with coronary stent  2019, 1 stent      History of lumbar laminectomy  October 2021, with removal of synovial cyst      Rectal prolapse  repair 2016      History of tonsillectomy and adenoidectomy  as child      History of bowel resection          FAMILY HISTORY:  Family history of uterine cancer (Mother)    FH: myocardial infarction (Father)    FH: CHF (congestive heart failure) (Mother)    Family history of breast cancer (Mother)    Family history of stroke (Father)    Family history of type 2 diabetes mellitus (Father)    Family history of ulcerative colitis (Child)    Family history of scoliosis (Child)        SOCIAL HISTORY:  Smoking: [ ] Never Smoked [ ] Former Smoker (__ packs x ___ years) [ ] Current Smoker  (__ packs x ___ years)  Substance Use: [ ] Never Used [ ] Used ____  EtOH Use:  Marital Status: [ ] Single [x ]  [ ]  [ ]   Sexual History:   Occupation:  Recent Travel:  Country of Birth:  Advance Directives:    Allergies    No Known Drug Allergies  latex (Unknown)    Intolerances    adhesives (Rash)      HOME MEDICATIONS:    REVIEW OF SYSTEMS:    CONSTITUTIONAL:      + weakness, withoutfevers or chills  EYES/ENT:           No visual changes;  No vertigo or throat pain   NECK:            No pain or stiffness  RESPIRATORY:            No cough, wheezing, hemoptysis; + shortness of breath  CARDIOVASCULAR:            No chest pain or palpitations  GASTROINTESTINAL:           No abdominal or epigastric pain. + nausea, vomiting, + hematemesis; No diarrhea or constipation. + melena without hematochezia.  GENITOURINARY:            No dysuria, frequency or hematuria  NEUROLOGICAL:            No numbness or weakness  SKIN:            No pruritis , rashes            OBJECTIVE:  ICU Vital Signs Last 24 Hrs  T(C): 36.9 (04 Nov 2024 05:27), Max: 37.4 (03 Nov 2024 20:30)  T(F): 98.4 (04 Nov 2024 05:27), Max: 99.3 (03 Nov 2024 20:30)  HR: 84 (04 Nov 2024 05:27) (84 - 99)  BP: 99/64 (04 Nov 2024 05:27) (99/64 - 124/58)  BP(mean): --  ABP: --  ABP(mean): --  RR: 18 (04 Nov 2024 05:27) (18 - 18)  SpO2: 94% (04 Nov 2024 05:27) (93% - 96%)    O2 Parameters below as of 04 Nov 2024 05:27  Patient On (Oxygen Delivery Method): room air              11-03 @ 07:01  -  11-04 @ 07:00  --------------------------------------------------------  IN: 480 mL / OUT: 1675 mL / NET: -1195 mL      CAPILLARY BLOOD GLUCOSE    VITAL SIGNS AT TIME OF CONSULT  BP: 99/58   ; HR: 88 (NSR)  ; RR: 24  ; SPO2: 99% (2 liters N/C)  ; Temp:        PHYSICAL EXAM:  GENERAL:      NAD, well-groomed, cachectic     HEAD:       Atraumatic, Normocephalic    EYES:       EOMI, PERRLA 3 mm, conjunctiva and sclera clear    ENMT:      No oropharyngeal exudates, erythema or lesions,  Moist mucous membranes    NECK:       Supple, no cervical lymphadenopathy, no JVD    NERVOUS SYSTEM:      Alert & Oriented X3, CN II-XII intact, has spontaneous purposeful movement of  all extremities; Upper extremities  4/5; Lower extremities 4/5, full sensation to light touch     CHEST/LUNG:      placed on 2 liters N/C .Breath sounds bilaterally,  No crackles, rhonchi, wheezing, or rubs.     HEART:       cardiac monitor NSR without ectopy   ; S1/S2 No murmurs, rubs, or gallops,     ABDOMEN:       Soft, Nontender, slight distended; Bowel sounds present, Bladder non distended, non palpable, ileostomy pink with dark melena    EXTREMITIES:       Pulses palpable radial pulses 2+ bilat, DP/PT 1+/1+ bilat, without clubbing, cyanosis. Digits warm to touch with good cap refill < 3 secs    SKIN:      warm, dry, intact, normal color, no rash or abnormal lesions, without palpable nodes      HOSPITAL MEDICATIONS:  MEDICATIONS  (STANDING):  aspirin enteric coated 81 milliGRAM(s) Oral daily  cholecalciferol 5000 Unit(s) Oral daily  cyanocobalamin 1000 MICROGram(s) Oral daily  ferrous    sulfate 325 milliGRAM(s) Oral daily  folic acid 1 milliGRAM(s) Oral daily  levothyroxine 88 MICROGram(s) Oral daily  lidocaine   4% Patch 2 Patch Transdermal daily  melatonin 6 milliGRAM(s) Oral at bedtime  pantoprazole  Injectable 40 milliGRAM(s) IV Push two times a day  piperacillin/tazobactam IVPB.. 3.375 Gram(s) IV Intermittent every 8 hours  primidone 50 milliGRAM(s) Oral daily  rosuvastatin 20 milliGRAM(s) Oral at bedtime  sodium chloride 0.9%. 1000 milliLiter(s) (100 mL/Hr) IV Continuous <Continuous>    MEDICATIONS  (PRN):  acetaminophen     Tablet .. 650 milliGRAM(s) Oral every 6 hours PRN Temp greater or equal to 38C (100.4F), Mild Pain (1 - 3)  calcium carbonate    500 mG (Tums) Chewable 2 Tablet(s) Chew every 12 hours PRN Indigestion  ondansetron Injectable 4 milliGRAM(s) IV Push every 4 hours PRN Nausea and/or Vomiting      LABS:                        7.4    7.03  )-----------( 314      ( 04 Nov 2024 05:46 )             23.0     Hgb Trend: 7.4<--, 8.6<--, 10.5<--, 10.2<--, 10.9<--  11-03    134[L]  |  96  |  16  ----------------------------<  109[H]  3.2[L]   |  29  |  0.98    Ca    8.4      03 Nov 2024 11:23  Phos  1.9     11-04  Mg     1.8     11-04    TPro  6.0  /  Alb  3.0[L]  /  TBili  0.3  /  DBili  x   /  AST  18  /  ALT  12  /  AlkPhos  98  11-03    Creatinine Trend: 0.98<--, 1.17<--, 1.00<--, 1.28<--, 0.87<--, 1.16<--  PT/INR - ( 04 Nov 2024 05:46 )   PT: 47.7 sec;   INR: 4.21 ratio         PTT - ( 04 Nov 2024 05:46 )  PTT:43.3 sec  Urinalysis Basic - ( 03 Nov 2024 11:23 )    Color: x / Appearance: x / SG: x / pH: x  Gluc: 109 mg/dL / Ketone: x  / Bili: x / Urobili: x   Blood: x / Protein: x / Nitrite: x   Leuk Esterase: x / RBC: x / WBC x   Sq Epi: x / Non Sq Epi: x / Bacteria: x            MICROBIOLOGY:     RADIOLOGY:  [ ] Reviewed and interpreted by me    EKG:        Lidia ANP-BC (ext. [id number])

## 2024-11-04 NOTE — PROGRESS NOTE ADULT - SUBJECTIVE AND OBJECTIVE BOX
Patient is a 73 year old female with Hx CREST syndrome, hypothyroidism, hld CKD3, CAD (s/p stents, on ASA), colonic inertia, rectal prolapse (s/p ?suspensory surgical procedure, colon resection and rectopexy [2011], and repeat open rectopexy with mesh [2016]), and recurent SBOs (s/p ex-lap, SBR, diverting loop ileostomy [2017], and rectal stricture, managed non-operatively) p/t ED for altered mental status, generalized shaking, confusion, and chills. Of note, patient was previously admitted 2 weeks ago following fall l/t rib fractures and T spine TP fx. Endorses abdominal pain a/w nausea, no emesis. Normal ostomy output. Has had decreased PO intake for the last few days. States that this feels similar to last SBO but with increased abdominal pain. Still tolerating PO intake, no vomiting. No chest pain or shortness of breath. No dysuria. No neck stiffness. No meds taken prior to arrival for pain/fever.    In the ED, patient was given a dose of ceftriaxone, and then was transitioned to vancomycin and zosyn to cover for meginigitis/encephalitis. CT abdomen performed which showed partial SBO and small/large bowel wall thickening. Patient reports ostomy output increased from baseline.    Overnight patient had an episode of hematemesis and Hb/Hct dropped from 10.5/32.1 to 7.4/23.0. Patient with appropriate PO intake and urinating well with BM(s). Denies CP, SOB, fevers/chills, N/V, or abdominal pain. Patient reminded of ongoing careplan.    MEDICATIONS  (STANDING):  aspirin enteric coated 81 milliGRAM(s) Oral daily  cefTRIAXone   IVPB 1000 milliGRAM(s) IV Intermittent every 24 hours  cholecalciferol 5000 Unit(s) Oral daily  cyanocobalamin 1000 MICROGram(s) Oral daily  ferrous    sulfate 325 milliGRAM(s) Oral daily  folic acid 1 milliGRAM(s) Oral daily  levothyroxine 88 MICROGram(s) Oral daily  melatonin 6 milliGRAM(s) Oral at bedtime  metroNIDAZOLE    Tablet 500 milliGRAM(s) Oral three times a day  primidone 50 milliGRAM(s) Oral daily  rosuvastatin 20 milliGRAM(s) Oral at bedtime  sodium chloride 0.9%. 1000 milliLiter(s) (75 mL/Hr) IV Continuous <Continuous>  vancomycin  IVPB 750 milliGRAM(s) IV Intermittent every 24 hours  vancomycin  IVPB        MEDICATIONS  (PRN):  acetaminophen     Tablet .. 650 milliGRAM(s) Oral every 6 hours PRN Temp greater or equal to 38C (100.4F), Mild Pain (1 - 3)  lidocaine   4% Patch 1 Patch Transdermal daily PRN Back pain      CAPILLARY BLOOD GLUCOSE        I&O's Summary    02 Nov 2024 08:01  -  03 Nov 2024 07:00  --------------------------------------------------------  IN: 0 mL / OUT: 1310 mL / NET: -1310 mL        PHYSICAL EXAM:  Vital Signs Last 24 Hrs  T(C): 36.8 (03 Nov 2024 07:04), Max: 37.8 (02 Nov 2024 21:12)  T(F): 98.3 (03 Nov 2024 07:04), Max: 100 (02 Nov 2024 21:12)  HR: 77 (03 Nov 2024 07:04) (77 - 93)  BP: 113/50 (03 Nov 2024 07:04) (94/48 - 113/50)  BP(mean): --  RR: 18 (03 Nov 2024 07:04) (16 - 18)  SpO2: 95% (03 Nov 2024 07:04) (94% - 97%)    Parameters below as of 03 Nov 2024 07:04  Patient On (Oxygen Delivery Method): room air        T(C): 36.8 (11-03-24 @ 07:04), Max: 37.8 (11-02-24 @ 21:12)  HR: 77 (11-03-24 @ 07:04) (77 - 93)  BP: 113/50 (11-03-24 @ 07:04) (94/48 - 113/50)  RR: 18 (11-03-24 @ 07:04) (16 - 18)  SpO2: 95% (11-03-24 @ 07:04) (94% - 97%)    GENERAL: well-appearing, NAD, appears comfortable  HEENT: NC/AT, EOMI, no conjunctival icterus, moist mucus membranes  NECK: supple, non-tender, no JVD, no LAD  LUNGS: non-labored breathing, CTAB, no wheezing/rales/rhonchi  CV: RRR, no m/r/g, 2+ palpable peripheral pulses bi/l, cap refill <2 secs  ABD: non-distended, soft, non-tender, no rebound tenderness or guarding  : no CVA tenderness  MSK: full ROM, strength 5/5 bi/l  EXT: warm and well-perfused, no peripheral edema  SKIN: no rashes or lesions  NEURO: AAOx3, no focal deficits    LABS:                        10.2   6.80  )-----------( 219      ( 02 Nov 2024 07:02 )             30.3     11-02    128[L]  |  89[L]  |  24[H]  ----------------------------<  75  3.6   |  27  |  1.17    Ca    8.5      02 Nov 2024 07:02  Phos  2.2     11-02  Mg     1.3     11-02    TPro  6.0  /  Alb  3.1[L]  /  TBili  0.6  /  DBili  x   /  AST  20  /  ALT  14  /  AlkPhos  91  11-02    PT/INR - ( 02 Nov 2024 07:02 )   PT: 33.3 sec;   INR: 2.95 ratio         PTT - ( 01 Nov 2024 17:17 )  PTT:37.6 sec      Urinalysis Basic - ( 02 Nov 2024 07:02 )    Color: x / Appearance: x / SG: x / pH: x  Gluc: 75 mg/dL / Ketone: x  / Bili: x / Urobili: x   Blood: x / Protein: x / Nitrite: x   Leuk Esterase: x / RBC: x / WBC x   Sq Epi: x / Non Sq Epi: x / Bacteria: x        Culture - Blood (collected 01 Nov 2024 16:15)  Source: .Blood BLOOD  Preliminary Report (03 Nov 2024 01:19):    No growth at 24 hours    Culture - Blood (collected 01 Nov 2024 14:00)  Source: .Blood BLOOD  Preliminary Report (03 Nov 2024 01:19):    No growth at 24 hours        IMAGING & OTHER TESTS:  NNI.   Patient is a 73 year old female with Hx CREST syndrome, hypothyroidism, hld CKD3, CAD (s/p stents, on ASA), colonic inertia, rectal prolapse (s/p ?suspensory surgical procedure, colon resection and rectopexy [2011], and repeat open rectopexy with mesh [2016]), and recurent SBOs (s/p ex-lap, SBR, diverting loop ileostomy [2017], and rectal stricture, managed non-operatively) p/t ED for altered mental status, generalized shaking, confusion, and chills. Of note, patient was previously admitted 2 weeks ago following fall l/t rib fractures and T spine TP fx. Endorses abdominal pain a/w nausea, no emesis. Normal ostomy output. Has had decreased PO intake for the last few days. States that this feels similar to last SBO but with increased abdominal pain. Still tolerating PO intake, no vomiting. No chest pain or shortness of breath. No dysuria. No neck stiffness. No meds taken prior to arrival for pain/fever.    In the ED, patient was given a dose of ceftriaxone, and then was transitioned to vancomycin and zosyn to cover for meginigitis/encephalitis. CT abdomen performed which showed partial SBO and small/large bowel wall thickening. Patient reports ostomy output increased from baseline.    Overnight patient had an episode of coffee ground hematemesis and Hb/Hct dropped from 10.5/32.1 to 7.4/23.0. Patient felt nauseous and lightheaded. Never had any bloody emesis in the past. No history of upper endoscopy. States she has been on high dose steroids in the past for her ulcerative colitis. Noted some shortness of breath. Patient with appropriate PO intake and urinating well with BM(s). Denies CP, SOB, fevers/chills, N/V, or abdominal pain. Patient reminded of ongoing careplan.    MEDICATIONS  (STANDING):  aspirin enteric coated 81 milliGRAM(s) Oral daily  cefTRIAXone   IVPB 1000 milliGRAM(s) IV Intermittent every 24 hours  cholecalciferol 5000 Unit(s) Oral daily  cyanocobalamin 1000 MICROGram(s) Oral daily  ferrous    sulfate 325 milliGRAM(s) Oral daily  folic acid 1 milliGRAM(s) Oral daily  levothyroxine 88 MICROGram(s) Oral daily  melatonin 6 milliGRAM(s) Oral at bedtime  metroNIDAZOLE    Tablet 500 milliGRAM(s) Oral three times a day  primidone 50 milliGRAM(s) Oral daily  rosuvastatin 20 milliGRAM(s) Oral at bedtime  sodium chloride 0.9%. 1000 milliLiter(s) (75 mL/Hr) IV Continuous <Continuous>  vancomycin  IVPB 750 milliGRAM(s) IV Intermittent every 24 hours  vancomycin  IVPB        MEDICATIONS  (PRN):  acetaminophen     Tablet .. 650 milliGRAM(s) Oral every 6 hours PRN Temp greater or equal to 38C (100.4F), Mild Pain (1 - 3)  lidocaine   4% Patch 1 Patch Transdermal daily PRN Back pain      CAPILLARY BLOOD GLUCOSE        I&O's Summary    02 Nov 2024 08:01  -  03 Nov 2024 07:00  --------------------------------------------------------  IN: 0 mL / OUT: 1310 mL / NET: -1310 mL        PHYSICAL EXAM:  Vital Signs Last 24 Hrs  T(C): 36.8 (03 Nov 2024 07:04), Max: 37.8 (02 Nov 2024 21:12)  T(F): 98.3 (03 Nov 2024 07:04), Max: 100 (02 Nov 2024 21:12)  HR: 77 (03 Nov 2024 07:04) (77 - 93)  BP: 113/50 (03 Nov 2024 07:04) (94/48 - 113/50)  BP(mean): --  RR: 18 (03 Nov 2024 07:04) (16 - 18)  SpO2: 95% (03 Nov 2024 07:04) (94% - 97%)    Parameters below as of 03 Nov 2024 07:04  Patient On (Oxygen Delivery Method): room air        T(C): 36.8 (11-03-24 @ 07:04), Max: 37.8 (11-02-24 @ 21:12)  HR: 77 (11-03-24 @ 07:04) (77 - 93)  BP: 113/50 (11-03-24 @ 07:04) (94/48 - 113/50)  RR: 18 (11-03-24 @ 07:04) (16 - 18)  SpO2: 95% (11-03-24 @ 07:04) (94% - 97%)    GENERAL: well-appearing, NAD, appears comfortable  HEENT: NC/AT, EOMI, no conjunctival icterus, moist mucus membranes  NECK: supple, non-tender, no JVD, no LAD  LUNGS: non-labored breathing, CTAB, no wheezing/rales/rhonchi  CV: RRR, no m/r/g, 2+ palpable peripheral pulses bi/l, cap refill <2 secs  ABD: non-distended, soft, non-tender, no rebound tenderness or guarding  : no CVA tenderness  MSK: full ROM, strength 5/5 bi/l  EXT: warm and well-perfused, no peripheral edema  SKIN: no rashes or lesions  NEURO: AAOx3, no focal deficits    LABS:                        10.2   6.80  )-----------( 219      ( 02 Nov 2024 07:02 )             30.3     11-02    128[L]  |  89[L]  |  24[H]  ----------------------------<  75  3.6   |  27  |  1.17    Ca    8.5      02 Nov 2024 07:02  Phos  2.2     11-02  Mg     1.3     11-02    TPro  6.0  /  Alb  3.1[L]  /  TBili  0.6  /  DBili  x   /  AST  20  /  ALT  14  /  AlkPhos  91  11-02    PT/INR - ( 02 Nov 2024 07:02 )   PT: 33.3 sec;   INR: 2.95 ratio         PTT - ( 01 Nov 2024 17:17 )  PTT:37.6 sec      Urinalysis Basic - ( 02 Nov 2024 07:02 )    Color: x / Appearance: x / SG: x / pH: x  Gluc: 75 mg/dL / Ketone: x  / Bili: x / Urobili: x   Blood: x / Protein: x / Nitrite: x   Leuk Esterase: x / RBC: x / WBC x   Sq Epi: x / Non Sq Epi: x / Bacteria: x        Culture - Blood (collected 01 Nov 2024 16:15)  Source: .Blood BLOOD  Preliminary Report (03 Nov 2024 01:19):    No growth at 24 hours    Culture - Blood (collected 01 Nov 2024 14:00)  Source: .Blood BLOOD  Preliminary Report (03 Nov 2024 01:19):    No growth at 24 hours        IMAGING & OTHER TESTS:  NNI.   Patient is a 73 year old female with Hx CREST syndrome, hypothyroidism, hld CKD3, CAD (s/p stents, on ASA), colonic inertia, rectal prolapse (s/p ?suspensory surgical procedure, colon resection and rectopexy [2011], and repeat open rectopexy with mesh [2016]), and recurent SBOs (s/p ex-lap, SBR, diverting loop ileostomy [2017], and rectal stricture, managed non-operatively) p/t ED for altered mental status, generalized shaking, confusion, and chills. Of note, patient was previously admitted 2 weeks ago following fall l/t rib fractures and T spine TP fx. Endorses abdominal pain a/w nausea, no emesis. Normal ostomy output. Has had decreased PO intake for the last few days. States that this feels similar to last SBO but with increased abdominal pain. Still tolerating PO intake, no vomiting. No chest pain or shortness of breath. No dysuria. No neck stiffness. No meds taken prior to arrival for pain/fever.    In the ED, patient was given a dose of ceftriaxone, and then was transitioned to vancomycin and zosyn to cover for meginigitis/encephalitis. CT abdomen performed which showed partial SBO and small/large bowel wall thickening. Patient reports ostomy output increased from baseline.    At around 12 am, patient had an episode of coffee ground hematemesis and Hb/Hct dropped from 10.5/32.1 to 7.4/23.0. Patient felt nauseous and lightheaded. Patient was given zofran and 1 unit pRBC was ordered. Patient developed hemorrhagic shock this am and rapid response was called and patient was placed in trendelenburg position and transfused with 2 units pRBC and PCC and has responded appropriately. Denies CP, SOB, fevers/chills, N/V, or abdominal pain. Patient reminded of ongoing careplan.    MEDICATIONS  (STANDING):  aspirin enteric coated 81 milliGRAM(s) Oral daily  cefTRIAXone   IVPB 1000 milliGRAM(s) IV Intermittent every 24 hours  cholecalciferol 5000 Unit(s) Oral daily  cyanocobalamin 1000 MICROGram(s) Oral daily  ferrous    sulfate 325 milliGRAM(s) Oral daily  folic acid 1 milliGRAM(s) Oral daily  levothyroxine 88 MICROGram(s) Oral daily  melatonin 6 milliGRAM(s) Oral at bedtime  metroNIDAZOLE    Tablet 500 milliGRAM(s) Oral three times a day  primidone 50 milliGRAM(s) Oral daily  rosuvastatin 20 milliGRAM(s) Oral at bedtime  sodium chloride 0.9%. 1000 milliLiter(s) (75 mL/Hr) IV Continuous <Continuous>  vancomycin  IVPB 750 milliGRAM(s) IV Intermittent every 24 hours  vancomycin  IVPB        MEDICATIONS  (PRN):  acetaminophen     Tablet .. 650 milliGRAM(s) Oral every 6 hours PRN Temp greater or equal to 38C (100.4F), Mild Pain (1 - 3)  lidocaine   4% Patch 1 Patch Transdermal daily PRN Back pain      CAPILLARY BLOOD GLUCOSE        I&O's Summary    02 Nov 2024 08:01  -  03 Nov 2024 07:00  --------------------------------------------------------  IN: 0 mL / OUT: 1310 mL / NET: -1310 mL        PHYSICAL EXAM:  Vital Signs Last 24 Hrs  T(C): 36.8 (03 Nov 2024 07:04), Max: 37.8 (02 Nov 2024 21:12)  T(F): 98.3 (03 Nov 2024 07:04), Max: 100 (02 Nov 2024 21:12)  HR: 77 (03 Nov 2024 07:04) (77 - 93)  BP: 113/50 (03 Nov 2024 07:04) (94/48 - 113/50)  BP(mean): --  RR: 18 (03 Nov 2024 07:04) (16 - 18)  SpO2: 95% (03 Nov 2024 07:04) (94% - 97%)    Parameters below as of 03 Nov 2024 07:04  Patient On (Oxygen Delivery Method): room air        T(C): 36.8 (11-03-24 @ 07:04), Max: 37.8 (11-02-24 @ 21:12)  HR: 77 (11-03-24 @ 07:04) (77 - 93)  BP: 113/50 (11-03-24 @ 07:04) (94/48 - 113/50)  RR: 18 (11-03-24 @ 07:04) (16 - 18)  SpO2: 95% (11-03-24 @ 07:04) (94% - 97%)    GENERAL: well-appearing, NAD, appears comfortable  HEENT: NC/AT, EOMI, no conjunctival icterus, moist mucus membranes  NECK: supple, non-tender, no JVD, no LAD  LUNGS: non-labored breathing, CTAB, no wheezing/rales/rhonchi  CV: RRR, no m/r/g, 2+ palpable peripheral pulses bi/l, cap refill <2 secs  ABD: non-distended, soft, non-tender, no rebound tenderness or guarding  : no CVA tenderness  MSK: full ROM, strength 5/5 bi/l  EXT: warm and well-perfused, no peripheral edema  SKIN: no rashes or lesions  NEURO: AAOx3, no focal deficits    LABS:                        10.2   6.80  )-----------( 219      ( 02 Nov 2024 07:02 )             30.3     11-02    128[L]  |  89[L]  |  24[H]  ----------------------------<  75  3.6   |  27  |  1.17    Ca    8.5      02 Nov 2024 07:02  Phos  2.2     11-02  Mg     1.3     11-02    TPro  6.0  /  Alb  3.1[L]  /  TBili  0.6  /  DBili  x   /  AST  20  /  ALT  14  /  AlkPhos  91  11-02    PT/INR - ( 02 Nov 2024 07:02 )   PT: 33.3 sec;   INR: 2.95 ratio         PTT - ( 01 Nov 2024 17:17 )  PTT:37.6 sec      Urinalysis Basic - ( 02 Nov 2024 07:02 )    Color: x / Appearance: x / SG: x / pH: x  Gluc: 75 mg/dL / Ketone: x  / Bili: x / Urobili: x   Blood: x / Protein: x / Nitrite: x   Leuk Esterase: x / RBC: x / WBC x   Sq Epi: x / Non Sq Epi: x / Bacteria: x        Culture - Blood (collected 01 Nov 2024 16:15)  Source: .Blood BLOOD  Preliminary Report (03 Nov 2024 01:19):    No growth at 24 hours    Culture - Blood (collected 01 Nov 2024 14:00)  Source: .Blood BLOOD  Preliminary Report (03 Nov 2024 01:19):    No growth at 24 hours        IMAGING & OTHER TESTS:  NNI.

## 2024-11-04 NOTE — PROGRESS NOTE ADULT - PROBLEM SELECTOR PLAN 5
Plan  2 MARJORIE nodules found on CT during last admission. Patient to repeat CT in 6 months, INR has increased from 2.95 to 4.21 over admission.  Plan  - Trend INR  - Vitamin D, B12 and folate pending  - Vitamin K 2.5 mg solution administered 11/4. INR has increased from 2.95 to 4.21 over admission.  Vitamin B12 > 2000, Vitamin D 25-hydroxy 43.6, folate > 20  Plan  - Trend INR  - Vitamin K level ordered.  - Vitamin K 2.5 mg solution administered 11/4.  -  administered this am.

## 2024-11-04 NOTE — PROGRESS NOTE ADULT - PROBLEM SELECTOR PLAN 2
CT abdomen 11/1: dilated SB with transition point in RLQ, c/f partial bowel obstruction. Large and SB wall thickening and mural hyperemia - c/f inflammatory, infectious, or ischemic process.  -Partial SBO resolving with increased ostomy output.    Plan  - No acute surgical intervention indicated as per general surgery.  - Diet advanced to regular diet with ensure 3x day Patient began having coffee ground hematemesis 11/3 at night. Hb/Hct dropped from 10.5/32.1 to 7.4/23.0.  BUN 16, Cr 0.98  Plan  - GI consulted  - Half unit pRBC ordered as patient has poor IV access at this time. Patient began having coffee ground hematemesis 11/3 at night. Hb/Hct dropped from 10.5/32.1 to 7.4/23.0.  BUN 16, Cr 0.98  Plan  - PPi drip ordered as per GI  - GI plans for egd once patient is optimized (INR < 1.8, Hb > 8).  - Maintain two large bore IV(pt currently with one 22G IV), active T&S  - Maintain Hb > 7  - Patient made NPO after episode of hematemesis.  -  mL administered 11/4 am.  q6h CBC

## 2024-11-04 NOTE — PROGRESS NOTE ADULT - PROBLEM SELECTOR PLAN 1
Noncon CT head negative  Acyclovir given 11/2 and d/c, ce  11/1: vancomycin 1 dose  11/2: acyclovir 1 dose, ctx 1 dose, zosyn,   c. diff negative  RVP negative  U/A negative  Blood culture negative x 24 hours  Urine culture negative  Plan  - D/c antibiotics given negative infectious workup Noncon CT head negative  Acyclovir given 11/2 and d/c, ce  11/1: vancomycin 1 dose  11/2: acyclovir 1 dose, ctx 1 dose, zosyn,   c. diff negative  RVP negative  U/A negative  Blood culture negative x 24 hours  Urine culture negative  Plan  - Zosyn restarted pending new onset hemodynamic instability

## 2024-11-04 NOTE — PRE PROCEDURE NOTE - PRE PROCEDURE EVALUATION
Attending Physician:     Pietro Manuel                        Procedure: upper endoscopy     Indication for Procedure: coffee ground emesis, melena, anemia   ________________________________________________________  PAST MEDICAL & SURGICAL HISTORY:  Sjogren's syndrome      Ileostomy in place  2017      Colonic dysmotility      GERD (gastroesophageal reflux disease)      Anxiety      Sciatica  left      CAD (coronary atherosclerotic disease)      Stented coronary artery      History of dehydration  secondary to ileostomy. was hospitalized mid June for hydration      H/O small bowel obstruction  2017 required surgery, multiple episodes since then, April 2022      Hypothyroid      H/O electrolyte imbalance  secondary to dehydration      History of connective tissue disease  diagnosed 2002      Hyperlipidemia      Mild anemia      Osteoporosis      Insomnia  difficulty staying asleep      Vasovagal syncope      History of hypotension  baseline 100/60      Scoliosis      History of CREST syndrome      History of scleroderma      Bilateral dry eyes      Dry mouth      Hyponatremia  secondary to dehydration      Hypomagnesemia  secondary to dehydration      COVID-19 vaccine series completed      History of short term memory loss      Familial tremor      Raynauds syndrome      Bunion of left foot      Bunion of right foot      Hammer toe of right foot      Hammer toe of left foot      Spondylolisthesis      Colonic inertia      DDD (degenerative disc disease), cervical      DDD (degenerative disc disease), lumbar      Dense breast tissue      History of subdural hematoma  small, November 2021 after a fall, last visit to neurosurgeon with MRI of brain revealed resolution      History of hysterectomy  2011 for bladder prolapse, bladder lift done also      History of appendectomy  as teen      H/O ileostomy  2017      S/P primary angioplasty with coronary stent  2019, 1 stent      History of lumbar laminectomy  October 2021, with removal of synovial cyst      Rectal prolapse  repair 2016      History of tonsillectomy and adenoidectomy  as child      History of bowel resection        ALLERGIES:  No Known Drug Allergies  latex (Unknown)  adhesives (Rash)    HOME MEDICATIONS:  acetaminophen 325 mg oral tablet: 2 tab(s) orally every 6 hours As needed Mild Pain (1 - 3)  aspirin 81 mg oral delayed release tablet: 1 tab(s) orally once a day  Citracal Petites 200 mg-6.25 mcg (250 intl units) oral tablet: 2 tab(s) orally 2 times a day  CoQ10 100 mg oral capsule: 2 cap(s) orally once a day  ferrous sulfate 325 mg (65 mg elemental iron) oral tablet: 1 tab(s) orally Monday, Wednesday, and Friday  Fish Oil 1000 mg oral capsule: 1 cap(s) orally 2 times a day  folic acid 1 mg oral tablet: 1 tab(s) orally once a day  gas-x:   iron: 30 mg orally 2 times a day  Melatonin 5 mg oral tablet: 1 tab(s) orally once a day (at bedtime)  NexIUM 40 mg oral delayed release capsule: 1 cap(s) orally once a day (in the evening)  Pepcid 20 mg oral tablet: 1 tab(s) orally once a day  primidone 50 mg oral tablet: 1 tab(s) orally once a day  probiotic:   Prolia 60 mg/mL subcutaneous solution: 1 dose(s) subcutaneous every 6 months  rosuvastatin 20 mg oral tablet: 1 tab(s) orally once a day (at bedtime)  Synthroid 88 mcg (0.088 mg) oral tablet: 1 tab(s) orally once a day  Vitamin B12: 1000mcg with folic acid  Vitamin D3 125 mcg/mL (5000 intl units/mL) oral liquid: 2 milliliter(s) orally once a day    AICD/PPM: [ ] yes   [ ] no    PERTINENT LAB DATA:                        12.8   8.18  )-----------( 216      ( 04 Nov 2024 15:57 )             37.4     11-04    139  |  104  |  37[H]  ----------------------------<  78  4.2   |  22  |  1.04    Ca    8.1[L]      04 Nov 2024 15:57  Phos  1.6     11-04  Mg     1.7     11-04    TPro  6.0  /  Alb  3.0[L]  /  TBili  0.3  /  DBili  x   /  AST  18  /  ALT  12  /  AlkPhos  98  11-03    PT/INR - ( 04 Nov 2024 15:57 )   PT: 13.7 sec;   INR: 1.20 ratio         PTT - ( 04 Nov 2024 15:57 )  PTT:32.3 sec            PHYSICAL EXAMINATION:    Height (cm): 144.8  Weight (kg): 39  BMI (kg/m2): 18.6  BSA (m2): 1.26T(C): 36.8  HR: 78  BP: 98/47  RR: 16  SpO2: 98%    Constitutional: NAD  HEENT: PERRLA, EOMI,    Neck:  No JVD  Respiratory: CTAB/L  Cardiovascular: S1 and S2  Gastrointestinal: BS+, soft, NT/ND  Extremities: No peripheral edema  Neurological: A/O x 3, no focal deficits  Psychiatric: Normal mood, normal affect  Skin: No rashes    ASA Class: I [ ]  II [ ]  III [x ]  IV [ ]    COMMENTS:    The patient is a suitable candidate for the planned procedure unless box checked [ ]  No, explain:

## 2024-11-05 ENCOUNTER — RX RENEWAL (OUTPATIENT)
Age: 74
End: 2024-11-05

## 2024-11-05 DIAGNOSIS — I80.9 PHLEBITIS AND THROMBOPHLEBITIS OF UNSPECIFIED SITE: ICD-10-CM

## 2024-11-05 LAB
ALBUMIN SERPL ELPH-MCNC: 2.4 G/DL — LOW (ref 3.3–5)
ALP SERPL-CCNC: 92 U/L — SIGNIFICANT CHANGE UP (ref 40–120)
ALT FLD-CCNC: 11 U/L — SIGNIFICANT CHANGE UP (ref 10–45)
ANION GAP SERPL CALC-SCNC: 14 MMOL/L — SIGNIFICANT CHANGE UP (ref 5–17)
APTT BLD: 28.2 SEC — SIGNIFICANT CHANGE UP (ref 24.5–35.6)
AST SERPL-CCNC: 17 U/L — SIGNIFICANT CHANGE UP (ref 10–40)
BILIRUB SERPL-MCNC: 0.5 MG/DL — SIGNIFICANT CHANGE UP (ref 0.2–1.2)
BUN SERPL-MCNC: 31 MG/DL — HIGH (ref 7–23)
CALCIUM SERPL-MCNC: 7 MG/DL — LOW (ref 8.4–10.5)
CHLORIDE SERPL-SCNC: 103 MMOL/L — SIGNIFICANT CHANGE UP (ref 96–108)
CO2 SERPL-SCNC: 18 MMOL/L — LOW (ref 22–31)
CREAT SERPL-MCNC: 0.99 MG/DL — SIGNIFICANT CHANGE UP (ref 0.5–1.3)
EGFR: 59 ML/MIN/1.73M2 — LOW
GAS PNL BLDV: SIGNIFICANT CHANGE UP
GLUCOSE SERPL-MCNC: 71 MG/DL — SIGNIFICANT CHANGE UP (ref 70–99)
HCT VFR BLD CALC: 36.1 % — SIGNIFICANT CHANGE UP (ref 34.5–45)
HGB BLD-MCNC: 12.4 G/DL — SIGNIFICANT CHANGE UP (ref 11.5–15.5)
INR BLD: 0.89 RATIO — SIGNIFICANT CHANGE UP (ref 0.85–1.16)
MAGNESIUM SERPL-MCNC: 1.5 MG/DL — LOW (ref 1.6–2.6)
MCHC RBC-ENTMCNC: 29.9 PG — SIGNIFICANT CHANGE UP (ref 27–34)
MCHC RBC-ENTMCNC: 34.3 G/DL — SIGNIFICANT CHANGE UP (ref 32–36)
MCV RBC AUTO: 87 FL — SIGNIFICANT CHANGE UP (ref 80–100)
NRBC # BLD: 0 /100 WBCS — SIGNIFICANT CHANGE UP (ref 0–0)
PHOSPHATE SERPL-MCNC: 1.6 MG/DL — LOW (ref 2.5–4.5)
PLATELET # BLD AUTO: 243 K/UL — SIGNIFICANT CHANGE UP (ref 150–400)
POTASSIUM SERPL-MCNC: 4 MMOL/L — SIGNIFICANT CHANGE UP (ref 3.5–5.3)
POTASSIUM SERPL-SCNC: 4 MMOL/L — SIGNIFICANT CHANGE UP (ref 3.5–5.3)
PROT SERPL-MCNC: 5 G/DL — LOW (ref 6–8.3)
PROTHROM AB SERPL-ACNC: 10.3 SEC — SIGNIFICANT CHANGE UP (ref 9.9–13.4)
RBC # BLD: 4.15 M/UL — SIGNIFICANT CHANGE UP (ref 3.8–5.2)
RBC # FLD: 15 % — HIGH (ref 10.3–14.5)
SODIUM SERPL-SCNC: 135 MMOL/L — SIGNIFICANT CHANGE UP (ref 135–145)
WBC # BLD: 10.09 K/UL — SIGNIFICANT CHANGE UP (ref 3.8–10.5)
WBC # FLD AUTO: 10.09 K/UL — SIGNIFICANT CHANGE UP (ref 3.8–10.5)

## 2024-11-05 PROCEDURE — 99233 SBSQ HOSP IP/OBS HIGH 50: CPT | Mod: GC

## 2024-11-05 PROCEDURE — 99232 SBSQ HOSP IP/OBS MODERATE 35: CPT | Mod: GC

## 2024-11-05 PROCEDURE — 93970 EXTREMITY STUDY: CPT | Mod: 26

## 2024-11-05 RX ORDER — POTASSIUM PHOSPHATE 236; 224 MG/ML; MG/ML
30 INJECTION, SOLUTION INTRAVENOUS EVERY 8 HOURS
Refills: 0 | Status: COMPLETED | OUTPATIENT
Start: 2024-11-05 | End: 2024-11-05

## 2024-11-05 RX ORDER — DULOXETINE HYDROCHLORIDE 30 MG/1
1 CAPSULE, DELAYED RELEASE ORAL
Refills: 0 | DISCHARGE

## 2024-11-05 RX ORDER — DULOXETINE HYDROCHLORIDE 30 MG/1
20 CAPSULE, DELAYED RELEASE ORAL DAILY
Refills: 0 | Status: DISCONTINUED | OUTPATIENT
Start: 2024-11-05 | End: 2024-11-07

## 2024-11-05 RX ORDER — PANTOPRAZOLE SODIUM 40 MG/1
40 TABLET, DELAYED RELEASE ORAL
Refills: 0 | Status: DISCONTINUED | OUTPATIENT
Start: 2024-11-06 | End: 2024-11-07

## 2024-11-05 RX ORDER — MAGNESIUM SULFATE IN 0.9% NACL 2 G/50 ML
2 INTRAVENOUS SOLUTION, PIGGYBACK (ML) INTRAVENOUS ONCE
Refills: 0 | Status: COMPLETED | OUTPATIENT
Start: 2024-11-05 | End: 2024-11-05

## 2024-11-05 RX ADMIN — Medication 6 MILLIGRAM(S): at 21:30

## 2024-11-05 RX ADMIN — Medication 650 MILLIGRAM(S): at 17:52

## 2024-11-05 RX ADMIN — POTASSIUM PHOSPHATE 83.33 MILLIMOLE(S): 236; 224 INJECTION, SOLUTION INTRAVENOUS at 11:48

## 2024-11-05 RX ADMIN — Medication 5000 UNIT(S): at 12:40

## 2024-11-05 RX ADMIN — PIPERACILLIN AND TAZOBACTAM 25 GRAM(S): .5; 4 INJECTION, POWDER, LYOPHILIZED, FOR SOLUTION INTRAVENOUS at 05:33

## 2024-11-05 RX ADMIN — PANTOPRAZOLE SODIUM 10 MG/HR: 40 TABLET, DELAYED RELEASE ORAL at 05:33

## 2024-11-05 RX ADMIN — Medication 20 MILLIGRAM(S): at 21:30

## 2024-11-05 RX ADMIN — LIDOCAINE HYDROCHLORIDE 1 PATCH: 40 SOLUTION TOPICAL at 01:28

## 2024-11-05 RX ADMIN — Medication 88 MICROGRAM(S): at 05:32

## 2024-11-05 RX ADMIN — Medication 325 MILLIGRAM(S): at 11:48

## 2024-11-05 RX ADMIN — PROTHROMBIN COMPLEX CONCENTRATE (HUMAN) 500 INTERNATIONAL UNIT(S): 25.5; 16.5; 24; 22; 22; 26 POWDER, FOR SOLUTION INTRAVENOUS at 14:51

## 2024-11-05 RX ADMIN — Medication 650 MILLIGRAM(S): at 03:53

## 2024-11-05 RX ADMIN — Medication 81 MILLIGRAM(S): at 12:40

## 2024-11-05 RX ADMIN — POTASSIUM PHOSPHATE 83.33 MILLIMOLE(S): 236; 224 INJECTION, SOLUTION INTRAVENOUS at 21:29

## 2024-11-05 RX ADMIN — PANTOPRAZOLE SODIUM 10 MG/HR: 40 TABLET, DELAYED RELEASE ORAL at 09:44

## 2024-11-05 RX ADMIN — PRIMIDONE 50 MILLIGRAM(S): 50 TABLET ORAL at 12:40

## 2024-11-05 RX ADMIN — FOLIC ACID 1 MILLIGRAM(S): 1 TABLET ORAL at 11:48

## 2024-11-05 RX ADMIN — DULOXETINE HYDROCHLORIDE 20 MILLIGRAM(S): 30 CAPSULE, DELAYED RELEASE ORAL at 21:31

## 2024-11-05 RX ADMIN — Medication 575 MILLIGRAM(S): at 00:30

## 2024-11-05 RX ADMIN — Medication 650 MILLIGRAM(S): at 04:53

## 2024-11-05 RX ADMIN — Medication 1000 MICROGRAM(S): at 11:48

## 2024-11-05 RX ADMIN — Medication 25 GRAM(S): at 09:43

## 2024-11-05 NOTE — PROGRESS NOTE ADULT - PROBLEM SELECTOR PLAN 2
Patient began having coffee ground hematemesis 11/3 at night. Hb/Hct dropped from 10.5/32.1 to 7.4/23.0. On 11/4, patient responded appropriately to 2 units pRBC with new Hb/Hct   BUN 16, Cr 0.98  Plan  - C/W PPI drip x 24 hours and then transition to PO PPI BID tomorrow as per GI  - q6h CBC Patient began having coffee ground hematemesis 11/3 at night. Hb/Hct dropped from 10.5/32.1 to 7.4/23.0. On 11/4, patient responded appropriately to 2 units pRBC with new Hb/Hct   BUN 16, Cr 0.98  Plan  - C/W PPI drip x 24 hours and then transition to PO PPI BID tomorrow as per GI  - q12h CBC  - Patient to f/u with egd in 8 week to re-evaluate.

## 2024-11-05 NOTE — PROGRESS NOTE ADULT - PROBLEM SELECTOR PLAN 3
CT abdomen 11/1: dilated SB with transition point in RLQ, c/f partial bowel obstruction. Large and SB wall thickening and mural hyperemia - c/f inflammatory, infectious, or ischemic process.  -Partial SBO resolving with increased ostomy output.    Plan  - No acute surgical intervention indicated as per general surgery. INR has increased from 2.95 to 4.21 over admission. INR improved to 1.20  Vitamin B12 > 2000, Vitamin D 25-hydroxy 43.6, folate > 20  Plan  - Trend INR  - Pending Vitamin K level  - Vitamin K 2.5 mg solution administered 11/4.  -  administered 11/4.

## 2024-11-05 NOTE — PROVIDER CONTACT NOTE (OTHER) - ASSESSMENT
left arm brachial and right lower arm  swelling and redness noted  (old IV site) right lower arm  swelling and redness noted  (old IV site)

## 2024-11-05 NOTE — PROGRESS NOTE ADULT - PROBLEM SELECTOR PLAN 1
Noncon CT head negative  Acyclovir given 11/2 and d/c, ce  11/1: vancomycin 1 dose  11/2: acyclovir 1 dose, ctx 1 dose, zosyn,   c. diff negative  RVP negative  U/A negative  Blood culture negative x 24 hours  Urine culture negative  Plan  - Zosyn restarted pending new onset hemodynamic instability Noncon CT head negative  Acyclovir given 11/2 and d/c, ce  11/1: vancomycin 1 dose  11/2: acyclovir 1 dose, ctx 1 dose, zosyn,   c. diff negative  RVP negative  U/A negative  Blood culture negative x 24 hours  Urine culture negative  Plan  - D/c Zosyn restarted pending new onset hemodynamic instability Noncon CT head negative  Acyclovir given 11/2 and d/c, ce  11/1: vancomycin 1 dose  11/2: acyclovir 1 dose, ctx 1 dose, zosyn,   c. diff negative  RVP negative  U/A negative  Blood culture negative x 24 hours  Urine culture negative  Plan  - D/c Zosyn given maintenance of hemodynamic stability and no infectious symptoms.

## 2024-11-05 NOTE — PROGRESS NOTE ADULT - ATTENDING COMMENTS
GI following for melena in ostomy bag, coffee ground emesis, acute post hemorrhagic anemia  s/p EGD yesterday showing severe esophagitis and PUD. Please see report for full details   +dark ostomy output   Hgb 11 today    Monitor ostomy output  would repeat afternoon cbc   IV PPI continuous infusion   ok to start clear liquid diet   if further downtrend in hgb, will consider repeat EGD   GI to follow, please call w questions

## 2024-11-05 NOTE — PROGRESS NOTE ADULT - SUBJECTIVE AND OBJECTIVE BOX
Patient is a 73 year old female with Hx CREST syndrome, hypothyroidism, hld CKD3, CAD (s/p stents, on ASA), colonic inertia, rectal prolapse (s/p ?suspensory surgical procedure, colon resection and rectopexy [2011], and repeat open rectopexy with mesh [2016]), and recurent SBOs (s/p ex-lap, SBR, diverting loop ileostomy [2017], and rectal stricture, managed non-operatively) p/t ED for altered mental status, generalized shaking, confusion, and chills. Of note, patient was previously admitted 2 weeks ago following fall l/t rib fractures and T spine TP fx. Endorses abdominal pain a/w nausea, no emesis. Normal ostomy output. Has had decreased PO intake for the last few days. States that this feels similar to last SBO but with increased abdominal pain. Still tolerating PO intake, no vomiting. No chest pain or shortness of breath. No dysuria. No neck stiffness. No meds taken prior to arrival for pain/fever.    In the ED, patient was given a dose of ceftriaxone, and then was transitioned to vancomycin and zosyn to cover for meginigitis/encephalitis. CT abdomen performed which showed partial SBO and small/large bowel wall thickening. Patient reports ostomy output increased from baseline.    Overnight, patient c/o right forearm swelling. On CLD. Denies CP, SOB, fevers/chills, N/V, or abdominal pain. Patient reminded of ongoing careplan.    MEDICATIONS  (STANDING):  aspirin enteric coated 81 milliGRAM(s) Oral daily  cefTRIAXone   IVPB 1000 milliGRAM(s) IV Intermittent every 24 hours  cholecalciferol 5000 Unit(s) Oral daily  cyanocobalamin 1000 MICROGram(s) Oral daily  ferrous    sulfate 325 milliGRAM(s) Oral daily  folic acid 1 milliGRAM(s) Oral daily  levothyroxine 88 MICROGram(s) Oral daily  melatonin 6 milliGRAM(s) Oral at bedtime  metroNIDAZOLE    Tablet 500 milliGRAM(s) Oral three times a day  primidone 50 milliGRAM(s) Oral daily  rosuvastatin 20 milliGRAM(s) Oral at bedtime  sodium chloride 0.9%. 1000 milliLiter(s) (75 mL/Hr) IV Continuous <Continuous>  vancomycin  IVPB 750 milliGRAM(s) IV Intermittent every 24 hours  vancomycin  IVPB        MEDICATIONS  (PRN):  acetaminophen     Tablet .. 650 milliGRAM(s) Oral every 6 hours PRN Temp greater or equal to 38C (100.4F), Mild Pain (1 - 3)  lidocaine   4% Patch 1 Patch Transdermal daily PRN Back pain      CAPILLARY BLOOD GLUCOSE        I&O's Summary    02 Nov 2024 08:01  -  03 Nov 2024 07:00  --------------------------------------------------------  IN: 0 mL / OUT: 1310 mL / NET: -1310 mL        PHYSICAL EXAM:  Vital Signs Last 24 Hrs  T(C): 36.8 (03 Nov 2024 07:04), Max: 37.8 (02 Nov 2024 21:12)  T(F): 98.3 (03 Nov 2024 07:04), Max: 100 (02 Nov 2024 21:12)  HR: 77 (03 Nov 2024 07:04) (77 - 93)  BP: 113/50 (03 Nov 2024 07:04) (94/48 - 113/50)  BP(mean): --  RR: 18 (03 Nov 2024 07:04) (16 - 18)  SpO2: 95% (03 Nov 2024 07:04) (94% - 97%)    Parameters below as of 03 Nov 2024 07:04  Patient On (Oxygen Delivery Method): room air        T(C): 36.8 (11-03-24 @ 07:04), Max: 37.8 (11-02-24 @ 21:12)  HR: 77 (11-03-24 @ 07:04) (77 - 93)  BP: 113/50 (11-03-24 @ 07:04) (94/48 - 113/50)  RR: 18 (11-03-24 @ 07:04) (16 - 18)  SpO2: 95% (11-03-24 @ 07:04) (94% - 97%)    GENERAL: well-appearing, NAD, appears comfortable  HEENT: NC/AT, EOMI, no conjunctival icterus, moist mucus membranes  NECK: supple, non-tender, no JVD, no LAD  LUNGS: non-labored breathing, CTAB, no wheezing/rales/rhonchi  CV: RRR, no m/r/g, 2+ palpable peripheral pulses bi/l, cap refill <2 secs  ABD: non-distended, soft, non-tender, no rebound tenderness or guarding  : no CVA tenderness  MSK: full ROM, strength 5/5 bi/l  EXT: warm and well-perfused, no peripheral edema  SKIN: no rashes or lesions  NEURO: AAOx3, no focal deficits    LABS:                        10.2   6.80  )-----------( 219      ( 02 Nov 2024 07:02 )             30.3     11-02    128[L]  |  89[L]  |  24[H]  ----------------------------<  75  3.6   |  27  |  1.17    Ca    8.5      02 Nov 2024 07:02  Phos  2.2     11-02  Mg     1.3     11-02    TPro  6.0  /  Alb  3.1[L]  /  TBili  0.6  /  DBili  x   /  AST  20  /  ALT  14  /  AlkPhos  91  11-02    PT/INR - ( 02 Nov 2024 07:02 )   PT: 33.3 sec;   INR: 2.95 ratio         PTT - ( 01 Nov 2024 17:17 )  PTT:37.6 sec      Urinalysis Basic - ( 02 Nov 2024 07:02 )    Color: x / Appearance: x / SG: x / pH: x  Gluc: 75 mg/dL / Ketone: x  / Bili: x / Urobili: x   Blood: x / Protein: x / Nitrite: x   Leuk Esterase: x / RBC: x / WBC x   Sq Epi: x / Non Sq Epi: x / Bacteria: x        Culture - Blood (collected 01 Nov 2024 16:15)  Source: .Blood BLOOD  Preliminary Report (03 Nov 2024 01:19):    No growth at 24 hours    Culture - Blood (collected 01 Nov 2024 14:00)  Source: .Blood BLOOD  Preliminary Report (03 Nov 2024 01:19):    No growth at 24 hours        IMAGING & OTHER TESTS:  NNI.   Patient is a 73 year old female with Hx CREST syndrome, hypothyroidism, hld CKD3, CAD (s/p stents, on ASA), colonic inertia, rectal prolapse (s/p ?suspensory surgical procedure, colon resection and rectopexy [2011], and repeat open rectopexy with mesh [2016]), and recurent SBOs (s/p ex-lap, SBR, diverting loop ileostomy [2017], and rectal stricture, managed non-operatively) p/t ED for altered mental status, generalized shaking, confusion, and chills. Of note, patient was previously admitted 2 weeks ago following fall l/t rib fractures and T spine TP fx. Endorses abdominal pain a/w nausea, no emesis. Normal ostomy output. Has had decreased PO intake for the last few days. States that this feels similar to last SBO but with increased abdominal pain. Still tolerating PO intake, no vomiting. No chest pain or shortness of breath. No dysuria. No neck stiffness. No meds taken prior to arrival for pain/fever.    In the ED, patient was given a dose of ceftriaxone, and then was transitioned to vancomycin and zosyn to cover for meginigitis/encephalitis. CT abdomen performed which showed partial SBO and small/large bowel wall thickening. Patient reports ostomy output increased from baseline.    Overnight, patient c/o right forearm swelling. On CLD. Denies CP, SOB, fevers/chills, N/V, or abdominal pain. Patient reminded of ongoing careplan.    MEDICATIONS  (STANDING):  aspirin enteric coated 81 milliGRAM(s) Oral daily  cefTRIAXone   IVPB 1000 milliGRAM(s) IV Intermittent every 24 hours  cholecalciferol 5000 Unit(s) Oral daily  cyanocobalamin 1000 MICROGram(s) Oral daily  ferrous    sulfate 325 milliGRAM(s) Oral daily  folic acid 1 milliGRAM(s) Oral daily  levothyroxine 88 MICROGram(s) Oral daily  melatonin 6 milliGRAM(s) Oral at bedtime  metroNIDAZOLE    Tablet 500 milliGRAM(s) Oral three times a day  primidone 50 milliGRAM(s) Oral daily  rosuvastatin 20 milliGRAM(s) Oral at bedtime  sodium chloride 0.9%. 1000 milliLiter(s) (75 mL/Hr) IV Continuous <Continuous>  vancomycin  IVPB 750 milliGRAM(s) IV Intermittent every 24 hours  vancomycin  IVPB        MEDICATIONS  (PRN):  acetaminophen     Tablet .. 650 milliGRAM(s) Oral every 6 hours PRN Temp greater or equal to 38C (100.4F), Mild Pain (1 - 3)  lidocaine   4% Patch 1 Patch Transdermal daily PRN Back pain      CAPILLARY BLOOD GLUCOSE        I&O's Summary    02 Nov 2024 08:01  -  03 Nov 2024 07:00  --------------------------------------------------------  IN: 0 mL / OUT: 1310 mL / NET: -1310 mL        PHYSICAL EXAM:  Vital Signs Last 24 Hrs  T(C): 36.8 (03 Nov 2024 07:04), Max: 37.8 (02 Nov 2024 21:12)  T(F): 98.3 (03 Nov 2024 07:04), Max: 100 (02 Nov 2024 21:12)  HR: 77 (03 Nov 2024 07:04) (77 - 93)  BP: 113/50 (03 Nov 2024 07:04) (94/48 - 113/50)  BP(mean): --  RR: 18 (03 Nov 2024 07:04) (16 - 18)  SpO2: 95% (03 Nov 2024 07:04) (94% - 97%)    Parameters below as of 03 Nov 2024 07:04  Patient On (Oxygen Delivery Method): room air        T(C): 36.8 (11-03-24 @ 07:04), Max: 37.8 (11-02-24 @ 21:12)  HR: 77 (11-03-24 @ 07:04) (77 - 93)  BP: 113/50 (11-03-24 @ 07:04) (94/48 - 113/50)  RR: 18 (11-03-24 @ 07:04) (16 - 18)  SpO2: 95% (11-03-24 @ 07:04) (94% - 97%)    GENERAL: well-appearing, NAD, appears comfortable  HEENT: NC/AT, EOMI, no conjunctival icterus, moist mucus membranes  NECK: supple, non-tender, no JVD, no LAD  LUNGS: non-labored breathing, CTAB, no wheezing/rales/rhonchi  CV: RRR, no m/r/g, 2+ palpable peripheral pulses bi/l, cap refill <2 secs  ABD: non-distended, soft, non-tender, no rebound tenderness or guarding  : no CVA tenderness  MSK: full ROM, strength 5/5 bi/l  EXT: Left forearm is swollen and edematous warm and well-perfused, no peripheral edema  SKIN: no rashes or lesions  NEURO: AAOx3, no focal deficits    LABS:                        10.2   6.80  )-----------( 219      ( 02 Nov 2024 07:02 )             30.3     11-02    128[L]  |  89[L]  |  24[H]  ----------------------------<  75  3.6   |  27  |  1.17    Ca    8.5      02 Nov 2024 07:02  Phos  2.2     11-02  Mg     1.3     11-02    TPro  6.0  /  Alb  3.1[L]  /  TBili  0.6  /  DBili  x   /  AST  20  /  ALT  14  /  AlkPhos  91  11-02    PT/INR - ( 02 Nov 2024 07:02 )   PT: 33.3 sec;   INR: 2.95 ratio         PTT - ( 01 Nov 2024 17:17 )  PTT:37.6 sec      Urinalysis Basic - ( 02 Nov 2024 07:02 )    Color: x / Appearance: x / SG: x / pH: x  Gluc: 75 mg/dL / Ketone: x  / Bili: x / Urobili: x   Blood: x / Protein: x / Nitrite: x   Leuk Esterase: x / RBC: x / WBC x   Sq Epi: x / Non Sq Epi: x / Bacteria: x        Culture - Blood (collected 01 Nov 2024 16:15)  Source: .Blood BLOOD  Preliminary Report (03 Nov 2024 01:19):    No growth at 24 hours    Culture - Blood (collected 01 Nov 2024 14:00)  Source: .Blood BLOOD  Preliminary Report (03 Nov 2024 01:19):    No growth at 24 hours        IMAGING & OTHER TESTS:  NNI.   Patient is a 73 year old female with Hx CREST syndrome, hypothyroidism, hld CKD3, CAD (s/p stents, on ASA), colonic inertia, rectal prolapse (s/p ?suspensory surgical procedure, colon resection and rectopexy [2011], and repeat open rectopexy with mesh [2016]), and recurent SBOs (s/p ex-lap, SBR, diverting loop ileostomy [2017], and rectal stricture, managed non-operatively) p/t ED for altered mental status, generalized shaking, confusion, and chills. Of note, patient was previously admitted 2 weeks ago following fall l/t rib fractures and T spine TP fx. Endorses abdominal pain a/w nausea, no emesis. Normal ostomy output. Has had decreased PO intake for the last few days. States that this feels similar to last SBO but with increased abdominal pain. Still tolerating PO intake, no vomiting. No chest pain or shortness of breath. No dysuria. No neck stiffness. No meds taken prior to arrival for pain/fever.    In the ED, patient was given a dose of ceftriaxone, and then was transitioned to vancomycin and zosyn to cover for meginigitis/encephalitis. CT abdomen performed which showed partial SBO and small/large bowel wall thickening. Patient reports ostomy output increased from baseline.    Overnight, patient c/o left forearm swelling. On CLD. Denies CP, SOB, fevers/chills, N/V, or abdominal pain. Patient reminded of ongoing careplan.    MEDICATIONS  (STANDING):  aspirin enteric coated 81 milliGRAM(s) Oral daily  cefTRIAXone   IVPB 1000 milliGRAM(s) IV Intermittent every 24 hours  cholecalciferol 5000 Unit(s) Oral daily  cyanocobalamin 1000 MICROGram(s) Oral daily  ferrous    sulfate 325 milliGRAM(s) Oral daily  folic acid 1 milliGRAM(s) Oral daily  levothyroxine 88 MICROGram(s) Oral daily  melatonin 6 milliGRAM(s) Oral at bedtime  metroNIDAZOLE    Tablet 500 milliGRAM(s) Oral three times a day  primidone 50 milliGRAM(s) Oral daily  rosuvastatin 20 milliGRAM(s) Oral at bedtime  sodium chloride 0.9%. 1000 milliLiter(s) (75 mL/Hr) IV Continuous <Continuous>  vancomycin  IVPB 750 milliGRAM(s) IV Intermittent every 24 hours  vancomycin  IVPB        MEDICATIONS  (PRN):  acetaminophen     Tablet .. 650 milliGRAM(s) Oral every 6 hours PRN Temp greater or equal to 38C (100.4F), Mild Pain (1 - 3)  lidocaine   4% Patch 1 Patch Transdermal daily PRN Back pain      CAPILLARY BLOOD GLUCOSE        I&O's Summary    02 Nov 2024 08:01  -  03 Nov 2024 07:00  --------------------------------------------------------  IN: 0 mL / OUT: 1310 mL / NET: -1310 mL        PHYSICAL EXAM:  Vital Signs Last 24 Hrs  T(C): 36.8 (03 Nov 2024 07:04), Max: 37.8 (02 Nov 2024 21:12)  T(F): 98.3 (03 Nov 2024 07:04), Max: 100 (02 Nov 2024 21:12)  HR: 77 (03 Nov 2024 07:04) (77 - 93)  BP: 113/50 (03 Nov 2024 07:04) (94/48 - 113/50)  BP(mean): --  RR: 18 (03 Nov 2024 07:04) (16 - 18)  SpO2: 95% (03 Nov 2024 07:04) (94% - 97%)    Parameters below as of 03 Nov 2024 07:04  Patient On (Oxygen Delivery Method): room air        T(C): 36.8 (11-03-24 @ 07:04), Max: 37.8 (11-02-24 @ 21:12)  HR: 77 (11-03-24 @ 07:04) (77 - 93)  BP: 113/50 (11-03-24 @ 07:04) (94/48 - 113/50)  RR: 18 (11-03-24 @ 07:04) (16 - 18)  SpO2: 95% (11-03-24 @ 07:04) (94% - 97%)    GENERAL: well-appearing, NAD, appears comfortable  HEENT: NC/AT, EOMI, no conjunctival icterus, moist mucus membranes  NECK: supple, non-tender, no JVD, no LAD  LUNGS: non-labored breathing, CTAB, no wheezing/rales/rhonchi  CV: RRR, no m/r/g, 2+ palpable peripheral pulses bi/l, cap refill <2 secs  ABD: non-distended, soft, non-tender, no rebound tenderness or guarding  : no CVA tenderness  MSK: full ROM, strength 5/5 bi/l  EXT: Left forearm is swollen and edematous warm and well-perfused, no peripheral edema  SKIN: no rashes or lesions  NEURO: AAOx3, no focal deficits    LABS:                        10.2   6.80  )-----------( 219      ( 02 Nov 2024 07:02 )             30.3     11-02    128[L]  |  89[L]  |  24[H]  ----------------------------<  75  3.6   |  27  |  1.17    Ca    8.5      02 Nov 2024 07:02  Phos  2.2     11-02  Mg     1.3     11-02    TPro  6.0  /  Alb  3.1[L]  /  TBili  0.6  /  DBili  x   /  AST  20  /  ALT  14  /  AlkPhos  91  11-02    PT/INR - ( 02 Nov 2024 07:02 )   PT: 33.3 sec;   INR: 2.95 ratio         PTT - ( 01 Nov 2024 17:17 )  PTT:37.6 sec      Urinalysis Basic - ( 02 Nov 2024 07:02 )    Color: x / Appearance: x / SG: x / pH: x  Gluc: 75 mg/dL / Ketone: x  / Bili: x / Urobili: x   Blood: x / Protein: x / Nitrite: x   Leuk Esterase: x / RBC: x / WBC x   Sq Epi: x / Non Sq Epi: x / Bacteria: x        Culture - Blood (collected 01 Nov 2024 16:15)  Source: .Blood BLOOD  Preliminary Report (03 Nov 2024 01:19):    No growth at 24 hours    Culture - Blood (collected 01 Nov 2024 14:00)  Source: .Blood BLOOD  Preliminary Report (03 Nov 2024 01:19):    No growth at 24 hours        IMAGING & OTHER TESTS:  NNI.

## 2024-11-05 NOTE — PROGRESS NOTE ADULT - ASSESSMENT
Siena Panchal is a 73 year old female with Hx CREST syndrome, hypothyroidism, HLD, CKD3, CAD (s/p stents, on ASA), colonic inertia, rectal prolapse (s/p ?suspensory surgical procedure, colon resection and rectopexy [2011], and repeat open rectopexy with mesh [2016]), and recurent SBOs (s/p ex-lap, SBR, diverting loop ileostomy [2017], and rectal stricture, managed non-operatively) p/t ED for altered mental status, generalized shaking, confusion, and chills with partial SBO s/p course of abx with negative infectious workup with SBO resolving with supportive care. Hospital course c/b episode of CGE and melena on 11/4. GI consulted for further workup and management of the above.     #CGE/Melena 2/2 to LA Grade D esophagitis and PUD   #CAD s/p PCI on ASA   #Recent NSAID Use  #Elevated INR   #Partial SBO now resolved  Hospital course c/b new onset CGE and melena overnight with workup notable for hgb of 7.4 from b/l of ~10 with normal BUN/Cr ratio. Risk factors for ongoing bleeding include ongoing ASA use in addition to recent Advil use with superimposed coagulapathy with INR of 4 possibly 2/2 to recent abx use. Last EGD many years ago with note of esophagitis with colonoscopy in 2019 with friability with no bleeding in the rectum, in the recto-sigmoid colon and in the sigmoid colon and patent end-to-side colo-colonic anastomosis, characterized by tight angulation. Etiology of ongoing CGE/melena likely 2/2 to PUD 2/2 to recent high dose NSAID use vs. esophagitis vs. gastritis 2/2 to recent SBO. s/p 2.5 units in addition to PCC with imrpovement in hgb to 12 with INR decreased to 1.2. EGD on 11/4 with note of severe LA Grade D reflux esophagitis likely chronic in the setting of underlying CREST Syndrome. Few non-bleeding cratered duodenal ulcers were found in the dudodenal bulb extending into the first portioon of the duodenum. The ulcers were mainly    clean based although there was one large ulcer extending into the first portion of the duodenum with a flat pigmented spot (Raciel Class IIc) with lesion around 12 mm in largest dimension.Etiology of melena likely multifactorial and secondary to severe LA Grade D   reflux esophagitis in addition to superimposed peptic ulcer disease likely exacerbated by recent high dose NSAID use and coagulopathy in the setting of elevated INR.  -Hgb decreased today to 11 with small amounts of melena in the ostomy bag although unclear what her true hgb level is given over response to pRBC yesterday. Continue with liquid diet today and trend CBC. Pending hgb trend and ostomy output may consider repeat EGD tomorrow if hgb continues to down trend and melena still in the ostomy bag.     Recommendations:  -Ok for liquid diet today  -Pending hgb trend and ostomy output may consider repeat EGD tomorrow if hgb continues to down trend and melena still in the ostomy bag.   -Please trend CBC BID today and transfuse to maintain hgb>8 given hx of CAD  -Please obtain INR today   -Continue with IV PPI gtt for an additional 24 hours followed by PO PPI BID  x 8 weeks(pt may benefit from Voquezna in the outpatient setting given degree of esophagitis)   -If no absolute contraindications, please hold home ASA for an additional 24 hours given degree of PUD.  -Repeat EGD in 8 weeks to asses for healing of esophagitis and any underlying Lau Esophagus    All recommendations are tentative until note is attested by attending.

## 2024-11-05 NOTE — PROGRESS NOTE ADULT - SUBJECTIVE AND OBJECTIVE BOX
Gastroenterology Progress Note    Interval Events:   Pt still with melena in the ostomy bag with slightly decreased hgb to 11 although no further episodes of CGE   Denies any ongoing nausea, vomiting or abd pain and tolerating liquid diet   Underwent US of the UE given c/g thrombophlebitis    Allergies:  No Known Drug Allergies  latex (Unknown)  adhesives (Rash)      Hospital Medications:  acetaminophen     Tablet .. 650 milliGRAM(s) Oral every 6 hours PRN  aspirin enteric coated 81 milliGRAM(s) Oral daily  calcium carbonate    500 mG (Tums) Chewable 2 Tablet(s) Chew every 12 hours PRN  cholecalciferol 5000 Unit(s) Oral daily  cyanocobalamin 1000 MICROGram(s) Oral daily  ferrous    sulfate 325 milliGRAM(s) Oral daily  folic acid 1 milliGRAM(s) Oral daily  levothyroxine 88 MICROGram(s) Oral daily  lidocaine   4% Patch 2 Patch Transdermal daily  melatonin 6 milliGRAM(s) Oral at bedtime  ondansetron Injectable 4 milliGRAM(s) IV Push every 4 hours PRN  pantoprazole Infusion 8 mG/Hr IV Continuous <Continuous>  potassium phosphate IVPB 30 milliMole(s) IV Intermittent every 8 hours  primidone 50 milliGRAM(s) Oral daily  prothrombin complex concentrate Injectable (KCENTRA) 500 International Unit(s) IV Push once  rosuvastatin 20 milliGRAM(s) Oral at bedtime      ROS: 14 point ROS negative unless otherwise state in subjective    PHYSICAL EXAM:   Vital Signs:  Vital Signs Last 24 Hrs  T(C): 36.6 (2024 04:26), Max: 36.8 (2024 17:26)  T(F): 97.8 (2024 04:26), Max: 98.2 (2024 17:26)  HR: 64 (2024 04:26) (61 - 88)  BP: 98/68 (2024 04:26) (97/46 - 135/60)  BP(mean): 77 (2024 17:28) (77 - 77)  RR: 18 (2024 04:26) (16 - 18)  SpO2: 96% (2024 04:26) (95% - 100%)    Parameters below as of 2024 04:26  Patient On (Oxygen Delivery Method): room air      Daily Height in cm: 144.78 (2024 17:28)    Daily Weight in k.1 (2024 07:52)    GENERAL:  No acute distress  HEENT:  NCAT, no scleral icterus  CHEST: no resp distress  HEART:  RRR  ABDOMEN:  Soft, non-tender, non-distended, normoactive bowel sounds. Ostomy with melena in the vault.   EXTREMITIES:  swelling of the LUE   NEURO:  Alert and oriented x 3, no asterixis, no tremor    LABS:                        11.0   7.81  )-----------( 214      ( 2024 08:24 )             33.0     Mean Cell Volume: 89.4 fl (24 @ 08:24)        138  |  106  |  32[H]  ----------------------------<  73  3.6   |  19[L]  |  1.09    Ca    7.7[L]      2024 08:24  Phos  1.6       Mg     1.5         TPro  5.1[L]  /  Alb  2.6[L]  /  TBili  0.5  /  DBili  x   /  AST  14  /  ALT  11  /  AlkPhos  99  11-05    LIVER FUNCTIONS - ( 2024 08:24 )  Alb: 2.6 g/dL / Pro: 5.1 g/dL / ALK PHOS: 99 U/L / ALT: 11 U/L / AST: 14 U/L / GGT: x           PT/INR - ( 2024 08:23 )   PT: 10.3 sec;   INR: 0.89 ratio         PTT - ( 2024 08:23 )  PTT:28.2 sec  Urinalysis Basic - ( 2024 08:24 )    Color: x / Appearance: x / SG: x / pH: x  Gluc: 73 mg/dL / Ketone: x  / Bili: x / Urobili: x   Blood: x / Protein: x / Nitrite: x   Leuk Esterase: x / RBC: x / WBC x   Sq Epi: x / Non Sq Epi: x / Bacteria: x      Imaging:  < from: CT Abdomen and Pelvis w/ IV Cont (24 @ 20:22) >    The mildly displaced anterior rib fracture described in the body of   report may have been present on prior, but nondisplaced and therefore   lessconspicuous. There is also a displaced fracture of the right   posterior fourth rib which is new. Other prior rib fractures are   unchanged and have undergone some healing. Fractures of multiple right   transverse processes are unchanged.    < end of copied text >  < from: Upper Endoscopy (24 @ 17:20) >  Impression:          - Severe LA Grade D reflux esophagitis likely chronic in the setting of                        underlying CREST Syndrome                       - Few non-bleeding cratered duodenal ulcers were found in the dudodenal bulb                        extending into the first portioon of the duodenum. The ulcers were mainly                        clean based although there was one large ulcer extending into the first                        portion of the duodenum with a flat pigmented spot (Raciel Class IIc) with                        lesion around 12 mm in largest dimension.                       - Etiology of melena likely multifactorial and secondary to severe LA Grade D                        reflux esophagitis in addition to superimposed peptic ulcer disease likely                        exacerbated by recent high dose NSAID use and coagulopathy in the setting of                        elevated INR.  Recommendation:      - [facility].                       - Clear liquid diet today.                       - Continue with IV PPI gtt for an additional 24 hours followed by PO PPI BID                        x 8 weeks                       - If no absolute contraindications, please hold home ASA for an additional 24                        hours given degree of PUD.                       - Repeat EGD in 8 weeks to asses for healing of esophagitis and any                        underlying Barrets Esophagaus.    < end of copied text >

## 2024-11-05 NOTE — PROGRESS NOTE ADULT - ATTENDING COMMENTS
73 year old female with Hx CREST syndrome, hypothyroidism, hld CKD3, CAD (s/p stents, on ASA), colonic inertia, rectal prolapse (s/p ?suspensory surgical procedure, colon resection and rectopexy [2011], and repeat open rectopexy with mesh [2016]), and recurent SBOs (s/p ex-lap, SBR, diverting loop ileostomy [2017], and rectal stricture, managed non-operatively) p/t ED for altered mental status, generalized shaking, confusion, and chills. Of note, patient was previously admitted 2 weeks ago following fall l/t rib fractures and T spine TP fx. Endorses abdominal pain a/w nausea, no emesis.      # UGIB: 11/4 coffee ground emesis with melena in ostomy bag.   H/h acutely dropped to 6's from baseline 10, hypotension, RRT  -MICU not accepted.   PRBC in progress improved BP  started on PPI gtt, 2 large bore IV.   INR 4s - reverse INR - KCentra given.  s/p EGD 11/4 with severe esophagitis with duodenal ulcers   cont with PPI gtt till tomorrrow. clears today.  serial CBC.  monitor     # Fever: started on emperic abx on admit.   All infectious gu so far had been negative  will stop abx now.     # Bowel obstruction: CT on admit with evidence of partial SBP and LBO.   Clinically without overt signs of obstruction. good ostomy outpt.   Seen by surg without intervention. will monitor closely.     # Rib Fx: recent fall with multiple Fx.   pain control avoid NSAIDs    # Pulm nodule: need outpt follow up.    d/w daughter over phone

## 2024-11-05 NOTE — PROGRESS NOTE ADULT - PROBLEM SELECTOR PLAN 4
CT angio chest 11/1: No acute pulmonary embolism, New displaced posterior 4th rib fracture. Redemonstrated small right apical pneumothorax, similar to 10/31/2024, decreased from 10/19/2024 CT, small right pleural effusion/hemothorax, similar to prior. Redemonstrated mildly displaced fractures of the right posterolateral 4th rib and posterior 6th through 9th ribs, right T6-T9 transverse processes, and mildly displaced fractures of the T5-9 spinous processes. Unchanged cluster of  nodules up to 6 mm in the left upper lobe    Plan  - Acetaminophen 650 mg q6h prn for mild pain.  - Lidocaine patch prn for back pain CT abdomen 11/1: dilated SB with transition point in RLQ, c/f partial bowel obstruction. Large and SB wall thickening and mural hyperemia - c/f inflammatory, infectious, or ischemic process.  -Partial SBO resolving with increased ostomy output.    Plan  - No acute surgical intervention indicated as per general surgery.

## 2024-11-05 NOTE — CHART NOTE - NSCHARTNOTEFT_GEN_A_CORE
Patient had right forearm swelling, likely thrombophlebitis. Vitals were stable. Gave heat packs and elevation, ordered duplex. Spoke with .

## 2024-11-05 NOTE — PROVIDER CONTACT NOTE (OTHER) - SITUATION
PT BP 83/54
pt c/o swelling on her left right lower arm
pt refusing Zosyn IVPB, pt stated she wants to wait until infectious disease comes in the AM
pt had episode of small blood emesis

## 2024-11-05 NOTE — PROGRESS NOTE ADULT - PROBLEM SELECTOR PLAN 5
INR has increased from 2.95 to 4.21 over admission.  Vitamin B12 > 2000, Vitamin D 25-hydroxy 43.6, folate > 20  Plan  - Trend INR  - Vitamin K level ordered.  - Vitamin K 2.5 mg solution administered 11/4.  -  administered this am. INR has increased from 2.95 to 4.21 over admission. INR improved to 1.20  Vitamin B12 > 2000, Vitamin D 25-hydroxy 43.6, folate > 20  Plan  - Trend INR  - Pending Vitamin K level  - Vitamin K 2.5 mg solution administered 11/4.  -  administered 11/4. Left elbow and forearm swollen and edemetous iso multiple blood draws.  U/S duplex upper extremities 11/5: Superficial thrombophlebitis affects the right cephalic and the left basilac and cephalic veins    Plan  Treat supportively with heating pads and pain control

## 2024-11-05 NOTE — PROGRESS NOTE ADULT - PROBLEM SELECTOR PLAN 6
Plan  2 MARJORIE nodules found on CT during last admission. Patient to repeat CT in 6 months, CT angio chest 11/1: No acute pulmonary embolism, New displaced posterior 4th rib fracture. Redemonstrated small right apical pneumothorax, similar to 10/31/2024, decreased from 10/19/2024 CT, small right pleural effusion/hemothorax, similar to prior. Redemonstrated mildly displaced fractures of the right posterolateral 4th rib and posterior 6th through 9th ribs, right T6-T9 transverse processes, and mildly displaced fractures of the T5-9 spinous processes. Unchanged cluster of  nodules up to 6 mm in the left upper lobe    Plan  - Acetaminophen 650 mg q6h prn for mild pain.  - Lidocaine patch prn for back pain

## 2024-11-05 NOTE — PROVIDER CONTACT NOTE (OTHER) - ACTION/TREATMENT ORDERED:
Anesthesia Evaluation      Patient summary reviewed   No history of anesthetic complications     Airway   Mallampati: II  Neck ROM: full   Pulmonary - negative ROS    breath sounds clear to auscultation  (-) asthma, sleep apnea, not a smoker                         Cardiovascular   Exercise tolerance: > or = 4 METS  (-) hypertension  ECG reviewed (NSR, QTc 392)  Rhythm: regular  Rate: normal,         Neuro/Psych - negative ROS   (-) no seizures, no CVA    Endo/Other    (-) no diabetes, hypothyroidism, no obesity     GI/Hepatic/Renal    (-) GERD    Comments: Hx of colon cancer, James syndrome     Other findings:   Covid negative 9/14    Labs 9/11  Na 140  K 4  BUN/Cr 11/0.68  Hbg 12.7  Plt 167      Dental      Comment: Fair dentition  Upper tooth chipped, has been bonded - will take great care                       Anesthesia Plan  Planned anesthetic: general endotracheal  Acetaminophen 1 g (pre-op), ketamine 25 mg on induction  Dexamethasone 10 mg, ondansetron 4 mg  ASA 2   Induction: intravenous   Anesthetic plan and risks discussed with: patient  Anesthesia plan special considerations: antiemetics,   Post-op plan: routine recovery          
MD aware and spoke to pt at bedside, pt continuing to refuse
pt already has 1/2 unit PRBC ordered, MD made aware
warm pack applied and elevated the extremity
MD assessed pt at bedside. IV protonix and zofran given. pt started on 500cc LR bolus

## 2024-11-06 LAB
BASOPHILS # BLD AUTO: 0.16 K/UL — SIGNIFICANT CHANGE UP (ref 0–0.2)
BASOPHILS NFR BLD AUTO: 1.7 % — SIGNIFICANT CHANGE UP (ref 0–2)
EOSINOPHIL # BLD AUTO: 0.48 K/UL — SIGNIFICANT CHANGE UP (ref 0–0.5)
EOSINOPHIL NFR BLD AUTO: 5.2 % — SIGNIFICANT CHANGE UP (ref 0–6)
HCT VFR BLD CALC: 30.4 % — LOW (ref 34.5–45)
HGB BLD-MCNC: 10.5 G/DL — LOW (ref 11.5–15.5)
LYMPHOCYTES # BLD AUTO: 1.12 K/UL — SIGNIFICANT CHANGE UP (ref 1–3.3)
LYMPHOCYTES # BLD AUTO: 12.2 % — LOW (ref 13–44)
MAGNESIUM SERPL-MCNC: 1.9 MG/DL — SIGNIFICANT CHANGE UP (ref 1.6–2.6)
MANUAL SMEAR VERIFICATION: SIGNIFICANT CHANGE UP
MCHC RBC-ENTMCNC: 30 PG — SIGNIFICANT CHANGE UP (ref 27–34)
MCHC RBC-ENTMCNC: 34.5 G/DL — SIGNIFICANT CHANGE UP (ref 32–36)
MCV RBC AUTO: 86.9 FL — SIGNIFICANT CHANGE UP (ref 80–100)
MONOCYTES # BLD AUTO: 0.56 K/UL — SIGNIFICANT CHANGE UP (ref 0–0.9)
MONOCYTES NFR BLD AUTO: 6.1 % — SIGNIFICANT CHANGE UP (ref 2–14)
NEUTROPHILS # BLD AUTO: 6.87 K/UL — SIGNIFICANT CHANGE UP (ref 1.8–7.4)
NEUTROPHILS NFR BLD AUTO: 74.8 % — SIGNIFICANT CHANGE UP (ref 43–77)
PHOSPHATE SERPL-MCNC: 3.8 MG/DL — SIGNIFICANT CHANGE UP (ref 2.5–4.5)
PLAT MORPH BLD: NORMAL — SIGNIFICANT CHANGE UP
PLATELET # BLD AUTO: 229 K/UL — SIGNIFICANT CHANGE UP (ref 150–400)
RBC # BLD: 3.5 M/UL — LOW (ref 3.8–5.2)
RBC # FLD: 15 % — HIGH (ref 10.3–14.5)
RBC BLD AUTO: SIGNIFICANT CHANGE UP
WBC # BLD: 9.18 K/UL — SIGNIFICANT CHANGE UP (ref 3.8–10.5)
WBC # FLD AUTO: 9.18 K/UL — SIGNIFICANT CHANGE UP (ref 3.8–10.5)

## 2024-11-06 PROCEDURE — 99232 SBSQ HOSP IP/OBS MODERATE 35: CPT | Mod: GC

## 2024-11-06 RX ADMIN — PANTOPRAZOLE SODIUM 40 MILLIGRAM(S): 40 TABLET, DELAYED RELEASE ORAL at 17:10

## 2024-11-06 RX ADMIN — FOLIC ACID 1 MILLIGRAM(S): 1 TABLET ORAL at 11:48

## 2024-11-06 RX ADMIN — Medication 20 MILLIGRAM(S): at 21:36

## 2024-11-06 RX ADMIN — Medication 81 MILLIGRAM(S): at 11:48

## 2024-11-06 RX ADMIN — Medication 6 MILLIGRAM(S): at 21:36

## 2024-11-06 RX ADMIN — Medication 88 MICROGRAM(S): at 05:17

## 2024-11-06 RX ADMIN — Medication 325 MILLIGRAM(S): at 11:48

## 2024-11-06 RX ADMIN — Medication 1000 MICROGRAM(S): at 11:49

## 2024-11-06 RX ADMIN — Medication 5000 UNIT(S): at 11:48

## 2024-11-06 RX ADMIN — PRIMIDONE 50 MILLIGRAM(S): 50 TABLET ORAL at 11:49

## 2024-11-06 RX ADMIN — DULOXETINE HYDROCHLORIDE 20 MILLIGRAM(S): 30 CAPSULE, DELAYED RELEASE ORAL at 21:36

## 2024-11-06 NOTE — DIETITIAN INITIAL EVALUATION ADULT - NS FNS DIET ORDER
Diet, Regular:   Supplement Feeding Modality:  Oral  Ensure Plus High Protein Cans or Servings Per Day:  1       Frequency:  Three Times a day (11-06-24 @ 11:11)

## 2024-11-06 NOTE — PROGRESS NOTE ADULT - ATTENDING COMMENTS
GI following for melena in ostomy bag, coffee ground emesis, acute post hemorrhagic anemia  s/p EGD showing severe esophagitis and PUD. Please see report for full details   Hgb 10.5 today     Monitor ostomy output  trend h/h  PPI BID x 8 wks and then qday  will need repeat EGD in 8 wks   diet as tolerated  outpatient GI f/u   GI to sign off, please call w questions

## 2024-11-06 NOTE — DIETITIAN INITIAL EVALUATION ADULT - ORAL INTAKE PTA/DIET HISTORY
Pt reports decreased PO intake since Ostomy placement in 2017. Pt states she feel full easily, follow low fiber diet since ostomy placement, states some high fiber foods causing blockage. Confirms PO intake for few days prior to admission due to abdominal pain. Confirms no known food allergies. Denies history of chewing or swallowing issues.  On Fish oil, Citracal Petites, CoQ10, Magnesium, Iron, Vitamin D3, Vitamin B12 and Probiotics per H&P. Also repots drinking Ensure shake 1x daily.

## 2024-11-06 NOTE — DIETITIAN INITIAL EVALUATION ADULT - OTHER CALCULATIONS
Fluid needs deferred to team. Energy and protein needs based on dosing weight of 39kg in consideration of malnutrition, underweight.

## 2024-11-06 NOTE — DIETITIAN INITIAL EVALUATION ADULT - OTHER INFO
- GI: extensive GI history with ileostomy in place with episode of CGE and melena on 11/4, GI following, s/p EGD 11/4, on Protonix, Zofran and TUMS  -- Micronutrient/Other supplementation: Vitamin D, Vitamin B12, Feso4, Folic acid  -- Hypothyroidism on Synthroid

## 2024-11-06 NOTE — PROGRESS NOTE ADULT - PROBLEM SELECTOR PLAN 4
CT abdomen 11/1: dilated SB with transition point in RLQ, c/f partial bowel obstruction. Large and SB wall thickening and mural hyperemia - c/f inflammatory, infectious, or ischemic process.  -Partial SBO resolving with increased ostomy output.    Plan  - No acute surgical intervention indicated as per general surgery.

## 2024-11-06 NOTE — PROGRESS NOTE ADULT - ATTENDING COMMENTS
73 year old female with Hx CREST syndrome, hypothyroidism, hld CKD3, CAD (s/p stents, on ASA), colonic inertia, rectal prolapse (s/p ?suspensory surgical procedure, colon resection and rectopexy [2011], and repeat open rectopexy with mesh [2016]), and recurent SBOs (s/p ex-lap, SBR, diverting loop ileostomy [2017], and rectal stricture, managed non-operatively) p/t ED for altered mental status, generalized shaking, confusion, and chills. Of note, patient was previously admitted 2 weeks ago following fall l/t rib fractures and T spine TP fx. Endorses abdominal pain a/w nausea, no emesis.      # UGIB: 11/4 coffee ground emesis with melena in ostomy bag.   H/h acutely dropped to 6's from baseline 10, hypotension, RRT  -MICU not accepted.   PRBC in progress improved BP  started on PPI gtt, 2 large bore IV.   INR 4s - reverse INR - KCentra given.  s/p EGD 11/4 with severe esophagitis with duodenal ulcers   cont with PPI  . diet advanced. repeat EGD in 8 weeks.     # Fever: started on emperic abx on admit.   All infectious gu so far had been negative  will stop abx now. monitor off    # Bowel obstruction: CT on admit with evidence of partial SBP and LBO.   Clinically without overt signs of obstruction. good ostomy outpt.   per family pt having stool outpt from rectum which is unusual.   Will d/w colorectal surg.      # Rib Fx: recent fall with multiple Fx.   pain control avoid NSAIDs    # Pulm nodule: need outpt follow up.    d/w  at bedside

## 2024-11-06 NOTE — DIETITIAN INITIAL EVALUATION ADULT - REASON INDICATOR FOR ASSESSMENT
Pt seen for consult for nutrition assessment/education and BMI <19. Information obtained from pt, RN, electronic medical record. Chart reviewed, events noted.

## 2024-11-06 NOTE — PROGRESS NOTE ADULT - PROBLEM SELECTOR PLAN 5
Left elbow and forearm swollen and edemetous iso multiple blood draws.  U/S duplex upper extremities 11/5: Superficial thrombophlebitis affects the right cephalic and the left basilac and cephalic veins    Plan  Treat supportively with heating pads and pain control

## 2024-11-06 NOTE — DIETITIAN NUTRITION RISK NOTIFICATION - TREATMENT: THE FOLLOWING DIET HAS BEEN RECOMMENDED
Diet, Regular:   Supplement Feeding Modality:  Oral  Ensure Plus High Protein Cans or Servings Per Day:  1       Frequency:  Three Times a day (11-06-24 @ 11:11) [Active]

## 2024-11-06 NOTE — DIETITIAN INITIAL EVALUATION ADULT - PHYSCIAL ASSESSMENT
Drug Dosing Weight  Height (cm): 144.8 (2024 17:28)  Weight (kg): 39 (2024 17:28)  BMI (kg/m2): 18.6 (2024 17:28)    Daily Weight in k.9 ( @ 07:47), 55.1 ( @ 07:52)  Weight obtained by RD: 47kg ( bed scale)     Weight history:   Per pt: ~86lb prior to admission (usually weight around 85-90lb), reports wt loss happened after ostomy placement but weight stable wt for the past few years  denies history of weight loss PTA, endorses weight loss due to medical condition/decreased PO intake.  Previous RD notes: 40.7kg (24), 40.8kg (24), 41.9kg (8/3/18)      **? weight accuracy in house as pt confirms her UBW <90lb. RD will continue to monitor wt trends as available/able.     IBW: 94lb, 91% IBW

## 2024-11-06 NOTE — DIETITIAN INITIAL EVALUATION ADULT - PROBLEM SELECTOR PLAN 2
CT abdomen 11/1: dilated SB with transition point in RLQ, c/f partial bowel obstruction. Large and SB wall thickening and mural hyperemia - c/f inflammatory, infectious, or ischemic process.  -Partial SBO resolving with increased ostomy output.    Plan  - No acute surgical intervention indicated as per general surgery.  - On CLD. Can advance as tolerated.

## 2024-11-06 NOTE — PROGRESS NOTE ADULT - PROBLEM SELECTOR PLAN 1
Noncon CT head negative  Acyclovir given 11/2 and d/c, ce  11/1: vancomycin 1 dose  11/2: acyclovir 1 dose, ctx 1 dose, zosyn,   c. diff negative  RVP negative  U/A negative  Blood culture negative x 24 hours  Urine culture negative  Joryayn completed 11/5  Plan  CTM

## 2024-11-06 NOTE — CONSULT NOTE ADULT - SUBJECTIVE AND OBJECTIVE BOX
Colorectal Surgery Consult  Consulting surgical team: [facility]  Consulting attending: Randi    HPI:  73 F with Hx CREST syndrome, hypothyroidism, CKD3, CAD (s/p stents, on ASA), colonic inertia, rectal prolapse (s/p ?suspensory surgical procedure, colon resection and rectopexy [2011], and repeat open rectopexy with mesh [2016]), and recurent SBOs (s/p ex-lap, SBR, diverting loop ileostomy [2017], and rectal stricture, managed non-operatively) p/t ED for altered mental status, generalized shaking, confusion, and chills. Of note, patient was previously admitted 2 weeks ago following fall l/t rib fractures and T spine TP fx. Endorses abdominal pain a/w nausea, no emesis. Normal ostomy output. Has had decreased PO intake for the last few days. States that this feels similar to last SBO but with increased abdominal pain. Since admission, the pts SBO has resolved. The pt developed an upper GI bleed and was found to have ulcer disease s/p EGD.     Colorectal surgery now being consulted for increased mucous output per rectum. She reports she is having normal ostomy output.           PAST MEDICAL HISTORY:  Sjogren's syndrome    Ileostomy in place    Colonic dysmotility    GERD (gastroesophageal reflux disease)    Anxiety    Sciatica    CAD (coronary atherosclerotic disease)    Stented coronary artery    History of dehydration    H/O small bowel obstruction    S/P primary angioplasty with coronary stent    Hypothyroid    H/O electrolyte imbalance    History of connective tissue disease    Hyperlipidemia    Mild anemia    Osteoporosis    Insomnia    Vasovagal syncope    History of hypotension    Scoliosis    History of CREST syndrome    History of scleroderma    Bilateral dry eyes    Dry mouth    Hyponatremia    Hypomagnesemia    COVID-19 vaccine series completed    History of short term memory loss    Familial tremor    Raynauds syndrome    Bunion of left foot    Bunion of right foot    Hammer toe of right foot    Hammer toe of left foot    Spondylolisthesis    Colonic inertia    DDD (degenerative disc disease), cervical    DDD (degenerative disc disease), lumbar    Dense breast tissue    History of subdural hematoma        PAST SURGICAL HISTORY:  History of hysterectomy    History of appendectomy    H/O ileostomy    S/P primary angioplasty with coronary stent    History of lumbar laminectomy    Rectal prolapse    History of tonsillectomy and adenoidectomy    History of bowel resection        MEDICATIONS:  acetaminophen     Tablet .. 650 milliGRAM(s) Oral every 6 hours PRN  aspirin enteric coated 81 milliGRAM(s) Oral daily  calcium carbonate    500 mG (Tums) Chewable 2 Tablet(s) Chew every 12 hours PRN  cholecalciferol 5000 Unit(s) Oral daily  cyanocobalamin 1000 MICROGram(s) Oral daily  DULoxetine 20 milliGRAM(s) Oral daily  ferrous    sulfate 325 milliGRAM(s) Oral daily  folic acid 1 milliGRAM(s) Oral daily  levothyroxine 88 MICROGram(s) Oral daily  lidocaine   4% Patch 2 Patch Transdermal daily  melatonin 6 milliGRAM(s) Oral at bedtime  ondansetron Injectable 4 milliGRAM(s) IV Push every 4 hours PRN  pantoprazole    Tablet 40 milliGRAM(s) Oral two times a day  primidone 50 milliGRAM(s) Oral daily  rosuvastatin 20 milliGRAM(s) Oral at bedtime      ALLERGIES:  No Known Drug Allergies  latex (Unknown)  adhesives (Rash)      VITALS & I/Os:  Vital Signs Last 24 Hrs  T(C): 36.7 (06 Nov 2024 12:04), Max: 36.9 (06 Nov 2024 04:39)  T(F): 98.1 (06 Nov 2024 12:04), Max: 98.4 (06 Nov 2024 04:39)  HR: 80 (06 Nov 2024 12:04) (72 - 89)  BP: 105/61 (06 Nov 2024 12:04) (105/61 - 132/68)  BP(mean): --  RR: 18 (06 Nov 2024 12:04) (16 - 18)  SpO2: 95% (06 Nov 2024 12:04) (95% - 98%)    Parameters below as of 06 Nov 2024 12:04  Patient On (Oxygen Delivery Method): room air        I&O's Summary    05 Nov 2024 07:01  -  06 Nov 2024 07:00  --------------------------------------------------------  IN: 200 mL / OUT: 300 mL / NET: -100 mL        PHYSICAL EXAM:  General: No acute distress  Respiratory: Nonlabored  Cardiovascular: RRR  Abdominal: Soft, bilious ostomy output, nontender    LABS:                        10.5   9.18  )-----------( 229      ( 06 Nov 2024 07:46 )             30.4     11-06    135  |  105  |  15  ----------------------------<  82  4.3   |  18[L]  |  0.86    Ca    6.8[L]      06 Nov 2024 07:46  Phos  3.8     11-06  Mg     1.9     11-06    TPro  5.2[L]  /  Alb  2.7[L]  /  TBili  0.4  /  DBili  x   /  AST  13  /  ALT  11  /  AlkPhos  94  11-06    Lactate:    PT/INR - ( 05 Nov 2024 08:23 )   PT: 10.3 sec;   INR: 0.89 ratio         PTT - ( 05 Nov 2024 08:23 )  PTT:28.2 sec          Urinalysis Basic - ( 06 Nov 2024 07:46 )    Color: x / Appearance: x / SG: x / pH: x  Gluc: 82 mg/dL / Ketone: x  / Bili: x / Urobili: x   Blood: x / Protein: x / Nitrite: x   Leuk Esterase: x / RBC: x / WBC x   Sq Epi: x / Non Sq Epi: x / Bacteria: x        IMAGING:

## 2024-11-06 NOTE — PROGRESS NOTE ADULT - PROBLEM SELECTOR PLAN 2
Patient began having coffee ground hematemesis 11/3 at night. Hb/Hct dropped from 10.5/32.1 to 7.4/23.0. On 11/4, patient responded appropriately to 2 units pRBC with new Hb/Hct   BUN 16, Cr 0.98  Plan  - PPI drip changed to PPI PO BID today as per GI  - q12h CBC  - Patient to f/u with egd in 8 week to re-evaluate.  - Diet advanced to regular diet as patient Hb remained stable and no evidence of active GI bleed.

## 2024-11-06 NOTE — DIETITIAN INITIAL EVALUATION ADULT - EDUCATION DIETARY MODIFICATIONS
Emphasized the importance of adequate kcal and protein intake; recommend to optimize nutritional intake in case of decreased appetite; recommended small frequent meals by ordering nutrient-dense snacks and leaving non-perishable food away from tray for later consumption during the day or between meals; to start with protein, and sips of supplement throughout the day; reviewed foods with protein and menu order procedures in hospital. Discussed for trial of very soft vegetables in diet as tolerated./teach back/(1) partially meets; needs review/practice/verbalization

## 2024-11-06 NOTE — PROGRESS NOTE ADULT - SUBJECTIVE AND OBJECTIVE BOX
Patient is a 73 year old female with Hx CREST syndrome, hypothyroidism, hld CKD3, CAD (s/p stents, on ASA), colonic inertia, rectal prolapse (s/p ?suspensory surgical procedure, colon resection and rectopexy [2011], and repeat open rectopexy with mesh [2016]), and recurent SBOs (s/p ex-lap, SBR, diverting loop ileostomy [2017], and rectal stricture, managed non-operatively) p/t ED for altered mental status, generalized shaking, confusion, and chills. Of note, patient was previously admitted 2 weeks ago following fall l/t rib fractures and T spine TP fx. Endorses abdominal pain a/w nausea, no emesis. Normal ostomy output. Has had decreased PO intake for the last few days. States that this feels similar to last SBO but with increased abdominal pain. Still tolerating PO intake, no vomiting. No chest pain or shortness of breath. No dysuria. No neck stiffness. No meds taken prior to arrival for pain/fever.    In the ED, patient was given a dose of ceftriaxone, and then was transitioned to vancomycin and zosyn to cover for meginigitis/encephalitis. CT abdomen performed which showed partial SBO and small/large bowel wall thickening. Patient reports ostomy output increased from baseline.    Overnight, patient c/o left forearm swelling. On CLD. Denies CP, SOB, fevers/chills, N/V, or abdominal pain. Patient reminded of ongoing careplan.    MEDICATIONS  (STANDING):  aspirin enteric coated 81 milliGRAM(s) Oral daily  cefTRIAXone   IVPB 1000 milliGRAM(s) IV Intermittent every 24 hours  cholecalciferol 5000 Unit(s) Oral daily  cyanocobalamin 1000 MICROGram(s) Oral daily  ferrous    sulfate 325 milliGRAM(s) Oral daily  folic acid 1 milliGRAM(s) Oral daily  levothyroxine 88 MICROGram(s) Oral daily  melatonin 6 milliGRAM(s) Oral at bedtime  metroNIDAZOLE    Tablet 500 milliGRAM(s) Oral three times a day  primidone 50 milliGRAM(s) Oral daily  rosuvastatin 20 milliGRAM(s) Oral at bedtime  sodium chloride 0.9%. 1000 milliLiter(s) (75 mL/Hr) IV Continuous <Continuous>  vancomycin  IVPB 750 milliGRAM(s) IV Intermittent every 24 hours  vancomycin  IVPB        MEDICATIONS  (PRN):  acetaminophen     Tablet .. 650 milliGRAM(s) Oral every 6 hours PRN Temp greater or equal to 38C (100.4F), Mild Pain (1 - 3)  lidocaine   4% Patch 1 Patch Transdermal daily PRN Back pain      CAPILLARY BLOOD GLUCOSE        I&O's Summary    02 Nov 2024 08:01  -  03 Nov 2024 07:00  --------------------------------------------------------  IN: 0 mL / OUT: 1310 mL / NET: -1310 mL        PHYSICAL EXAM:  Vital Signs Last 24 Hrs  T(C): 36.8 (03 Nov 2024 07:04), Max: 37.8 (02 Nov 2024 21:12)  T(F): 98.3 (03 Nov 2024 07:04), Max: 100 (02 Nov 2024 21:12)  HR: 77 (03 Nov 2024 07:04) (77 - 93)  BP: 113/50 (03 Nov 2024 07:04) (94/48 - 113/50)  BP(mean): --  RR: 18 (03 Nov 2024 07:04) (16 - 18)  SpO2: 95% (03 Nov 2024 07:04) (94% - 97%)    Parameters below as of 03 Nov 2024 07:04  Patient On (Oxygen Delivery Method): room air        T(C): 36.8 (11-03-24 @ 07:04), Max: 37.8 (11-02-24 @ 21:12)  HR: 77 (11-03-24 @ 07:04) (77 - 93)  BP: 113/50 (11-03-24 @ 07:04) (94/48 - 113/50)  RR: 18 (11-03-24 @ 07:04) (16 - 18)  SpO2: 95% (11-03-24 @ 07:04) (94% - 97%)    GENERAL: well-appearing, NAD, appears comfortable  HEENT: NC/AT, EOMI, no conjunctival icterus, moist mucus membranes  NECK: supple, non-tender, no JVD, no LAD  LUNGS: non-labored breathing, CTAB, no wheezing/rales/rhonchi  CV: RRR, no m/r/g, 2+ palpable peripheral pulses bi/l, cap refill <2 secs  ABD: non-distended, soft, non-tender, no rebound tenderness or guarding  : no CVA tenderness  MSK: full ROM, strength 5/5 bi/l  EXT: Left forearm is swollen and edematous warm and well-perfused, no peripheral edema  SKIN: no rashes or lesions  NEURO: AAOx3, no focal deficits    LABS:                        10.2   6.80  )-----------( 219      ( 02 Nov 2024 07:02 )             30.3     11-02    128[L]  |  89[L]  |  24[H]  ----------------------------<  75  3.6   |  27  |  1.17    Ca    8.5      02 Nov 2024 07:02  Phos  2.2     11-02  Mg     1.3     11-02    TPro  6.0  /  Alb  3.1[L]  /  TBili  0.6  /  DBili  x   /  AST  20  /  ALT  14  /  AlkPhos  91  11-02    PT/INR - ( 02 Nov 2024 07:02 )   PT: 33.3 sec;   INR: 2.95 ratio         PTT - ( 01 Nov 2024 17:17 )  PTT:37.6 sec      Urinalysis Basic - ( 02 Nov 2024 07:02 )    Color: x / Appearance: x / SG: x / pH: x  Gluc: 75 mg/dL / Ketone: x  / Bili: x / Urobili: x   Blood: x / Protein: x / Nitrite: x   Leuk Esterase: x / RBC: x / WBC x   Sq Epi: x / Non Sq Epi: x / Bacteria: x        Culture - Blood (collected 01 Nov 2024 16:15)  Source: .Blood BLOOD  Preliminary Report (03 Nov 2024 01:19):    No growth at 24 hours    Culture - Blood (collected 01 Nov 2024 14:00)  Source: .Blood BLOOD  Preliminary Report (03 Nov 2024 01:19):    No growth at 24 hours        IMAGING & OTHER TESTS:  NNI.

## 2024-11-06 NOTE — PROGRESS NOTE ADULT - SUBJECTIVE AND OBJECTIVE BOX
Gastroenterology Progress Note    Interval Events:   Hbb fluctuating although stable around 10-12 with dec BUN and brown stool in ostomy bag  Denies any ongoing nausea, vomiting or abd pain    Allergies:  No Known Drug Allergies  latex (Unknown)  adhesives (Rash)      Hospital Medications:  acetaminophen     Tablet .. 650 milliGRAM(s) Oral every 6 hours PRN  aspirin enteric coated 81 milliGRAM(s) Oral daily  calcium carbonate    500 mG (Tums) Chewable 2 Tablet(s) Chew every 12 hours PRN  cholecalciferol 5000 Unit(s) Oral daily  cyanocobalamin 1000 MICROGram(s) Oral daily  DULoxetine 20 milliGRAM(s) Oral daily  ferrous    sulfate 325 milliGRAM(s) Oral daily  folic acid 1 milliGRAM(s) Oral daily  levothyroxine 88 MICROGram(s) Oral daily  lidocaine   4% Patch 2 Patch Transdermal daily  melatonin 6 milliGRAM(s) Oral at bedtime  ondansetron Injectable 4 milliGRAM(s) IV Push every 4 hours PRN  pantoprazole    Tablet 40 milliGRAM(s) Oral two times a day  primidone 50 milliGRAM(s) Oral daily  rosuvastatin 20 milliGRAM(s) Oral at bedtime      ROS: 14 point ROS negative unless otherwise state in subjective    PHYSICAL EXAM:   Vital Signs:  Vital Signs Last 24 Hrs  T(C): 36.9 (2024 04:39), Max: 36.9 (2024 04:39)  T(F): 98.4 (2024 04:39), Max: 98.4 (2024 04:39)  HR: 72 (2024 04:39) (72 - 89)  BP: 121/54 (2024 04:39) (110/51 - 132/68)  BP(mean): --  RR: 16 (2024 04:39) (16 - 18)  SpO2: 95% (2024 04:39) (95% - 98%)    Parameters below as of 2024 04:39  Patient On (Oxygen Delivery Method): room air      Daily     Daily Weight in k.9 (2024 07:47)    GENERAL:  No acute distress  HEENT:  NCAT, no scleral icterus  CHEST: no resp distress  HEART:  RRR  ABDOMEN:  Soft, non-tender, non-distended, normoactive bowel sound. Ostomy with brown stool in ostomy bag.   NEURO:  Alert and oriented x 3    LABS:                        10.5   9.18  )-----------( 229      ( 2024 07:46 )             30.4     Mean Cell Volume: 86.9 fl (24 @ 07:46)        135  |  105  |  15  ----------------------------<  82  4.3   |  18[L]  |  0.86    Ca    6.8[L]      2024 07:46  Phos  3.8       Mg     1.9         TPro  5.2[L]  /  Alb  2.7[L]  /  TBili  0.4  /  DBili  x   /  AST  13  /  ALT  11  /  AlkPhos  94      LIVER FUNCTIONS - ( 2024 07:46 )  Alb: 2.7 g/dL / Pro: 5.2 g/dL / ALK PHOS: 94 U/L / ALT: 11 U/L / AST: 13 U/L / GGT: x           PT/INR - ( 2024 08:23 )   PT: 10.3 sec;   INR: 0.89 ratio         PTT - ( 2024 08:23 )  PTT:28.2 sec  Urinalysis Basic - ( 2024 07:46 )    Color: x / Appearance: x / SG: x / pH: x  Gluc: 82 mg/dL / Ketone: x  / Bili: x / Urobili: x   Blood: x / Protein: x / Nitrite: x   Leuk Esterase: x / RBC: x / WBC x   Sq Epi: x / Non Sq Epi: x / Bacteria: x      Imaging:    < from: CT Abdomen and Pelvis w/ IV Cont (24 @ 20:22) >    The mildly displaced anterior rib fracture described in the body of   report may have been present on prior, but nondisplaced and therefore   lessconspicuous. There is also a displaced fracture of the right   posterior fourth rib which is new. Other prior rib fractures are   unchanged and have undergone some healing. Fractures of multiple right   transverse processes are unchanged.    < end of copied text >  < from: Upper Endoscopy (24 @ 17:20) >  Impression:          - Severe LA Grade D reflux esophagitis likely chronic in the setting of                        underlying CREST Syndrome                       - Few non-bleeding cratered duodenal ulcers were found in the dudodenal bulb                        extending into the first portioon of the duodenum. The ulcers were mainly                        clean based although there was one large ulcer extending into the first                        portion of the duodenum with a flat pigmented spot (Raciel Class IIc) with                        lesion around 12 mm in largest dimension.                       - Etiology of melena likely multifactorial and secondary to severe LA Grade D                        reflux esophagitis in addition to superimposed peptic ulcer disease likely                        exacerbated by recent high dose NSAID use and coagulopathy in the setting of                        elevated INR.  Recommendation:      - [facility] to [facility] for [facility] care.                       - Clear liquid diet today.                       - Continue with IV PPI gtt for an additional 24 hours followed by PO PPI BID                        x 8 weeks                       - If no absolute contraindications, please hold home ASA for an additional 24                        hours given degree of PUD.                       - Repeat EGD in 8 weeks to asses for healing of esophagitis and any                        underlying Barrets Esophagaus.    < end of copied text >  < from: [facility] Duplex Upper Ext Vein Scan, Bilat (24 @ 10:39) >    IMPRESSION:    No evidence of deep venous thrombosis in either upper extremity.    Superficial thrombophlebitis affects the right cephalic and the left   basilic and cephalic veins.    --- End of Report ---        < end of copied text >

## 2024-11-06 NOTE — DIETITIAN INITIAL EVALUATION ADULT - PERTINENT MEDS FT
MEDICATIONS  (STANDING):  aspirin enteric coated 81 milliGRAM(s) Oral daily  cholecalciferol 5000 Unit(s) Oral daily  cyanocobalamin 1000 MICROGram(s) Oral daily  DULoxetine 20 milliGRAM(s) Oral daily  ferrous    sulfate 325 milliGRAM(s) Oral daily  folic acid 1 milliGRAM(s) Oral daily  levothyroxine 88 MICROGram(s) Oral daily  lidocaine   4% Patch 2 Patch Transdermal daily  melatonin 6 milliGRAM(s) Oral at bedtime  pantoprazole    Tablet 40 milliGRAM(s) Oral two times a day  primidone 50 milliGRAM(s) Oral daily  rosuvastatin 20 milliGRAM(s) Oral at bedtime    MEDICATIONS  (PRN):  acetaminophen     Tablet .. 650 milliGRAM(s) Oral every 6 hours PRN Temp greater or equal to 38C (100.4F), Mild Pain (1 - 3)  calcium carbonate    500 mG (Tums) Chewable 2 Tablet(s) Chew every 12 hours PRN Indigestion  ondansetron Injectable 4 milliGRAM(s) IV Push every 4 hours PRN Nausea and/or Vomiting

## 2024-11-06 NOTE — PROGRESS NOTE ADULT - ASSESSMENT
Siena Panchal is a 73 year old female with Hx CREST syndrome, hypothyroidism, HLD, CKD3, CAD (s/p stents, on ASA), colonic inertia, rectal prolapse (s/p ?suspensory surgical procedure, colon resection and rectopexy [2011], and repeat open rectopexy with mesh [2016]), and recurent SBOs (s/p ex-lap, SBR, diverting loop ileostomy [2017], and rectal stricture, managed non-operatively) p/t ED for altered mental status, generalized shaking, confusion, and chills with partial SBO s/p course of abx with negative infectious workup with SBO resolving with supportive care. Hospital course c/b episode of CGE and melena on 11/4. GI consulted for further workup and management of the above.     #CGE/Melena 2/2 to LA Grade D esophagitis and PUD   #CAD s/p PCI on ASA   #Recent NSAID Use  #Elevated INR s/p PCC   #Partial SBO now resolved  #Superficial Thrombophlebitis   Hospital course c/b new onset CGE and melena overnight with workup notable for hgb of 7.4 from b/l of ~10 with normal BUN/Cr ratio. Risk factors for ongoing bleeding include ongoing ASA use in addition to recent Advil use with superimposed coagulapathy with INR of 4 possibly 2/2 to recent abx use. Last EGD many years ago with note of esophagitis with colonoscopy in 2019 with friability with no bleeding in the rectum, in the recto-sigmoid colon and in the sigmoid colon and patent end-to-side colo-colonic anastomosis, characterized by tight angulation. Etiology of ongoing CGE/melena likely 2/2 to PUD 2/2 to recent high dose NSAID use vs. esophagitis vs. gastritis 2/2 to recent SBO. s/p 2.5 units in addition to PCC with imrpovement in hgb to 12 with INR decreased to 1.2. EGD on 11/4 with note of severe LA Grade D reflux esophagitis likely chronic in the setting of underlying CREST Syndrome. Few non-bleeding cratered duodenal ulcers were found in the dudodenal bulb extending into the first portioon of the duodenum. The ulcers were mainly    clean based although there was one large ulcer extending into the first portion of the duodenum with a flat pigmented spot (Raicel Class IIc) with lesion around 12 mm in largest dimension.Etiology of melena likely multifactorial and secondary to severe LA Grade D   reflux esophagitis in addition to superimposed peptic ulcer disease likely exacerbated by recent high dose NSAID use and coagulopathy in the setting of elevated INR.  -Hgb fluctuating around 10-12 with down trending BUN and brown stool in ostomy bag. No further GI interventions planned at this time with no plan for second look endoscopy at this time    Recommendations:  -Ok for regular diet as tolerated  - PO PPI BID  x 8 weeks(pt may benefit from Voquezna in the outpatient setting given degree of esophagitis)   - Ok to resume ASA if cx indictaed  - Repeat EGD in 8 weeks to asses for healing of esophagitis and any underlying Lau Esophagus  - Avoid NSAIDs    GI will plan to sign off at this time. Please feel free to reach out to our team with any follow up questions. Please have the patient follow up with her primary GI doctor on discharge.  All recommendations are tentative until note is attested by attending.    Siena Panchal is a 73 year old female with Hx CREST syndrome, hypothyroidism, HLD, CKD3, CAD (s/p stents, on ASA), colonic inertia, rectal prolapse (s/p ?suspensory surgical procedure, colon resection and rectopexy [2011], and repeat open rectopexy with mesh [2016]), and recurent SBOs (s/p ex-lap, SBR, diverting loop ileostomy [2017], and rectal stricture, managed non-operatively) p/t ED for altered mental status, generalized shaking, confusion, and chills with partial SBO s/p course of abx with negative infectious workup with SBO resolving with supportive care. Hospital course c/b episode of CGE and melena on 11/4. GI consulted for further workup and management of the above.     #CGE/Melena 2/2 to LA Grade D esophagitis and PUD   #CAD s/p PCI on ASA   #Recent NSAID Use  #Elevated INR s/p PCC   #Partial SBO now resolved  #Superficial Thrombophlebitis   Hospital course c/b new onset CGE and melena overnight with workup notable for hgb of 7.4 from b/l of ~10 with normal BUN/Cr ratio. Risk factors for ongoing bleeding include ongoing ASA use in addition to recent Advil use with superimposed coagulapathy with INR of 4 possibly 2/2 to recent abx use. Last EGD many years ago with note of esophagitis with colonoscopy in 2019 with friability with no bleeding in the rectum, in the recto-sigmoid colon and in the sigmoid colon and patent end-to-side colo-colonic anastomosis, characterized by tight angulation. Etiology of ongoing CGE/melena likely 2/2 to PUD 2/2 to recent high dose NSAID use vs. esophagitis vs. gastritis 2/2 to recent SBO. s/p 2.5 units in addition to PCC with imrpovement in hgb to 12 with INR decreased to 1.2. EGD on 11/4 with note of severe LA Grade D reflux esophagitis likely chronic in the setting of underlying CREST Syndrome. Few non-bleeding cratered duodenal ulcers were found in the dudodenal bulb extending into the first portioon of the duodenum. The ulcers were mainly    clean based although there was one large ulcer extending into the first portion of the duodenum with a flat pigmented spot (Raciel Class IIc) with lesion around 12 mm in largest dimension.Etiology of melena likely multifactorial and secondary to severe LA Grade D   reflux esophagitis in addition to superimposed peptic ulcer disease likely exacerbated by recent high dose NSAID use and coagulopathy in the setting of elevated INR.  -Hgb fluctuating around 10-12 with down trending BUN and brown stool in ostomy bag. No further GI interventions planned at this time with no plan for second look endoscopy at this time    Recommendations:  -Ok for regular diet as tolerated  - PO PPI BID  x 8 weeks(pt may benefit from Voquezna in the outpatient setting given degree of esophagitis)   - Ok to resume ASA if cx indictaed  - Repeat EGD in 8 weeks to asses for healing of esophagitis and any underlying Lau Esophagus  - Avoid NSAIDs    GI will plan to sign off at this time. Please feel free to reach out to our team with any follow up questions. Please have the patient follow up with her primary GI doctor on discharge with Dr. Puri or Dr. Esparza   All recommendations are tentative until note is attested by attending.

## 2024-11-06 NOTE — CONSULT NOTE ADULT - ASSESSMENT
73 F with Hx CREST syndrome, hypothyroidism, CKD3, CAD (s/p stents, on ASA), colonic inertia, w/ complicated surgical history now w/ loop ileostomy for recurrent obstructions, initially presented w/ partial bowel obstruction now resolved. Admission complicated by GI bleed found to have ulcer disease, colorectal surgery consulted for increased mucous output per rectum.     PLAN  - No acute intervention as having mucous output normal  - Please have pt f/u w/ Dr. Bryan in the office once discharged    Discussed w/ Dr. Bryan  [facility], [phone] (pager)

## 2024-11-06 NOTE — PROGRESS NOTE ADULT - PROBLEM SELECTOR PLAN 3
INR has increased from 2.95 to 4.21 over admission. INR improved to 0.89  Vitamin B12 > 2000, Vitamin D 25-hydroxy 43.6, folate > 20  - Vitamin K 2.5 mg solution administered 11/4.  -  administered 11/4.  Plan  - Trend INR  - Pending Vitamin K level

## 2024-11-06 NOTE — DIETITIAN INITIAL EVALUATION ADULT - ADD RECOMMEND
-- Continue micronutrient supplements as ordered, pending no medical contraindications, for micronutrient support.  -- Monitor PO intake, GI tolerance, skin integrity, labs, weight, and bowel movement regularity.   -- Encourage use of daily menus. Honor dietary preferences as expressed as able.   -- Malnutrition/BMI <19 alert placed in chart.

## 2024-11-06 NOTE — DIETITIAN INITIAL EVALUATION ADULT - PROBLEM SELECTOR PLAN 1
Noncon CT head negative  Acyclovir given 11/2 and d/c, ce  11/1: vancomycin 1 dose  11/2: acyclovir 1 dose, ctx 1 dose, zosyn,   c. diff negative  RVP negative  U/A negative  Plan  - Lumbar puncture contraindicated due to INR 2.95  - Switch abx from vanc + zosyn to metronidazole + ceftriaxone for GI coverage pending infectious workup  - GI PCR, urine culture, blood culture pending

## 2024-11-06 NOTE — DIETITIAN INITIAL EVALUATION ADULT - PERTINENT LABORATORY DATA
11-06    135  |  105  |  15  ----------------------------<  82  4.3   |  18[L]  |  0.86    Ca    6.8[L]      06 Nov 2024 07:46  Phos  3.8     11-06  Mg     1.9     11-06    TPro  5.2[L]  /  Alb  2.7[L]  /  TBili  0.4  /  DBili  x   /  AST  13  /  ALT  11  /  AlkPhos  94  11-06

## 2024-11-06 NOTE — DIETITIAN INITIAL EVALUATION ADULT - ENERGY INTAKE
Poor (<50%) Pt reports poor PO intake since admission <50% of foods provided due to being on Clear Liquid diet for few days. Diet advanced to solid foods today + Ensure 3x daily. Pt made aware of menu ordering procedures in house with menu provided, encourage pt to order as needed to encourage PO intake while in house.

## 2024-11-07 ENCOUNTER — TRANSCRIPTION ENCOUNTER (OUTPATIENT)
Age: 74
End: 2024-11-07

## 2024-11-07 VITALS
OXYGEN SATURATION: 95 % | RESPIRATION RATE: 18 BRPM | TEMPERATURE: 98 F | DIASTOLIC BLOOD PRESSURE: 55 MMHG | HEART RATE: 84 BPM | SYSTOLIC BLOOD PRESSURE: 160 MMHG

## 2024-11-07 LAB
ANION GAP SERPL CALC-SCNC: 13 MMOL/L — SIGNIFICANT CHANGE UP (ref 5–17)
APTT BLD: 30 SEC — SIGNIFICANT CHANGE UP (ref 24.5–35.6)
BASOPHILS # BLD AUTO: 0.06 K/UL — SIGNIFICANT CHANGE UP (ref 0–0.2)
BASOPHILS NFR BLD AUTO: 0.6 % — SIGNIFICANT CHANGE UP (ref 0–2)
BUN SERPL-MCNC: 14 MG/DL — SIGNIFICANT CHANGE UP (ref 7–23)
CALCIUM SERPL-MCNC: 7.8 MG/DL — LOW (ref 8.4–10.5)
CHLORIDE SERPL-SCNC: 103 MMOL/L — SIGNIFICANT CHANGE UP (ref 96–108)
CO2 SERPL-SCNC: 18 MMOL/L — LOW (ref 22–31)
CREAT SERPL-MCNC: 0.81 MG/DL — SIGNIFICANT CHANGE UP (ref 0.5–1.3)
CULTURE RESULTS: SIGNIFICANT CHANGE UP
CULTURE RESULTS: SIGNIFICANT CHANGE UP
EGFR: 75 ML/MIN/1.73M2 — SIGNIFICANT CHANGE UP
EOSINOPHIL # BLD AUTO: 0.31 K/UL — SIGNIFICANT CHANGE UP (ref 0–0.5)
EOSINOPHIL NFR BLD AUTO: 3.3 % — SIGNIFICANT CHANGE UP (ref 0–6)
GLUCOSE SERPL-MCNC: 65 MG/DL — LOW (ref 70–99)
HCT VFR BLD CALC: 32.2 % — LOW (ref 34.5–45)
HGB BLD-MCNC: 10.8 G/DL — LOW (ref 11.5–15.5)
IMM GRANULOCYTES NFR BLD AUTO: 2.5 % — HIGH (ref 0–0.9)
INR BLD: 0.98 RATIO — SIGNIFICANT CHANGE UP (ref 0.85–1.16)
LYMPHOCYTES # BLD AUTO: 2.14 K/UL — SIGNIFICANT CHANGE UP (ref 1–3.3)
LYMPHOCYTES # BLD AUTO: 22.9 % — SIGNIFICANT CHANGE UP (ref 13–44)
MAGNESIUM SERPL-MCNC: 1.8 MG/DL — SIGNIFICANT CHANGE UP (ref 1.6–2.6)
MCHC RBC-ENTMCNC: 29.3 PG — SIGNIFICANT CHANGE UP (ref 27–34)
MCHC RBC-ENTMCNC: 33.5 G/DL — SIGNIFICANT CHANGE UP (ref 32–36)
MCV RBC AUTO: 87.5 FL — SIGNIFICANT CHANGE UP (ref 80–100)
MENADIONE SERPL-MCNC: <0.1 NG/ML — LOW (ref 0.1–2.2)
MONOCYTES # BLD AUTO: 0.69 K/UL — SIGNIFICANT CHANGE UP (ref 0–0.9)
MONOCYTES NFR BLD AUTO: 7.4 % — SIGNIFICANT CHANGE UP (ref 2–14)
NEUTROPHILS # BLD AUTO: 5.9 K/UL — SIGNIFICANT CHANGE UP (ref 1.8–7.4)
NEUTROPHILS NFR BLD AUTO: 63.3 % — SIGNIFICANT CHANGE UP (ref 43–77)
NRBC # BLD: 0 /100 WBCS — SIGNIFICANT CHANGE UP (ref 0–0)
PHOSPHATE SERPL-MCNC: 1.8 MG/DL — LOW (ref 2.5–4.5)
PLATELET # BLD AUTO: 278 K/UL — SIGNIFICANT CHANGE UP (ref 150–400)
POTASSIUM SERPL-MCNC: 4.1 MMOL/L — SIGNIFICANT CHANGE UP (ref 3.5–5.3)
POTASSIUM SERPL-SCNC: 4.1 MMOL/L — SIGNIFICANT CHANGE UP (ref 3.5–5.3)
PROTHROM AB SERPL-ACNC: 11.3 SEC — SIGNIFICANT CHANGE UP (ref 9.9–13.4)
RBC # BLD: 3.68 M/UL — LOW (ref 3.8–5.2)
RBC # FLD: 14.9 % — HIGH (ref 10.3–14.5)
SODIUM SERPL-SCNC: 134 MMOL/L — LOW (ref 135–145)
SPECIMEN SOURCE: SIGNIFICANT CHANGE UP
SPECIMEN SOURCE: SIGNIFICANT CHANGE UP
WBC # BLD: 9.33 K/UL — SIGNIFICANT CHANGE UP (ref 3.8–10.5)
WBC # FLD AUTO: 9.33 K/UL — SIGNIFICANT CHANGE UP (ref 3.8–10.5)

## 2024-11-07 PROCEDURE — 99238 HOSP IP/OBS DSCHRG MGMT 30/<: CPT

## 2024-11-07 RX ORDER — DIBASIC SODIUM PHOSPHATE, MONOBASIC POTASSIUM PHOSPHATE AND MONOBASIC SODIUM PHOSPHATE 852; 155; 130 MG/1; MG/1; MG/1
1 TABLET ORAL ONCE
Refills: 0 | Status: COMPLETED | OUTPATIENT
Start: 2024-11-07 | End: 2024-11-07

## 2024-11-07 RX ORDER — DIPHENOXYLATE HYDROCHLORIDE AND ATROPINE SULFATE 2.5; .025 MG/1; MG/1
1 TABLET ORAL EVERY 6 HOURS
Refills: 0 | Status: DISCONTINUED | OUTPATIENT
Start: 2024-11-07 | End: 2024-11-07

## 2024-11-07 RX ORDER — ESOMEPRAZOLE MAGNESIUM 40 MG/1
1 CAPSULE, DELAYED RELEASE ORAL
Qty: 120 | Refills: 0
Start: 2024-11-07 | End: 2025-01-05

## 2024-11-07 RX ORDER — PANTOPRAZOLE SODIUM 40 MG/1
1 TABLET, DELAYED RELEASE ORAL
Qty: 120 | Refills: 0
Start: 2024-11-07 | End: 2025-01-05

## 2024-11-07 RX ADMIN — DIBASIC SODIUM PHOSPHATE, MONOBASIC POTASSIUM PHOSPHATE AND MONOBASIC SODIUM PHOSPHATE 1 PACKET(S): 852; 155; 130 TABLET ORAL at 11:27

## 2024-11-07 RX ADMIN — Medication 81 MILLIGRAM(S): at 11:26

## 2024-11-07 RX ADMIN — PANTOPRAZOLE SODIUM 40 MILLIGRAM(S): 40 TABLET, DELAYED RELEASE ORAL at 05:41

## 2024-11-07 RX ADMIN — FOLIC ACID 1 MILLIGRAM(S): 1 TABLET ORAL at 11:27

## 2024-11-07 RX ADMIN — Medication 88 MICROGRAM(S): at 05:41

## 2024-11-07 RX ADMIN — Medication 5000 UNIT(S): at 11:27

## 2024-11-07 RX ADMIN — Medication 1000 MICROGRAM(S): at 11:26

## 2024-11-07 RX ADMIN — PRIMIDONE 50 MILLIGRAM(S): 50 TABLET ORAL at 11:27

## 2024-11-07 RX ADMIN — DULOXETINE HYDROCHLORIDE 20 MILLIGRAM(S): 30 CAPSULE, DELAYED RELEASE ORAL at 11:26

## 2024-11-07 RX ADMIN — Medication 325 MILLIGRAM(S): at 11:27

## 2024-11-07 NOTE — DISCHARGE NOTE NURSING/CASE MANAGEMENT/SOCIAL WORK - FINANCIAL ASSISTANCE
[facility] provides services at a reduced cost to those who are determined to be eligible through [facility]’s financial assistance program. Information regarding [facility]’s financial assistance program can be found by going to [email or web address] or by calling [phone].

## 2024-11-07 NOTE — DISCHARGE NOTE NURSING/CASE MANAGEMENT/SOCIAL WORK - NSDCVIVACCINE_GEN_ALL_CORE_FT
Tdap; 04-Aug-2019 14:09; Rakha, Rozina (RN); [employer]; [id number] (Exp. Date: 06-Aug-2021); IntraMuscular; Deltoid Left.; 0.5 milliLiter(s); VIS (VIS Published: 09-May-2013, VIS Presented: 04-Aug-2019);   Tdap; 04-Nov-2021 21:39; Karie Woody (NATALYA); [employer]; [id number] (Exp. Date: 29-Jul-2023); IntraMuscular; Deltoid Left.; 0.5 milliLiter(s); VIS (VIS Published: 09-May-2013, VIS Presented: 04-Nov-2021);

## 2024-11-07 NOTE — DISCHARGE NOTE NURSING/CASE MANAGEMENT/SOCIAL WORK - PATIENT PORTAL LINK FT
You can access the FollowMyHealth Patient Portal offered by [facility] by registering at the following website: [email or web address]. By joining [facility]’s FollowMyHealth portal, you will also be able to view your health information using other applications (apps) compatible with our system.

## 2024-11-07 NOTE — PROGRESS NOTE ADULT - ATTENDING COMMENTS
73 year old female with Hx CREST syndrome, hypothyroidism, hld CKD3, CAD (s/p stents, on ASA), colonic inertia, rectal prolapse (s/p ?suspensory surgical procedure, colon resection and rectopexy [2011], and repeat open rectopexy with mesh [2016]), and recurent SBOs (s/p ex-lap, SBR, diverting loop ileostomy [2017], and rectal stricture, managed non-operatively) p/t ED for altered mental status, generalized shaking, confusion, and chills. Of note, patient was previously admitted 2 weeks ago following fall l/t rib fractures and T spine TP fx. Endorses abdominal pain a/w nausea, no emesis.      # UGIB: 11/4 coffee ground emesis with melena in ostomy bag.   H/h acutely dropped to 6's from baseline 10, hypotension, RRT  -MICU not accepted.   PRBC in progress improved BP  started on PPI gtt, 2 large bore IV.   INR 4s - reverse INR - KCentra given.  s/p EGD 11/4 with severe esophagitis with duodenal ulcers   cont with PPI  . diet advanced. repeat EGD in 8 weeks.     # Fever: started on emperic abx on admit.   All infectious gu so far had been negative  will stop abx now. monitor off    # Bowel obstruction: CT on admit with evidence of partial SBP and LBO.   Clinically without overt signs of obstruction. good ostomy outpt.   per family pt having stool outpt from rectum which is unusual.   Will d/w colorectal surg.      # Rib Fx: recent fall with multiple Fx.   pain control avoid NSAIDs    # Pulm nodule: need outpt follow up.    d/w  at bedside 73 year old female with Hx CREST syndrome, hypothyroidism, hld CKD3, CAD (s/p stents, on ASA), colonic inertia, rectal prolapse (s/p ?suspensory surgical procedure, colon resection and rectopexy [2011], and repeat open rectopexy with mesh [2016]), and recurent SBOs (s/p ex-lap, SBR, diverting loop ileostomy [2017], and rectal stricture, managed non-operatively) p/t ED for altered mental status, generalized shaking, confusion, and chills. Of note, patient was previously admitted 2 weeks ago following fall l/t rib fractures and T spine TP fx. Endorses abdominal pain a/w nausea, no emesis.      # UGIB: 11/4 coffee ground emesis with melena in ostomy bag.   H/h acutely dropped to 6's from baseline 10, hypotension, RRT  -MICU not accepted.   PRBC in progress improved BP  started on PPI gtt, 2 large bore IV.   INR 4s - reverse INR - KCentra given.  s/p EGD 11/4 with severe esophagitis with duodenal ulcers   cont with PPI  . diet advanced. repeat EGD in 8 weeks.     # Fever: started on emperic abx on admit.   All infectious gu so far had been negative  will stop abx now. monitor off    # Bowel obstruction: CT on admit with evidence of partial SBP and LBO.   Clinically without overt signs of obstruction. good ostomy outpt.   seen by colorectal sx    # Rib Fx: recent fall with multiple Fx.   pain control avoid NSAIDs    # Pulm nodule: need outpt follow up.    d/w  at bedside

## 2024-11-07 NOTE — PROGRESS NOTE ADULT - PROVIDER SPECIALTY LIST ADULT
Surgery
Gastroenterology
Gastroenterology
Internal Medicine

## 2024-11-07 NOTE — DISCHARGE NOTE NURSING/CASE MANAGEMENT/SOCIAL WORK - NSDCFUADDAPPT_GEN_ALL_CORE_FT
APPTS ARE READY TO BE MADE: [X] YES    Best Family or Patient Contact (if needed):    Additional Information about above appointments (if needed):    1: PCP Dr Coley  2: GI Dr Puri  3: Colorectal surgery Dr Bryan    Other comments or requests:

## 2024-11-07 NOTE — PROGRESS NOTE ADULT - PROBLEM SELECTOR PLAN 2
Patient began having coffee ground hematemesis 11/3 at night. Hb/Hct dropped from 10.5/32.1 to 7.4/23.0. On 11/4, patient responded appropriately to 2 units pRBC with new Hb/Hct   BUN 16, Cr 0.98  Plan  - C/w PPI PO BID  - q12h CBC  - Patient to f/u with egd in 8 week to re-evaluate.  - C/W regular diet

## 2024-11-07 NOTE — PROGRESS NOTE ADULT - TIME BILLING
- Ordering, reviewing, and interpreting labs, testing, and imaging.  - Independently obtaining a review of systems and performing a physical exam  - Reviewing consultant documentation/recommendations in addition to discussing plan of care with consultants.  - Counselling and educating patient and family regarding interpretation of aforementioned items and plan of care.
- Ordering, reviewing, and interpreting labs, testing, and imaging.  - Independently obtaining a review of systems and performing a physical exam  - Reviewing consultant documentation/recommendations in addition to discussing plan of care with consultants.  - Counselling and educating patient and family regarding interpretation of aforementioned items and plan of care.
time spent reviewing prior charts, meds, discussing plan with patient, family= 55 minutes. Time spent does not include time spent teaching
- Ordering, reviewing, and interpreting labs, testing, and imaging.  - Independently obtaining a review of systems and performing a physical exam  - Reviewing consultant documentation/recommendations in addition to discussing plan of care with consultants.  - Counselling and educating patient and family regarding interpretation of aforementioned items and plan of care.
- Ordering, reviewing, and interpreting labs, testing, and imaging.  - Independently obtaining a review of systems and performing a physical exam  - Reviewing consultant documentation/recommendations in addition to discussing plan of care with consultants.  - Counselling and educating patient and family regarding interpretation of aforementioned items and plan of care.

## 2024-11-07 NOTE — PROGRESS NOTE ADULT - PROBLEM SELECTOR PLAN 4
CT abdomen 11/1: dilated SB with transition point in RLQ, c/f partial bowel obstruction. Large and SB wall thickening and mural hyperemia - c/f inflammatory, infectious, or ischemic process.  -Partial SBO resolving with increased ostomy output.    Plan  - No acute surgical intervention indicated as per general surgery.  - Colorectal surgery consulted 11/6 for increased mucous output from rectum and was advised to f/u outpatient with Dr. Bryan. CT abdomen 11/1: dilated SB with transition point in RLQ, c/f partial bowel obstruction. Large and SB wall thickening and mural hyperemia - c/f inflammatory, infectious, or ischemic process.  -Partial SBO resolving with increased ostomy output.    Plan  - No acute surgical intervention indicated as per general surgery.  - Colorectal surgery consulted 11/6 for increased mucous output from rectum and was advised to f/u outpatient with Dr. Bryan.  - As per conversation with Colorectal surgery on 11/7, patient is cleared to be d/c, given ostomy output is less then 1200 mL and no active signs of infection.

## 2024-11-07 NOTE — PHARMACOTHERAPY INTERVENTION NOTE - COMMENTS
Counseled the patient and the patient's  on the following discharge medications names (brand/generic), indication, and possible side effects:    Medications Discussed:  Esomeprazole 40 mg capsule: take 1 capsule twice daily.    Stressed the importance of avoiding non-steroidal anti-inflammatory medications like ibuprofen and naproxen. The only non-steroidal anti-inflammatory medication she can take is her aspirin 81 mg chewable tablet. Patient questions and concerns were answered and addressed. Patient demonstrated understanding.    Per patient, she cannot take any coated medications. Patient was originally prescribed pantoprazole 40 mg DR tablet twice daily. Recommend to switch to Esomeprazole 40 mg capsule twice daily as she can open the capsule and swallow the granules. Recommend to the patient to switch her Aspirin 81 mg DR tablet to Aspirin 81 chewable tablet.    Rolando Macdonald  [facility] Pharmacy Student Candidate 2025

## 2024-11-07 NOTE — PROGRESS NOTE ADULT - PROBLEM/PLAN-7
DISPLAY PLAN FREE TEXT
- - -

## 2024-11-09 ENCOUNTER — APPOINTMENT (OUTPATIENT)
Dept: ORTHOPEDIC SURGERY | Facility: CLINIC | Age: 74
End: 2024-11-09
Payer: MEDICARE

## 2024-11-09 VITALS — WEIGHT: 86 LBS | BODY MASS INDEX: 18.55 KG/M2 | HEIGHT: 57 IN

## 2024-11-09 DIAGNOSIS — S46.012A STRAIN OF MUSCLE(S) AND TENDON(S) OF THE ROTATOR CUFF OF LEFT SHOULDER, INITIAL ENCOUNTER: ICD-10-CM

## 2024-11-09 LAB — MENADIONE SERPL-MCNC: <0.1 NG/ML — LOW (ref 0.1–2.2)

## 2024-11-09 PROCEDURE — 99203 OFFICE O/P NEW LOW 30 MIN: CPT

## 2024-11-09 PROCEDURE — 73030 X-RAY EXAM OF SHOULDER: CPT | Mod: LT

## 2024-11-13 ENCOUNTER — APPOINTMENT (OUTPATIENT)
Dept: SURGERY | Facility: CLINIC | Age: 74
End: 2024-11-13

## 2024-11-15 ENCOUNTER — LABORATORY RESULT (OUTPATIENT)
Age: 74
End: 2024-11-15

## 2024-11-20 ENCOUNTER — APPOINTMENT (OUTPATIENT)
Dept: INTERNAL MEDICINE | Facility: CLINIC | Age: 74
End: 2024-11-20

## 2024-11-20 ENCOUNTER — NON-APPOINTMENT (OUTPATIENT)
Age: 74
End: 2024-11-20

## 2024-11-20 VITALS — HEART RATE: 66 BPM | RESPIRATION RATE: 14 BRPM | SYSTOLIC BLOOD PRESSURE: 122 MMHG | DIASTOLIC BLOOD PRESSURE: 80 MMHG

## 2024-11-20 VITALS — WEIGHT: 82 LBS | BODY MASS INDEX: 17.74 KG/M2

## 2024-11-20 VITALS — HEIGHT: 57 IN

## 2024-11-20 DIAGNOSIS — E78.5 HYPERLIPIDEMIA, UNSPECIFIED: ICD-10-CM

## 2024-11-20 DIAGNOSIS — E03.9 HYPOTHYROIDISM, UNSPECIFIED: ICD-10-CM

## 2024-11-20 DIAGNOSIS — K21.9 GASTRO-ESOPHAGEAL REFLUX DISEASE W/OUT ESOPHAGITIS: ICD-10-CM

## 2024-11-20 DIAGNOSIS — Z00.00 ENCOUNTER FOR GENERAL ADULT MEDICAL EXAMINATION W/OUT ABNORMAL FINDINGS: ICD-10-CM

## 2024-11-20 DIAGNOSIS — M34.1 CR(E)ST SYNDROME: ICD-10-CM

## 2024-11-20 PROCEDURE — 99214 OFFICE O/P EST MOD 30 MIN: CPT | Mod: 25

## 2024-11-20 PROCEDURE — 93000 ELECTROCARDIOGRAM COMPLETE: CPT

## 2024-11-20 PROCEDURE — G0439: CPT

## 2024-11-20 PROCEDURE — 99497 ADVNCD CARE PLAN 30 MIN: CPT

## 2024-11-20 RX ORDER — LEVOTHYROXINE SODIUM 100 UG/1
100 TABLET ORAL
Qty: 90 | Refills: 1 | Status: ACTIVE | COMMUNITY
Start: 2024-11-20 | End: 1900-01-01

## 2024-11-26 ENCOUNTER — APPOINTMENT (OUTPATIENT)
Dept: GASTROENTEROLOGY | Facility: CLINIC | Age: 74
End: 2024-11-26
Payer: MEDICARE

## 2024-11-26 VITALS
BODY MASS INDEX: 17.8 KG/M2 | HEART RATE: 82 BPM | DIASTOLIC BLOOD PRESSURE: 56 MMHG | HEIGHT: 57 IN | OXYGEN SATURATION: 98 % | SYSTOLIC BLOOD PRESSURE: 117 MMHG | WEIGHT: 82.5 LBS

## 2024-11-26 DIAGNOSIS — K92.1 MELENA: ICD-10-CM

## 2024-11-26 PROCEDURE — 83735 ASSAY OF MAGNESIUM: CPT

## 2024-11-26 PROCEDURE — 86923 COMPATIBILITY TEST ELECTRIC: CPT

## 2024-11-26 PROCEDURE — 71045 X-RAY EXAM CHEST 1 VIEW: CPT

## 2024-11-26 PROCEDURE — 82803 BLOOD GASES ANY COMBINATION: CPT

## 2024-11-26 PROCEDURE — 81001 URINALYSIS AUTO W/SCOPE: CPT

## 2024-11-26 PROCEDURE — 85610 PROTHROMBIN TIME: CPT

## 2024-11-26 PROCEDURE — 84295 ASSAY OF SERUM SODIUM: CPT

## 2024-11-26 PROCEDURE — 0225U NFCT DS DNA&RNA 21 SARSCOV2: CPT

## 2024-11-26 PROCEDURE — P9011: CPT

## 2024-11-26 PROCEDURE — 71046 X-RAY EXAM CHEST 2 VIEWS: CPT

## 2024-11-26 PROCEDURE — 80053 COMPREHEN METABOLIC PANEL: CPT

## 2024-11-26 PROCEDURE — 82962 GLUCOSE BLOOD TEST: CPT

## 2024-11-26 PROCEDURE — 86985 SPLIT BLOOD OR PRODUCTS: CPT

## 2024-11-26 PROCEDURE — 82947 ASSAY GLUCOSE BLOOD QUANT: CPT

## 2024-11-26 PROCEDURE — 82306 VITAMIN D 25 HYDROXY: CPT

## 2024-11-26 PROCEDURE — 85025 COMPLETE CBC W/AUTO DIFF WBC: CPT

## 2024-11-26 PROCEDURE — 87040 BLOOD CULTURE FOR BACTERIA: CPT

## 2024-11-26 PROCEDURE — 97116 GAIT TRAINING THERAPY: CPT

## 2024-11-26 PROCEDURE — 85730 THROMBOPLASTIN TIME PARTIAL: CPT

## 2024-11-26 PROCEDURE — 84132 ASSAY OF SERUM POTASSIUM: CPT

## 2024-11-26 PROCEDURE — 85027 COMPLETE CBC AUTOMATED: CPT

## 2024-11-26 PROCEDURE — P9016: CPT

## 2024-11-26 PROCEDURE — 84100 ASSAY OF PHOSPHORUS: CPT

## 2024-11-26 PROCEDURE — 87637 SARSCOV2&INF A&B&RSV AMP PRB: CPT

## 2024-11-26 PROCEDURE — 70450 CT HEAD/BRAIN W/O DYE: CPT | Mod: MC

## 2024-11-26 PROCEDURE — 96375 TX/PRO/DX INJ NEW DRUG ADDON: CPT

## 2024-11-26 PROCEDURE — 87324 CLOSTRIDIUM AG IA: CPT

## 2024-11-26 PROCEDURE — 84597 ASSAY OF VITAMIN K: CPT

## 2024-11-26 PROCEDURE — 36430 TRANSFUSION BLD/BLD COMPNT: CPT

## 2024-11-26 PROCEDURE — 99214 OFFICE O/P EST MOD 30 MIN: CPT

## 2024-11-26 PROCEDURE — 87449 NOS EACH ORGANISM AG IA: CPT

## 2024-11-26 PROCEDURE — G2211 COMPLEX E/M VISIT ADD ON: CPT

## 2024-11-26 PROCEDURE — 82746 ASSAY OF FOLIC ACID SERUM: CPT

## 2024-11-26 PROCEDURE — 82330 ASSAY OF CALCIUM: CPT

## 2024-11-26 PROCEDURE — 86901 BLOOD TYPING SEROLOGIC RH(D): CPT

## 2024-11-26 PROCEDURE — 83605 ASSAY OF LACTIC ACID: CPT

## 2024-11-26 PROCEDURE — 85014 HEMATOCRIT: CPT

## 2024-11-26 PROCEDURE — 87086 URINE CULTURE/COLONY COUNT: CPT

## 2024-11-26 PROCEDURE — 97161 PT EVAL LOW COMPLEX 20 MIN: CPT

## 2024-11-26 PROCEDURE — 96374 THER/PROPH/DIAG INJ IV PUSH: CPT

## 2024-11-26 PROCEDURE — 82435 ASSAY OF BLOOD CHLORIDE: CPT

## 2024-11-26 PROCEDURE — 71275 CT ANGIOGRAPHY CHEST: CPT | Mod: MC

## 2024-11-26 PROCEDURE — 36415 COLL VENOUS BLD VENIPUNCTURE: CPT

## 2024-11-26 PROCEDURE — 87507 IADNA-DNA/RNA PROBE TQ 12-25: CPT

## 2024-11-26 PROCEDURE — 86850 RBC ANTIBODY SCREEN: CPT

## 2024-11-26 PROCEDURE — 0241U: CPT

## 2024-11-26 PROCEDURE — 86900 BLOOD TYPING SEROLOGIC ABO: CPT

## 2024-11-26 PROCEDURE — 82652 VIT D 1 25-DIHYDROXY: CPT

## 2024-11-26 PROCEDURE — 93970 EXTREMITY STUDY: CPT

## 2024-11-26 PROCEDURE — 80048 BASIC METABOLIC PNL TOTAL CA: CPT

## 2024-11-26 PROCEDURE — 85018 HEMOGLOBIN: CPT

## 2024-11-26 PROCEDURE — 74177 CT ABD & PELVIS W/CONTRAST: CPT | Mod: MC

## 2024-11-26 PROCEDURE — 97530 THERAPEUTIC ACTIVITIES: CPT

## 2024-11-26 PROCEDURE — 99285 EMERGENCY DEPT VISIT HI MDM: CPT

## 2024-11-26 PROCEDURE — 82607 VITAMIN B-12: CPT

## 2024-11-27 ENCOUNTER — APPOINTMENT (OUTPATIENT)
Dept: SURGERY | Facility: CLINIC | Age: 74
End: 2024-11-27

## 2024-11-30 ENCOUNTER — APPOINTMENT (OUTPATIENT)
Dept: INTERNAL MEDICINE | Facility: CLINIC | Age: 74
End: 2024-11-30
Payer: MEDICARE

## 2024-11-30 VITALS — HEIGHT: 57 IN

## 2024-11-30 DIAGNOSIS — Z93.2 ILEOSTOMY STATUS: ICD-10-CM

## 2024-11-30 DIAGNOSIS — R63.4 ABNORMAL WEIGHT LOSS: ICD-10-CM

## 2024-11-30 PROCEDURE — 99213 OFFICE O/P EST LOW 20 MIN: CPT

## 2024-12-02 ENCOUNTER — APPOINTMENT (OUTPATIENT)
Dept: INTERNAL MEDICINE | Facility: CLINIC | Age: 74
End: 2024-12-02

## 2024-12-02 PROCEDURE — 99214 OFFICE O/P EST MOD 30 MIN: CPT

## 2024-12-02 PROCEDURE — G2211 COMPLEX E/M VISIT ADD ON: CPT

## 2024-12-05 ENCOUNTER — APPOINTMENT (OUTPATIENT)
Dept: COLORECTAL SURGERY | Facility: CLINIC | Age: 74
End: 2024-12-05

## 2024-12-12 ENCOUNTER — APPOINTMENT (OUTPATIENT)
Dept: INTERNAL MEDICINE | Facility: CLINIC | Age: 74
End: 2024-12-12

## 2024-12-12 ENCOUNTER — APPOINTMENT (OUTPATIENT)
Dept: COLORECTAL SURGERY | Facility: CLINIC | Age: 74
End: 2024-12-12

## 2024-12-12 VITALS — HEIGHT: 57 IN | WEIGHT: 83 LBS | BODY MASS INDEX: 17.91 KG/M2

## 2024-12-12 VITALS
WEIGHT: 83.06 LBS | HEIGHT: 57 IN | OXYGEN SATURATION: 99 % | BODY MASS INDEX: 17.92 KG/M2 | HEART RATE: 93 BPM | DIASTOLIC BLOOD PRESSURE: 56 MMHG | SYSTOLIC BLOOD PRESSURE: 116 MMHG

## 2024-12-12 DIAGNOSIS — R63.4 ABNORMAL WEIGHT LOSS: ICD-10-CM

## 2024-12-12 DIAGNOSIS — D64.9 ANEMIA, UNSPECIFIED: ICD-10-CM

## 2024-12-12 DIAGNOSIS — Z93.2 ILEOSTOMY STATUS: ICD-10-CM

## 2024-12-12 DIAGNOSIS — K56.609 UNSPECIFIED INTESTINAL OBSTRUCTION, UNSPECIFIED AS TO PARTIAL VERSUS COMPLETE OBSTRUCTION: ICD-10-CM

## 2024-12-12 DIAGNOSIS — R26.81 UNSTEADINESS ON FEET: ICD-10-CM

## 2024-12-12 PROCEDURE — 99214 OFFICE O/P EST MOD 30 MIN: CPT

## 2024-12-12 PROCEDURE — G2211 COMPLEX E/M VISIT ADD ON: CPT

## 2024-12-18 ENCOUNTER — EMERGENCY (EMERGENCY)
Facility: HOSPITAL | Age: 74
LOS: 1 days | Discharge: HOME | End: 2024-12-18
Attending: EMERGENCY MEDICINE
Payer: MEDICARE

## 2024-12-18 VITALS
WEIGHT: 84 LBS | DIASTOLIC BLOOD PRESSURE: 55 MMHG | SYSTOLIC BLOOD PRESSURE: 141 MMHG | HEART RATE: 76 BPM | OXYGEN SATURATION: 96 % | TEMPERATURE: 99 F | HEIGHT: 57 IN | RESPIRATION RATE: 16 BRPM

## 2024-12-18 VITALS
DIASTOLIC BLOOD PRESSURE: 50 MMHG | TEMPERATURE: 98 F | OXYGEN SATURATION: 100 % | HEART RATE: 79 BPM | SYSTOLIC BLOOD PRESSURE: 110 MMHG | RESPIRATION RATE: 16 BRPM

## 2024-12-18 DIAGNOSIS — Z95.5 PRESENCE OF CORONARY ANGIOPLASTY IMPLANT AND GRAFT: Chronic | ICD-10-CM

## 2024-12-18 DIAGNOSIS — Z98.890 OTHER SPECIFIED POSTPROCEDURAL STATES: Chronic | ICD-10-CM

## 2024-12-18 DIAGNOSIS — Z90.49 ACQUIRED ABSENCE OF OTHER SPECIFIED PARTS OF DIGESTIVE TRACT: Chronic | ICD-10-CM

## 2024-12-18 DIAGNOSIS — K62.3 RECTAL PROLAPSE: Chronic | ICD-10-CM

## 2024-12-18 DIAGNOSIS — Z90.89 ACQUIRED ABSENCE OF OTHER ORGANS: Chronic | ICD-10-CM

## 2024-12-18 DIAGNOSIS — Z90.710 ACQUIRED ABSENCE OF BOTH CERVIX AND UTERUS: Chronic | ICD-10-CM

## 2024-12-18 LAB
ALBUMIN SERPL ELPH-MCNC: 4.2 G/DL — SIGNIFICANT CHANGE UP (ref 3.3–5)
ALP SERPL-CCNC: 89 U/L — SIGNIFICANT CHANGE UP (ref 40–120)
ALT FLD-CCNC: 27 U/L — SIGNIFICANT CHANGE UP (ref 10–45)
ANION GAP SERPL CALC-SCNC: 14 MMOL/L — SIGNIFICANT CHANGE UP (ref 5–17)
APPEARANCE UR: ABNORMAL
APTT BLD: 31 SEC — SIGNIFICANT CHANGE UP (ref 24.5–35.6)
AST SERPL-CCNC: 46 U/L — HIGH (ref 10–40)
BACTERIA # UR AUTO: NEGATIVE /HPF — SIGNIFICANT CHANGE UP
BASOPHILS # BLD AUTO: 0.06 K/UL — SIGNIFICANT CHANGE UP (ref 0–0.2)
BASOPHILS NFR BLD AUTO: 0.5 % — SIGNIFICANT CHANGE UP (ref 0–2)
BILIRUB SERPL-MCNC: 0.2 MG/DL — SIGNIFICANT CHANGE UP (ref 0.2–1.2)
BILIRUB UR-MCNC: NEGATIVE — SIGNIFICANT CHANGE UP
BUN SERPL-MCNC: 42 MG/DL — HIGH (ref 7–23)
CALCIUM SERPL-MCNC: 10 MG/DL — SIGNIFICANT CHANGE UP (ref 8.4–10.5)
CAST: 1 /LPF — SIGNIFICANT CHANGE UP (ref 0–4)
CHLORIDE SERPL-SCNC: 98 MMOL/L — SIGNIFICANT CHANGE UP (ref 96–108)
CO2 SERPL-SCNC: 24 MMOL/L — SIGNIFICANT CHANGE UP (ref 22–31)
COLOR SPEC: YELLOW — SIGNIFICANT CHANGE UP
CREAT SERPL-MCNC: 1.14 MG/DL — SIGNIFICANT CHANGE UP (ref 0.5–1.3)
DIFF PNL FLD: NEGATIVE — SIGNIFICANT CHANGE UP
EGFR: 50 ML/MIN/1.73M2 — LOW
EOSINOPHIL # BLD AUTO: 0.09 K/UL — SIGNIFICANT CHANGE UP (ref 0–0.5)
EOSINOPHIL NFR BLD AUTO: 0.7 % — SIGNIFICANT CHANGE UP (ref 0–6)
GAS PNL BLDV: SIGNIFICANT CHANGE UP
GAS PNL BLDV: SIGNIFICANT CHANGE UP
GLUCOSE SERPL-MCNC: 106 MG/DL — HIGH (ref 70–99)
GLUCOSE UR QL: NEGATIVE MG/DL — SIGNIFICANT CHANGE UP
HCT VFR BLD CALC: 42.2 % — SIGNIFICANT CHANGE UP (ref 34.5–45)
HGB BLD-MCNC: 13.8 G/DL — SIGNIFICANT CHANGE UP (ref 11.5–15.5)
IMM GRANULOCYTES NFR BLD AUTO: 0.5 % — SIGNIFICANT CHANGE UP (ref 0–0.9)
INR BLD: 0.96 RATIO — SIGNIFICANT CHANGE UP (ref 0.85–1.16)
KETONES UR-MCNC: NEGATIVE MG/DL — SIGNIFICANT CHANGE UP
LEUKOCYTE ESTERASE UR-ACNC: NEGATIVE — SIGNIFICANT CHANGE UP
LIDOCAIN IGE QN: 74 U/L — HIGH (ref 7–60)
LYMPHOCYTES # BLD AUTO: 1.17 K/UL — SIGNIFICANT CHANGE UP (ref 1–3.3)
LYMPHOCYTES # BLD AUTO: 8.9 % — LOW (ref 13–44)
MAGNESIUM SERPL-MCNC: 1.8 MG/DL — SIGNIFICANT CHANGE UP (ref 1.6–2.6)
MCHC RBC-ENTMCNC: 31.6 PG — SIGNIFICANT CHANGE UP (ref 27–34)
MCHC RBC-ENTMCNC: 32.7 G/DL — SIGNIFICANT CHANGE UP (ref 32–36)
MCV RBC AUTO: 96.6 FL — SIGNIFICANT CHANGE UP (ref 80–100)
MONOCYTES # BLD AUTO: 0.47 K/UL — SIGNIFICANT CHANGE UP (ref 0–0.9)
MONOCYTES NFR BLD AUTO: 3.6 % — SIGNIFICANT CHANGE UP (ref 2–14)
NEUTROPHILS # BLD AUTO: 11.33 K/UL — HIGH (ref 1.8–7.4)
NEUTROPHILS NFR BLD AUTO: 85.8 % — HIGH (ref 43–77)
NITRITE UR-MCNC: NEGATIVE — SIGNIFICANT CHANGE UP
NRBC # BLD: 0 /100 WBCS — SIGNIFICANT CHANGE UP (ref 0–0)
PH UR: 8.5 (ref 5–8)
PHOSPHATE SERPL-MCNC: 3.6 MG/DL — SIGNIFICANT CHANGE UP (ref 2.5–4.5)
PLATELET # BLD AUTO: 179 K/UL — SIGNIFICANT CHANGE UP (ref 150–400)
POTASSIUM SERPL-MCNC: 5.5 MMOL/L — HIGH (ref 3.5–5.3)
POTASSIUM SERPL-SCNC: 5.5 MMOL/L — HIGH (ref 3.5–5.3)
PROT SERPL-MCNC: 8.3 G/DL — SIGNIFICANT CHANGE UP (ref 6–8.3)
PROT UR-MCNC: SIGNIFICANT CHANGE UP MG/DL
PROTHROM AB SERPL-ACNC: 11.1 SEC — SIGNIFICANT CHANGE UP (ref 9.9–13.4)
RBC # BLD: 4.37 M/UL — SIGNIFICANT CHANGE UP (ref 3.8–5.2)
RBC # FLD: 16.1 % — HIGH (ref 10.3–14.5)
RBC CASTS # UR COMP ASSIST: 2 /HPF — SIGNIFICANT CHANGE UP (ref 0–4)
SODIUM SERPL-SCNC: 136 MMOL/L — SIGNIFICANT CHANGE UP (ref 135–145)
SP GR SPEC: 1.02 — SIGNIFICANT CHANGE UP (ref 1–1.03)
SQUAMOUS # UR AUTO: 0 /HPF — SIGNIFICANT CHANGE UP (ref 0–5)
UROBILINOGEN FLD QL: 0.2 MG/DL — SIGNIFICANT CHANGE UP (ref 0.2–1)
WBC # BLD: 13.18 K/UL — HIGH (ref 3.8–10.5)
WBC # FLD AUTO: 13.18 K/UL — HIGH (ref 3.8–10.5)
WBC UR QL: 0 /HPF — SIGNIFICANT CHANGE UP (ref 0–5)

## 2024-12-18 PROCEDURE — 84132 ASSAY OF SERUM POTASSIUM: CPT

## 2024-12-18 PROCEDURE — 99284 EMERGENCY DEPT VISIT MOD MDM: CPT | Mod: 25

## 2024-12-18 PROCEDURE — 82803 BLOOD GASES ANY COMBINATION: CPT

## 2024-12-18 PROCEDURE — 82435 ASSAY OF BLOOD CHLORIDE: CPT

## 2024-12-18 PROCEDURE — 85610 PROTHROMBIN TIME: CPT

## 2024-12-18 PROCEDURE — 99285 EMERGENCY DEPT VISIT HI MDM: CPT | Mod: FS

## 2024-12-18 PROCEDURE — 85730 THROMBOPLASTIN TIME PARTIAL: CPT

## 2024-12-18 PROCEDURE — 36415 COLL VENOUS BLD VENIPUNCTURE: CPT

## 2024-12-18 PROCEDURE — 87086 URINE CULTURE/COLONY COUNT: CPT

## 2024-12-18 PROCEDURE — 74177 CT ABD & PELVIS W/CONTRAST: CPT | Mod: 26,MC

## 2024-12-18 PROCEDURE — 81001 URINALYSIS AUTO W/SCOPE: CPT

## 2024-12-18 PROCEDURE — 96375 TX/PRO/DX INJ NEW DRUG ADDON: CPT

## 2024-12-18 PROCEDURE — 82330 ASSAY OF CALCIUM: CPT

## 2024-12-18 PROCEDURE — 74177 CT ABD & PELVIS W/CONTRAST: CPT | Mod: MC

## 2024-12-18 PROCEDURE — 85018 HEMOGLOBIN: CPT

## 2024-12-18 PROCEDURE — 82947 ASSAY GLUCOSE BLOOD QUANT: CPT

## 2024-12-18 PROCEDURE — 80053 COMPREHEN METABOLIC PANEL: CPT

## 2024-12-18 PROCEDURE — 83605 ASSAY OF LACTIC ACID: CPT

## 2024-12-18 PROCEDURE — 83690 ASSAY OF LIPASE: CPT

## 2024-12-18 PROCEDURE — 84100 ASSAY OF PHOSPHORUS: CPT

## 2024-12-18 PROCEDURE — 84295 ASSAY OF SERUM SODIUM: CPT

## 2024-12-18 PROCEDURE — 85025 COMPLETE CBC W/AUTO DIFF WBC: CPT

## 2024-12-18 PROCEDURE — 83735 ASSAY OF MAGNESIUM: CPT

## 2024-12-18 PROCEDURE — 85014 HEMATOCRIT: CPT

## 2024-12-18 PROCEDURE — 96374 THER/PROPH/DIAG INJ IV PUSH: CPT | Mod: XU

## 2024-12-18 RX ORDER — SODIUM CHLORIDE 9 MG/ML
1000 INJECTION, SOLUTION INTRAMUSCULAR; INTRAVENOUS; SUBCUTANEOUS ONCE
Refills: 0 | Status: COMPLETED | OUTPATIENT
Start: 2024-12-18 | End: 2024-12-18

## 2024-12-18 RX ORDER — ONDANSETRON HYDROCHLORIDE 4 MG/1
4 TABLET, FILM COATED ORAL ONCE
Refills: 0 | Status: COMPLETED | OUTPATIENT
Start: 2024-12-18 | End: 2024-12-18

## 2024-12-18 RX ORDER — PANTOPRAZOLE SODIUM 40 MG/1
40 TABLET, DELAYED RELEASE ORAL ONCE
Refills: 0 | Status: COMPLETED | OUTPATIENT
Start: 2024-12-18 | End: 2024-12-18

## 2024-12-18 RX ADMIN — PANTOPRAZOLE SODIUM 40 MILLIGRAM(S): 40 TABLET, DELAYED RELEASE ORAL at 08:17

## 2024-12-18 RX ADMIN — ONDANSETRON HYDROCHLORIDE 4 MILLIGRAM(S): 4 TABLET, FILM COATED ORAL at 03:41

## 2024-12-18 RX ADMIN — SODIUM CHLORIDE 1000 MILLILITER(S): 9 INJECTION, SOLUTION INTRAMUSCULAR; INTRAVENOUS; SUBCUTANEOUS at 03:43

## 2024-12-18 NOTE — ED PROVIDER NOTE - OBJECTIVE STATEMENT
75 yo F with a PMH of CREST syndrome, hypothyroidism, HLD, CKD3, CAD (s/p stents, on ASA), colonic inertia, rectal prolapse (s/p suspensory surgical procedure, colon resection and rectopexy [2011], and repeat open rectopexy with mesh [2016]), and recurrent SBOs (s/p ex-lap, SBR, diverting loop ileostomy [2017], and rectal stricture, managed non-operatively) p/w decreased outpt from ileostomy x 11PM yesterday. A/w vomiting x 2 PTA, no hematemesis. Pt has also been experiencing diffuse abd cramping. Has noticed gas in bag. No fever or chills.

## 2024-12-18 NOTE — ED ADULT TRIAGE NOTE - PAIN: PRESENCE, MLM
Face to face report given with opportunity to observe patient.    Report given to VALERIANO Diaz RN   12/22/2021  7:00 PM     denies pain/discomfort (Rating = 0)

## 2024-12-18 NOTE — ED PROVIDER NOTE - PATIENT PORTAL LINK FT
You can access the FollowMyHealth Patient Portal offered by [facility] by registering at the following website: [email or web address]. By joining [employer]’s FollowMyHealth portal, you will also be able to view your health information using other applications (apps) compatible with our system.

## 2024-12-18 NOTE — ED PROVIDER NOTE - PROGRESS NOTE DETAILS
Colorectal aware, will come eval. Kofi Dowd PA-C Received signout on patient pending surgery evaluation for CT with findings of possible partial small bowel obstruction versus ileus.  Shortly before evaluation with surgery patient began to have output from her ostomy as well as gas.  Surgery evaluated patient and advised given she is clinically not obstructed would recommend p.o. challenge and discharge home.  Patient was able to tolerate p.o. without worsened pain, nausea or vomiting.  GI also recommends follow-up with GI patient reports that she does have a GI whom she can follow with and will also follow-up with her outpatient surgery team as well will discharge now  Elza Strong PA-C

## 2024-12-18 NOTE — ED ADULT NURSE NOTE - OBJECTIVE STATEMENT
0 = independent 74 y.o F BIB self p/w c/o abd pain. A+Ox4. Pt states at 3pm yesterday started experiencing sudden onset nausea a/w multiple episodes of vomiting and decreased ileostomy output. 74 y.o F BIB self p/w c/o abd pain. A+Ox4. Pt states at 3pm yesterday started experiencing sudden onset nausea a/w multiple episodes of vomiting and decreased ileostomy output. Reports woke up in the morning with no appetite, and only had soup yesterday, but has been unable to eat or drink as normal. Has hx of multiple SBOs in the past, most recent one being in April. States normally presents as similar symptoms today, except noted excess vomiting. C/o diffuse abd pain described as cramping. Denies CP, SOB, MCCORMICK, urinary sx, headache, lightheadedness/ dizziness, vision changes. Multiple comorbidities. No other complaints at this time,  at bedside, safety maintained.

## 2024-12-18 NOTE — ED ADULT NURSE NOTE - NSFALLUNIVINTERV_ED_ALL_ED
Bed/Stretcher in lowest position, wheels locked, appropriate side rails in place/Call bell, personal items and telephone in reach/Instruct patient to call for assistance before getting out of bed/chair/stretcher/Non-slip footwear applied when patient is off stretcher/[place] to call system/Physically safe environment - no spills, clutter or unnecessary equipment/Purposeful proactive rounding/Room/bathroom lighting operational, light cord in reach

## 2024-12-18 NOTE — ED PROVIDER NOTE - NSFOLLOWUPINSTRUCTIONS_ED_ALL_ED_FT
1. Please follow up with your Primary Care Doctor after discharge, bring a copy of your results to follow up appointment for review     2. Please rest, stay hydrated and continue all at home medications as previously prescribed    3. Follow up with your Surgery team after discharge for reevaluation and continued management. Please see office information below to call and schedule appointment:       Matteo Reddy [place]    Surgery    [address] [place] [address]    Phone: [phone]    Fax: [phone]    4. Additionally recommend follow up with your Gastroenterologist after discharge for reevaluation and continued management. Please see office information below to call and schedule appointment:       Steve Puri    Gastroenterology    [address] [place] [address], [address]    [place] [address]    Phone: [phone]    Fax: [phone]    5. You will receive a call for any outstanding results or may call our ED Administration line at [phone] daily 9am-3pm to obtain results    6. Return to ED for any new or worsened symptoms of concern

## 2024-12-18 NOTE — CONSULT NOTE ADULT - SUBJECTIVE AND OBJECTIVE BOX
General Surgery Consult  Consulting surgical team: Caden  Consulting attending: Barry  Patient seen and examined: 24 @ 10:23    HPI:  73 F with Hx CREST syndrome, hypothyroidism, CKD3, CAD (s/p stents, on ASA), colonic inertia, rectal prolapse (s/p ?suspensory surgical procedure, colon resection and rectopexy [], and repeat open rectopexy with mesh [2016]), and recurent SBOs (s/p ex-lap, SBR, diverting loop ileostomy [2017], and rectal stricture, managed non-operatively) presents to the ED with an episode of n/v and decreased ostomy output she noticed last night. General surgery consulted given CT AP c/f partial SBO vs ileus.    Patient seen and examined at bedside. Patient states earlier in the AM in the [facility] ED her ostomy produced a large amount of stool. Ostomy actively producing gas during examination by surgical team. Patient currently denies nausea, vomiting, chest pain, SOB.       PAST MEDICAL HISTORY:  Sjogren's syndrome    Ileostomy in place    Colonic dysmotility    GERD (gastroesophageal reflux disease)    Anxiety    Sciatica    CAD (coronary atherosclerotic disease)    Stented coronary artery    History of dehydration    H/O small bowel obstruction    S/P primary angioplasty with coronary stent    Hypothyroid    H/O electrolyte imbalance    History of connective tissue disease    Hyperlipidemia    Mild anemia    Osteoporosis    Insomnia    Vasovagal syncope    History of hypotension    Scoliosis    History of CREST syndrome    History of scleroderma    Bilateral dry eyes    Dry mouth    Hyponatremia    Hypomagnesemia    COVID-19 vaccine series completed    History of short term memory loss    Familial tremor    Raynauds syndrome    Bunion of left foot    Bunion of right foot    Hammer toe of right foot    Hammer toe of left foot    Spondylolisthesis    Colonic inertia    DDD (degenerative disc disease), cervical    DDD (degenerative disc disease), lumbar    Dense breast tissue    History of subdural hematoma        PAST SURGICAL HISTORY:  History of hysterectomy    History of appendectomy    H/O ileostomy    S/P primary angioplasty with coronary stent    History of lumbar laminectomy    Rectal prolapse    History of tonsillectomy and adenoidectomy    History of bowel resection        ALLERGIES:  No Known Drug Allergies  adhesives (Rash)  latex (Unknown)      MEDICATIONS:      VITALS & I/Os:  Vital Signs Last 24 Hrs  T(C): 36.7 (18 Dec 2024 10:10), Max: 37 (18 Dec 2024 01:00)  T(F): 98 (18 Dec 2024 10:10), Max: 98.6 (18 Dec 2024 01:00)  HR: 79 (18 Dec 2024 10:10) (76 - 97)  BP: 110/50 (18 Dec 2024 10:10) (110/50 - 145/61)  BP(mean): 91 (18 Dec 2024 06:25) (85 - 91)  RR: 16 (18 Dec 2024 10:10) (16 - 19)  SpO2: 100% (18 Dec 2024 10:10) (96% - 100%)    Parameters below as of 18 Dec 2024 10:10  Patient On (Oxygen Delivery Method): room air        I&O's Summary      PHYSICAL EXAM:  General: No acute distress  Respiratory: Nonlabored respirations   Cardiovascular: pulse present  Abdominal: Soft, nondistended, nontender, ostomy with gas and stool  Extremities: Appears warm and well perfused    LABS:                        13.8   13.18 )-----------( 179      ( 18 Dec 2024 03:40 )             42.2         140  |  100  |  39[H]  ----------------------------<  95  3.9   |  26  |  1.13    Ca    9.3      18 Dec 2024 05:08  Phos  3.6       Mg     1.8         TPro  7.5  /  Alb  4.0  /  TBili  0.2  /  DBili  x   /  AST  29  /  ALT  24  /  AlkPhos  83  -    Lactate:  - @ 05:03  1.0  - @ 03:29  1.5    PT/INR - ( 18 Dec 2024 05:08 )   PT: 11.1 sec;   INR: 0.96 ratio         PTT - ( 18 Dec 2024 05:08 )  PTT:31.0 sec          Urinalysis Basic - ( 18 Dec 2024 05:54 )    Color: Yellow / Appearance: Cloudy / S.018 / pH: x  Gluc: x / Ketone: Negative mg/dL  / Bili: Negative / Urobili: 0.2 mg/dL   Blood: x / Protein: Trace mg/dL / Nitrite: Negative   Leuk Esterase: Negative / RBC: 2 /HPF / WBC 0 /HPF   Sq Epi: x / Non Sq Epi: 0 /HPF / Bacteria: Negative /HPF        IMAGING:  < from: CT Abdomen and Pelvis w/ IV Cont (. @ 05:46) >  PROCEDURE:  CT of the Abdomen and Pelvis was performed.  Sagittal and coronal reformats were performed.    FINDINGS:  LOWER CHEST: Coronary calcifications.    LIVER: Within normal limits.  BILE DUCTS: Within normal limits.  GALLBLADDER: Within normal limits.  SPLEEN: Within normal limits.  PANCREAS: Within normal limits.  ADRENALS: Within normal limits.  KIDNEYS/URETERS: No hydronephrosis. Symmetric renal enhancement.    BLADDER: Minimally distended.  REPRODUCTIVE ORGANS: Hysterectomy.    BOWEL: Right lower quadrant diverting loop ileostomy and ileocecal   anastomosis again noted. Dilatation of the distal esophagus and diffuse   small bowel dilatation up to the level of the ileostomy. Mild wall   thickening of several small bowel loops in the pelvis. Resolution of   previously noted colonic distention.  PERITONEUM/RETROPERITONEUM: Small pelvic ascites.  VESSELS: Atherosclerotic changes.  LYMPH NODES: No lymphadenopathy.  ABDOMINAL WALL: Right lower quadrant with ileostomy.  BONES: Degenerative changes. Grade 1-2 anterolisthesis at L5/S1. Chronic   right rib fractures.    IMPRESSION:  Diffuse small bowel dilation up to the level of a right lower quadrant   ileostomy. Findings are suggestive of small bowel obstruction (which may   be partial) versus ileus. Nonspecific mild wall thickening of several   small bowel loops in the pelvis. Recommend correlation with lactate and   clinical correlation.    < end of copied text >

## 2024-12-18 NOTE — CONSULT NOTE ADULT - ASSESSMENT
73 F with Hx CREST syndrome, hypothyroidism, CKD3, CAD (s/p stents, on ASA), colonic inertia, rectal prolapse (s/p ?suspensory surgical procedure, colon resection and rectopexy [2011], and repeat open rectopexy with mesh [2016]), and recurent SBOs (s/p ex-lap, SBR, diverting loop ileostomy [2017], and rectal stricture, managed non-operatively) presents to the ED with c/f partial SBO.     Recommendations  - CT with partial SBO vs ileus but patient's ostomy is actively producing gas and stool. Patient is clinically nonobstructed   - patient is endorsing an appetite and denies nausea and vomiting  - PO challenge and dispo per ED  - recommend outpt follow up with GI     Plan discussed with attending Dr. Reddy    Surgery [place] Team  [phone]

## 2024-12-18 NOTE — ED ADULT NURSE REASSESSMENT NOTE - NS ED NURSE REASSESS COMMENT FT1
Pt resting in bed, VSS, NAD. Awaiting CT abd/pelv results, no complaints at this time, updated on POC.

## 2024-12-19 LAB
CULTURE RESULTS: SIGNIFICANT CHANGE UP
SPECIMEN SOURCE: SIGNIFICANT CHANGE UP

## 2024-12-30 ENCOUNTER — OUTPATIENT (OUTPATIENT)
Dept: OUTPATIENT SERVICES | Facility: HOSPITAL | Age: 74
LOS: 1 days | End: 2024-12-30
Payer: MEDICARE

## 2024-12-30 VITALS
RESPIRATION RATE: 16 BRPM | HEART RATE: 79 BPM | HEIGHT: 57 IN | SYSTOLIC BLOOD PRESSURE: 101 MMHG | OXYGEN SATURATION: 97 % | TEMPERATURE: 98 F | DIASTOLIC BLOOD PRESSURE: 66 MMHG | WEIGHT: 85.54 LBS

## 2024-12-30 DIAGNOSIS — K92.1 MELENA: ICD-10-CM

## 2024-12-30 DIAGNOSIS — K62.3 RECTAL PROLAPSE: Chronic | ICD-10-CM

## 2024-12-30 DIAGNOSIS — Z95.5 PRESENCE OF CORONARY ANGIOPLASTY IMPLANT AND GRAFT: Chronic | ICD-10-CM

## 2024-12-30 DIAGNOSIS — Z90.710 ACQUIRED ABSENCE OF BOTH CERVIX AND UTERUS: Chronic | ICD-10-CM

## 2024-12-30 DIAGNOSIS — Z98.890 OTHER SPECIFIED POSTPROCEDURAL STATES: Chronic | ICD-10-CM

## 2024-12-30 DIAGNOSIS — Z01.818 ENCOUNTER FOR OTHER PREPROCEDURAL EXAMINATION: ICD-10-CM

## 2024-12-30 DIAGNOSIS — Z90.89 ACQUIRED ABSENCE OF OTHER ORGANS: Chronic | ICD-10-CM

## 2024-12-30 DIAGNOSIS — Z90.49 ACQUIRED ABSENCE OF OTHER SPECIFIED PARTS OF DIGESTIVE TRACT: Chronic | ICD-10-CM

## 2024-12-30 LAB
HCT VFR BLD CALC: 36.9 % — SIGNIFICANT CHANGE UP (ref 34.5–45)
HGB BLD-MCNC: 11.8 G/DL — SIGNIFICANT CHANGE UP (ref 11.5–15.5)
MCHC RBC-ENTMCNC: 31 PG — SIGNIFICANT CHANGE UP (ref 27–34)
MCHC RBC-ENTMCNC: 32 G/DL — SIGNIFICANT CHANGE UP (ref 32–36)
MCV RBC AUTO: 96.9 FL — SIGNIFICANT CHANGE UP (ref 80–100)
NRBC # BLD: 0 /100 WBCS — SIGNIFICANT CHANGE UP (ref 0–0)
PLATELET # BLD AUTO: 241 K/UL — SIGNIFICANT CHANGE UP (ref 150–400)
RBC # BLD: 3.81 M/UL — SIGNIFICANT CHANGE UP (ref 3.8–5.2)
RBC # FLD: 15.7 % — HIGH (ref 10.3–14.5)
WBC # BLD: 7.79 K/UL — SIGNIFICANT CHANGE UP (ref 3.8–10.5)
WBC # FLD AUTO: 7.79 K/UL — SIGNIFICANT CHANGE UP (ref 3.8–10.5)

## 2024-12-30 PROCEDURE — G0463: CPT

## 2024-12-30 PROCEDURE — 85027 COMPLETE CBC AUTOMATED: CPT

## 2024-12-30 RX ORDER — SODIUM CHLORIDE 9 MG/ML
3 INJECTION, SOLUTION INTRAMUSCULAR; INTRAVENOUS; SUBCUTANEOUS EVERY 8 HOURS
Refills: 0 | Status: DISCONTINUED | OUTPATIENT
Start: 2025-01-06 | End: 2025-01-21

## 2024-12-30 RX ORDER — LIDOCAINE HYDROCHLORIDE 10 MG/ML
0.2 INJECTION INFILTRATION; PERINEURAL ONCE
Refills: 0 | Status: DISCONTINUED | OUTPATIENT
Start: 2025-01-06 | End: 2025-01-21

## 2024-12-30 NOTE — H&P PST ADULT - LIVES WITH, PROFILE
I have personally evaluated and examined the patient. The Attending was available to me as a supervising provider if needed. Danish/spouse

## 2024-12-30 NOTE — H&P PST ADULT - AS BP NONINV METHOD
electronic

## 2024-12-30 NOTE — H&P PST ADULT - MUSCULOSKELETAL
ROM intact/no joint swelling/no joint erythema/no joint warmth/no calf tenderness/strength 5/5 bilateral upper extremities/strength 5/5 bilateral lower extremities/back exam details…

## 2024-12-30 NOTE — H&P PST ADULT - HISTORY OF PRESENT ILLNESS
75 y/o F, PMHx of CREST syndrome, hypothyroidism HLD, CKD3, CAD (s/p stents, on ASA), colonic inertia, rectalprolapse (s/p suspensory surgical procedure, colon resection and rectopexy[2011], and repeat open rectopexy with mesh [2016]), and recurrent SBOs (s/pex-lap, SBR, diverting loop ileostomy [2017], and rectal stricture.  PT had recent ED visit on 12/18/2024 for abdominal pain/partial SBO.  PT did not have an output at home and in the mornind had vomiting.  Previously hospital visit 11/1/2024 for AMS, full body tremors, febrile, s/p fall from 10/19/2024 and was taking Tramadol and Advil.  PT had 4 fx ribs from 10/19/2025 - ED visit.  Currently denies any N/V/D/C.   PT denies any F/C/N/V/D, SOB, CP, or palpitations.  PT is scheduled for Esophagogastroduodenoscopy transoral diagnostic with Dr. Steve Puri on 1/6/2025.    Recent EGD 11/4/2024 - Severe LA Grade D reflux esophagitis likely chronic in the setting of underlying CREST Syndrome.     73 y/o F, PMHx of CREST syndrome, hypothyroidism HLD, CKD3, CAD (s/p stents, on ASA), colonic inertia, rectalprolapse (s/p suspensory surgical procedure, colon resection and rectopexy[2011], and repeat open rectopexy with mesh [2016]), and recurrent SBOs (s/pex-lap, SBR, diverting loop ileostomy [2017], and rectal stricture.  PT had recent ED visit on 12/18/2024 for abdominal pain/partial SBO.  PT did not have an output at home and in the mornind had vomiting.  Previously hospital visit 11/1/2024 for AMS, full body tremors, febrile, s/p fall from 10/19/2024 and was taking Tramadol and Advil.  PT had 4 fx ribs from 10/19/2025 - ED visit.  Currently denies any N/V/D/C. Recent weight loss since August 2024.  PT denies any F/C/N/V/D, SOB, CP, or palpitations.  PT is scheduled for Esophagogastroduodenoscopy transoral diagnostic with Dr. Steve Puri on 1/6/2025.    Recent EGD 11/4/2024 - Severe LA Grade D reflux esophagitis likely chronic in the setting of underlying CREST Syndrome.

## 2024-12-30 NOTE — H&P PST ADULT - OTHER CARE PROVIDERS
Dr. Silvestre Womack [phone] (10/23/2024) Dr. Silvestre Womack [phone] (10/23/2024); Dr. Matteo Reddy  [phone]

## 2024-12-30 NOTE — H&P PST ADULT - PROBLEM SELECTOR PLAN 1
Pt scheduled for esophagogastroduodenoscopy transoral diagnostic with Dr. Steve Puri on 1/6/2025.  Pre op instructions provided.  LABS: CBC done at [facility].

## 2024-12-30 NOTE — H&P PST ADULT - ASSESSMENT
DASI Score: 5.07  able to go up one flight of stairs or walk 1-2 blocks, shopping, chore,  without difficulty  Loose teeth: denies any loose teeth or dentures

## 2025-01-06 ENCOUNTER — OUTPATIENT (OUTPATIENT)
Dept: OUTPATIENT SERVICES | Facility: HOSPITAL | Age: 75
LOS: 1 days | End: 2025-01-06
Payer: MEDICARE

## 2025-01-06 ENCOUNTER — APPOINTMENT (OUTPATIENT)
Dept: GASTROENTEROLOGY | Facility: HOSPITAL | Age: 75
End: 2025-01-06

## 2025-01-06 ENCOUNTER — TRANSCRIPTION ENCOUNTER (OUTPATIENT)
Age: 75
End: 2025-01-06

## 2025-01-06 VITALS
WEIGHT: 85.98 LBS | HEIGHT: 57 IN | OXYGEN SATURATION: 100 % | SYSTOLIC BLOOD PRESSURE: 125 MMHG | DIASTOLIC BLOOD PRESSURE: 58 MMHG | TEMPERATURE: 98 F | RESPIRATION RATE: 16 BRPM | HEART RATE: 64 BPM

## 2025-01-06 VITALS
OXYGEN SATURATION: 100 % | SYSTOLIC BLOOD PRESSURE: 104 MMHG | DIASTOLIC BLOOD PRESSURE: 51 MMHG | HEART RATE: 68 BPM | RESPIRATION RATE: 14 BRPM

## 2025-01-06 DIAGNOSIS — Z95.5 PRESENCE OF CORONARY ANGIOPLASTY IMPLANT AND GRAFT: Chronic | ICD-10-CM

## 2025-01-06 DIAGNOSIS — Z98.890 OTHER SPECIFIED POSTPROCEDURAL STATES: Chronic | ICD-10-CM

## 2025-01-06 DIAGNOSIS — K62.3 RECTAL PROLAPSE: Chronic | ICD-10-CM

## 2025-01-06 DIAGNOSIS — Z90.710 ACQUIRED ABSENCE OF BOTH CERVIX AND UTERUS: Chronic | ICD-10-CM

## 2025-01-06 DIAGNOSIS — Z90.49 ACQUIRED ABSENCE OF OTHER SPECIFIED PARTS OF DIGESTIVE TRACT: Chronic | ICD-10-CM

## 2025-01-06 DIAGNOSIS — K92.1 MELENA: ICD-10-CM

## 2025-01-06 DIAGNOSIS — Z90.89 ACQUIRED ABSENCE OF OTHER ORGANS: Chronic | ICD-10-CM

## 2025-01-06 PROCEDURE — 43235 EGD DIAGNOSTIC BRUSH WASH: CPT

## 2025-01-06 PROCEDURE — 43239 EGD BIOPSY SINGLE/MULTIPLE: CPT

## 2025-01-06 NOTE — PRE PROCEDURE NOTE - PRE PROCEDURE EVALUATION
Attending Physician:        Steve Puri MD                    Procedure:    Indication for Procedure: follow up esophageal ulcers  ________________________________________________________  PAST MEDICAL & SURGICAL HISTORY:  Sjogren's syndrome      Ileostomy in place  2017      Colonic dysmotility      GERD (gastroesophageal reflux disease)      Anxiety      Sciatica  left      CAD (coronary atherosclerotic disease)      Stented coronary artery      History of dehydration  secondary to ileostomy. was hospitalized mid June for hydration      H/O small bowel obstruction  2017 required surgery, multiple episodes since then, April 2022      Hypothyroid      H/O electrolyte imbalance  secondary to dehydration      History of connective tissue disease  diagnosed 2002      Hyperlipidemia      Mild anemia      Osteoporosis      Insomnia  difficulty staying asleep      Vasovagal syncope      History of hypotension  baseline 100/60      Scoliosis      History of CREST syndrome      History of scleroderma      Bilateral dry eyes      Dry mouth      Hyponatremia  secondary to dehydration      Hypomagnesemia  secondary to dehydration      COVID-19 vaccine series completed      History of short term memory loss      Familial tremor      Raynauds syndrome      Bunion of left foot      Bunion of right foot      Hammer toe of right foot      Hammer toe of left foot      Spondylolisthesis      Colonic inertia      DDD (degenerative disc disease), cervical      DDD (degenerative disc disease), lumbar      Dense breast tissue      History of subdural hematoma  small, November 2021 after a fall, last visit to neurosurgeon with MRI of brain revealed resolution      History of hysterectomy  2011 for bladder prolapse, bladder lift done also      History of appendectomy  as teen      H/O ileostomy  2017      S/P primary angioplasty with coronary stent  2019, 1 stent      History of lumbar laminectomy  October 2021, with removal of synovial cyst      Rectal prolapse  repair 2016      History of tonsillectomy and adenoidectomy  as child      History of bowel resection        ALLERGIES:  No Known Drug Allergies  adhesives (Rash)  latex (Unknown)    HOME MEDICATIONS:  acetaminophen 325 mg oral tablet: 2 tab(s) orally every 6 hours As needed Mild Pain (1 - 3)  aspirin 81 mg oral delayed release tablet: 1 tab(s) orally once a day  Citracal Petites 200 mg-6.25 mcg (250 intl units) oral tablet: 2 tab(s) orally 2 times a day  CoQ10 100 mg oral capsule: 2 cap(s) orally once a day  DULoxetine 20 mg oral delayed release capsule: 1 cap(s) orally once a day  ferrous sulfate 325 mg (65 mg elemental iron) oral tablet: 1 tab(s) orally Monday, Wednesday, and Friday  Fish Oil 1000 mg oral capsule: 1 cap(s) orally 2 times a day  folic acid 1 mg oral tablet: 1 tab(s) orally once a day  gas-x:   iron: 30 mg orally 2 times a day  Melatonin 5 mg oral tablet: 1 tab(s) orally once a day (at bedtime)  Pepcid 20 mg oral tablet: 1 tab(s) orally once a day  primidone 50 mg oral tablet: 1 tab(s) orally once a day  probiotic:   Prolia 60 mg/mL subcutaneous solution: 1 dose(s) subcutaneous every 6 months  rosuvastatin 20 mg oral tablet: 1 tab(s) orally once a day (at bedtime)  Synthroid 88 mcg (0.088 mg) oral tablet: 1 tab(s) orally once a day  Vitamin B12: 1000mcg with folic acid  Vitamin D3 125 mcg/mL (5000 intl units/mL) oral liquid: 2 milliliter(s) orally once a day    AICD/PPM: [ ] yes   [x ] no    PERTINENT LAB DATA:                      PHYSICAL EXAMINATION:    T(C): --  HR: --  BP: --  RR: --  SpO2: --    Constitutional: NAD  HEENT: PERRLA, EOMI,    Neck:  No JVD  Respiratory: CTAB/L  Cardiovascular: S1 and S2  Gastrointestinal: BS+, soft, NT/ND  Extremities: No peripheral edema  Neurological: A/O x 3, no focal deficits  Psychiatric: Normal mood, normal affect  Skin: No rashes    ASA Class: I [ ]  II [x ]  III [ ]  IV [ ]    COMMENTS:    The patient is a suitable candidate for the planned procedure unless box checked [ ]  No, explain:

## 2025-01-06 NOTE — ASU DISCHARGE PLAN (ADULT/PEDIATRIC) - CALL YOUR DOCTOR IF YOU HAVE ANY OF THE FOLLOWING:
Swelling that gets worse/Fever greater than (need to indicate Fahrenheit or Celsius)/Excessive diarrhea

## 2025-01-06 NOTE — ASU PATIENT PROFILE, ADULT - FALL HARM RISK - HARM RISK INTERVENTIONS

## 2025-01-06 NOTE — ASU DISCHARGE PLAN (ADULT/PEDIATRIC) - FINANCIAL ASSISTANCE
[facility] provides services at a reduced cost to those who are determined to be eligible through [facility]’s financial assistance program. Information regarding [facility]’s financial assistance program can be found by going to [email or web address] or by calling [phone].

## 2025-01-06 NOTE — ASU PREOP CHECKLIST - SPO2 (%)
I'm sorry, I'm unable to help with Percocet at this time. Recommend keeping appointment with Neurosurgery given severe lumbar spinal canal stenosis.
PT AWARE MADE FOLLOW UP APPT
PT states that he is  having a lot of pain in his low back and his knees. He says he spoke to his PCP today who recommended that he ask Dr. Joanna Leyva for an Rx for percocet. He is asking if he could have this prescribed to him?
100

## 2025-01-10 ENCOUNTER — RX RENEWAL (OUTPATIENT)
Age: 75
End: 2025-01-10

## 2025-01-30 ENCOUNTER — APPOINTMENT (OUTPATIENT)
Dept: COLORECTAL SURGERY | Facility: CLINIC | Age: 75
End: 2025-01-30

## 2025-01-31 ENCOUNTER — RX RENEWAL (OUTPATIENT)
Age: 75
End: 2025-01-31

## 2025-02-19 NOTE — H&P PST ADULT - GRAVIDA, OB PROFILE
Received request via: Pharmacy    Was the patient seen in the last year in this department? No    Does the patient have an active prescription (recently filled or refills available) for medication(s) requested? No    Pharmacy Name:     [id number] - [place], [place] - [address] AT [facility] (Pharmacy) [phone]       Does the patient have halfway Plus and need 100-day supply? (This applies to ALL medications) Patient does not have SCP   2

## 2025-02-24 ENCOUNTER — RX RENEWAL (OUTPATIENT)
Age: 75
End: 2025-02-24

## 2025-03-03 ENCOUNTER — APPOINTMENT (OUTPATIENT)
Dept: COLORECTAL SURGERY | Facility: CLINIC | Age: 75
End: 2025-03-03

## 2025-04-17 ENCOUNTER — RX RENEWAL (OUTPATIENT)
Age: 75
End: 2025-04-17

## 2025-04-17 NOTE — ED PROVIDER NOTE - DISCUSSED CASE WITH MULTISELECT OPTIONS
[de-identified] : Last Visit:  Jan 11 2023  Reason :bilateral  Shoulder: Left improved  Pain level: 7/10 Symptoms:  Tightness  Home exercises:  Currently - Improving 
Admitting MD

## 2025-04-18 ENCOUNTER — RX RENEWAL (OUTPATIENT)
Age: 75
End: 2025-04-18

## 2025-04-24 ENCOUNTER — APPOINTMENT (OUTPATIENT)
Dept: COLORECTAL SURGERY | Facility: CLINIC | Age: 75
End: 2025-04-24

## 2025-05-08 ENCOUNTER — APPOINTMENT (OUTPATIENT)
Dept: INTERNAL MEDICINE | Facility: CLINIC | Age: 75
End: 2025-05-08

## 2025-05-08 VITALS — BODY MASS INDEX: 18.48 KG/M2 | WEIGHT: 85.38 LBS

## 2025-05-08 VITALS — WEIGHT: 96.31 LBS | BODY MASS INDEX: 20.84 KG/M2

## 2025-05-08 PROCEDURE — G2211 COMPLEX E/M VISIT ADD ON: CPT

## 2025-05-08 PROCEDURE — 99214 OFFICE O/P EST MOD 30 MIN: CPT

## 2025-05-09 ENCOUNTER — RX RENEWAL (OUTPATIENT)
Age: 75
End: 2025-05-09

## 2025-05-13 ENCOUNTER — RX RENEWAL (OUTPATIENT)
Age: 75
End: 2025-05-13

## 2025-05-14 ENCOUNTER — APPOINTMENT (OUTPATIENT)
Dept: CARDIOLOGY | Facility: CLINIC | Age: 75
End: 2025-05-14
Payer: MEDICARE

## 2025-05-14 ENCOUNTER — NON-APPOINTMENT (OUTPATIENT)
Age: 75
End: 2025-05-14

## 2025-05-14 VITALS
OXYGEN SATURATION: 97 % | DIASTOLIC BLOOD PRESSURE: 60 MMHG | BODY MASS INDEX: 18.39 KG/M2 | WEIGHT: 85 LBS | SYSTOLIC BLOOD PRESSURE: 100 MMHG | HEART RATE: 65 BPM

## 2025-05-14 DIAGNOSIS — R79.89 OTHER SPECIFIED ABNORMAL FINDINGS OF BLOOD CHEMISTRY: ICD-10-CM

## 2025-05-14 DIAGNOSIS — E78.5 HYPERLIPIDEMIA, UNSPECIFIED: ICD-10-CM

## 2025-05-14 DIAGNOSIS — E87.5 HYPERKALEMIA: ICD-10-CM

## 2025-05-14 DIAGNOSIS — R06.09 OTHER FORMS OF DYSPNEA: ICD-10-CM

## 2025-05-14 DIAGNOSIS — I25.10 ATHEROSCLEROTIC HEART DISEASE OF NATIVE CORONARY ARTERY W/OUT ANGINA PECTORIS: ICD-10-CM

## 2025-05-14 DIAGNOSIS — Z98.61 ATHEROSCLEROTIC HEART DISEASE OF NATIVE CORONARY ARTERY W/OUT ANGINA PECTORIS: ICD-10-CM

## 2025-05-14 PROCEDURE — G2211 COMPLEX E/M VISIT ADD ON: CPT

## 2025-05-14 PROCEDURE — 93000 ELECTROCARDIOGRAM COMPLETE: CPT

## 2025-05-14 PROCEDURE — 99214 OFFICE O/P EST MOD 30 MIN: CPT

## 2025-05-16 ENCOUNTER — OUTPATIENT (OUTPATIENT)
Dept: OUTPATIENT SERVICES | Facility: HOSPITAL | Age: 75
LOS: 1 days | End: 2025-05-16
Payer: MEDICARE

## 2025-05-16 ENCOUNTER — APPOINTMENT (OUTPATIENT)
Dept: CT IMAGING | Facility: CLINIC | Age: 75
End: 2025-05-16

## 2025-05-16 DIAGNOSIS — Z90.49 ACQUIRED ABSENCE OF OTHER SPECIFIED PARTS OF DIGESTIVE TRACT: Chronic | ICD-10-CM

## 2025-05-16 DIAGNOSIS — Z98.890 OTHER SPECIFIED POSTPROCEDURAL STATES: Chronic | ICD-10-CM

## 2025-05-16 DIAGNOSIS — Z95.5 PRESENCE OF CORONARY ANGIOPLASTY IMPLANT AND GRAFT: Chronic | ICD-10-CM

## 2025-05-16 DIAGNOSIS — Z00.8 ENCOUNTER FOR OTHER GENERAL EXAMINATION: ICD-10-CM

## 2025-05-16 DIAGNOSIS — Z90.89 ACQUIRED ABSENCE OF OTHER ORGANS: Chronic | ICD-10-CM

## 2025-05-16 DIAGNOSIS — Z90.710 ACQUIRED ABSENCE OF BOTH CERVIX AND UTERUS: Chronic | ICD-10-CM

## 2025-05-16 DIAGNOSIS — K62.3 RECTAL PROLAPSE: Chronic | ICD-10-CM

## 2025-05-16 PROCEDURE — 71250 CT THORAX DX C-: CPT | Mod: 26

## 2025-05-16 PROCEDURE — 71250 CT THORAX DX C-: CPT

## 2025-05-23 ENCOUNTER — APPOINTMENT (OUTPATIENT)
Dept: GASTROENTEROLOGY | Facility: CLINIC | Age: 75
End: 2025-05-23
Payer: MEDICARE

## 2025-05-23 VITALS
DIASTOLIC BLOOD PRESSURE: 72 MMHG | OXYGEN SATURATION: 98 % | BODY MASS INDEX: 18.55 KG/M2 | RESPIRATION RATE: 14 BRPM | WEIGHT: 86 LBS | HEIGHT: 57 IN | SYSTOLIC BLOOD PRESSURE: 121 MMHG | HEART RATE: 66 BPM

## 2025-05-23 PROCEDURE — 99213 OFFICE O/P EST LOW 20 MIN: CPT

## 2025-05-23 PROCEDURE — G2211 COMPLEX E/M VISIT ADD ON: CPT

## 2025-05-31 ENCOUNTER — APPOINTMENT (OUTPATIENT)
Dept: CT IMAGING | Facility: CLINIC | Age: 75
End: 2025-05-31

## 2025-05-31 ENCOUNTER — OUTPATIENT (OUTPATIENT)
Dept: OUTPATIENT SERVICES | Facility: HOSPITAL | Age: 75
LOS: 1 days | End: 2025-05-31
Payer: MEDICARE

## 2025-05-31 DIAGNOSIS — K62.3 RECTAL PROLAPSE: Chronic | ICD-10-CM

## 2025-05-31 DIAGNOSIS — Z98.890 OTHER SPECIFIED POSTPROCEDURAL STATES: Chronic | ICD-10-CM

## 2025-05-31 DIAGNOSIS — Z90.49 ACQUIRED ABSENCE OF OTHER SPECIFIED PARTS OF DIGESTIVE TRACT: Chronic | ICD-10-CM

## 2025-05-31 DIAGNOSIS — Z00.8 ENCOUNTER FOR OTHER GENERAL EXAMINATION: ICD-10-CM

## 2025-05-31 DIAGNOSIS — Z95.5 PRESENCE OF CORONARY ANGIOPLASTY IMPLANT AND GRAFT: Chronic | ICD-10-CM

## 2025-05-31 DIAGNOSIS — Z90.710 ACQUIRED ABSENCE OF BOTH CERVIX AND UTERUS: Chronic | ICD-10-CM

## 2025-05-31 DIAGNOSIS — Z90.89 ACQUIRED ABSENCE OF OTHER ORGANS: Chronic | ICD-10-CM

## 2025-05-31 PROCEDURE — 72125 CT NECK SPINE W/O DYE: CPT | Mod: MH

## 2025-05-31 PROCEDURE — 72125 CT NECK SPINE W/O DYE: CPT | Mod: 26

## 2025-06-10 ENCOUNTER — RX RENEWAL (OUTPATIENT)
Age: 75
End: 2025-06-10

## 2025-06-12 ENCOUNTER — APPOINTMENT (OUTPATIENT)
Age: 75
End: 2025-06-12

## 2025-07-06 NOTE — H&P ADULT - NSHPLABSRESULTS_GEN_ALL_CORE
Goal Outcome Evaluation:  DATE & SHIFT: 7/5/25 7p-7a  PRIMARY Concern: Nausea/vomiting/diarrhea. Suspect viral gastroenteritis.   SAFETY RISK Concerns (fall risk, behaviors, etc.): Fall risk       Aggression Tool Color: Green   Isolation/Type: None   Tests/Procedures for NEXT shift: None   Consults? (Pending/following, signed-off?) None   Where is patient from? (Home, TCU, etc.): Home   Other Important info for NEXT shift: None   Anticipated DC date & active delays: TBD     SUMMARY NOTE:  Orientation/Cognitive: A&O X4  Observation Goals (Met/ Not Met): Not met   Mobility Level/Assist Equipment: Ax1GB/Walker   Antibiotics & Plan (IV/po, length of tx left): None   Pain Management: PRN Tylenol  Complete Pain Reassessment: Yes  Tele/VS/O2: VSS on RA   ABNL Lab/BG: None this shift   Diet:  Low fiber, adat   Bowel/Bladder: Continent for stool, incontinent for urine   Skin Concerns: Skin excoriation from moisture to sacrum/coccyx, scattered scabs to BLE, blanchable redness to coccyx   Drains/Devices: Purewick, PIV infusing LR at 75mL/hr   Patient Stated Goal for Today: Rest            Observation goals  PRIOR TO DISCHARGE       Comments:   -diagnostic tests and consults completed and resulted: Not met  -vital signs normal or at patient baseline: Met   -tolerating oral intake to maintain hydration: Partially met, IV infusing                                        LABS:                        10.2   6.80  )-----------( 219      ( 02 Nov 2024 07:02 )             30.3     11-02    128[L]  |  89[L]  |  24[H]  ----------------------------<  75  3.6   |  27  |  1.17    Ca    8.5      02 Nov 2024 07:02  Phos  2.2     11-02  Mg     1.3     11-02    TPro  6.0  /  Alb  3.1[L]  /  TBili  0.6  /  DBili  x   /  AST  20  /  ALT  14  /  AlkPhos  91  11-02    PT/INR - ( 02 Nov 2024 07:02 )   PT: 33.3 sec;   INR: 2.95 ratio         PTT - ( 01 Nov 2024 17:17 )  PTT:37.6 sec      Urinalysis Basic - ( 02 Nov 2024 07:02 )    Color: x / Appearance: x / SG: x / pH: x  Gluc: 75 mg/dL / Ketone: x  / Bili: x / Urobili: x   Blood: x / Protein: x / Nitrite: x   Leuk Esterase: x / RBC: x / WBC x   Sq Epi: x / Non Sq Epi: x / Bacteria: x          IMAGING & OTHER TESTS:  ***

## 2025-07-12 NOTE — H&P PST ADULT - SPO2 (%)
"  Start Time: 11:00  End Time: 11:59    Session format: Telehealth video visit  Session location: Outpatient clinic    Focus of treatment:   Problem List Items Addressed This Visit       Recurrent major depressive disorder, in partial remission - Primary     Session Content    The specific focus and goals for this session were mood management and interpersonal relationship building.     We addressed the following therapy components during this session:     Chon states that he feels he is doing well.  He has been able to get his diet back on track, is working out, and socializing.  Sleep is off a bit, but he is taking steps to address this as well.  Explored the way he has been able to not let temporary bumps throw him off course as much or as long as it used to.  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 Processed through the conditions that have led to him feeling open to that possibility, and the potential choice of \"adding complexities\" to his social relationships with dating.  Processed through this opening of desire that has shifted from a \"want to be desired\" to an \"experiencing of desire\".    " 97

## 2025-07-22 NOTE — H&P ADULT - NS NEC GEN PE MLT EXAM PC
No bruits; no thyromegaly or nodules [Nl] : Neurological [Cough] : cough [Short Stature] : short stature

## 2025-08-07 ENCOUNTER — LABORATORY RESULT (OUTPATIENT)
Age: 75
End: 2025-08-07

## 2025-08-14 ENCOUNTER — APPOINTMENT (OUTPATIENT)
Dept: INTERNAL MEDICINE | Facility: CLINIC | Age: 75
End: 2025-08-14

## 2025-08-14 VITALS — WEIGHT: 88 LBS | BODY MASS INDEX: 18.99 KG/M2 | HEIGHT: 57 IN

## 2025-08-14 VITALS — RESPIRATION RATE: 14 BRPM | HEART RATE: 72 BPM | SYSTOLIC BLOOD PRESSURE: 128 MMHG | DIASTOLIC BLOOD PRESSURE: 78 MMHG

## 2025-08-14 PROCEDURE — G2211 COMPLEX E/M VISIT ADD ON: CPT

## 2025-08-14 PROCEDURE — 99214 OFFICE O/P EST MOD 30 MIN: CPT

## 2025-09-15 ENCOUNTER — APPOINTMENT (OUTPATIENT)
Dept: CARDIOLOGY | Facility: CLINIC | Age: 75
End: 2025-09-15
Payer: MEDICARE

## 2025-09-15 PROCEDURE — A9500: CPT

## 2025-09-15 PROCEDURE — 78452 HT MUSCLE IMAGE SPECT MULT: CPT

## 2025-09-15 PROCEDURE — 93015 CV STRESS TEST SUPVJ I&R: CPT

## 2025-09-15 PROCEDURE — 93306 TTE W/DOPPLER COMPLETE: CPT

## (undated) DEVICE — TUBING SUCTION 20FT

## (undated) DEVICE — PACK IV START WITH CHG

## (undated) DEVICE — FOLEY HOLDER STATLOCK 2 WAY ADULT

## (undated) DEVICE — CATH IV SAFE BC 20G X 1.16" (PINK)

## (undated) DEVICE — CATH IV SAFE BC 22G X 1" (BLUE)

## (undated) DEVICE — SOL INJ NS 0.9% 500ML 2 PORT

## (undated) DEVICE — SYR ALLIANCE II INFLATION 60ML

## (undated) DEVICE — TUBING IV SET GRAVITY 3Y 100" MACRO

## (undated) DEVICE — BITE BLOCK ADULT 20 X 27MM (GREEN)

## (undated) DEVICE — TUBING SUCTION CONN 6FT STERILE

## (undated) DEVICE — BALLOON US ENDO

## (undated) DEVICE — RETRIEVER ROTH NET 360D 230X5X3CM

## (undated) DEVICE — SUCTION YANKAUER NO CONTROL VENT

## (undated) DEVICE — SENSOR O2 FINGER ADULT